# Patient Record
Sex: FEMALE | Race: WHITE | NOT HISPANIC OR LATINO | Employment: OTHER | URBAN - METROPOLITAN AREA
[De-identification: names, ages, dates, MRNs, and addresses within clinical notes are randomized per-mention and may not be internally consistent; named-entity substitution may affect disease eponyms.]

---

## 2017-01-05 RX ORDER — OMEGA-3-ACID ETHYL ESTERS 1 G/1
CAPSULE, LIQUID FILLED ORAL
Qty: 180 CAPSULE | Refills: 0
Start: 2017-01-05

## 2017-01-11 RX ORDER — OMEGA-3-ACID ETHYL ESTERS 1 G/1
CAPSULE, LIQUID FILLED ORAL
Qty: 180 CAPSULE | Refills: 0 | OUTPATIENT
Start: 2017-01-11

## 2017-01-16 RX ORDER — OMEGA-3-ACID ETHYL ESTERS 1 G/1
CAPSULE, LIQUID FILLED ORAL
Qty: 180 CAPSULE | Refills: 0 | Status: SHIPPED | OUTPATIENT
Start: 2017-01-16 | End: 2017-04-17 | Stop reason: SDUPTHER

## 2017-01-16 NOTE — TELEPHONE ENCOUNTER
----- Message from Landy Jordan sent at 2017  1:19 PM CST -----  Contact: SELF  Tonya Bucio  MRN: [id number]  : 1935  PCP: Tasha Atkins  Home Phone      [phone]  Work Phone      Not on file.  Mobile          [phone]      MESSAGE:   PT NEEDS A REFILL ON HER OMEGA 3 ACID    [facility]Y: [facility]    PHONE: [phone]

## 2017-01-18 ENCOUNTER — LAB VISIT (OUTPATIENT)
Dept: LAB | Facility: HOSPITAL | Age: 82
End: 2017-01-18
Attending: INTERNAL MEDICINE
Payer: MEDICARE

## 2017-01-18 DIAGNOSIS — I12.9 HYPERTENSIVE CHRONIC KIDNEY DISEASE: ICD-10-CM

## 2017-01-18 DIAGNOSIS — N18.31 CHRONIC KIDNEY DISEASE (CKD) STAGE G3A/A1, MODERATELY DECREASED GLOMERULAR FILTRATION RATE (GFR) BETWEEN 45-59 ML/MIN/1.73 SQUARE METER AND ALBUMINURIA CREATININE RATIO LESS THAN 30 MG/G: Primary | ICD-10-CM

## 2017-01-18 DIAGNOSIS — E55.9 UNSPECIFIED VITAMIN D DEFICIENCY: ICD-10-CM

## 2017-01-18 LAB
25(OH)D3+25(OH)D2 SERPL-MCNC: 15 NG/ML
ALBUMIN SERPL BCP-MCNC: 3.4 G/DL
ALP SERPL-CCNC: 108 U/L
ALT SERPL W/O P-5'-P-CCNC: 21 U/L
ANION GAP SERPL CALC-SCNC: 10 MMOL/L
AST SERPL-CCNC: 15 U/L
BASOPHILS # BLD AUTO: 0.02 K/UL
BASOPHILS NFR BLD: 0.3 %
BILIRUB SERPL-MCNC: 1.1 MG/DL
BUN SERPL-MCNC: 21 MG/DL
CALCIUM SERPL-MCNC: 10.4 MG/DL
CHLORIDE SERPL-SCNC: 106 MMOL/L
CO2 SERPL-SCNC: 26 MMOL/L
CREAT SERPL-MCNC: 1.3 MG/DL
DIFFERENTIAL METHOD: ABNORMAL
EOSINOPHIL # BLD AUTO: 0.5 K/UL
EOSINOPHIL NFR BLD: 6.8 %
ERYTHROCYTE [DISTWIDTH] IN BLOOD BY AUTOMATED COUNT: 15.2 %
EST. GFR  (AFRICAN AMERICAN): 45 ML/MIN/1.73 M^2
EST. GFR  (NON AFRICAN AMERICAN): 39 ML/MIN/1.73 M^2
GLUCOSE SERPL-MCNC: 115 MG/DL
HCT VFR BLD AUTO: 40.1 %
HGB BLD-MCNC: 12.8 G/DL
LYMPHOCYTES # BLD AUTO: 1.8 K/UL
LYMPHOCYTES NFR BLD: 26 %
MCH RBC QN AUTO: 29.4 PG
MCHC RBC AUTO-ENTMCNC: 31.9 %
MCV RBC AUTO: 92 FL
MONOCYTES # BLD AUTO: 0.6 K/UL
MONOCYTES NFR BLD: 9 %
NEUTROPHILS # BLD AUTO: 4 K/UL
NEUTROPHILS NFR BLD: 57.9 %
PHOSPHATE SERPL-MCNC: 2.3 MG/DL
PLATELET # BLD AUTO: 276 K/UL
PMV BLD AUTO: 10.6 FL
POTASSIUM SERPL-SCNC: 4.1 MMOL/L
PROT SERPL-MCNC: 7.6 G/DL
PTH-INTACT SERPL-MCNC: 401 PG/ML
RBC # BLD AUTO: 4.36 M/UL
SODIUM SERPL-SCNC: 142 MMOL/L
WBC # BLD AUTO: 6.92 K/UL

## 2017-01-18 PROCEDURE — 80053 COMPREHEN METABOLIC PANEL: CPT

## 2017-01-18 PROCEDURE — 82306 VITAMIN D 25 HYDROXY: CPT

## 2017-01-18 PROCEDURE — 36415 COLL VENOUS BLD VENIPUNCTURE: CPT

## 2017-01-18 PROCEDURE — 83970 ASSAY OF PARATHORMONE: CPT

## 2017-01-18 PROCEDURE — 85025 COMPLETE CBC W/AUTO DIFF WBC: CPT

## 2017-01-18 PROCEDURE — 84100 ASSAY OF PHOSPHORUS: CPT

## 2017-01-25 RX ORDER — ROSUVASTATIN CALCIUM 20 MG/1
TABLET, FILM COATED ORAL
Qty: 90 TABLET | Refills: 0 | Status: SHIPPED | OUTPATIENT
Start: 2017-01-25 | End: 2017-02-02 | Stop reason: CLARIF

## 2017-01-25 RX ORDER — ALLOPURINOL 300 MG/1
TABLET ORAL
Qty: 30 TABLET | Refills: 0 | Status: SHIPPED | OUTPATIENT
Start: 2017-01-25 | End: 2017-03-01 | Stop reason: SDUPTHER

## 2017-01-30 DIAGNOSIS — M17.0 PRIMARY OSTEOARTHRITIS OF BOTH KNEES: ICD-10-CM

## 2017-01-30 RX ORDER — HYDROCODONE BITARTRATE AND ACETAMINOPHEN 7.5; 325 MG/1; MG/1
1 TABLET ORAL
Qty: 30 TABLET | Refills: 0 | Status: SHIPPED | OUTPATIENT
Start: 2017-01-30 | End: 2017-02-22 | Stop reason: SDUPTHER

## 2017-01-30 NOTE — TELEPHONE ENCOUNTER
----- Message from Landy Jordan sent at 2017 10:22 AM CST -----  Contact: self  Tonya Bucio  MRN: [id number]  : 1935  PCP: Tasha Atkins  Home Phone      [phone]  Work Phone      Not on file.  Mobile          [phone]      MESSAGE:   Pt needs a refill on her pain meds.    Phone: [phone]

## 2017-01-31 NOTE — TELEPHONE ENCOUNTER
Fax received from [facility] pharmacy indicating that patient's insurance will not cover brand name Crestor. Pharmacy would like to substitute generic so insurance will cover. Please advise. Thanks.

## 2017-02-02 DIAGNOSIS — I10 ESSENTIAL HYPERTENSION: ICD-10-CM

## 2017-02-02 RX ORDER — METOPROLOL TARTRATE 50 MG/1
TABLET ORAL
Qty: 60 TABLET | Refills: 0 | Status: SHIPPED | OUTPATIENT
Start: 2017-02-02 | End: 2017-03-08 | Stop reason: SDUPTHER

## 2017-02-02 RX ORDER — ROSUVASTATIN CALCIUM 20 MG/1
20 TABLET, COATED ORAL DAILY
Qty: 90 TABLET | Refills: 1 | Status: SHIPPED | OUTPATIENT
Start: 2017-02-02 | End: 2017-10-05

## 2017-02-02 NOTE — TELEPHONE ENCOUNTER
Requested Prescriptions     Pending Prescriptions Disp Refills    rosuvastatin (CRESTOR) 20 MG tablet 90 tablet 1     Sig: Take 1 tablet (20 mg total) by mouth once daily.   Script pended.

## 2017-02-22 ENCOUNTER — HOSPITAL ENCOUNTER (OUTPATIENT)
Dept: RADIOLOGY | Facility: HOSPITAL | Age: 82
Discharge: HOME | End: 2017-02-22
Attending: INTERNAL MEDICINE
Payer: MEDICARE

## 2017-02-22 ENCOUNTER — OFFICE VISIT (OUTPATIENT)
Dept: INTERNAL MEDICINE | Facility: CLINIC | Age: 82
End: 2017-02-22
Payer: MEDICARE

## 2017-02-22 VITALS
WEIGHT: 277.56 LBS | HEIGHT: 68 IN | DIASTOLIC BLOOD PRESSURE: 64 MMHG | HEART RATE: 61 BPM | BODY MASS INDEX: 42.07 KG/M2 | SYSTOLIC BLOOD PRESSURE: 112 MMHG | OXYGEN SATURATION: 94 % | RESPIRATION RATE: 16 BRPM

## 2017-02-22 DIAGNOSIS — M17.0 PRIMARY OSTEOARTHRITIS OF BOTH KNEES: ICD-10-CM

## 2017-02-22 PROCEDURE — 99999 PR PBB SHADOW E&M-EST. PATIENT-LVL III: CPT | Mod: PBBFAC,,, | Performed by: INTERNAL MEDICINE

## 2017-02-22 PROCEDURE — 1157F ADVNC CARE PLAN IN RCRD: CPT | Mod: S$GLB,,, | Performed by: INTERNAL MEDICINE

## 2017-02-22 PROCEDURE — 3078F DIAST BP <80 MM HG: CPT | Mod: S$GLB,,, | Performed by: INTERNAL MEDICINE

## 2017-02-22 PROCEDURE — 99499 UNLISTED E&M SERVICE: CPT | Mod: S$GLB,,, | Performed by: INTERNAL MEDICINE

## 2017-02-22 PROCEDURE — 99213 OFFICE O/P EST LOW 20 MIN: CPT | Mod: S$GLB,,, | Performed by: INTERNAL MEDICINE

## 2017-02-22 PROCEDURE — 73560 X-RAY EXAM OF KNEE 1 OR 2: CPT | Mod: 26,50,, | Performed by: RADIOLOGY

## 2017-02-22 PROCEDURE — 1159F MED LIST DOCD IN RCRD: CPT | Mod: S$GLB,,, | Performed by: INTERNAL MEDICINE

## 2017-02-22 PROCEDURE — 1160F RVW MEDS BY RX/DR IN RCRD: CPT | Mod: S$GLB,,, | Performed by: INTERNAL MEDICINE

## 2017-02-22 PROCEDURE — 3074F SYST BP LT 130 MM HG: CPT | Mod: S$GLB,,, | Performed by: INTERNAL MEDICINE

## 2017-02-22 PROCEDURE — 73560 X-RAY EXAM OF KNEE 1 OR 2: CPT | Mod: 50,TC

## 2017-02-22 RX ORDER — HYDROCODONE BITARTRATE AND ACETAMINOPHEN 7.5; 325 MG/1; MG/1
1 TABLET ORAL
Qty: 30 TABLET | Refills: 0 | Status: SHIPPED | OUTPATIENT
Start: 2017-02-22 | End: 2017-03-29 | Stop reason: SDUPTHER

## 2017-02-22 RX ORDER — ERGOCALCIFEROL 1.25 MG/1
50000 CAPSULE ORAL
COMMUNITY
End: 2018-05-24

## 2017-02-22 RX ORDER — CINACALCET HYDROCHLORIDE 60 MG/1
TABLET, COATED ORAL
COMMUNITY
Start: 2017-01-27 | End: 2020-11-25 | Stop reason: SDUPTHER

## 2017-02-22 NOTE — PROGRESS NOTES
Subjective:       Patient ID: Tonya Bucio is a 81 y.o. female.    Chief Complaint: Osteoarthritis of both knees (pain med refill)    Chronic Pain  Past Medical History includes: chronic pain syndrome and degenerative joint disease. Patient states that the pain is chronic. Tonya describes pain involving the knee and lumbar spine. The onset of her pain began more than 1 year ago. Progression of pain since onset is waxing and waning. Patient describes pain as a 9/10. Tonya is currently treating her symptoms with hydrocodone/acetaminophen (Hycet, LorcetLortab, Norco, Vicodin). Patient has shown moderate improvement with current treatment.  Goals of therapy include: Improve ADL's and Improve Sleep. Tonya has previously tried hydrocodone/acetaminophen (Hycet, Lorcet, Lortab, Norco,Vicodin) to treat her pain. Associated symptoms include: leg pain. Previous Imaging studies include: X-Ray.  Patient has not had a drug screen. Patient does have a pain contract. Patient's history of substance abuse includes: none. Patient's psychiatric disorders include: none.    Review of Systems   Constitutional: Positive for fatigue. Negative for chills, fever and weight loss.   HENT: Negative for congestion, hearing loss, sinus pressure and sore throat.    Eyes: Negative for photophobia.   Respiratory: Negative for cough, choking, chest tightness, shortness of breath and wheezing.    Cardiovascular: Negative for chest pain and palpitations.   Gastrointestinal: Negative for blood in stool, nausea and vomiting.   Endocrine: Negative for polydipsia and polyphagia.   Genitourinary: Negative for dysuria and hematuria.   Musculoskeletal: Positive for arthralgias, back pain, gait problem and myalgias. Negative for neck pain.   Skin: Negative for pallor.   Neurological: Negative for dizziness, tingling, weakness and numbness.   Hematological: Does not bruise/bleed easily.   Psychiatric/Behavioral: Negative for confusion and suicidal ideas. The  patient is not nervous/anxious.        Objective:      Physical Exam   Constitutional: She is oriented to person, place, and time. She appears well-developed and well-nourished.   HENT:   Head: Normocephalic and atraumatic.   Right Ear: External ear normal.   Left Ear: External ear normal.   Mouth/Throat: Oropharynx is clear and moist.   Eyes: Conjunctivae and EOM are normal. Pupils are equal, round, and reactive to light.   Neck: Normal range of motion. Neck supple. No JVD present. No tracheal deviation present. No thyromegaly present.   Cardiovascular: Normal rate, regular rhythm, normal heart sounds and intact distal pulses.    Pulmonary/Chest: Effort normal and breath sounds normal. No respiratory distress. She has no wheezes. She has no rales. She exhibits no tenderness.   Abdominal: Soft. Bowel sounds are normal. She exhibits no distension and no mass. There is no tenderness. There is no rebound and no guarding.   Musculoskeletal: Normal range of motion. She exhibits no edema.   Bilateral knee oa changes.     Lymphadenopathy:     She has no cervical adenopathy.   Neurological: She is alert and oriented to person, place, and time. She has normal reflexes. No cranial nerve deficit. She exhibits normal muscle tone. Coordination normal.   Skin: Skin is warm and dry.   Psychiatric: She has a normal mood and affect.   Nursing note and vitals reviewed.      Assessment:       1. Primary osteoarthritis of both knees        Plan:   Tonya was seen today for osteoarthritis of both knees.    Diagnoses and all orders for this visit:    Primary osteoarthritis of both knees  -     hydrocodone-acetaminophen 7.5-325mg (NORCO) 7.5-325 mg per tablet; Take 1 tablet by mouth every 24 hours as needed for Pain.    we discussed narcotics for pain management :    Managing chronic pain with opioids is complicated and challenging. I explained to patient that Doctors need to know if patients can follow the treatment plan, if they get  desired responses from the meds, and if there are signs of developing addiction. Physicians use medication contracts to monitor patients adherence, or to help check that patients are compliant with the medications ordered. Such agreements are most commonly used when narcotic pain relievers are prescribed. Narcotics can sometimes become addictive if not taken as prescribed by a doctor.    The use of a pain management agreement allows for the documentation of understanding between a doctor and patient. Such documentation, when used as a means of facilitating care, can improve communication between doctors and patients.

## 2017-02-22 NOTE — MR AVS SNAPSHOT
[facility]  [address] [address]  Phone: [phone]  Fax: [phone]                  Tonya Bucio   2017 3:45 PM   Office Visit    Description:  Female : 1935   Provider:  Tasha Atkins MD   Department:  [facility]           Reason for Visit     Osteoarthritis of both knees           Diagnoses this Visit        Comments    Primary osteoarthritis of both knees                To Do List           Future Appointments        Provider Department Dept Phone    2017 8:00 AM NURSE, [facility] [phone]    2017 10:15 AM Tasha Atkins MD [facility] [phone]      Goals (5 Years of Data)     None      Follow-Up and Disposition     Return in about 3 months (around 2017).       These Medications        Disp Refills Start End    hydrocodone-acetaminophen 7.5-325mg (NORCO) 7.5-325 mg per tablet 30 tablet 0 2017     Take 1 tablet by mouth every 24 hours as needed for Pain. - Oral    Pharmacy: [facility] [address] [place] [address] #: [phone]         [facility]     [facility] Nurse Care Line -  Assistance  Registered nurses in the [facility] On Call Center provide clinical advisement, health education, appointment booking, and other advisory services.  Call for this free service at [phone].             Medications           Message regarding Medications     Verify the changes and/or additions to your medication regime listed below are the same as discussed with your clinician today.  If any of these changes or additions are incorrect, please notify your healthcare provider.             Verify that the below list of medications is an accurate representation of the medications you are currently taking.  If none reported, the list may be blank. If incorrect, please contact your healthcare provider. Carry this list with you in case of  "emergency.           Current Medications     allopurinol (ZYLOPRIM) 300 MG tablet TAKE ONE TABLET BY MOUTH ONCE DAILY    amlodipine (NORVASC) 10 MG tablet Take 10 mg by mouth once daily.     aspirin (ECOTRIN) 81 MG EC tablet Take 162 mg by mouth every other day.     back brace Misc one    blood sugar diagnostic (CONTOUR TEST STRIPS) Strp 1 strips strip bid    ergocalciferol (VITAMIN D2) 50,000 unit Cap Take 50,000 Units by mouth every 7 days.    hydrochlorothiazide (HYDRODIURIL) 25 MG tablet Take 1 tablet (25 mg total) by mouth every other day.    hydrocodone-acetaminophen 7.5-325mg (NORCO) 7.5-325 mg per tablet Take 1 tablet by mouth every 24 hours as needed for Pain.    levothyroxine (SYNTHROID) 75 MCG tablet TAKE ONE TABLET BY MOUTH ONCE DAILY    metoprolol tartrate (LOPRESSOR) 50 MG tablet TAKE ONE TABLET BY MOUTH TWICE DAILY    omega-3 acid ethyl esters (LOVAZA) 1 gram capsule TAKE ONE CAPSULE BY MOUTH TWICE DAILY    rivaroxaban (XARELTO) 20 mg Tab Take 1 tablet (20 mg total) by mouth once daily. To start after 15 mg BID for 21 days completed    rosuvastatin (CRESTOR) 20 MG tablet Take 1 tablet (20 mg total) by mouth once daily.    SENSIPAR 60 mg Tab     triamcinolone acetonide 0.1% (KENALOG) 0.1 % cream APPLY TOPICALLY TWICE DAILY    nitroGLYCERIN (NITROSTAT) 0.4 MG SL tablet Place 0.4 mg under the tongue every 5 (five) minutes as needed for Chest pain.           Clinical Reference Information           Your Vitals Were     BP Pulse Resp Height Weight SpO2    112/64 61 16 5' 8" (1.727 m) 125.9 kg (277 lb 9 oz) 94%    BMI                42.2 kg/m2          Blood Pressure          Most Recent Value    BP  112/64      Allergies as of 2/22/2017     No Known Allergies      Immunizations Administered on Date of Encounter - 2/22/2017     None      Orders Placed During Today's Visit      Normal Orders This Visit    Ambulatory referral to Orthopedics     Future Labs/Procedures Expected by Expires    X-Ray Knee 3 View " Bilateral  2/22/2017 2/22/2018      Language Assistance Services     ATTENTION: Language assistance services are available, free of charge. Please call [phone].      ATENCIÓN: Si habla hzao, tiene a white disposición servicios gratuitos de asistencia lingüística. Llame al [phone].     CHÚ Ý: N?u b?n nói Ti?ng Vi?t, có các d?ch v? h? tr? ngôn ng? mi?n phí dành cho b?n. G?i s? [phone].         [facility] complies with applicable Federal civil rights laws and does not discriminate on the basis of race, color, national origin, age, disability, or sex.

## 2017-02-22 NOTE — PROGRESS NOTES
Patient, Tonya Bucio (MRN #[id number]), presented with a recorded BMI of 42.2 kg/m^2 consistent with the definition of morbid obesity (ICD-10 E66.01). The patient's morbid obesity was monitored, evaluated, addressed and/or treated. This addendum to the medical record is made on 02/22/2017.

## 2017-02-24 ENCOUNTER — TELEPHONE (OUTPATIENT)
Dept: INTERNAL MEDICINE | Facility: CLINIC | Age: 82
End: 2017-02-24

## 2017-03-01 RX ORDER — ALLOPURINOL 300 MG/1
TABLET ORAL
Qty: 30 TABLET | Refills: 0 | Status: SHIPPED | OUTPATIENT
Start: 2017-03-01 | End: 2017-03-29 | Stop reason: SDUPTHER

## 2017-03-08 DIAGNOSIS — I10 ESSENTIAL HYPERTENSION: ICD-10-CM

## 2017-03-09 RX ORDER — METOPROLOL TARTRATE 50 MG/1
TABLET ORAL
Qty: 60 TABLET | Refills: 11 | Status: SHIPPED | OUTPATIENT
Start: 2017-03-09 | End: 2018-03-06 | Stop reason: SDUPTHER

## 2017-03-29 DIAGNOSIS — M17.0 PRIMARY OSTEOARTHRITIS OF BOTH KNEES: ICD-10-CM

## 2017-03-29 RX ORDER — HYDROCODONE BITARTRATE AND ACETAMINOPHEN 7.5; 325 MG/1; MG/1
1 TABLET ORAL
Qty: 30 TABLET | Refills: 0 | Status: SHIPPED | OUTPATIENT
Start: 2017-03-29 | End: 2017-04-26 | Stop reason: SDUPTHER

## 2017-03-29 RX ORDER — ALLOPURINOL 300 MG/1
TABLET ORAL
Qty: 30 TABLET | Refills: 0 | Status: SHIPPED | OUTPATIENT
Start: 2017-03-29 | End: 2017-04-26 | Stop reason: SDUPTHER

## 2017-03-29 NOTE — TELEPHONE ENCOUNTER
----- Message from Shania Casillas sent at 3/29/2017 11:01 AM CDT -----  Contact: self  Tonya Bucio  MRN: [id number]  : 1935  PCP: Tasha Atkins  Home Phone      [phone]  Work Phone      Not on file.  [employer]          [phone]      MESSAGE: katelin-------[phone]

## 2017-04-04 DIAGNOSIS — E11.9 TYPE 2 DIABETES MELLITUS WITHOUT COMPLICATION, UNSPECIFIED LONG TERM INSULIN USE STATUS: Primary | ICD-10-CM

## 2017-04-04 NOTE — TELEPHONE ENCOUNTER
----- Message from Landy Jordan sent at 2017  4:28 PM CDT -----  Contact: self  Tonya Bucio  MRN: [id number]  : 1935  PCP: Tasha Atkins  Home Phone      [phone]  Work Phone      Not on file.  Mobile          [phone]      MESSAGE:   Pt needs to get her diabetic supplies (meter, lancets, strips) sent in to [facility].     Phone: [phone]    Pharmacy: [facility] [place]

## 2017-04-17 RX ORDER — OMEGA-3-ACID ETHYL ESTERS 1 G/1
CAPSULE, LIQUID FILLED ORAL
Qty: 180 CAPSULE | Refills: 0 | Status: SHIPPED | OUTPATIENT
Start: 2017-04-17 | End: 2017-07-12 | Stop reason: SDUPTHER

## 2017-04-26 DIAGNOSIS — M17.0 PRIMARY OSTEOARTHRITIS OF BOTH KNEES: ICD-10-CM

## 2017-04-26 RX ORDER — HYDROCODONE BITARTRATE AND ACETAMINOPHEN 7.5; 325 MG/1; MG/1
1 TABLET ORAL
Qty: 30 TABLET | Refills: 0 | Status: SHIPPED | OUTPATIENT
Start: 2017-04-26 | End: 2017-05-30 | Stop reason: SDUPTHER

## 2017-04-26 RX ORDER — ALLOPURINOL 300 MG/1
TABLET ORAL
Qty: 30 TABLET | Refills: 11 | Status: SHIPPED | OUTPATIENT
Start: 2017-04-26 | End: 2018-04-30 | Stop reason: SDUPTHER

## 2017-04-26 NOTE — TELEPHONE ENCOUNTER
----- Message from Landy Jordan sent at 2017 10:52 AM CDT -----  Contact: self  Tonya Bucio  MRN: [id number]  : 1935  PCP: Tasha Atkins  Home Phone      [phone]  Work Phone      Not on file.  Mobile          [phone]      MESSAGE:   Pt needs to get a refill on her pain meds.    Phone: [phone]

## 2017-05-11 ENCOUNTER — CLINICAL SUPPORT (OUTPATIENT)
Dept: INTERNAL MEDICINE | Facility: CLINIC | Age: 82
End: 2017-05-11
Payer: MEDICARE

## 2017-05-11 DIAGNOSIS — E11.69 CONTROLLED TYPE 2 DIABETES MELLITUS WITH OTHER SPECIFIED COMPLICATION, WITHOUT LONG-TERM CURRENT USE OF INSULIN: ICD-10-CM

## 2017-05-11 DIAGNOSIS — I10 ESSENTIAL HYPERTENSION: ICD-10-CM

## 2017-05-11 DIAGNOSIS — E78.5 HYPERLIPIDEMIA, UNSPECIFIED HYPERLIPIDEMIA TYPE: ICD-10-CM

## 2017-05-11 LAB
ALBUMIN SERPL BCP-MCNC: 3.3 G/DL
ALP SERPL-CCNC: 95 U/L
ALT SERPL W/O P-5'-P-CCNC: 24 U/L
ANION GAP SERPL CALC-SCNC: 13 MMOL/L
AST SERPL-CCNC: 18 U/L
BASOPHILS # BLD AUTO: 0.03 K/UL
BASOPHILS NFR BLD: 0.4 %
BILIRUB SERPL-MCNC: 1 MG/DL
BUN SERPL-MCNC: 23 MG/DL
CALCIUM SERPL-MCNC: 10.5 MG/DL
CHLORIDE SERPL-SCNC: 106 MMOL/L
CHOLEST/HDLC SERPL: 3.8 {RATIO}
CO2 SERPL-SCNC: 25 MMOL/L
CREAT SERPL-MCNC: 1.1 MG/DL
CREAT UR-MCNC: 55.4 MG/DL
DIFFERENTIAL METHOD: ABNORMAL
EOSINOPHIL # BLD AUTO: 0.4 K/UL
EOSINOPHIL NFR BLD: 5.4 %
ERYTHROCYTE [DISTWIDTH] IN BLOOD BY AUTOMATED COUNT: 15.1 %
EST. GFR  (AFRICAN AMERICAN): 55 ML/MIN/1.73 M^2
EST. GFR  (NON AFRICAN AMERICAN): 48 ML/MIN/1.73 M^2
GLUCOSE SERPL-MCNC: 112 MG/DL
HCT VFR BLD AUTO: 38.8 %
HDL/CHOLESTEROL RATIO: 26.4 %
HDLC SERPL-MCNC: 140 MG/DL
HDLC SERPL-MCNC: 37 MG/DL
HGB BLD-MCNC: 12.5 G/DL
LDLC SERPL CALC-MCNC: 53.4 MG/DL
LYMPHOCYTES # BLD AUTO: 2.1 K/UL
LYMPHOCYTES NFR BLD: 27.7 %
MCH RBC QN AUTO: 30.1 PG
MCHC RBC AUTO-ENTMCNC: 32.2 %
MCV RBC AUTO: 94 FL
MICROALBUMIN UR DL<=1MG/L-MCNC: 19 UG/ML
MICROALBUMIN/CREATININE RATIO: 34.3 UG/MG
MONOCYTES # BLD AUTO: 0.6 K/UL
MONOCYTES NFR BLD: 8.2 %
NEUTROPHILS # BLD AUTO: 4.4 K/UL
NEUTROPHILS NFR BLD: 58.3 %
NONHDLC SERPL-MCNC: 103 MG/DL
PLATELET # BLD AUTO: 275 K/UL
PMV BLD AUTO: 11.3 FL
POTASSIUM SERPL-SCNC: 4 MMOL/L
PROT SERPL-MCNC: 7.6 G/DL
RBC # BLD AUTO: 4.15 M/UL
SODIUM SERPL-SCNC: 144 MMOL/L
TRIGL SERPL-MCNC: 248 MG/DL
TSH SERPL DL<=0.005 MIU/L-ACNC: 2.67 UIU/ML
WBC # BLD AUTO: 7.45 K/UL

## 2017-05-11 PROCEDURE — 80053 COMPREHEN METABOLIC PANEL: CPT

## 2017-05-11 PROCEDURE — 83036 HEMOGLOBIN GLYCOSYLATED A1C: CPT

## 2017-05-11 PROCEDURE — 85025 COMPLETE CBC W/AUTO DIFF WBC: CPT

## 2017-05-11 PROCEDURE — 36415 COLL VENOUS BLD VENIPUNCTURE: CPT | Mod: S$GLB,,, | Performed by: INTERNAL MEDICINE

## 2017-05-11 PROCEDURE — 84443 ASSAY THYROID STIM HORMONE: CPT

## 2017-05-11 PROCEDURE — 80061 LIPID PANEL: CPT

## 2017-05-11 PROCEDURE — 82570 ASSAY OF URINE CREATININE: CPT

## 2017-05-12 LAB
ESTIMATED AVG GLUCOSE: 131 MG/DL
HBA1C MFR BLD HPLC: 6.2 %

## 2017-05-18 ENCOUNTER — LAB VISIT (OUTPATIENT)
Dept: LAB | Facility: HOSPITAL | Age: 82
End: 2017-05-18
Attending: INTERNAL MEDICINE
Payer: MEDICARE

## 2017-05-18 ENCOUNTER — OFFICE VISIT (OUTPATIENT)
Dept: INTERNAL MEDICINE | Facility: CLINIC | Age: 82
End: 2017-05-18
Payer: MEDICARE

## 2017-05-18 VITALS
HEART RATE: 70 BPM | RESPIRATION RATE: 16 BRPM | OXYGEN SATURATION: 96 % | WEIGHT: 283.06 LBS | HEIGHT: 68 IN | SYSTOLIC BLOOD PRESSURE: 136 MMHG | DIASTOLIC BLOOD PRESSURE: 62 MMHG | BODY MASS INDEX: 42.9 KG/M2

## 2017-05-18 DIAGNOSIS — E78.5 HYPERLIPIDEMIA, UNSPECIFIED HYPERLIPIDEMIA TYPE: ICD-10-CM

## 2017-05-18 DIAGNOSIS — N18.30 TYPE 2 DIABETES MELLITUS WITH STAGE 3 CHRONIC KIDNEY DISEASE, WITHOUT LONG-TERM CURRENT USE OF INSULIN: ICD-10-CM

## 2017-05-18 DIAGNOSIS — E11.22 TYPE 2 DIABETES MELLITUS WITH STAGE 3 CHRONIC KIDNEY DISEASE, WITHOUT LONG-TERM CURRENT USE OF INSULIN: ICD-10-CM

## 2017-05-18 DIAGNOSIS — E66.01 MORBID OBESITY WITH BMI OF 40.0-44.9, ADULT: ICD-10-CM

## 2017-05-18 DIAGNOSIS — I12.9 UNSPECIFIED HYPERTENSIVE KIDNEY DISEASE WITH CHRONIC KIDNEY DISEASE STAGE I THROUGH STAGE IV, OR UNSPECIFIED(403.90): ICD-10-CM

## 2017-05-18 DIAGNOSIS — I10 ESSENTIAL HYPERTENSION: Primary | ICD-10-CM

## 2017-05-18 DIAGNOSIS — N18.30 CHRONIC KIDNEY DISEASE, STAGE III (MODERATE): Primary | ICD-10-CM

## 2017-05-18 DIAGNOSIS — E03.9 ACQUIRED HYPOTHYROIDISM: ICD-10-CM

## 2017-05-18 LAB
ALBUMIN SERPL BCP-MCNC: 3.4 G/DL
ALP SERPL-CCNC: 94 U/L
ALT SERPL W/O P-5'-P-CCNC: 26 U/L
ANION GAP SERPL CALC-SCNC: 14 MMOL/L
AST SERPL-CCNC: 16 U/L
BASOPHILS # BLD AUTO: 0.03 K/UL
BASOPHILS NFR BLD: 0.3 %
BILIRUB SERPL-MCNC: 1.1 MG/DL
BUN SERPL-MCNC: 27 MG/DL
CALCIUM SERPL-MCNC: 10.7 MG/DL
CHLORIDE SERPL-SCNC: 105 MMOL/L
CO2 SERPL-SCNC: 24 MMOL/L
CREAT SERPL-MCNC: 1.3 MG/DL
DIFFERENTIAL METHOD: ABNORMAL
EOSINOPHIL # BLD AUTO: 0.4 K/UL
EOSINOPHIL NFR BLD: 4.2 %
ERYTHROCYTE [DISTWIDTH] IN BLOOD BY AUTOMATED COUNT: 14.9 %
EST. GFR  (AFRICAN AMERICAN): 45 ML/MIN/1.73 M^2
EST. GFR  (NON AFRICAN AMERICAN): 39 ML/MIN/1.73 M^2
GLUCOSE SERPL-MCNC: 115 MG/DL
HCT VFR BLD AUTO: 39.9 %
HGB BLD-MCNC: 13 G/DL
LYMPHOCYTES # BLD AUTO: 2.2 K/UL
LYMPHOCYTES NFR BLD: 24 %
MCH RBC QN AUTO: 30.4 PG
MCHC RBC AUTO-ENTMCNC: 32.6 %
MCV RBC AUTO: 93 FL
MONOCYTES # BLD AUTO: 0.7 K/UL
MONOCYTES NFR BLD: 7.4 %
NEUTROPHILS # BLD AUTO: 5.8 K/UL
NEUTROPHILS NFR BLD: 64.1 %
PHOSPHATE SERPL-MCNC: 2.8 MG/DL
PLATELET # BLD AUTO: 299 K/UL
PMV BLD AUTO: 10.3 FL
POTASSIUM SERPL-SCNC: 4.1 MMOL/L
PROT SERPL-MCNC: 7.6 G/DL
PTH-INTACT SERPL-MCNC: 321 PG/ML
RBC # BLD AUTO: 4.27 M/UL
SODIUM SERPL-SCNC: 143 MMOL/L
WBC # BLD AUTO: 9.04 K/UL

## 2017-05-18 PROCEDURE — 3075F SYST BP GE 130 - 139MM HG: CPT | Mod: S$GLB,,, | Performed by: INTERNAL MEDICINE

## 2017-05-18 PROCEDURE — 36415 COLL VENOUS BLD VENIPUNCTURE: CPT

## 2017-05-18 PROCEDURE — 1159F MED LIST DOCD IN RCRD: CPT | Mod: S$GLB,,, | Performed by: INTERNAL MEDICINE

## 2017-05-18 PROCEDURE — 80053 COMPREHEN METABOLIC PANEL: CPT

## 2017-05-18 PROCEDURE — 1160F RVW MEDS BY RX/DR IN RCRD: CPT | Mod: S$GLB,,, | Performed by: INTERNAL MEDICINE

## 2017-05-18 PROCEDURE — 83970 ASSAY OF PARATHORMONE: CPT

## 2017-05-18 PROCEDURE — 99214 OFFICE O/P EST MOD 30 MIN: CPT | Mod: S$GLB,,, | Performed by: INTERNAL MEDICINE

## 2017-05-18 PROCEDURE — 84100 ASSAY OF PHOSPHORUS: CPT

## 2017-05-18 PROCEDURE — 85025 COMPLETE CBC W/AUTO DIFF WBC: CPT

## 2017-05-18 PROCEDURE — 99499 UNLISTED E&M SERVICE: CPT | Mod: S$GLB,,, | Performed by: INTERNAL MEDICINE

## 2017-05-18 PROCEDURE — 99999 PR PBB SHADOW E&M-EST. PATIENT-LVL III: CPT | Mod: PBBFAC,,, | Performed by: INTERNAL MEDICINE

## 2017-05-18 PROCEDURE — 3078F DIAST BP <80 MM HG: CPT | Mod: S$GLB,,, | Performed by: INTERNAL MEDICINE

## 2017-05-18 RX ORDER — HYDROCHLOROTHIAZIDE 12.5 MG/1
TABLET ORAL
COMMUNITY
Start: 2017-04-19 | End: 2017-10-05

## 2017-05-18 NOTE — MR AVS SNAPSHOT
[place] [facility]  [address] [address]  Phone: [phone]  Fax: [phone]                  Tonya Bucio   2017 10:15 AM   Office Visit    Description:  Female : 1935   Provider:  Tasha Atkins MD   Department:  [place] [facility]           Reason for Visit     Hypertension     Hyperlipidemia     Arthritis     Thyroid Problem     Diabetes           Diagnoses this Visit        Comments    Essential hypertension    -  Primary     Type 2 diabetes mellitus with stage 3 chronic kidney disease, without long-term current use of insulin         Acquired hypothyroidism         Hyperlipidemia, unspecified hyperlipidemia type         Morbid obesity with BMI of 40.0-44.9, adult                To Do List           Goals (5 Years of Data)     None      Follow-Up and Disposition     Return in about 6 months (around 2017).      [facility]     [facility] On [facility] Nurse Care Line -  Assistance  Unless otherwise directed by your provider, please contact [facility], our nurse care line that is available for  assistance.     Registered nurses in the [facility] On Call Center provide: appointment scheduling, clinical advisement, health education, and other advisory services.  Call: [phone] (toll free)               Medications           Message regarding Medications     Verify the changes and/or additions to your medication regime listed below are the same as discussed with your clinician today.  If any of these changes or additions are incorrect, please notify your healthcare provider.             Verify that the below list of medications is an accurate representation of the medications you are currently taking.  If none reported, the list may be blank. If incorrect, please contact your healthcare provider. Carry this list with you in case of emergency.           Current Medications     allopurinol (ZYLOPRIM) 300 MG tablet TAKE ONE TABLET BY  "MOUTH ONCE DAILY    amlodipine (NORVASC) 10 MG tablet Take 10 mg by mouth once daily.     aspirin (ECOTRIN) 81 MG EC tablet Take 162 mg by mouth every other day.     back brace [facility] one    diabetic supplies, [employer]. [facility] TRUE METRIX GLUCOMETER. USE AS DIRECTED TO TEST BLOOD SUGAR ONCE DAILY    diabetic supplies, [employer]. Misc TRUE METRIX TEST STRIPS. USE AS DIRECTED TO TEST BLOOD SUGAR DAILY.    diabetic supplies, [facility] LANCETS. USE AS DIRECTED TO TEST BLOOD SUGAR DAILY.    ergocalciferol (VITAMIN D2) 50,000 unit Cap Take 50,000 Units by mouth every 7 days.    hydrochlorothiazide (HYDRODIURIL) 12.5 MG Tab     hydrocodone-acetaminophen 7.5-325mg (NORCO) 7.5-325 mg per tablet Take 1 tablet by mouth every 24 hours as needed for Pain.    levothyroxine (SYNTHROID) 75 MCG tablet TAKE ONE TABLET BY MOUTH ONCE DAILY    metoprolol tartrate (LOPRESSOR) 50 MG tablet TAKE ONE TABLET BY MOUTH TWICE DAILY    omega-3 acid ethyl esters (LOVAZA) 1 gram capsule TAKE ONE CAPSULE BY MOUTH TWICE DAILY    rivaroxaban (XARELTO) 20 mg Tab Take 1 tablet (20 mg total) by mouth once daily. To start after 15 mg BID for 21 days completed    rosuvastatin (CRESTOR) 20 MG tablet Take 1 tablet (20 mg total) by mouth once daily.    SENSIPAR 60 mg Tab     triamcinolone acetonide 0.1% (KENALOG) 0.1 % cream APPLY TOPICALLY TWICE DAILY    nitroGLYCERIN (NITROSTAT) 0.4 MG SL tablet Place 0.4 mg under the tongue every 5 (five) minutes as needed for Chest pain.           Clinical Reference Information           Your Vitals Were     BP Pulse Resp Height Weight SpO2    136/62 70 16 5' 8" (1.727 m) 128.4 kg (283 lb 1.1 oz) 96%    BMI                43.04 kg/m2          Blood Pressure          Most Recent Value    BP  136/62      Allergies as of 5/18/2017     No Known Allergies      Immunizations Administered on Date of Encounter - 5/18/2017     None      Orders Placed During Today's Visit     Future Labs/Procedures Expected by Expires    CBC " auto differential  11/14/2017 (Approximate) 5/18/2018    Comprehensive metabolic panel  11/14/2017 (Approximate) 5/18/2018    Hemoglobin A1c  11/14/2017 (Approximate) 5/18/2018    Lipid panel  11/14/2017 (Approximate) 5/18/2018    Microalbumin/creatinine urine ratio  11/14/2017 (Approximate) 5/18/2018    TSH  11/14/2017 (Approximate) 5/18/2018      Language Assistance Services     ATTENTION: Language assistance services are available, free of charge. Please call [phone].      ATENCIÓN: Si habla español, tiene a white disposición servicios gratuitos de asistencia lingüística. Llame al [phone].     CHÚ Ý: N?u b?n nói Ti?ng Vi?t, có các d?ch v? h? tr? ngôn ng? mi?n phí dành cho b?n. G?i s? [phone].         [facility] complies with applicable Federal civil rights laws and does not discriminate on the basis of race, color, national origin, age, disability, or sex.

## 2017-05-18 NOTE — PROGRESS NOTES
"Subjective:       Patient ID: Tonya Bucio is a 81 y.o. female.    Chief Complaint: Hypertension (follow up with lab review); Hyperlipidemia; Arthritis; Thyroid Problem; and Diabetes    HPI Comments: Tonya Bucio is a 81 y.o. female who presents for Type II DM, Hypertension, and Hyperlipidemia follow up. Labs were reviewed with patient today.      Hypertension   This is a chronic problem. The current episode started more than 1 year ago. The problem has been gradually worsening since onset. The problem is controlled. Pertinent negatives include no chest pain, neck pain, palpitations or shortness of breath. Past treatments include calcium channel blockers and beta blockers. Hypertensive end-organ damage includes a thyroid problem.   Hyperlipidemia   This is a chronic problem. The current episode started more than 1 year ago. The problem is controlled. Recent lipid tests were reviewed and are low. Exacerbating diseases include obesity. Associated symptoms include myalgias. Pertinent negatives include no chest pain, leg pain or shortness of breath.   Arthritis   Presents for follow-up (R hand swollen for 2 -3 weeks . " my gout is acting Up ") visit. Associated symptoms include fatigue and rash. Pertinent negatives include no dysuria or fever. Compliance with total regimen is 51-75%.   Thyroid Problem   Presents for follow-up visit. Symptoms include fatigue. Patient reports no anxiety or palpitations. Her past medical history is significant for hyperlipidemia.   Diabetes   She presents for her follow-up diabetic visit. She has type 2 diabetes mellitus. There are no hypoglycemic associated symptoms. Pertinent negatives for hypoglycemia include no confusion, dizziness, nervousness/anxiousness or pallor. Associated symptoms include fatigue. Pertinent negatives for diabetes include no chest pain, no polydipsia, no polyphagia and no weakness. There are no hypoglycemic complications. Symptoms are stable. There are no " diabetic complications. Current diabetic treatment includes diet. She is following a diabetic diet. Her breakfast blood glucose range is generally 110-130 mg/dl. Her dinner blood glucose range is generally  mg/dl. An ACE inhibitor/angiotensin II receptor blocker is being taken.   Head and Neck Mass:   Chronicity:  New (R clavicuomandibular area swelling : for 2 weeks . non tneder , no heat ; no pain ; no injury ; walks with walker )   Associated symptoms: myalgias.  No sore throat, no chills, no fever, no shortness of breath and no sore throat.Aggravated by:  Nothing  Treatments tried:  Nothing  Medication Refill   Associated symptoms include arthralgias, fatigue, myalgias and a rash. Pertinent negatives include no chest pain, chills, congestion, coughing, fever, nausea, neck pain, numbness, sore throat, vomiting or weakness.     Review of Systems   Constitutional: Positive for fatigue. Negative for chills and fever.   HENT: Negative for congestion and sore throat.    Respiratory: Negative for cough and shortness of breath.    Cardiovascular: Negative for chest pain and palpitations.   Gastrointestinal: Negative for nausea and vomiting.   Endocrine: Negative for polydipsia and polyphagia.   Genitourinary: Negative for dysuria.   Musculoskeletal: Positive for arthralgias, arthritis and myalgias. Negative for neck pain.   Skin: Positive for rash. Negative for pallor.   Neurological: Negative for dizziness, weakness and numbness.   Psychiatric/Behavioral: Negative for confusion. The patient is not nervous/anxious.        Objective:      Physical Exam   Constitutional: She is oriented to person, place, and time. She appears well-developed and well-nourished.   HENT:   Head: Normocephalic and atraumatic.   Right Ear: External ear normal.   Left Ear: External ear normal.   Mouth/Throat: Oropharynx is clear and moist.   Eyes: Conjunctivae and EOM are normal. Pupils are equal, round, and reactive to light.   Neck:  Normal range of motion. Neck supple. No JVD present. No tracheal deviation present. No thyromegaly present.   Cardiovascular: Normal rate, regular rhythm, normal heart sounds and intact distal pulses.        Pulmonary/Chest: Effort normal and breath sounds normal. No respiratory distress. She has no wheezes. She has no rales. She exhibits no tenderness.   Abdominal: Soft. Bowel sounds are normal. She exhibits no distension and no mass. There is no tenderness. There is no rebound and no guarding.   Musculoskeletal: Normal range of motion. She exhibits no edema.   Bilateral knee oa changes.     Feet:   Left Foot:   Protective Sensation: 6 sites sensed.   Lymphadenopathy:     She has no cervical adenopathy.   Neurological: She is alert and oriented to person, place, and time. She has normal reflexes. No cranial nerve deficit. She exhibits normal muscle tone. Coordination normal.   Skin: Skin is warm and dry.        R clavicular head where it meets manubrium is slightly swollen; no hyperemia, no tenderness   Psychiatric: She has a normal mood and affect.   Nursing note and vitals reviewed.      Assessment:       1. Essential hypertension    2. Type 2 diabetes mellitus with stage 3 chronic kidney disease, without long-term current use of insulin    3. Acquired hypothyroidism    4. Hyperlipidemia, unspecified hyperlipidemia type    5. Morbid obesity with BMI of 40.0-44.9, adult        Plan:   Tonya was seen today for hypertension, hyperlipidemia, arthritis, thyroid problem and diabetes.    Diagnoses and all orders for this visit:    Essential hypertension  -     CBC auto differential; Future  -     Comprehensive metabolic panel; Future    Well controlled.  Continue same medication and dose.  1. Keep weight close to ideal body weight.   2.   Avoid high salt foods (olives, pickles, smoked meats, salted potato chips, etc.).   Do not add salt to your food at the table.   Use only small amounts of salt when cooking.   3. Begin  an exercise program. Discuss with your doctor what type of exercise program would be best for you. It doesn't have to be difficult. Even brisk walking for 20 minutes three times a week is a good form of exercise.   4. Avoid medicines which contain heart stimulants. This includes many cold and sinus decongestant pills and sprays as well as diet pills. Check the warnings about hypertension on the label. Stimulants such as amphetamine or cocaine could be lethal for someone with hypertension. Never take these.    Type 2 diabetes mellitus with stage 3 chronic kidney disease, without long-term current use of insulin  -     Hemoglobin A1c; Future  -     Microalbumin/creatinine urine ratio; Future  Patient has uncontrolled Diabetes .  We discussed about diet ;low in calories. Avoid sweats, sodas.  Also increasing activity;walking 2-3 miles a day.  I also adjusted medications and gave patient  instructions about adherence to plan.  Goal of  A1c  less than 7 % stressed.  Also goal of LDL less than 70 highlighted to patient.    Acquired hypothyroidism  -     TSH; Future  Well controlled.  Continue same medication and dose.  Hyperlipidemia, unspecified hyperlipidemia type  -     Lipid panel; Future  Limit the cholesterol in your diet to less than 300 mg per day.   Fats should contribute no more than 20 to 35% of your daily calories.   Less than 7 to 10% of your calories should come from saturated fat.   Avoid saturated fat products e.g., Butter, some oils, meat, and poultry fat contain a lot of saturated fat.   Check food labels for fat and cholesterol content. Choose the foods with less fat per serving.   Limit the amount of butter and margarine you eat.   Use salad dressings and margarine made with polyunsaturated and monounsaturated fats.   Use egg whites or egg substitutes rather than whole eggs.   Replace whole-milk dairy products with nonfat or low-fat milk, cheese, spreads, and yogurt.   Eat skinless chicken, turkey,  fish, and meatless entrees more often than red meat.   Choose lean cuts of meat and trim off all visible fat. Keep portion sizes moderate.   Avoid fatty desserts such as ice cream, cream-filled cakes, and cheesecakes. Choose fresh fruits, nonfat frozen yogurt, Popsicles, etc.   Reduce the amount of fried foods, vending machine food, and fast food you eat.   Eat fruits and vegetables (especially fresh fruits and leafy vegetables), beans, and whole grains daily. The fiber in these foods helps lower cholesterol.   Look for low-fat or nonfat varieties of the foods you like to eat, or look for substitutes.   You may need to exercise 60 minutes a day to prevent weight gain and 90 minutes a day to lose weight.  Morbid obesity with BMI of 40.0-44.9, adult    # The patient is asked to make an attempt to improve diet and exercise patterns to aid in medical management of this problem.     # Eat  5 small meals a day.     # Cut out high carbohydrate  foods : bread, rice, pasta, potatoes.     # Exercise/walk 5x/week for at least 30-45  minutes.

## 2017-05-30 DIAGNOSIS — M17.0 PRIMARY OSTEOARTHRITIS OF BOTH KNEES: ICD-10-CM

## 2017-05-30 RX ORDER — HYDROCODONE BITARTRATE AND ACETAMINOPHEN 7.5; 325 MG/1; MG/1
1 TABLET ORAL
Qty: 30 TABLET | Refills: 0 | Status: SHIPPED | OUTPATIENT
Start: 2017-05-30 | End: 2017-06-28 | Stop reason: SDUPTHER

## 2017-06-28 DIAGNOSIS — M17.0 PRIMARY OSTEOARTHRITIS OF BOTH KNEES: ICD-10-CM

## 2017-06-28 RX ORDER — HYDROCODONE BITARTRATE AND ACETAMINOPHEN 7.5; 325 MG/1; MG/1
1 TABLET ORAL
Qty: 30 TABLET | Refills: 0 | Status: SHIPPED | OUTPATIENT
Start: 2017-06-28 | End: 2017-07-26 | Stop reason: SDUPTHER

## 2017-06-28 NOTE — TELEPHONE ENCOUNTER
----- Message from Shania Casillas sent at 2017 10:52 AM CDT -----  Contact: self  Tonya Bucio  MRN: [id number]  : 1935  PCP: Tasha Atkins  Home Phone      [phone]  Work Phone      Not on file.  [employer]          [phone]      MESSAGE: katelin-------[phone]

## 2017-07-12 ENCOUNTER — OFFICE VISIT (OUTPATIENT)
Dept: INTERNAL MEDICINE | Facility: CLINIC | Age: 82
End: 2017-07-12
Payer: MEDICARE

## 2017-07-12 VITALS
OXYGEN SATURATION: 96 % | HEART RATE: 65 BPM | RESPIRATION RATE: 16 BRPM | WEIGHT: 285.25 LBS | DIASTOLIC BLOOD PRESSURE: 68 MMHG | HEIGHT: 68 IN | BODY MASS INDEX: 43.23 KG/M2 | SYSTOLIC BLOOD PRESSURE: 122 MMHG

## 2017-07-12 DIAGNOSIS — Z02.4 DRIVER'S PERMIT PE (PHYSICAL EXAMINATION): Primary | ICD-10-CM

## 2017-07-12 PROCEDURE — 99213 OFFICE O/P EST LOW 20 MIN: CPT | Mod: S$GLB,,, | Performed by: INTERNAL MEDICINE

## 2017-07-12 PROCEDURE — 1159F MED LIST DOCD IN RCRD: CPT | Mod: S$GLB,,, | Performed by: INTERNAL MEDICINE

## 2017-07-12 PROCEDURE — 99999 PR PBB SHADOW E&M-EST. PATIENT-LVL III: CPT | Mod: PBBFAC,,, | Performed by: INTERNAL MEDICINE

## 2017-07-12 RX ORDER — LANCETS 33 GAUGE
EACH MISCELLANEOUS
COMMUNITY
Start: 2017-04-04 | End: 2018-03-07 | Stop reason: SDUPTHER

## 2017-07-12 RX ORDER — CALCIUM CITRATE/VITAMIN D3 200MG-6.25
TABLET ORAL
COMMUNITY
Start: 2017-07-05 | End: 2018-03-07 | Stop reason: SDUPTHER

## 2017-07-12 RX ORDER — TRIAMCINOLONE ACETONIDE 1 MG/G
CREAM TOPICAL
Qty: 80 G | Refills: 1 | Status: SHIPPED | OUTPATIENT
Start: 2017-07-12 | End: 2019-08-14

## 2017-07-12 RX ORDER — OMEGA-3-ACID ETHYL ESTERS 1 G/1
1 CAPSULE, LIQUID FILLED ORAL 2 TIMES DAILY
Qty: 180 CAPSULE | Refills: 0 | Status: SHIPPED | OUTPATIENT
Start: 2017-07-12 | End: 2017-10-17 | Stop reason: SDUPTHER

## 2017-07-12 RX ORDER — BLOOD-GLUCOSE METER
EACH MISCELLANEOUS
COMMUNITY
Start: 2017-04-04 | End: 2018-03-07 | Stop reason: SDUPTHER

## 2017-07-12 NOTE — PROGRESS NOTES
Subjective:       Patient ID: Tonya Bucio is a 82 y.o. female.    Chief Complaint: Medical Examination Form (dmv licence )    Tonya Bucio is a 80 y.o. female  Here for  physical.    She reports walks with cane ;went for [employer] and was sent home to have a physical      Review of Systems   Constitutional: Positive for fatigue. Negative for chills and fever.   HENT: Negative for congestion, hearing loss, sinus pressure and sore throat.    Eyes: Negative for photophobia.   Respiratory: Negative for cough, choking, chest tightness, shortness of breath and wheezing.    Cardiovascular: Negative for chest pain and palpitations.   Gastrointestinal: Negative for blood in stool, nausea and vomiting.   Endocrine: Negative for polydipsia and polyphagia.   Genitourinary: Negative for dysuria and hematuria.   Musculoskeletal: Positive for arthralgias, back pain, gait problem and myalgias. Negative for neck pain.   Skin: Positive for rash. Negative for pallor.        R leg chronic hyperemia ; ch venous congestion with possible cellulitis   Neurological: Negative for dizziness, weakness and numbness.   Hematological: Does not bruise/bleed easily.   Psychiatric/Behavioral: Negative for confusion and suicidal ideas. The patient is not nervous/anxious.        Objective:      Physical Exam   Constitutional: She is oriented to person, place, and time. She appears well-developed and well-nourished.   HENT:   Head: Normocephalic and atraumatic.   Right Ear: External ear normal.   Left Ear: External ear normal.   Mouth/Throat: Oropharynx is clear and moist.   Eyes: Conjunctivae and EOM are normal. Pupils are equal, round, and reactive to light.   Neck: Normal range of motion. Neck supple. No JVD present. No tracheal deviation present. No thyromegaly present.   Cardiovascular: Normal rate, regular rhythm, normal heart sounds and intact distal pulses.    Pulmonary/Chest: Effort normal and breath sounds normal. No respiratory  distress. She has no wheezes. She has no rales. She exhibits no tenderness.   Abdominal: Soft. Bowel sounds are normal. She exhibits no distension and no mass. There is no tenderness. There is no rebound and no guarding.   Musculoskeletal: Normal range of motion. She exhibits no edema.   Bilateral knee oa changes.     Lymphadenopathy:     She has no cervical adenopathy.   Neurological: She is alert and oriented to person, place, and time. She has normal reflexes. No cranial nerve deficit. She exhibits normal muscle tone. Coordination normal.   Skin: Skin is warm and dry.   R leg chronic hyperemia ; ch venous congestion    Psychiatric: She has a normal mood and affect.   Nursing note and vitals reviewed.      Assessment:       1. 's permit PE (physical examination)        Plan:   Tonya was seen today for medical examination form.    Diagnoses and all orders for this visit:    's permit PE (physical examination)    paperwork completed.      's permit PE (physical examination)    Other orders  -     omega-3 acid ethyl esters (LOVAZA) 1 gram capsule; Take 1 capsule (1 g total) by mouth 2 (two) times daily.  Dispense: 180 capsule; Refill: 0  -     triamcinolone acetonide 0.1% (KENALOG) 0.1 % cream; APPLY TOPICALLY TWICE DAILY  Dispense: 80 g; Refill: 1

## 2017-07-13 DIAGNOSIS — R07.9 CHEST PAIN, UNSPECIFIED: ICD-10-CM

## 2017-07-17 RX ORDER — NITROGLYCERIN 0.4 MG/1
TABLET SUBLINGUAL
Qty: 25 TABLET | Refills: 0 | Status: SHIPPED | OUTPATIENT
Start: 2017-07-17 | End: 2019-08-14

## 2017-07-26 DIAGNOSIS — M17.0 PRIMARY OSTEOARTHRITIS OF BOTH KNEES: ICD-10-CM

## 2017-07-26 RX ORDER — HYDROCODONE BITARTRATE AND ACETAMINOPHEN 7.5; 325 MG/1; MG/1
1 TABLET ORAL
Qty: 30 TABLET | Refills: 0 | Status: SHIPPED | OUTPATIENT
Start: 2017-07-26 | End: 2017-08-30 | Stop reason: SDUPTHER

## 2017-07-26 NOTE — TELEPHONE ENCOUNTER
----- Message from Shania Casillas sent at 2017 11:50 AM CDT -----  Contact: self  Tonya Bucio  MRN: [id number]  : 1935  PCP: Tasha Atkins  Home Phone      [phone]  Work Phone      Not on file.  [employer]          [phone]      MESSAGE: refill---norco-----[phone]

## 2017-08-23 ENCOUNTER — LAB VISIT (OUTPATIENT)
Dept: LAB | Facility: HOSPITAL | Age: 82
End: 2017-08-23
Attending: INTERNAL MEDICINE
Payer: MEDICARE

## 2017-08-23 ENCOUNTER — OFFICE VISIT (OUTPATIENT)
Dept: INTERNAL MEDICINE | Facility: CLINIC | Age: 82
End: 2017-08-23
Payer: MEDICARE

## 2017-08-23 VITALS
DIASTOLIC BLOOD PRESSURE: 56 MMHG | WEIGHT: 285.06 LBS | RESPIRATION RATE: 16 BRPM | SYSTOLIC BLOOD PRESSURE: 130 MMHG | OXYGEN SATURATION: 93 % | HEART RATE: 66 BPM | BODY MASS INDEX: 43.2 KG/M2 | HEIGHT: 68 IN

## 2017-08-23 DIAGNOSIS — K62.5 RECTAL BLEEDING: ICD-10-CM

## 2017-08-23 DIAGNOSIS — K62.5 RECTAL BLEEDING: Primary | ICD-10-CM

## 2017-08-23 LAB
BASOPHILS # BLD AUTO: 0.02 K/UL
BASOPHILS NFR BLD: 0.2 %
DIFFERENTIAL METHOD: NORMAL
EOSINOPHIL # BLD AUTO: 0.2 K/UL
EOSINOPHIL NFR BLD: 1.9 %
ERYTHROCYTE [DISTWIDTH] IN BLOOD BY AUTOMATED COUNT: 14.5 %
HCT VFR BLD AUTO: 41.1 %
HGB BLD-MCNC: 13.2 G/DL
LYMPHOCYTES # BLD AUTO: 3.4 K/UL
LYMPHOCYTES NFR BLD: 28.4 %
MCH RBC QN AUTO: 30 PG
MCHC RBC AUTO-ENTMCNC: 32.1 G/DL
MCV RBC AUTO: 93 FL
MONOCYTES # BLD AUTO: 0.7 K/UL
MONOCYTES NFR BLD: 6.2 %
NEUTROPHILS # BLD AUTO: 7.5 K/UL
NEUTROPHILS NFR BLD: 63.3 %
PLATELET # BLD AUTO: 302 K/UL
PMV BLD AUTO: 10.5 FL
RBC # BLD AUTO: 4.4 M/UL
WBC # BLD AUTO: 11.81 K/UL

## 2017-08-23 PROCEDURE — 1159F MED LIST DOCD IN RCRD: CPT | Mod: S$GLB,,, | Performed by: INTERNAL MEDICINE

## 2017-08-23 PROCEDURE — 36415 COLL VENOUS BLD VENIPUNCTURE: CPT

## 2017-08-23 PROCEDURE — 3008F BODY MASS INDEX DOCD: CPT | Mod: S$GLB,,, | Performed by: INTERNAL MEDICINE

## 2017-08-23 PROCEDURE — 3078F DIAST BP <80 MM HG: CPT | Mod: S$GLB,,, | Performed by: INTERNAL MEDICINE

## 2017-08-23 PROCEDURE — 99999 PR PBB SHADOW E&M-EST. PATIENT-LVL III: CPT | Mod: PBBFAC,,, | Performed by: INTERNAL MEDICINE

## 2017-08-23 PROCEDURE — 85025 COMPLETE CBC W/AUTO DIFF WBC: CPT

## 2017-08-23 PROCEDURE — 3075F SYST BP GE 130 - 139MM HG: CPT | Mod: S$GLB,,, | Performed by: INTERNAL MEDICINE

## 2017-08-23 PROCEDURE — 99213 OFFICE O/P EST LOW 20 MIN: CPT | Mod: S$GLB,,, | Performed by: INTERNAL MEDICINE

## 2017-08-23 PROCEDURE — 99499 UNLISTED E&M SERVICE: CPT | Mod: S$GLB,,, | Performed by: INTERNAL MEDICINE

## 2017-08-23 NOTE — PROGRESS NOTES
"Subjective:       Patient ID: Tonya Bucio is a 82 y.o. female.    Chief Complaint: Rectal Bleeding    Tonya Bucio is a 81 y.o. female who presents for rectal bleeding   Last colonoscopy at [facility] more than 10 yrs ago.  She is on ASA and xarelto.        Rectal Bleeding   This is a new problem. The current episode started in the past 7 days. Episode frequency: once on sunday ; about a tablespoon  Associated symptoms include arthralgias, myalgias and a rash. Pertinent negatives include no abdominal pain, chest pain, chills, congestion, coughing, fatigue, fever, nausea, neck pain, numbness, sore throat, vomiting or weakness.   Hypertension   This is a chronic problem. The current episode started more than 1 year ago. The problem has been gradually worsening since onset. The problem is controlled. Pertinent negatives include no chest pain, neck pain, palpitations or shortness of breath. Past treatments include calcium channel blockers and beta blockers. Hypertensive end-organ damage includes a thyroid problem.   Hyperlipidemia   This is a chronic problem. The current episode started more than 1 year ago. The problem is controlled. Recent lipid tests were reviewed and are low. Exacerbating diseases include obesity. Associated symptoms include myalgias. Pertinent negatives include no chest pain, leg pain or shortness of breath.   Arthritis   Presents for follow-up (R hand swollen for 2 -3 weeks . " my gout is acting Up ") visit. Associated symptoms include rash. Pertinent negatives include no dysuria, fatigue or fever. Compliance with total regimen is 51-75%.   Thyroid Problem   Presents for follow-up visit. Patient reports no anxiety, fatigue or palpitations. Her past medical history is significant for hyperlipidemia.   Diabetes   She presents for her follow-up diabetic visit. She has type 2 diabetes mellitus. There are no hypoglycemic associated symptoms. Pertinent negatives for hypoglycemia include no " confusion, dizziness, nervousness/anxiousness or pallor. Pertinent negatives for diabetes include no chest pain, no fatigue, no polydipsia, no polyphagia and no weakness. There are no hypoglycemic complications. Symptoms are stable. There are no diabetic complications. Current diabetic treatment includes diet. She is following a diabetic diet. Her breakfast blood glucose range is generally 110-130 mg/dl. Her dinner blood glucose range is generally  mg/dl. An ACE inhibitor/angiotensin II receptor blocker is being taken.   Head and Neck Mass:   Chronicity:  New (R clavicuomandibular area swelling : for 2 weeks . non tneder , no heat ; no pain ; no injury ; walks with walker )   Associated symptoms: myalgias.  No sore throat, no chills, no fever, no shortness of breath and no sore throat.Aggravated by:  Nothing  Treatments tried:  Nothing  Medication Refill   Associated symptoms include arthralgias, myalgias and a rash. Pertinent negatives include no abdominal pain, chest pain, chills, congestion, coughing, fatigue, fever, nausea, neck pain, numbness, sore throat, vomiting or weakness.     Review of Systems   Constitutional: Negative for chills, fatigue and fever.   HENT: Negative for congestion and sore throat.    Respiratory: Negative for cough and shortness of breath.    Cardiovascular: Negative for chest pain and palpitations.   Gastrointestinal: Positive for blood in stool and hematochezia. Negative for abdominal pain, nausea and vomiting.        Fresh bright red blood X1 ; 1 tablespoon    Endocrine: Negative for polydipsia and polyphagia.   Genitourinary: Negative for dysuria.   Musculoskeletal: Positive for arthralgias, arthritis and myalgias. Negative for neck pain.   Skin: Positive for rash. Negative for pallor.   Neurological: Negative for dizziness, weakness and numbness.   Psychiatric/Behavioral: Negative for confusion. The patient is not nervous/anxious.        Objective:      Physical Exam    Constitutional: She is oriented to person, place, and time. She appears well-developed and well-nourished.   HENT:   Head: Normocephalic and atraumatic.   Right Ear: External ear normal.   Left Ear: External ear normal.   Mouth/Throat: Oropharynx is clear and moist.   Eyes: Conjunctivae and EOM are normal. Pupils are equal, round, and reactive to light.   Neck: Normal range of motion. Neck supple. No JVD present. No tracheal deviation present. No thyromegaly present.   Cardiovascular: Normal rate, regular rhythm, normal heart sounds and intact distal pulses.        Pulmonary/Chest: Effort normal and breath sounds normal. No respiratory distress. She has no wheezes. She has no rales. She exhibits no tenderness.   Abdominal: Soft. Bowel sounds are normal. She exhibits no distension and no mass. There is no tenderness. There is no rebound and no guarding.   Musculoskeletal: Normal range of motion. She exhibits no edema.   Bilateral knee oa changes.     Feet:   Left Foot:   Protective Sensation: 6 sites sensed.   Lymphadenopathy:     She has no cervical adenopathy.   Neurological: She is alert and oriented to person, place, and time. She has normal reflexes. No cranial nerve deficit. She exhibits normal muscle tone. Coordination normal.   Skin: Skin is warm and dry.        R clavicular head where it meets manubrium is slightly swollen; no hyperemia, no tenderness   Psychiatric: She has a normal mood and affect.   Nursing note and vitals reviewed.      Assessment:       1. Rectal bleeding        Plan:   Tonya was seen today for rectal bleeding.    Diagnoses and all orders for this visit:    Rectal bleeding  -     Ambulatory Referral to Colorectal Surgery  -     CBC auto differential; Future    her last coloscopy was more than 10 yrs ago.  Continue ASA and xarelto.  She is on xarelto for PE.

## 2017-08-24 ENCOUNTER — OFFICE VISIT (OUTPATIENT)
Dept: SURGERY | Facility: CLINIC | Age: 82
End: 2017-08-24
Payer: MEDICARE

## 2017-08-24 VITALS
DIASTOLIC BLOOD PRESSURE: 78 MMHG | BODY MASS INDEX: 43.3 KG/M2 | RESPIRATION RATE: 17 BRPM | WEIGHT: 285.69 LBS | SYSTOLIC BLOOD PRESSURE: 120 MMHG | HEIGHT: 68 IN | HEART RATE: 74 BPM

## 2017-08-24 DIAGNOSIS — K62.5 RECTAL BLEEDING: Primary | ICD-10-CM

## 2017-08-24 DIAGNOSIS — Z86.010 HISTORY OF COLON POLYPS: ICD-10-CM

## 2017-08-24 DIAGNOSIS — Z79.01 ANTICOAGULANT LONG-TERM USE: ICD-10-CM

## 2017-08-24 DIAGNOSIS — K64.8 INTERNAL HEMORRHOIDS: ICD-10-CM

## 2017-08-24 PROCEDURE — 3074F SYST BP LT 130 MM HG: CPT | Mod: S$GLB,,, | Performed by: COLON & RECTAL SURGERY

## 2017-08-24 PROCEDURE — 3008F BODY MASS INDEX DOCD: CPT | Mod: S$GLB,,, | Performed by: COLON & RECTAL SURGERY

## 2017-08-24 PROCEDURE — 46600 DIAGNOSTIC ANOSCOPY SPX: CPT | Mod: S$GLB,,, | Performed by: COLON & RECTAL SURGERY

## 2017-08-24 PROCEDURE — 99203 OFFICE O/P NEW LOW 30 MIN: CPT | Mod: 25,S$GLB,, | Performed by: COLON & RECTAL SURGERY

## 2017-08-24 PROCEDURE — 99999 PR PBB SHADOW E&M-EST. PATIENT-LVL II: CPT | Mod: PBBFAC,,, | Performed by: COLON & RECTAL SURGERY

## 2017-08-24 PROCEDURE — 1159F MED LIST DOCD IN RCRD: CPT | Mod: S$GLB,,, | Performed by: COLON & RECTAL SURGERY

## 2017-08-24 PROCEDURE — 1126F AMNT PAIN NOTED NONE PRSNT: CPT | Mod: S$GLB,,, | Performed by: COLON & RECTAL SURGERY

## 2017-08-24 PROCEDURE — 3078F DIAST BP <80 MM HG: CPT | Mod: S$GLB,,, | Performed by: COLON & RECTAL SURGERY

## 2017-08-24 RX ORDER — HYDROCORTISONE 25 MG/G
CREAM TOPICAL 2 TIMES DAILY
Qty: 1 TUBE | Refills: 5 | Status: SHIPPED | OUTPATIENT
Start: 2017-08-24 | End: 2023-10-04

## 2017-08-24 NOTE — LETTER
August 24, 2017      Tasha Atkins MD  [address] [address]           [facility]  [address] [facility] [address]  Phone: [phone]  Fax: [phone]          Patient: Tonya Bucio   MR Number: [id number]   YOB: 1935   Date of Visit: 8/24/2017       Dear Dr. Tasha Atkins:    Thank you for referring Tonya Bucio to me for evaluation. Attached you will find relevant portions of my assessment and plan of care.    If you have questions, please do not hesitate to call me. I look forward to following Tonya Bucio along with you.    Sincerely,    Wyatt Quintreos MD    Enclosure  CC:  No Recipients    If you would like to receive this communication electronically, please contact [email or web address] or [phone] to request more information on [employer] access.    For providers and/or their staff who would like to refer a patient to [facility], please contact us through our one-stop-shop provider referral line, [facility], at [phone].    If you feel you have received this communication in error or would no longer like to receive these types of communications, please e-mail [email or web address]

## 2017-08-24 NOTE — PROGRESS NOTES
Subjective:       Patient ID: Tonya Bucio is a 82 y.o. female.    Chief Complaint: Rectal Bleeding (x 1 day )    HPI  81 yo F with c/o rectal bleeding - had episode of hematochezia 4 days ago - felt urge to have BM & large volume of bright red blood passed (no stool).  She's had no significant bleeding since then and has been having normal BM's.   No anal pain with bleeding, no dizziness/lightheadedness.  She's has had similar episodes in the past.   No significant constipation.  Earlier this year she had diarrhea for 3-4 months - this has resolved.     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    Last colonoscopy - >10 yrs ago @ [facility] - she says polyps were removed - she was not told when she needed another.  No family hx of CRC or IBD.      Review of patient's allergies indicates:  No Known Allergies    Past Medical History:   Diagnosis Date    Acute bronchitis with asthma     Angina pectoris     Anticoagulant long-term use     Asthma     Back pain     Cancer     Chest pain, musculoskeletal 7/16/2013    Colon polyps     Gout, unspecified     History of cervical cancer     Hypothyroidism     Obesity     Osteoarthritis     Other and unspecified hyperlipidemia     PE (pulmonary embolism)     Renal manifestation of secondary diabetes mellitus     Thyroid disease     Type II or unspecified type diabetes mellitus without mention of complication, uncontrolled     States everything okay-previous dx    Unspecified essential hypertension     Urinary incontinence     Uterine cancer        Past Surgical History:   Procedure Laterality Date    ADENOIDECTOMY      EYE SURGERY Right     right eye cataract    HYSTERECTOMY  2008    LIZ-BSO    tonsilectomy      TONSILLECTOMY  1945    TOTAL ABDOMINAL HYSTERECTOMY  2008    TOTAL ABDOMINAL HYSTERECTOMY W/ BILATERAL SALPINGOOPHORECTOMY  2008       Current Outpatient Prescriptions   Medication Sig Dispense Refill    allopurinol  (ZYLOPRIM) 300 MG tablet TAKE ONE TABLET BY MOUTH ONCE DAILY 30 tablet 11    amlodipine (NORVASC) 10 MG tablet Take 10 mg by mouth once daily.       aspirin (ECOTRIN) 81 MG EC tablet Take 162 mg by mouth every other day.       back brace Misc one 1 each 0    diabetic supplies, [facility]. [facility] TRUE METRIX GLUCOMETER. USE AS DIRECTED TO TEST BLOOD SUGAR ONCE DAILY 1 each 0    diabetic supplies, miscellan. Misc TRUE METRIX TEST STRIPS. USE AS DIRECTED TO TEST BLOOD SUGAR DAILY. 100 each 4    diabetic supplies, [employer]. [facility] LANCETS. USE AS DIRECTED TO TEST BLOOD SUGAR DAILY. 100 each 4    ergocalciferol (VITAMIN D2) 50,000 unit Cap Take 50,000 Units by mouth every 7 days.      hydrochlorothiazide (HYDRODIURIL) 12.5 MG Tab       hydrocodone-acetaminophen 7.5-325mg (NORCO) 7.5-325 mg per tablet Take 1 tablet by mouth every 24 hours as needed for Pain. 30 tablet 0    levothyroxine (SYNTHROID) 75 MCG tablet TAKE ONE TABLET BY MOUTH ONCE DAILY 30 tablet 5    metoprolol tartrate (LOPRESSOR) 50 MG tablet TAKE ONE TABLET BY MOUTH TWICE DAILY 60 tablet 11    omega-3 acid ethyl esters (LOVAZA) 1 gram capsule Take 1 capsule (1 g total) by mouth 2 (two) times daily. 180 capsule 0    rivaroxaban (XARELTO) 20 mg Tab Take 1 tablet (20 mg total) by mouth once daily. To start after 15 mg BID for 21 days completed (Patient taking differently: Take 15 mg by mouth every evening. To start after 15 mg BID for 21 days completed) 30 tablet 5    rosuvastatin (CRESTOR) 20 MG tablet Take 1 tablet (20 mg total) by mouth once daily. 90 tablet 1    SENSIPAR 60 mg Tab       TRUE METRIX GLUCOSE METER Misc       TRUE METRIX GLUCOSE TEST STRIP Strp       TRUEPLUS LANCETS 33 gauge Misc       hydrocortisone 2.5 % cream Apply topically 2 (two) times daily. 1 Tube 5    NITROSTAT 0.4 mg SL tablet PLACE 1 TABLET UNDER THE TONGUE EVERY 5 MINUTES AS NEEDED FOR CHEST PAIN. 25 tablet 0    triamcinolone acetonide 0.1% (KENALOG) 0.1 % cream  APPLY TOPICALLY TWICE DAILY 80 g 1     No current facility-administered medications for this visit.        Family History   Problem Relation Age of Onset    Heart disease Mother     Arthritis Father     Cancer Sister      breast    Cancer Brother      Throat cancer    Arthritis Daughter      back    No Known Problems Son     Heart disease Brother     Stroke Brother     Heart disease Brother      stents heart and legs     Diabetes Brother     Hyperlipidemia Brother     Anemia Daughter     Arthritis Daughter      RA    Diabetes Daughter     Arthritis Daughter      RA    Glaucoma Daughter     Diabetes Daughter     Liver disease Daughter     Arthritis Daughter      back     Stroke Son     No Known Problems Son        Social History     Social History    Marital status:      Spouse name: N/A    Number of children: N/A    Years of education: N/A     Social History Main Topics    Smoking status: Former Smoker     Packs/day: 0.33     Years: 13.00     Types: Cigarettes     Start date: 8/11/1951     Quit date: 8/11/1964    Smokeless tobacco: Never Used    Alcohol use No    Drug use: No    Sexual activity: No      Comment:      Other Topics Concern    None     Social History Narrative    None       Review of Systems   Constitutional: Negative for chills and fever.   HENT: Negative for congestion and sore throat.    Eyes: Negative for visual disturbance.   Respiratory: Negative for cough and shortness of breath.    Cardiovascular: Negative for chest pain and palpitations.   Gastrointestinal: Positive for anal bleeding. Negative for abdominal distention, abdominal pain, blood in stool, constipation, diarrhea, nausea, rectal pain and vomiting.   Endocrine: Negative for cold intolerance and heat intolerance.   Genitourinary: Negative for dysuria and frequency.   Musculoskeletal: Positive for arthralgias, gait problem and myalgias. Negative for back pain and neck pain.   Skin: Negative  for rash.   Allergic/Immunologic: Negative for immunocompromised state.   Neurological: Negative for dizziness, light-headedness and headaches.   Hematological: Does not bruise/bleed easily.   Psychiatric/Behavioral: Negative for confusion. The patient is not nervous/anxious.        Objective:      Physical Exam   Constitutional: She is oriented to person, place, and time. She appears well-developed and well-nourished.   HENT:   Head: Normocephalic.   Pulmonary/Chest: Effort normal. No respiratory distress.   Abdominal: Soft. Bowel sounds are normal. She exhibits no distension and no mass. There is no tenderness. There is no rebound and no guarding.   Genitourinary:   Genitourinary Comments: Perineum - excoriated/macerated perianal skin, no mass, no fissure, no external hemorrhoids  BARBARA - good tone, no mass  Anoscopy - Grade 1 internal hemorrhoids with mild inflammation     Musculoskeletal: Normal range of motion.   Neurological: She is alert and oriented to person, place, and time.   Skin: Skin is warm and dry.   Psychiatric: She has a normal mood and affect.         Lab Results   Component Value Date    WBC 11.81 08/23/2017    HGB 13.2 08/23/2017    HCT 41.1 08/23/2017    MCV 93 08/23/2017     08/23/2017     BMP  Lab Results   Component Value Date     05/18/2017    K 4.1 05/18/2017     05/18/2017    CO2 24 05/18/2017    BUN 27 (H) 05/18/2017    CREATININE 1.3 05/18/2017    CALCIUM 10.7 (H) 05/18/2017    ANIONGAP 14 05/18/2017    ESTGFRAFRICA 45 (A) 05/18/2017    EGFRNONAA 39 (A) 05/18/2017     CMP  Sodium   Date Value Ref Range Status   05/18/2017 143 136 - 145 mmol/L Final     Potassium   Date Value Ref Range Status   05/18/2017 4.1 3.5 - 5.1 mmol/L Final     Chloride   Date Value Ref Range Status   05/18/2017 105 95 - 110 mmol/L Final     CO2   Date Value Ref Range Status   05/18/2017 24 23 - 29 mmol/L Final     Glucose   Date Value Ref Range Status   05/18/2017 115 (H) 70 - 110 mg/dL Final      BUN, Bld   Date Value Ref Range Status   05/18/2017 27 (H) 8 - 23 mg/dL Final     Creatinine   Date Value Ref Range Status   05/18/2017 1.3 0.5 - 1.4 mg/dL Final     Calcium   Date Value Ref Range Status   05/18/2017 10.7 (H) 8.7 - 10.5 mg/dL Final     Total Protein   Date Value Ref Range Status   05/18/2017 7.6 6.0 - 8.4 g/dL Final     Albumin   Date Value Ref Range Status   05/18/2017 3.4 (L) 3.5 - 5.2 g/dL Final     Total Bilirubin   Date Value Ref Range Status   05/18/2017 1.1 (H) 0.1 - 1.0 mg/dL Final     Comment:     For infants and newborns, interpretation of results should be based  on gestational age, weight and in agreement with clinical  observations.  Premature Infant recommended reference ranges:  Up to 24 hours.............<8.0 mg/dL  Up to 48 hours............<12.0 mg/dL  3-5 days..................<15.0 mg/dL  6-29 days.................<15.0 mg/dL       Alkaline Phosphatase   Date Value Ref Range Status   05/18/2017 94 55 - 135 U/L Final     AST   Date Value Ref Range Status   05/18/2017 16 10 - 40 U/L Final     ALT   Date Value Ref Range Status   05/18/2017 26 10 - 44 U/L Final     Anion Gap   Date Value Ref Range Status   05/18/2017 14 8 - 16 mmol/L Final     eGFR if    Date Value Ref Range Status   05/18/2017 45 (A) >60 mL/min/1.73 m^2 Final     eGFR if non    Date Value Ref Range Status   05/18/2017 39 (A) >60 mL/min/1.73 m^2 Final     Comment:     Calculation used to obtain the estimated glomerular filtration  rate (eGFR) is the CKD-EPI equation. Since race is unknown   in our information system, the eGFR values for   -American and Non--American patients are given   for each creatinine result.         Assessment:       1. Rectal bleeding    2. Internal hemorrhoids    3. History of colon polyps    4. Anticoagulant long-term use        Plan:   Etiology of bleeding may have been hemorrhoidal.  Another possible etiology, epsecially given her  description of the bleeding, is diverticular disease.  She also has hx of colon polyps and last colonoscopy was >10 yrs ago - polyp/mass may be possible etiology as well.  Bleeding exacerbated by anticoagulant use  Will start with conservative measures for hemorrhoids:   -Increased fiber intake (20-25 grams/day) and fluid intake (8-10 glasses water/day)   -Daily fiber supplement   -Avoid excessive trauma/straining if possible   -Avoid sitting on toilet for long periods   -Hydrocortisone 2.5% bid.   Discussed colonoscopy - she wants to wait and see how she does with treating her hemorrhoids first.  Urged her to call if she has additional episodes of bleeding.  RTO 6 weeks.    Wyatt Quinteros MD, FACS, FASCRS

## 2017-08-29 ENCOUNTER — TELEPHONE (OUTPATIENT)
Dept: INTERNAL MEDICINE | Facility: CLINIC | Age: 82
End: 2017-08-29

## 2017-08-29 NOTE — TELEPHONE ENCOUNTER
----- Message from Carolyn Negrete sent at 2017 10:46 AM CDT -----  Contact: Pt  Tonya Bucio  MRN: [id number]  : 1935  PCP: Tasha Atkins  Home Phone      [phone]  Work Phone      Not on file.  Mobile          [phone]      MESSAGE:  Called for a refill on Norco. Please advise.    PHONE: [phone]

## 2017-08-30 ENCOUNTER — OFFICE VISIT (OUTPATIENT)
Dept: INTERNAL MEDICINE | Facility: CLINIC | Age: 82
End: 2017-08-30
Payer: MEDICARE

## 2017-08-30 VITALS
DIASTOLIC BLOOD PRESSURE: 68 MMHG | RESPIRATION RATE: 16 BRPM | WEIGHT: 288.56 LBS | BODY MASS INDEX: 43.73 KG/M2 | SYSTOLIC BLOOD PRESSURE: 136 MMHG | HEART RATE: 75 BPM | HEIGHT: 68 IN | OXYGEN SATURATION: 94 %

## 2017-08-30 DIAGNOSIS — M17.0 PRIMARY OSTEOARTHRITIS OF BOTH KNEES: ICD-10-CM

## 2017-08-30 PROCEDURE — 99999 PR PBB SHADOW E&M-EST. PATIENT-LVL III: CPT | Mod: PBBFAC,,, | Performed by: INTERNAL MEDICINE

## 2017-08-30 PROCEDURE — 1159F MED LIST DOCD IN RCRD: CPT | Mod: S$GLB,,, | Performed by: INTERNAL MEDICINE

## 2017-08-30 PROCEDURE — 3078F DIAST BP <80 MM HG: CPT | Mod: S$GLB,,, | Performed by: INTERNAL MEDICINE

## 2017-08-30 PROCEDURE — 99213 OFFICE O/P EST LOW 20 MIN: CPT | Mod: S$GLB,,, | Performed by: INTERNAL MEDICINE

## 2017-08-30 PROCEDURE — 3008F BODY MASS INDEX DOCD: CPT | Mod: S$GLB,,, | Performed by: INTERNAL MEDICINE

## 2017-08-30 PROCEDURE — 3075F SYST BP GE 130 - 139MM HG: CPT | Mod: S$GLB,,, | Performed by: INTERNAL MEDICINE

## 2017-08-30 RX ORDER — HYDROCODONE BITARTRATE AND ACETAMINOPHEN 7.5; 325 MG/1; MG/1
1 TABLET ORAL
Qty: 30 TABLET | Refills: 0 | Status: SHIPPED | OUTPATIENT
Start: 2017-08-30 | End: 2017-09-27 | Stop reason: SDUPTHER

## 2017-08-30 NOTE — PROGRESS NOTES
Subjective:       Patient ID: Tonya Bucio is a 82 y.o. female.    Chief Complaint: Medication Refill (pain)    Tonya Bucio is a 82 y.o. female here for her pain meds refills.      Medication Refill   Associated symptoms include arthralgias, fatigue and myalgias. Pertinent negatives include no chest pain, chills, congestion, coughing, fever, nausea, neck pain, numbness, sore throat, vomiting or weakness.   Chronic Pain  Past Medical History includes: chronic pain syndrome and degenerative joint disease. Patient states that the pain is chronic. Tonya describes pain involving the knee and lumbar spine. The onset of her pain began more than 1 year ago. Progression of pain since onset is waxing and waning. Patient describes pain as a 9/10. Tonya is currently treating her symptoms with hydrocodone/acetaminophen (Hycet, LorcetLortab, Norco, Vicodin). Patient has shown moderate improvement with current treatment.  Goals of therapy include: Improve ADL's and Improve Sleep. Tonya has previously tried hydrocodone/acetaminophen (Hycet, Lorcet, Lortab, Norco,Vicodin) to treat her pain. Associated symptoms include: leg pain. Previous Imaging studies include: X-Ray.  Patient has not had a drug screen. Patient does have a pain contract. Patient's history of substance abuse includes: none. Patient's psychiatric disorders include: none.    Review of Systems   Constitutional: Positive for fatigue. Negative for chills, fever and weight loss.   HENT: Negative for congestion, hearing loss, sinus pressure and sore throat.    Eyes: Negative for photophobia.   Respiratory: Negative for cough, choking, chest tightness, shortness of breath and wheezing.    Cardiovascular: Negative for chest pain and palpitations.   Gastrointestinal: Negative for blood in stool, nausea and vomiting.   Endocrine: Negative for polydipsia and polyphagia.   Genitourinary: Negative for dysuria and hematuria.   Musculoskeletal: Positive for arthralgias, back  pain, gait problem and myalgias. Negative for neck pain.   Skin: Negative for pallor.   Neurological: Negative for dizziness, tingling, weakness and numbness.   Hematological: Does not bruise/bleed easily.   Psychiatric/Behavioral: Negative for confusion and suicidal ideas. The patient is not nervous/anxious.        Objective:      Physical Exam   Constitutional: She is oriented to person, place, and time. She appears well-developed and well-nourished.   HENT:   Head: Normocephalic and atraumatic.   Right Ear: External ear normal.   Left Ear: External ear normal.   Mouth/Throat: Oropharynx is clear and moist.   Eyes: Conjunctivae and EOM are normal. Pupils are equal, round, and reactive to light.   Neck: Normal range of motion. Neck supple. No JVD present. No tracheal deviation present. No thyromegaly present.   Cardiovascular: Normal rate, regular rhythm, normal heart sounds and intact distal pulses.    Pulmonary/Chest: Effort normal and breath sounds normal. No respiratory distress. She has no wheezes. She has no rales. She exhibits no tenderness.   Abdominal: Soft. Bowel sounds are normal. She exhibits no distension and no mass. There is no tenderness. There is no rebound and no guarding.   Musculoskeletal: Normal range of motion. She exhibits no edema.   Bilateral knee oa changes.     Lymphadenopathy:     She has no cervical adenopathy.   Neurological: She is alert and oriented to person, place, and time. She has normal reflexes. No cranial nerve deficit. She exhibits normal muscle tone. Coordination normal.   Skin: Skin is warm and dry.   Psychiatric: She has a normal mood and affect.   Nursing note and vitals reviewed.      Assessment:       1. Primary osteoarthritis of both knees        Plan:   Tonya was seen today for medication refill.    Diagnoses and all orders for this visit:    Primary osteoarthritis of both knees  -     hydrocodone-acetaminophen 7.5-325mg (NORCO) 7.5-325 mg per tablet; Take 1 tablet by  mouth every 24 hours as needed for Pain.    we discussed narcotics for pain management :    Managing chronic pain with opioids is complicated and challenging. I explained to patient that Doctors need to know if patients can follow the treatment plan, if they get desired responses from the meds, and if there are signs of developing addiction. Physicians use medication contracts to monitor patients adherence, or to help check that patients are compliant with the medications ordered. Such agreements are most commonly used when narcotic pain relievers are prescribed. Narcotics can sometimes become addictive if not taken as prescribed by a doctor.    The use of a pain management agreement allows for the documentation of understanding between a doctor and patient. Such documentation, when used as a means of facilitating care, can improve communication between doctors and patients.

## 2017-09-07 ENCOUNTER — TELEPHONE (OUTPATIENT)
Dept: INTERNAL MEDICINE | Facility: CLINIC | Age: 82
End: 2017-09-07

## 2017-09-07 NOTE — TELEPHONE ENCOUNTER
Fax received from [employer] requesting the you select a [employer] preferred lower cost alternative as an approved change from Omega 3-Acid Ethyl Esters as this could potentially have a cost savings for the patient.   Alternatives include: Fenofibrate tablet, Gemfibrozil, Niacor.   Please advise. Thanks.

## 2017-09-07 NOTE — TELEPHONE ENCOUNTER
Unfortunately All these are not good choices for her . Risks of her statin combing with these meds are very high   She should stay on lovaza

## 2017-09-21 ENCOUNTER — LAB VISIT (OUTPATIENT)
Dept: LAB | Facility: HOSPITAL | Age: 82
End: 2017-09-21
Attending: INTERNAL MEDICINE
Payer: MEDICARE

## 2017-09-21 DIAGNOSIS — I10 ESSENTIAL (PRIMARY) HYPERTENSION: Primary | ICD-10-CM

## 2017-09-21 LAB
ALBUMIN SERPL BCP-MCNC: 3.4 G/DL
ALP SERPL-CCNC: 105 U/L
ALT SERPL W/O P-5'-P-CCNC: 25 U/L
ANION GAP SERPL CALC-SCNC: 12 MMOL/L
AST SERPL-CCNC: 16 U/L
BASOPHILS # BLD AUTO: 0.02 K/UL
BASOPHILS NFR BLD: 0.2 %
BILIRUB SERPL-MCNC: 0.9 MG/DL
BUN SERPL-MCNC: 19 MG/DL
CALCIUM SERPL-MCNC: 10.9 MG/DL
CHLORIDE SERPL-SCNC: 103 MMOL/L
CO2 SERPL-SCNC: 26 MMOL/L
CREAT SERPL-MCNC: 1.3 MG/DL
DIFFERENTIAL METHOD: ABNORMAL
EOSINOPHIL # BLD AUTO: 0.3 K/UL
EOSINOPHIL NFR BLD: 3.5 %
ERYTHROCYTE [DISTWIDTH] IN BLOOD BY AUTOMATED COUNT: 14.7 %
EST. GFR  (AFRICAN AMERICAN): 44 ML/MIN/1.73 M^2
EST. GFR  (NON AFRICAN AMERICAN): 39 ML/MIN/1.73 M^2
GLUCOSE SERPL-MCNC: 130 MG/DL
HCT VFR BLD AUTO: 40.8 %
HGB BLD-MCNC: 13.1 G/DL
LYMPHOCYTES # BLD AUTO: 2.3 K/UL
LYMPHOCYTES NFR BLD: 24.1 %
MCH RBC QN AUTO: 30.1 PG
MCHC RBC AUTO-ENTMCNC: 32.1 G/DL
MCV RBC AUTO: 94 FL
MONOCYTES # BLD AUTO: 0.7 K/UL
MONOCYTES NFR BLD: 7.2 %
NEUTROPHILS # BLD AUTO: 6.2 K/UL
NEUTROPHILS NFR BLD: 65 %
PHOSPHATE SERPL-MCNC: 2.2 MG/DL
PLATELET # BLD AUTO: 327 K/UL
PMV BLD AUTO: 10.3 FL
POTASSIUM SERPL-SCNC: 4.6 MMOL/L
PROT SERPL-MCNC: 7.7 G/DL
PTH-INTACT SERPL-MCNC: 326 PG/ML
RBC # BLD AUTO: 4.35 M/UL
SODIUM SERPL-SCNC: 141 MMOL/L
WBC # BLD AUTO: 9.5 K/UL

## 2017-09-21 PROCEDURE — 80053 COMPREHEN METABOLIC PANEL: CPT

## 2017-09-21 PROCEDURE — 85025 COMPLETE CBC W/AUTO DIFF WBC: CPT

## 2017-09-21 PROCEDURE — 36415 COLL VENOUS BLD VENIPUNCTURE: CPT

## 2017-09-21 PROCEDURE — 83970 ASSAY OF PARATHORMONE: CPT

## 2017-09-21 PROCEDURE — 84100 ASSAY OF PHOSPHORUS: CPT

## 2017-09-27 DIAGNOSIS — M17.0 PRIMARY OSTEOARTHRITIS OF BOTH KNEES: ICD-10-CM

## 2017-09-27 RX ORDER — HYDROCODONE BITARTRATE AND ACETAMINOPHEN 7.5; 325 MG/1; MG/1
1 TABLET ORAL
Qty: 30 TABLET | Refills: 0 | Status: SHIPPED | OUTPATIENT
Start: 2017-09-27 | End: 2017-10-31 | Stop reason: SDUPTHER

## 2017-09-27 NOTE — TELEPHONE ENCOUNTER
Requested Prescriptions     Pending Prescriptions Disp Refills    hydrocodone-acetaminophen 7.5-325mg (NORCO) 7.5-325 mg per tablet 30 tablet 0     Sig: Take 1 tablet by mouth every 24 hours as needed for Pain.   LOV: 8/30/17. Last refill: 8/24/17

## 2017-10-05 ENCOUNTER — OFFICE VISIT (OUTPATIENT)
Dept: SURGERY | Facility: CLINIC | Age: 82
End: 2017-10-05
Payer: MEDICARE

## 2017-10-05 VITALS
WEIGHT: 285.94 LBS | HEIGHT: 68 IN | SYSTOLIC BLOOD PRESSURE: 102 MMHG | RESPIRATION RATE: 19 BRPM | HEART RATE: 68 BPM | BODY MASS INDEX: 43.34 KG/M2 | DIASTOLIC BLOOD PRESSURE: 60 MMHG

## 2017-10-05 DIAGNOSIS — Z79.01 ANTICOAGULANT LONG-TERM USE: ICD-10-CM

## 2017-10-05 DIAGNOSIS — Z86.010 HISTORY OF COLON POLYPS: ICD-10-CM

## 2017-10-05 DIAGNOSIS — K62.5 RECTAL BLEEDING: Primary | ICD-10-CM

## 2017-10-05 PROCEDURE — 99999 PR PBB SHADOW E&M-EST. PATIENT-LVL IV: CPT | Mod: PBBFAC,,, | Performed by: COLON & RECTAL SURGERY

## 2017-10-05 PROCEDURE — 99213 OFFICE O/P EST LOW 20 MIN: CPT | Mod: S$GLB,,, | Performed by: COLON & RECTAL SURGERY

## 2017-10-05 RX ORDER — LOSARTAN POTASSIUM AND HYDROCHLOROTHIAZIDE 12.5; 5 MG/1; MG/1
TABLET ORAL
COMMUNITY
Start: 2017-09-21 | End: 2019-11-11 | Stop reason: SDUPTHER

## 2017-10-05 RX ORDER — RIVAROXABAN 15 MG/1
TABLET, FILM COATED ORAL NIGHTLY
COMMUNITY
Start: 2017-09-21 | End: 2023-06-01 | Stop reason: SDUPTHER

## 2017-10-05 NOTE — LETTER
October 5, 2017        Tasha Atkins MD  [address]  [facility] [address]             [facility]  [address] [facility] [address]  Phone: [phone]  Fax: [phone]   Patient: Tonya Bucio   MR Number: [id number]   YOB: 1935   Date of Visit: 10/5/2017       Dear Dr. Atkins:    Thank you for referring Tonya Bucio to me for evaluation. Attached you will find relevant portions of my assessment and plan of care.    If you have questions, please do not hesitate to call me. I look forward to following Tonya Bucio along with you.    Sincerely,      Wyatt Quinteros MD            CC  No Recipients    Enclosure

## 2017-10-05 NOTE — PROGRESS NOTES
Subjective:       Patient ID: Tonya Bucio is a 82 y.o. female.    Chief Complaint: Hemorrhoids (doinmg better)    HPI  81 yo F seen 8/24/17 c/o rectal bleeding - had episode of hematochezia 4 days prior - felt urge to have BM & large volume of bright red blood passed (no stool).  She'd had no significant bleeding between then & office visit on 8/24/17.  No anal pain with bleeding, no dizziness/lightheadedness.  She's has had similar episodes in the past.   No significant constipation.  Earlier this year she had diarrhea for 3-4 months - this has resolved.     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    Last colonoscopy - >10 yrs ago @ [facility] - she says polyps were removed - she was not told when she needed another.  No family hx of CRC or IBD.    On exam she had grade 1 internal hemorrhoids.  I told her most likely source of bleeding was either hemorrhoidal or diverticular.  In either instance, initial treatment is to increase fiber/fluid intake, which I recommended.    She returns today for follow-up.  No further bleeding.  BM's are soft/regular.  Her Xarelto dose has been reduced in half by her cardiologist.    Review of patient's allergies indicates:  No Known Allergies    Past Medical History:   Diagnosis Date    Acute bronchitis with asthma     Angina pectoris     Anticoagulant long-term use     Asthma     Back pain     Cancer     Chest pain, musculoskeletal 7/16/2013    Colon polyps     Gout, unspecified     History of cervical cancer     Hypothyroidism     Obesity     Osteoarthritis     Other and unspecified hyperlipidemia     PE (pulmonary embolism)     Renal manifestation of secondary diabetes mellitus     Thyroid disease     Type II or unspecified type diabetes mellitus without mention of complication, uncontrolled     States everything okay-previous dx    Unspecified essential hypertension     Urinary incontinence     Uterine cancer        Past  Surgical History:   Procedure Laterality Date    ADENOIDECTOMY      EYE SURGERY Right     right eye cataract    HYSTERECTOMY  2008    LIZ-BSO    tonsilectomy      TONSILLECTOMY  1945    TOTAL ABDOMINAL HYSTERECTOMY  2008    TOTAL ABDOMINAL HYSTERECTOMY W/ BILATERAL SALPINGOOPHORECTOMY  2008       Current Outpatient Prescriptions   Medication Sig Dispense Refill    allopurinol (ZYLOPRIM) 300 MG tablet TAKE ONE TABLET BY MOUTH ONCE DAILY 30 tablet 11    amlodipine (NORVASC) 10 MG tablet Take 10 mg by mouth once daily.       aspirin (ECOTRIN) 81 MG EC tablet Take 162 mg by mouth every other day.       diabetic [facility], [facility]. [facility] TRUE [facility] GLUCOMETER. USE AS DIRECTED TO TEST BLOOD SUGAR ONCE DAILY 1 each 0    diabetic supplies, miscellan. Misc TRUE METRIX TEST STRIPS. USE AS DIRECTED TO TEST BLOOD SUGAR DAILY. 100 each 4    diabetic supplies, [facility]ellan. [facility] LANCETS. USE AS DIRECTED TO TEST BLOOD SUGAR DAILY. 100 each 4    ergocalciferol (VITAMIN D2) 50,000 unit Cap Take 50,000 Units by mouth every 7 days.      hydrocodone-acetaminophen 7.5-325mg (NORCO) 7.5-325 mg per tablet Take 1 tablet by mouth every 24 hours as needed for Pain. 30 tablet 0    hydrocortisone 2.5 % cream Apply topically 2 (two) times daily. 1 Tube 5    levothyroxine (SYNTHROID) 75 MCG tablet TAKE ONE TABLET BY MOUTH ONCE DAILY 30 tablet 5    losartan-hydrochlorothiazide 50-12.5 mg (HYZAAR) 50-12.5 mg per tablet       metoprolol tartrate (LOPRESSOR) 50 MG tablet TAKE ONE TABLET BY MOUTH TWICE DAILY 60 tablet 11    NITROSTAT 0.4 mg SL tablet PLACE 1 TABLET UNDER THE TONGUE EVERY 5 MINUTES AS NEEDED FOR CHEST PAIN. 25 tablet 0    omega-3 acid ethyl esters (LOVAZA) 1 gram capsule Take 1 capsule (1 g total) by mouth 2 (two) times daily. 180 capsule 0    rivaroxaban (XARELTO) 20 mg Tab Take 1 tablet (20 mg total) by mouth once daily. To start after 15 mg BID for 21 days completed (Patient taking differently: Take 15 mg by  mouth every evening. To start after 15 mg BID for 21 days completed) 30 tablet 5    SENSIPAR 60 mg Tab       triamcinolone acetonide 0.1% (KENALOG) 0.1 % cream APPLY TOPICALLY TWICE DAILY 80 g 1    TRUE METRIX GLUCOSE METER Misc       TRUE METRIX GLUCOSE TEST STRIP Strp       TRUEPLUS LANCETS 33 gauge Misc       XARELTO 15 mg Tab every evening.        No current facility-administered medications for this visit.        Family History   Problem Relation Age of Onset    Heart disease Mother     Arthritis Father     Cancer Sister      breast    Cancer Brother      Throat cancer    Arthritis Daughter      back    No Known Problems Son     Heart disease Brother     Stroke Brother     Heart disease Brother      stents heart and legs     Diabetes Brother     Hyperlipidemia Brother     Anemia Daughter     Arthritis Daughter      RA    Diabetes Daughter     Arthritis Daughter      RA    Glaucoma Daughter     Diabetes Daughter     Liver disease Daughter     Arthritis Daughter      back     Stroke Son     No Known Problems Son        Social History     Social History    Marital status:      Spouse name: N/A    Number of children: N/A    Years of education: N/A     Social History Main Topics    Smoking status: Former Smoker     Packs/day: 0.33     Years: 13.00     Types: Cigarettes     Start date: 8/11/1951     Quit date: 8/11/1964    Smokeless tobacco: Never Used    Alcohol use No    Drug use: No    Sexual activity: No      Comment:      Other Topics Concern    None     Social History Narrative    None       Review of Systems   Constitutional: Negative for chills and fever.   HENT: Negative for congestion and sore throat.    Eyes: Negative for visual disturbance.   Respiratory: Negative for cough and shortness of breath.    Cardiovascular: Negative for chest pain and palpitations.   Gastrointestinal: Negative for abdominal distention, abdominal pain, anal bleeding, blood in  stool, constipation, diarrhea, nausea, rectal pain and vomiting.   Endocrine: Negative for cold intolerance and heat intolerance.   Genitourinary: Negative for dysuria and frequency.   Musculoskeletal: Positive for arthralgias, gait problem and myalgias. Negative for back pain and neck pain.   Skin: Negative for rash.   Allergic/Immunologic: Negative for immunocompromised state.   Neurological: Negative for dizziness, light-headedness and headaches.   Hematological: Does not bruise/bleed easily.   Psychiatric/Behavioral: Negative for confusion. The patient is not nervous/anxious.        Objective:      Physical Exam   Constitutional: She is oriented to person, place, and time. She appears well-developed and well-nourished.   HENT:   Head: Normocephalic.   Pulmonary/Chest: Effort normal. No respiratory distress.   Abdominal: Soft. Bowel sounds are normal. She exhibits no distension and no mass. There is no tenderness. There is no rebound and no guarding.   Musculoskeletal: Normal range of motion.   Neurological: She is alert and oriented to person, place, and time.   Skin: Skin is warm and dry.   Psychiatric: She has a normal mood and affect.         Lab Results   Component Value Date    WBC 9.50 09/21/2017    HGB 13.1 09/21/2017    HCT 40.8 09/21/2017    MCV 94 09/21/2017     09/21/2017     BMP  Lab Results   Component Value Date     09/21/2017    K 4.6 09/21/2017     09/21/2017    CO2 26 09/21/2017    BUN 19 09/21/2017    CREATININE 1.3 09/21/2017    CALCIUM 10.9 (H) 09/21/2017    ANIONGAP 12 09/21/2017    ESTGFRAFRICA 44 (A) 09/21/2017    EGFRNONAA 39 (A) 09/21/2017     CMP  Sodium   Date Value Ref Range Status   09/21/2017 141 136 - 145 mmol/L Final     Potassium   Date Value Ref Range Status   09/21/2017 4.6 3.5 - 5.1 mmol/L Final     Chloride   Date Value Ref Range Status   09/21/2017 103 95 - 110 mmol/L Final     CO2   Date Value Ref Range Status   09/21/2017 26 23 - 29 mmol/L Final      Glucose   Date Value Ref Range Status   09/21/2017 130 (H) 70 - 110 mg/dL Final     BUN, Bld   Date Value Ref Range Status   09/21/2017 19 8 - 23 mg/dL Final     Creatinine   Date Value Ref Range Status   09/21/2017 1.3 0.5 - 1.4 mg/dL Final     Calcium   Date Value Ref Range Status   09/21/2017 10.9 (H) 8.7 - 10.5 mg/dL Final     Total Protein   Date Value Ref Range Status   09/21/2017 7.7 6.0 - 8.4 g/dL Final     Albumin   Date Value Ref Range Status   09/21/2017 3.4 (L) 3.5 - 5.2 g/dL Final     Total Bilirubin   Date Value Ref Range Status   09/21/2017 0.9 0.1 - 1.0 mg/dL Final     Comment:     For infants and newborns, interpretation of results should be based  on gestational age, weight and in agreement with clinical  observations.  Premature Infant recommended reference ranges:  Up to 24 hours.............<8.0 mg/dL  Up to 48 hours............<12.0 mg/dL  3-5 days..................<15.0 mg/dL  6-29 days.................<15.0 mg/dL       Alkaline Phosphatase   Date Value Ref Range Status   09/21/2017 105 55 - 135 U/L Final     AST   Date Value Ref Range Status   09/21/2017 16 10 - 40 U/L Final     ALT   Date Value Ref Range Status   09/21/2017 25 10 - 44 U/L Final     Anion Gap   Date Value Ref Range Status   09/21/2017 12 8 - 16 mmol/L Final     eGFR if    Date Value Ref Range Status   09/21/2017 44 (A) >60 mL/min/1.73 m^2 Final     eGFR if non    Date Value Ref Range Status   09/21/2017 39 (A) >60 mL/min/1.73 m^2 Final     Comment:     Calculation used to obtain the estimated glomerular filtration  rate (eGFR) is the CKD-EPI equation. Since race is unknown   in our information system, the eGFR values for   -American and Non--American patients are given   for each creatinine result.         Assessment:       1. Rectal bleeding    2. History of colon polyps    3. Anticoagulant long-term use        Plan:   Suspect etiology of bleeding was diverticular  disease.  Bleeding exacerbated by anticoagulant use  She also has hx of colon polyps and last colonoscopy was >10 yrs ago - given that bleeding has stopped, would not recommend colonoscopy given age/co-morbidities/need for anti-coagulation.  Continue high fiber diet  RTO if bleeding recurs.    Wyatt Quinteros MD, FACS, FASCRS

## 2017-10-17 RX ORDER — OMEGA-3-ACID ETHYL ESTERS 1 G/1
CAPSULE, LIQUID FILLED ORAL
Qty: 180 CAPSULE | Refills: 0 | Status: SHIPPED | OUTPATIENT
Start: 2017-10-17 | End: 2018-01-16 | Stop reason: SDUPTHER

## 2017-10-30 ENCOUNTER — LAB VISIT (OUTPATIENT)
Dept: LAB | Facility: HOSPITAL | Age: 82
End: 2017-10-30
Attending: INTERNAL MEDICINE
Payer: MEDICARE

## 2017-10-30 DIAGNOSIS — I12.9: ICD-10-CM

## 2017-10-30 DIAGNOSIS — N18.30 CHRONIC KIDNEY DISEASE, STAGE III (MODERATE): Primary | ICD-10-CM

## 2017-10-30 LAB
ALBUMIN SERPL BCP-MCNC: 3.4 G/DL
ALP SERPL-CCNC: 93 U/L
ALT SERPL W/O P-5'-P-CCNC: 25 U/L
ANION GAP SERPL CALC-SCNC: 11 MMOL/L
AST SERPL-CCNC: 15 U/L
BILIRUB SERPL-MCNC: 0.8 MG/DL
BUN SERPL-MCNC: 34 MG/DL
CALCIUM SERPL-MCNC: 10.4 MG/DL
CHLORIDE SERPL-SCNC: 103 MMOL/L
CO2 SERPL-SCNC: 28 MMOL/L
CREAT SERPL-MCNC: 1.6 MG/DL
EST. GFR  (AFRICAN AMERICAN): 35 ML/MIN/1.73 M^2
EST. GFR  (NON AFRICAN AMERICAN): 30 ML/MIN/1.73 M^2
GLUCOSE SERPL-MCNC: 118 MG/DL
POTASSIUM SERPL-SCNC: 4.4 MMOL/L
PROT SERPL-MCNC: 7.6 G/DL
PTH-INTACT SERPL-MCNC: 356 PG/ML
SODIUM SERPL-SCNC: 142 MMOL/L

## 2017-10-30 PROCEDURE — 80053 COMPREHEN METABOLIC PANEL: CPT

## 2017-10-30 PROCEDURE — 36415 COLL VENOUS BLD VENIPUNCTURE: CPT

## 2017-10-30 PROCEDURE — 83970 ASSAY OF PARATHORMONE: CPT

## 2017-10-31 DIAGNOSIS — M17.0 PRIMARY OSTEOARTHRITIS OF BOTH KNEES: ICD-10-CM

## 2017-10-31 RX ORDER — HYDROCODONE BITARTRATE AND ACETAMINOPHEN 7.5; 325 MG/1; MG/1
1 TABLET ORAL
Qty: 30 TABLET | Refills: 0 | Status: SHIPPED | OUTPATIENT
Start: 2017-10-31 | End: 2017-11-28 | Stop reason: SDUPTHER

## 2017-10-31 RX ORDER — ROSUVASTATIN CALCIUM 20 MG/1
TABLET, COATED ORAL
Qty: 90 TABLET | Refills: 0 | Status: SHIPPED | OUTPATIENT
Start: 2017-10-31 | End: 2018-01-29 | Stop reason: SDUPTHER

## 2017-10-31 NOTE — TELEPHONE ENCOUNTER
----- Message from Shania Casillas sent at 10/31/2017  9:47 AM CDT -----  Contact: self  Tonya Bucio  MRN: [id number]  : 1935  PCP: Tasha Atkins  Home Phone      [phone]  Work Phone      Not on file.  [employer]          [phone]      MESSAGE: refill norco-----[phone]

## 2017-11-28 ENCOUNTER — OFFICE VISIT (OUTPATIENT)
Dept: INTERNAL MEDICINE | Facility: CLINIC | Age: 82
End: 2017-11-28
Payer: MEDICARE

## 2017-11-28 VITALS
DIASTOLIC BLOOD PRESSURE: 62 MMHG | BODY MASS INDEX: 43.23 KG/M2 | OXYGEN SATURATION: 95 % | HEIGHT: 68 IN | SYSTOLIC BLOOD PRESSURE: 128 MMHG | HEART RATE: 76 BPM | RESPIRATION RATE: 16 BRPM | WEIGHT: 285.25 LBS

## 2017-11-28 DIAGNOSIS — I27.82 OTHER CHRONIC PULMONARY EMBOLISM WITHOUT ACUTE COR PULMONALE: ICD-10-CM

## 2017-11-28 DIAGNOSIS — E11.21 CONTROLLED TYPE 2 DIABETES MELLITUS WITH DIABETIC NEPHROPATHY, WITHOUT LONG-TERM CURRENT USE OF INSULIN: Primary | ICD-10-CM

## 2017-11-28 DIAGNOSIS — E11.22 TYPE 2 DIABETES MELLITUS WITH STAGE 3 CHRONIC KIDNEY DISEASE, WITHOUT LONG-TERM CURRENT USE OF INSULIN: ICD-10-CM

## 2017-11-28 DIAGNOSIS — M17.0 PRIMARY OSTEOARTHRITIS OF BOTH KNEES: ICD-10-CM

## 2017-11-28 DIAGNOSIS — E03.9 ACQUIRED HYPOTHYROIDISM: ICD-10-CM

## 2017-11-28 DIAGNOSIS — E03.4 HYPOTHYROIDISM DUE TO ACQUIRED ATROPHY OF THYROID: ICD-10-CM

## 2017-11-28 DIAGNOSIS — I77.1 TORTUOUS AORTA: ICD-10-CM

## 2017-11-28 DIAGNOSIS — N18.30 TYPE 2 DIABETES MELLITUS WITH STAGE 3 CHRONIC KIDNEY DISEASE, WITHOUT LONG-TERM CURRENT USE OF INSULIN: ICD-10-CM

## 2017-11-28 DIAGNOSIS — I10 ESSENTIAL HYPERTENSION: ICD-10-CM

## 2017-11-28 DIAGNOSIS — E78.5 HYPERLIPIDEMIA, UNSPECIFIED HYPERLIPIDEMIA TYPE: ICD-10-CM

## 2017-11-28 PROCEDURE — 99999 PR PBB SHADOW E&M-EST. PATIENT-LVL III: CPT | Mod: PBBFAC,,, | Performed by: INTERNAL MEDICINE

## 2017-11-28 PROCEDURE — 99499 UNLISTED E&M SERVICE: CPT | Mod: S$GLB,,, | Performed by: INTERNAL MEDICINE

## 2017-11-28 PROCEDURE — 99214 OFFICE O/P EST MOD 30 MIN: CPT | Mod: S$GLB,,, | Performed by: INTERNAL MEDICINE

## 2017-11-28 RX ORDER — LEVOTHYROXINE SODIUM 75 UG/1
TABLET ORAL
Qty: 30 TABLET | Refills: 5 | Status: SHIPPED | OUTPATIENT
Start: 2017-11-28 | End: 2018-06-06 | Stop reason: SDUPTHER

## 2017-11-28 RX ORDER — HYDROCODONE BITARTRATE AND ACETAMINOPHEN 7.5; 325 MG/1; MG/1
1 TABLET ORAL
Qty: 30 TABLET | Refills: 0 | Status: SHIPPED | OUTPATIENT
Start: 2017-11-28 | End: 2017-12-26 | Stop reason: SDUPTHER

## 2017-11-28 NOTE — PROGRESS NOTES
Subjective:       Patient ID: Tonya Bucio is a 82 y.o. female.    Chief Complaint: Hyperlipidemia; Hypertension; Thyroid Problem; Diabetes; and Arthritis    Tonya Bucio is a 82 y.o. female who presents for Type II DM, Hypertension, and Hyperlipidemia follow up. Labs were reviewed with patient today.        Hyperlipidemia   This is a chronic problem. The current episode started more than 1 year ago. The problem is controlled. Recent lipid tests were reviewed and are low. Exacerbating diseases include obesity. Associated symptoms include myalgias. Pertinent negatives include no chest pain, leg pain or shortness of breath.   Hypertension   This is a chronic problem. The current episode started more than 1 year ago. The problem has been gradually worsening since onset. The problem is controlled. Pertinent negatives include no chest pain, neck pain, palpitations or shortness of breath. Past treatments include calcium channel blockers and beta blockers. Hypertensive end-organ damage includes a thyroid problem.   Thyroid Problem   Presents for follow-up visit. Symptoms include fatigue. Patient reports no anxiety or palpitations. Her past medical history is significant for hyperlipidemia.   Diabetes   She presents for her follow-up diabetic visit. She has type 2 diabetes mellitus. There are no hypoglycemic associated symptoms. Pertinent negatives for hypoglycemia include no confusion, dizziness, nervousness/anxiousness or pallor. Associated symptoms include fatigue. Pertinent negatives for diabetes include no chest pain, no polydipsia, no polyphagia and no weakness. There are no hypoglycemic complications. Symptoms are stable. There are no diabetic complications. Current diabetic treatment includes diet. She is following a diabetic diet. Her breakfast blood glucose range is generally 110-130 mg/dl. Her dinner blood glucose range is generally  mg/dl. An ACE inhibitor/angiotensin II receptor blocker is being  "taken.   Arthritis   Presents for follow-up (R hand swollen for 2 -3 weeks . " my gout is acting Up ") visit. Associated symptoms include fatigue. Pertinent negatives include no dysuria or fever. Compliance with total regimen is 51-75%.   Medication Refill   Associated symptoms include arthralgias, fatigue and myalgias. Pertinent negatives include no chest pain, chills, congestion, coughing, fever, nausea, neck pain, numbness, sore throat, vomiting or weakness.     Review of Systems   Constitutional: Positive for fatigue. Negative for chills and fever.   HENT: Negative for congestion, hearing loss, sinus pressure and sore throat.    Eyes: Negative for photophobia.   Respiratory: Negative for cough, choking, chest tightness, shortness of breath and wheezing.    Cardiovascular: Negative for chest pain and palpitations.   Gastrointestinal: Negative for blood in stool, nausea and vomiting.   Endocrine: Negative for polydipsia and polyphagia.   Genitourinary: Negative for dysuria and hematuria.   Musculoskeletal: Positive for arthralgias, arthritis, back pain, gait problem and myalgias. Negative for neck pain.   Skin: Negative for pallor.   Neurological: Negative for dizziness, weakness and numbness.   Hematological: Does not bruise/bleed easily.   Psychiatric/Behavioral: Negative for confusion and suicidal ideas. The patient is not nervous/anxious.        Objective:      Physical Exam   Constitutional: She is oriented to person, place, and time. She appears well-developed and well-nourished.   HENT:   Head: Normocephalic and atraumatic.   Right Ear: External ear normal.   Left Ear: External ear normal.   Mouth/Throat: Oropharynx is clear and moist.   Eyes: Conjunctivae and EOM are normal. Pupils are equal, round, and reactive to light.   Neck: Normal range of motion. Neck supple. No JVD present. No tracheal deviation present. No thyromegaly present.   Cardiovascular: Normal rate, regular rhythm, normal heart sounds and " intact distal pulses.    Pulses:       Dorsalis pedis pulses are 2+ on the right side, and 2+ on the left side.        Posterior tibial pulses are 2+ on the right side, and 2+ on the left side.       Pulmonary/Chest: Effort normal and breath sounds normal. No respiratory distress. She has no wheezes. She has no rales. She exhibits no tenderness.   Abdominal: Soft. Bowel sounds are normal. She exhibits no distension and no mass. There is no tenderness. There is no rebound and no guarding.   Musculoskeletal: Normal range of motion. She exhibits no edema.   Bilateral knee oa changes.     Feet:   Right Foot:   Protective Sensation: 7 sites tested. 7 sites sensed.   Skin Integrity: Positive for dry skin. Negative for ulcer or erythema.   Left Foot:   Protective Sensation: 7 sites tested. 6 sites sensed.   Skin Integrity: Positive for dry skin. Negative for ulcer or erythema.   Lymphadenopathy:     She has no cervical adenopathy.   Neurological: She is alert and oriented to person, place, and time. She has normal reflexes. No cranial nerve deficit. She exhibits normal muscle tone. Coordination normal.   Skin: Skin is warm and dry.        R clavicular head where it meets manubrium is slightly swollen; no hyperemia, no tenderness   Psychiatric: She has a normal mood and affect.   Nursing note and vitals reviewed.      Assessment:       1. Controlled type 2 diabetes mellitus with diabetic nephropathy, without long-term current use of insulin    2. Other chronic pulmonary embolism without acute cor pulmonale    3. Acquired hypothyroidism    4. Essential hypertension    5. Tortuous aorta    6. Type 2 diabetes mellitus with stage 3 chronic kidney disease, without long-term current use of insulin    7. Hyperlipidemia, unspecified hyperlipidemia type    8. Primary osteoarthritis of both knees        Plan:   Tonya was seen today for hyperlipidemia, hypertension, thyroid problem, diabetes and arthritis.    Diagnoses and all orders  for this visit:    Controlled type 2 diabetes mellitus with diabetic nephropathy, without long-term current use of insulin  Diet controllwd.    -     Hemoglobin A1c; Future  -     Microalbumin/creatinine urine ratio; Future  Patient has controlled Diabetes .  We discussed about diet ;low in calories. Avoid sweats, sodas.  Also increasing activity;walking 2-3 miles a day.  I also adjusted medications and gave patient  instructions about adherence to plan.  Goal of  A1c  less than 7 % stressed.  Also goal of LDL less than 70 highlighted to patient.  Other chronic pulmonary embolism without acute cor pulmonale  Continue xarelto     Acquired hypothyroidism  -     TSH; Future  The current medical regimen is effective;  continue present plan and medications.  Essential hypertension  -     CBC auto differential; Future  -     Comprehensive metabolic panel; Future    Tortuous aorta  -     Lipid panel; Future  Continue levothyroxine 75 daily       Hyperlipidemia, unspecified hyperlipidemia type  -     Lipid panel; Future  Continue lovaaza    Primary osteoarthritis of both knees  -     hydrocodone-acetaminophen 7.5-325mg (NORCO) 7.5-325 mg per tablet; Take 1 tablet by mouth every 24 hours as needed for Pain.

## 2017-12-13 ENCOUNTER — OFFICE VISIT (OUTPATIENT)
Dept: INTERNAL MEDICINE | Facility: CLINIC | Age: 82
End: 2017-12-13
Payer: MEDICARE

## 2017-12-13 ENCOUNTER — TELEPHONE (OUTPATIENT)
Dept: INTERNAL MEDICINE | Facility: CLINIC | Age: 82
End: 2017-12-13

## 2017-12-13 VITALS
BODY MASS INDEX: 43.1 KG/M2 | DIASTOLIC BLOOD PRESSURE: 48 MMHG | RESPIRATION RATE: 32 BRPM | TEMPERATURE: 98 F | HEIGHT: 68 IN | SYSTOLIC BLOOD PRESSURE: 100 MMHG | OXYGEN SATURATION: 95 % | WEIGHT: 284.38 LBS | HEART RATE: 75 BPM

## 2017-12-13 DIAGNOSIS — J44.1 COPD EXACERBATION: Primary | ICD-10-CM

## 2017-12-13 DIAGNOSIS — J20.9 ACUTE BRONCHITIS, UNSPECIFIED ORGANISM: Primary | ICD-10-CM

## 2017-12-13 PROCEDURE — 96372 THER/PROPH/DIAG INJ SC/IM: CPT | Mod: S$GLB,,, | Performed by: INTERNAL MEDICINE

## 2017-12-13 PROCEDURE — 99213 OFFICE O/P EST LOW 20 MIN: CPT | Mod: 25,S$GLB,, | Performed by: NURSE PRACTITIONER

## 2017-12-13 PROCEDURE — 99999 PR PBB SHADOW E&M-EST. PATIENT-LVL V: CPT | Mod: PBBFAC,,, | Performed by: NURSE PRACTITIONER

## 2017-12-13 PROCEDURE — 99499 UNLISTED E&M SERVICE: CPT | Mod: S$GLB,,, | Performed by: NURSE PRACTITIONER

## 2017-12-13 RX ORDER — DOXYCYCLINE 100 MG/1
100 CAPSULE ORAL EVERY 12 HOURS
Qty: 20 CAPSULE | Refills: 0 | Status: SHIPPED | OUTPATIENT
Start: 2017-12-13 | End: 2018-02-21

## 2017-12-13 RX ORDER — LEVALBUTEROL 1.25 MG/.5ML
1 SOLUTION, CONCENTRATE RESPIRATORY (INHALATION) EVERY 4 HOURS PRN
Qty: 50 EACH | Refills: 0 | Status: SHIPPED | OUTPATIENT
Start: 2017-12-13 | End: 2017-12-14 | Stop reason: SDUPTHER

## 2017-12-13 RX ORDER — METHYLPREDNISOLONE ACETATE 40 MG/ML
40 INJECTION, SUSPENSION INTRA-ARTICULAR; INTRALESIONAL; INTRAMUSCULAR; SOFT TISSUE
Status: COMPLETED | OUTPATIENT
Start: 2017-12-13 | End: 2017-12-13

## 2017-12-13 RX ADMIN — METHYLPREDNISOLONE ACETATE 40 MG: 40 INJECTION, SUSPENSION INTRA-ARTICULAR; INTRALESIONAL; INTRAMUSCULAR; SOFT TISSUE at 02:12

## 2017-12-13 NOTE — PROGRESS NOTES
Subjective:       Patient ID: Tonya Bucio is a 82 y.o. female.    Chief Complaint: Chest Congestion; Wheezing; and Cough    HPI: Pt presents to clinic today known to me with c/o not feeling well since ;last Thursday. She started with cough and congestion. Now wheezing. No fever. Using cough syrup OTC with some relief. No fever. Feels like sinus congestion and sore throat getting worse.   Review of Systems   Constitutional: Positive for fatigue. Negative for appetite change, chills and fever.   HENT: Negative for congestion, ear discharge, ear pain, rhinorrhea, sinus pressure and sore throat.    Respiratory: Positive for cough, shortness of breath and wheezing. Negative for choking and chest tightness.         With coughing and increased activity   Cardiovascular: Negative for chest pain and palpitations.   Gastrointestinal: Negative for constipation, diarrhea, nausea and vomiting.   Musculoskeletal: Negative for joint swelling and myalgias.   Skin: Negative for rash and wound.   Neurological: Negative for dizziness, syncope, weakness, light-headedness and numbness.       Objective:      Physical Exam   Constitutional: She is oriented to person, place, and time. She appears well-developed and well-nourished.   HENT:   Head: Normocephalic and atraumatic.   Right Ear: External ear normal.   Left Ear: External ear normal.   Nose: Nose normal.   Mouth/Throat: Oropharynx is clear and moist. No oropharyngeal exudate.   Neck: No thyromegaly present.   Cardiovascular: Normal rate, regular rhythm and normal heart sounds.    No murmur heard.  Pulmonary/Chest: Effort normal. No respiratory distress. She has wheezes. She has rales.   RR 24   Abdominal: Soft. Bowel sounds are normal. She exhibits no distension and no mass. There is no tenderness. There is no rebound and no guarding.   Lymphadenopathy:     She has no cervical adenopathy.   Neurological: She is alert and oriented to person, place, and time.   Skin: Skin is warm  and dry. Capillary refill takes less than 2 seconds.   Psychiatric: She has a normal mood and affect.   Nursing note and vitals reviewed.      Assessment:       1. Acute bronchitis, unspecified organism        Plan:   Tonya was seen today for chest congestion, wheezing and cough.    Diagnoses and all orders for this visit:    Acute bronchitis, unspecified organism  -     doxycycline (VIBRAMYCIN) 100 MG Cap; Take 1 capsule (100 mg total) by mouth every 12 (twelve) hours.  -     methylPREDNISolone acetate injection 40 mg; Inject 1 mL (40 mg total) into the muscle one time.  -     levalbuterol (XOPENEX) 1.25 mg/0.5 mL nebulizer solution; Take 0.5 mLs (1.25 mg total) by nebulization every 4 (four) hours as needed for Wheezing. Rescue    Reports that bs has been well controlled without meds, diet controlled. last bs on labs 116. Also CKD 3 stable

## 2017-12-13 NOTE — TELEPHONE ENCOUNTER
[facility] needs a new Rx sent in for neb with Dx code, Will not take a verbal. Thank you!!          [facility]

## 2017-12-14 RX ORDER — LEVALBUTEROL 1.25 MG/.5ML
1 SOLUTION, CONCENTRATE RESPIRATORY (INHALATION) EVERY 4 HOURS PRN
Qty: 50 EACH | Refills: 0 | Status: SHIPPED | OUTPATIENT
Start: 2017-12-14 | End: 2017-12-15 | Stop reason: DRUGHIGH

## 2017-12-15 ENCOUNTER — TELEPHONE (OUTPATIENT)
Dept: INTERNAL MEDICINE | Facility: CLINIC | Age: 82
End: 2017-12-15

## 2017-12-15 RX ORDER — LEVALBUTEROL INHALATION SOLUTION 1.25 MG/3ML
1 SOLUTION RESPIRATORY (INHALATION) EVERY 4 HOURS PRN
Qty: 1 BOX | Refills: 1 | Status: SHIPPED | OUTPATIENT
Start: 2017-12-15 | End: 2018-03-07 | Stop reason: SDUPTHER

## 2017-12-15 NOTE — TELEPHONE ENCOUNTER
----- Message from Hollie Hartman sent at 12/15/2017  8:14 AM CST -----  Contact: [facility]  Tonya Bucio  MRN: [id number]  : 1935  PCP: Tasha Atkins  Home Phone      [phone]  Work Phone      Not on file.  Mobile          [phone]      MESSAGE: WANTS TO SEE IF MEDICATION CHANGE CAN BE ADDRESSED BY SOMEONE TODAY SINCE PT HAS BEEN OUT FOR TWO DAYS. NEEDS CHANGE ON XOPENEX. SENT FAX LAST NIGHT. PLEASE CALL     PHONE: [phone]

## 2017-12-26 DIAGNOSIS — M17.0 PRIMARY OSTEOARTHRITIS OF BOTH KNEES: ICD-10-CM

## 2017-12-26 RX ORDER — HYDROCODONE BITARTRATE AND ACETAMINOPHEN 7.5; 325 MG/1; MG/1
1 TABLET ORAL
Qty: 30 TABLET | Refills: 0 | Status: SHIPPED | OUTPATIENT
Start: 2017-12-26 | End: 2018-01-29 | Stop reason: SDUPTHER

## 2017-12-26 NOTE — TELEPHONE ENCOUNTER
----- Message from Carolyn Negrete sent at 2017 10:14 AM CST -----  Contact: Pt  Tonya Bucio  MRN: [id number]  : 1935  PCP: Tasha Atkins  Home Phone      [phone]  Work Phone      Not on file.  Mobile          [phone]      MESSAGE:  Pt needs refill on [employer]. Please advise.  PHONE:  [phone]

## 2018-01-17 RX ORDER — OMEGA-3-ACID ETHYL ESTERS 1 G/1
CAPSULE, LIQUID FILLED ORAL
Qty: 180 CAPSULE | Refills: 0 | Status: SHIPPED | OUTPATIENT
Start: 2018-01-17 | End: 2018-04-26 | Stop reason: SDUPTHER

## 2018-01-29 DIAGNOSIS — M17.0 PRIMARY OSTEOARTHRITIS OF BOTH KNEES: ICD-10-CM

## 2018-01-29 NOTE — TELEPHONE ENCOUNTER
----- Message from Carolyn Negrete sent at 2018  2:17 PM CST -----  Contact: pt  Tonya Bucio  MRN: [id number]  : 1935  PCP: Tasha Atkins  Home Phone      [phone]  Work Phone      Not on file.  Mobile          [phone]      MESSAGE:  Requesting refill on Norco. Please advise.  PHONE:  [phone]

## 2018-01-29 NOTE — TELEPHONE ENCOUNTER
Requested Prescriptions     Pending Prescriptions Disp Refills    hydrocodone-acetaminophen 7.5-325mg (NORCO) 7.5-325 mg per tablet 30 tablet 0     Sig: Take 1 tablet by mouth every 24 hours as needed for Pain.   LOV: 11/28/17. Rx last fill: 12/26/17

## 2018-01-30 RX ORDER — HYDROCODONE BITARTRATE AND ACETAMINOPHEN 7.5; 325 MG/1; MG/1
1 TABLET ORAL
Qty: 30 TABLET | Refills: 0 | Status: SHIPPED | OUTPATIENT
Start: 2018-01-30 | End: 2018-02-26 | Stop reason: SDUPTHER

## 2018-01-30 RX ORDER — ROSUVASTATIN CALCIUM 20 MG/1
TABLET, COATED ORAL
Qty: 90 TABLET | Refills: 0 | Status: SHIPPED | OUTPATIENT
Start: 2018-01-30 | End: 2018-04-30 | Stop reason: SDUPTHER

## 2018-02-16 ENCOUNTER — PES CALL (OUTPATIENT)
Dept: ADMINISTRATIVE | Facility: CLINIC | Age: 83
End: 2018-02-16

## 2018-02-21 ENCOUNTER — OFFICE VISIT (OUTPATIENT)
Dept: INTERNAL MEDICINE | Facility: CLINIC | Age: 83
End: 2018-02-21
Payer: MEDICARE

## 2018-02-21 VITALS
WEIGHT: 280.19 LBS | HEART RATE: 68 BPM | BODY MASS INDEX: 46.68 KG/M2 | DIASTOLIC BLOOD PRESSURE: 52 MMHG | HEIGHT: 65 IN | OXYGEN SATURATION: 95 % | TEMPERATURE: 98 F | RESPIRATION RATE: 20 BRPM | SYSTOLIC BLOOD PRESSURE: 112 MMHG

## 2018-02-21 DIAGNOSIS — G89.29 CHRONIC BILATERAL LOW BACK PAIN WITHOUT SCIATICA: ICD-10-CM

## 2018-02-21 DIAGNOSIS — Z85.41 HISTORY OF CERVICAL CANCER: ICD-10-CM

## 2018-02-21 DIAGNOSIS — M1A.39X0 CHRONIC GOUT DUE TO RENAL IMPAIRMENT OF MULTIPLE SITES WITHOUT TOPHUS: ICD-10-CM

## 2018-02-21 DIAGNOSIS — H91.91 HEARING LOSS OF RIGHT EAR, UNSPECIFIED HEARING LOSS TYPE: ICD-10-CM

## 2018-02-21 DIAGNOSIS — J44.9 PULMONARY HYPERTENSION DUE TO COPD: ICD-10-CM

## 2018-02-21 DIAGNOSIS — I26.92 CHRONIC SADDLE PULMONARY EMBOLISM WITHOUT ACUTE COR PULMONALE: ICD-10-CM

## 2018-02-21 DIAGNOSIS — N18.4 HYPERTENSION ASSOCIATED WITH STAGE 4 CHRONIC KIDNEY DISEASE DUE TO TYPE 2 DIABETES MELLITUS: ICD-10-CM

## 2018-02-21 DIAGNOSIS — K80.20 ASYMPTOMATIC CHOLELITHIASIS: ICD-10-CM

## 2018-02-21 DIAGNOSIS — R32 INCONTINENCE OF URINE IN FEMALE: ICD-10-CM

## 2018-02-21 DIAGNOSIS — E11.36 CONTROLLED TYPE 2 DIABETES MELLITUS WITH DIABETIC CATARACT, WITHOUT LONG-TERM CURRENT USE OF INSULIN: ICD-10-CM

## 2018-02-21 DIAGNOSIS — M54.50 CHRONIC BILATERAL LOW BACK PAIN WITHOUT SCIATICA: ICD-10-CM

## 2018-02-21 DIAGNOSIS — E78.2 MIXED HYPERLIPIDEMIA: ICD-10-CM

## 2018-02-21 DIAGNOSIS — I27.23 PULMONARY HYPERTENSION DUE TO COPD: ICD-10-CM

## 2018-02-21 DIAGNOSIS — Z79.02 LONG TERM CURRENT USE OF ANTITHROMBOTICS/ANTIPLATELETS: ICD-10-CM

## 2018-02-21 DIAGNOSIS — E03.9 ACQUIRED HYPOTHYROIDISM: ICD-10-CM

## 2018-02-21 DIAGNOSIS — I27.82 CHRONIC SADDLE PULMONARY EMBOLISM WITHOUT ACUTE COR PULMONALE: ICD-10-CM

## 2018-02-21 DIAGNOSIS — Z86.010 HISTORY OF ADENOMATOUS POLYP OF COLON: ICD-10-CM

## 2018-02-21 DIAGNOSIS — M17.0 PRIMARY OSTEOARTHRITIS OF BOTH KNEES: ICD-10-CM

## 2018-02-21 DIAGNOSIS — I12.9 HYPERTENSION ASSOCIATED WITH STAGE 4 CHRONIC KIDNEY DISEASE DUE TO TYPE 2 DIABETES MELLITUS: ICD-10-CM

## 2018-02-21 DIAGNOSIS — E11.22 TYPE 2 DIABETES MELLITUS WITH STAGE 4 CHRONIC KIDNEY DISEASE, WITHOUT LONG-TERM CURRENT USE OF INSULIN: ICD-10-CM

## 2018-02-21 DIAGNOSIS — Z86.711 HISTORY OF PULMONARY EMBOLUS (PE): ICD-10-CM

## 2018-02-21 DIAGNOSIS — I77.1 TORTUOUS AORTA: ICD-10-CM

## 2018-02-21 DIAGNOSIS — N18.4 TYPE 2 DIABETES MELLITUS WITH STAGE 4 CHRONIC KIDNEY DISEASE, WITHOUT LONG-TERM CURRENT USE OF INSULIN: ICD-10-CM

## 2018-02-21 DIAGNOSIS — Z00.00 ENCOUNTER FOR PREVENTIVE HEALTH EXAMINATION: Primary | ICD-10-CM

## 2018-02-21 DIAGNOSIS — E11.22 HYPERTENSION ASSOCIATED WITH STAGE 4 CHRONIC KIDNEY DISEASE DUE TO TYPE 2 DIABETES MELLITUS: ICD-10-CM

## 2018-02-21 DIAGNOSIS — M15.9 PRIMARY OSTEOARTHRITIS INVOLVING MULTIPLE JOINTS: ICD-10-CM

## 2018-02-21 PROBLEM — Z79.01 BLOOD THINNED DUE TO LONG-TERM ANTICOAGULANT USE: Status: ACTIVE | Noted: 2018-02-21

## 2018-02-21 PROCEDURE — G0439 PPPS, SUBSEQ VISIT: HCPCS | Mod: S$GLB,,, | Performed by: NURSE PRACTITIONER

## 2018-02-21 PROCEDURE — 3048F LDL-C <100 MG/DL: CPT | Mod: S$GLB,,, | Performed by: NURSE PRACTITIONER

## 2018-02-21 PROCEDURE — 3074F SYST BP LT 130 MM HG: CPT | Mod: S$GLB,,, | Performed by: NURSE PRACTITIONER

## 2018-02-21 PROCEDURE — 1158F ADVNC CARE PLAN TLK DOCD: CPT | Mod: S$GLB,,, | Performed by: NURSE PRACTITIONER

## 2018-02-21 PROCEDURE — 3044F HG A1C LEVEL LT 7.0%: CPT | Mod: S$GLB,,, | Performed by: NURSE PRACTITIONER

## 2018-02-21 PROCEDURE — 1125F AMNT PAIN NOTED PAIN PRSNT: CPT | Mod: S$GLB,,, | Performed by: NURSE PRACTITIONER

## 2018-02-21 PROCEDURE — 1159F MED LIST DOCD IN RCRD: CPT | Mod: S$GLB,,, | Performed by: NURSE PRACTITIONER

## 2018-02-21 PROCEDURE — 1170F FXNL STATUS ASSESSED: CPT | Mod: S$GLB,,, | Performed by: NURSE PRACTITIONER

## 2018-02-21 PROCEDURE — 3078F DIAST BP <80 MM HG: CPT | Mod: S$GLB,,, | Performed by: NURSE PRACTITIONER

## 2018-02-21 PROCEDURE — 99499 UNLISTED E&M SERVICE: CPT | Mod: S$GLB,,, | Performed by: NURSE PRACTITIONER

## 2018-02-21 PROCEDURE — 1160F RVW MEDS BY RX/DR IN RCRD: CPT | Mod: S$GLB,,, | Performed by: NURSE PRACTITIONER

## 2018-02-21 PROCEDURE — 99999 PR PBB SHADOW E&M-EST. PATIENT-LVL V: CPT | Mod: PBBFAC,,, | Performed by: NURSE PRACTITIONER

## 2018-02-21 PROCEDURE — 3060F POS MICROALBUMINURIA REV: CPT | Mod: S$GLB,,, | Performed by: NURSE PRACTITIONER

## 2018-02-21 NOTE — PATIENT INSTRUCTIONS
Counseling and Referral of Other Preventative  (Italic type indicates deductible and co-insurance are waived)    Patient Name: Tonya Bucio  Today's Date: 2/21/2018    Health Maintenance       Date Due Completion Date    Urine Drug Screen 06/18/1953 ---    Hemoglobin A1c 03/27/2018 9/27/2017 (Done)    Override on 9/27/2017: Done (a1c 6.6 %)    DEXA SCAN 08/03/2018 8/3/2015 (Declined)    Override on 8/3/2015: Declined    Lipid Panel 09/27/2018 9/27/2017    Eye Exam 11/01/2018 11/1/2017 (Done)    Override on 11/1/2017: Done    Override on 10/28/2016: Not Clinically Appropriate    Override on 8/3/2015: Not Clinically Appropriate    Foot Exam 11/28/2018 11/28/2017    Override on 11/28/2017: Done    Override on 10/12/2016: Done    Override on 8/3/2015: Declined    TETANUS VACCINE 02/21/2028 2/21/2018 (Not Clinical)    Override on 2/21/2018: Not Clinically Appropriate        No orders of the defined types were placed in this encounter.    The following information is provided to all patients.  This information is to help you find resources for any of the problems found today that may be affecting your health:                Living healthy guide: www.[facility].[place].gov      Understanding Diabetes: www.diabetes.org      Eating healthy: www.cdc.gov/healthyweight      CDC home safety checklist: www.cdc.gov/steadi/patient.html      Agency on Aging: www.goea.[place].[facility]      Alcoholics anonymous (AA): www.aa.org      Physical Activity: www.kristyn.nih.gov/xt0mpxb      Tobacco use: www.quitwithusla.org

## 2018-02-21 NOTE — PROGRESS NOTES
"Tonya Bucio presented for a  Medicare AWV and comprehensive Health Risk Assessment today. The following components were reviewed and updated:    · Medical history  · Family History  · Social history  · Allergies and Current Medications  · Health Risk Assessment  · Health Maintenance  · Care Team     ** See Completed Assessments for Annual Wellness Visit within the encounter summary.**       The following assessments were completed:  · Living Situation  · CAGE  · Depression Screening  · Timed Get Up and Go  · Whisper Test  · Cognitive Function Screening  · Nutrition Screening  · ADL Screening  · PAQ Screening    Vitals:    02/21/18 1108   BP: (!) 112/52   Pulse: 68   Resp: 20   Temp: 98.2 °F (36.8 °C)   TempSrc: Tympanic   SpO2: 95%   Weight: 127.1 kg (280 lb 3.3 oz)   Height: 5' 5" (1.651 m)     Body mass index is 46.63 kg/m².  Physical Exam   Constitutional: She is oriented to person, place, and time. She appears well-developed and well-nourished.   HENT:   Head: Normocephalic and atraumatic.   Right Ear: External ear normal.   Left Ear: External ear normal.   Mouth/Throat: Oropharynx is clear and moist.   Eyes: Conjunctivae and EOM are normal. Pupils are equal, round, and reactive to light.   Neck: Normal range of motion. Neck supple. No JVD present. No tracheal deviation present. No thyromegaly present.   Cardiovascular: Normal rate, regular rhythm, normal heart sounds and intact distal pulses.    Pulmonary/Chest: Effort normal and breath sounds normal. No respiratory distress. She has no wheezes. She has no rales. She exhibits no tenderness.   Abdominal: Soft. Bowel sounds are normal. She exhibits no distension and no mass. There is no tenderness. There is no rebound and no guarding.   Musculoskeletal: Normal range of motion. She exhibits no edema.   Bilateral knee oa changes.     Lymphadenopathy:     She has no cervical adenopathy.   Neurological: She is alert and oriented to person, place, and time. She has " normal reflexes. No cranial nerve deficit. She exhibits normal muscle tone. Coordination normal.   Skin: Skin is warm and dry.   Psychiatric: She has a normal mood and affect.   Nursing note and vitals reviewed.        Diagnoses and health risks identified today and associated recommendations/orders:    1. Encounter for preventive health examination    2. History of pulmonary embolus (PE)    3. Long term current use of antithrombotics/antiplatelets    4. Hypertension associated with stage 4 chronic kidney disease due to type 2 diabetes mellitus    5. Mixed hyperlipidemia    6. Tortuous aorta    7. Type 2 diabetes mellitus with stage 4 chronic kidney disease, without long-term current use of insulin    8. Acquired hypothyroidism    9. Class 3 obesity with serious comorbidity and body mass index (BMI) of 45.0 to 49.9 in adult, unspecified obesity type    10. Pulmonary hypertension due to COPD    11. History of cervical cancer    12. Hearing loss of right ear, unspecified hearing loss type    13. Controlled type 2 diabetes mellitus with diabetic cataract, without long-term current use of insulin    14. Primary osteoarthritis of both knees    15. Primary osteoarthritis involving multiple joints    16. Chronic gout due to renal impairment of multiple sites without tophus    17. Chronic bilateral low back pain without sciatica    18. History of adenomatous polyp of colon    19. Asymptomatic cholelithiasis    20. Chronic saddle pulmonary embolism without acute cor pulmonale    21. Incontinence of urine in female      Provided Tonya with a 5-10 year written screening schedule and personal prevention plan. Recommendations were developed using the USPSTF age appropriate recommendations. Education, counseling, and referrals were provided as needed. After Visit Summary printed and given to patient which includes a list of additional screenings\tests needed.    No Follow-up on file.    Carolyn Sarah NP    I offered to  discuss end of life issues, including information on how to make advance directives that the patient could use to name someone who would make medical decisions on their behalf if they became too ill to make themselves.    ___Patient declined  _X_Patient is interested, I provided paper work and offered to discuss.

## 2018-02-26 ENCOUNTER — OFFICE VISIT (OUTPATIENT)
Dept: INTERNAL MEDICINE | Facility: CLINIC | Age: 83
End: 2018-02-26
Payer: MEDICARE

## 2018-02-26 VITALS
HEIGHT: 65 IN | DIASTOLIC BLOOD PRESSURE: 82 MMHG | OXYGEN SATURATION: 95 % | WEIGHT: 285.5 LBS | RESPIRATION RATE: 16 BRPM | BODY MASS INDEX: 47.57 KG/M2 | SYSTOLIC BLOOD PRESSURE: 126 MMHG | HEART RATE: 64 BPM

## 2018-02-26 DIAGNOSIS — Z91.81 AT RISK FOR FALLING: ICD-10-CM

## 2018-02-26 DIAGNOSIS — M17.0 PRIMARY OSTEOARTHRITIS OF BOTH KNEES: Primary | ICD-10-CM

## 2018-02-26 PROCEDURE — 99213 OFFICE O/P EST LOW 20 MIN: CPT | Mod: S$GLB,,, | Performed by: INTERNAL MEDICINE

## 2018-02-26 PROCEDURE — 3008F BODY MASS INDEX DOCD: CPT | Mod: S$GLB,,, | Performed by: INTERNAL MEDICINE

## 2018-02-26 PROCEDURE — 99999 PR PBB SHADOW E&M-EST. PATIENT-LVL III: CPT | Mod: PBBFAC,,, | Performed by: INTERNAL MEDICINE

## 2018-02-26 PROCEDURE — 1125F AMNT PAIN NOTED PAIN PRSNT: CPT | Mod: S$GLB,,, | Performed by: INTERNAL MEDICINE

## 2018-02-26 PROCEDURE — 1159F MED LIST DOCD IN RCRD: CPT | Mod: S$GLB,,, | Performed by: INTERNAL MEDICINE

## 2018-02-26 RX ORDER — HYDROCODONE BITARTRATE AND ACETAMINOPHEN 7.5; 325 MG/1; MG/1
1 TABLET ORAL
Qty: 30 TABLET | Refills: 0 | Status: SHIPPED | OUTPATIENT
Start: 2018-02-26 | End: 2018-03-26 | Stop reason: SDUPTHER

## 2018-02-26 NOTE — PROGRESS NOTES
Subjective:       Patient ID: Tonya Bucio is a 82 y.o. female.    Chief Complaint: Primary Osteoarthritis of both knees (needs a script for wheel chair )    Tonya Bucio is a 82 y.o. female here for her pain meds refills.      Medication Refill   Associated symptoms include arthralgias, fatigue and myalgias. Pertinent negatives include no chest pain, chills, congestion, coughing, fever, nausea, neck pain, numbness, sore throat, vomiting or weakness.   Chronic Pain  Past Medical History includes: chronic pain syndrome and degenerative joint disease. Patient states that the pain is chronic. Tonya describes pain involving the knee and lumbar spine. The onset of her pain began more than 1 year ago. Progression of pain since onset is waxing and waning. Patient describes pain as a 9/10. Tonya is currently treating her symptoms with hydrocodone/acetaminophen (Hycet, LorcetLortab, Norco, Vicodin). Patient has shown moderate improvement with current treatment.  Goals of therapy include: Improve ADL's and Improve Sleep. Tonya has previously tried hydrocodone/acetaminophen (Hycet, Lorcet, Lortab, Norco,Vicodin) to treat her pain. Associated symptoms include: leg pain. Previous Imaging studies include: X-Ray.  Patient has not had a drug screen. Patient does have a pain contract. Patient's history of substance abuse includes: none. Patient's psychiatric disorders include: none.    Review of Systems   Constitutional: Positive for fatigue. Negative for chills, fever and weight loss.   HENT: Negative for congestion, hearing loss, sinus pressure and sore throat.    Eyes: Negative for photophobia.   Respiratory: Negative for cough, choking, chest tightness, shortness of breath and wheezing.    Cardiovascular: Negative for chest pain and palpitations.   Gastrointestinal: Negative for blood in stool, nausea and vomiting.   Endocrine: Negative for polydipsia and polyphagia.   Genitourinary: Negative for dysuria and hematuria.    Musculoskeletal: Positive for arthralgias, back pain, gait problem and myalgias. Negative for neck pain.        Fall risk ; needs wheel chair  For  Moving from her room to kitchen ;she is a fall risk    Skin: Negative for pallor.   Neurological: Negative for dizziness, tingling, weakness and numbness.   Hematological: Does not bruise/bleed easily.   Psychiatric/Behavioral: Negative for confusion and suicidal ideas. The patient is not nervous/anxious.        Objective:      Physical Exam   Constitutional: She is oriented to person, place, and time. She appears well-developed and well-nourished.   HENT:   Head: Normocephalic and atraumatic.   Right Ear: External ear normal.   Left Ear: External ear normal.   Mouth/Throat: Oropharynx is clear and moist.   Eyes: Conjunctivae and EOM are normal. Pupils are equal, round, and reactive to light.   Neck: Normal range of motion. Neck supple. No JVD present. No tracheal deviation present. No thyromegaly present.   Cardiovascular: Normal rate, regular rhythm, normal heart sounds and intact distal pulses.    Pulmonary/Chest: Effort normal and breath sounds normal. No respiratory distress. She has no wheezes. She has no rales. She exhibits no tenderness.   Abdominal: Soft. Bowel sounds are normal. She exhibits no distension and no mass. There is no tenderness. There is no rebound and no guarding.   Musculoskeletal: Normal range of motion. She exhibits no edema.   Bilateral knee oa changes.     Lymphadenopathy:     She has no cervical adenopathy.   Neurological: She is alert and oriented to person, place, and time. She has normal reflexes. No cranial nerve deficit. She exhibits normal muscle tone. Coordination normal.   Skin: Skin is warm and dry.   Psychiatric: She has a normal mood and affect.   Nursing note and vitals reviewed.      Assessment:       1. Primary osteoarthritis of both knees    2. At risk for falling        Plan:   Tonya was seen today for primary  osteoarthritis of both knees.    Diagnoses and all orders for this visit:    Primary osteoarthritis of both knees  -     wheelchair Melly; Use as directed  -     hydrocodone-acetaminophen 7.5-325mg (NORCO) 7.5-325 mg per tablet; Take 1 tablet by mouth every 24 hours as needed for Pain.    At risk for falling  -     wheelchair Melly; Use as directed    we discussed narcotics for pain management :    Managing chronic pain with opioids is complicated and challenging. I explained to patient that Doctors need to know if patients can follow the treatment plan, if they get desired responses from the meds, and if there are signs of developing addiction. Physicians use medication contracts to monitor patients adherence, or to help check that patients are compliant with the medications ordered. Such agreements are most commonly used when narcotic pain relievers are prescribed. Narcotics can sometimes become addictive if not taken as prescribed by a doctor.    The use of a pain management agreement allows for the documentation of understanding between a doctor and patient. Such documentation, when used as a means of facilitating care, can improve communication between doctors and patients.

## 2018-03-06 DIAGNOSIS — I10 ESSENTIAL HYPERTENSION: ICD-10-CM

## 2018-03-06 RX ORDER — METOPROLOL TARTRATE 50 MG/1
TABLET ORAL
Qty: 60 TABLET | Refills: 11 | Status: SHIPPED | OUTPATIENT
Start: 2018-03-06 | End: 2019-03-06 | Stop reason: SDUPTHER

## 2018-03-07 ENCOUNTER — OFFICE VISIT (OUTPATIENT)
Dept: INTERNAL MEDICINE | Facility: CLINIC | Age: 83
End: 2018-03-07
Payer: MEDICARE

## 2018-03-07 VITALS
DIASTOLIC BLOOD PRESSURE: 74 MMHG | TEMPERATURE: 98 F | HEART RATE: 64 BPM | BODY MASS INDEX: 46.68 KG/M2 | OXYGEN SATURATION: 91 % | SYSTOLIC BLOOD PRESSURE: 128 MMHG | WEIGHT: 280.19 LBS | HEIGHT: 65 IN | RESPIRATION RATE: 18 BRPM

## 2018-03-07 DIAGNOSIS — J20.9 ACUTE BRONCHITIS, UNSPECIFIED ORGANISM: Primary | ICD-10-CM

## 2018-03-07 PROCEDURE — 3074F SYST BP LT 130 MM HG: CPT | Mod: S$GLB,,, | Performed by: INTERNAL MEDICINE

## 2018-03-07 PROCEDURE — 96372 THER/PROPH/DIAG INJ SC/IM: CPT | Mod: S$GLB,,, | Performed by: INTERNAL MEDICINE

## 2018-03-07 PROCEDURE — 99213 OFFICE O/P EST LOW 20 MIN: CPT | Mod: 25,S$GLB,, | Performed by: INTERNAL MEDICINE

## 2018-03-07 PROCEDURE — 3078F DIAST BP <80 MM HG: CPT | Mod: S$GLB,,, | Performed by: INTERNAL MEDICINE

## 2018-03-07 PROCEDURE — 99999 PR PBB SHADOW E&M-EST. PATIENT-LVL III: CPT | Mod: PBBFAC,,, | Performed by: INTERNAL MEDICINE

## 2018-03-07 RX ORDER — LEVALBUTEROL INHALATION SOLUTION 1.25 MG/3ML
1 SOLUTION RESPIRATORY (INHALATION) EVERY 4 HOURS PRN
Qty: 1 BOX | Refills: 1 | Status: SHIPPED | OUTPATIENT
Start: 2018-03-07 | End: 2019-08-14

## 2018-03-07 RX ORDER — METHYLPREDNISOLONE ACETATE 40 MG/ML
40 INJECTION, SUSPENSION INTRA-ARTICULAR; INTRALESIONAL; INTRAMUSCULAR; SOFT TISSUE
Status: COMPLETED | OUTPATIENT
Start: 2018-03-07 | End: 2018-03-07

## 2018-03-07 RX ORDER — AZITHROMYCIN 250 MG/1
TABLET, FILM COATED ORAL
Qty: 6 TABLET | Refills: 0 | Status: SHIPPED | OUTPATIENT
Start: 2018-03-07 | End: 2018-05-24

## 2018-03-07 RX ADMIN — METHYLPREDNISOLONE ACETATE 40 MG: 40 INJECTION, SUSPENSION INTRA-ARTICULAR; INTRALESIONAL; INTRAMUSCULAR; SOFT TISSUE at 04:03

## 2018-03-07 NOTE — PROGRESS NOTES
Subjective:       Patient ID: Tonya Bucio is a 82 y.o. female.    Chief Complaint: Cough and Shortness of Breath    Cough   This is a new problem. The current episode started in the past 7 days. The problem has been waxing and waning. The problem occurs hourly. The cough is non-productive. Associated symptoms include nasal congestion, postnasal drip, shortness of breath and wheezing. Pertinent negatives include no chest pain, chills, ear pain, fever, myalgias, rash, rhinorrhea or sore throat. The symptoms are aggravated by lying down. Treatments tried: robitussin; nebs  The treatment provided mild relief.   Shortness of Breath   Associated symptoms include wheezing. Pertinent negatives include no chest pain, ear pain, fever, rash, rhinorrhea, sore throat or vomiting.     Review of Systems   Constitutional: Positive for fatigue. Negative for appetite change, chills and fever.   HENT: Positive for postnasal drip. Negative for congestion, ear discharge, ear pain, rhinorrhea, sinus pressure and sore throat.    Respiratory: Positive for cough, shortness of breath and wheezing. Negative for choking and chest tightness.         With coughing and increased activity   Cardiovascular: Negative for chest pain and palpitations.   Gastrointestinal: Negative for constipation, diarrhea, nausea and vomiting.   Musculoskeletal: Negative for joint swelling and myalgias.   Skin: Negative for rash and wound.   Neurological: Negative for dizziness, syncope, weakness, light-headedness and numbness.       Objective:      Physical Exam   Constitutional: She is oriented to person, place, and time. She appears well-developed and well-nourished.   HENT:   Head: Normocephalic and atraumatic.   Right Ear: External ear normal.   Left Ear: External ear normal.   Nose: Nose normal.   Mouth/Throat: Oropharynx is clear and moist. No oropharyngeal exudate.   Neck: No thyromegaly present.   Cardiovascular: Normal rate, regular rhythm and normal  heart sounds.    No murmur heard.  Pulmonary/Chest: Effort normal. No respiratory distress. She has wheezes. She has rales.   Abdominal: Soft. Bowel sounds are normal. She exhibits no distension and no mass. There is no tenderness. There is no rebound and no guarding.   Lymphadenopathy:     She has no cervical adenopathy.   Neurological: She is alert and oriented to person, place, and time.   Skin: Skin is warm and dry. Capillary refill takes less than 2 seconds.   Psychiatric: She has a normal mood and affect.   Nursing note and vitals reviewed.      Assessment:       1. Acute bronchitis, unspecified organism        Plan:   Tonya was seen today for cough and shortness of breath.    Diagnoses and all orders for this visit:    Acute bronchitis, unspecified organism  -     methylPREDNISolone acetate injection 40 mg; Inject 1 mL (40 mg total) into the muscle one time.  -     azithromycin (ZITHROMAX Z-JEIMY) 250 MG tablet; 2 today , then 1 daily  -     levalbuterol (XOPENEX) 1.25 mg/3 mL nebulizer solution; Take 3 mLs (1.25 mg total) by nebulization every 4 (four) hours as needed for Wheezing or Shortness of Breath. Rescue    continue robitussin  If not better call me or see us.

## 2018-03-26 DIAGNOSIS — M17.0 PRIMARY OSTEOARTHRITIS OF BOTH KNEES: ICD-10-CM

## 2018-03-26 RX ORDER — HYDROCODONE BITARTRATE AND ACETAMINOPHEN 7.5; 325 MG/1; MG/1
1 TABLET ORAL
Qty: 30 TABLET | Refills: 0 | Status: SHIPPED | OUTPATIENT
Start: 2018-03-26 | End: 2018-04-30 | Stop reason: SDUPTHER

## 2018-03-26 NOTE — TELEPHONE ENCOUNTER
Medication refill on hydrocodone-acetaminophen 7.5-325mg (NORCO) 7.5-325 mg per tablet sent to [facility] [address]. Last office visit on 3/7/2018. Please advise.

## 2018-04-26 RX ORDER — OMEGA-3-ACID ETHYL ESTERS 1 G/1
CAPSULE, LIQUID FILLED ORAL
Qty: 180 CAPSULE | Refills: 0 | Status: SHIPPED | OUTPATIENT
Start: 2018-04-26 | End: 2018-07-25 | Stop reason: SDUPTHER

## 2018-04-30 DIAGNOSIS — M17.0 PRIMARY OSTEOARTHRITIS OF BOTH KNEES: ICD-10-CM

## 2018-04-30 RX ORDER — ALLOPURINOL 300 MG/1
TABLET ORAL
Qty: 30 TABLET | Refills: 11 | Status: SHIPPED | OUTPATIENT
Start: 2018-04-30 | End: 2019-05-08 | Stop reason: SDUPTHER

## 2018-04-30 RX ORDER — HYDROCODONE BITARTRATE AND ACETAMINOPHEN 7.5; 325 MG/1; MG/1
1 TABLET ORAL
Qty: 30 TABLET | Refills: 0 | Status: SHIPPED | OUTPATIENT
Start: 2018-04-30 | End: 2018-05-24 | Stop reason: SDUPTHER

## 2018-04-30 RX ORDER — ROSUVASTATIN CALCIUM 20 MG/1
TABLET, COATED ORAL
Qty: 90 TABLET | Refills: 0 | Status: SHIPPED | OUTPATIENT
Start: 2018-04-30 | End: 2018-08-06 | Stop reason: SDUPTHER

## 2018-05-01 ENCOUNTER — TELEPHONE (OUTPATIENT)
Dept: INTERNAL MEDICINE | Facility: CLINIC | Age: 83
End: 2018-05-01

## 2018-05-01 DIAGNOSIS — M17.0 PRIMARY OSTEOARTHRITIS OF BOTH KNEES: ICD-10-CM

## 2018-05-01 DIAGNOSIS — Z91.81 AT RISK FOR FALLING: ICD-10-CM

## 2018-05-01 NOTE — TELEPHONE ENCOUNTER
----- Message from Carolyn Negrete sent at 2018  3:57 PM CDT -----  Contact: pt  Tonya Bucio  MRN: [id number]  : 1935  PCP: Tasha Atkins  Home Phone      [phone]  Work Phone      Not on file.  Mobile          [phone]      MESSAGE:  Pt accidentally misplaced the order for a wheelchair and wants to know if she can get a copy of it. Please advise.  PHONE:  [phone]

## 2018-05-01 NOTE — TELEPHONE ENCOUNTER
Could you place another order for a wheel chair? Patient called stating she misplaced the previous order.

## 2018-05-17 ENCOUNTER — LAB VISIT (OUTPATIENT)
Dept: LAB | Facility: HOSPITAL | Age: 83
End: 2018-05-17
Attending: INTERNAL MEDICINE
Payer: MEDICARE

## 2018-05-17 DIAGNOSIS — I12.9 HYPERTENSIVE KIDNEY DISEASE: ICD-10-CM

## 2018-05-17 DIAGNOSIS — N18.30 CHRONIC KIDNEY DISEASE, STAGE III (MODERATE): ICD-10-CM

## 2018-05-17 DIAGNOSIS — E03.9 ACQUIRED HYPOTHYROIDISM: ICD-10-CM

## 2018-05-17 DIAGNOSIS — I10 ESSENTIAL HYPERTENSION: ICD-10-CM

## 2018-05-17 DIAGNOSIS — N18.30 TYPE 2 DIABETES MELLITUS WITH STAGE 3 CHRONIC KIDNEY DISEASE, WITHOUT LONG-TERM CURRENT USE OF INSULIN: ICD-10-CM

## 2018-05-17 DIAGNOSIS — E11.22 TYPE 2 DIABETES MELLITUS WITH STAGE 3 CHRONIC KIDNEY DISEASE, WITHOUT LONG-TERM CURRENT USE OF INSULIN: ICD-10-CM

## 2018-05-17 DIAGNOSIS — E78.5 HYPERLIPIDEMIA, UNSPECIFIED HYPERLIPIDEMIA TYPE: ICD-10-CM

## 2018-05-17 DIAGNOSIS — E83.52 HYPERCALCEMIA: Primary | ICD-10-CM

## 2018-05-17 LAB
ALBUMIN SERPL BCP-MCNC: 3.3 G/DL
ALBUMIN SERPL BCP-MCNC: 3.3 G/DL
ALP SERPL-CCNC: 88 U/L
ALP SERPL-CCNC: 88 U/L
ALT SERPL W/O P-5'-P-CCNC: 20 U/L
ALT SERPL W/O P-5'-P-CCNC: 20 U/L
ANION GAP SERPL CALC-SCNC: 11 MMOL/L
ANION GAP SERPL CALC-SCNC: 11 MMOL/L
AST SERPL-CCNC: 14 U/L
AST SERPL-CCNC: 14 U/L
BASOPHILS # BLD AUTO: 0.02 K/UL
BASOPHILS # BLD AUTO: 0.02 K/UL
BASOPHILS NFR BLD: 0.2 %
BASOPHILS NFR BLD: 0.2 %
BILIRUB SERPL-MCNC: 1 MG/DL
BILIRUB SERPL-MCNC: 1 MG/DL
BUN SERPL-MCNC: 25 MG/DL
BUN SERPL-MCNC: 25 MG/DL
CALCIUM SERPL-MCNC: 10.7 MG/DL
CALCIUM SERPL-MCNC: 10.7 MG/DL
CHLORIDE SERPL-SCNC: 103 MMOL/L
CHLORIDE SERPL-SCNC: 103 MMOL/L
CHOLEST SERPL-MCNC: 153 MG/DL
CHOLEST/HDLC SERPL: 3.3 {RATIO}
CO2 SERPL-SCNC: 28 MMOL/L
CO2 SERPL-SCNC: 28 MMOL/L
CREAT SERPL-MCNC: 1.3 MG/DL
CREAT SERPL-MCNC: 1.3 MG/DL
CREAT UR-MCNC: 99.7 MG/DL
DIFFERENTIAL METHOD: ABNORMAL
DIFFERENTIAL METHOD: ABNORMAL
EOSINOPHIL # BLD AUTO: 0.5 K/UL
EOSINOPHIL # BLD AUTO: 0.5 K/UL
EOSINOPHIL NFR BLD: 5.9 %
EOSINOPHIL NFR BLD: 5.9 %
ERYTHROCYTE [DISTWIDTH] IN BLOOD BY AUTOMATED COUNT: 14.8 %
ERYTHROCYTE [DISTWIDTH] IN BLOOD BY AUTOMATED COUNT: 14.8 %
EST. GFR  (AFRICAN AMERICAN): 44 ML/MIN/1.73 M^2
EST. GFR  (AFRICAN AMERICAN): 44 ML/MIN/1.73 M^2
EST. GFR  (NON AFRICAN AMERICAN): 39 ML/MIN/1.73 M^2
EST. GFR  (NON AFRICAN AMERICAN): 39 ML/MIN/1.73 M^2
ESTIMATED AVG GLUCOSE: 131 MG/DL
GLUCOSE SERPL-MCNC: 119 MG/DL
GLUCOSE SERPL-MCNC: 119 MG/DL
HBA1C MFR BLD HPLC: 6.2 %
HCT VFR BLD AUTO: 36.8 %
HCT VFR BLD AUTO: 36.8 %
HDLC SERPL-MCNC: 46 MG/DL
HDLC SERPL: 30.1 %
HGB BLD-MCNC: 11.8 G/DL
HGB BLD-MCNC: 11.8 G/DL
LDLC SERPL CALC-MCNC: 58 MG/DL
LYMPHOCYTES # BLD AUTO: 2.2 K/UL
LYMPHOCYTES # BLD AUTO: 2.2 K/UL
LYMPHOCYTES NFR BLD: 23.7 %
LYMPHOCYTES NFR BLD: 23.7 %
MCH RBC QN AUTO: 30.6 PG
MCH RBC QN AUTO: 30.6 PG
MCHC RBC AUTO-ENTMCNC: 32.1 G/DL
MCHC RBC AUTO-ENTMCNC: 32.1 G/DL
MCV RBC AUTO: 96 FL
MCV RBC AUTO: 96 FL
MICROALBUMIN UR DL<=1MG/L-MCNC: 68 UG/ML
MICROALBUMIN/CREATININE RATIO: 68.2 UG/MG
MONOCYTES # BLD AUTO: 0.5 K/UL
MONOCYTES # BLD AUTO: 0.5 K/UL
MONOCYTES NFR BLD: 5.8 %
MONOCYTES NFR BLD: 5.8 %
NEUTROPHILS # BLD AUTO: 5.9 K/UL
NEUTROPHILS # BLD AUTO: 5.9 K/UL
NEUTROPHILS NFR BLD: 64.4 %
NEUTROPHILS NFR BLD: 64.4 %
NONHDLC SERPL-MCNC: 107 MG/DL
PLATELET # BLD AUTO: 292 K/UL
PLATELET # BLD AUTO: 292 K/UL
PMV BLD AUTO: 10.9 FL
PMV BLD AUTO: 10.9 FL
POTASSIUM SERPL-SCNC: 4.3 MMOL/L
POTASSIUM SERPL-SCNC: 4.3 MMOL/L
PROT SERPL-MCNC: 7.5 G/DL
PROT SERPL-MCNC: 7.5 G/DL
PTH-INTACT SERPL-MCNC: 342 PG/ML
RBC # BLD AUTO: 3.85 M/UL
RBC # BLD AUTO: 3.85 M/UL
SODIUM SERPL-SCNC: 142 MMOL/L
SODIUM SERPL-SCNC: 142 MMOL/L
TRIGL SERPL-MCNC: 245 MG/DL
TSH SERPL DL<=0.005 MIU/L-ACNC: 2.22 UIU/ML
WBC # BLD AUTO: 9.14 K/UL
WBC # BLD AUTO: 9.14 K/UL

## 2018-05-17 PROCEDURE — 80053 COMPREHEN METABOLIC PANEL: CPT

## 2018-05-17 PROCEDURE — 83036 HEMOGLOBIN GLYCOSYLATED A1C: CPT

## 2018-05-17 PROCEDURE — 84443 ASSAY THYROID STIM HORMONE: CPT

## 2018-05-17 PROCEDURE — 36415 COLL VENOUS BLD VENIPUNCTURE: CPT

## 2018-05-17 PROCEDURE — 85025 COMPLETE CBC W/AUTO DIFF WBC: CPT

## 2018-05-17 PROCEDURE — 80061 LIPID PANEL: CPT

## 2018-05-17 PROCEDURE — 82043 UR ALBUMIN QUANTITATIVE: CPT

## 2018-05-17 PROCEDURE — 83970 ASSAY OF PARATHORMONE: CPT

## 2018-05-24 ENCOUNTER — OFFICE VISIT (OUTPATIENT)
Dept: INTERNAL MEDICINE | Facility: CLINIC | Age: 83
End: 2018-05-24
Payer: MEDICARE

## 2018-05-24 VITALS
HEART RATE: 69 BPM | SYSTOLIC BLOOD PRESSURE: 110 MMHG | DIASTOLIC BLOOD PRESSURE: 68 MMHG | RESPIRATION RATE: 17 BRPM | WEIGHT: 289.69 LBS | HEIGHT: 65 IN | BODY MASS INDEX: 48.26 KG/M2

## 2018-05-24 DIAGNOSIS — M1A.39X0 CHRONIC GOUT DUE TO RENAL IMPAIRMENT OF MULTIPLE SITES WITHOUT TOPHUS: ICD-10-CM

## 2018-05-24 DIAGNOSIS — E78.2 MIXED HYPERLIPIDEMIA: Primary | ICD-10-CM

## 2018-05-24 DIAGNOSIS — M17.0 PRIMARY OSTEOARTHRITIS OF BOTH KNEES: ICD-10-CM

## 2018-05-24 DIAGNOSIS — N18.4 TYPE 2 DIABETES MELLITUS WITH STAGE 4 CHRONIC KIDNEY DISEASE, WITHOUT LONG-TERM CURRENT USE OF INSULIN: ICD-10-CM

## 2018-05-24 DIAGNOSIS — E03.9 ACQUIRED HYPOTHYROIDISM: ICD-10-CM

## 2018-05-24 DIAGNOSIS — E11.22 TYPE 2 DIABETES MELLITUS WITH STAGE 4 CHRONIC KIDNEY DISEASE, WITHOUT LONG-TERM CURRENT USE OF INSULIN: ICD-10-CM

## 2018-05-24 PROCEDURE — 99499 UNLISTED E&M SERVICE: CPT | Mod: S$PBB,,, | Performed by: INTERNAL MEDICINE

## 2018-05-24 PROCEDURE — 99214 OFFICE O/P EST MOD 30 MIN: CPT | Mod: S$GLB,,, | Performed by: INTERNAL MEDICINE

## 2018-05-24 PROCEDURE — 99999 PR PBB SHADOW E&M-EST. PATIENT-LVL III: CPT | Mod: PBBFAC,,, | Performed by: INTERNAL MEDICINE

## 2018-05-24 PROCEDURE — 3078F DIAST BP <80 MM HG: CPT | Mod: CPTII,S$GLB,, | Performed by: INTERNAL MEDICINE

## 2018-05-24 PROCEDURE — 3074F SYST BP LT 130 MM HG: CPT | Mod: CPTII,S$GLB,, | Performed by: INTERNAL MEDICINE

## 2018-05-24 RX ORDER — HYDROCODONE BITARTRATE AND ACETAMINOPHEN 7.5; 325 MG/1; MG/1
1 TABLET ORAL
Qty: 30 TABLET | Refills: 0 | Status: SHIPPED | OUTPATIENT
Start: 2018-05-24 | End: 2018-06-28 | Stop reason: SDUPTHER

## 2018-05-24 RX ORDER — LANOLIN ALCOHOL/MO/W.PET/CERES
400 CREAM (GRAM) TOPICAL 2 TIMES DAILY
COMMUNITY
End: 2018-08-22 | Stop reason: SDUPTHER

## 2018-05-24 NOTE — PROGRESS NOTES
Subjective:       Patient ID: Tonya Bucio is a 82 y.o. female.    Chief Complaint: Follow-up (6 month); Hyperlipidemia; Hypertension; and Diabetes    Tonya Bucio is a 82 y.o. female who presents for Type II DM, Hypertension, and Hyperlipidemia follow up. Labs were reviewed with patient today.        Hyperlipidemia   This is a chronic problem. The current episode started more than 1 year ago. The problem is controlled. Recent lipid tests were reviewed and are low. Exacerbating diseases include obesity. Associated symptoms include myalgias. Pertinent negatives include no chest pain, leg pain or shortness of breath.   Hypertension   This is a chronic problem. The current episode started more than 1 year ago. The problem has been gradually worsening since onset. The problem is controlled. Pertinent negatives include no chest pain, neck pain, palpitations or shortness of breath. Past treatments include calcium channel blockers and beta blockers. Identifiable causes of hypertension include a thyroid problem.   Diabetes   She presents for her follow-up diabetic visit. She has type 2 diabetes mellitus. There are no hypoglycemic associated symptoms. Pertinent negatives for hypoglycemia include no confusion, dizziness, nervousness/anxiousness or pallor. Associated symptoms include fatigue. Pertinent negatives for diabetes include no chest pain, no polydipsia, no polyphagia and no weakness. There are no hypoglycemic complications. Symptoms are stable. There are no diabetic complications. Current diabetic treatment includes diet. She is following a diabetic diet. Her breakfast blood glucose range is generally 110-130 mg/dl. Her dinner blood glucose range is generally  mg/dl. An ACE inhibitor/angiotensin II receptor blocker is being taken.   Thyroid Problem   Presents for follow-up visit. Symptoms include fatigue. Patient reports no anxiety or palpitations. Her past medical history is significant for hyperlipidemia.  "  Arthritis   Presents for follow-up (R hand swollen for 2 -3 weeks . " my gout is acting Up ") visit. Associated symptoms include fatigue. Pertinent negatives include no dysuria or fever. Compliance with total regimen is 51-75%.   Medication Refill   Associated symptoms include arthralgias, fatigue and myalgias. Pertinent negatives include no chest pain, chills, congestion, coughing, fever, nausea, neck pain, numbness, sore throat, vomiting or weakness.     Review of Systems   Constitutional: Positive for fatigue. Negative for chills and fever.   HENT: Negative for congestion, hearing loss, sinus pressure and sore throat.    Eyes: Negative for photophobia.   Respiratory: Negative for cough, choking, chest tightness, shortness of breath and wheezing.    Cardiovascular: Negative for chest pain and palpitations.   Gastrointestinal: Negative for blood in stool, nausea and vomiting.   Endocrine: Negative for polydipsia and polyphagia.   Genitourinary: Negative for dysuria and hematuria.   Musculoskeletal: Positive for arthralgias, arthritis, back pain, gait problem and myalgias. Negative for neck pain.   Skin: Negative for pallor.   Neurological: Negative for dizziness, weakness and numbness.   Hematological: Does not bruise/bleed easily.   Psychiatric/Behavioral: Negative for confusion and suicidal ideas. The patient is not nervous/anxious.        Objective:      Physical Exam   Constitutional: She is oriented to person, place, and time. She appears well-developed and well-nourished.   HENT:   Head: Normocephalic and atraumatic.   Right Ear: External ear normal.   Left Ear: External ear normal.   Mouth/Throat: Oropharynx is clear and moist.   Eyes: Conjunctivae and EOM are normal. Pupils are equal, round, and reactive to light.   Neck: Normal range of motion. Neck supple. No JVD present. No tracheal deviation present. No thyromegaly present.   Cardiovascular: Normal rate, regular rhythm, normal heart sounds and intact " distal pulses.    Pulses:       Dorsalis pedis pulses are 2+ on the right side, and 2+ on the left side.        Posterior tibial pulses are 2+ on the right side, and 2+ on the left side.       Pulmonary/Chest: Effort normal and breath sounds normal. No respiratory distress. She has no wheezes. She has no rales. She exhibits no tenderness.   Abdominal: Soft. Bowel sounds are normal. She exhibits no distension and no mass. There is no tenderness. There is no rebound and no guarding.   Musculoskeletal: Normal range of motion. She exhibits no edema.   Bilateral knee oa changes.     Feet:   Right Foot:   Protective Sensation: 7 sites tested. 7 sites sensed.   Skin Integrity: Positive for dry skin. Negative for ulcer or erythema.   Left Foot:   Protective Sensation: 7 sites tested. 6 sites sensed.   Skin Integrity: Positive for dry skin. Negative for ulcer or erythema.   Lymphadenopathy:     She has no cervical adenopathy.   Neurological: She is alert and oriented to person, place, and time. She has normal reflexes. No cranial nerve deficit. She exhibits normal muscle tone. Coordination normal.   Skin: Skin is warm and dry.        R clavicular head where it meets manubrium is slightly swollen; no hyperemia, no tenderness   Psychiatric: She has a normal mood and affect.   Nursing note and vitals reviewed.      Assessment:       1. Mixed hyperlipidemia    2. Acquired hypothyroidism    3. Type 2 diabetes mellitus with stage 4 chronic kidney disease, without long-term current use of insulin    4. Primary osteoarthritis of both knees        Plan:   Tonya was seen today for follow-up, hyperlipidemia, hypertension and diabetes.    Diagnoses and all orders for this visit:    Mixed hyperlipidemia  -     CBC auto differential; Future  -     Comprehensive metabolic panel; Future  Limit the cholesterol in your diet to less than 300 mg per day.   Fats should contribute no more than 20 to 35% of your daily calories.   Less than 7 to  10% of your calories should come from saturated fat.   Avoid saturated fat products e.g., Butter, some oils, meat, and poultry fat contain a lot of saturated fat.   Check food labels for fat and cholesterol content. Choose the foods with less fat per serving.   Limit the amount of butter and margarine you eat.   Use salad dressings and margarine made with polyunsaturated and monounsaturated fats.   Use egg whites or egg substitutes rather than whole eggs.   Replace whole-milk dairy products with nonfat or low-fat milk, cheese, spreads, and yogurt.   Eat skinless chicken, turkey, fish, and meatless entrees more often than red meat.   Choose lean cuts of meat and trim off all visible fat. Keep portion sizes moderate.   Avoid fatty desserts such as ice cream, cream-filled cakes, and cheesecakes. Choose fresh fruits, nonfat frozen yogurt, Popsicles, etc.   Reduce the amount of fried foods, vending machine food, and fast food you eat.   Eat fruits and vegetables (especially fresh fruits and leafy vegetables), beans, and whole grains daily. The fiber in these foods helps lower cholesterol.   Look for low-fat or nonfat varieties of the foods you like to eat, or look for substitutes.   You may need to exercise 60 minutes a day to prevent weight gain and 90 minutes a day to lose weight.  Acquired hypothyroidism  -     TSH; Future  The current medical regimen is effective;  continue present plan and medications.  Type 2 diabetes mellitus with stage 4 chronic kidney disease, without long-term current use of insulin  -     Lipid panel; Future  -     Hemoglobin A1c; Future  Patient has uncontrolled Diabetes .  We discussed about diet ;low in calories. Avoid sweats, sodas.  Also increasing activity;walking 2-3 miles a day.  I also adjusted medications and gave patient  instructions about adherence to plan.  Goal of  A1c  less than 7 % stressed.  Also goal of LDL less than 70 highlighted to patient.  Primary osteoarthritis of both  knees  -     HYDROcodone-acetaminophen (NORCO) 7.5-325 mg per tablet; Take 1 tablet by mouth every 24 hours as needed for Pain.  we discussed narcotics for pain management :    Managing chronic pain with opioids is complicated and challenging. I explained to patient that Doctors need to know if patients can follow the treatment plan, if they get desired responses from the meds, and if there are signs of developing addiction. Physicians use medication contracts to monitor patients adherence, or to help check that patients are compliant with the medications ordered. Such agreements are most commonly used when narcotic pain relievers are prescribed. Narcotics can sometimes become addictive if not taken as prescribed by a doctor.    The use of a pain management agreement allows for the documentation of understanding between a doctor and patient. Such documentation, when used as a means of facilitating care, can improve communication between doctors and patients.    Chronic gout due to renal impairment of multiple sites without tophus  -     Uric acid; Future

## 2018-05-28 ENCOUNTER — HOSPITAL ENCOUNTER (EMERGENCY)
Facility: HOSPITAL | Age: 83
Discharge: HOME | End: 2018-05-29
Attending: EMERGENCY MEDICINE
Payer: MEDICARE

## 2018-05-28 DIAGNOSIS — N39.0 URINARY TRACT INFECTION WITHOUT HEMATURIA, SITE UNSPECIFIED: Primary | ICD-10-CM

## 2018-05-28 DIAGNOSIS — R53.1 WEAKNESS: ICD-10-CM

## 2018-05-28 LAB
ALBUMIN SERPL BCP-MCNC: 3.1 G/DL
ALP SERPL-CCNC: 73 U/L
ALT SERPL W/O P-5'-P-CCNC: 18 U/L
ANION GAP SERPL CALC-SCNC: 11 MMOL/L
AST SERPL-CCNC: 14 U/L
BASOPHILS # BLD AUTO: 0.02 K/UL
BASOPHILS NFR BLD: 0.2 %
BILIRUB SERPL-MCNC: 1 MG/DL
BNP SERPL-MCNC: 138 PG/ML
BUN SERPL-MCNC: 28 MG/DL
CALCIUM SERPL-MCNC: 9.9 MG/DL
CHLORIDE SERPL-SCNC: 104 MMOL/L
CK MB SERPL-MCNC: 0.3 NG/ML
CK MB SERPL-RTO: 1.1 %
CK SERPL-CCNC: 28 U/L
CK SERPL-CCNC: 28 U/L
CO2 SERPL-SCNC: 25 MMOL/L
CREAT SERPL-MCNC: 1.4 MG/DL
DIFFERENTIAL METHOD: ABNORMAL
EOSINOPHIL # BLD AUTO: 0 K/UL
EOSINOPHIL NFR BLD: 0.1 %
ERYTHROCYTE [DISTWIDTH] IN BLOOD BY AUTOMATED COUNT: 14.9 %
EST. GFR  (AFRICAN AMERICAN): 41 ML/MIN/1.73 M^2
EST. GFR  (NON AFRICAN AMERICAN): 35 ML/MIN/1.73 M^2
GLUCOSE SERPL-MCNC: 121 MG/DL
HCT VFR BLD AUTO: 34.7 %
HGB BLD-MCNC: 11.1 G/DL
LYMPHOCYTES # BLD AUTO: 0.7 K/UL
LYMPHOCYTES NFR BLD: 8.5 %
MCH RBC QN AUTO: 30.2 PG
MCHC RBC AUTO-ENTMCNC: 32 G/DL
MCV RBC AUTO: 94 FL
MONOCYTES # BLD AUTO: 0.4 K/UL
MONOCYTES NFR BLD: 4.6 %
NEUTROPHILS # BLD AUTO: 7.5 K/UL
NEUTROPHILS NFR BLD: 86.6 %
PLATELET # BLD AUTO: 248 K/UL
PMV BLD AUTO: 10.5 FL
POTASSIUM SERPL-SCNC: 4 MMOL/L
PROT SERPL-MCNC: 6.9 G/DL
RBC # BLD AUTO: 3.68 M/UL
SODIUM SERPL-SCNC: 140 MMOL/L
TROPONIN I SERPL DL<=0.01 NG/ML-MCNC: 0.03 NG/ML
WBC # BLD AUTO: 8.69 K/UL

## 2018-05-28 PROCEDURE — 81000 URINALYSIS NONAUTO W/SCOPE: CPT

## 2018-05-28 PROCEDURE — 80053 COMPREHEN METABOLIC PANEL: CPT

## 2018-05-28 PROCEDURE — 82550 ASSAY OF CK (CPK): CPT

## 2018-05-28 PROCEDURE — 83880 ASSAY OF NATRIURETIC PEPTIDE: CPT

## 2018-05-28 PROCEDURE — 99285 EMERGENCY DEPT VISIT HI MDM: CPT | Mod: 25

## 2018-05-28 PROCEDURE — 36415 COLL VENOUS BLD VENIPUNCTURE: CPT

## 2018-05-28 PROCEDURE — 93010 ELECTROCARDIOGRAM REPORT: CPT | Mod: ,,, | Performed by: INTERNAL MEDICINE

## 2018-05-28 PROCEDURE — 87088 URINE BACTERIA CULTURE: CPT

## 2018-05-28 PROCEDURE — 93005 ELECTROCARDIOGRAM TRACING: CPT

## 2018-05-28 PROCEDURE — 82553 CREATINE MB FRACTION: CPT

## 2018-05-28 PROCEDURE — 87186 SC STD MICRODIL/AGAR DIL: CPT

## 2018-05-28 PROCEDURE — 96361 HYDRATE IV INFUSION ADD-ON: CPT

## 2018-05-28 PROCEDURE — 85025 COMPLETE CBC W/AUTO DIFF WBC: CPT

## 2018-05-28 PROCEDURE — 87086 URINE CULTURE/COLONY COUNT: CPT

## 2018-05-28 PROCEDURE — 87077 CULTURE AEROBIC IDENTIFY: CPT

## 2018-05-28 PROCEDURE — 96374 THER/PROPH/DIAG INJ IV PUSH: CPT

## 2018-05-28 PROCEDURE — 84484 ASSAY OF TROPONIN QUANT: CPT

## 2018-05-28 RX ORDER — ACETAMINOPHEN 500 MG
1000 TABLET ORAL
Status: COMPLETED | OUTPATIENT
Start: 2018-05-28 | End: 2018-05-29

## 2018-05-29 VITALS
HEART RATE: 82 BPM | BODY MASS INDEX: 44.6 KG/M2 | WEIGHT: 268 LBS | RESPIRATION RATE: 22 BRPM | TEMPERATURE: 100 F | OXYGEN SATURATION: 95 % | DIASTOLIC BLOOD PRESSURE: 61 MMHG | SYSTOLIC BLOOD PRESSURE: 140 MMHG

## 2018-05-29 LAB
BACTERIA #/AREA URNS HPF: ABNORMAL /HPF
BILIRUB UR QL STRIP: NEGATIVE
CLARITY UR: CLEAR
COLOR UR: YELLOW
GLUCOSE UR QL STRIP: NEGATIVE
HGB UR QL STRIP: ABNORMAL
KETONES UR QL STRIP: NEGATIVE
LEUKOCYTE ESTERASE UR QL STRIP: ABNORMAL
MICROSCOPIC COMMENT: ABNORMAL
NITRITE UR QL STRIP: NEGATIVE
PH UR STRIP: 6 [PH] (ref 5–8)
PROT UR QL STRIP: NEGATIVE
RBC #/AREA URNS HPF: 50 /HPF (ref 0–4)
SP GR UR STRIP: 1.02 (ref 1–1.03)
TROPONIN I SERPL DL<=0.01 NG/ML-MCNC: 0.03 NG/ML
URN SPEC COLLECT METH UR: ABNORMAL
UROBILINOGEN UR STRIP-ACNC: NEGATIVE EU/DL
WBC #/AREA URNS HPF: 30 /HPF (ref 0–5)

## 2018-05-29 PROCEDURE — 84484 ASSAY OF TROPONIN QUANT: CPT

## 2018-05-29 PROCEDURE — 93010 ELECTROCARDIOGRAM REPORT: CPT | Mod: ,,, | Performed by: INTERNAL MEDICINE

## 2018-05-29 PROCEDURE — 36415 COLL VENOUS BLD VENIPUNCTURE: CPT

## 2018-05-29 PROCEDURE — 63600175 PHARM REV CODE 636 W HCPCS: Performed by: EMERGENCY MEDICINE

## 2018-05-29 PROCEDURE — 25000003 PHARM REV CODE 250: Performed by: EMERGENCY MEDICINE

## 2018-05-29 PROCEDURE — 93005 ELECTROCARDIOGRAM TRACING: CPT

## 2018-05-29 RX ORDER — CEFTRIAXONE 1 G/1
1 INJECTION, POWDER, FOR SOLUTION INTRAMUSCULAR; INTRAVENOUS
Status: COMPLETED | OUTPATIENT
Start: 2018-05-29 | End: 2018-05-29

## 2018-05-29 RX ADMIN — CEFTRIAXONE SODIUM 1 G: 1 INJECTION, POWDER, FOR SOLUTION INTRAMUSCULAR; INTRAVENOUS at 01:05

## 2018-05-29 RX ADMIN — SODIUM CHLORIDE 1000 ML: 0.9 INJECTION, SOLUTION INTRAVENOUS at 01:05

## 2018-05-29 RX ADMIN — SODIUM CHLORIDE 500 ML: 0.9 INJECTION, SOLUTION INTRAVENOUS at 03:05

## 2018-05-29 RX ADMIN — ACETAMINOPHEN 1000 MG: 500 TABLET ORAL at 12:05

## 2018-05-29 NOTE — ED PROVIDER NOTES
"[facility]                                                  Chief Complaint  82 y.o. female with Fatigue    History of Present Illness  Tonya Bucio presents to the emergency room with complaints of fatigue and feeling run down.  Patient denies specific pain.  She just reports that she doesn't feel well.  She has some left shoulder pain that she believes she "slept on her shoulder wrong".  Her family is worried because they believe she is slurring her words and seems tired.  They report the symptoms have been going on since yesterday.  The patient denies any muscle weakness or neurologic symptoms.      Past Medical History:   Diagnosis Date    Acute bronchitis with asthma     Angina pectoris     Anticoagulant long-term use     Asthma     Back pain     Cancer     Chest pain, musculoskeletal 7/16/2013    Chronic bronchitis     Colon polyps     Gout, unspecified     History of cervical cancer     Hypothyroidism     Obesity     Osteoarthritis     Other and unspecified hyperlipidemia     PE (pulmonary embolism)     Renal manifestation of secondary diabetes mellitus     Thyroid disease     Trouble in sleeping     Type 2 diabetes mellitus with ophthalmic manifestations     Type 2 diabetes with peripheral circulatory disorder, controlled     Type II or unspecified type diabetes mellitus without mention of complication, uncontrolled     States everything okay-previous dx    Unspecified essential hypertension     Urinary incontinence     Uterine cancer      Past Surgical History:   Procedure Laterality Date    ADENOIDECTOMY      EYE SURGERY Right     right eye cataract    HYSTERECTOMY  2008    LIZ-BSO    tonsilectomy      TONSILLECTOMY  1945    TOTAL ABDOMINAL HYSTERECTOMY  2008    TOTAL ABDOMINAL HYSTERECTOMY W/ BILATERAL SALPINGOOPHORECTOMY  2008      Review of patient's allergies indicates:  No Known Allergies     Review of Systems and Physical Exam     Review of " Systems  -- Constitution - no fever, reports fatigue and generalized weakness  -- Eyes - no tearing or redness, no visual disturbance  -- Ear, Nose - no tinnitus or earache, no nasal congestion or discharge  -- Mouth,Throat - no sore throat, no toothache, normal voice, normal swallowing  -- Respiratory - denies cough and congestion, no shortness of breath, no wheezing  -- Cardiovascular - denies chest pain, no palpitations, denies claudication  -- Gastrointestinal - denies abdominal pain, nausea, vomiting, or diarrhea  -- Genitourinary - no dysuria, no denies flank pain, no hematuria or frequency   -- Musculoskeletal - denies back pain, negative for myalgias and arthralgias  reports left shoulder pain  -- Neurological - no headache, denies weakness or seizure; no LOC  -- Skin - denies pallor, rash, or changes in skin. no hives or welts noted    Vital Signs   weight is 121.6 kg (268 lb). Her tympanic temperature is 100.1 °F (37.8 °C). Her blood pressure is 128/54 (abnormal) and her pulse is 82. Her respiration is 27 (abnormal) and oxygen saturation is 95%.      Physical Exam  -- Nursing note and vitals reviewed  -- Constitutional: Appears well-developed and well-nourished, awake and alert, oriented ×4, patient appears well  -- Head: Atraumatic. Normocephalic. No obvious abnormality  -- Eyes: Pupils are equal and reactive to light. Normal conjunctiva and lids  -- Nose: Nose normal in appearance, nares grossly normal. No discharge  -- Throat: Mucous membranes moist, pharynx normal, normal tonsils. No lesions   -- Ears: External ears and TM normal bilaterally. Normal hearing and no drainage  -- Neck: Normal range of motion. Neck supple. No masses, trachea midline  -- Cardiac: Normal rate, regular rhythm and normal heart sounds, no JVD  -- Pulmonary: Normal respiratory effort, breath sounds clear to auscultation  -- Abdominal: Soft, no tenderness, no rebound or guarding. Normal bowel sounds. Normal liver edge  --  Musculoskeletal: Normal range of motion, no effusions. Joints stable left arm and shoulder full range of motion   -- Neurological: Cranial nerves II through XII grossly intact No focal deficits, I do not appreciate any slurring.  Patient has adequate articulation and is having no word finding difficulty.  She expresses herself clearly.  Strength is 5 out of 5 in all 4 extremities.  Showed good interaction with staff  -- Vascular: Posterior tibial, dorsalis pedis and radial pulses 2+ bilaterally  no lower extremity edema  -- Lymphatics: No cervical or peripheral lymphadenopathy. No edema noted  -- Skin: Warm and dry. No evidence of rash or cellulitis  -- Psychiatric: Normal mood and affect. Bedside behavior is appropriate    Emergency Room Course     Treatment and Evaluation  1.  Physical exam unremarkable, neuro exam normal  2.  Vitals within normal limits  3.  CT head negative for acute process  4.  EKG shows A. fib versus junctional rhythm without tachycardia, no evidence of ST changes  5.  CBC/CMP within normal limits, elevated BUN, creatinine at baseline  6.  Troponin mildly elevated at 0.031, will repeat  7.  Urinalysis positive for white blood cells and erythrocytes, culture sent  8.  Ceftriaxone 1 g IV  9.  Normal saline 1 L  10.  acetaminophen 1 g by mouth  11.  Asked x-ray negative for acute process  12.  Repeat EKG after hydration normal sinus rhythm with first-degree AV block  13.  Repeat troponin unchanged  14.  NV home follow-up Dr Atkins this week for repeat EKG and reevaluation    Abnormal lab values  Labs Reviewed   CBC W/ AUTO DIFFERENTIAL - Abnormal; Notable for the following:        Result Value    RBC 3.68 (*)     Hemoglobin 11.1 (*)     Hematocrit 34.7 (*)     RDW 14.9 (*)     Lymph # 0.7 (*)     Gran% 86.6 (*)     Lymph% 8.5 (*)     All other components within normal limits   COMPREHENSIVE METABOLIC PANEL - Abnormal; Notable for the following:     Glucose 121 (*)     BUN, Bld 28 (*)     Albumin  3.1 (*)     eGFR if  41 (*)     eGFR if non  35 (*)     All other components within normal limits   TROPONIN I - Abnormal; Notable for the following:     Troponin I 0.031 (*)     All other components within normal limits   B-TYPE NATRIURETIC PEPTIDE - Abnormal; Notable for the following:      (*)     All other components within normal limits   URINALYSIS - Abnormal; Notable for the following:     Occult Blood UA 3+ (*)     Leukocytes, UA 2+ (*)     All other components within normal limits   URINALYSIS MICROSCOPIC - Abnormal; Notable for the following:     RBC, UA 50 (*)     WBC, UA 30 (*)     All other components within normal limits   CULTURE, URINE   CK-MB   CK       Medications Given  Medications   acetaminophen tablet 1,000 mg (1,000 mg Oral Given 5/29/18 0029)   cefTRIAXone injection 1 g (1 g Intravenous Given 5/29/18 0131)   sodium chloride 0.9% bolus 1,000 mL (1,000 mLs Intravenous New Bag 5/29/18 0131)           Diagnosis  -- Urinary tract infection    Disposition and Plan  -- Disposition: home  -- Condition: stable  -- Follow-up: Patient to follow up with Tasha Atikns MD in 1-2 days.  -- I advised the patient that we have found no life threatening condition today  -- At this time, I believe the patient is clinically stable for discharge.   -- The patient acknowledges that close follow up with a MD is required   -- Patient agrees to comply with all instruction and direction given in the ER  -- Patient counseled on strict return precautions as discussed       Bailey Eden MD  05/29/18 6207

## 2018-05-30 ENCOUNTER — OFFICE VISIT (OUTPATIENT)
Dept: INTERNAL MEDICINE | Facility: CLINIC | Age: 83
End: 2018-05-30
Payer: MEDICARE

## 2018-05-30 VITALS
HEART RATE: 60 BPM | HEIGHT: 65 IN | RESPIRATION RATE: 16 BRPM | BODY MASS INDEX: 48.12 KG/M2 | WEIGHT: 288.81 LBS | DIASTOLIC BLOOD PRESSURE: 66 MMHG | SYSTOLIC BLOOD PRESSURE: 110 MMHG | OXYGEN SATURATION: 92 %

## 2018-05-30 DIAGNOSIS — N18.30 CHRONIC KIDNEY DISEASE, STAGE III (MODERATE): ICD-10-CM

## 2018-05-30 DIAGNOSIS — R79.89 ELEVATED TROPONIN: ICD-10-CM

## 2018-05-30 DIAGNOSIS — N30.01 ACUTE CYSTITIS WITH HEMATURIA: Primary | ICD-10-CM

## 2018-05-30 DIAGNOSIS — R94.31 EKG ABNORMALITIES: ICD-10-CM

## 2018-05-30 DIAGNOSIS — R07.89 ATYPICAL CHEST PAIN: ICD-10-CM

## 2018-05-30 LAB — TROPONIN I SERPL DL<=0.01 NG/ML-MCNC: 0.03 NG/ML

## 2018-05-30 PROCEDURE — 99999 PR PBB SHADOW E&M-EST. PATIENT-LVL V: CPT | Mod: PBBFAC,,, | Performed by: NURSE PRACTITIONER

## 2018-05-30 PROCEDURE — 3074F SYST BP LT 130 MM HG: CPT | Mod: CPTII,S$GLB,, | Performed by: NURSE PRACTITIONER

## 2018-05-30 PROCEDURE — 3078F DIAST BP <80 MM HG: CPT | Mod: CPTII,S$GLB,, | Performed by: NURSE PRACTITIONER

## 2018-05-30 PROCEDURE — 84484 ASSAY OF TROPONIN QUANT: CPT

## 2018-05-30 PROCEDURE — 99214 OFFICE O/P EST MOD 30 MIN: CPT | Mod: S$GLB,,, | Performed by: NURSE PRACTITIONER

## 2018-05-30 PROCEDURE — 99499 UNLISTED E&M SERVICE: CPT | Mod: S$GLB,,, | Performed by: NURSE PRACTITIONER

## 2018-05-30 RX ORDER — CIPROFLOXACIN 250 MG/1
250 TABLET, FILM COATED ORAL 2 TIMES DAILY
Qty: 14 TABLET | Refills: 0 | Status: SHIPPED | OUTPATIENT
Start: 2018-05-30 | End: 2018-08-22 | Stop reason: ALTCHOICE

## 2018-05-30 NOTE — PROGRESS NOTES
Subjective:           Patient ID: Tonya Bucio is a 82 y.o. female.    Chief Complaint: Urinary Tract Infection (ER FOLLOW UP)    81 yO WF brought to ER by 5/28 with c/o fatigue and noting she had slurred speech. CT of head negative. She was found to have + UTI and tx with IV rocephin 1Gm IVFluids. She also reported some atypical chest pain, left shoulder pain. This symptom was also recently reported at visit with Dr. Atkins. Daughter states they recommended repeat EKG at follow up today bc she was noted to have mild elevated TNI and  abn EKG per ER report; Afib vs junctional rhythm. Pt denies hx of afib. Heart tones are regular and repeat EKG in office today better quality and reveals SR with first degree AVB> Will repeat Troponin level to make sure not rising.   She has follow up tomorrow with Nephrology- Dr. Ritter for CKD.   She also has f/u with Dr. Ibarra June 11th.   Urine Micro pending; will start ABx - cipro 25omg bid and f/u culture  She is feeling much better today. No fever. NO slurred speech. LOC nml      Review of Systems   Constitutional: Positive for fatigue. Negative for chills and fever.   HENT: Negative for congestion, hearing loss, sinus pressure and sore throat.    Eyes: Negative for photophobia.   Respiratory: Negative for cough, choking, chest tightness, shortness of breath and wheezing.    Cardiovascular: Negative for chest pain and palpitations.   Gastrointestinal: Negative for blood in stool, nausea and vomiting.   Endocrine: Negative for polydipsia and polyphagia.   Genitourinary: Negative for dysuria and hematuria.   Musculoskeletal: Positive for arthralgias, back pain, gait problem and myalgias. Negative for neck pain.        Left upper arm pain with lifting but not tender to palpation   Skin: Negative for pallor.   Neurological: Negative for dizziness, weakness and numbness.   Hematological: Does not bruise/bleed easily.   Psychiatric/Behavioral: Negative for confusion and suicidal  ideas. The patient is not nervous/anxious.        Objective:      Physical Exam   Constitutional: She is oriented to person, place, and time. Vital signs are normal. She appears well-developed and well-nourished. She does not appear ill. No distress.   Obese in WC   HENT:   Head: Normocephalic and atraumatic.   Right Ear: External ear normal.   Left Ear: External ear normal.   Mouth/Throat: Oropharynx is clear and moist.   Eyes: Conjunctivae and EOM are normal. Pupils are equal, round, and reactive to light.   Neck: Normal range of motion. Neck supple. No JVD present. No tracheal deviation present. No thyromegaly present.   Cardiovascular: Normal rate, regular rhythm, normal heart sounds and intact distal pulses.    Pulmonary/Chest: Effort normal and breath sounds normal. No respiratory distress. She has no wheezes. She has no rales. She exhibits no tenderness.   Abdominal: Soft. Bowel sounds are normal. She exhibits no distension and no mass. There is no tenderness. There is no rebound and no guarding.   Musculoskeletal: Normal range of motion. She exhibits no edema.   Bilateral knee oa changes.  +Wheelchair     Lymphadenopathy:     She has no cervical adenopathy.   Neurological: She is alert and oriented to person, place, and time. She has normal reflexes. No cranial nerve deficit. She exhibits normal muscle tone. Coordination normal.   Skin: Skin is warm and dry.   Psychiatric: She has a normal mood and affect.   Nursing note and vitals reviewed.      Assessment:       1. Acute cystitis with hematuria    2. EKG abnormalities    3. Elevated troponin    4. Chronic kidney disease, stage III (moderate)    5. Atypical chest pain        Plan:   Tonya was seen today for urinary tract infection.    Diagnoses and all orders for this visit:    Acute cystitis with hematuria  -     ciprofloxacin HCl (CIPRO) 250 MG tablet; Take 1 tablet (250 mg total) by mouth 2 (two) times daily.    EKG abnormalities  -     IN OFFICE EKG  12-LEAD (to Muse)    Elevated troponin  -     IN OFFICE EKG 12-LEAD (to Muse)  -     Troponin I; Future  -     Troponin I    Chronic kidney disease, stage III (moderate)    Atypical chest pain  Comments:  left upper arm pain      Follow up next week

## 2018-05-31 ENCOUNTER — TELEPHONE (OUTPATIENT)
Dept: INTERNAL MEDICINE | Facility: CLINIC | Age: 83
End: 2018-05-31

## 2018-05-31 LAB — BACTERIA UR CULT: NORMAL

## 2018-05-31 NOTE — TELEPHONE ENCOUNTER
Looked up diclofenac/volataren topical and capsicum creams but none of this is covered by her insurance.   Can try salonpas patches or other OTC patches

## 2018-06-04 ENCOUNTER — TELEPHONE (OUTPATIENT)
Dept: INTERNAL MEDICINE | Facility: CLINIC | Age: 83
End: 2018-06-04

## 2018-06-06 DIAGNOSIS — E03.4 HYPOTHYROIDISM DUE TO ACQUIRED ATROPHY OF THYROID: ICD-10-CM

## 2018-06-06 RX ORDER — LEVOTHYROXINE SODIUM 75 UG/1
TABLET ORAL
Qty: 30 TABLET | Refills: 5 | Status: SHIPPED | OUTPATIENT
Start: 2018-06-06 | End: 2018-12-06 | Stop reason: SDUPTHER

## 2018-06-28 DIAGNOSIS — M17.0 PRIMARY OSTEOARTHRITIS OF BOTH KNEES: ICD-10-CM

## 2018-06-28 RX ORDER — HYDROCODONE BITARTRATE AND ACETAMINOPHEN 7.5; 325 MG/1; MG/1
1 TABLET ORAL
Qty: 30 TABLET | Refills: 0 | Status: SHIPPED | OUTPATIENT
Start: 2018-06-28 | End: 2018-07-31 | Stop reason: SDUPTHER

## 2018-06-28 NOTE — TELEPHONE ENCOUNTER
----- Message from Hollie Hartman sent at 2018 12:29 PM CDT -----  Contact: SELF   Tonya Bucio  MRN: [id number]  : 1935  PCP: Tasha Atkins  Home Phone      [phone]  Work Phone      Not on file.  [employer]          [phone]      MESSAGE: NEEDS REFILL ON [employer]    PHONE: [phone]

## 2018-07-25 RX ORDER — OMEGA-3-ACID ETHYL ESTERS 1 G/1
CAPSULE, LIQUID FILLED ORAL
Qty: 180 CAPSULE | Refills: 0 | Status: SHIPPED | OUTPATIENT
Start: 2018-07-25 | End: 2018-09-18 | Stop reason: SDUPTHER

## 2018-07-31 DIAGNOSIS — M17.0 PRIMARY OSTEOARTHRITIS OF BOTH KNEES: ICD-10-CM

## 2018-07-31 RX ORDER — HYDROCODONE BITARTRATE AND ACETAMINOPHEN 7.5; 325 MG/1; MG/1
1 TABLET ORAL
Qty: 30 TABLET | Refills: 0 | Status: SHIPPED | OUTPATIENT
Start: 2018-07-31 | End: 2018-08-22 | Stop reason: SDUPTHER

## 2018-07-31 NOTE — TELEPHONE ENCOUNTER
----- Message from Hollie Hartman sent at 2018 10:30 AM CDT -----  Contact: SELF  Tonya Bucio  MRN: [id number]  : 1935  PCP: Tasha Atkins  Home Phone      [phone]  Work Phone      Not on file.  [employer]          [phone]      MESSAGE: NEEDS REFILL ON PAIN MED.     PHONE: [phone]

## 2018-08-06 RX ORDER — ROSUVASTATIN CALCIUM 20 MG/1
TABLET, COATED ORAL
Qty: 90 TABLET | Refills: 0 | Status: SHIPPED | OUTPATIENT
Start: 2018-08-06 | End: 2018-11-08 | Stop reason: SDUPTHER

## 2018-08-22 ENCOUNTER — OFFICE VISIT (OUTPATIENT)
Dept: INTERNAL MEDICINE | Facility: CLINIC | Age: 83
End: 2018-08-22
Payer: MEDICARE

## 2018-08-22 VITALS
DIASTOLIC BLOOD PRESSURE: 76 MMHG | RESPIRATION RATE: 16 BRPM | BODY MASS INDEX: 48.6 KG/M2 | OXYGEN SATURATION: 95 % | HEART RATE: 69 BPM | WEIGHT: 291.69 LBS | HEIGHT: 65 IN | SYSTOLIC BLOOD PRESSURE: 116 MMHG

## 2018-08-22 DIAGNOSIS — M17.0 PRIMARY OSTEOARTHRITIS OF BOTH KNEES: ICD-10-CM

## 2018-08-22 PROCEDURE — 3074F SYST BP LT 130 MM HG: CPT | Mod: CPTII,S$GLB,, | Performed by: INTERNAL MEDICINE

## 2018-08-22 PROCEDURE — 99213 OFFICE O/P EST LOW 20 MIN: CPT | Mod: S$GLB,,, | Performed by: INTERNAL MEDICINE

## 2018-08-22 PROCEDURE — 99999 PR PBB SHADOW E&M-EST. PATIENT-LVL III: CPT | Mod: PBBFAC,,, | Performed by: INTERNAL MEDICINE

## 2018-08-22 PROCEDURE — 3078F DIAST BP <80 MM HG: CPT | Mod: CPTII,S$GLB,, | Performed by: INTERNAL MEDICINE

## 2018-08-22 RX ORDER — LANOLIN ALCOHOL/MO/W.PET/CERES
400 CREAM (GRAM) TOPICAL 2 TIMES DAILY
Qty: 180 TABLET | Refills: 1 | COMMUNITY
Start: 2018-08-22 | End: 2020-04-29 | Stop reason: SDUPTHER

## 2018-08-22 RX ORDER — HYDROCODONE BITARTRATE AND ACETAMINOPHEN 7.5; 325 MG/1; MG/1
1 TABLET ORAL
Qty: 30 TABLET | Refills: 0 | Status: SHIPPED | OUTPATIENT
Start: 2018-08-22 | End: 2018-09-28 | Stop reason: SDUPTHER

## 2018-08-22 NOTE — PROGRESS NOTES
Subjective:       Patient ID: Tonya Bucio is a 83 y.o. female.    Chief Complaint: OSTEOARTHRITIS OF BOTH KNEES (3 MONTH FOLLOW UP)    Tonya Bucio is a 83  y.o. female here for her pain meds refills.      Medication Refill   Associated symptoms include arthralgias, fatigue and myalgias. Pertinent negatives include no chest pain, chills, congestion, coughing, fever, nausea, neck pain, numbness, sore throat, vomiting or weakness.   Chronic Pain  Past Medical History includes: chronic pain syndrome and degenerative joint disease. Patient states that the pain is chronic. Tonya describes pain involving the knee and lumbar spine. The onset of her pain began more than 1 year ago. Progression of pain since onset is waxing and waning. Patient describes pain as a 9/10. Tonya is currently treating her symptoms with hydrocodone/acetaminophen (Hycet, LorcetLortab, Norco, Vicodin). Patient has shown moderate improvement with current treatment.  Goals of therapy include: Improve ADL's and Improve Sleep. Tonya has previously tried hydrocodone/acetaminophen (Hycet, Lorcet, Lortab, Norco,Vicodin) to treat her pain. Associated symptoms include: leg pain. Previous Imaging studies include: X-Ray.  Patient has not had a drug screen. Patient does have a pain contract. Patient's history of substance abuse includes: none. Patient's psychiatric disorders include: none.    Review of Systems   Constitutional: Positive for fatigue. Negative for chills, fever and weight loss.   HENT: Negative for congestion, hearing loss, sinus pressure and sore throat.    Eyes: Negative for photophobia.   Respiratory: Negative for cough, choking, chest tightness, shortness of breath and wheezing.    Cardiovascular: Negative for chest pain and palpitations.   Gastrointestinal: Negative for blood in stool, nausea and vomiting.   Endocrine: Negative for polydipsia and polyphagia.   Genitourinary: Negative for dysuria and hematuria.   Musculoskeletal:  Positive for arthralgias, back pain, gait problem and myalgias. Negative for neck pain.        Fall risk ; needs wheel chair  For  Moving from her room to kitchen ;she is a fall risk    Skin: Negative for pallor.   Neurological: Negative for dizziness, tingling, weakness and numbness.   Hematological: Does not bruise/bleed easily.   Psychiatric/Behavioral: Negative for confusion and suicidal ideas. The patient is not nervous/anxious.        Objective:      Physical Exam   Constitutional: She is oriented to person, place, and time. She appears well-developed and well-nourished.   HENT:   Head: Normocephalic and atraumatic.   Right Ear: External ear normal.   Left Ear: External ear normal.   Mouth/Throat: Oropharynx is clear and moist.   Eyes: Conjunctivae and EOM are normal. Pupils are equal, round, and reactive to light.   Neck: Normal range of motion. Neck supple. No JVD present. No tracheal deviation present. No thyromegaly present.   Cardiovascular: Normal rate, regular rhythm, normal heart sounds and intact distal pulses.   Pulmonary/Chest: Effort normal and breath sounds normal. No respiratory distress. She has no wheezes. She has no rales. She exhibits no tenderness.   Abdominal: Soft. Bowel sounds are normal. She exhibits no distension and no mass. There is no tenderness. There is no rebound and no guarding.   Musculoskeletal: Normal range of motion. She exhibits no edema.   Bilateral knee oa changes.     Lymphadenopathy:     She has no cervical adenopathy.   Neurological: She is alert and oriented to person, place, and time. She has normal reflexes. No cranial nerve deficit. She exhibits normal muscle tone. Coordination normal.   Skin: Skin is warm and dry.   Psychiatric: She has a normal mood and affect.   Nursing note and vitals reviewed.      Assessment:       1. Primary osteoarthritis of both knees        Plan:   Tonya was seen today for osteoarthritis of both knees.    Diagnoses and all orders for this  visit:    Primary osteoarthritis of both knees  -     HYDROcodone-acetaminophen (NORCO) 7.5-325 mg per tablet; Take 1 tablet by mouth every 24 hours as needed for Pain.    we discussed narcotics for pain management :    Managing chronic pain with opioids is complicated and challenging. I explained to patient that Doctors need to know if patients can follow the treatment plan, if they get desired responses from the meds, and if there are signs of developing addiction. Physicians use medication contracts to monitor patients adherence, or to help check that patients are compliant with the medications ordered. Such agreements are most commonly used when narcotic pain relievers are prescribed. Narcotics can sometimes become addictive if not taken as prescribed by a doctor.    The use of a pain management agreement allows for the documentation of understanding between a doctor and patient. Such documentation, when used as a means of facilitating care, can improve communication between doctors and patients.      Chronic, pain controlled on current regimen  Cont for now  No escalation of narcotics  Agrees to random UDS   reviewed today

## 2018-09-10 ENCOUNTER — OFFICE VISIT (OUTPATIENT)
Dept: INTERNAL MEDICINE | Facility: CLINIC | Age: 83
End: 2018-09-10
Payer: MEDICARE

## 2018-09-10 VITALS
HEIGHT: 65 IN | DIASTOLIC BLOOD PRESSURE: 64 MMHG | RESPIRATION RATE: 16 BRPM | OXYGEN SATURATION: 95 % | SYSTOLIC BLOOD PRESSURE: 110 MMHG | WEIGHT: 285.94 LBS | BODY MASS INDEX: 47.64 KG/M2 | HEART RATE: 61 BPM

## 2018-09-10 DIAGNOSIS — R42 DIZZINESS: Primary | ICD-10-CM

## 2018-09-10 PROCEDURE — 99213 OFFICE O/P EST LOW 20 MIN: CPT | Mod: S$PBB,,, | Performed by: INTERNAL MEDICINE

## 2018-09-10 PROCEDURE — 3074F SYST BP LT 130 MM HG: CPT | Mod: CPTII,,, | Performed by: INTERNAL MEDICINE

## 2018-09-10 PROCEDURE — 3078F DIAST BP <80 MM HG: CPT | Mod: CPTII,,, | Performed by: INTERNAL MEDICINE

## 2018-09-10 PROCEDURE — 99999 PR PBB SHADOW E&M-EST. PATIENT-LVL III: CPT | Mod: PBBFAC,,, | Performed by: INTERNAL MEDICINE

## 2018-09-10 PROCEDURE — 99213 OFFICE O/P EST LOW 20 MIN: CPT | Mod: PBBFAC | Performed by: INTERNAL MEDICINE

## 2018-09-10 PROCEDURE — 1101F PT FALLS ASSESS-DOCD LE1/YR: CPT | Mod: CPTII,,, | Performed by: INTERNAL MEDICINE

## 2018-09-10 RX ORDER — MECLIZINE HCL 12.5 MG 12.5 MG/1
12.5 TABLET ORAL 3 TIMES DAILY PRN
Qty: 30 TABLET | Refills: 0 | Status: SHIPPED | OUTPATIENT
Start: 2018-09-10 | End: 2019-02-28

## 2018-09-10 NOTE — PROGRESS NOTES
Subjective:       Patient ID: Tonya Bucio is a 83 y.o. female.    Chief Complaint: Dizziness    Dizziness:   Chronicity:  New  Onset:  In the past 7 days  Progression since onset:  Waxing and waning  Dizziness characteristics:  Lightheaded/impending faintno ear congestion, no ear pain, no fever, no tinnitus, no panic and no numbness in extremities.no strokes, no ear trauma and no ear tubes.    Review of Systems   Constitutional: Negative for fever.   HENT: Negative for ear pain and tinnitus.    Neurological: Positive for dizziness.       Objective:      Physical Exam   Constitutional: She is oriented to person, place, and time. She appears well-developed and well-nourished.   HENT:   Head: Normocephalic and atraumatic.   Right Ear: External ear normal.   Left Ear: External ear normal.   Mouth/Throat: Oropharynx is clear and moist.   Eyes: Conjunctivae and EOM are normal. Pupils are equal, round, and reactive to light.   Neck: Normal range of motion. Neck supple. No JVD present. No tracheal deviation present. No thyromegaly present.   Cardiovascular: Normal rate, regular rhythm, normal heart sounds and intact distal pulses.   Pulmonary/Chest: Effort normal and breath sounds normal. No respiratory distress. She has no wheezes. She has no rales. She exhibits no tenderness.   Abdominal: Soft. Bowel sounds are normal. She exhibits no distension and no mass. There is no tenderness. There is no rebound and no guarding.   Musculoskeletal: Normal range of motion. She exhibits no edema.   Bilateral knee oa changes.     Lymphadenopathy:     She has no cervical adenopathy.   Neurological: She is alert and oriented to person, place, and time. She has normal reflexes. No cranial nerve deficit. She exhibits normal muscle tone. Coordination normal.   Skin: Skin is warm and dry.   Psychiatric: She has a normal mood and affect.   Nursing note and vitals reviewed.      Assessment:       1. Dizziness        Plan:   Tonya was seen  today for dizziness.    Diagnoses and all orders for this visit:    Dizziness  Etiology is between vertigo from inner ear issues or orthostic hypotension  Add meclizine   Decrease amlodipine 5 mg from 10 mg   Check bp daily .

## 2018-09-17 ENCOUNTER — TELEPHONE (OUTPATIENT)
Dept: INTERNAL MEDICINE | Facility: CLINIC | Age: 83
End: 2018-09-17

## 2018-09-17 NOTE — TELEPHONE ENCOUNTER
Instructed patient to stop amlodipine and continue to monitor B/P daily. Call with report in 1 week.

## 2018-09-17 NOTE — TELEPHONE ENCOUNTER
----- Message from Rosaura Reeves sent at 2018 11:36 AM CDT -----  Contact: Self  Tonya Bucio  MRN: [id number]  : 1935  PCP: Tasha Atkins  Home Phone      [phone]  Work Phone      Not on file.  [employer]          [phone]    MESSAGE:   Dr. Atkins changed the dosage of her blood pressure meds, and told her to call in with a report of her blood pressure readings. Please call.    Phone: [phone]

## 2018-09-17 NOTE — TELEPHONE ENCOUNTER
Patient calling with B/P readings since you decreased her amlodipine from 10 mg to 5 mg daily:  116/47, 109/49, 114/61, 119/52, 116/43, 108/45.   Readings taken in am before meds. Please advise. Thanks.

## 2018-09-18 RX ORDER — OMEGA-3-ACID ETHYL ESTERS 1 G/1
1 CAPSULE, LIQUID FILLED ORAL 2 TIMES DAILY
Qty: 180 CAPSULE | Refills: 1 | Status: SHIPPED | OUTPATIENT
Start: 2018-09-18 | End: 2019-02-18 | Stop reason: SDUPTHER

## 2018-09-24 ENCOUNTER — TELEPHONE (OUTPATIENT)
Dept: INTERNAL MEDICINE | Facility: CLINIC | Age: 83
End: 2018-09-24

## 2018-09-24 NOTE — TELEPHONE ENCOUNTER
----- Message from Stefani Cotter MA sent at 9/24/2018  1:20 PM CDT -----  Contact: Patient  Patient called to report her BP readings.     On Tuesday  105/45  Wednesday 101/41  Thursday   105/54   And this morning it was 123/69

## 2018-09-28 DIAGNOSIS — M17.0 PRIMARY OSTEOARTHRITIS OF BOTH KNEES: ICD-10-CM

## 2018-09-28 RX ORDER — HYDROCODONE BITARTRATE AND ACETAMINOPHEN 7.5; 325 MG/1; MG/1
1 TABLET ORAL
Qty: 30 TABLET | Refills: 0 | Status: SHIPPED | OUTPATIENT
Start: 2018-09-28 | End: 2018-11-01 | Stop reason: SDUPTHER

## 2018-09-28 NOTE — TELEPHONE ENCOUNTER
----- Message from Rosaura Reeves sent at 2018 10:32 AM CDT -----  Contact: Self  Tonya Bucio  MRN: [id number]  : 1935  PCP: Tasha Atkins  Home Phone      [phone]  Work Phone      Not on file.  [employer]          [phone]    MESSAGE:   Needs RX  HYDROcodone-acetaminophen (NORCO) 7.5-325 mg per tablet    Phone: [phone]

## 2018-10-22 LAB
LEFT EYE DM RETINOPATHY: NEGATIVE
RIGHT EYE DM RETINOPATHY: NEGATIVE

## 2018-10-26 ENCOUNTER — TELEPHONE (OUTPATIENT)
Dept: INTERNAL MEDICINE | Facility: CLINIC | Age: 83
End: 2018-10-26

## 2018-10-26 NOTE — TELEPHONE ENCOUNTER
----- Message from Rosaura Reeves sent at 10/26/2018  2:35 PM CDT -----  Contact: Self  Tonya Bucio  MRN: [id number]  : 1935  PCP: Tasha Atkins  Home Phone      [phone]  Work Phone      Not on file.  [employer]          [phone]    MESSAGE:   Calling to see if she can get documentation to give [employer] in [place] to get her wheelchair.  She already has prescription, but they are telling her that they need documentation also.  Please call.    Phone: [phone]

## 2018-11-01 DIAGNOSIS — M17.0 PRIMARY OSTEOARTHRITIS OF BOTH KNEES: ICD-10-CM

## 2018-11-01 NOTE — TELEPHONE ENCOUNTER
----- Message from Rosaura Reeves sent at 2018 10:24 AM CDT -----  Contact: Self  Tonya Bucio  MRN: [id number]  : 1935  PCP: Tasha Atkins  Home Phone      [phone]  Work Phone      Not on file.  [employer]          [phone]      MESSAGE:   Needs RX  HYDROcodone-acetaminophen (NORCO) 7.5-325 mg per tablet    Phone: [phone]

## 2018-11-02 RX ORDER — HYDROCODONE BITARTRATE AND ACETAMINOPHEN 7.5; 325 MG/1; MG/1
1 TABLET ORAL
Qty: 30 TABLET | Refills: 0 | Status: SHIPPED | OUTPATIENT
Start: 2018-11-02 | End: 2018-11-15 | Stop reason: SDUPTHER

## 2018-11-08 ENCOUNTER — LAB VISIT (OUTPATIENT)
Dept: LAB | Facility: HOSPITAL | Age: 83
End: 2018-11-08
Attending: INTERNAL MEDICINE
Payer: MEDICARE

## 2018-11-08 DIAGNOSIS — M1A.39X0 CHRONIC GOUT DUE TO RENAL IMPAIRMENT OF MULTIPLE SITES WITHOUT TOPHUS: ICD-10-CM

## 2018-11-08 DIAGNOSIS — E78.2 MIXED HYPERLIPIDEMIA: ICD-10-CM

## 2018-11-08 DIAGNOSIS — E83.52 HYPERCALCEMIA: Primary | ICD-10-CM

## 2018-11-08 DIAGNOSIS — E11.22 TYPE 2 DIABETES MELLITUS WITH STAGE 4 CHRONIC KIDNEY DISEASE, WITHOUT LONG-TERM CURRENT USE OF INSULIN: ICD-10-CM

## 2018-11-08 DIAGNOSIS — N18.4 TYPE 2 DIABETES MELLITUS WITH STAGE 4 CHRONIC KIDNEY DISEASE, WITHOUT LONG-TERM CURRENT USE OF INSULIN: ICD-10-CM

## 2018-11-08 DIAGNOSIS — E03.9 ACQUIRED HYPOTHYROIDISM: ICD-10-CM

## 2018-11-08 LAB
ALBUMIN SERPL BCP-MCNC: 3.2 G/DL
ALBUMIN SERPL BCP-MCNC: 3.2 G/DL
ALP SERPL-CCNC: 84 U/L
ALT SERPL W/O P-5'-P-CCNC: 21 U/L
ANION GAP SERPL CALC-SCNC: 11 MMOL/L
ANION GAP SERPL CALC-SCNC: 11 MMOL/L
AST SERPL-CCNC: 19 U/L
BASOPHILS # BLD AUTO: 0.02 K/UL
BASOPHILS NFR BLD: 0.3 %
BILIRUB SERPL-MCNC: 0.9 MG/DL
BUN SERPL-MCNC: 23 MG/DL
BUN SERPL-MCNC: 23 MG/DL
CALCIUM SERPL-MCNC: 10.6 MG/DL
CALCIUM SERPL-MCNC: 10.6 MG/DL
CHLORIDE SERPL-SCNC: 102 MMOL/L
CHLORIDE SERPL-SCNC: 102 MMOL/L
CHOLEST SERPL-MCNC: 146 MG/DL
CHOLEST/HDLC SERPL: 4.2 {RATIO}
CO2 SERPL-SCNC: 28 MMOL/L
CO2 SERPL-SCNC: 28 MMOL/L
CREAT SERPL-MCNC: 1.4 MG/DL
CREAT SERPL-MCNC: 1.4 MG/DL
DIFFERENTIAL METHOD: ABNORMAL
EOSINOPHIL # BLD AUTO: 0.4 K/UL
EOSINOPHIL NFR BLD: 5.6 %
ERYTHROCYTE [DISTWIDTH] IN BLOOD BY AUTOMATED COUNT: 15 %
EST. GFR  (AFRICAN AMERICAN): 40 ML/MIN/1.73 M^2
EST. GFR  (AFRICAN AMERICAN): 40 ML/MIN/1.73 M^2
EST. GFR  (NON AFRICAN AMERICAN): 35 ML/MIN/1.73 M^2
EST. GFR  (NON AFRICAN AMERICAN): 35 ML/MIN/1.73 M^2
ESTIMATED AVG GLUCOSE: 143 MG/DL
GLUCOSE SERPL-MCNC: 121 MG/DL
GLUCOSE SERPL-MCNC: 121 MG/DL
HBA1C MFR BLD HPLC: 6.6 %
HCT VFR BLD AUTO: 37 %
HDLC SERPL-MCNC: 35 MG/DL
HDLC SERPL: 24 %
HGB BLD-MCNC: 11.7 G/DL
LDLC SERPL CALC-MCNC: 49.6 MG/DL
LYMPHOCYTES # BLD AUTO: 2.1 K/UL
LYMPHOCYTES NFR BLD: 27.2 %
MCH RBC QN AUTO: 30.5 PG
MCHC RBC AUTO-ENTMCNC: 31.6 G/DL
MCV RBC AUTO: 96 FL
MONOCYTES # BLD AUTO: 0.6 K/UL
MONOCYTES NFR BLD: 7.4 %
NEUTROPHILS # BLD AUTO: 4.7 K/UL
NEUTROPHILS NFR BLD: 59.5 %
NONHDLC SERPL-MCNC: 111 MG/DL
PHOSPHATE SERPL-MCNC: 2.4 MG/DL
PLATELET # BLD AUTO: 294 K/UL
PMV BLD AUTO: 10.4 FL
POTASSIUM SERPL-SCNC: 4.4 MMOL/L
POTASSIUM SERPL-SCNC: 4.4 MMOL/L
PROT SERPL-MCNC: 7.4 G/DL
PTH-INTACT SERPL-MCNC: 497 PG/ML
RBC # BLD AUTO: 3.84 M/UL
SODIUM SERPL-SCNC: 141 MMOL/L
SODIUM SERPL-SCNC: 141 MMOL/L
TRIGL SERPL-MCNC: 307 MG/DL
TSH SERPL DL<=0.005 MIU/L-ACNC: 2.15 UIU/ML
URATE SERPL-MCNC: 6.3 MG/DL
WBC # BLD AUTO: 7.88 K/UL

## 2018-11-08 PROCEDURE — 83036 HEMOGLOBIN GLYCOSYLATED A1C: CPT | Mod: HCWC

## 2018-11-08 PROCEDURE — 84443 ASSAY THYROID STIM HORMONE: CPT | Mod: HCWC

## 2018-11-08 PROCEDURE — 84550 ASSAY OF BLOOD/URIC ACID: CPT | Mod: HCWC

## 2018-11-08 PROCEDURE — 85025 COMPLETE CBC W/AUTO DIFF WBC: CPT | Mod: HCWC

## 2018-11-08 PROCEDURE — 36415 COLL VENOUS BLD VENIPUNCTURE: CPT | Mod: HCWC

## 2018-11-08 PROCEDURE — 83970 ASSAY OF PARATHORMONE: CPT | Mod: HCWC

## 2018-11-08 PROCEDURE — 80069 RENAL FUNCTION PANEL: CPT | Mod: HCWC

## 2018-11-08 PROCEDURE — 80053 COMPREHEN METABOLIC PANEL: CPT | Mod: HCWC

## 2018-11-08 PROCEDURE — 80061 LIPID PANEL: CPT | Mod: HCWC

## 2018-11-08 RX ORDER — ROSUVASTATIN CALCIUM 20 MG/1
TABLET, COATED ORAL
Qty: 90 TABLET | Refills: 0 | Status: SHIPPED | OUTPATIENT
Start: 2018-11-08 | End: 2019-02-07 | Stop reason: SDUPTHER

## 2018-11-15 ENCOUNTER — OFFICE VISIT (OUTPATIENT)
Dept: INTERNAL MEDICINE | Facility: CLINIC | Age: 83
End: 2018-11-15
Payer: MEDICARE

## 2018-11-15 VITALS
HEART RATE: 70 BPM | HEIGHT: 65 IN | DIASTOLIC BLOOD PRESSURE: 72 MMHG | SYSTOLIC BLOOD PRESSURE: 122 MMHG | OXYGEN SATURATION: 93 % | BODY MASS INDEX: 48.3 KG/M2 | RESPIRATION RATE: 14 BRPM | WEIGHT: 289.88 LBS

## 2018-11-15 DIAGNOSIS — M17.0 PRIMARY OSTEOARTHRITIS OF BOTH KNEES: ICD-10-CM

## 2018-11-15 DIAGNOSIS — E11.22 TYPE 2 DIABETES MELLITUS WITH STAGE 4 CHRONIC KIDNEY DISEASE, WITHOUT LONG-TERM CURRENT USE OF INSULIN: ICD-10-CM

## 2018-11-15 DIAGNOSIS — E03.9 ACQUIRED HYPOTHYROIDISM: ICD-10-CM

## 2018-11-15 DIAGNOSIS — E11.22 HYPERTENSION ASSOCIATED WITH STAGE 4 CHRONIC KIDNEY DISEASE DUE TO TYPE 2 DIABETES MELLITUS: ICD-10-CM

## 2018-11-15 DIAGNOSIS — I12.9 HYPERTENSION ASSOCIATED WITH STAGE 4 CHRONIC KIDNEY DISEASE DUE TO TYPE 2 DIABETES MELLITUS: ICD-10-CM

## 2018-11-15 DIAGNOSIS — N18.4 HYPERTENSION ASSOCIATED WITH STAGE 4 CHRONIC KIDNEY DISEASE DUE TO TYPE 2 DIABETES MELLITUS: ICD-10-CM

## 2018-11-15 DIAGNOSIS — E78.2 MIXED HYPERLIPIDEMIA: Primary | ICD-10-CM

## 2018-11-15 DIAGNOSIS — N18.4 TYPE 2 DIABETES MELLITUS WITH STAGE 4 CHRONIC KIDNEY DISEASE, WITHOUT LONG-TERM CURRENT USE OF INSULIN: ICD-10-CM

## 2018-11-15 PROCEDURE — 1101F PT FALLS ASSESS-DOCD LE1/YR: CPT | Mod: CPTII,HCWC,S$GLB, | Performed by: INTERNAL MEDICINE

## 2018-11-15 PROCEDURE — 90662 IIV NO PRSV INCREASED AG IM: CPT | Mod: HCWC,S$GLB,, | Performed by: INTERNAL MEDICINE

## 2018-11-15 PROCEDURE — G0008 ADMIN INFLUENZA VIRUS VAC: HCPCS | Mod: HCWC,S$GLB,, | Performed by: INTERNAL MEDICINE

## 2018-11-15 PROCEDURE — 99999 PR PBB SHADOW E&M-EST. PATIENT-LVL III: CPT | Mod: PBBFAC,HCWC,, | Performed by: INTERNAL MEDICINE

## 2018-11-15 PROCEDURE — 99214 OFFICE O/P EST MOD 30 MIN: CPT | Mod: 25,HCWC,S$GLB, | Performed by: INTERNAL MEDICINE

## 2018-11-15 PROCEDURE — 3078F DIAST BP <80 MM HG: CPT | Mod: CPTII,HCWC,S$GLB, | Performed by: INTERNAL MEDICINE

## 2018-11-15 PROCEDURE — 3074F SYST BP LT 130 MM HG: CPT | Mod: CPTII,HCWC,S$GLB, | Performed by: INTERNAL MEDICINE

## 2018-11-15 RX ORDER — HYDROCODONE BITARTRATE AND ACETAMINOPHEN 7.5; 325 MG/1; MG/1
1 TABLET ORAL
Qty: 30 TABLET | Refills: 0 | Status: SHIPPED | OUTPATIENT
Start: 2018-11-15 | End: 2019-01-02 | Stop reason: SDUPTHER

## 2018-11-15 NOTE — PROGRESS NOTES
Subjective:       Patient ID: Tonya Bucio is a 83 y.o. female.    Chief Complaint: Hyperlipidemia (FOLLOW UP WITH LAB REVIEW); Hypertension; Arthritis; Diabetes; and Thyroid Problem    Tonya Bucio is a 83 y.o. female who presents for Type II DM, Hypertension, and Hyperlipidemia follow up. Labs were reviewed with patient today.        Hyperlipidemia   This is a chronic problem. The current episode started more than 1 year ago. The problem is controlled. Recent lipid tests were reviewed and are low. Exacerbating diseases include obesity. Associated symptoms include myalgias. Pertinent negatives include no chest pain, leg pain or shortness of breath.   Hypertension   This is a chronic problem. The current episode started more than 1 year ago. The problem has been gradually worsening since onset. The problem is controlled. Pertinent negatives include no chest pain, neck pain, palpitations or shortness of breath. Past treatments include calcium channel blockers and beta blockers. Identifiable causes of hypertension include a thyroid problem.   Diabetes   She presents for her follow-up diabetic visit. She has type 2 diabetes mellitus. There are no hypoglycemic associated symptoms. Pertinent negatives for hypoglycemia include no confusion, dizziness, nervousness/anxiousness or pallor. Associated symptoms include fatigue. Pertinent negatives for diabetes include no chest pain, no polydipsia, no polyphagia and no weakness. There are no hypoglycemic complications. Symptoms are stable. There are no diabetic complications. Current diabetic treatment includes diet. She is following a diabetic diet. Her breakfast blood glucose range is generally 110-130 mg/dl. Her dinner blood glucose range is generally  mg/dl. An ACE inhibitor/angiotensin II receptor blocker is being taken.   Thyroid Problem   Presents for follow-up visit. Symptoms include fatigue. Patient reports no anxiety or palpitations. Her past medical history  "is significant for hyperlipidemia.   Arthritis   Presents for follow-up (R hand swollen for 2 -3 weeks . " my gout is acting Up ") visit. Associated symptoms include fatigue. Pertinent negatives include no dysuria or fever. Compliance with total regimen is 51-75%.   Medication Refill   Associated symptoms include arthralgias, fatigue and myalgias. Pertinent negatives include no chest pain, chills, congestion, coughing, fever, nausea, neck pain, numbness, sore throat, vomiting or weakness.     Review of Systems   Constitutional: Positive for fatigue. Negative for chills and fever.   HENT: Negative for congestion, hearing loss, sinus pressure and sore throat.    Eyes: Negative for photophobia.   Respiratory: Negative for cough, choking, chest tightness, shortness of breath and wheezing.    Cardiovascular: Negative for chest pain and palpitations.   Gastrointestinal: Negative for blood in stool, nausea and vomiting.   Endocrine: Negative for polydipsia and polyphagia.   Genitourinary: Negative for dysuria and hematuria.   Musculoskeletal: Positive for arthralgias, arthritis, back pain, gait problem and myalgias. Negative for neck pain.   Skin: Negative for pallor.   Neurological: Negative for dizziness, weakness and numbness.   Hematological: Does not bruise/bleed easily.   Psychiatric/Behavioral: Negative for confusion and suicidal ideas. The patient is not nervous/anxious.        Objective:      Physical Exam   Constitutional: She is oriented to person, place, and time. She appears well-developed and well-nourished.   HENT:   Head: Normocephalic and atraumatic.   Right Ear: External ear normal.   Left Ear: External ear normal.   Mouth/Throat: Oropharynx is clear and moist.   Eyes: Conjunctivae and EOM are normal. Pupils are equal, round, and reactive to light.   Neck: Normal range of motion. Neck supple. No JVD present. No tracheal deviation present. No thyromegaly present.   Cardiovascular: Normal rate, regular " rhythm, normal heart sounds and intact distal pulses.   Pulmonary/Chest: Effort normal and breath sounds normal. No respiratory distress. She has no wheezes. She has no rales. She exhibits no tenderness.   Abdominal: Soft. Bowel sounds are normal. She exhibits no distension and no mass. There is no tenderness. There is no rebound and no guarding.   Musculoskeletal: Normal range of motion. She exhibits no edema.   Bilateral knee oa changes.     Lymphadenopathy:     She has no cervical adenopathy.   Neurological: She is alert and oriented to person, place, and time. She has normal reflexes. No cranial nerve deficit. She exhibits normal muscle tone. Coordination normal.   Skin: Skin is warm and dry.   Psychiatric: She has a normal mood and affect.   Nursing note and vitals reviewed.      Assessment:       1. Mixed hyperlipidemia    2. Primary osteoarthritis of both knees    3. Acquired hypothyroidism    4. Type 2 diabetes mellitus with stage 4 chronic kidney disease, without long-term current use of insulin    5. Hypertension associated with stage 4 chronic kidney disease due to type 2 diabetes mellitus        Plan:   Tonya was seen today for hyperlipidemia, hypertension, arthritis, diabetes and thyroid problem.    Diagnoses and all orders for this visit:    Mixed hyperlipidemia  -     CBC auto differential; Future  -     Comprehensive metabolic panel; Future  -     Lipid panel; Future  -     TSH; Future  The current medical regimen is effective;  continue present plan and medications.  Primary osteoarthritis of both knees  -     HYDROcodone-acetaminophen (NORCO) 7.5-325 mg per tablet; Take 1 tablet by mouth every 24 hours as needed for Pain.    Acquired hypothyroidism  -     TSH; Future  Well controlled.  Continue same medication and dose.  Type 2 diabetes mellitus with stage 4 chronic kidney disease, without long-term current use of insulin  -     Hemoglobin A1c; Future  -     Microalbumin/creatinine urine ratio;  Future  Patient has controlled Diabetes .  We discussed about diet ;low in calories. Avoid sweats, sodas.  Also increasing activity;walking 2-3 miles a day.  Goal of  A1c  less than 7 % stressed.  Also goal of LDL less than 70 highlighted to patient.  Hypertension associated with stage 4 chronic kidney disease due to type 2 diabetes mellitus  -     CBC auto differential; Future  -     Comprehensive metabolic panel; Future    Well controlled.  Continue same medication and dose.  1. Keep weight close to ideal body weight.   2.   Avoid high salt foods (olives, pickles, smoked meats, salted potato chips, etc.).   Do not add salt to your food at the table.   Use only small amounts of salt when cooking.   3. Begin an exercise program. Discuss with your doctor what type of exercise program would be best for you. It doesn't have to be difficult. Even brisk walking for 20 minutes three times a week is a good form of exercise.   4. Avoid medicines which contain heart stimulants. This includes many cold and sinus decongestant pills and sprays as well as diet pills. Check the warnings about hypertension on the label. Stimulants such as amphetamine or cocaine could be lethal for someone with hypertension. Never take these.    Other orders  -     Diabetes Digital Medicine (DDMP) Enrollment Order  -     Diabetes Digital Medicine (DDMP): Assign Onboarding Questionnaires  -     NURSING COMMUNICATION: Create [facility] Account

## 2018-12-06 DIAGNOSIS — E03.4 HYPOTHYROIDISM DUE TO ACQUIRED ATROPHY OF THYROID: ICD-10-CM

## 2018-12-06 RX ORDER — LEVOTHYROXINE SODIUM 75 UG/1
TABLET ORAL
Qty: 30 TABLET | Refills: 5 | Status: SHIPPED | OUTPATIENT
Start: 2018-12-06 | End: 2019-06-06 | Stop reason: SDUPTHER

## 2018-12-12 ENCOUNTER — TELEPHONE (OUTPATIENT)
Dept: INTERNAL MEDICINE | Facility: CLINIC | Age: 83
End: 2018-12-12

## 2018-12-12 NOTE — TELEPHONE ENCOUNTER
Patient called requesting that notes be sent to [facility] stating she needs a wheelchair. Notes faxed over.

## 2018-12-12 NOTE — TELEPHONE ENCOUNTER
----- Message from Rosaura Reeves sent at 2018  2:57 PM CST -----  Contact: Self  Tonya Bucio  MRN: [id number]  : 1935  PCP: Tasha Atkins  Home Phone      [phone]  Work Phone      Not on file.  Mobile          [phone]    MESSAGE:   Stating that [facility] in [place] is requesting chart notes in order to complete her order for wheelchair.  Please call.    Phone: [phone]

## 2019-01-02 DIAGNOSIS — M17.0 PRIMARY OSTEOARTHRITIS OF BOTH KNEES: ICD-10-CM

## 2019-01-02 RX ORDER — HYDROCODONE BITARTRATE AND ACETAMINOPHEN 7.5; 325 MG/1; MG/1
1 TABLET ORAL
Qty: 30 TABLET | Refills: 0 | Status: SHIPPED | OUTPATIENT
Start: 2019-01-02 | End: 2019-01-31 | Stop reason: SDUPTHER

## 2019-01-02 NOTE — TELEPHONE ENCOUNTER
----- Message from Rosaura Reeves sent at 2019  8:29 AM CST -----  Contact: Self  Tonya Bucio  MRN: [id number]  : 1935  PCP: Tasha Atkins  Home Phone      [phone]  Work Phone      Not on file.  [employer]          [phone]    MESSAGE:   Needs RX   HYDROcodone-acetaminophen (NORCO) 7.5-325 mg per tablet    Phone: [phone]

## 2019-01-02 NOTE — TELEPHONE ENCOUNTER
Requested Prescriptions     Pending Prescriptions Disp Refills    HYDROcodone-acetaminophen (NORCO) 7.5-325 mg per tablet 30 tablet 0     Sig: Take 1 tablet by mouth every 24 hours as needed for Pain.   LOV: 11/15/18. Last fill: 11/15/18

## 2019-01-31 DIAGNOSIS — M17.0 PRIMARY OSTEOARTHRITIS OF BOTH KNEES: ICD-10-CM

## 2019-01-31 NOTE — TELEPHONE ENCOUNTER
----- Message from Rosanne Dawn sent at 2019  8:48 AM CST -----  Contact: paul Bucio  MRN: [id number]  : 1935  PCP: Tasha Atkins  Home Phone      [phone]  Work Phone      Not on file.  [employer]          [phone]    MESSAGE:    Rx written - HYDROcodone-acetaminophen (NORCO) 7.5-325 mg per tablet    Phone # [phone]    [place] [address]

## 2019-02-01 RX ORDER — HYDROCODONE BITARTRATE AND ACETAMINOPHEN 7.5; 325 MG/1; MG/1
1 TABLET ORAL
Qty: 30 TABLET | Refills: 0 | Status: SHIPPED | OUTPATIENT
Start: 2019-02-01 | End: 2019-02-14 | Stop reason: SDUPTHER

## 2019-02-07 RX ORDER — ROSUVASTATIN CALCIUM 20 MG/1
TABLET, COATED ORAL
Qty: 90 TABLET | Refills: 0 | Status: SHIPPED | OUTPATIENT
Start: 2019-02-07 | End: 2019-05-01 | Stop reason: SDUPTHER

## 2019-02-14 ENCOUNTER — TELEPHONE (OUTPATIENT)
Dept: INTERNAL MEDICINE | Facility: CLINIC | Age: 84
End: 2019-02-14

## 2019-02-14 ENCOUNTER — OFFICE VISIT (OUTPATIENT)
Dept: INTERNAL MEDICINE | Facility: CLINIC | Age: 84
End: 2019-02-14
Payer: MEDICARE

## 2019-02-14 VITALS
HEART RATE: 72 BPM | WEIGHT: 283.94 LBS | HEIGHT: 65 IN | BODY MASS INDEX: 47.31 KG/M2 | SYSTOLIC BLOOD PRESSURE: 126 MMHG | OXYGEN SATURATION: 94 % | RESPIRATION RATE: 16 BRPM | DIASTOLIC BLOOD PRESSURE: 74 MMHG

## 2019-02-14 DIAGNOSIS — G89.29 CHRONIC BILATERAL LOW BACK PAIN WITHOUT SCIATICA: ICD-10-CM

## 2019-02-14 DIAGNOSIS — Z91.81 RISK FOR FALLS: ICD-10-CM

## 2019-02-14 DIAGNOSIS — M54.50 CHRONIC BILATERAL LOW BACK PAIN WITHOUT SCIATICA: ICD-10-CM

## 2019-02-14 DIAGNOSIS — M15.9 PRIMARY OSTEOARTHRITIS INVOLVING MULTIPLE JOINTS: Primary | ICD-10-CM

## 2019-02-14 DIAGNOSIS — M17.0 PRIMARY OSTEOARTHRITIS OF BOTH KNEES: Primary | ICD-10-CM

## 2019-02-14 PROCEDURE — 3074F PR MOST RECENT SYSTOLIC BLOOD PRESSURE < 130 MM HG: ICD-10-PCS | Mod: HCNC,CPTII,S$GLB, | Performed by: INTERNAL MEDICINE

## 2019-02-14 PROCEDURE — 3078F PR MOST RECENT DIASTOLIC BLOOD PRESSURE < 80 MM HG: ICD-10-PCS | Mod: HCNC,CPTII,S$GLB, | Performed by: INTERNAL MEDICINE

## 2019-02-14 PROCEDURE — 3074F SYST BP LT 130 MM HG: CPT | Mod: HCNC,CPTII,S$GLB, | Performed by: INTERNAL MEDICINE

## 2019-02-14 PROCEDURE — 3078F DIAST BP <80 MM HG: CPT | Mod: HCNC,CPTII,S$GLB, | Performed by: INTERNAL MEDICINE

## 2019-02-14 PROCEDURE — 99213 OFFICE O/P EST LOW 20 MIN: CPT | Mod: HCNC,S$GLB,, | Performed by: INTERNAL MEDICINE

## 2019-02-14 PROCEDURE — 99499 UNLISTED E&M SERVICE: CPT | Mod: HCNC,S$GLB,, | Performed by: INTERNAL MEDICINE

## 2019-02-14 PROCEDURE — 99999 PR PBB SHADOW E&M-EST. PATIENT-LVL III: ICD-10-PCS | Mod: PBBFAC,HCNC,, | Performed by: INTERNAL MEDICINE

## 2019-02-14 PROCEDURE — 1101F PT FALLS ASSESS-DOCD LE1/YR: CPT | Mod: HCNC,CPTII,S$GLB, | Performed by: INTERNAL MEDICINE

## 2019-02-14 PROCEDURE — 99213 PR OFFICE/OUTPT VISIT, EST, LEVL III, 20-29 MIN: ICD-10-PCS | Mod: HCNC,S$GLB,, | Performed by: INTERNAL MEDICINE

## 2019-02-14 PROCEDURE — 99499 RISK ADDL DX/OHS AUDIT: ICD-10-PCS | Mod: HCNC,S$GLB,, | Performed by: INTERNAL MEDICINE

## 2019-02-14 PROCEDURE — 99999 PR PBB SHADOW E&M-EST. PATIENT-LVL III: CPT | Mod: PBBFAC,HCNC,, | Performed by: INTERNAL MEDICINE

## 2019-02-14 PROCEDURE — 1101F PR PT FALLS ASSESS DOC 0-1 FALLS W/OUT INJ PAST YR: ICD-10-PCS | Mod: HCNC,CPTII,S$GLB, | Performed by: INTERNAL MEDICINE

## 2019-02-14 RX ORDER — HYDROCODONE BITARTRATE AND ACETAMINOPHEN 7.5; 325 MG/1; MG/1
1 TABLET ORAL
Qty: 30 TABLET | Refills: 0 | Status: SHIPPED | OUTPATIENT
Start: 2019-02-14 | End: 2019-04-03 | Stop reason: SDUPTHER

## 2019-02-14 NOTE — PROGRESS NOTES
Patient, Tonya Bucio (MRN #[id number]), presented with a recent Estimated Glumerular Filtration Rate (EGFR) between 30 and 45 consistent with the definition of chronic kidney disease stage 3 - moderate (ICD10 - N18.3).    eGFR if non    Date Value Ref Range Status   11/08/2018 35 (A) >60 mL/min/1.73 m^2 Final     Comment:     Calculation used to obtain the estimated glomerular filtration  rate (eGFR) is the CKD-EPI equation.      11/08/2018 35 (A) >60 mL/min/1.73 m^2 Final     Comment:     Calculation used to obtain the estimated glomerular filtration  rate (eGFR) is the CKD-EPI equation.          The patient's chronic kidney disease stage 3 was monitored, evaluated, addressed and/or treated. This addendum to the medical record is made on 02/14/2019.

## 2019-02-14 NOTE — TELEPHONE ENCOUNTER
----- Message from Rosaura Reeves sent at 2019  2:24 PM CST -----  [facility]  Tonya Bucio  MRN: [id number]  : 1935  PCP: Tasha Atkins  Home Phone      [phone]  Work Phone      Not on file.  Mobile          [phone]      MESSAGE:     Received order for wheelchair, but they are in need of patient demographics and weight and height of patient. Please call.    Phone: [phone]

## 2019-02-14 NOTE — PROGRESS NOTES
Patient, Tonya Bucio (MRN #[id number]), presented with a recorded BMI of 47.25 kg/m^2 consistent with the definition of morbid obesity (ICD-10 E66.01). The patient's morbid obesity was monitored, evaluated, addressed and/or treated. This addendum to the medical record is made on 02/14/2019.

## 2019-02-14 NOTE — PROGRESS NOTES
"Subjective:       Patient ID: Tonya Bucio is a 83 y.o. female.    Chief Complaint: Osteoarthritis of both knees (3 month follow up)    Tonya Bucio is a 83  y.o. female here for her pain meds refills.  Needs visit note faxed to [facility] to receive wheelchair.      "Patient is unable to safely ambulate with the use of a cane or walker. The patient requires the use of a wheelchair to perform ADL's within her home."       Medication Refill   Associated symptoms include arthralgias, fatigue and myalgias. Pertinent negatives include no chest pain, chills, congestion, coughing, fever, nausea, neck pain, numbness, sore throat, vomiting or weakness. The symptoms are aggravated by walking. She has tried oral narcotics for the symptoms.   Chronic Pain  Past Medical History includes: chronic pain syndrome and degenerative joint disease. Patient states that the pain is chronic. Tonya describes pain involving the knee and lumbar spine. The onset of her pain began more than 1 year ago. Progression of pain since onset is waxing and waning. Patient describes pain as a 9/10. Tonya is currently treating her symptoms with hydrocodone/acetaminophen (Hycet, LorcetLortab, Norco, Vicodin). Patient has shown moderate improvement with current treatment.  Goals of therapy include: Improve ADL's and Improve Sleep. Tonya has previously tried hydrocodone/acetaminophen (Hycet, Lorcet, Lortab, Norco,Vicodin) to treat her pain. Associated symptoms include: leg pain. Previous Imaging studies include: X-Ray.  Patient has not had a drug screen. Patient does have a pain contract. Patient's history of substance abuse includes: none. Patient's psychiatric disorders include: none.    Review of Systems   Constitutional: Positive for fatigue. Negative for chills, fever and weight loss.   HENT: Negative for congestion, hearing loss, sinus pressure and sore throat.    Eyes: Negative for photophobia.   Respiratory: Negative for cough, choking, chest " tightness, shortness of breath and wheezing.    Cardiovascular: Negative for chest pain and palpitations.   Gastrointestinal: Negative for blood in stool, nausea and vomiting.   Endocrine: Negative for polydipsia and polyphagia.   Genitourinary: Negative for dysuria and hematuria.   Musculoskeletal: Positive for arthralgias, back pain, gait problem and myalgias. Negative for neck pain.        Fall risk ; needs wheel chair  For  Moving from her room to kitchen ;she is a fall risk    Skin: Negative for pallor.   Neurological: Negative for dizziness, tingling, weakness and numbness.   Hematological: Does not bruise/bleed easily.   Psychiatric/Behavioral: Negative for confusion and suicidal ideas. The patient is not nervous/anxious.        Objective:      Physical Exam   Constitutional: She is oriented to person, place, and time. She appears well-developed and well-nourished.   HENT:   Head: Normocephalic and atraumatic.   Right Ear: External ear normal.   Left Ear: External ear normal.   Mouth/Throat: Oropharynx is clear and moist.   Eyes: Conjunctivae and EOM are normal. Pupils are equal, round, and reactive to light.   Neck: Normal range of motion. Neck supple. No JVD present. No tracheal deviation present. No thyromegaly present.   Cardiovascular: Normal rate, regular rhythm, normal heart sounds and intact distal pulses.   Pulmonary/Chest: Effort normal and breath sounds normal. No respiratory distress. She has no wheezes. She has no rales. She exhibits no tenderness.   Abdominal: Soft. Bowel sounds are normal. She exhibits no distension and no mass. There is no tenderness. There is no rebound and no guarding.   Musculoskeletal: Normal range of motion. She exhibits no edema.   Bilateral knee oa changes.     Lymphadenopathy:     She has no cervical adenopathy.   Neurological: She is alert and oriented to person, place, and time. She has normal reflexes. No cranial nerve deficit. She exhibits normal muscle tone.  Coordination normal.   Skin: Skin is warm and dry.   Psychiatric: She has a normal mood and affect.   Nursing note and vitals reviewed.      Assessment:       1. Primary osteoarthritis of both knees    2. Risk for falls        Plan:   Tonya was seen today for osteoarthritis of both knees.    Diagnoses and all orders for this visit:    Primary osteoarthritis of both knees  -     HYDROcodone-acetaminophen (NORCO) 7.5-325 mg per tablet; Take 1 tablet by mouth every 24 hours as needed for Pain.    Risk for falls  - Use wheelchair to decrease risk of fall.    Problem List Items Addressed This Visit     Primary osteoarthritis of both knees - Primary

## 2019-02-14 NOTE — TELEPHONE ENCOUNTER
"Please create an addendum to today's note stating that "Patient is unable to safely ambulate with the use of a cane or walker. The patient requires the use of a wheelchair to perform ADL's within her home."   A new order for w/c is pended because the old order is out-dated. Thanks.   "

## 2019-02-19 RX ORDER — OMEGA-3-ACID ETHYL ESTERS 1 G/1
CAPSULE, LIQUID FILLED ORAL
Qty: 180 CAPSULE | Refills: 1 | Status: SHIPPED | OUTPATIENT
Start: 2019-02-19 | End: 2019-07-08 | Stop reason: SDUPTHER

## 2019-02-28 ENCOUNTER — OFFICE VISIT (OUTPATIENT)
Dept: INTERNAL MEDICINE | Facility: CLINIC | Age: 84
End: 2019-02-28
Payer: MEDICARE

## 2019-02-28 VITALS
OXYGEN SATURATION: 94 % | WEIGHT: 283 LBS | SYSTOLIC BLOOD PRESSURE: 106 MMHG | HEIGHT: 68 IN | BODY MASS INDEX: 42.89 KG/M2 | RESPIRATION RATE: 16 BRPM | DIASTOLIC BLOOD PRESSURE: 66 MMHG | HEART RATE: 63 BPM

## 2019-02-28 DIAGNOSIS — E21.0 PRIMARY HYPERPARATHYROIDISM: ICD-10-CM

## 2019-02-28 DIAGNOSIS — E11.36 CONTROLLED TYPE 2 DIABETES MELLITUS WITH DIABETIC CATARACT, WITHOUT LONG-TERM CURRENT USE OF INSULIN: ICD-10-CM

## 2019-02-28 DIAGNOSIS — J44.9 PULMONARY HYPERTENSION DUE TO COPD: ICD-10-CM

## 2019-02-28 DIAGNOSIS — E21.5 PARATHYROID RELATED HYPERCALCEMIA: ICD-10-CM

## 2019-02-28 DIAGNOSIS — E11.69 HYPERLIPIDEMIA ASSOCIATED WITH TYPE 2 DIABETES MELLITUS: ICD-10-CM

## 2019-02-28 DIAGNOSIS — E78.5 HYPERLIPIDEMIA ASSOCIATED WITH TYPE 2 DIABETES MELLITUS: ICD-10-CM

## 2019-02-28 DIAGNOSIS — Z74.09 IMPAIRED PHYSICAL MOBILITY: ICD-10-CM

## 2019-02-28 DIAGNOSIS — E11.22 HYPERTENSION ASSOCIATED WITH STAGE 3 CHRONIC KIDNEY DISEASE DUE TO TYPE 2 DIABETES MELLITUS: ICD-10-CM

## 2019-02-28 DIAGNOSIS — D63.1 ANEMIA DUE TO STAGE 3 CHRONIC KIDNEY DISEASE: ICD-10-CM

## 2019-02-28 DIAGNOSIS — I27.82 CHRONIC SADDLE PULMONARY EMBOLISM WITHOUT ACUTE COR PULMONALE: ICD-10-CM

## 2019-02-28 DIAGNOSIS — N18.30 HYPERTENSION ASSOCIATED WITH STAGE 3 CHRONIC KIDNEY DISEASE DUE TO TYPE 2 DIABETES MELLITUS: ICD-10-CM

## 2019-02-28 DIAGNOSIS — E03.9 ACQUIRED HYPOTHYROIDISM: ICD-10-CM

## 2019-02-28 DIAGNOSIS — I77.1 TORTUOUS AORTA: ICD-10-CM

## 2019-02-28 DIAGNOSIS — I12.9 HYPERTENSION ASSOCIATED WITH STAGE 3 CHRONIC KIDNEY DISEASE DUE TO TYPE 2 DIABETES MELLITUS: ICD-10-CM

## 2019-02-28 DIAGNOSIS — E83.52 PARATHYROID RELATED HYPERCALCEMIA: ICD-10-CM

## 2019-02-28 DIAGNOSIS — E66.01 MORBID OBESITY WITH BODY MASS INDEX (BMI) OF 40.0 TO 44.9 IN ADULT: ICD-10-CM

## 2019-02-28 DIAGNOSIS — I27.23 PULMONARY HYPERTENSION DUE TO COPD: ICD-10-CM

## 2019-02-28 DIAGNOSIS — I26.92 CHRONIC SADDLE PULMONARY EMBOLISM WITHOUT ACUTE COR PULMONALE: ICD-10-CM

## 2019-02-28 DIAGNOSIS — N18.30 ANEMIA DUE TO STAGE 3 CHRONIC KIDNEY DISEASE: ICD-10-CM

## 2019-02-28 DIAGNOSIS — Z00.00 ENCOUNTER FOR PREVENTIVE HEALTH EXAMINATION: Primary | ICD-10-CM

## 2019-02-28 PROBLEM — E79.0 HYPERURICEMIA: Status: ACTIVE | Noted: 2019-02-28

## 2019-02-28 PROCEDURE — 99499 UNLISTED E&M SERVICE: CPT | Mod: HCNC,S$GLB,, | Performed by: NURSE PRACTITIONER

## 2019-02-28 PROCEDURE — G0439 PR MEDICARE ANNUAL WELLNESS SUBSEQUENT VISIT: ICD-10-PCS | Mod: HCNC,S$GLB,, | Performed by: NURSE PRACTITIONER

## 2019-02-28 PROCEDURE — 3074F PR MOST RECENT SYSTOLIC BLOOD PRESSURE < 130 MM HG: ICD-10-PCS | Mod: HCNC,CPTII,S$GLB, | Performed by: NURSE PRACTITIONER

## 2019-02-28 PROCEDURE — 3078F PR MOST RECENT DIASTOLIC BLOOD PRESSURE < 80 MM HG: ICD-10-PCS | Mod: HCNC,CPTII,S$GLB, | Performed by: NURSE PRACTITIONER

## 2019-02-28 PROCEDURE — 99499 RISK ADDL DX/OHS AUDIT: ICD-10-PCS | Mod: HCNC,S$GLB,, | Performed by: NURSE PRACTITIONER

## 2019-02-28 PROCEDURE — 3074F SYST BP LT 130 MM HG: CPT | Mod: HCNC,CPTII,S$GLB, | Performed by: NURSE PRACTITIONER

## 2019-02-28 PROCEDURE — 99999 PR PBB SHADOW E&M-EST. PATIENT-LVL IV: ICD-10-PCS | Mod: PBBFAC,HCNC,, | Performed by: NURSE PRACTITIONER

## 2019-02-28 PROCEDURE — 99999 PR PBB SHADOW E&M-EST. PATIENT-LVL IV: CPT | Mod: PBBFAC,HCNC,, | Performed by: NURSE PRACTITIONER

## 2019-02-28 PROCEDURE — 3078F DIAST BP <80 MM HG: CPT | Mod: HCNC,CPTII,S$GLB, | Performed by: NURSE PRACTITIONER

## 2019-02-28 PROCEDURE — G0439 PPPS, SUBSEQ VISIT: HCPCS | Mod: HCNC,S$GLB,, | Performed by: NURSE PRACTITIONER

## 2019-02-28 RX ORDER — OMEPRAZOLE 40 MG/1
40 CAPSULE, DELAYED RELEASE ORAL DAILY
COMMUNITY
End: 2019-08-14

## 2019-02-28 NOTE — PATIENT INSTRUCTIONS
Counseling and Referral of Other Preventative  (Italic type indicates deductible and co-insurance are waived)    Patient Name: Tonya Bucio  Today's Date: 2/28/2019    Health Maintenance       Date Due Completion Date    Urine Drug Screen 06/18/1953 ---    Pneumococcal Vaccine (65+ Low/Medium Risk) (1 of 2 - PCV13) 06/18/2001 ---    DEXA SCAN 08/03/2018 8/3/2015 (Declined)    Override on 8/3/2015: Declined    Eye Exam 04/04/2019 4/4/2018 (Done)    Override on 4/4/2018: Done (Dr. Martins)    Override on 11/1/2017: Done    Override on 10/28/2016: Not Clinically Appropriate    Override on 8/3/2015: Not Clinically Appropriate    Hemoglobin A1c 05/08/2019 11/8/2018    Override on 9/27/2017: Done (a1c 6.6 %)    Foot Exam 05/24/2019 5/24/2018    Override on 11/28/2017: Done    Override on 10/12/2016: Done    Override on 8/3/2015: Declined    Lipid Panel 11/08/2019 11/8/2018    TETANUS VACCINE 02/21/2028 2/21/2018 (ClinicallyNA)    Override on 2/21/2018: Not Clinically Appropriate        No orders of the defined types were placed in this encounter.    The following information is provided to all patients.  This information is to help you find resources for any of the problems found today that may be affecting your health:                Living healthy guide: www.[facility].[place].gov      Understanding Diabetes: www.diabetes.org      Eating healthy: www.cdc.gov/healthyweight      CDC home safety checklist: www.cdc.gov/steadi/patient.html      Agency on Aging: www.goea.[place].[facility]      Alcoholics anonymous (AA): www.aa.org      Physical Activity: www.kristyn.nih.gov/pb6ascm      Tobacco use: www.quitwithusla.org

## 2019-02-28 NOTE — PROGRESS NOTES
"Tonya Bucio presented for a  Medicare AWV and comprehensive Health Risk Assessment today. The following components were reviewed and updated:    · Medical history  · Family History  · Social history  · Allergies and Current Medications  · Health Risk Assessment  · Health Maintenance  · Care Team     ** See Completed Assessments for Annual Wellness Visit within the encounter summary.**       The following assessments were completed:  · Living Situation  · CAGE  · Depression Screening  · Timed Get Up and Go  · Whisper Test  · Cognitive Function Screening  · Nutrition Screening  · ADL Screening  · PAQ Screening    Vitals:    02/28/19 1315   BP: 106/66   Pulse: 63   Resp: 16   SpO2: (!) 94%   Weight: 128.4 kg (283 lb)   Height: 5' 8" (1.727 m)     Body mass index is 43.03 kg/m².  Physical Exam   Constitutional: She is oriented to person, place, and time. Vital signs are normal. She appears well-developed and well-nourished. No distress.   morbidly obese, in wheelchair   HENT:   Head: Normocephalic and atraumatic.   Right Ear: External ear normal.   Left Ear: External ear normal.   Nose: Nose normal.   Eyes: Conjunctivae and lids are normal. Right eye exhibits no discharge. Left eye exhibits no discharge. No scleral icterus.   Neck: Phonation normal. Neck supple.   Cardiovascular: Normal rate, regular rhythm, normal heart sounds and intact distal pulses.   Pulmonary/Chest: Effort normal and breath sounds normal. No accessory muscle usage. No respiratory distress.   Abdominal: Normal appearance.   Neurological: She is alert and oriented to person, place, and time. She has normal strength. She exhibits normal muscle tone. Gait normal.   Skin: Skin is warm, dry and intact. No rash noted.   Psychiatric: She has a normal mood and affect. Her speech is normal and behavior is normal. Judgment and thought content normal. Cognition and memory are normal.   Nursing note and vitals reviewed.        Diagnoses and health risks " identified today and associated recommendations/orders:    1. Encounter for preventive health examination  Health maintenance reviewed and up to date    2. Hypertension associated with stage 3 chronic kidney disease due to type 2 diabetes mellitus  Stable on current regimen, followed by nephrology and cards    3. Hyperlipidemia associated with type 2 diabetes mellitus  On statin, followed by PCP    4. Tortuous aorta  On statin and xarelto, followed by cards    5. Pulmonary hypertension due to COPD  Stable, followed by pulmonology    6. Chronic saddle pulmonary embolism without acute cor pulmonale  On xarelto, asymptomatic    7. Primary hyperparathyroidism  Stable, monitored by nephrology    8. Acquired hypothyroidism  Stable on current regimen, followed by PCP    9. Parathyroid related hypercalcemia  Managed by nephrology    10. Controlled type 2 diabetes mellitus with diabetic cataract, without long-term current use of insulin  Last HgA1c 6.6, managed by PCP    11. Anemia due to stage 3 chronic kidney disease  Stable, followed by PCP    12. Morbid obesity with body mass index (BMI) of 40.0 to 44.9 in adult  Non ambulatory due to chronic pain, The patient is asked to make an attempt to improve diet and exercise patterns to aid in medical management of this problem.    13. Impaired physical mobility  Mostly wheelchair bound, does use walker assistance for transfers at home, at risk for fall, home prevention discussed      Provided Tonya with a 5-10 year written screening schedule and personal prevention plan. Recommendations were developed using the USPSTF age appropriate recommendations. Education, counseling, and referrals were provided as needed. After Visit Summary printed and given to patient which includes a list of additional screenings\tests needed.    No Follow-up on file.    Carolyn Sarah NP

## 2019-03-06 DIAGNOSIS — I10 ESSENTIAL HYPERTENSION: ICD-10-CM

## 2019-03-07 ENCOUNTER — LAB VISIT (OUTPATIENT)
Dept: LAB | Facility: HOSPITAL | Age: 84
End: 2019-03-07
Attending: INTERNAL MEDICINE
Payer: MEDICARE

## 2019-03-07 DIAGNOSIS — I10 HTN (HYPERTENSION): Primary | ICD-10-CM

## 2019-03-07 DIAGNOSIS — Z79.899 HIGH RISK MEDICATION USE: ICD-10-CM

## 2019-03-07 DIAGNOSIS — I12.9 HYPERTENSIVE KIDNEY DISEASE WITH CKD (CHRONIC KIDNEY DISEASE), STAGE 1-4 OR UNSPECIFIED CHRONIC KIDNEY DISEASE: ICD-10-CM

## 2019-03-07 DIAGNOSIS — E78.5 DYSLIPIDEMIA: ICD-10-CM

## 2019-03-07 LAB
ALBUMIN SERPL BCP-MCNC: 3.4 G/DL
ALBUMIN SERPL BCP-MCNC: 3.4 G/DL
ALP SERPL-CCNC: 83 U/L
ALT SERPL W/O P-5'-P-CCNC: 18 U/L
ANION GAP SERPL CALC-SCNC: 10 MMOL/L
ANION GAP SERPL CALC-SCNC: 11 MMOL/L
AST SERPL-CCNC: 13 U/L
BILIRUB DIRECT SERPL-MCNC: 0.3 MG/DL
BILIRUB SERPL-MCNC: 0.8 MG/DL
BUN SERPL-MCNC: 23 MG/DL
BUN SERPL-MCNC: 23 MG/DL
CALCIUM SERPL-MCNC: 10.1 MG/DL
CALCIUM SERPL-MCNC: 10.2 MG/DL
CHLORIDE SERPL-SCNC: 103 MMOL/L
CHLORIDE SERPL-SCNC: 103 MMOL/L
CHOLEST SERPL-MCNC: 149 MG/DL
CHOLEST/HDLC SERPL: 3.9 {RATIO}
CO2 SERPL-SCNC: 27 MMOL/L
CO2 SERPL-SCNC: 27 MMOL/L
CREAT SERPL-MCNC: 1.4 MG/DL
CREAT SERPL-MCNC: 1.4 MG/DL
ERYTHROCYTE [DISTWIDTH] IN BLOOD BY AUTOMATED COUNT: 14.9 %
EST. GFR  (AFRICAN AMERICAN): 40 ML/MIN/1.73 M^2
EST. GFR  (AFRICAN AMERICAN): 40 ML/MIN/1.73 M^2
EST. GFR  (NON AFRICAN AMERICAN): 35 ML/MIN/1.73 M^2
EST. GFR  (NON AFRICAN AMERICAN): 35 ML/MIN/1.73 M^2
GLUCOSE SERPL-MCNC: 151 MG/DL
GLUCOSE SERPL-MCNC: 152 MG/DL
HCT VFR BLD AUTO: 38.2 %
HDLC SERPL-MCNC: 38 MG/DL
HDLC SERPL: 25.5 %
HGB BLD-MCNC: 11.8 G/DL
LDLC SERPL CALC-MCNC: 47.2 MG/DL
MCH RBC QN AUTO: 29.8 PG
MCHC RBC AUTO-ENTMCNC: 30.9 G/DL
MCV RBC AUTO: 97 FL
NONHDLC SERPL-MCNC: 111 MG/DL
PHOSPHATE SERPL-MCNC: 2.7 MG/DL
PLATELET # BLD AUTO: 302 K/UL
PMV BLD AUTO: 10.8 FL
POTASSIUM SERPL-SCNC: 4.5 MMOL/L
POTASSIUM SERPL-SCNC: 4.7 MMOL/L
PROT SERPL-MCNC: 7.4 G/DL
PTH-INTACT SERPL-MCNC: 463 PG/ML
RBC # BLD AUTO: 3.96 M/UL
SODIUM SERPL-SCNC: 140 MMOL/L
SODIUM SERPL-SCNC: 141 MMOL/L
T3FREE SERPL-MCNC: 2.3 PG/ML
T4 FREE SERPL-MCNC: 1.07 NG/DL
TRIGL SERPL-MCNC: 319 MG/DL
TSH SERPL DL<=0.005 MIU/L-ACNC: 2.64 UIU/ML
WBC # BLD AUTO: 9.06 K/UL

## 2019-03-07 PROCEDURE — 84439 ASSAY OF FREE THYROXINE: CPT | Mod: HCNC

## 2019-03-07 PROCEDURE — 36415 COLL VENOUS BLD VENIPUNCTURE: CPT | Mod: HCNC

## 2019-03-07 PROCEDURE — 83970 ASSAY OF PARATHORMONE: CPT | Mod: HCNC

## 2019-03-07 PROCEDURE — 84443 ASSAY THYROID STIM HORMONE: CPT | Mod: HCNC

## 2019-03-07 PROCEDURE — 80061 LIPID PANEL: CPT | Mod: HCNC

## 2019-03-07 PROCEDURE — 80076 HEPATIC FUNCTION PANEL: CPT | Mod: HCNC

## 2019-03-07 PROCEDURE — 85027 COMPLETE CBC AUTOMATED: CPT | Mod: HCNC

## 2019-03-07 PROCEDURE — 80069 RENAL FUNCTION PANEL: CPT | Mod: HCNC

## 2019-03-07 PROCEDURE — 84481 FREE ASSAY (FT-3): CPT | Mod: HCNC

## 2019-03-07 PROCEDURE — 80048 BASIC METABOLIC PNL TOTAL CA: CPT | Mod: HCNC

## 2019-03-08 RX ORDER — METOPROLOL TARTRATE 50 MG/1
TABLET ORAL
Qty: 60 TABLET | Refills: 11 | Status: SHIPPED | OUTPATIENT
Start: 2019-03-08 | End: 2020-02-26

## 2019-04-03 DIAGNOSIS — M17.0 PRIMARY OSTEOARTHRITIS OF BOTH KNEES: ICD-10-CM

## 2019-04-03 RX ORDER — HYDROCODONE BITARTRATE AND ACETAMINOPHEN 7.5; 325 MG/1; MG/1
1 TABLET ORAL
Qty: 30 TABLET | Refills: 0 | Status: SHIPPED | OUTPATIENT
Start: 2019-04-03 | End: 2019-05-01 | Stop reason: SDUPTHER

## 2019-04-03 NOTE — TELEPHONE ENCOUNTER
----- Message from Adriana Russ MA sent at 4/3/2019 11:33 AM CDT -----  Contact: self  Tonya Bucio  MRN: [id number]  : 1935  PCP: Tasha Atkins  Home Phone      [phone]  Work Phone      Not on file.  [employer]          [phone]      MESSAGE:  Needs refill on [place]   Phone:  [phone]

## 2019-05-01 DIAGNOSIS — M17.0 PRIMARY OSTEOARTHRITIS OF BOTH KNEES: ICD-10-CM

## 2019-05-01 RX ORDER — ROSUVASTATIN CALCIUM 20 MG/1
TABLET, COATED ORAL
Qty: 90 TABLET | Refills: 0 | Status: SHIPPED | OUTPATIENT
Start: 2019-05-01 | End: 2019-08-07 | Stop reason: SDUPTHER

## 2019-05-01 RX ORDER — HYDROCODONE BITARTRATE AND ACETAMINOPHEN 7.5; 325 MG/1; MG/1
1 TABLET ORAL
Qty: 30 TABLET | Refills: 0 | Status: SHIPPED | OUTPATIENT
Start: 2019-05-01 | End: 2019-05-16 | Stop reason: SDUPTHER

## 2019-05-01 NOTE — TELEPHONE ENCOUNTER
----- Message from Rosanne Dawn sent at 2019  2:19 PM CDT -----  Contact: self   Tonya Bucio  MRN: [id number]  : 1935  PCP: Tasha Atkins  Home Phone      [phone]  Work Phone      Not on file.  [employer]          [phone]    MESSAGE:   The pt requested a written Rx for medication - HYDROcodone-acetaminophen (NORCO) 7.5-325 mg per tablet    Phone # [phone]    [facility] [address] [place] [address]

## 2019-05-08 RX ORDER — ALLOPURINOL 300 MG/1
TABLET ORAL
Qty: 30 TABLET | Refills: 11 | Status: SHIPPED | OUTPATIENT
Start: 2019-05-08 | End: 2020-05-06 | Stop reason: SDUPTHER

## 2019-05-16 ENCOUNTER — OFFICE VISIT (OUTPATIENT)
Dept: INTERNAL MEDICINE | Facility: CLINIC | Age: 84
End: 2019-05-16
Payer: MEDICARE

## 2019-05-16 VITALS
RESPIRATION RATE: 14 BRPM | OXYGEN SATURATION: 92 % | SYSTOLIC BLOOD PRESSURE: 124 MMHG | DIASTOLIC BLOOD PRESSURE: 62 MMHG | BODY MASS INDEX: 42.53 KG/M2 | WEIGHT: 280.63 LBS | HEIGHT: 68 IN | HEART RATE: 62 BPM

## 2019-05-16 DIAGNOSIS — M17.0 PRIMARY OSTEOARTHRITIS OF BOTH KNEES: Primary | ICD-10-CM

## 2019-05-16 DIAGNOSIS — N18.30 HYPERTENSION ASSOCIATED WITH STAGE 3 CHRONIC KIDNEY DISEASE DUE TO TYPE 2 DIABETES MELLITUS: ICD-10-CM

## 2019-05-16 DIAGNOSIS — E11.69 HYPERLIPIDEMIA ASSOCIATED WITH TYPE 2 DIABETES MELLITUS: ICD-10-CM

## 2019-05-16 DIAGNOSIS — E03.9 ACQUIRED HYPOTHYROIDISM: ICD-10-CM

## 2019-05-16 DIAGNOSIS — I12.9 HYPERTENSION ASSOCIATED WITH STAGE 3 CHRONIC KIDNEY DISEASE DUE TO TYPE 2 DIABETES MELLITUS: ICD-10-CM

## 2019-05-16 DIAGNOSIS — E11.22 HYPERTENSION ASSOCIATED WITH STAGE 3 CHRONIC KIDNEY DISEASE DUE TO TYPE 2 DIABETES MELLITUS: ICD-10-CM

## 2019-05-16 DIAGNOSIS — E78.5 HYPERLIPIDEMIA ASSOCIATED WITH TYPE 2 DIABETES MELLITUS: ICD-10-CM

## 2019-05-16 DIAGNOSIS — E11.36 CONTROLLED TYPE 2 DIABETES MELLITUS WITH DIABETIC CATARACT, WITHOUT LONG-TERM CURRENT USE OF INSULIN: ICD-10-CM

## 2019-05-16 PROCEDURE — 3074F SYST BP LT 130 MM HG: CPT | Mod: HCNC,CPTII,S$GLB, | Performed by: INTERNAL MEDICINE

## 2019-05-16 PROCEDURE — 99213 PR OFFICE/OUTPT VISIT, EST, LEVL III, 20-29 MIN: ICD-10-PCS | Mod: HCNC,S$GLB,, | Performed by: INTERNAL MEDICINE

## 2019-05-16 PROCEDURE — 3078F PR MOST RECENT DIASTOLIC BLOOD PRESSURE < 80 MM HG: ICD-10-PCS | Mod: HCNC,CPTII,S$GLB, | Performed by: INTERNAL MEDICINE

## 2019-05-16 PROCEDURE — 3078F DIAST BP <80 MM HG: CPT | Mod: HCNC,CPTII,S$GLB, | Performed by: INTERNAL MEDICINE

## 2019-05-16 PROCEDURE — 1101F PR PT FALLS ASSESS DOC 0-1 FALLS W/OUT INJ PAST YR: ICD-10-PCS | Mod: HCNC,CPTII,S$GLB, | Performed by: INTERNAL MEDICINE

## 2019-05-16 PROCEDURE — 99213 OFFICE O/P EST LOW 20 MIN: CPT | Mod: HCNC,S$GLB,, | Performed by: INTERNAL MEDICINE

## 2019-05-16 PROCEDURE — 3074F PR MOST RECENT SYSTOLIC BLOOD PRESSURE < 130 MM HG: ICD-10-PCS | Mod: HCNC,CPTII,S$GLB, | Performed by: INTERNAL MEDICINE

## 2019-05-16 PROCEDURE — 99999 PR PBB SHADOW E&M-EST. PATIENT-LVL III: ICD-10-PCS | Mod: PBBFAC,HCNC,, | Performed by: INTERNAL MEDICINE

## 2019-05-16 PROCEDURE — 99999 PR PBB SHADOW E&M-EST. PATIENT-LVL III: CPT | Mod: PBBFAC,HCNC,, | Performed by: INTERNAL MEDICINE

## 2019-05-16 PROCEDURE — 1101F PT FALLS ASSESS-DOCD LE1/YR: CPT | Mod: HCNC,CPTII,S$GLB, | Performed by: INTERNAL MEDICINE

## 2019-05-16 RX ORDER — HYDROCODONE BITARTRATE AND ACETAMINOPHEN 7.5; 325 MG/1; MG/1
1 TABLET ORAL
Qty: 30 TABLET | Refills: 0 | Status: SHIPPED | OUTPATIENT
Start: 2019-05-16 | End: 2019-07-03 | Stop reason: SDUPTHER

## 2019-05-16 NOTE — PROGRESS NOTES
"Subjective:       Patient ID: Tonya Bucio is a 83 y.o. female.    Chief Complaint: Chronic Pain    Tonya Bucio is a 83  y.o. female here for her pain meds refills.  Needs visit note faxed to [employer] to receive wheelchair.      "Patient is unable to safely ambulate with the use of a cane or walker. The patient requires the use of a wheelchair to perform ADL's within her home."     Chronic Pain  Past Medical History includes: chronic pain syndrome and degenerative joint disease. Patient states that the pain is chronic. Tonya describes pain involving the knee and lumbar spine. The onset of her pain began more than 1 year ago. Progression of pain since onset is waxing and waning. Patient describes pain as a 9/10. Tonya is currently treating her symptoms with hydrocodone/acetaminophen (Hycet, LorcetLortab, Norco, Vicodin). Patient has shown moderate improvement with current treatment.  Goals of therapy include: Improve ADL's and Improve Sleep. Tonya has previously tried hydrocodone/acetaminophen (Hycet, Lorcet, Lortab, Norco,Vicodin) to treat her pain. Associated symptoms include: leg pain. Previous Imaging studies include: X-Ray.  Patient has not had a drug screen. Patient does have a pain contract. Patient's history of substance abuse includes: none. Patient's psychiatric disorders include: none.  Medication Refill   Associated symptoms include arthralgias, fatigue and myalgias. Pertinent negatives include no chest pain, chills, congestion, coughing, fever, nausea, neck pain, numbness, sore throat, vomiting or weakness. The symptoms are aggravated by walking. She has tried oral narcotics for the symptoms.     Review of Systems   Constitutional: Positive for fatigue. Negative for chills, fever and weight loss.   HENT: Negative for congestion and sore throat.    Respiratory: Negative for cough and shortness of breath.    Cardiovascular: Negative for chest pain.   Gastrointestinal: Negative for nausea and vomiting. "   Musculoskeletal: Positive for arthralgias and myalgias. Negative for neck pain.   Neurological: Negative for dizziness, tingling, weakness and numbness.       Objective:      Physical Exam   Constitutional: She is oriented to person, place, and time. She appears well-developed and well-nourished.   HENT:   Head: Normocephalic and atraumatic.   Right Ear: External ear normal.   Left Ear: External ear normal.   Mouth/Throat: Oropharynx is clear and moist.   Eyes: Pupils are equal, round, and reactive to light. Conjunctivae and EOM are normal.   Neck: Normal range of motion. Neck supple. No JVD present. No tracheal deviation present. No thyromegaly present.   Cardiovascular: Normal rate, regular rhythm, normal heart sounds and intact distal pulses.   Pulmonary/Chest: Effort normal and breath sounds normal. No respiratory distress. She has no wheezes. She has no rales. She exhibits no tenderness.   Abdominal: Soft. Bowel sounds are normal. She exhibits no distension and no mass. There is no tenderness. There is no rebound and no guarding.   Musculoskeletal: Normal range of motion. She exhibits no edema.   Bilateral knee oa changes.     Lymphadenopathy:     She has no cervical adenopathy.   Neurological: She is alert and oriented to person, place, and time. She has normal reflexes. No cranial nerve deficit. She exhibits normal muscle tone. Coordination normal.   Skin: Skin is warm and dry.   Psychiatric: She has a normal mood and affect.   Nursing note and vitals reviewed.      Assessment:       1. Primary osteoarthritis of both knees    2. Controlled type 2 diabetes mellitus with diabetic cataract, without long-term current use of insulin    3. Acquired hypothyroidism    4. Hypertension associated with stage 3 chronic kidney disease due to type 2 diabetes mellitus    5. Hyperlipidemia associated with type 2 diabetes mellitus        Plan:   Tonya was seen today for chronic pain.    Diagnoses and all orders for this  visit:    Primary osteoarthritis of both knees  -     HYDROcodone-acetaminophen (NORCO) 7.5-325 mg per tablet; Take 1 tablet by mouth every 24 hours as needed for Pain.  we discussed narcotics for pain management :    Managing chronic pain with opioids is complicated and challenging. I explained to patient that Doctors need to know if patients can follow the treatment plan, if they get desired responses from the meds, and if there are signs of developing addiction. Physicians use medication contracts to monitor patients adherence, or to help check that patients are compliant with the medications ordered. Such agreements are most commonly used when narcotic pain relievers are prescribed. Narcotics can sometimes become addictive if not taken as prescribed by a doctor.    The use of a pain management agreement allows for the documentation of understanding between a doctor and patient. Such documentation, when used as a means of facilitating care, can improve communication between doctors and patients.      Chronic, pain controlled on current regimen  Cont for now  No escalation of narcotics  Agrees to random UDS   reviewed today    Controlled type 2 diabetes mellitus with diabetic cataract, without long-term current use of insulin  -     Hemoglobin A1c; Future  -     Microalbumin/creatinine urine ratio; Future    Acquired hypothyroidism  -     TSH; Future    Hypertension associated with stage 3 chronic kidney disease due to type 2 diabetes mellitus  -     CBC auto differential; Future  -     Comprehensive metabolic panel; Future    Hyperlipidemia associated with type 2 diabetes mellitus  -     Lipid panel; Future      Problem List Items Addressed This Visit     Osteoarthritis    Relevant Medications    HYDROcodone-acetaminophen (NORCO) 7.5-325 mg per tablet    Hypothyroidism    Relevant Orders    TSH    Hyperlipidemia associated with type 2 diabetes mellitus    Relevant Orders    Lipid panel    Hypertension  associated with stage 3 chronic kidney disease due to type 2 diabetes mellitus    Relevant Orders    CBC auto differential    Comprehensive metabolic panel    Controlled type 2 diabetes mellitus with diabetic cataract, without long-term current use of insulin - Primary    Relevant Orders    Hemoglobin A1c    Microalbumin/creatinine urine ratio

## 2019-06-06 ENCOUNTER — TELEPHONE (OUTPATIENT)
Dept: INTERNAL MEDICINE | Facility: CLINIC | Age: 84
End: 2019-06-06

## 2019-06-06 DIAGNOSIS — E03.4 HYPOTHYROIDISM DUE TO ACQUIRED ATROPHY OF THYROID: ICD-10-CM

## 2019-06-06 RX ORDER — LEVOTHYROXINE SODIUM 75 UG/1
TABLET ORAL
Qty: 30 TABLET | Refills: 5 | Status: SHIPPED | OUTPATIENT
Start: 2019-06-06 | End: 2019-12-30

## 2019-06-06 NOTE — TELEPHONE ENCOUNTER
----- Message from Luisa Russ sent at 2019 11:17 AM CDT -----  Contact: self  Tonya Bucio  MRN: [id number]  : 1935  PCP: Tasha Atkins  Home Phone      [phone]  Work Phone      Not on file.  Mobile          [phone]      MESSAGE:  Pt states went to get her medication yesterday for her NORCO, but pharmacy stated they would have to talk to the Dr first. Pt would like to know if it can be filled and if not why. Please advise, thanks.  Pharmacy: [facility] in [place]  Phone: [phone]

## 2019-07-03 DIAGNOSIS — M17.0 PRIMARY OSTEOARTHRITIS OF BOTH KNEES: ICD-10-CM

## 2019-07-03 RX ORDER — HYDROCODONE BITARTRATE AND ACETAMINOPHEN 7.5; 325 MG/1; MG/1
1 TABLET ORAL
Qty: 7 TABLET | Refills: 0 | Status: SHIPPED | OUTPATIENT
Start: 2019-07-03 | End: 2019-07-09 | Stop reason: SDUPTHER

## 2019-07-03 NOTE — TELEPHONE ENCOUNTER
----- Message from Rosaura Reeves sent at 7/3/2019  8:23 AM CDT -----  Contact: Self  Tonya Bucio  MRN: [id number]  : 1935  PCP: Tasha Atkins  Home Phone      [phone]  Work Phone      Not on file.  [place]          [phone]      MESSAGE:   Needs RX  HYDROcodone-acetaminophen (NORCO) 7.5-325 mg per tablet    Phone: [phone]

## 2019-07-08 ENCOUNTER — TELEPHONE (OUTPATIENT)
Dept: INTERNAL MEDICINE | Facility: CLINIC | Age: 84
End: 2019-07-08

## 2019-07-08 RX ORDER — OMEGA-3-ACID ETHYL ESTERS 1 G/1
CAPSULE, LIQUID FILLED ORAL
Qty: 180 CAPSULE | Refills: 1 | Status: SHIPPED | OUTPATIENT
Start: 2019-07-08 | End: 2019-11-27 | Stop reason: SDUPTHER

## 2019-07-08 RX ORDER — MECLIZINE HCL 12.5 MG 12.5 MG/1
12.5 TABLET ORAL 3 TIMES DAILY PRN
Qty: 30 TABLET | Refills: 0 | Status: ON HOLD | OUTPATIENT
Start: 2019-07-08 | End: 2020-03-15 | Stop reason: HOSPADM

## 2019-07-08 NOTE — TELEPHONE ENCOUNTER
----- Message from Rosaura Reeves sent at 2019 10:24 AM CDT -----  Contact: Self  Tonya Bucio  MRN: [id number]  : 1935  PCP: Tasha Atkins  Home Phone      [phone]  Work Phone      Not on file.  Mobile          [phone]    MESSAGE:   Needs RX  Meclizine 12.5 mg    Pharmacy: [place] [id number]    Phone: [phone]

## 2019-07-09 DIAGNOSIS — M17.0 PRIMARY OSTEOARTHRITIS OF BOTH KNEES: ICD-10-CM

## 2019-07-09 NOTE — TELEPHONE ENCOUNTER
----- Message from Rosanne Dawn sent at 2019  4:40 PM CDT -----  Contact: self   Tonya Bucio  MRN: [id number]  : 1935  PCP: Tasha Atkins  Home Phone      [phone]  Work Phone      Not on file.  Mobile          [phone]    MESSAGE:   Pt requests written Rx for medication - HYDROcodone-acetaminophen (NORCO) 7.5-325 mg per tablet.    Phone # [phone]    [facility] [address] [place] [address]

## 2019-07-09 NOTE — TELEPHONE ENCOUNTER
Requested Prescriptions     Pending Prescriptions Disp Refills    HYDROcodone-acetaminophen (NORCO) 7.5-325 mg per tablet 30 tablet 0     Sig: Take 1 tablet by mouth every 24 hours as needed for Pain.   LOV: 5/16/19

## 2019-07-10 RX ORDER — HYDROCODONE BITARTRATE AND ACETAMINOPHEN 7.5; 325 MG/1; MG/1
1 TABLET ORAL
Qty: 30 TABLET | Refills: 0 | Status: SHIPPED | OUTPATIENT
Start: 2019-07-10 | End: 2019-08-05 | Stop reason: SDUPTHER

## 2019-07-31 ENCOUNTER — PATIENT OUTREACH (OUTPATIENT)
Dept: ADMINISTRATIVE | Facility: HOSPITAL | Age: 84
End: 2019-07-31

## 2019-07-31 DIAGNOSIS — M89.9 DISORDER OF BONE AND ARTICULAR CARTILAGE: Primary | ICD-10-CM

## 2019-07-31 DIAGNOSIS — M94.9 DISORDER OF BONE AND ARTICULAR CARTILAGE: Primary | ICD-10-CM

## 2019-08-01 ENCOUNTER — PATIENT OUTREACH (OUTPATIENT)
Dept: ADMINISTRATIVE | Facility: HOSPITAL | Age: 84
End: 2019-08-01

## 2019-08-05 DIAGNOSIS — M17.0 PRIMARY OSTEOARTHRITIS OF BOTH KNEES: ICD-10-CM

## 2019-08-05 NOTE — TELEPHONE ENCOUNTER
----- Message from Anjali Osman sent at 2019  2:56 PM CDT -----  Contact: pt  Tonya Bucio  MRN: [id number]  : 1935  PCP: Tasha Atkins  Home Phone      [phone]  Work Phone      Not on file.  Mobile          [phone]      MESSAGE:   Pt requesting refill or new Rx.   Is this a refill or new RX:  refill  RX name and strength: HYDROcodone-acetaminophen (NORCO) 7.5-325 mg per tablet  Last office visit: 19  Is this a 30-day or 90-day RX:  30  Ph[facility] [place]  Comments:      Phone:  [phone]

## 2019-08-06 ENCOUNTER — TELEPHONE (OUTPATIENT)
Dept: INTERNAL MEDICINE | Facility: CLINIC | Age: 84
End: 2019-08-06

## 2019-08-06 RX ORDER — HYDROCODONE BITARTRATE AND ACETAMINOPHEN 7.5; 325 MG/1; MG/1
1 TABLET ORAL
Qty: 30 TABLET | Refills: 0 | Status: SHIPPED | OUTPATIENT
Start: 2019-08-06 | End: 2019-09-09 | Stop reason: SDUPTHER

## 2019-08-06 NOTE — TELEPHONE ENCOUNTER
----- Message from Rosanne Dawn sent at 2019  1:00 PM CDT -----  Contact: paul Bucio  MRN: [id number]  : 1935  PCP: Tasha Atkins  Home Phone      [phone]  Work Phone      Not on file.  Mobile          [phone]    MESSAGE:   Pt requests written Rx of mediation - HYDROcodone-acetaminophen (NORCO) 7.5-325 mg per tablet 30 tablet. She has two days of medication left.     Phone # [phone]    Pharmacy - [facility] [address] [place] [address]

## 2019-08-07 RX ORDER — ROSUVASTATIN CALCIUM 20 MG/1
TABLET, COATED ORAL
Qty: 90 TABLET | Refills: 0 | Status: SHIPPED | OUTPATIENT
Start: 2019-08-07 | End: 2019-11-11 | Stop reason: SDUPTHER

## 2019-08-09 ENCOUNTER — LAB VISIT (OUTPATIENT)
Dept: LAB | Facility: HOSPITAL | Age: 84
End: 2019-08-09
Attending: INTERNAL MEDICINE
Payer: MEDICARE

## 2019-08-09 DIAGNOSIS — I10 ESSENTIAL HYPERTENSION: ICD-10-CM

## 2019-08-09 DIAGNOSIS — E78.5 HYPERLIPIDEMIA ASSOCIATED WITH TYPE 2 DIABETES MELLITUS: ICD-10-CM

## 2019-08-09 DIAGNOSIS — E03.9 ACQUIRED HYPOTHYROIDISM: ICD-10-CM

## 2019-08-09 DIAGNOSIS — E11.22 HYPERTENSION ASSOCIATED WITH STAGE 3 CHRONIC KIDNEY DISEASE DUE TO TYPE 2 DIABETES MELLITUS: ICD-10-CM

## 2019-08-09 DIAGNOSIS — N18.30 HYPERTENSION ASSOCIATED WITH STAGE 3 CHRONIC KIDNEY DISEASE DUE TO TYPE 2 DIABETES MELLITUS: ICD-10-CM

## 2019-08-09 DIAGNOSIS — E11.69 HYPERLIPIDEMIA ASSOCIATED WITH TYPE 2 DIABETES MELLITUS: ICD-10-CM

## 2019-08-09 DIAGNOSIS — E11.36 CONTROLLED TYPE 2 DIABETES MELLITUS WITH DIABETIC CATARACT, WITHOUT LONG-TERM CURRENT USE OF INSULIN: ICD-10-CM

## 2019-08-09 DIAGNOSIS — I12.9 HYPERTENSION ASSOCIATED WITH STAGE 3 CHRONIC KIDNEY DISEASE DUE TO TYPE 2 DIABETES MELLITUS: ICD-10-CM

## 2019-08-09 DIAGNOSIS — E21.0 PRIMARY HYPERPARATHYROIDISM: ICD-10-CM

## 2019-08-09 DIAGNOSIS — I12.9 HYPERTENSIVE CHRONIC KIDNEY DISEASE WITH STAGE 1 THROUGH STAGE 4 CHRONIC KIDNEY DISEASE, OR UNSPECIFIED CHRONIC KIDNEY DISEASE: Primary | ICD-10-CM

## 2019-08-09 LAB
ALBUMIN SERPL BCP-MCNC: 3.4 G/DL (ref 3.5–5.2)
ALBUMIN SERPL BCP-MCNC: 3.4 G/DL (ref 3.5–5.2)
ALBUMIN/CREAT UR: 29.8 UG/MG (ref 0–30)
ALP SERPL-CCNC: 86 U/L (ref 55–135)
ALT SERPL W/O P-5'-P-CCNC: 25 U/L (ref 10–44)
ANION GAP SERPL CALC-SCNC: 12 MMOL/L (ref 8–16)
ANION GAP SERPL CALC-SCNC: 12 MMOL/L (ref 8–16)
AST SERPL-CCNC: 17 U/L (ref 10–40)
BACTERIA #/AREA URNS HPF: ABNORMAL /HPF
BASOPHILS # BLD AUTO: 0.03 K/UL (ref 0–0.2)
BASOPHILS # BLD AUTO: 0.03 K/UL (ref 0–0.2)
BASOPHILS NFR BLD: 0.4 % (ref 0–1.9)
BASOPHILS NFR BLD: 0.4 % (ref 0–1.9)
BILIRUB SERPL-MCNC: 0.8 MG/DL (ref 0.1–1)
BILIRUB UR QL STRIP: NEGATIVE
BUN SERPL-MCNC: 24 MG/DL (ref 8–23)
BUN SERPL-MCNC: 24 MG/DL (ref 8–23)
CALCIUM SERPL-MCNC: 10.7 MG/DL (ref 8.7–10.5)
CALCIUM SERPL-MCNC: 10.7 MG/DL (ref 8.7–10.5)
CHLORIDE SERPL-SCNC: 103 MMOL/L (ref 95–110)
CHLORIDE SERPL-SCNC: 103 MMOL/L (ref 95–110)
CHOLEST SERPL-MCNC: 146 MG/DL (ref 120–199)
CHOLEST/HDLC SERPL: 3.9 {RATIO} (ref 2–5)
CLARITY UR: CLEAR
CO2 SERPL-SCNC: 27 MMOL/L (ref 23–29)
CO2 SERPL-SCNC: 27 MMOL/L (ref 23–29)
COLOR UR: YELLOW
CREAT SERPL-MCNC: 1.5 MG/DL (ref 0.5–1.4)
CREAT SERPL-MCNC: 1.5 MG/DL (ref 0.5–1.4)
CREAT UR-MCNC: 110.9 MG/DL (ref 15–325)
DIFFERENTIAL METHOD: ABNORMAL
DIFFERENTIAL METHOD: ABNORMAL
EOSINOPHIL # BLD AUTO: 0.4 K/UL (ref 0–0.5)
EOSINOPHIL # BLD AUTO: 0.4 K/UL (ref 0–0.5)
EOSINOPHIL NFR BLD: 4.8 % (ref 0–8)
EOSINOPHIL NFR BLD: 4.8 % (ref 0–8)
ERYTHROCYTE [DISTWIDTH] IN BLOOD BY AUTOMATED COUNT: 14.8 % (ref 11.5–14.5)
ERYTHROCYTE [DISTWIDTH] IN BLOOD BY AUTOMATED COUNT: 14.8 % (ref 11.5–14.5)
EST. GFR  (AFRICAN AMERICAN): 37 ML/MIN/1.73 M^2
EST. GFR  (AFRICAN AMERICAN): 37 ML/MIN/1.73 M^2
EST. GFR  (NON AFRICAN AMERICAN): 32 ML/MIN/1.73 M^2
EST. GFR  (NON AFRICAN AMERICAN): 32 ML/MIN/1.73 M^2
ESTIMATED AVG GLUCOSE: 154 MG/DL (ref 68–131)
GLUCOSE SERPL-MCNC: 138 MG/DL (ref 70–110)
GLUCOSE SERPL-MCNC: 138 MG/DL (ref 70–110)
GLUCOSE UR QL STRIP: NEGATIVE
HBA1C MFR BLD HPLC: 7 % (ref 4–5.6)
HCT VFR BLD AUTO: 36.5 % (ref 37–48.5)
HCT VFR BLD AUTO: 36.5 % (ref 37–48.5)
HDLC SERPL-MCNC: 37 MG/DL (ref 40–75)
HDLC SERPL: 25.3 % (ref 20–50)
HGB BLD-MCNC: 11.6 G/DL (ref 12–16)
HGB BLD-MCNC: 11.6 G/DL (ref 12–16)
HGB UR QL STRIP: ABNORMAL
IMM GRANULOCYTES # BLD AUTO: 0.06 K/UL (ref 0–0.04)
IMM GRANULOCYTES # BLD AUTO: 0.06 K/UL (ref 0–0.04)
IMM GRANULOCYTES NFR BLD AUTO: 0.7 % (ref 0–0.5)
IMM GRANULOCYTES NFR BLD AUTO: 0.7 % (ref 0–0.5)
KETONES UR QL STRIP: NEGATIVE
LDLC SERPL CALC-MCNC: 48.8 MG/DL (ref 63–159)
LEUKOCYTE ESTERASE UR QL STRIP: ABNORMAL
LYMPHOCYTES # BLD AUTO: 2.2 K/UL (ref 1–4.8)
LYMPHOCYTES # BLD AUTO: 2.2 K/UL (ref 1–4.8)
LYMPHOCYTES NFR BLD: 26.7 % (ref 18–48)
LYMPHOCYTES NFR BLD: 26.7 % (ref 18–48)
MCH RBC QN AUTO: 29.8 PG (ref 27–31)
MCH RBC QN AUTO: 29.8 PG (ref 27–31)
MCHC RBC AUTO-ENTMCNC: 31.8 G/DL (ref 32–36)
MCHC RBC AUTO-ENTMCNC: 31.8 G/DL (ref 32–36)
MCV RBC AUTO: 94 FL (ref 82–98)
MCV RBC AUTO: 94 FL (ref 82–98)
MICROALBUMIN UR DL<=1MG/L-MCNC: 33 UG/ML
MICROSCOPIC COMMENT: ABNORMAL
MONOCYTES # BLD AUTO: 0.5 K/UL (ref 0.3–1)
MONOCYTES # BLD AUTO: 0.5 K/UL (ref 0.3–1)
MONOCYTES NFR BLD: 6.5 % (ref 4–15)
MONOCYTES NFR BLD: 6.5 % (ref 4–15)
NEUTROPHILS # BLD AUTO: 5 K/UL (ref 1.8–7.7)
NEUTROPHILS # BLD AUTO: 5 K/UL (ref 1.8–7.7)
NEUTROPHILS NFR BLD: 60.9 % (ref 38–73)
NEUTROPHILS NFR BLD: 60.9 % (ref 38–73)
NITRITE UR QL STRIP: NEGATIVE
NONHDLC SERPL-MCNC: 109 MG/DL
NRBC BLD-RTO: 0 /100 WBC
NRBC BLD-RTO: 0 /100 WBC
PH UR STRIP: 6 [PH] (ref 5–8)
PHOSPHATE SERPL-MCNC: 2.4 MG/DL (ref 2.7–4.5)
PLATELET # BLD AUTO: 282 K/UL (ref 150–350)
PLATELET # BLD AUTO: 282 K/UL (ref 150–350)
PMV BLD AUTO: 10.7 FL (ref 9.2–12.9)
PMV BLD AUTO: 10.7 FL (ref 9.2–12.9)
POTASSIUM SERPL-SCNC: 4.5 MMOL/L (ref 3.5–5.1)
POTASSIUM SERPL-SCNC: 4.5 MMOL/L (ref 3.5–5.1)
PROT SERPL-MCNC: 7.4 G/DL (ref 6–8.4)
PROT UR QL STRIP: NEGATIVE
PTH-INTACT SERPL-MCNC: 485 PG/ML (ref 9–77)
RBC # BLD AUTO: 3.89 M/UL (ref 4–5.4)
RBC # BLD AUTO: 3.89 M/UL (ref 4–5.4)
RBC #/AREA URNS HPF: >100 /HPF (ref 0–4)
SODIUM SERPL-SCNC: 142 MMOL/L (ref 136–145)
SODIUM SERPL-SCNC: 142 MMOL/L (ref 136–145)
SP GR UR STRIP: 1.02 (ref 1–1.03)
TRIGL SERPL-MCNC: 301 MG/DL (ref 30–150)
TSH SERPL DL<=0.005 MIU/L-ACNC: 2.16 UIU/ML (ref 0.4–4)
URN SPEC COLLECT METH UR: ABNORMAL
UROBILINOGEN UR STRIP-ACNC: NEGATIVE EU/DL
WBC # BLD AUTO: 8.15 K/UL (ref 3.9–12.7)
WBC # BLD AUTO: 8.15 K/UL (ref 3.9–12.7)
WBC #/AREA URNS HPF: 5 /HPF (ref 0–5)

## 2019-08-09 PROCEDURE — 84443 ASSAY THYROID STIM HORMONE: CPT | Mod: HCNC

## 2019-08-09 PROCEDURE — 82043 UR ALBUMIN QUANTITATIVE: CPT | Mod: HCNC

## 2019-08-09 PROCEDURE — 83970 ASSAY OF PARATHORMONE: CPT | Mod: HCNC

## 2019-08-09 PROCEDURE — 85025 COMPLETE CBC W/AUTO DIFF WBC: CPT | Mod: HCNC

## 2019-08-09 PROCEDURE — 80061 LIPID PANEL: CPT | Mod: HCNC

## 2019-08-09 PROCEDURE — 83036 HEMOGLOBIN GLYCOSYLATED A1C: CPT | Mod: HCNC

## 2019-08-09 PROCEDURE — 36415 COLL VENOUS BLD VENIPUNCTURE: CPT | Mod: HCNC

## 2019-08-09 PROCEDURE — 84100 ASSAY OF PHOSPHORUS: CPT | Mod: HCNC

## 2019-08-09 PROCEDURE — 80053 COMPREHEN METABOLIC PANEL: CPT | Mod: HCNC

## 2019-08-09 PROCEDURE — 81000 URINALYSIS NONAUTO W/SCOPE: CPT | Mod: HCNC

## 2019-08-12 ENCOUNTER — TELEPHONE (OUTPATIENT)
Dept: INTERNAL MEDICINE | Facility: CLINIC | Age: 84
End: 2019-08-12

## 2019-08-12 RX ORDER — CIPROFLOXACIN 250 MG/1
250 TABLET, FILM COATED ORAL EVERY 12 HOURS
Qty: 14 TABLET | Refills: 0 | Status: SHIPPED | OUTPATIENT
Start: 2019-08-12 | End: 2019-08-19

## 2019-08-12 NOTE — TELEPHONE ENCOUNTER
Patient has UTI  Started on antibiotics.  Drink plenty of water.  If high fevers and chills call.

## 2019-08-14 ENCOUNTER — OFFICE VISIT (OUTPATIENT)
Dept: INTERNAL MEDICINE | Facility: CLINIC | Age: 84
End: 2019-08-14
Payer: MEDICARE

## 2019-08-14 VITALS
RESPIRATION RATE: 16 BRPM | SYSTOLIC BLOOD PRESSURE: 106 MMHG | BODY MASS INDEX: 43.23 KG/M2 | HEIGHT: 68 IN | WEIGHT: 285.25 LBS | HEART RATE: 77 BPM | DIASTOLIC BLOOD PRESSURE: 68 MMHG

## 2019-08-14 DIAGNOSIS — I12.9 HYPERTENSION ASSOCIATED WITH STAGE 4 CHRONIC KIDNEY DISEASE DUE TO TYPE 2 DIABETES MELLITUS: ICD-10-CM

## 2019-08-14 DIAGNOSIS — E03.9 ACQUIRED HYPOTHYROIDISM: ICD-10-CM

## 2019-08-14 DIAGNOSIS — N18.4 HYPERTENSION ASSOCIATED WITH STAGE 4 CHRONIC KIDNEY DISEASE DUE TO TYPE 2 DIABETES MELLITUS: ICD-10-CM

## 2019-08-14 DIAGNOSIS — E11.65 UNCONTROLLED TYPE 2 DIABETES MELLITUS WITH HYPERGLYCEMIA: ICD-10-CM

## 2019-08-14 DIAGNOSIS — Z02.4 DRIVER'S PERMIT PHYSICAL EXAMINATION: ICD-10-CM

## 2019-08-14 DIAGNOSIS — N18.30 ANEMIA DUE TO STAGE 3 CHRONIC KIDNEY DISEASE: Primary | ICD-10-CM

## 2019-08-14 DIAGNOSIS — E11.22 HYPERTENSION ASSOCIATED WITH STAGE 4 CHRONIC KIDNEY DISEASE DUE TO TYPE 2 DIABETES MELLITUS: ICD-10-CM

## 2019-08-14 DIAGNOSIS — D63.1 ANEMIA DUE TO STAGE 3 CHRONIC KIDNEY DISEASE: Primary | ICD-10-CM

## 2019-08-14 PROCEDURE — 1101F PT FALLS ASSESS-DOCD LE1/YR: CPT | Mod: HCNC,CPTII,S$GLB, | Performed by: INTERNAL MEDICINE

## 2019-08-14 PROCEDURE — 3074F PR MOST RECENT SYSTOLIC BLOOD PRESSURE < 130 MM HG: ICD-10-PCS | Mod: HCNC,CPTII,S$GLB, | Performed by: INTERNAL MEDICINE

## 2019-08-14 PROCEDURE — 99999 PR PBB SHADOW E&M-EST. PATIENT-LVL III: CPT | Mod: PBBFAC,HCNC,, | Performed by: INTERNAL MEDICINE

## 2019-08-14 PROCEDURE — 99999 PR PBB SHADOW E&M-EST. PATIENT-LVL III: ICD-10-PCS | Mod: PBBFAC,HCNC,, | Performed by: INTERNAL MEDICINE

## 2019-08-14 PROCEDURE — 99214 OFFICE O/P EST MOD 30 MIN: CPT | Mod: HCNC,S$GLB,, | Performed by: INTERNAL MEDICINE

## 2019-08-14 PROCEDURE — 3078F PR MOST RECENT DIASTOLIC BLOOD PRESSURE < 80 MM HG: ICD-10-PCS | Mod: HCNC,CPTII,S$GLB, | Performed by: INTERNAL MEDICINE

## 2019-08-14 PROCEDURE — 1101F PR PT FALLS ASSESS DOC 0-1 FALLS W/OUT INJ PAST YR: ICD-10-PCS | Mod: HCNC,CPTII,S$GLB, | Performed by: INTERNAL MEDICINE

## 2019-08-14 PROCEDURE — 3074F SYST BP LT 130 MM HG: CPT | Mod: HCNC,CPTII,S$GLB, | Performed by: INTERNAL MEDICINE

## 2019-08-14 PROCEDURE — 99214 PR OFFICE/OUTPT VISIT, EST, LEVL IV, 30-39 MIN: ICD-10-PCS | Mod: HCNC,S$GLB,, | Performed by: INTERNAL MEDICINE

## 2019-08-14 PROCEDURE — 3078F DIAST BP <80 MM HG: CPT | Mod: HCNC,CPTII,S$GLB, | Performed by: INTERNAL MEDICINE

## 2019-08-14 NOTE — PROGRESS NOTES
Subjective:       Patient ID: Tonya Bucio is a 84 y.o. female.    Chief Complaint: Follow-up ( physical); Hypertension; Hyperlipidemia; and Diabetes    Tonya Bucio is a 84 y.o. female who presents for Type II DM, Hypertension, and Hyperlipidemia follow up. Labs were reviewed with patient today.      Hyperlipidemia   This is a chronic problem. The current episode started more than 1 year ago. The problem is controlled. Recent lipid tests were reviewed and are low. Exacerbating diseases include obesity. Associated symptoms include myalgias. Pertinent negatives include no chest pain, leg pain or shortness of breath.   Hypertension   This is a chronic problem. The current episode started more than 1 year ago. The problem has been gradually worsening since onset. The problem is controlled. Pertinent negatives include no chest pain, neck pain, palpitations or shortness of breath. Past treatments include calcium channel blockers and beta blockers. Identifiable causes of hypertension include a thyroid problem.   Diabetes   She presents for her follow-up diabetic visit. She has type 2 diabetes mellitus. There are no hypoglycemic associated symptoms. Pertinent negatives for hypoglycemia include no confusion, dizziness, nervousness/anxiousness or pallor. Associated symptoms include fatigue. Pertinent negatives for diabetes include no chest pain, no polydipsia, no polyphagia and no weakness. There are no hypoglycemic complications. Symptoms are stable. There are no diabetic complications. Current diabetic treatment includes diet. She is following a diabetic diet. Her breakfast blood glucose range is generally 110-130 mg/dl. Her dinner blood glucose range is generally  mg/dl. An ACE inhibitor/angiotensin II receptor blocker is being taken.   Thyroid Problem   Presents for follow-up visit. Symptoms include fatigue. Patient reports no anxiety or palpitations. Her past medical history is significant for  "hyperlipidemia.   Arthritis   Presents for follow-up (R hand swollen for 2 -3 weeks . " my gout is acting Up ") visit. Associated symptoms include fatigue. Pertinent negatives include no dysuria or fever. Compliance with total regimen is 51-75%.   Medication Refill   Associated symptoms include arthralgias, fatigue and myalgias. Pertinent negatives include no chest pain, chills, congestion, coughing, fever, nausea, neck pain, numbness, sore throat, vomiting or weakness.     Review of Systems   Constitutional: Positive for fatigue. Negative for chills and fever.   HENT: Negative for congestion and sore throat.    Respiratory: Negative for cough and shortness of breath.    Cardiovascular: Negative for chest pain and palpitations.   Gastrointestinal: Negative for nausea and vomiting.   Endocrine: Negative for polydipsia and polyphagia.   Genitourinary: Negative for dysuria.   Musculoskeletal: Positive for arthralgias, arthritis and myalgias. Negative for neck pain.   Skin: Negative for pallor.   Neurological: Negative for dizziness, weakness and numbness.   Psychiatric/Behavioral: Negative for confusion. The patient is not nervous/anxious.        Objective:      Physical Exam   Constitutional: She is oriented to person, place, and time. She appears well-developed and well-nourished.   HENT:   Head: Normocephalic and atraumatic.   Right Ear: External ear normal.   Left Ear: External ear normal.   Mouth/Throat: Oropharynx is clear and moist.   Eyes: Pupils are equal, round, and reactive to light. Conjunctivae and EOM are normal.   Neck: Normal range of motion. Neck supple. No JVD present. No tracheal deviation present. No thyromegaly present.   Cardiovascular: Normal rate, regular rhythm, normal heart sounds and intact distal pulses.   Pulses:       Dorsalis pedis pulses are 1+ on the right side, and 1+ on the left side.        Posterior tibial pulses are 1+ on the right side, and 1+ on the left side.   Pulmonary/Chest: " Effort normal and breath sounds normal. No respiratory distress. She has no wheezes. She has no rales. She exhibits no tenderness.   Abdominal: Soft. Bowel sounds are normal. She exhibits no distension and no mass. There is no tenderness. There is no rebound and no guarding.   Musculoskeletal: Normal range of motion. She exhibits no edema.   Bilateral knee oa changes.     Feet:   Right Foot:   Protective Sensation: 7 sites tested.   Skin Integrity: Positive for dry skin. Negative for ulcer or erythema.   Left Foot:   Protective Sensation: 7 sites tested. 4 sites sensed.   Skin Integrity: Positive for dry skin. Negative for ulcer or erythema.   Lymphadenopathy:     She has no cervical adenopathy.   Neurological: She is alert and oriented to person, place, and time. She has normal reflexes. No cranial nerve deficit. She exhibits normal muscle tone. Coordination normal.   Skin: Skin is warm and dry.   Psychiatric: She has a normal mood and affect.   Nursing note and vitals reviewed.      Assessment:       1. Anemia due to stage 3 chronic kidney disease    2. Hypertension associated with stage 4 chronic kidney disease due to type 2 diabetes mellitus    3. Uncontrolled type 2 diabetes mellitus with hyperglycemia    4. Acquired hypothyroidism    5. 's permit physical examination        Plan:   Tonya was seen today for follow-up, hypertension, hyperlipidemia and diabetes.    Diagnoses and all orders for this visit:    Anemia due to stage 3 chronic kidney disease  -     CBC auto differential; Future  Labs stable  She is eeing nephrology     Hypertension associated with stage 4 chronic kidney disease due to type 2 diabetes mellitus  -     CBC auto differential; Future  -     Comprehensive metabolic panel; Future    Well controlled.  Continue same medication and dose.  1. Keep weight close to ideal body weight.   2.   Avoid high salt foods (olives, pickles, smoked meats, salted potato chips, etc.).   Do not add salt to  your food at the table.   Use only small amounts of salt when cooking.   3. Begin an exercise program. Discuss with your doctor what type of exercise program would be best for you. It doesn't have to be difficult. Even brisk walking for 20 minutes three times a week is a good form of exercise.   4. Avoid medicines which contain heart stimulants. This includes many cold and sinus decongestant pills and sprays as well as diet pills. Check the warnings about hypertension on the label. Stimulants such as amphetamine or cocaine could be lethal for someone with hypertension. Never take these.    Uncontrolled type 2 diabetes mellitus with hyperglycemia  -     Lipid panel; Future  -     Hemoglobin A1c; Future  -     Microalbumin/creatinine urine ratio; Future  Patient has uncontrolled Diabetes .  We discussed about diet ;low in calories. Avoid sweats, sodas.  Also increasing activity;walking 2-3 miles a day.  I also adjusted medications and gave patient  instructions about adherence to plan.  Goal of  A1c  less than 7 % stressed.  Also goal of LDL less than 70 highlighted to patient.  Acquired hypothyroidism  -     TSH; Future  The current medical regimen is effective;  continue present plan and medications.    's permit physical examination  Paperwork completed       Problem List Items Addressed This Visit     Hypothyroidism    Anemia due to stage 3 chronic kidney disease - Primary    Hypertension associated with stage 4 chronic kidney disease due to type 2 diabetes mellitus    Uncontrolled type 2 diabetes mellitus with hyperglycemia      Other Visit Diagnoses     's permit physical examination

## 2019-09-09 DIAGNOSIS — M17.0 PRIMARY OSTEOARTHRITIS OF BOTH KNEES: ICD-10-CM

## 2019-09-09 RX ORDER — HYDROCODONE BITARTRATE AND ACETAMINOPHEN 7.5; 325 MG/1; MG/1
1 TABLET ORAL
Qty: 30 TABLET | Refills: 0 | Status: SHIPPED | OUTPATIENT
Start: 2019-09-09 | End: 2019-10-08 | Stop reason: SDUPTHER

## 2019-09-09 NOTE — TELEPHONE ENCOUNTER
Requested Prescriptions     Pending Prescriptions Disp Refills    HYDROcodone-acetaminophen (NORCO) 7.5-325 mg per tablet 30 tablet 0     Sig: Take 1 tablet by mouth every 24 hours as needed for Pain.   LOV: 8/14/19. Last fill date: 8/6/19

## 2019-09-09 NOTE — TELEPHONE ENCOUNTER
----- Message from Rosaura Reeves sent at 2019  1:37 PM CDT -----  Contact: Self  Tonya Bucio  MRN: [id number]  : 1935  PCP: Tasha Atkins  Home Phone      [phone]  Work Phone      Not on file.  [employer]          [phone]    MESSAGE:   Needs RX  HYDROcodone-acetaminophen (NORCO) 7.5-325 mg per tablet    Phone: [phone]

## 2019-10-08 DIAGNOSIS — M17.0 PRIMARY OSTEOARTHRITIS OF BOTH KNEES: ICD-10-CM

## 2019-10-08 NOTE — TELEPHONE ENCOUNTER
----- Message from Vilma Marcial sent at 10/8/2019  4:38 PM CDT -----  Contact: Self  Tonya Bucio  MRN: [id number]  : 1935  PCP: Tasha Atkins  Home Phone      [phone]  Work Phone      Not on file.  [employer]          [phone]      MESSAGE:   Pt requesting refill or new Rx.   Is this a refill or new RX:  refill  RX name and strength: HYDROcodone-acetaminophen (NORCO) 7.5-325 mg per tablet  Last office visit: 2019  Is this a 30-day or 90-day RX:  30-Day  Pharmacy name and location:  [facility] [place]  Comments:      Phone:

## 2019-10-09 RX ORDER — HYDROCODONE BITARTRATE AND ACETAMINOPHEN 7.5; 325 MG/1; MG/1
1 TABLET ORAL
Qty: 30 TABLET | Refills: 0 | Status: SHIPPED | OUTPATIENT
Start: 2019-10-09 | End: 2019-11-07 | Stop reason: SDUPTHER

## 2019-10-21 ENCOUNTER — LAB VISIT (OUTPATIENT)
Dept: LAB | Facility: HOSPITAL | Age: 84
End: 2019-10-21
Attending: INTERNAL MEDICINE
Payer: MEDICARE

## 2019-10-21 DIAGNOSIS — I12.9 HYPERTENSION ASSOCIATED WITH STAGE 4 CHRONIC KIDNEY DISEASE DUE TO TYPE 2 DIABETES MELLITUS: ICD-10-CM

## 2019-10-21 DIAGNOSIS — N18.4 TYPE 2 DIABETES MELLITUS WITH STAGE 4 CHRONIC KIDNEY DISEASE, WITHOUT LONG-TERM CURRENT USE OF INSULIN: ICD-10-CM

## 2019-10-21 DIAGNOSIS — E11.22 TYPE 2 DIABETES MELLITUS WITH STAGE 4 CHRONIC KIDNEY DISEASE, WITHOUT LONG-TERM CURRENT USE OF INSULIN: ICD-10-CM

## 2019-10-21 DIAGNOSIS — I10 HTN (HYPERTENSION): Primary | ICD-10-CM

## 2019-10-21 DIAGNOSIS — E11.22 HYPERTENSION ASSOCIATED WITH STAGE 4 CHRONIC KIDNEY DISEASE DUE TO TYPE 2 DIABETES MELLITUS: ICD-10-CM

## 2019-10-21 DIAGNOSIS — E78.2 MIXED HYPERLIPIDEMIA: ICD-10-CM

## 2019-10-21 DIAGNOSIS — E21.3 HYPERPARATHYROIDISM: ICD-10-CM

## 2019-10-21 DIAGNOSIS — E03.9 ACQUIRED HYPOTHYROIDISM: ICD-10-CM

## 2019-10-21 DIAGNOSIS — N18.4 HYPERTENSION ASSOCIATED WITH STAGE 4 CHRONIC KIDNEY DISEASE DUE TO TYPE 2 DIABETES MELLITUS: ICD-10-CM

## 2019-10-21 LAB
ALBUMIN SERPL BCP-MCNC: 3.5 G/DL (ref 3.5–5.2)
ALBUMIN SERPL BCP-MCNC: 3.5 G/DL (ref 3.5–5.2)
ALBUMIN/CREAT UR: 14.8 UG/MG (ref 0–30)
ALP SERPL-CCNC: 90 U/L (ref 55–135)
ALT SERPL W/O P-5'-P-CCNC: 23 U/L (ref 10–44)
ANION GAP SERPL CALC-SCNC: 11 MMOL/L (ref 8–16)
ANION GAP SERPL CALC-SCNC: 12 MMOL/L (ref 8–16)
AST SERPL-CCNC: 15 U/L (ref 10–40)
BACTERIA #/AREA URNS HPF: ABNORMAL /HPF
BASOPHILS # BLD AUTO: 0.03 K/UL (ref 0–0.2)
BASOPHILS # BLD AUTO: 0.03 K/UL (ref 0–0.2)
BASOPHILS NFR BLD: 0.3 % (ref 0–1.9)
BASOPHILS NFR BLD: 0.3 % (ref 0–1.9)
BILIRUB SERPL-MCNC: 0.8 MG/DL (ref 0.1–1)
BILIRUB UR QL STRIP: NEGATIVE
BUN SERPL-MCNC: 27 MG/DL (ref 8–23)
BUN SERPL-MCNC: 27 MG/DL (ref 8–23)
CALCIUM SERPL-MCNC: 11.1 MG/DL (ref 8.7–10.5)
CALCIUM SERPL-MCNC: 11.2 MG/DL (ref 8.7–10.5)
CHLORIDE SERPL-SCNC: 102 MMOL/L (ref 95–110)
CHLORIDE SERPL-SCNC: 103 MMOL/L (ref 95–110)
CHOLEST SERPL-MCNC: 163 MG/DL (ref 120–199)
CHOLEST/HDLC SERPL: 3.7 {RATIO} (ref 2–5)
CLARITY UR: ABNORMAL
CO2 SERPL-SCNC: 28 MMOL/L (ref 23–29)
CO2 SERPL-SCNC: 29 MMOL/L (ref 23–29)
COLOR UR: YELLOW
CREAT SERPL-MCNC: 1.4 MG/DL (ref 0.5–1.4)
CREAT SERPL-MCNC: 1.4 MG/DL (ref 0.5–1.4)
CREAT UR-MCNC: 54 MG/DL (ref 15–325)
DIFFERENTIAL METHOD: ABNORMAL
DIFFERENTIAL METHOD: ABNORMAL
EOSINOPHIL # BLD AUTO: 0.4 K/UL (ref 0–0.5)
EOSINOPHIL # BLD AUTO: 0.4 K/UL (ref 0–0.5)
EOSINOPHIL NFR BLD: 4 % (ref 0–8)
EOSINOPHIL NFR BLD: 4.4 % (ref 0–8)
ERYTHROCYTE [DISTWIDTH] IN BLOOD BY AUTOMATED COUNT: 14.9 % (ref 11.5–14.5)
ERYTHROCYTE [DISTWIDTH] IN BLOOD BY AUTOMATED COUNT: 15 % (ref 11.5–14.5)
EST. GFR  (AFRICAN AMERICAN): 40 ML/MIN/1.73 M^2
EST. GFR  (AFRICAN AMERICAN): 40 ML/MIN/1.73 M^2
EST. GFR  (NON AFRICAN AMERICAN): 35 ML/MIN/1.73 M^2
EST. GFR  (NON AFRICAN AMERICAN): 35 ML/MIN/1.73 M^2
ESTIMATED AVG GLUCOSE: 146 MG/DL (ref 68–131)
GLUCOSE SERPL-MCNC: 133 MG/DL (ref 70–110)
GLUCOSE SERPL-MCNC: 134 MG/DL (ref 70–110)
GLUCOSE UR QL STRIP: NEGATIVE
HBA1C MFR BLD HPLC: 6.7 % (ref 4–5.6)
HCT VFR BLD AUTO: 39 % (ref 37–48.5)
HCT VFR BLD AUTO: 39.2 % (ref 37–48.5)
HDLC SERPL-MCNC: 44 MG/DL (ref 40–75)
HDLC SERPL: 27 % (ref 20–50)
HGB BLD-MCNC: 12.2 G/DL (ref 12–16)
HGB BLD-MCNC: 12.4 G/DL (ref 12–16)
HGB UR QL STRIP: ABNORMAL
IMM GRANULOCYTES # BLD AUTO: 0.05 K/UL (ref 0–0.04)
IMM GRANULOCYTES # BLD AUTO: 0.05 K/UL (ref 0–0.04)
IMM GRANULOCYTES NFR BLD AUTO: 0.6 % (ref 0–0.5)
IMM GRANULOCYTES NFR BLD AUTO: 0.6 % (ref 0–0.5)
KETONES UR QL STRIP: NEGATIVE
LDLC SERPL CALC-MCNC: 50.4 MG/DL (ref 63–159)
LEUKOCYTE ESTERASE UR QL STRIP: ABNORMAL
LYMPHOCYTES # BLD AUTO: 1.9 K/UL (ref 1–4.8)
LYMPHOCYTES # BLD AUTO: 1.9 K/UL (ref 1–4.8)
LYMPHOCYTES NFR BLD: 20.9 % (ref 18–48)
LYMPHOCYTES NFR BLD: 21.5 % (ref 18–48)
MCH RBC QN AUTO: 30.1 PG (ref 27–31)
MCH RBC QN AUTO: 30.6 PG (ref 27–31)
MCHC RBC AUTO-ENTMCNC: 31.1 G/DL (ref 32–36)
MCHC RBC AUTO-ENTMCNC: 31.8 G/DL (ref 32–36)
MCV RBC AUTO: 96 FL (ref 82–98)
MCV RBC AUTO: 97 FL (ref 82–98)
MICROALBUMIN UR DL<=1MG/L-MCNC: 8 UG/ML
MICROSCOPIC COMMENT: ABNORMAL
MONOCYTES # BLD AUTO: 0.6 K/UL (ref 0.3–1)
MONOCYTES # BLD AUTO: 0.6 K/UL (ref 0.3–1)
MONOCYTES NFR BLD: 6.3 % (ref 4–15)
MONOCYTES NFR BLD: 6.8 % (ref 4–15)
NEUTROPHILS # BLD AUTO: 5.9 K/UL (ref 1.8–7.7)
NEUTROPHILS # BLD AUTO: 6 K/UL (ref 1.8–7.7)
NEUTROPHILS NFR BLD: 67 % (ref 38–73)
NEUTROPHILS NFR BLD: 67.3 % (ref 38–73)
NITRITE UR QL STRIP: NEGATIVE
NONHDLC SERPL-MCNC: 119 MG/DL
NRBC BLD-RTO: 0 /100 WBC
NRBC BLD-RTO: 0 /100 WBC
PH UR STRIP: 7 [PH] (ref 5–8)
PHOSPHATE SERPL-MCNC: 2.3 MG/DL (ref 2.7–4.5)
PLATELET # BLD AUTO: 264 K/UL (ref 150–350)
PLATELET # BLD AUTO: 274 K/UL (ref 150–350)
PMV BLD AUTO: 11.1 FL (ref 9.2–12.9)
PMV BLD AUTO: 11.4 FL (ref 9.2–12.9)
POTASSIUM SERPL-SCNC: 4.5 MMOL/L (ref 3.5–5.1)
POTASSIUM SERPL-SCNC: 4.6 MMOL/L (ref 3.5–5.1)
PROT SERPL-MCNC: 7.5 G/DL (ref 6–8.4)
PROT UR QL STRIP: NEGATIVE
PTH-INTACT SERPL-MCNC: 441 PG/ML (ref 9–77)
RBC # BLD AUTO: 4.05 M/UL (ref 4–5.4)
RBC # BLD AUTO: 4.05 M/UL (ref 4–5.4)
RBC #/AREA URNS HPF: 10 /HPF (ref 0–4)
SODIUM SERPL-SCNC: 142 MMOL/L (ref 136–145)
SODIUM SERPL-SCNC: 143 MMOL/L (ref 136–145)
SP GR UR STRIP: 1.01 (ref 1–1.03)
TRIGL SERPL-MCNC: 343 MG/DL (ref 30–150)
TSH SERPL DL<=0.005 MIU/L-ACNC: 2 UIU/ML (ref 0.4–4)
URN SPEC COLLECT METH UR: ABNORMAL
UROBILINOGEN UR STRIP-ACNC: NEGATIVE EU/DL
WBC # BLD AUTO: 8.76 K/UL (ref 3.9–12.7)
WBC # BLD AUTO: 8.95 K/UL (ref 3.9–12.7)
WBC #/AREA URNS HPF: 20 /HPF (ref 0–5)

## 2019-10-21 PROCEDURE — 80061 LIPID PANEL: CPT | Mod: HCNC

## 2019-10-21 PROCEDURE — 83036 HEMOGLOBIN GLYCOSYLATED A1C: CPT | Mod: HCNC

## 2019-10-21 PROCEDURE — 82043 UR ALBUMIN QUANTITATIVE: CPT | Mod: HCNC

## 2019-10-21 PROCEDURE — 83970 ASSAY OF PARATHORMONE: CPT | Mod: HCNC

## 2019-10-21 PROCEDURE — 36415 COLL VENOUS BLD VENIPUNCTURE: CPT | Mod: HCNC

## 2019-10-21 PROCEDURE — 80053 COMPREHEN METABOLIC PANEL: CPT | Mod: HCNC

## 2019-10-21 PROCEDURE — 80069 RENAL FUNCTION PANEL: CPT | Mod: HCNC

## 2019-10-21 PROCEDURE — 84443 ASSAY THYROID STIM HORMONE: CPT | Mod: HCNC

## 2019-10-21 PROCEDURE — 85025 COMPLETE CBC W/AUTO DIFF WBC: CPT | Mod: HCNC

## 2019-10-21 PROCEDURE — 81000 URINALYSIS NONAUTO W/SCOPE: CPT | Mod: HCNC

## 2019-11-04 NOTE — TELEPHONE ENCOUNTER
Patient due for appt to refill pain meds. Appt scheduled for tomorrow. Patient notified   Consent: Verbal consent obtained. Risks include but not limited to lid/brow ptosis, bruising, swelling, diplopia, temporary effect, incomplete chemical denervation.

## 2019-11-07 DIAGNOSIS — M17.0 PRIMARY OSTEOARTHRITIS OF BOTH KNEES: ICD-10-CM

## 2019-11-07 NOTE — TELEPHONE ENCOUNTER
----- Message from Rosanne Dawn sent at 2019  1:02 PM CST -----  Contact: Self   Tonya Bucio  MRN: [id number]  : 1935  PCP: Tasha Atkins  Home Phone      [phone]  Work Phone      Not on file.  Mobile          [phone]    MESSAGE:   Rx / written Refill -- HYDROcodone-acetaminophen (NORCO) 7.5-325 mg per tablet.    Phone # [phone]    Pharmacy - [place] [address]

## 2019-11-08 RX ORDER — HYDROCODONE BITARTRATE AND ACETAMINOPHEN 7.5; 325 MG/1; MG/1
1 TABLET ORAL
Qty: 30 TABLET | Refills: 0 | Status: SHIPPED | OUTPATIENT
Start: 2019-11-08 | End: 2019-12-09 | Stop reason: SDUPTHER

## 2019-11-11 ENCOUNTER — OFFICE VISIT (OUTPATIENT)
Dept: INTERNAL MEDICINE | Facility: CLINIC | Age: 84
End: 2019-11-11
Payer: MEDICARE

## 2019-11-11 VITALS
RESPIRATION RATE: 16 BRPM | WEIGHT: 274.25 LBS | OXYGEN SATURATION: 94 % | HEART RATE: 64 BPM | DIASTOLIC BLOOD PRESSURE: 70 MMHG | SYSTOLIC BLOOD PRESSURE: 110 MMHG | BODY MASS INDEX: 41.57 KG/M2 | HEIGHT: 68 IN

## 2019-11-11 DIAGNOSIS — E11.69 HYPERLIPIDEMIA ASSOCIATED WITH TYPE 2 DIABETES MELLITUS: ICD-10-CM

## 2019-11-11 DIAGNOSIS — E78.5 HYPERLIPIDEMIA ASSOCIATED WITH TYPE 2 DIABETES MELLITUS: ICD-10-CM

## 2019-11-11 DIAGNOSIS — M17.0 PRIMARY OSTEOARTHRITIS OF BOTH KNEES: Primary | ICD-10-CM

## 2019-11-11 PROCEDURE — 1101F PR PT FALLS ASSESS DOC 0-1 FALLS W/OUT INJ PAST YR: ICD-10-PCS | Mod: HCNC,CPTII,S$GLB, | Performed by: INTERNAL MEDICINE

## 2019-11-11 PROCEDURE — 90662 IIV NO PRSV INCREASED AG IM: CPT | Mod: HCNC,S$GLB,, | Performed by: INTERNAL MEDICINE

## 2019-11-11 PROCEDURE — G0008 ADMIN INFLUENZA VIRUS VAC: HCPCS | Mod: HCNC,S$GLB,, | Performed by: INTERNAL MEDICINE

## 2019-11-11 PROCEDURE — 3074F SYST BP LT 130 MM HG: CPT | Mod: HCNC,CPTII,S$GLB, | Performed by: INTERNAL MEDICINE

## 2019-11-11 PROCEDURE — G0008 FLU VACCINE - HIGH DOSE (65+) PRESERVATIVE FREE IM: ICD-10-PCS | Mod: HCNC,S$GLB,, | Performed by: INTERNAL MEDICINE

## 2019-11-11 PROCEDURE — 99213 OFFICE O/P EST LOW 20 MIN: CPT | Mod: 25,HCNC,S$GLB, | Performed by: INTERNAL MEDICINE

## 2019-11-11 PROCEDURE — 1101F PT FALLS ASSESS-DOCD LE1/YR: CPT | Mod: HCNC,CPTII,S$GLB, | Performed by: INTERNAL MEDICINE

## 2019-11-11 PROCEDURE — 3074F PR MOST RECENT SYSTOLIC BLOOD PRESSURE < 130 MM HG: ICD-10-PCS | Mod: HCNC,CPTII,S$GLB, | Performed by: INTERNAL MEDICINE

## 2019-11-11 PROCEDURE — 99999 PR PBB SHADOW E&M-EST. PATIENT-LVL III: CPT | Mod: PBBFAC,HCNC,, | Performed by: INTERNAL MEDICINE

## 2019-11-11 PROCEDURE — 90662 FLU VACCINE - HIGH DOSE (65+) PRESERVATIVE FREE IM: ICD-10-PCS | Mod: HCNC,S$GLB,, | Performed by: INTERNAL MEDICINE

## 2019-11-11 PROCEDURE — 99213 PR OFFICE/OUTPT VISIT, EST, LEVL III, 20-29 MIN: ICD-10-PCS | Mod: 25,HCNC,S$GLB, | Performed by: INTERNAL MEDICINE

## 2019-11-11 PROCEDURE — 99999 PR PBB SHADOW E&M-EST. PATIENT-LVL III: ICD-10-PCS | Mod: PBBFAC,HCNC,, | Performed by: INTERNAL MEDICINE

## 2019-11-11 PROCEDURE — 3078F PR MOST RECENT DIASTOLIC BLOOD PRESSURE < 80 MM HG: ICD-10-PCS | Mod: HCNC,CPTII,S$GLB, | Performed by: INTERNAL MEDICINE

## 2019-11-11 PROCEDURE — 3078F DIAST BP <80 MM HG: CPT | Mod: HCNC,CPTII,S$GLB, | Performed by: INTERNAL MEDICINE

## 2019-11-11 RX ORDER — HYDROCHLOROTHIAZIDE 12.5 MG/1
12.5 TABLET ORAL DAILY
Refills: 2 | COMMUNITY
Start: 2019-09-24 | End: 2022-01-19

## 2019-11-11 RX ORDER — LOSARTAN POTASSIUM 50 MG/1
50 TABLET ORAL DAILY
Refills: 2 | COMMUNITY
Start: 2019-09-24 | End: 2022-01-19

## 2019-11-11 RX ORDER — ROSUVASTATIN CALCIUM 20 MG/1
20 TABLET, COATED ORAL DAILY
Qty: 90 TABLET | Refills: 0 | Status: SHIPPED | OUTPATIENT
Start: 2019-11-11 | End: 2020-02-12

## 2019-11-11 NOTE — PROGRESS NOTES
"Subjective:       Patient ID: Tonya Bucio is a 84 y.o. female.    Chief Complaint: Medication Refill and Chronic Pain    Tonya Bucio is a 84  y.o. female here for her pain meds refills.      "Patient is unable to safely ambulate with the use of a cane or walker. The patient requires the use of a wheelchair to perform ADL's within her home."     Chronic Pain  Past Medical History includes: chronic pain syndrome and degenerative joint disease. Patient states that the pain is chronic. Tonya describes pain involving the knee and lumbar spine. The onset of her pain began more than 1 year ago. Progression of pain since onset is waxing and waning. Patient describes pain as a 9/10. Tonya is currently treating her symptoms with hydrocodone/acetaminophen (Hycet, LorcetLortab, Norco, Vicodin). Patient has shown moderate improvement with current treatment.  Goals of therapy include: Improve ADL's and Improve Sleep. Tonya has previously tried hydrocodone/acetaminophen (Hycet, Lorcet, Lortab, Norco,Vicodin) to treat her pain. Associated symptoms include: leg pain. Previous Imaging studies include: X-Ray.  Patient has not had a drug screen. Patient does have a pain contract. Patient's history of substance abuse includes: none. Patient's psychiatric disorders include: none.  Medication Refill   Associated symptoms include arthralgias, fatigue and myalgias. Pertinent negatives include no chest pain, chills, congestion, coughing, fever, nausea, neck pain, numbness, sore throat, vomiting or weakness. The symptoms are aggravated by walking. She has tried oral narcotics for the symptoms.     Review of Systems   Constitutional: Positive for fatigue. Negative for chills, fever and weight loss.   HENT: Negative for congestion and sore throat.    Respiratory: Negative for cough and shortness of breath.    Cardiovascular: Negative for chest pain.   Gastrointestinal: Negative for nausea and vomiting.   Musculoskeletal: Positive for " arthralgias and myalgias. Negative for neck pain.   Neurological: Negative for dizziness, tingling, weakness and numbness.       Objective:      Physical Exam   Constitutional: She is oriented to person, place, and time. She appears well-developed and well-nourished.   HENT:   Head: Normocephalic and atraumatic.   Right Ear: External ear normal.   Left Ear: External ear normal.   Mouth/Throat: Oropharynx is clear and moist.   Eyes: Pupils are equal, round, and reactive to light. Conjunctivae and EOM are normal.   Neck: Normal range of motion. Neck supple. No JVD present. No tracheal deviation present. No thyromegaly present.   Cardiovascular: Normal rate, regular rhythm, normal heart sounds and intact distal pulses.   Pulses:       Dorsalis pedis pulses are 1+ on the right side, and 1+ on the left side.        Posterior tibial pulses are 1+ on the right side, and 1+ on the left side.   Pulmonary/Chest: Effort normal and breath sounds normal. No respiratory distress. She has no wheezes. She has no rales. She exhibits no tenderness.   Abdominal: Soft. Bowel sounds are normal. She exhibits no distension and no mass. There is no tenderness. There is no rebound and no guarding.   Musculoskeletal: Normal range of motion. She exhibits no edema.   Bilateral knee oa changes.     Feet:   Right Foot:   Protective Sensation: 7 sites tested.   Skin Integrity: Positive for dry skin. Negative for ulcer or erythema.   Left Foot:   Protective Sensation: 7 sites tested. 4 sites sensed.   Skin Integrity: Positive for dry skin. Negative for ulcer or erythema.   Lymphadenopathy:     She has no cervical adenopathy.   Neurological: She is alert and oriented to person, place, and time. She has normal reflexes. No cranial nerve deficit. She exhibits normal muscle tone. Coordination normal.   Skin: Skin is warm and dry.   Psychiatric: She has a normal mood and affect.   Nursing note and vitals reviewed.      Assessment:       1. Primary  osteoarthritis of both knees    2. Hyperlipidemia associated with type 2 diabetes mellitus        Plan:   Tonya was seen today for medication refill and chronic pain.    Diagnoses and all orders for this visit:    Primary osteoarthritis of both knees  we discussed narcotics for pain management :    Managing chronic pain with opioids is complicated and challenging. I explained to patient that Doctors need to know if patients can follow the treatment plan, if they get desired responses from the meds, and if there are signs of developing addiction. Physicians use medication contracts to monitor patients adherence, or to help check that patients are compliant with the medications ordered. Such agreements are most commonly used when narcotic pain relievers are prescribed. Narcotics can sometimes become addictive if not taken as prescribed by a doctor.    The use of a pain management agreement allows for the documentation of understanding between a doctor and patient. Such documentation, when used as a means of facilitating care, can improve communication between doctors and patients.      Chronic, pain controlled on current regimen  Cont for now  No escalation of narcotics  Agrees to random UDS   reviewed today    Hyperlipidemia associated with type 2 diabetes mellitus  -     rosuvastatin (CRESTOR) 20 MG tablet; Take 1 tablet (20 mg total) by mouth once daily.      Problem List Items Addressed This Visit     None

## 2019-12-02 RX ORDER — OMEGA-3-ACID ETHYL ESTERS 1 G/1
CAPSULE, LIQUID FILLED ORAL
Qty: 180 CAPSULE | Refills: 0 | Status: SHIPPED | OUTPATIENT
Start: 2019-12-02 | End: 2020-05-06 | Stop reason: SDUPTHER

## 2019-12-09 DIAGNOSIS — M17.0 PRIMARY OSTEOARTHRITIS OF BOTH KNEES: ICD-10-CM

## 2019-12-09 RX ORDER — HYDROCODONE BITARTRATE AND ACETAMINOPHEN 7.5; 325 MG/1; MG/1
1 TABLET ORAL
Qty: 30 TABLET | Refills: 0 | Status: SHIPPED | OUTPATIENT
Start: 2019-12-09 | End: 2020-01-06 | Stop reason: SDUPTHER

## 2019-12-09 NOTE — TELEPHONE ENCOUNTER
----- Message from Luisa Russ sent at 2019  4:42 PM CST -----  Contact: self  Tonya Bucio  MRN: [id number]  : 1935  PCP: Tasha Atkins  Home Phone      [phone]  Work Phone      Not on file.  [employer]          [phone]      MESSAGE:  Needing refill on HYDROcodone-acetaminophen (NORCO) 7.5-325 mg per tablet.  Pharmacy: [facility]   Phone: [phone]

## 2019-12-10 ENCOUNTER — TELEPHONE (OUTPATIENT)
Dept: INTERNAL MEDICINE | Facility: CLINIC | Age: 84
End: 2019-12-10

## 2019-12-10 NOTE — TELEPHONE ENCOUNTER
----- Message from Emerald Riddle sent at 12/9/2019  5:28 PM CST -----  Contact: pharmacy  Pharmacy called stating prescription HYDROcodone-acetaminophen (NORCO) 7.5-325 mg per tablet can not be fax over have to be hard copy or escribe.

## 2019-12-26 DIAGNOSIS — E03.4 HYPOTHYROIDISM DUE TO ACQUIRED ATROPHY OF THYROID: ICD-10-CM

## 2019-12-30 RX ORDER — LEVOTHYROXINE SODIUM 75 UG/1
TABLET ORAL
Qty: 30 TABLET | Refills: 5 | Status: SHIPPED | OUTPATIENT
Start: 2019-12-30 | End: 2020-07-06

## 2020-01-06 DIAGNOSIS — M17.0 PRIMARY OSTEOARTHRITIS OF BOTH KNEES: ICD-10-CM

## 2020-01-06 RX ORDER — HYDROCODONE BITARTRATE AND ACETAMINOPHEN 7.5; 325 MG/1; MG/1
1 TABLET ORAL
Qty: 30 TABLET | Refills: 0 | Status: SHIPPED | OUTPATIENT
Start: 2020-01-06 | End: 2020-02-04 | Stop reason: SDUPTHER

## 2020-01-06 NOTE — TELEPHONE ENCOUNTER
Requested Prescriptions     Pending Prescriptions Disp Refills    HYDROcodone-acetaminophen (NORCO) 7.5-325 mg per tablet 30 tablet 0     Sig: Take 1 tablet by mouth every 24 hours as needed for Pain.   LOV: 11/19/19. Last fill date: 12/9/19

## 2020-01-06 NOTE — TELEPHONE ENCOUNTER
----- Message from Fany Leal MA sent at 1/6/2020 10:55 AM CST -----  Contact: self   Pt request refill on [place].      [phone]

## 2020-01-17 ENCOUNTER — PES CALL (OUTPATIENT)
Dept: ADMINISTRATIVE | Facility: CLINIC | Age: 85
End: 2020-01-17

## 2020-02-04 DIAGNOSIS — M17.0 PRIMARY OSTEOARTHRITIS OF BOTH KNEES: ICD-10-CM

## 2020-02-04 RX ORDER — HYDROCODONE BITARTRATE AND ACETAMINOPHEN 7.5; 325 MG/1; MG/1
1 TABLET ORAL
Qty: 30 TABLET | Refills: 0 | Status: SHIPPED | OUTPATIENT
Start: 2020-02-04 | End: 2020-03-09 | Stop reason: SDUPTHER

## 2020-02-04 NOTE — TELEPHONE ENCOUNTER
----- Message from Rosaura Reeves sent at 2020 10:38 AM CST -----  Contact: Self  Tonya Bucio  MRN: [id number]  : 1935  PCP: Tasha Atkins  Home Phone      [phone]  Work Phone      Not on file.  [employer]          [phone]      MESSAGE:   Requesting refill of RX  HYDROcodone-acetaminophen (NORCO) 7.5-325 mg per tablet    Phone: [phone]

## 2020-02-06 ENCOUNTER — LAB VISIT (OUTPATIENT)
Dept: LAB | Facility: HOSPITAL | Age: 85
End: 2020-02-06
Attending: INTERNAL MEDICINE
Payer: MEDICARE

## 2020-02-06 DIAGNOSIS — N18.4 HYPERTENSION ASSOCIATED WITH STAGE 4 CHRONIC KIDNEY DISEASE DUE TO TYPE 2 DIABETES MELLITUS: ICD-10-CM

## 2020-02-06 DIAGNOSIS — E11.22 HYPERTENSION ASSOCIATED WITH STAGE 4 CHRONIC KIDNEY DISEASE DUE TO TYPE 2 DIABETES MELLITUS: ICD-10-CM

## 2020-02-06 DIAGNOSIS — N18.30 ANEMIA DUE TO STAGE 3 CHRONIC KIDNEY DISEASE: ICD-10-CM

## 2020-02-06 DIAGNOSIS — I12.9 HYPERTENSION ASSOCIATED WITH STAGE 4 CHRONIC KIDNEY DISEASE DUE TO TYPE 2 DIABETES MELLITUS: ICD-10-CM

## 2020-02-06 DIAGNOSIS — E11.65 UNCONTROLLED TYPE 2 DIABETES MELLITUS WITH HYPERGLYCEMIA: ICD-10-CM

## 2020-02-06 DIAGNOSIS — I12.9 HYPERTENSIVE CHRONIC KIDNEY DISEASE WITH STAGE 1 THROUGH STAGE 4 CHRONIC KIDNEY DISEASE, OR UNSPECIFIED CHRONIC KIDNEY DISEASE: Primary | ICD-10-CM

## 2020-02-06 DIAGNOSIS — D63.1 ANEMIA DUE TO STAGE 3 CHRONIC KIDNEY DISEASE: ICD-10-CM

## 2020-02-06 DIAGNOSIS — E03.9 ACQUIRED HYPOTHYROIDISM: ICD-10-CM

## 2020-02-06 LAB
ALBUMIN SERPL BCP-MCNC: 3.5 G/DL (ref 3.5–5.2)
ALBUMIN SERPL BCP-MCNC: 3.5 G/DL (ref 3.5–5.2)
ALBUMIN/CREAT UR: 142.6 UG/MG (ref 0–30)
ALP SERPL-CCNC: 100 U/L (ref 55–135)
ALT SERPL W/O P-5'-P-CCNC: 21 U/L (ref 10–44)
ANION GAP SERPL CALC-SCNC: 11 MMOL/L (ref 8–16)
ANION GAP SERPL CALC-SCNC: 11 MMOL/L (ref 8–16)
AST SERPL-CCNC: 13 U/L (ref 10–40)
BASOPHILS # BLD AUTO: 0.05 K/UL (ref 0–0.2)
BASOPHILS # BLD AUTO: 0.05 K/UL (ref 0–0.2)
BASOPHILS NFR BLD: 0.5 % (ref 0–1.9)
BASOPHILS NFR BLD: 0.5 % (ref 0–1.9)
BILIRUB SERPL-MCNC: 0.8 MG/DL (ref 0.1–1)
BUN SERPL-MCNC: 31 MG/DL (ref 8–23)
BUN SERPL-MCNC: 31 MG/DL (ref 8–23)
CALCIUM SERPL-MCNC: 10.4 MG/DL (ref 8.7–10.5)
CALCIUM SERPL-MCNC: 10.4 MG/DL (ref 8.7–10.5)
CHLORIDE SERPL-SCNC: 102 MMOL/L (ref 95–110)
CHLORIDE SERPL-SCNC: 102 MMOL/L (ref 95–110)
CHOLEST SERPL-MCNC: 151 MG/DL (ref 120–199)
CHOLEST/HDLC SERPL: 4 {RATIO} (ref 2–5)
CO2 SERPL-SCNC: 29 MMOL/L (ref 23–29)
CO2 SERPL-SCNC: 29 MMOL/L (ref 23–29)
CREAT SERPL-MCNC: 1.6 MG/DL (ref 0.5–1.4)
CREAT SERPL-MCNC: 1.6 MG/DL (ref 0.5–1.4)
CREAT UR-MCNC: 56.6 MG/DL (ref 15–325)
CREAT UR-MCNC: 56.8 MG/DL (ref 15–325)
DIFFERENTIAL METHOD: ABNORMAL
DIFFERENTIAL METHOD: ABNORMAL
EOSINOPHIL # BLD AUTO: 0.4 K/UL (ref 0–0.5)
EOSINOPHIL # BLD AUTO: 0.4 K/UL (ref 0–0.5)
EOSINOPHIL NFR BLD: 3.8 % (ref 0–8)
EOSINOPHIL NFR BLD: 3.8 % (ref 0–8)
ERYTHROCYTE [DISTWIDTH] IN BLOOD BY AUTOMATED COUNT: 14.2 % (ref 11.5–14.5)
ERYTHROCYTE [DISTWIDTH] IN BLOOD BY AUTOMATED COUNT: 14.2 % (ref 11.5–14.5)
EST. GFR  (AFRICAN AMERICAN): 34 ML/MIN/1.73 M^2
EST. GFR  (AFRICAN AMERICAN): 34 ML/MIN/1.73 M^2
EST. GFR  (NON AFRICAN AMERICAN): 30 ML/MIN/1.73 M^2
EST. GFR  (NON AFRICAN AMERICAN): 30 ML/MIN/1.73 M^2
ESTIMATED AVG GLUCOSE: 146 MG/DL (ref 68–131)
GLUCOSE SERPL-MCNC: 121 MG/DL (ref 70–110)
GLUCOSE SERPL-MCNC: 121 MG/DL (ref 70–110)
HBA1C MFR BLD HPLC: 6.7 % (ref 4–5.6)
HCT VFR BLD AUTO: 40 % (ref 37–48.5)
HCT VFR BLD AUTO: 40 % (ref 37–48.5)
HDLC SERPL-MCNC: 38 MG/DL (ref 40–75)
HDLC SERPL: 25.2 % (ref 20–50)
HGB BLD-MCNC: 12.4 G/DL (ref 12–16)
HGB BLD-MCNC: 12.4 G/DL (ref 12–16)
IMM GRANULOCYTES # BLD AUTO: 0.06 K/UL (ref 0–0.04)
IMM GRANULOCYTES # BLD AUTO: 0.06 K/UL (ref 0–0.04)
IMM GRANULOCYTES NFR BLD AUTO: 0.6 % (ref 0–0.5)
IMM GRANULOCYTES NFR BLD AUTO: 0.6 % (ref 0–0.5)
LDLC SERPL CALC-MCNC: 51.4 MG/DL (ref 63–159)
LYMPHOCYTES # BLD AUTO: 2.6 K/UL (ref 1–4.8)
LYMPHOCYTES # BLD AUTO: 2.6 K/UL (ref 1–4.8)
LYMPHOCYTES NFR BLD: 24.8 % (ref 18–48)
LYMPHOCYTES NFR BLD: 24.8 % (ref 18–48)
MCH RBC QN AUTO: 30.4 PG (ref 27–31)
MCH RBC QN AUTO: 30.4 PG (ref 27–31)
MCHC RBC AUTO-ENTMCNC: 31 G/DL (ref 32–36)
MCHC RBC AUTO-ENTMCNC: 31 G/DL (ref 32–36)
MCV RBC AUTO: 98 FL (ref 82–98)
MCV RBC AUTO: 98 FL (ref 82–98)
MICROALBUMIN UR DL<=1MG/L-MCNC: 81 UG/ML
MONOCYTES # BLD AUTO: 0.7 K/UL (ref 0.3–1)
MONOCYTES # BLD AUTO: 0.7 K/UL (ref 0.3–1)
MONOCYTES NFR BLD: 7.2 % (ref 4–15)
MONOCYTES NFR BLD: 7.2 % (ref 4–15)
NEUTROPHILS # BLD AUTO: 6.5 K/UL (ref 1.8–7.7)
NEUTROPHILS # BLD AUTO: 6.5 K/UL (ref 1.8–7.7)
NEUTROPHILS NFR BLD: 63.1 % (ref 38–73)
NEUTROPHILS NFR BLD: 63.1 % (ref 38–73)
NONHDLC SERPL-MCNC: 113 MG/DL
NRBC BLD-RTO: 0 /100 WBC
NRBC BLD-RTO: 0 /100 WBC
PHOSPHATE SERPL-MCNC: 2.4 MG/DL (ref 2.7–4.5)
PLATELET # BLD AUTO: 301 K/UL (ref 150–350)
PLATELET # BLD AUTO: 301 K/UL (ref 150–350)
PMV BLD AUTO: 10.8 FL (ref 9.2–12.9)
PMV BLD AUTO: 10.8 FL (ref 9.2–12.9)
POTASSIUM SERPL-SCNC: 4.6 MMOL/L (ref 3.5–5.1)
POTASSIUM SERPL-SCNC: 4.6 MMOL/L (ref 3.5–5.1)
PROT SERPL-MCNC: 7.8 G/DL (ref 6–8.4)
PROT UR-MCNC: 27 MG/DL (ref 0–15)
PROT/CREAT UR: 0.48 MG/G{CREAT} (ref 0–0.2)
PTH-INTACT SERPL-MCNC: 459 PG/ML (ref 9–77)
RBC # BLD AUTO: 4.08 M/UL (ref 4–5.4)
RBC # BLD AUTO: 4.08 M/UL (ref 4–5.4)
SODIUM SERPL-SCNC: 142 MMOL/L (ref 136–145)
SODIUM SERPL-SCNC: 142 MMOL/L (ref 136–145)
TRIGL SERPL-MCNC: 308 MG/DL (ref 30–150)
TSH SERPL DL<=0.005 MIU/L-ACNC: 2.2 UIU/ML (ref 0.4–4)
WBC # BLD AUTO: 10.33 K/UL (ref 3.9–12.7)
WBC # BLD AUTO: 10.33 K/UL (ref 3.9–12.7)

## 2020-02-06 PROCEDURE — 80069 RENAL FUNCTION PANEL: CPT | Mod: HCNC

## 2020-02-06 PROCEDURE — 80053 COMPREHEN METABOLIC PANEL: CPT | Mod: HCNC

## 2020-02-06 PROCEDURE — 85025 COMPLETE CBC W/AUTO DIFF WBC: CPT | Mod: HCNC

## 2020-02-06 PROCEDURE — 82043 UR ALBUMIN QUANTITATIVE: CPT | Mod: HCNC

## 2020-02-06 PROCEDURE — 84156 ASSAY OF PROTEIN URINE: CPT | Mod: HCNC

## 2020-02-06 PROCEDURE — 83970 ASSAY OF PARATHORMONE: CPT | Mod: HCNC

## 2020-02-06 PROCEDURE — 83036 HEMOGLOBIN GLYCOSYLATED A1C: CPT | Mod: HCNC

## 2020-02-06 PROCEDURE — 36415 COLL VENOUS BLD VENIPUNCTURE: CPT | Mod: HCNC

## 2020-02-06 PROCEDURE — 84443 ASSAY THYROID STIM HORMONE: CPT | Mod: HCNC

## 2020-02-06 PROCEDURE — 80061 LIPID PANEL: CPT | Mod: HCNC

## 2020-02-12 DIAGNOSIS — E11.69 HYPERLIPIDEMIA ASSOCIATED WITH TYPE 2 DIABETES MELLITUS: ICD-10-CM

## 2020-02-12 DIAGNOSIS — E78.5 HYPERLIPIDEMIA ASSOCIATED WITH TYPE 2 DIABETES MELLITUS: ICD-10-CM

## 2020-02-12 RX ORDER — ROSUVASTATIN CALCIUM 20 MG/1
TABLET, COATED ORAL
Qty: 90 TABLET | Refills: 0 | Status: SHIPPED | OUTPATIENT
Start: 2020-02-12 | End: 2020-05-13

## 2020-02-13 ENCOUNTER — OFFICE VISIT (OUTPATIENT)
Dept: INTERNAL MEDICINE | Facility: CLINIC | Age: 85
End: 2020-02-13
Payer: MEDICARE

## 2020-02-13 VITALS
BODY MASS INDEX: 41.9 KG/M2 | SYSTOLIC BLOOD PRESSURE: 138 MMHG | WEIGHT: 276.44 LBS | HEART RATE: 74 BPM | DIASTOLIC BLOOD PRESSURE: 82 MMHG | RESPIRATION RATE: 18 BRPM | HEIGHT: 68 IN

## 2020-02-13 DIAGNOSIS — I12.9 HYPERTENSION ASSOCIATED WITH STAGE 4 CHRONIC KIDNEY DISEASE DUE TO TYPE 2 DIABETES MELLITUS: ICD-10-CM

## 2020-02-13 DIAGNOSIS — I26.92 CHRONIC SADDLE PULMONARY EMBOLISM WITHOUT ACUTE COR PULMONALE: ICD-10-CM

## 2020-02-13 DIAGNOSIS — E11.22 HYPERTENSION ASSOCIATED WITH STAGE 4 CHRONIC KIDNEY DISEASE DUE TO TYPE 2 DIABETES MELLITUS: ICD-10-CM

## 2020-02-13 DIAGNOSIS — J44.9 PULMONARY HYPERTENSION DUE TO COPD: ICD-10-CM

## 2020-02-13 DIAGNOSIS — E21.0 PRIMARY HYPERPARATHYROIDISM: ICD-10-CM

## 2020-02-13 DIAGNOSIS — N18.4 HYPERTENSION ASSOCIATED WITH STAGE 4 CHRONIC KIDNEY DISEASE DUE TO TYPE 2 DIABETES MELLITUS: ICD-10-CM

## 2020-02-13 DIAGNOSIS — E78.5 HYPERLIPIDEMIA ASSOCIATED WITH TYPE 2 DIABETES MELLITUS: ICD-10-CM

## 2020-02-13 DIAGNOSIS — N30.01 ACUTE CYSTITIS WITH HEMATURIA: Primary | ICD-10-CM

## 2020-02-13 DIAGNOSIS — I27.82 CHRONIC SADDLE PULMONARY EMBOLISM WITHOUT ACUTE COR PULMONALE: ICD-10-CM

## 2020-02-13 DIAGNOSIS — I27.23 PULMONARY HYPERTENSION DUE TO COPD: ICD-10-CM

## 2020-02-13 DIAGNOSIS — E11.69 HYPERLIPIDEMIA ASSOCIATED WITH TYPE 2 DIABETES MELLITUS: ICD-10-CM

## 2020-02-13 DIAGNOSIS — E66.01 MORBID OBESITY WITH BODY MASS INDEX (BMI) OF 40.0 TO 44.9 IN ADULT: ICD-10-CM

## 2020-02-13 PROCEDURE — 1159F PR MEDICATION LIST DOCUMENTED IN MEDICAL RECORD: ICD-10-PCS | Mod: HCNC,S$GLB,, | Performed by: INTERNAL MEDICINE

## 2020-02-13 PROCEDURE — 1126F AMNT PAIN NOTED NONE PRSNT: CPT | Mod: HCNC,S$GLB,, | Performed by: INTERNAL MEDICINE

## 2020-02-13 PROCEDURE — 99499 RISK ADDL DX/OHS AUDIT: ICD-10-PCS | Mod: HCNC,S$GLB,, | Performed by: INTERNAL MEDICINE

## 2020-02-13 PROCEDURE — 99499 UNLISTED E&M SERVICE: CPT | Mod: HCNC,S$GLB,, | Performed by: INTERNAL MEDICINE

## 2020-02-13 PROCEDURE — 99999 PR PBB SHADOW E&M-EST. PATIENT-LVL III: CPT | Mod: PBBFAC,HCNC,, | Performed by: INTERNAL MEDICINE

## 2020-02-13 PROCEDURE — 3075F SYST BP GE 130 - 139MM HG: CPT | Mod: HCNC,CPTII,S$GLB, | Performed by: INTERNAL MEDICINE

## 2020-02-13 PROCEDURE — 1101F PT FALLS ASSESS-DOCD LE1/YR: CPT | Mod: HCNC,CPTII,S$GLB, | Performed by: INTERNAL MEDICINE

## 2020-02-13 PROCEDURE — 1126F PR PAIN SEVERITY QUANTIFIED, NO PAIN PRESENT: ICD-10-PCS | Mod: HCNC,S$GLB,, | Performed by: INTERNAL MEDICINE

## 2020-02-13 PROCEDURE — 99999 PR PBB SHADOW E&M-EST. PATIENT-LVL III: ICD-10-PCS | Mod: PBBFAC,HCNC,, | Performed by: INTERNAL MEDICINE

## 2020-02-13 PROCEDURE — 3079F PR MOST RECENT DIASTOLIC BLOOD PRESSURE 80-89 MM HG: ICD-10-PCS | Mod: HCNC,CPTII,S$GLB, | Performed by: INTERNAL MEDICINE

## 2020-02-13 PROCEDURE — 3075F PR MOST RECENT SYSTOLIC BLOOD PRESS GE 130-139MM HG: ICD-10-PCS | Mod: HCNC,CPTII,S$GLB, | Performed by: INTERNAL MEDICINE

## 2020-02-13 PROCEDURE — 3079F DIAST BP 80-89 MM HG: CPT | Mod: HCNC,CPTII,S$GLB, | Performed by: INTERNAL MEDICINE

## 2020-02-13 PROCEDURE — 99214 PR OFFICE/OUTPT VISIT, EST, LEVL IV, 30-39 MIN: ICD-10-PCS | Mod: HCNC,S$GLB,, | Performed by: INTERNAL MEDICINE

## 2020-02-13 PROCEDURE — 1159F MED LIST DOCD IN RCRD: CPT | Mod: HCNC,S$GLB,, | Performed by: INTERNAL MEDICINE

## 2020-02-13 PROCEDURE — 1101F PR PT FALLS ASSESS DOC 0-1 FALLS W/OUT INJ PAST YR: ICD-10-PCS | Mod: HCNC,CPTII,S$GLB, | Performed by: INTERNAL MEDICINE

## 2020-02-13 PROCEDURE — 99214 OFFICE O/P EST MOD 30 MIN: CPT | Mod: HCNC,S$GLB,, | Performed by: INTERNAL MEDICINE

## 2020-02-13 RX ORDER — NITROFURANTOIN MACROCRYSTALS 50 MG/1
50 CAPSULE ORAL DAILY
Qty: 10 CAPSULE | Refills: 0 | Status: SHIPPED | OUTPATIENT
Start: 2020-02-13 | End: 2020-02-23

## 2020-02-13 NOTE — PROGRESS NOTES
Subjective:       Patient ID: Tonya Bucio is a 84 y.o. female.    Chief Complaint: Follow-up (uti); Urinary Frequency; Hyperlipidemia; Hypertension; and Diabetes    Tonya Bucio is a 84 y.o. female who presents for Type II DM, Hypertension, and Hyperlipidemia follow up. Labs were reviewed with patient today.  Seeing Dr austin for  CKD and hyperparathyroidism      Hyperlipidemia   This is a chronic problem. The current episode started more than 1 year ago. The problem is controlled. Recent lipid tests were reviewed and are low. Exacerbating diseases include obesity. Associated symptoms include myalgias. Pertinent negatives include no chest pain, leg pain or shortness of breath.   Hypertension   This is a chronic problem. The current episode started more than 1 year ago. The problem has been gradually worsening since onset. The problem is controlled. Pertinent negatives include no chest pain, neck pain, palpitations or shortness of breath. Past treatments include calcium channel blockers and beta blockers. Identifiable causes of hypertension include a thyroid problem.   Diabetes   She presents for her follow-up diabetic visit. She has type 2 diabetes mellitus. There are no hypoglycemic associated symptoms. Pertinent negatives for hypoglycemia include no confusion, dizziness, nervousness/anxiousness or pallor. Associated symptoms include fatigue. Pertinent negatives for diabetes include no chest pain, no polydipsia, no polyphagia and no weakness. There are no hypoglycemic complications. Symptoms are stable. There are no diabetic complications. Current diabetic treatment includes diet. She is following a diabetic diet. Her breakfast blood glucose range is generally 110-130 mg/dl. Her dinner blood glucose range is generally  mg/dl. An ACE inhibitor/angiotensin II receptor blocker is being taken.   Thyroid Problem   Presents for follow-up visit. Symptoms include fatigue. Patient reports no anxiety or  "palpitations. Her past medical history is significant for hyperlipidemia.   Arthritis   Presents for follow-up (R hand swollen for 2 -3 weeks . " my gout is acting Up ") visit. Associated symptoms include fatigue. Pertinent negatives include no dysuria or fever. Compliance with total regimen is 51-75%.   Medication Refill   Associated symptoms include arthralgias, fatigue and myalgias. Pertinent negatives include no chest pain, chills, congestion, coughing, fever, nausea, neck pain, numbness, sore throat, vomiting or weakness.     Review of Systems   Constitutional: Positive for fatigue. Negative for chills and fever.   HENT: Negative for congestion and sore throat.    Respiratory: Negative for cough and shortness of breath.    Cardiovascular: Negative for chest pain and palpitations.   Gastrointestinal: Negative for nausea and vomiting.   Endocrine: Negative for polydipsia and polyphagia.   Genitourinary: Negative for dysuria.   Musculoskeletal: Positive for arthralgias, arthritis and myalgias. Negative for neck pain.   Skin: Negative for pallor.   Neurological: Negative for dizziness, weakness and numbness.   Psychiatric/Behavioral: Negative for confusion. The patient is not nervous/anxious.        Objective:      Physical Exam   Constitutional: She is oriented to person, place, and time. She appears well-developed and well-nourished.   HENT:   Head: Normocephalic and atraumatic.   Right Ear: External ear normal.   Left Ear: External ear normal.   Mouth/Throat: Oropharynx is clear and moist.   Eyes: Pupils are equal, round, and reactive to light. Conjunctivae and EOM are normal.   Neck: Normal range of motion. Neck supple. No JVD present. No tracheal deviation present. No thyromegaly present.   Cardiovascular: Normal rate, regular rhythm, normal heart sounds and intact distal pulses.   Pulses:       Dorsalis pedis pulses are 1+ on the right side, and 1+ on the left side.        Posterior tibial pulses are 1+ on " the right side, and 1+ on the left side.   Pulmonary/Chest: Effort normal and breath sounds normal. No respiratory distress. She has no wheezes. She has no rales. She exhibits no tenderness.   Abdominal: Soft. Bowel sounds are normal. She exhibits no distension and no mass. There is no tenderness. There is no rebound and no guarding.   Musculoskeletal: Normal range of motion. She exhibits no edema.   Bilateral knee oa changes.     Feet:   Right Foot:   Protective Sensation: 7 sites tested.   Skin Integrity: Positive for dry skin. Negative for ulcer or erythema.   Left Foot:   Protective Sensation: 7 sites tested. 4 sites sensed.   Skin Integrity: Positive for dry skin. Negative for ulcer or erythema.   Lymphadenopathy:     She has no cervical adenopathy.   Neurological: She is alert and oriented to person, place, and time. She has normal reflexes. No cranial nerve deficit. She exhibits normal muscle tone. Coordination normal.   Skin: Skin is warm and dry.   Psychiatric: She has a normal mood and affect.   Nursing note and vitals reviewed.      Assessment:       1. Acute cystitis with hematuria    2. Hyperlipidemia associated with type 2 diabetes mellitus    3. Morbid obesity with body mass index (BMI) of 40.0 to 44.9 in adult    4. Primary hyperparathyroidism    5. Pulmonary hypertension due to COPD    6. Chronic saddle pulmonary embolism without acute cor pulmonale    7. Hypertension associated with stage 4 chronic kidney disease due to type 2 diabetes mellitus        Plan:   Tonya was seen today for follow-up, urinary frequency, hyperlipidemia, hypertension and diabetes.    Diagnoses and all orders for this visit:    Acute cystitis with hematuria  -     nitrofurantoin (MACRODANTIN) 50 MG capsule; Take 1 capsule (50 mg total) by mouth once daily. for 10 days    Hyperlipidemia associated with type 2 diabetes mellitus  -     CBC auto differential; Future  -     Comprehensive metabolic panel; Future  -     Hemoglobin  A1c; Future  -     Microalbumin/creatinine urine ratio; Future  -     TSH; Future  Well controlled.  Continue same medication and dose.  Morbid obesity with body mass index (BMI) of 40.0 to 44.9 in adult    # The patient is asked to make an attempt to improve diet and exercise patterns to aid in medical management of this problem.     # Eat  5 small meals a day.     # Cut out high carbohydrate  foods : bread, rice, pasta, potatoes.     # Exercise/walk 5x/week for at least 30-45  minutes.        Primary hyperparathyroidism  Seeing nephrology   She is not ready for parathyroid surgery     Pulmonary hypertension due to COPD  Stable.    Chronic saddle pulmonary embolism without acute cor pulmonale  Continue same meds.    Hypertension associated with stage 4 chronic kidney disease due to type 2 diabetes mellitus  -     Lipid panel; Future  -     Hemoglobin A1c; Future  -     TSH; Future    Well controlled.  Continue same medication and dose.  1. Keep weight close to ideal body weight.   2.   Avoid high salt foods (olives, pickles, smoked meats, salted potato chips, etc.).   Do not add salt to your food at the table.   Use only small amounts of salt when cooking.   3. Begin an exercise program. Discuss with your doctor what type of exercise program would be best for you. It doesn't have to be difficult. Even brisk walking for 20 minutes three times a week is a good form of exercise.   4. Avoid medicines which contain heart stimulants. This includes many cold and sinus decongestant pills and sprays as well as diet pills. Check the warnings about hypertension on the label. Stimulants such as amphetamine or cocaine could be lethal for someone with hypertension. Never take these.      Problem List Items Addressed This Visit     Hyperlipidemia associated with type 2 diabetes mellitus    Relevant Orders    CBC auto differential    Comprehensive metabolic panel    Hemoglobin A1c    Microalbumin/creatinine urine ratio    TSH     Chronic saddle pulmonary embolism without acute cor pulmonale    Primary hyperparathyroidism    Pulmonary hypertension due to COPD    Overview     On CT and bronch 2014         Morbid obesity with body mass index (BMI) of 40.0 to 44.9 in adult    Hypertension associated with stage 4 chronic kidney disease due to type 2 diabetes mellitus    Relevant Orders    Lipid panel    Hemoglobin A1c    TSH      Other Visit Diagnoses     Acute cystitis with hematuria    -  Primary    Relevant Medications    nitrofurantoin (MACRODANTIN) 50 MG capsule

## 2020-02-26 DIAGNOSIS — I10 ESSENTIAL HYPERTENSION: ICD-10-CM

## 2020-02-26 RX ORDER — METOPROLOL TARTRATE 50 MG/1
TABLET ORAL
Qty: 60 TABLET | Refills: 5 | Status: ON HOLD | OUTPATIENT
Start: 2020-02-26 | End: 2020-03-15 | Stop reason: HOSPADM

## 2020-03-09 DIAGNOSIS — M17.0 PRIMARY OSTEOARTHRITIS OF BOTH KNEES: ICD-10-CM

## 2020-03-09 RX ORDER — HYDROCODONE BITARTRATE AND ACETAMINOPHEN 7.5; 325 MG/1; MG/1
1 TABLET ORAL
Qty: 30 TABLET | Refills: 0 | Status: SHIPPED | OUTPATIENT
Start: 2020-03-09 | End: 2020-04-08 | Stop reason: SDUPTHER

## 2020-03-09 NOTE — TELEPHONE ENCOUNTER
----- Message from Rosanne Dawn sent at 3/9/2020  8:35 AM CDT -----  Contact: self   Tonya Bucio  MRN: [id number]  : 1935  PCP: Tasha Atkins  Home Phone      [phone]  Work Phone      Not on file.  [employer]          [phone]    MESSAGE:   Rx refill - HYDROcodone-acetaminophen (NORCO) 7.5-325 mg per tablet.    Phone # [phone]    Pharmacy - [facility] [address] [place] [address]

## 2020-03-09 NOTE — TELEPHONE ENCOUNTER
Requested Prescriptions     Pending Prescriptions Disp Refills    HYDROcodone-acetaminophen (NORCO) 7.5-325 mg per tablet 30 tablet 0     Sig: Take 1 tablet by mouth every 24 hours as needed for Pain.   LOV: 2/13/20. Last fill: 2/4/20

## 2020-03-13 ENCOUNTER — OFFICE VISIT (OUTPATIENT)
Dept: INTERNAL MEDICINE | Facility: CLINIC | Age: 85
End: 2020-03-13
Payer: MEDICARE

## 2020-03-13 ENCOUNTER — HOSPITAL ENCOUNTER (INPATIENT)
Facility: HOSPITAL | Age: 85
LOS: 2 days | Discharge: HOME | DRG: 191 | End: 2020-03-15
Attending: SURGERY | Admitting: INTERNAL MEDICINE
Payer: MEDICARE

## 2020-03-13 VITALS
RESPIRATION RATE: 20 BRPM | BODY MASS INDEX: 42.04 KG/M2 | DIASTOLIC BLOOD PRESSURE: 52 MMHG | SYSTOLIC BLOOD PRESSURE: 90 MMHG | HEART RATE: 94 BPM | TEMPERATURE: 100 F | OXYGEN SATURATION: 93 % | HEIGHT: 68 IN

## 2020-03-13 DIAGNOSIS — J44.0 COPD WITH ACUTE LOWER RESPIRATORY INFECTION: Primary | ICD-10-CM

## 2020-03-13 DIAGNOSIS — J40 BRONCHITIS: Primary | ICD-10-CM

## 2020-03-13 DIAGNOSIS — R05.9 COUGH: ICD-10-CM

## 2020-03-13 DIAGNOSIS — I48.3 TYPICAL ATRIAL FLUTTER: ICD-10-CM

## 2020-03-13 DIAGNOSIS — I48.91 NEW ONSET A-FIB: ICD-10-CM

## 2020-03-13 PROBLEM — J44.1 COPD EXACERBATION: Status: ACTIVE | Noted: 2020-03-13

## 2020-03-13 LAB
ALBUMIN SERPL BCP-MCNC: 2.7 G/DL (ref 3.5–5.2)
ALP SERPL-CCNC: 131 U/L (ref 55–135)
ALT SERPL W/O P-5'-P-CCNC: 14 U/L (ref 10–44)
AMORPH CRY URNS QL MICRO: ABNORMAL
ANION GAP SERPL CALC-SCNC: 16 MMOL/L (ref 8–16)
APTT BLDCRRT: 33.1 SEC (ref 21–32)
AST SERPL-CCNC: 13 U/L (ref 10–40)
BACTERIA #/AREA URNS HPF: ABNORMAL /HPF
BASOPHILS # BLD AUTO: 0.06 K/UL (ref 0–0.2)
BASOPHILS NFR BLD: 0.3 % (ref 0–1.9)
BILIRUB SERPL-MCNC: 0.7 MG/DL (ref 0.1–1)
BILIRUB UR QL STRIP: NEGATIVE
BNP SERPL-MCNC: 115 PG/ML (ref 0–99)
BUN SERPL-MCNC: 30 MG/DL (ref 8–23)
CALCIUM SERPL-MCNC: 10.9 MG/DL (ref 8.7–10.5)
CHLORIDE SERPL-SCNC: 100 MMOL/L (ref 95–110)
CK MB SERPL-MCNC: 0.6 NG/ML (ref 0.1–6.5)
CK MB SERPL-RTO: 2.6 % (ref 0–5)
CK SERPL-CCNC: 23 U/L (ref 20–180)
CK SERPL-CCNC: 23 U/L (ref 20–180)
CLARITY UR: ABNORMAL
CO2 SERPL-SCNC: 23 MMOL/L (ref 23–29)
COLOR UR: YELLOW
CREAT SERPL-MCNC: 1.7 MG/DL (ref 0.5–1.4)
DIFFERENTIAL METHOD: ABNORMAL
EOSINOPHIL # BLD AUTO: 0 K/UL (ref 0–0.5)
EOSINOPHIL NFR BLD: 0.2 % (ref 0–8)
ERYTHROCYTE [DISTWIDTH] IN BLOOD BY AUTOMATED COUNT: 14.2 % (ref 11.5–14.5)
EST. GFR  (AFRICAN AMERICAN): 32 ML/MIN/1.73 M^2
EST. GFR  (NON AFRICAN AMERICAN): 27 ML/MIN/1.73 M^2
GLUCOSE SERPL-MCNC: 178 MG/DL (ref 70–110)
GLUCOSE UR QL STRIP: NEGATIVE
HCT VFR BLD AUTO: 36 % (ref 37–48.5)
HGB BLD-MCNC: 11.6 G/DL (ref 12–16)
HGB UR QL STRIP: ABNORMAL
HYALINE CASTS #/AREA URNS LPF: 6 /LPF
IMM GRANULOCYTES # BLD AUTO: 0.19 K/UL (ref 0–0.04)
IMM GRANULOCYTES NFR BLD AUTO: 1 % (ref 0–0.5)
INFLUENZA A, MOLECULAR: NEGATIVE
INFLUENZA B, MOLECULAR: NEGATIVE
INR PPP: 1.1 (ref 0.8–1.2)
KETONES UR QL STRIP: NEGATIVE
LACTATE SERPL-SCNC: 1.7 MMOL/L (ref 0.5–2.2)
LACTATE SERPL-SCNC: 2.3 MMOL/L (ref 0.5–2.2)
LACTATE SERPL-SCNC: 2.5 MMOL/L (ref 0.5–2.2)
LEUKOCYTE ESTERASE UR QL STRIP: ABNORMAL
LYMPHOCYTES # BLD AUTO: 2.1 K/UL (ref 1–4.8)
LYMPHOCYTES NFR BLD: 10.7 % (ref 18–48)
MCH RBC QN AUTO: 30.6 PG (ref 27–31)
MCHC RBC AUTO-ENTMCNC: 32.2 G/DL (ref 32–36)
MCV RBC AUTO: 95 FL (ref 82–98)
MICROSCOPIC COMMENT: ABNORMAL
MONOCYTES # BLD AUTO: 1.1 K/UL (ref 0.3–1)
MONOCYTES NFR BLD: 5.7 % (ref 4–15)
NEUTROPHILS # BLD AUTO: 16.3 K/UL (ref 1.8–7.7)
NEUTROPHILS NFR BLD: 82.1 % (ref 38–73)
NITRITE UR QL STRIP: NEGATIVE
NRBC BLD-RTO: 0 /100 WBC
PH UR STRIP: 6 [PH] (ref 5–8)
PLATELET # BLD AUTO: 349 K/UL (ref 150–350)
PMV BLD AUTO: 10.6 FL (ref 9.2–12.9)
POCT GLUCOSE: 277 MG/DL (ref 70–110)
POTASSIUM SERPL-SCNC: 4 MMOL/L (ref 3.5–5.1)
PROCALCITONIN SERPL IA-MCNC: 0.35 NG/ML
PROT SERPL-MCNC: 8.1 G/DL (ref 6–8.4)
PROT UR QL STRIP: ABNORMAL
PROTHROMBIN TIME: 11.9 SEC (ref 9–12.5)
RBC # BLD AUTO: 3.79 M/UL (ref 4–5.4)
RBC #/AREA URNS HPF: 25 /HPF (ref 0–4)
SODIUM SERPL-SCNC: 139 MMOL/L (ref 136–145)
SP GR UR STRIP: 1.02 (ref 1–1.03)
SPECIMEN SOURCE: NORMAL
TROPONIN I SERPL DL<=0.01 NG/ML-MCNC: 0.03 NG/ML (ref 0–0.03)
TROPONIN I SERPL DL<=0.01 NG/ML-MCNC: 0.04 NG/ML (ref 0–0.03)
URN SPEC COLLECT METH UR: ABNORMAL
UROBILINOGEN UR STRIP-ACNC: NEGATIVE EU/DL
WBC # BLD AUTO: 19.85 K/UL (ref 3.9–12.7)
WBC #/AREA URNS HPF: 11 /HPF (ref 0–5)

## 2020-03-13 PROCEDURE — 99999 PR PBB SHADOW E&M-EST. PATIENT-LVL IV: ICD-10-PCS | Mod: PBBFAC,HCNC,, | Performed by: NURSE PRACTITIONER

## 2020-03-13 PROCEDURE — 1101F PT FALLS ASSESS-DOCD LE1/YR: CPT | Mod: HCNC,CPTII,S$GLB, | Performed by: NURSE PRACTITIONER

## 2020-03-13 PROCEDURE — 99223 PR INITIAL HOSPITAL CARE,LEVL III: ICD-10-PCS | Mod: AI,HCNC,, | Performed by: INTERNAL MEDICINE

## 2020-03-13 PROCEDURE — 99214 OFFICE O/P EST MOD 30 MIN: CPT | Mod: HCNC,S$GLB,, | Performed by: NURSE PRACTITIONER

## 2020-03-13 PROCEDURE — 87040 BLOOD CULTURE FOR BACTERIA: CPT | Mod: 59,HCNC

## 2020-03-13 PROCEDURE — 25000003 PHARM REV CODE 250: Mod: HCNC | Performed by: NURSE PRACTITIONER

## 2020-03-13 PROCEDURE — 99285 EMERGENCY DEPT VISIT HI MDM: CPT | Mod: 25,HCNC

## 2020-03-13 PROCEDURE — 85730 THROMBOPLASTIN TIME PARTIAL: CPT | Mod: HCNC

## 2020-03-13 PROCEDURE — 87502 INFLUENZA DNA AMP PROBE: CPT | Mod: HCNC

## 2020-03-13 PROCEDURE — 11000001 HC ACUTE MED/SURG PRIVATE ROOM: Mod: HCNC

## 2020-03-13 PROCEDURE — 3074F PR MOST RECENT SYSTOLIC BLOOD PRESSURE < 130 MM HG: ICD-10-PCS | Mod: HCNC,CPTII,S$GLB, | Performed by: NURSE PRACTITIONER

## 2020-03-13 PROCEDURE — 63600175 PHARM REV CODE 636 W HCPCS: Mod: HCNC | Performed by: SURGERY

## 2020-03-13 PROCEDURE — 81000 URINALYSIS NONAUTO W/SCOPE: CPT | Mod: HCNC

## 2020-03-13 PROCEDURE — 83880 ASSAY OF NATRIURETIC PEPTIDE: CPT | Mod: HCNC

## 2020-03-13 PROCEDURE — 84484 ASSAY OF TROPONIN QUANT: CPT | Mod: HCNC

## 2020-03-13 PROCEDURE — 3078F PR MOST RECENT DIASTOLIC BLOOD PRESSURE < 80 MM HG: ICD-10-PCS | Mod: HCNC,CPTII,S$GLB, | Performed by: NURSE PRACTITIONER

## 2020-03-13 PROCEDURE — 3074F SYST BP LT 130 MM HG: CPT | Mod: HCNC,CPTII,S$GLB, | Performed by: NURSE PRACTITIONER

## 2020-03-13 PROCEDURE — 82550 ASSAY OF CK (CPK): CPT | Mod: HCNC

## 2020-03-13 PROCEDURE — 99999 PR PBB SHADOW E&M-EST. PATIENT-LVL IV: CPT | Mod: PBBFAC,HCNC,, | Performed by: NURSE PRACTITIONER

## 2020-03-13 PROCEDURE — 94640 AIRWAY INHALATION TREATMENT: CPT | Mod: HCNC

## 2020-03-13 PROCEDURE — 80053 COMPREHEN METABOLIC PANEL: CPT | Mod: HCNC

## 2020-03-13 PROCEDURE — 25000242 PHARM REV CODE 250 ALT 637 W/ HCPCS: Mod: HCNC | Performed by: SURGERY

## 2020-03-13 PROCEDURE — 99223 1ST HOSP IP/OBS HIGH 75: CPT | Mod: AI,HCNC,, | Performed by: INTERNAL MEDICINE

## 2020-03-13 PROCEDURE — 84145 PROCALCITONIN (PCT): CPT | Mod: HCNC

## 2020-03-13 PROCEDURE — 85025 COMPLETE CBC W/AUTO DIFF WBC: CPT | Mod: HCNC

## 2020-03-13 PROCEDURE — 85610 PROTHROMBIN TIME: CPT | Mod: HCNC

## 2020-03-13 PROCEDURE — 99214 PR OFFICE/OUTPT VISIT, EST, LEVL IV, 30-39 MIN: ICD-10-PCS | Mod: HCNC,S$GLB,, | Performed by: NURSE PRACTITIONER

## 2020-03-13 PROCEDURE — 25000003 PHARM REV CODE 250: Mod: HCNC | Performed by: INTERNAL MEDICINE

## 2020-03-13 PROCEDURE — 1126F PR PAIN SEVERITY QUANTIFIED, NO PAIN PRESENT: ICD-10-PCS | Mod: HCNC,S$GLB,, | Performed by: NURSE PRACTITIONER

## 2020-03-13 PROCEDURE — 84484 ASSAY OF TROPONIN QUANT: CPT | Mod: 91,HCNC

## 2020-03-13 PROCEDURE — 93010 EKG 12-LEAD: ICD-10-PCS | Mod: HCNC,,, | Performed by: INTERNAL MEDICINE

## 2020-03-13 PROCEDURE — 3078F DIAST BP <80 MM HG: CPT | Mod: HCNC,CPTII,S$GLB, | Performed by: NURSE PRACTITIONER

## 2020-03-13 PROCEDURE — 1101F PR PT FALLS ASSESS DOC 0-1 FALLS W/OUT INJ PAST YR: ICD-10-PCS | Mod: HCNC,CPTII,S$GLB, | Performed by: NURSE PRACTITIONER

## 2020-03-13 PROCEDURE — 27000492 HC SLEEVE, SCD T/L: Mod: HCNC

## 2020-03-13 PROCEDURE — 63600175 PHARM REV CODE 636 W HCPCS: Mod: HCNC | Performed by: INTERNAL MEDICINE

## 2020-03-13 PROCEDURE — 82553 CREATINE MB FRACTION: CPT | Mod: HCNC

## 2020-03-13 PROCEDURE — 93005 ELECTROCARDIOGRAM TRACING: CPT | Mod: HCNC

## 2020-03-13 PROCEDURE — 94761 N-INVAS EAR/PLS OXIMETRY MLT: CPT | Mod: HCNC

## 2020-03-13 PROCEDURE — 36415 COLL VENOUS BLD VENIPUNCTURE: CPT | Mod: HCNC

## 2020-03-13 PROCEDURE — 1159F PR MEDICATION LIST DOCUMENTED IN MEDICAL RECORD: ICD-10-PCS | Mod: HCNC,S$GLB,, | Performed by: NURSE PRACTITIONER

## 2020-03-13 PROCEDURE — 87086 URINE CULTURE/COLONY COUNT: CPT | Mod: HCNC

## 2020-03-13 PROCEDURE — 1126F AMNT PAIN NOTED NONE PRSNT: CPT | Mod: HCNC,S$GLB,, | Performed by: NURSE PRACTITIONER

## 2020-03-13 PROCEDURE — 1159F MED LIST DOCD IN RCRD: CPT | Mod: HCNC,S$GLB,, | Performed by: NURSE PRACTITIONER

## 2020-03-13 PROCEDURE — 93010 ELECTROCARDIOGRAM REPORT: CPT | Mod: HCNC,,, | Performed by: INTERNAL MEDICINE

## 2020-03-13 PROCEDURE — 83605 ASSAY OF LACTIC ACID: CPT | Mod: 91,HCNC

## 2020-03-13 RX ORDER — METOPROLOL TARTRATE 50 MG/1
50 TABLET ORAL 2 TIMES DAILY
Status: DISCONTINUED | OUTPATIENT
Start: 2020-03-13 | End: 2020-03-13

## 2020-03-13 RX ORDER — PANTOPRAZOLE SODIUM 40 MG/1
40 TABLET, DELAYED RELEASE ORAL DAILY
Status: DISCONTINUED | OUTPATIENT
Start: 2020-03-14 | End: 2020-03-15 | Stop reason: HOSPADM

## 2020-03-13 RX ORDER — SODIUM CHLORIDE 0.9 % (FLUSH) 0.9 %
10 SYRINGE (ML) INJECTION
Status: DISCONTINUED | OUTPATIENT
Start: 2020-03-13 | End: 2020-03-15 | Stop reason: HOSPADM

## 2020-03-13 RX ORDER — METHYLPREDNISOLONE SOD SUCC 125 MG
125 VIAL (EA) INJECTION EVERY 8 HOURS
Status: DISCONTINUED | OUTPATIENT
Start: 2020-03-13 | End: 2020-03-13

## 2020-03-13 RX ORDER — LEVOTHYROXINE SODIUM 75 UG/1
75 TABLET ORAL
Status: DISCONTINUED | OUTPATIENT
Start: 2020-03-14 | End: 2020-03-15 | Stop reason: HOSPADM

## 2020-03-13 RX ORDER — ALLOPURINOL 100 MG/1
300 TABLET ORAL DAILY
Status: DISCONTINUED | OUTPATIENT
Start: 2020-03-14 | End: 2020-03-15 | Stop reason: HOSPADM

## 2020-03-13 RX ORDER — INSULIN ASPART 100 [IU]/ML
0-5 INJECTION, SOLUTION INTRAVENOUS; SUBCUTANEOUS
Status: DISCONTINUED | OUTPATIENT
Start: 2020-03-13 | End: 2020-03-13

## 2020-03-13 RX ORDER — METHYLPREDNISOLONE SOD SUCC 125 MG
125 VIAL (EA) INJECTION
Status: COMPLETED | OUTPATIENT
Start: 2020-03-13 | End: 2020-03-13

## 2020-03-13 RX ORDER — IBUPROFEN 200 MG
16 TABLET ORAL
Status: DISCONTINUED | OUTPATIENT
Start: 2020-03-13 | End: 2020-03-13

## 2020-03-13 RX ORDER — ROSUVASTATIN CALCIUM 10 MG/1
20 TABLET, COATED ORAL NIGHTLY
Status: DISCONTINUED | OUTPATIENT
Start: 2020-03-13 | End: 2020-03-15 | Stop reason: HOSPADM

## 2020-03-13 RX ORDER — LOSARTAN POTASSIUM 50 MG/1
50 TABLET ORAL DAILY
Status: DISCONTINUED | OUTPATIENT
Start: 2020-03-14 | End: 2020-03-15 | Stop reason: HOSPADM

## 2020-03-13 RX ORDER — LEVALBUTEROL 1.25 MG/.5ML
1.25 SOLUTION, CONCENTRATE RESPIRATORY (INHALATION) EVERY 6 HOURS PRN
Status: DISCONTINUED | OUTPATIENT
Start: 2020-03-13 | End: 2020-03-14

## 2020-03-13 RX ORDER — GLUCAGON 1 MG
1 KIT INJECTION
Status: DISCONTINUED | OUTPATIENT
Start: 2020-03-13 | End: 2020-03-15 | Stop reason: HOSPADM

## 2020-03-13 RX ORDER — INSULIN ASPART 100 [IU]/ML
8 INJECTION, SOLUTION INTRAVENOUS; SUBCUTANEOUS
Status: DISCONTINUED | OUTPATIENT
Start: 2020-03-13 | End: 2020-03-13

## 2020-03-13 RX ORDER — ACETAMINOPHEN 325 MG/1
650 TABLET ORAL EVERY 8 HOURS PRN
Status: DISCONTINUED | OUTPATIENT
Start: 2020-03-13 | End: 2020-03-15 | Stop reason: HOSPADM

## 2020-03-13 RX ORDER — IBUPROFEN 200 MG
16 TABLET ORAL
Status: DISCONTINUED | OUTPATIENT
Start: 2020-03-13 | End: 2020-03-15 | Stop reason: HOSPADM

## 2020-03-13 RX ORDER — LANOLIN ALCOHOL/MO/W.PET/CERES
400 CREAM (GRAM) TOPICAL 2 TIMES DAILY
Status: DISCONTINUED | OUTPATIENT
Start: 2020-03-13 | End: 2020-03-15 | Stop reason: HOSPADM

## 2020-03-13 RX ORDER — IBUPROFEN 200 MG
24 TABLET ORAL
Status: DISCONTINUED | OUTPATIENT
Start: 2020-03-13 | End: 2020-03-15 | Stop reason: HOSPADM

## 2020-03-13 RX ORDER — HYDROCODONE BITARTRATE AND ACETAMINOPHEN 7.5; 325 MG/1; MG/1
1 TABLET ORAL EVERY 8 HOURS PRN
Status: DISCONTINUED | OUTPATIENT
Start: 2020-03-13 | End: 2020-03-15 | Stop reason: HOSPADM

## 2020-03-13 RX ORDER — SOTALOL HYDROCHLORIDE 80 MG/1
80 TABLET ORAL 2 TIMES DAILY
Status: DISCONTINUED | OUTPATIENT
Start: 2020-03-13 | End: 2020-03-15 | Stop reason: HOSPADM

## 2020-03-13 RX ORDER — SODIUM CHLORIDE 9 MG/ML
INJECTION, SOLUTION INTRAVENOUS CONTINUOUS
Status: DISCONTINUED | OUTPATIENT
Start: 2020-03-13 | End: 2020-03-15 | Stop reason: HOSPADM

## 2020-03-13 RX ORDER — IBUPROFEN 200 MG
24 TABLET ORAL
Status: DISCONTINUED | OUTPATIENT
Start: 2020-03-13 | End: 2020-03-13

## 2020-03-13 RX ORDER — INSULIN ASPART 100 [IU]/ML
1-10 INJECTION, SOLUTION INTRAVENOUS; SUBCUTANEOUS
Status: DISCONTINUED | OUTPATIENT
Start: 2020-03-13 | End: 2020-03-15 | Stop reason: HOSPADM

## 2020-03-13 RX ORDER — ONDANSETRON 2 MG/ML
4 INJECTION INTRAMUSCULAR; INTRAVENOUS EVERY 8 HOURS PRN
Status: DISCONTINUED | OUTPATIENT
Start: 2020-03-13 | End: 2020-03-15 | Stop reason: HOSPADM

## 2020-03-13 RX ORDER — GLUCAGON 1 MG
1 KIT INJECTION
Status: DISCONTINUED | OUTPATIENT
Start: 2020-03-13 | End: 2020-03-13

## 2020-03-13 RX ORDER — IPRATROPIUM BROMIDE AND ALBUTEROL SULFATE 2.5; .5 MG/3ML; MG/3ML
3 SOLUTION RESPIRATORY (INHALATION)
Status: COMPLETED | OUTPATIENT
Start: 2020-03-13 | End: 2020-03-13

## 2020-03-13 RX ADMIN — CEFTRIAXONE 1 G: 1 INJECTION, SOLUTION INTRAVENOUS at 03:03

## 2020-03-13 RX ADMIN — ROSUVASTATIN CALCIUM 20 MG: 10 TABLET, FILM COATED ORAL at 08:03

## 2020-03-13 RX ADMIN — INSULIN ASPART 3 UNITS: 100 INJECTION, SOLUTION INTRAVENOUS; SUBCUTANEOUS at 08:03

## 2020-03-13 RX ADMIN — METHYLPREDNISOLONE SODIUM SUCCINATE 125 MG: 125 INJECTION, POWDER, FOR SOLUTION INTRAMUSCULAR; INTRAVENOUS at 03:03

## 2020-03-13 RX ADMIN — AZITHROMYCIN MONOHYDRATE 500 MG: 500 INJECTION, POWDER, LYOPHILIZED, FOR SOLUTION INTRAVENOUS at 04:03

## 2020-03-13 RX ADMIN — IPRATROPIUM BROMIDE AND ALBUTEROL SULFATE 3 ML: .5; 3 SOLUTION RESPIRATORY (INHALATION) at 02:03

## 2020-03-13 RX ADMIN — SODIUM CHLORIDE: 0.9 INJECTION, SOLUTION INTRAVENOUS at 04:03

## 2020-03-13 RX ADMIN — LEVALBUTEROL 1.25 MG: 1.25 SOLUTION, CONCENTRATE RESPIRATORY (INHALATION) at 08:03

## 2020-03-13 RX ADMIN — RIVAROXABAN 15 MG: 15 TABLET, FILM COATED ORAL at 06:03

## 2020-03-13 RX ADMIN — SOTALOL HYDROCHLORIDE 80 MG: 80 TABLET ORAL at 08:03

## 2020-03-13 RX ADMIN — Medication 400 MG: at 08:03

## 2020-03-13 NOTE — PROGRESS NOTES
Subjective:       Patient ID: Tonya Bucio is a 84 y.o. female.    Chief Complaint: Cough (started Monday, light green mucus) and Shortness of Breath    Pt remotely known to me. Present with SOB and cough for a few days. Pt do have hx of PHTN with COPD without complication.     Cough   This is a new problem. The current episode started in the past 7 days. The problem has been gradually worsening. The problem occurs every few minutes. The cough is productive of sputum. Associated symptoms include shortness of breath and wheezing. Pertinent negatives include no chest pain, chills, ear congestion, ear pain, fever, headaches, heartburn, hemoptysis, myalgias, nasal congestion, postnasal drip, rash, rhinorrhea, sore throat, sweats or weight loss. The symptoms are aggravated by lying down. She has tried OTC cough suppressant for the symptoms. The treatment provided no relief. Her past medical history is significant for COPD.     Review of Systems   Constitutional: Negative for activity change, appetite change, chills, fever, unexpected weight change and weight loss.   HENT: Negative for ear pain, postnasal drip, rhinorrhea and sore throat.    Respiratory: Positive for cough, shortness of breath and wheezing. Negative for hemoptysis and chest tightness.    Cardiovascular: Negative for chest pain and palpitations.   Gastrointestinal: Negative for abdominal pain, constipation, diarrhea, heartburn, nausea and vomiting.   Genitourinary: Negative for difficulty urinating and dysuria.   Musculoskeletal: Negative for myalgias.   Skin: Negative for rash.   Neurological: Negative for headaches.   All other systems reviewed and are negative.      Objective:      Physical Exam   Constitutional: She is oriented to person, place, and time. Vital signs are normal. She appears well-developed and well-nourished. She is active and cooperative.   HENT:   Head: Normocephalic and atraumatic.   Right Ear: External ear normal.   Left Ear:  External ear normal.   Nose: Nose normal.   Mouth/Throat: Oropharynx is clear and moist and mucous membranes are normal. Normal dentition.   Eyes: Pupils are equal, round, and reactive to light. Conjunctivae and lids are normal.   Neck: Trachea normal, normal range of motion and phonation normal. Neck supple.   Cardiovascular: Normal heart sounds. An irregular rhythm present. Tachycardia present.   Pulmonary/Chest: No accessory muscle usage. Tachypnea (shallow breaths, RR 22-24 ) noted. No respiratory distress. She has no decreased breath sounds. She has no wheezes. She has rhonchi. She has rales.   Sats 90-92% during visit   Abdominal: Soft. Normal appearance and bowel sounds are normal. There is no tenderness.   Neurological: She is alert and oriented to person, place, and time. No cranial nerve deficit.   Skin: Skin is warm, dry and intact. No rash noted.   Psychiatric: She has a normal mood and affect. Her speech is normal and behavior is normal. Judgment and thought content normal. Cognition and memory are normal.   Nursing note and vitals reviewed.      Assessment:       1. COPD with acute lower respiratory infection        Plan:       1. COPD with acute lower respiratory infection  Pt presents to clinic with worsening SOB, fatigue and cough. Found to by tachypic without distress. Rales noted bilaterally. Sats remained 90-92% during visit. Discussed with patient and family. She will go immediately to ED for further evaluation.     KAITY Clancy, FNP-C  Family/Internal Medicine  [facility]

## 2020-03-13 NOTE — ED TRIAGE NOTES
PT presents with C/O of cough and chest congestion. PT states she was sent to ED from clinic for diagnosis of Pneumonia

## 2020-03-13 NOTE — PLAN OF CARE
03/13/20 1741   Advance Directives (For Healthcare)   Advance Directive  (If Adv Dir status is received, view document under Adv Dir in header or Chart Review Media tab) Patient does not have Advance Directive, declines information.       Patient wishes to remain FULL CODE.

## 2020-03-13 NOTE — CONSULTS
[facility]  Cardiology  Consult Note    Patient Name: Tonya Bucio  MRN: [id number]  Admission Date: 3/13/2020  Hospital Length of Stay: 0 days  Code Status: Full Code   Consulting Provider: Willian Lara NP  Primary Care Physician: Tasha Atkins MD  Principal Problem:<principal problem not specified>      Inpatient consult to Cardiology-[facility]  Consult performed by: Elijah Ibarra MD  Consult ordered by: Tasha Atkins MD        Subjective:     Chief Complaint:  SOB    HPI: 85 y/o female with PMHx of HTN, dyslipidemia, DVT/PE presents with c/o SOB. Pt diagnosed and treated for bronchitis. Subsequent EKG reveals new onset Aflutter. [facility] asked to evaluate.    Past Medical History:   Diagnosis Date    Acute bronchitis with asthma     Anemia due to stage 3 chronic kidney disease     Angina pectoris     Anticoagulant long-term use     Asthma     Back pain     Cancer     Chest pain, musculoskeletal 7/16/2013    Chronic bronchitis     Class 3 obesity with serious comorbidity and body mass index (BMI) of 45.0 to 49.9 in adult 11/17/2016    Colon polyps     Gout, unspecified     History of cervical cancer     Hypothyroidism     Obesity     Osteoarthritis     Other and unspecified hyperlipidemia     PE (pulmonary embolism)     Renal manifestation of secondary diabetes mellitus     Thyroid disease     Trouble in sleeping     Type 2 diabetes mellitus with ophthalmic manifestations     Type 2 diabetes with peripheral circulatory disorder, controlled     Type II or unspecified type diabetes mellitus without mention of complication, uncontrolled     States everything okay-previous dx    Unspecified essential hypertension     Urinary incontinence     Uterine cancer        Past Surgical History:   Procedure Laterality Date    ADENOIDECTOMY      EYE SURGERY Right     right eye cataract    HYSTERECTOMY  2008    LIZ-BSO    tonsilectomy      TONSILLECTOMY  1945    TOTAL  ABDOMINAL HYSTERECTOMY  2008    TOTAL ABDOMINAL HYSTERECTOMY W/ BILATERAL SALPINGOOPHORECTOMY  2008       Review of patient's allergies indicates:  No Known Allergies    No current facility-administered medications on file prior to encounter.      Current Outpatient Medications on File Prior to Encounter   Medication Sig    allopurinol (ZYLOPRIM) 300 MG tablet TAKE 1 TABLET BY MOUTH ONCE DAILY    hydroCHLOROthiazide (HYDRODIURIL) 12.5 MG Tab Take 12.5 mg by mouth once daily.    HYDROcodone-acetaminophen (NORCO) 7.5-325 mg per tablet Take 1 tablet by mouth every 24 hours as needed for Pain.    levothyroxine (SYNTHROID) 75 MCG tablet TAKE 1 TABLET BY MOUTH ONCE DAILY    losartan (COZAAR) 50 MG tablet Take 50 mg by mouth once daily.    magnesium oxide (MAG-OX) 400 mg tablet Take 1 tablet (400 mg total) by mouth 2 (two) times daily.    metoprolol tartrate (LOPRESSOR) 50 MG tablet TAKE 1 TABLET BY MOUTH TWICE DAILY    omega-3 acid ethyl esters (LOVAZA) 1 gram capsule TAKE 1 CAPSULE TWICE DAILY    rosuvastatin (CRESTOR) 20 MG tablet TAKE 1 TABLET BY MOUTH ONCE DAILY    SENSIPAR 60 mg Tab     XARELTO 15 mg Tab every evening.     diabetic supplies, miscellan. [facility] TRUE METRIX GLUCOMETER. USE AS DIRECTED TO TEST BLOOD SUGAR ONCE DAILY    diabetic supplies, miscellan. Misc TRUE METRIX TEST STRIPS. USE AS DIRECTED TO TEST BLOOD SUGAR DAILY.    diabetic supplies, miscellan. [facility] LANCETS. USE AS DIRECTED TO TEST BLOOD SUGAR DAILY.    hydrocortisone 2.5 % cream Apply topically 2 (two) times daily.    meclizine (ANTIVERT) 12.5 mg tablet Take 1 tablet (12.5 mg total) by mouth 3 (three) times daily as needed for Dizziness.    wheelchair Melly Use as directed     Family History     Problem Relation (Age of Onset)    Anemia Daughter    Arthritis Father, Daughter, Daughter, Daughter, Daughter    Cancer Sister, Brother    Diabetes Brother, Daughter, Daughter    Glaucoma Daughter    Heart disease Mother, Brother, Brother     Hyperlipidemia Brother    Liver disease Daughter    No Known Problems Son, Son    Stroke Brother, Son        Tobacco Use    Smoking status: Former Smoker     Packs/day: 0.33     Years: 13.00     Pack years: 4.29     Types: Cigarettes     Start date: 1950     Last attempt to quit: 1964     Years since quittin.6    Smokeless tobacco: Never Used   Substance and Sexual Activity    Alcohol use: No    Drug use: No    Sexual activity: Never     Birth control/protection: Surgical     Comment:      ROS   Constitutional : Negative  EENT : Negative  CV : Negative  Respiratory : SOB  Gastrointestinal: Negative   Genitourinary: Negative  Musculoskeletal: Negative  Skin : Negative  Neurological : AAO x 3   Objective:     Vital Signs (Most Recent):  Temp: 97.4 °F (36.3 °C) (20)  Pulse: 85 (20)  Resp: 20 (20)  BP: 135/71 (20)  SpO2: 97 % (20) Vital Signs (24h Range):  Temp:  [97.4 °F (36.3 °C)-99.7 °F (37.6 °C)] 97.4 °F (36.3 °C)  Pulse:  [74-94] 85  Resp:  [20-22] 20  SpO2:  [93 %-97 %] 97 %  BP: ()/(52-76) 135/71     Weight: 123.9 kg (273 lb 2.4 oz)  Body mass index is 41.53 kg/m².    SpO2: 97 %  O2 Device (Oxygen Therapy): room air    No intake or output data in the 24 hours ending 20 1642    Lines/Drains/Airways     Peripheral Intravenous Line                 Peripheral IV - Single Lumen 20 1521 20 G;1 in Right Antecubital less than 1 day                Physical Exam  General appearance: alert, appears stated age and cooperative  Head: Normocephalic, without obvious abnormality, atraumatic  Eyes: conjunctivae/corneas clear. PERRL  Neck: no carotid bruit, no JVD and supple, symmetrical, trachea midline  Lungs: clear to auscultation bilaterally, normal respiratory effort  Chest Wall: no tenderness  Heart: regular rate and rhythm, S1, S2 normal, 2/6 SE murmur, click, rub or gallop  Abdomen: soft, non-tender; bowel sounds normal;  no masses,  no organomegaly  Extremities: Extremities normal, atraumatic, no cyanosis, clubbing, or edema  Pulses: Dorsalis Pedis R: 2+ (normal)/L: 2+ (normal)  Skin: Skin color, texture, turgor normal. No rashes or lesions  Neurologic: Normal mood and affect  Alert and oriented X 3    Significant Labs:   BMP:   Recent Labs   Lab 03/13/20  1420   *      K 4.0      CO2 23   BUN 30*   CREATININE 1.7*   CALCIUM 10.9*   , CMP   Recent Labs   Lab 03/13/20  1420      K 4.0      CO2 23   *   BUN 30*   CREATININE 1.7*   CALCIUM 10.9*   PROT 8.1   ALBUMIN 2.7*   BILITOT 0.7   ALKPHOS 131   AST 13   ALT 14   ANIONGAP 16   ESTGFRAFRICA 32*   EGFRNONAA 27*   , CBC   Recent Labs   Lab 03/13/20  1420   WBC 19.85*   HGB 11.6*   HCT 36.0*      , Troponin   Recent Labs   Lab 03/13/20  1420   TROPONINI 0.036*    and All pertinent lab results from the last 24 hours have been reviewed.    Assessment and Plan:     Active Diagnoses:    Diagnosis Date Noted POA    Bronchitis [J40] 03/13/2020 Yes      Problems Resolved During this Admission:       VTE Risk Mitigation (From admission, onward)         Ordered     IP VTE HIGH RISK PATIENT  Once      03/13/20 1601     Place sequential compression device  Until discontinued      03/13/20 1601              DX:  NEw onset Aflutter  COPD  HTN  HX DVT/PE  CKD    ECHO 2016:  EF 50-55%  1+TR  PAP 35mmHg    Willian Lara, NP for Dr Ibarra  Cardiology   [facility]    PLAN:try switch metoprolol to sotalol 80 mg bid, ekg qd  Continue same otherwise, including xarelto    I attest that I have personally seen and examined this patient masked and gowned. I have reviewed and discussed the management in detail as outlined above.

## 2020-03-13 NOTE — ED PROVIDER NOTES
[facility]                                                 Chief Complaint  84 y.o. female with Cough    History of Present Illness  Tonya Bucio presents to the emergency room with cough and congestion  Patient was sent from the local internal medicine clinic with hypoxia in clinic  Patient is not hypoxic care but has a bad cough with active wheezing noted   Long history of COPD the patient quit smoking in 1964 per ER interview now  Patient denies any sputum production, patient feels short of breath on history  ROS in the ER today shows no signs or symptoms suspicious for coronavirus     The history is provided by the patient   device was not used during this ER visit    Past Medical History   -- Acute bronchitis with asthma    -- Anemia due to stage 3 chronic kidney disease    -- Angina pectoris    -- Anticoagulant long-term use    -- Asthma    -- Back pain    -- Cancer    -- Chest pain, musculoskeletal    -- Chronic bronchitis    -- Class 3 obesity with serious comorbidity    -- Colon polyps    -- Gout, unspecified    -- History of cervical cancer    -- Hypothyroidism    -- Obesity    -- Osteoarthritis    -- Other and unspecified hyperlipidemia    -- PE (pulmonary embolism)    -- Renal manifestation of secondary diabetes mellitus    -- Thyroid disease    -- Trouble in sleeping    -- Type 2 diabetes mellitus with ophthalmic manifestations    -- Type 2 diabetes with peripheral circulatory disorder, controlled    -- Type II or unspecified type diabetes mellitus    -- Unspecified essential hypertension    -- Urinary incontinence    -- Uterine cancer       Surgeries:  Adenoids, cataract, hysterectomy, tonsils, salpingo oophorectomy  No Known Allergies     I have reviewed all of this patient's past medical, surgical, family, and social   histories as well as active allergies and medications documented in the  electronic medical record    Review of Systems and Physical Exam       Review of Systems  -- Constitution - no fever, denies fatigue, no weakness, no chills  -- Eyes - no tearing or redness, no visual disturbance  -- Ear, Nose - sneezing, nasal congestion and clear discharge   -- Mouth,Throat - sore throat, no toothache, normal voice, normal swallowing  -- Respiratory - cough and congestion, shortness of breath, no RICARDO  -- Cardiovascular - denies chest pain, no palpitations, denies claudication  -- Gastrointestinal - denies abdominal pain, nausea, vomiting, or diarrhea  -- Genitourinary - no dysuria, no hematuria, no flank pain, no bladder pain  -- Musculoskeletal - denies back pain, negative for trauma or injury  -- Neurological - no headache, denies weakness or seizure; no LOC  -- Skin - denies pallor, rash, or changes in skin. no hives or welts noted     BP (!) 156/76   Pulse 74   Temp 98.1 °F  Resp 20   SpO2 97%    Physical Exam  -- Nursing note and vitals reviewed  -- Constitutional: Appears well-developed and well-nourished  -- Head: Atraumatic. Normocephalic. No obvious abnormality  -- Eyes: Pupils are equal and reactive to light. Normal conjunctiva and lids  -- Nose: Nose normal in appearance, nares grossly normal. No discharge  -- Throat: Mucous membranes moist, pharynx normal, normal tonsils. No lesions   -- Ears: External ears and TM normal bilaterally. Normal hearing and no drainage  -- Neck: Normal range of motion. Neck supple. No masses, trachea midline  -- Cardiac: Normal rate, regular rhythm and normal heart sounds  -- Pulmonary: faint rhonchi at the bilateral bases with active wheezing   -- Abdominal: Soft, no tenderness. Normal bowel sounds. Normal liver edge  -- Musculoskeletal: Normal range of motion, no effusions. Joints stable   -- Neurological: No focal deficits. Showed good interaction with staff    Emergency Room Course      Lab Results     K 4.0      CO2 23   BUN 30 (H)   CREATININE 1.7 (H)    (H)   ALKPHOS 131   AST 13   ALT 14    BILITOT 0.7   ALBUMIN 2.7 (L)   PROT 8.1   WBC 19.85 (H)   HGB 11.6 (L)   HCT 36.0 (L)      CPK 23   CPK 23   CPKMB 0.6   TROPONINI 0.036 (H)   INR 1.1    (H)   LACTATE 2.5 (H)   TSH 2.197     EKG   -- The EKG findings showed chronic atrial flutter versus atrial fibrillation    Chest x-ray  -- peribronchial cuffing on chest x-ray consistent with bronchitis    Additional Work up  -- Blood cultures have also been drawn, results are pending  -- the patient tested negative for influenza   -- procalcitonin is currently pending    Medications Given  cefTRIAXone (ROCEPHIN) 1 g in dextrose 5 % 50 mL IVPB (has no administration in time range)   azithromycin 500 mg in dextrose 5 % 250 mL IVPB (ready to mix system) (has no administration in time range)   methylPREDNISolone sodium succinate injection 125 mg (has no administration in time range)   albuterol-ipratropium 2.5 mg-0.5 mg/3 mL nebulizer solution 3 mL (3 mLs Nebulization Given 3/13/20 1426)     ED Physician Management  -- Diagnosis management comments: 84 y.o. female with cough and congestion  -- patient was hypoxic and clinic and hypotensive with a low-grade temperature  -- sent immediately to the emergency room, active wheezing on ER presentation  -- patient quit smoking in 1964 with long history of COPD reported in the interview  -- patient has a chest x-ray shows bronchitis, no infiltrate appreciated on evaluation  -- patient has a [place] [id number] and elevated lactic acid on ER assessment  -- her clinical presentation is not consistent with sepsis but rather COPD bronchitis  -- patient be admitted for steroids antibiotics breathing treatment and minimal fluids    Diagnosis  -- The primary encounter diagnosis was Bronchitis.   -- A diagnosis of Cough was also pertinent to this visit.    Disposition and Plan  -- Disposition: admit  -- Condition: stable    This note is dictated on M*Modal word recognition program.  There are word recognition  mistakes that are occasionally missed on review.             David Guzmán MD  03/13/20 7505

## 2020-03-13 NOTE — CONSULTS
Discharge assessment completed via phone. I spoke with Ms. Castaneda. She states she lives at home with daughter, who helps her when needed. Patient uses walker and wheelchair at baseline. Patient admitted with Dx of bronchitis and questionable pneumonia. Patient educated on diagnosis for this admission, and patient verbalizes understanding. Discharge planning brochure and educational handout of what signs and symptoms to look for after discharge, and how to manage health at home, given to patient and family. Patient and family are encouraged to call with any questions or help the would need. Contact information given. CM will continue to follow patient throughout the transitions of care, and assist with any discharge needs.    Discharge plan:   Home with family.    [facility]       03/13/20 0943   Discharge Assessment   Assessment Type Discharge Planning Assessment   Confirmed/corrected address and phone number on facesheet? Yes   Assessment information obtained from? Patient;Medical Record   Expected Length of Stay (days) 3   Communicated expected length of stay with patient/caregiver yes   Prior to hospitilization cognitive status: Alert/Oriented   Prior to hospitalization functional status: Assistive Equipment   Current cognitive status: Alert/Oriented   Current Functional Status: Assistive Equipment;Needs Assistance  (Fall risk)   Facility Arrived From: Emergency room   Lives With child(yesenia), adult   Able to Return to Prior Arrangements yes   Is patient able to care for self after discharge? Yes   Who are your caregiver(s) and their phone number(s)? Mita Bucio (daughter)   [phone]   Patient's perception of discharge disposition home or selfcare   Readmission Within the Last 30 Days no previous admission in last 30 days   Patient currently being followed by outpatient case management? No   Patient currently receives any other outside agency services? No   Equipment Currently Used  at Home wheelchair;walker, rolling   Part D Coverage [employer]   Do you have any problems affording any of your prescribed medications? No   Is the patient taking medications as prescribed? yes   Does the patient have transportation home? Yes   Transportation Anticipated family or friend will provide   Dialysis Name and Scheduled days N/A   Does the patient receive services at the Coumadin Clinic? No   Discharge Plan A Home with family   Discharge Plan B Home Health   DME Needed Upon Discharge  none   Patient/Family in Agreement with Plan yes

## 2020-03-14 PROBLEM — I48.3 TYPICAL ATRIAL FLUTTER: Status: ACTIVE | Noted: 2020-03-14

## 2020-03-14 LAB
ALBUMIN SERPL BCP-MCNC: 2.4 G/DL (ref 3.5–5.2)
ALP SERPL-CCNC: 129 U/L (ref 55–135)
ALT SERPL W/O P-5'-P-CCNC: 17 U/L (ref 10–44)
ANION GAP SERPL CALC-SCNC: 12 MMOL/L (ref 8–16)
AST SERPL-CCNC: 14 U/L (ref 10–40)
BASOPHILS # BLD AUTO: 0.03 K/UL (ref 0–0.2)
BASOPHILS NFR BLD: 0.2 % (ref 0–1.9)
BILIRUB SERPL-MCNC: 0.4 MG/DL (ref 0.1–1)
BUN SERPL-MCNC: 31 MG/DL (ref 8–23)
CALCIUM SERPL-MCNC: 10.5 MG/DL (ref 8.7–10.5)
CHLORIDE SERPL-SCNC: 98 MMOL/L (ref 95–110)
CO2 SERPL-SCNC: 26 MMOL/L (ref 23–29)
CREAT SERPL-MCNC: 1.7 MG/DL (ref 0.5–1.4)
DIFFERENTIAL METHOD: ABNORMAL
EOSINOPHIL # BLD AUTO: 0 K/UL (ref 0–0.5)
EOSINOPHIL NFR BLD: 0 % (ref 0–8)
ERYTHROCYTE [DISTWIDTH] IN BLOOD BY AUTOMATED COUNT: 13.9 % (ref 11.5–14.5)
EST. GFR  (AFRICAN AMERICAN): 32 ML/MIN/1.73 M^2
EST. GFR  (NON AFRICAN AMERICAN): 27 ML/MIN/1.73 M^2
GLUCOSE SERPL-MCNC: 222 MG/DL (ref 70–110)
HCT VFR BLD AUTO: 33.7 % (ref 37–48.5)
HGB BLD-MCNC: 11 G/DL (ref 12–16)
IMM GRANULOCYTES # BLD AUTO: 0.19 K/UL (ref 0–0.04)
IMM GRANULOCYTES NFR BLD AUTO: 1.3 % (ref 0–0.5)
LYMPHOCYTES # BLD AUTO: 1.4 K/UL (ref 1–4.8)
LYMPHOCYTES NFR BLD: 9.3 % (ref 18–48)
MCH RBC QN AUTO: 30.6 PG (ref 27–31)
MCHC RBC AUTO-ENTMCNC: 32.6 G/DL (ref 32–36)
MCV RBC AUTO: 94 FL (ref 82–98)
MONOCYTES # BLD AUTO: 0.2 K/UL (ref 0.3–1)
MONOCYTES NFR BLD: 1.3 % (ref 4–15)
NEUTROPHILS # BLD AUTO: 13 K/UL (ref 1.8–7.7)
NEUTROPHILS NFR BLD: 87.9 % (ref 38–73)
NRBC BLD-RTO: 0 /100 WBC
PLATELET # BLD AUTO: 334 K/UL (ref 150–350)
PMV BLD AUTO: 10.7 FL (ref 9.2–12.9)
POCT GLUCOSE: 194 MG/DL (ref 70–110)
POCT GLUCOSE: 203 MG/DL (ref 70–110)
POCT GLUCOSE: 211 MG/DL (ref 70–110)
POCT GLUCOSE: 226 MG/DL (ref 70–110)
POTASSIUM SERPL-SCNC: 4.3 MMOL/L (ref 3.5–5.1)
PROT SERPL-MCNC: 7.6 G/DL (ref 6–8.4)
RBC # BLD AUTO: 3.59 M/UL (ref 4–5.4)
SODIUM SERPL-SCNC: 136 MMOL/L (ref 136–145)
TROPONIN I SERPL DL<=0.01 NG/ML-MCNC: 0.02 NG/ML (ref 0–0.03)
TROPONIN I SERPL DL<=0.01 NG/ML-MCNC: 0.03 NG/ML (ref 0–0.03)
WBC # BLD AUTO: 14.77 K/UL (ref 3.9–12.7)

## 2020-03-14 PROCEDURE — 63600175 PHARM REV CODE 636 W HCPCS: Mod: HCNC | Performed by: SURGERY

## 2020-03-14 PROCEDURE — 11000001 HC ACUTE MED/SURG PRIVATE ROOM: Mod: HCNC

## 2020-03-14 PROCEDURE — 25000003 PHARM REV CODE 250: Mod: HCNC | Performed by: NURSE PRACTITIONER

## 2020-03-14 PROCEDURE — 94640 AIRWAY INHALATION TREATMENT: CPT | Mod: HCNC

## 2020-03-14 PROCEDURE — 27000221 HC OXYGEN, UP TO 24 HOURS: Mod: HCNC

## 2020-03-14 PROCEDURE — 80053 COMPREHEN METABOLIC PANEL: CPT | Mod: HCNC

## 2020-03-14 PROCEDURE — 99233 SBSQ HOSP IP/OBS HIGH 50: CPT | Mod: HCNC,,, | Performed by: FAMILY MEDICINE

## 2020-03-14 PROCEDURE — 85025 COMPLETE CBC W/AUTO DIFF WBC: CPT | Mod: HCNC

## 2020-03-14 PROCEDURE — 25000242 PHARM REV CODE 250 ALT 637 W/ HCPCS: Mod: HCNC | Performed by: SURGERY

## 2020-03-14 PROCEDURE — 93005 ELECTROCARDIOGRAM TRACING: CPT | Mod: HCNC

## 2020-03-14 PROCEDURE — 36415 COLL VENOUS BLD VENIPUNCTURE: CPT | Mod: HCNC

## 2020-03-14 PROCEDURE — 25000003 PHARM REV CODE 250: Mod: HCNC | Performed by: INTERNAL MEDICINE

## 2020-03-14 PROCEDURE — 94761 N-INVAS EAR/PLS OXIMETRY MLT: CPT | Mod: HCNC

## 2020-03-14 PROCEDURE — 99233 PR SUBSEQUENT HOSPITAL CARE,LEVL III: ICD-10-PCS | Mod: HCNC,,, | Performed by: FAMILY MEDICINE

## 2020-03-14 PROCEDURE — 25000003 PHARM REV CODE 250: Mod: HCNC | Performed by: SURGERY

## 2020-03-14 PROCEDURE — 84484 ASSAY OF TROPONIN QUANT: CPT | Mod: HCNC

## 2020-03-14 RX ORDER — LEVALBUTEROL 1.25 MG/.5ML
1.25 SOLUTION, CONCENTRATE RESPIRATORY (INHALATION) EVERY 6 HOURS
Status: DISCONTINUED | OUTPATIENT
Start: 2020-03-15 | End: 2020-03-15 | Stop reason: HOSPADM

## 2020-03-14 RX ADMIN — INSULIN ASPART 2 UNITS: 100 INJECTION, SOLUTION INTRAVENOUS; SUBCUTANEOUS at 06:03

## 2020-03-14 RX ADMIN — INSULIN ASPART 4 UNITS: 100 INJECTION, SOLUTION INTRAVENOUS; SUBCUTANEOUS at 05:03

## 2020-03-14 RX ADMIN — ALLOPURINOL 300 MG: 100 TABLET ORAL at 09:03

## 2020-03-14 RX ADMIN — LEVALBUTEROL 1.25 MG: 1.25 SOLUTION, CONCENTRATE RESPIRATORY (INHALATION) at 08:03

## 2020-03-14 RX ADMIN — INSULIN ASPART 1 UNITS: 100 INJECTION, SOLUTION INTRAVENOUS; SUBCUTANEOUS at 08:03

## 2020-03-14 RX ADMIN — ROSUVASTATIN CALCIUM 20 MG: 10 TABLET, FILM COATED ORAL at 08:03

## 2020-03-14 RX ADMIN — Medication 400 MG: at 08:03

## 2020-03-14 RX ADMIN — AZITHROMYCIN MONOHYDRATE 500 MG: 500 INJECTION, POWDER, LYOPHILIZED, FOR SOLUTION INTRAVENOUS at 04:03

## 2020-03-14 RX ADMIN — RIVAROXABAN 15 MG: 15 TABLET, FILM COATED ORAL at 04:03

## 2020-03-14 RX ADMIN — LEVALBUTEROL 1.25 MG: 1.25 SOLUTION, CONCENTRATE RESPIRATORY (INHALATION) at 06:03

## 2020-03-14 RX ADMIN — PANTOPRAZOLE SODIUM 40 MG: 40 TABLET, DELAYED RELEASE ORAL at 09:03

## 2020-03-14 RX ADMIN — Medication 400 MG: at 09:03

## 2020-03-14 RX ADMIN — SOTALOL HYDROCHLORIDE 80 MG: 80 TABLET ORAL at 09:03

## 2020-03-14 RX ADMIN — LOSARTAN POTASSIUM 50 MG: 50 TABLET, FILM COATED ORAL at 09:03

## 2020-03-14 RX ADMIN — SOTALOL HYDROCHLORIDE 80 MG: 80 TABLET ORAL at 08:03

## 2020-03-14 RX ADMIN — INSULIN ASPART 4 UNITS: 100 INJECTION, SOLUTION INTRAVENOUS; SUBCUTANEOUS at 11:03

## 2020-03-14 RX ADMIN — LEVOTHYROXINE SODIUM 75 MCG: 75 TABLET ORAL at 06:03

## 2020-03-14 RX ADMIN — CEFTRIAXONE 1 G: 1 INJECTION, SOLUTION INTRAVENOUS at 03:03

## 2020-03-14 NOTE — PLAN OF CARE
Patient compliant with Plan of Care:  Maintain Pain Regimen; No c/o Pain noted this shift.  Monitor Breathing Status; Oxygen saturation 94-97% on room air; Bilateral Breath sounds auscultated and diminished RLL.  shortness of breath noted upon exertion.  Monitor Vital Signs;  Vital signs stable this shift.  Monitor Labs; Lactate tonight 2.3; Troponin level 0.028.  Administer Sotalol as ordered; Sotalol 80mg given tonight.  Administer IV fluids as ordered;.0.9% Normal Saline at 50cc/hr maintained.  Maintain I/O's; Maintained as ordered and documented.  Administer IV antibiotics as ordered;  Rocephin and Azithromycin given on Day shift.  Monitor Cardiac Rhythm; Patient on Tele in A-Flutter with Ventricular rate of 72-95/min.  Fall Precautions; Maintain Patient Safety; No falls or injury noted this shift; Bed Alarm in use; Family at bedside.

## 2020-03-14 NOTE — ASSESSMENT & PLAN NOTE
Supplemental O2 via nasal canula; titrate O2 saturation to >92%.   Stop  Solumedrol because of hyperglycemia.    Obtain Pulmonary consultation.   Continue beta 2 agonist bronchodilator treatments.   Continue IV antibiotics azithromycin and Rocephin.  Continue routine medications as before.

## 2020-03-14 NOTE — ASSESSMENT & PLAN NOTE
Cardiology consulted.  Heart rate under good control  Metoprolol discontinued  Sotalol 80 mg p.o. b.i.d.  Magnesium 40 mg p.o. b.i.d.  Losartan 50 mg p.o. daily  Xarelto 15 mg p.o. daily

## 2020-03-14 NOTE — PROGRESS NOTES
[facility] Medicine  Progress Note    Patient Name: Tonya Bucio  MRN: [id number]  Patient Class: IP- Inpatient   Admission Date: 3/13/2020  Length of Stay: 1 days  Attending Physician: Ata Negrete MD  Primary Care Provider: Tasha Atkins MD        Subjective:     Principal Problem:COPD exacerbation        HPI:  Tonya Bucio is a 84 y.o. female  Well known to me from clinic with PMH of COPD, pulmonary embolisim, DM, CAD, hypothyroidism presented with   Cough /wheezing for 5 days .  Cough: Patient complains of dyspnea, productive cough and wheezing.  Symptoms began 5 days ago.  The cough is non-productive, without wheezing, dyspnea or hemoptysis, productive of clear sputum and is aggravated by cold air and reclining position Associated symptoms include:change in voice and shortness of breath. Patient does not have new pets. Patient does have a history of asthma. Patient does have a history of environmental allergens. Patient does not have recent travel.         Overview/Hospital Course:  Patient is still coughing and wheezing.  She is feeling better.  She is receiving breathing treatments 4 times daily.  She is tolerating her is a through my sin and Rocephin.  Cardiology evaluated patient for atrial flutter.  They changed her metoprolol to sotalol 80 mg twice daily.  They added magnesium 400 mg twice daily as well.  She is currently taking Xarelto 15 mg daily for anticoagulation.  Cardiology continued losartan 50 mg daily    Past Medical History:   Diagnosis Date    Acute bronchitis with asthma     Anemia due to stage 3 chronic kidney disease     Angina pectoris     Anticoagulant long-term use     Asthma     Back pain     Cancer     Chest pain, musculoskeletal 7/16/2013    Chronic bronchitis     Class 3 obesity with serious comorbidity and body mass index (BMI) of 45.0 to 49.9 in adult 11/17/2016    Colon polyps     COPD exacerbation 3/13/2020    Gout,  unspecified     History of cervical cancer     Hypothyroidism     Obesity     Osteoarthritis     Other and unspecified hyperlipidemia     PE (pulmonary embolism)     Renal manifestation of secondary diabetes mellitus     Thyroid disease     Trouble in sleeping     Type 2 diabetes mellitus with ophthalmic manifestations     Type 2 diabetes with peripheral circulatory disorder, controlled     Type II or unspecified type diabetes mellitus without mention of complication, uncontrolled     States everything okay-previous dx    Unspecified essential hypertension     Urinary incontinence     Uterine cancer        Past Surgical History:   Procedure Laterality Date    ADENOIDECTOMY      EYE SURGERY Right     right eye cataract    HYSTERECTOMY  2008    LIZ-BSO    tonsilectomy      TONSILLECTOMY  1945    TOTAL ABDOMINAL HYSTERECTOMY  2008    TOTAL ABDOMINAL HYSTERECTOMY W/ BILATERAL SALPINGOOPHORECTOMY  2008       Review of patient's allergies indicates:  No Known Allergies    No current facility-administered medications on file prior to encounter.      Current Outpatient Medications on File Prior to Encounter   Medication Sig    allopurinol (ZYLOPRIM) 300 MG tablet TAKE 1 TABLET BY MOUTH ONCE DAILY    hydroCHLOROthiazide (HYDRODIURIL) 12.5 MG Tab Take 12.5 mg by mouth once daily.    HYDROcodone-acetaminophen (NORCO) 7.5-325 mg per tablet Take 1 tablet by mouth every 24 hours as needed for Pain.    levothyroxine (SYNTHROID) 75 MCG tablet TAKE 1 TABLET BY MOUTH ONCE DAILY    losartan (COZAAR) 50 MG tablet Take 50 mg by mouth once daily.    magnesium oxide (MAG-OX) 400 mg tablet Take 1 tablet (400 mg total) by mouth 2 (two) times daily.    metoprolol tartrate (LOPRESSOR) 50 MG tablet TAKE 1 TABLET BY MOUTH TWICE DAILY    omega-3 acid ethyl esters (LOVAZA) 1 gram capsule TAKE 1 CAPSULE TWICE DAILY    rosuvastatin (CRESTOR) 20 MG tablet TAKE 1 TABLET BY MOUTH ONCE DAILY    SENSIPAR 60 mg Tab      XARELTO 15 mg Tab every evening.     diabetic supplies, miscellan. [facility] TRUE METRIX GLUCOMETER. USE AS DIRECTED TO TEST BLOOD SUGAR ONCE DAILY    diabetic supplies, miscellan. Misc TRUE METRIX TEST STRIPS. USE AS DIRECTED TO TEST BLOOD SUGAR DAILY.    diabetic supplies, miscellan. [facility] LANCETS. USE AS DIRECTED TO TEST BLOOD SUGAR DAILY.    hydrocortisone 2.5 % cream Apply topically 2 (two) times daily.    meclizine (ANTIVERT) 12.5 mg tablet Take 1 tablet (12.5 mg total) by mouth 3 (three) times daily as needed for Dizziness.    wheelchair Melly Use as directed     Family History     Problem Relation (Age of Onset)    Anemia Daughter    Arthritis Father, Daughter, Daughter, Daughter, Daughter    Cancer Sister, Brother    Diabetes Brother, Daughter, Daughter    Glaucoma Daughter    Heart disease Mother, Brother, Brother    Hyperlipidemia Brother    Liver disease Daughter    No Known Problems Son, Son    Stroke Brother, Son        Tobacco Use    Smoking status: Former Smoker     Packs/day: 0.33     Years: 13.00     Pack years: 4.29     Types: Cigarettes     Start date: 1951     Last attempt to quit: 1964     Years since quittin.6    Smokeless tobacco: Never Used   Substance and Sexual Activity    Alcohol use: No    Drug use: No    Sexual activity: Never     Birth control/protection: Surgical     Comment:      Review of Systems   Constitutional: Positive for fatigue. Negative for appetite change, chills and fever.   HENT: Negative for congestion, ear discharge, ear pain, rhinorrhea, sinus pressure and sore throat.    Respiratory: Positive for cough, shortness of breath and wheezing. Negative for choking and chest tightness.         With coughing and increased activity   Cardiovascular: Negative for chest pain and palpitations.   Gastrointestinal: Negative for constipation, diarrhea, nausea and vomiting.   Musculoskeletal: Positive for arthralgias and gait problem. Negative for joint  swelling and myalgias.   Skin: Negative for rash and wound.   Neurological: Negative for dizziness, syncope, weakness, light-headedness and numbness.     Objective:     Vital Signs (Most Recent):  Temp: 97.1 °F (36.2 °C) (03/14/20 1118)  Pulse: 82 (03/14/20 1120)  Resp: 20 (03/14/20 1120)  BP: 119/64 (03/14/20 1120)  SpO2: (!) 94 % (03/14/20 1120) Vital Signs (24h Range):  Temp:  [97 °F (36.1 °C)-99.7 °F (37.6 °C)] 97.1 °F (36.2 °C)  Pulse:  [] 82  Resp:  [14-22] 20  SpO2:  [90 %-97 %] 94 %  BP: ()/(52-76) 119/64     Weight: 123.9 kg (273 lb 2.4 oz)  Body mass index is 41.53 kg/m².    Physical Exam   Constitutional: She is oriented to person, place, and time. She appears well-developed and well-nourished.   HENT:   Head: Normocephalic and atraumatic.   Right Ear: External ear normal.   Left Ear: External ear normal.   Nose: Nose normal.   Mouth/Throat: Oropharynx is clear and moist.   Cardiovascular: Normal rate, regular rhythm and normal heart sounds.   Pulmonary/Chest: Effort normal. She has wheezes. She has rales.   Abdominal: Soft. Bowel sounds are normal.   Musculoskeletal: She exhibits edema (1+ edema).   Neurological: She is alert and oriented to person, place, and time.   Skin: Skin is warm and dry.   Psychiatric: She has a normal mood and affect.   Nursing note and vitals reviewed.          Significant Labs:   CBC:   Recent Labs   Lab 03/13/20  1420 03/14/20  0156   WBC 19.85* 14.77*   HGB 11.6* 11.0*   HCT 36.0* 33.7*    334     CMP:   Recent Labs   Lab 03/13/20  1420 03/14/20  0156    136   K 4.0 4.3    98   CO2 23 26   * 222*   BUN 30* 31*   CREATININE 1.7* 1.7*   CALCIUM 10.9* 10.5   PROT 8.1 7.6   ALBUMIN 2.7* 2.4*   BILITOT 0.7 0.4   ALKPHOS 131 129   AST 13 14   ALT 14 17   ANIONGAP 16 12   EGFRNONAA 27* 27*     Lactic Acid:   Recent Labs   Lab 03/13/20  1420 03/13/20  1802 03/13/20 2055   LACTATE 2.5* 1.7 2.3*     Troponin:   Recent Labs   Lab 03/13/20 2055  03/14/20  0156 03/14/20  0849   TROPONINI 0.028* 0.022 0.021     TSH:   Recent Labs   Lab 02/06/20  1024   TSH 2.197     Urine Studies:   Recent Labs   Lab 03/13/20  1825   COLORU Yellow   APPEARANCEUA Hazy*   PHUR 6.0   SPECGRAV 1.025   PROTEINUA 2+*   GLUCUA Negative   KETONESU Negative   BILIRUBINUA Negative   OCCULTUA 3+*   NITRITE Negative   UROBILINOGEN Negative   LEUKOCYTESUR Trace*   RBCUA 25*   WBCUA 11*   BACTERIA Few*   HYALINECASTS 6*     BNP  Recent Labs   Lab 03/13/20  1420   *         Significant Imaging: CXR: I have reviewed all pertinent results/findings within the past 24 hours and my personal findings are:  see below     The heart is mildly enlarged.  There is central pulmonary vascular congestion.  There is peribronchial cuffing suggesting a bronchitis.  No evidence for consolidation to suggest a pneumonia.  Skeletal structures are intact.              Assessment/Plan:      * COPD exacerbation  Supplemental O2 via nasal canula; titrate O2 saturation to >92%.   Stop  Solumedrol because of hyperglycemia.    Obtain Pulmonary consultation.   Continue beta 2 agonist bronchodilator treatments.   Continue IV antibiotics azithromycin and Rocephin.  Continue routine medications as before.           Typical atrial flutter  Cardiology consulted.  Heart rate under good control  Metoprolol discontinued  Sotalol 80 mg p.o. b.i.d.  Magnesium 40 mg p.o. b.i.d.  Losartan 50 mg p.o. daily  Xarelto 15 mg p.o. daily      Bronchitis  Continue zithromax and Rocephin       Morbid obesity with body mass index (BMI) of 40.0 to 44.9 in adult    # The patient is asked to make an attempt to improve diet and exercise patterns to aid in medical management of this problem.     # Eat  5 small meals a day.     # Cut out high carbohydrate  foods : bread, rice, pasta, potatoes.     # Exercise/walk 5x/week for at least 30-45  minutes.            History of pulmonary embolus (PE)  Resume xarelto       Controlled type 2  diabetes mellitus with diabetic cataract, without long-term current use of insulin  Lab Results   Component Value Date    HGBA1C 6.7 (H) 02/06/2020       Correction scale     Hyperlipidemia associated with type 2 diabetes mellitus  Lab Results   Component Value Date    LDLCALC 51.4 (L) 02/06/2020     Resume statin     Hypothyroidism  Lab Results   Component Value Date    TSH 2.197 02/06/2020   Resume levothyroxine      Chronic gout of multiple sites  Resume allopurinol          VTE Risk Mitigation (From admission, onward)         Ordered     rivaroxaban tablet 15 mg  With dinner      03/13/20 1650     IP VTE HIGH RISK PATIENT  Once      03/13/20 1601     Place sequential compression device  Until discontinued      03/13/20 1601                      Ata Negrete MD  Department of Hospital Medicine   [facility]

## 2020-03-14 NOTE — HPI
Tonya Bucio is a 84 y.o. female  Well known to me from clinic with PMH of COPD, pulmonary embolisim, DM, CAD, hypothyroidism presented with   Cough /wheezing for 5 days .  Cough: Patient complains of dyspnea, productive cough and wheezing.  Symptoms began 5 days ago.  The cough is non-productive, without wheezing, dyspnea or hemoptysis, productive of clear sputum and is aggravated by cold air and reclining position Associated symptoms include:change in voice and shortness of breath. Patient does not have new pets. Patient does have a history of asthma. Patient does have a history of environmental allergens. Patient does not have recent travel.

## 2020-03-14 NOTE — ASSESSMENT & PLAN NOTE
Supplemental O2 via nasal canula; titrate O2 saturation to >92%.   Start Solumedrol Pulmonary consultation.   Continue beta 2 agonist bronchodilator treatments.   Continue IV antibiotics.  Continue routine medications as before.

## 2020-03-14 NOTE — SUBJECTIVE & OBJECTIVE
Past Medical History:   Diagnosis Date    Acute bronchitis with asthma     Anemia due to stage 3 chronic kidney disease     Angina pectoris     Anticoagulant long-term use     Asthma     Back pain     Cancer     Chest pain, musculoskeletal 7/16/2013    Chronic bronchitis     Class 3 obesity with serious comorbidity and body mass index (BMI) of 45.0 to 49.9 in adult 11/17/2016    Colon polyps     Gout, unspecified     History of cervical cancer     Hypothyroidism     Obesity     Osteoarthritis     Other and unspecified hyperlipidemia     PE (pulmonary embolism)     Renal manifestation of secondary diabetes mellitus     Thyroid disease     Trouble in sleeping     Type 2 diabetes mellitus with ophthalmic manifestations     Type 2 diabetes with peripheral circulatory disorder, controlled     Type II or unspecified type diabetes mellitus without mention of complication, uncontrolled     States everything okay-previous dx    Unspecified essential hypertension     Urinary incontinence     Uterine cancer        Past Surgical History:   Procedure Laterality Date    ADENOIDECTOMY      EYE SURGERY Right     right eye cataract    HYSTERECTOMY  2008    LIZ-BSO    tonsilectomy      TONSILLECTOMY  1945    TOTAL ABDOMINAL HYSTERECTOMY  2008    TOTAL ABDOMINAL HYSTERECTOMY W/ BILATERAL SALPINGOOPHORECTOMY  2008       Review of patient's allergies indicates:  No Known Allergies    No current facility-administered medications on file prior to encounter.      Current Outpatient Medications on File Prior to Encounter   Medication Sig    allopurinol (ZYLOPRIM) 300 MG tablet TAKE 1 TABLET BY MOUTH ONCE DAILY    hydroCHLOROthiazide (HYDRODIURIL) 12.5 MG Tab Take 12.5 mg by mouth once daily.    HYDROcodone-acetaminophen (NORCO) 7.5-325 mg per tablet Take 1 tablet by mouth every 24 hours as needed for Pain.    levothyroxine (SYNTHROID) 75 MCG tablet TAKE 1 TABLET BY MOUTH ONCE DAILY    losartan (COZAAR)  50 MG tablet Take 50 mg by mouth once daily.    magnesium oxide (MAG-OX) 400 mg tablet Take 1 tablet (400 mg total) by mouth 2 (two) times daily.    metoprolol tartrate (LOPRESSOR) 50 MG tablet TAKE 1 TABLET BY MOUTH TWICE DAILY    omega-3 acid ethyl esters (LOVAZA) 1 gram capsule TAKE 1 CAPSULE TWICE DAILY    rosuvastatin (CRESTOR) 20 MG tablet TAKE 1 TABLET BY MOUTH ONCE DAILY    SENSIPAR 60 mg Tab     XARELTO 15 mg Tab every evening.     diabetic supplies, miscellan. [facility] TRUE METRIX GLUCOMETER. USE AS DIRECTED TO TEST BLOOD SUGAR ONCE DAILY    diabetic supplies, miscellan. Misc TRUE METRIX TEST STRIPS. USE AS DIRECTED TO TEST BLOOD SUGAR DAILY.    diabetic supplies, miscellan. [facility] [place] LANCETS. USE AS DIRECTED TO TEST BLOOD SUGAR DAILY.    hydrocortisone 2.5 % cream Apply topically 2 (two) times daily.    meclizine (ANTIVERT) 12.5 mg tablet Take 1 tablet (12.5 mg total) by mouth 3 (three) times daily as needed for Dizziness.    wheelchair Melly Use as directed     Family History     Problem Relation (Age of Onset)    Anemia Daughter    Arthritis Father, Daughter, Daughter, Daughter, Daughter    Cancer Sister, Brother    Diabetes Brother, Daughter, Daughter    Glaucoma Daughter    Heart disease Mother, Brother, Brother    Hyperlipidemia Brother    Liver disease Daughter    No Known Problems Son, Son    Stroke Brother, Son        Tobacco Use    Smoking status: Former Smoker     Packs/day: 0.33     Years: 13.00     Pack years: 4.29     Types: Cigarettes     Start date: 1951     Last attempt to quit: 1964     Years since quittin.6    Smokeless tobacco: Never Used   Substance and Sexual Activity    Alcohol use: No    Drug use: No    Sexual activity: Never     Birth control/protection: Surgical     Comment:      Review of Systems   Constitutional: Positive for fatigue. Negative for appetite change, chills and fever.   HENT: Negative for congestion, ear discharge, ear pain, rhinorrhea,  sinus pressure and sore throat.    Respiratory: Positive for cough, shortness of breath and wheezing. Negative for choking and chest tightness.         With coughing and increased activity   Cardiovascular: Negative for chest pain and palpitations.   Gastrointestinal: Negative for constipation, diarrhea, nausea and vomiting.   Musculoskeletal: Positive for arthralgias and gait problem. Negative for joint swelling and myalgias.   Skin: Negative for rash and wound.   Neurological: Negative for dizziness, syncope, weakness, light-headedness and numbness.     Objective:     Vital Signs (Most Recent):  Temp: 98.9 °F (37.2 °C) (03/13/20 1910)  Pulse: 85 (03/13/20 2007)  Resp: 16 (03/13/20 2007)  BP: (!) 143/63 (03/13/20 1910)  SpO2: 95 % (03/13/20 2007) Vital Signs (24h Range):  Temp:  [97.4 °F (36.3 °C)-99.7 °F (37.6 °C)] 98.9 °F (37.2 °C)  Pulse:  [74-94] 85  Resp:  [16-22] 16  SpO2:  [93 %-97 %] 95 %  BP: ()/(52-76) 143/63     Weight: 123.9 kg (273 lb 2.4 oz)  Body mass index is 41.53 kg/m².    Physical Exam   Constitutional: She is oriented to person, place, and time. She appears well-developed and well-nourished.   HENT:   Head: Normocephalic and atraumatic.   Right Ear: External ear normal.   Left Ear: External ear normal.   Nose: Nose normal.   Mouth/Throat: Oropharynx is clear and moist.   Cardiovascular: Normal rate, regular rhythm and normal heart sounds.   Pulmonary/Chest: Effort normal. She has wheezes. She has rales.   Abdominal: Soft. Bowel sounds are normal.   Neurological: She is alert and oriented to person, place, and time.   Skin: Skin is warm and dry.   Psychiatric: She has a normal mood and affect.   Nursing note and vitals reviewed.          Significant Labs:   CBC:   Recent Labs   Lab 03/13/20  1420   WBC 19.85*   HGB 11.6*   HCT 36.0*        CMP:   Recent Labs   Lab 03/13/20  1420      K 4.0      CO2 23   *   BUN 30*   CREATININE 1.7*   CALCIUM 10.9*   PROT 8.1    ALBUMIN 2.7*   BILITOT 0.7   ALKPHOS 131   AST 13   ALT 14   ANIONGAP 16   EGFRNONAA 27*     Lactic Acid:   Recent Labs   Lab 03/13/20  1420 03/13/20  1802 03/13/20 2055   LACTATE 2.5* 1.7 2.3*     Troponin:   Recent Labs   Lab 03/13/20  1420 03/13/20  2055   TROPONINI 0.036* 0.028*     TSH:   Recent Labs   Lab 02/06/20  1024   TSH 2.197     Urine Studies:   Recent Labs   Lab 03/13/20  1825   COLORU Yellow   APPEARANCEUA Hazy*   PHUR 6.0   SPECGRAV 1.025   PROTEINUA 2+*   GLUCUA Negative   KETONESU Negative   BILIRUBINUA Negative   OCCULTUA 3+*   NITRITE Negative   UROBILINOGEN Negative   LEUKOCYTESUR Trace*   RBCUA 25*   WBCUA 11*   BACTERIA Few*   HYALINECASTS 6*     BNP  Recent Labs   Lab 03/13/20  1420   *         Significant Imaging: CXR: I have reviewed all pertinent results/findings within the past 24 hours and my personal findings are:  see below     The heart is mildly enlarged.  There is central pulmonary vascular congestion.  There is peribronchial cuffing suggesting a bronchitis.  No evidence for consolidation to suggest a pneumonia.  Skeletal structures are intact.

## 2020-03-14 NOTE — SUBJECTIVE & OBJECTIVE
Past Medical History:   Diagnosis Date    Acute bronchitis with asthma     Anemia due to stage 3 chronic kidney disease     Angina pectoris     Anticoagulant long-term use     Asthma     Back pain     Cancer     Chest pain, musculoskeletal 7/16/2013    Chronic bronchitis     Class 3 obesity with serious comorbidity and body mass index (BMI) of 45.0 to 49.9 in adult 11/17/2016    Colon polyps     COPD exacerbation 3/13/2020    Gout, unspecified     History of cervical cancer     Hypothyroidism     Obesity     Osteoarthritis     Other and unspecified hyperlipidemia     PE (pulmonary embolism)     Renal manifestation of secondary diabetes mellitus     Thyroid disease     Trouble in sleeping     Type 2 diabetes mellitus with ophthalmic manifestations     Type 2 diabetes with peripheral circulatory disorder, controlled     Type II or unspecified type diabetes mellitus without mention of complication, uncontrolled     States everything okay-previous dx    Unspecified essential hypertension     Urinary incontinence     Uterine cancer        Past Surgical History:   Procedure Laterality Date    ADENOIDECTOMY      EYE SURGERY Right     right eye cataract    HYSTERECTOMY  2008    LIZ-BSO    tonsilectomy      TONSILLECTOMY  1945    TOTAL ABDOMINAL HYSTERECTOMY  2008    TOTAL ABDOMINAL HYSTERECTOMY W/ BILATERAL SALPINGOOPHORECTOMY  2008       Review of patient's allergies indicates:  No Known Allergies    No current facility-administered medications on file prior to encounter.      Current Outpatient Medications on File Prior to Encounter   Medication Sig    allopurinol (ZYLOPRIM) 300 MG tablet TAKE 1 TABLET BY MOUTH ONCE DAILY    hydroCHLOROthiazide (HYDRODIURIL) 12.5 MG Tab Take 12.5 mg by mouth once daily.    HYDROcodone-acetaminophen (NORCO) 7.5-325 mg per tablet Take 1 tablet by mouth every 24 hours as needed for Pain.    levothyroxine (SYNTHROID) 75 MCG tablet TAKE 1 TABLET BY MOUTH  ONCE DAILY    losartan (COZAAR) 50 MG tablet Take 50 mg by mouth once daily.    magnesium oxide (MAG-OX) 400 mg tablet Take 1 tablet (400 mg total) by mouth 2 (two) times daily.    metoprolol tartrate (LOPRESSOR) 50 MG tablet TAKE 1 TABLET BY MOUTH TWICE DAILY    omega-3 acid ethyl esters (LOVAZA) 1 gram capsule TAKE 1 CAPSULE TWICE DAILY    rosuvastatin (CRESTOR) 20 MG tablet TAKE 1 TABLET BY MOUTH ONCE DAILY    SENSIPAR 60 mg Tab     XARELTO 15 mg Tab every evening.     diabetic supplies, miscellan. [facility] TRUE METRIX GLUCOMETER. USE AS DIRECTED TO TEST BLOOD SUGAR ONCE DAILY    diabetic supplies, miscellan. Misc TRUE METRIX TEST STRIPS. USE AS DIRECTED TO TEST BLOOD SUGAR DAILY.    diabetic supplies, miscellan. [facility] [place] LANCETS. USE AS DIRECTED TO TEST BLOOD SUGAR DAILY.    hydrocortisone 2.5 % cream Apply topically 2 (two) times daily.    meclizine (ANTIVERT) 12.5 mg tablet Take 1 tablet (12.5 mg total) by mouth 3 (three) times daily as needed for Dizziness.    wheelchair Melly Use as directed     Family History     Problem Relation (Age of Onset)    Anemia Daughter    Arthritis Father, Daughter, Daughter, Daughter, Daughter    Cancer Sister, Brother    Diabetes Brother, Daughter, Daughter    Glaucoma Daughter    Heart disease Mother, Brother, Brother    Hyperlipidemia Brother    Liver disease Daughter    No Known Problems Son, Son    Stroke Brother, Son        Tobacco Use    Smoking status: Former Smoker     Packs/day: 0.33     Years: 13.00     Pack years: 4.29     Types: Cigarettes     Start date: 1951     Last attempt to quit: 1964     Years since quittin.6    Smokeless tobacco: Never Used   Substance and Sexual Activity    Alcohol use: No    Drug use: No    Sexual activity: Never     Birth control/protection: Surgical     Comment:      Review of Systems   Constitutional: Positive for fatigue. Negative for appetite change, chills and fever.   HENT: Negative for congestion, ear  discharge, ear pain, rhinorrhea, sinus pressure and sore throat.    Respiratory: Positive for cough, shortness of breath and wheezing. Negative for choking and chest tightness.         With coughing and increased activity   Cardiovascular: Negative for chest pain and palpitations.   Gastrointestinal: Negative for constipation, diarrhea, nausea and vomiting.   Musculoskeletal: Positive for arthralgias and gait problem. Negative for joint swelling and myalgias.   Skin: Negative for rash and wound.   Neurological: Negative for dizziness, syncope, weakness, light-headedness and numbness.     Objective:     Vital Signs (Most Recent):  Temp: 97.1 °F (36.2 °C) (03/14/20 1118)  Pulse: 82 (03/14/20 1120)  Resp: 20 (03/14/20 1120)  BP: 119/64 (03/14/20 1120)  SpO2: (!) 94 % (03/14/20 1120) Vital Signs (24h Range):  Temp:  [97 °F (36.1 °C)-99.7 °F (37.6 °C)] 97.1 °F (36.2 °C)  Pulse:  [] 82  Resp:  [14-22] 20  SpO2:  [90 %-97 %] 94 %  BP: ()/(52-76) 119/64     Weight: 123.9 kg (273 lb 2.4 oz)  Body mass index is 41.53 kg/m².    Physical Exam   Constitutional: She is oriented to person, place, and time. She appears well-developed and well-nourished.   HENT:   Head: Normocephalic and atraumatic.   Right Ear: External ear normal.   Left Ear: External ear normal.   Nose: Nose normal.   Mouth/Throat: Oropharynx is clear and moist.   Cardiovascular: Normal rate, regular rhythm and normal heart sounds.   Pulmonary/Chest: Effort normal. She has wheezes. She has rales.   Abdominal: Soft. Bowel sounds are normal.   Musculoskeletal: She exhibits edema (1+ edema).   Neurological: She is alert and oriented to person, place, and time.   Skin: Skin is warm and dry.   Psychiatric: She has a normal mood and affect.   Nursing note and vitals reviewed.          Significant Labs:   CBC:   Recent Labs   Lab 03/13/20  1420 03/14/20  0156   WBC 19.85* 14.77*   HGB 11.6* 11.0*   HCT 36.0* 33.7*    334     CMP:   Recent Labs   Lab  03/13/20  1420 03/14/20  0156    136   K 4.0 4.3    98   CO2 23 26   * 222*   BUN 30* 31*   CREATININE 1.7* 1.7*   CALCIUM 10.9* 10.5   PROT 8.1 7.6   ALBUMIN 2.7* 2.4*   BILITOT 0.7 0.4   ALKPHOS 131 129   AST 13 14   ALT 14 17   ANIONGAP 16 12   EGFRNONAA 27* 27*     Lactic Acid:   Recent Labs   Lab 03/13/20  1420 03/13/20  1802 03/13/20  2055   LACTATE 2.5* 1.7 2.3*     Troponin:   Recent Labs   Lab 03/13/20  2055 03/14/20  0156 03/14/20  0849   TROPONINI 0.028* 0.022 0.021     TSH:   Recent Labs   Lab 02/06/20  1024   TSH 2.197     Urine Studies:   Recent Labs   Lab 03/13/20  1825   COLORU Yellow   APPEARANCEUA Hazy*   PHUR 6.0   SPECGRAV 1.025   PROTEINUA 2+*   GLUCUA Negative   KETONESU Negative   BILIRUBINUA Negative   OCCULTUA 3+*   NITRITE Negative   UROBILINOGEN Negative   LEUKOCYTESUR Trace*   RBCUA 25*   WBCUA 11*   BACTERIA Few*   HYALINECASTS 6*     BNP  Recent Labs   Lab 03/13/20  1420   *         Significant Imaging: CXR: I have reviewed all pertinent results/findings within the past 24 hours and my personal findings are:  see below     The heart is mildly enlarged.  There is central pulmonary vascular congestion.  There is peribronchial cuffing suggesting a bronchitis.  No evidence for consolidation to suggest a pneumonia.  Skeletal structures are intact.

## 2020-03-14 NOTE — H&P
[facility] Medicine  History & Physical    Patient Name: Tonya Bucio  MRN: [id number]  Admission Date: 3/13/2020  Attending Physician: Tasha Atkins MD   Primary Care Provider: Tasha Atkins MD         Patient information was obtained from patient, past medical records and ER records.     Subjective:     Principal Problem:COPD exacerbation    Chief Complaint:   Chief Complaint   Patient presents with    Cough     Chest Congestion,  PT was sent from Clinic for Diagnosis of Pneumonia        HPI: Tonya Bucio is a 84 y.o. female  Well known to me from clinic with PMH of COPD, pulmonary embolisim, DM, CAD, hypothyroidism presented with   Cough /wheezing for 5 days .  Cough: Patient complains of dyspnea, productive cough and wheezing.  Symptoms began 5 days ago.  The cough is non-productive, without wheezing, dyspnea or hemoptysis, productive of clear sputum and is aggravated by cold air and reclining position Associated symptoms include:change in voice and shortness of breath. Patient does not have new pets. Patient does have a history of asthma. Patient does have a history of environmental allergens. Patient does not have recent travel.         Past Medical History:   Diagnosis Date    Acute bronchitis with asthma     Anemia due to stage 3 chronic kidney disease     Angina pectoris     Anticoagulant long-term use     Asthma     Back pain     Cancer     Chest pain, musculoskeletal 7/16/2013    Chronic bronchitis     Class 3 obesity with serious comorbidity and body mass index (BMI) of 45.0 to 49.9 in adult 11/17/2016    Colon polyps     Gout, unspecified     History of cervical cancer     Hypothyroidism     Obesity     Osteoarthritis     Other and unspecified hyperlipidemia     PE (pulmonary embolism)     Renal manifestation of secondary diabetes mellitus     Thyroid disease     Trouble in sleeping     Type 2 diabetes mellitus with ophthalmic manifestations      Type 2 diabetes with peripheral circulatory disorder, controlled     Type II or unspecified type diabetes mellitus without mention of complication, uncontrolled     States everything okay-previous dx    Unspecified essential hypertension     Urinary incontinence     Uterine cancer        Past Surgical History:   Procedure Laterality Date    ADENOIDECTOMY      EYE SURGERY Right     right eye cataract    HYSTERECTOMY  2008    LIZ-BSO    tonsilectomy      TONSILLECTOMY  1945    TOTAL ABDOMINAL HYSTERECTOMY  2008    TOTAL ABDOMINAL HYSTERECTOMY W/ BILATERAL SALPINGOOPHORECTOMY  2008       Review of patient's allergies indicates:  No Known Allergies    No current facility-administered medications on file prior to encounter.      Current Outpatient Medications on File Prior to Encounter   Medication Sig    allopurinol (ZYLOPRIM) 300 MG tablet TAKE 1 TABLET BY MOUTH ONCE DAILY    hydroCHLOROthiazide (HYDRODIURIL) 12.5 MG Tab Take 12.5 mg by mouth once daily.    HYDROcodone-acetaminophen (NORCO) 7.5-325 mg per tablet Take 1 tablet by mouth every 24 hours as needed for Pain.    levothyroxine (SYNTHROID) 75 MCG tablet TAKE 1 TABLET BY MOUTH ONCE DAILY    losartan (COZAAR) 50 MG tablet Take 50 mg by mouth once daily.    magnesium oxide (MAG-OX) 400 mg tablet Take 1 tablet (400 mg total) by mouth 2 (two) times daily.    metoprolol tartrate (LOPRESSOR) 50 MG tablet TAKE 1 TABLET BY MOUTH TWICE DAILY    omega-3 acid ethyl esters (LOVAZA) 1 gram capsule TAKE 1 CAPSULE TWICE DAILY    rosuvastatin (CRESTOR) 20 MG tablet TAKE 1 TABLET BY MOUTH ONCE DAILY    SENSIPAR 60 mg Tab     XARELTO 15 mg Tab every evening.     diabetic [place]. [facility] [place]RIX GLUCOMETER. USE AS DIRECTED TO TEST BLOOD SUGAR ONCE DAILY    diabetic supplies, miscellan. Misc TRUE METRIX TEST STRIPS. USE AS DIRECTED TO TEST BLOOD SUGAR DAILY.    diabetic supplies, [place]an. [facility] [place]. USE AS DIRECTED TO TEST BLOOD  SUGAR DAILY.    hydrocortisone 2.5 % cream Apply topically 2 (two) times daily.    meclizine (ANTIVERT) 12.5 mg tablet Take 1 tablet (12.5 mg total) by mouth 3 (three) times daily as needed for Dizziness.    wheelchair Melly Use as directed     Family History     Problem Relation (Age of Onset)    Anemia Daughter    Arthritis Father, Daughter, Daughter, Daughter, Daughter    Cancer Sister, Brother    Diabetes Brother, Daughter, Daughter    Glaucoma Daughter    Heart disease Mother, Brother, Brother    Hyperlipidemia Brother    Liver disease Daughter    No Known Problems Son, Son    Stroke Brother, Son        Tobacco Use    Smoking status: Former Smoker     Packs/day: 0.33     Years: 13.00     Pack years: 4.29     Types: Cigarettes     Start date: 1951     Last attempt to quit: 1964     Years since quittin.6    Smokeless tobacco: Never Used   Substance and Sexual Activity    Alcohol use: No    Drug use: No    Sexual activity: Never     Birth control/protection: Surgical     Comment:      Review of Systems   Constitutional: Positive for fatigue. Negative for appetite change, chills and fever.   HENT: Negative for congestion, ear discharge, ear pain, rhinorrhea, sinus pressure and sore throat.    Respiratory: Positive for cough, shortness of breath and wheezing. Negative for choking and chest tightness.         With coughing and increased activity   Cardiovascular: Negative for chest pain and palpitations.   Gastrointestinal: Negative for constipation, diarrhea, nausea and vomiting.   Musculoskeletal: Positive for arthralgias and gait problem. Negative for joint swelling and myalgias.   Skin: Negative for rash and wound.   Neurological: Negative for dizziness, syncope, weakness, light-headedness and numbness.     Objective:     Vital Signs (Most Recent):  Temp: 98.9 °F (37.2 °C) (20)  Pulse: 85 (20)  Resp: 16 (20)  BP: (!) 143/63 (20)  SpO2: 95 %  (03/13/20 2007) Vital Signs (24h Range):  Temp:  [97.4 °F (36.3 °C)-99.7 °F (37.6 °C)] 98.9 °F (37.2 °C)  Pulse:  [74-94] 85  Resp:  [16-22] 16  SpO2:  [93 %-97 %] 95 %  BP: ()/(52-76) 143/63     Weight: 123.9 kg (273 lb 2.4 oz)  Body mass index is 41.53 kg/m².    Physical Exam   Constitutional: She is oriented to person, place, and time. She appears well-developed and well-nourished.   HENT:   Head: Normocephalic and atraumatic.   Right Ear: External ear normal.   Left Ear: External ear normal.   Nose: Nose normal.   Mouth/Throat: Oropharynx is clear and moist.   Cardiovascular: Normal rate, regular rhythm and normal heart sounds.   Pulmonary/Chest: Effort normal. She has wheezes. She has rales.   Abdominal: Soft. Bowel sounds are normal.   Neurological: She is alert and oriented to person, place, and time.   Skin: Skin is warm and dry.   Psychiatric: She has a normal mood and affect.   Nursing note and vitals reviewed.          Significant Labs:   CBC:   Recent Labs   Lab 03/13/20  1420   WBC 19.85*   HGB 11.6*   HCT 36.0*        CMP:   Recent Labs   Lab 03/13/20  1420      K 4.0      CO2 23   *   BUN 30*   CREATININE 1.7*   CALCIUM 10.9*   PROT 8.1   ALBUMIN 2.7*   BILITOT 0.7   ALKPHOS 131   AST 13   ALT 14   ANIONGAP 16   EGFRNONAA 27*     Lactic Acid:   Recent Labs   Lab 03/13/20  1420 03/13/20  1802 03/13/20 2055   LACTATE 2.5* 1.7 2.3*     Troponin:   Recent Labs   Lab 03/13/20  1420 03/13/20 2055   TROPONINI 0.036* 0.028*     TSH:   Recent Labs   Lab 02/06/20  1024   TSH 2.197     Urine Studies:   Recent Labs   Lab 03/13/20  1825   COLORU Yellow   APPEARANCEUA Hazy*   PHUR 6.0   SPECGRAV 1.025   PROTEINUA 2+*   GLUCUA Negative   KETONESU Negative   BILIRUBINUA Negative   OCCULTUA 3+*   NITRITE Negative   UROBILINOGEN Negative   LEUKOCYTESUR Trace*   RBCUA 25*   WBCUA 11*   BACTERIA Few*   HYALINECASTS 6*     BNP  Recent Labs   Lab 03/13/20  1420   *          Significant Imaging: CXR: I have reviewed all pertinent results/findings within the past 24 hours and my personal findings are:  see below     The heart is mildly enlarged.  There is central pulmonary vascular congestion.  There is peribronchial cuffing suggesting a bronchitis.  No evidence for consolidation to suggest a pneumonia.  Skeletal structures are intact.            Assessment/Plan:     * COPD exacerbation  Supplemental O2 via nasal canula; titrate O2 saturation to >92%.   Start Solumedrol Pulmonary consultation.   Continue beta 2 agonist bronchodilator treatments.   Continue IV antibiotics.  Continue routine medications as before.           Bronchitis  Continue zithromax and Rocephin       Morbid obesity with body mass index (BMI) of 40.0 to 44.9 in adult    # The patient is asked to make an attempt to improve diet and exercise patterns to aid in medical management of this problem.     # Eat  5 small meals a day.     # Cut out high carbohydrate  foods : bread, rice, pasta, potatoes.     # Exercise/walk 5x/week for at least 30-45  minutes.            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, without long-term current use of insulin  Lab Results   Component Value Date    HGBA1C 6.7 (H) 02/06/2020       Correction scale     Hyperlipidemia associated with type 2 diabetes mellitus  Lab Results   Component Value Date    LDLCALC 51.4 (L) 02/06/2020       Resume statin     Hypothyroidism  Lab Results   Component Value Date    TSH 2.197 02/06/2020       Resume levothyroxine      Chronic gout of multiple sites  Resume allopurinol         A flutter   Continue sotalol   Continue xarelto   CIS consult     VTE Risk Mitigation (From admission, onward)         Ordered     rivaroxaban tablet 15 mg  With dinner      03/13/20 1650     IP VTE HIGH RISK PATIENT  Once      03/13/20 1601     Place sequential compression device  Until discontinued       03/13/20 1601                   Tasha Atkins MD  Department of Hospital Medicine   [facility]

## 2020-03-14 NOTE — PLAN OF CARE
Assessment complete per flow sheet, AAOX4, RR even unlabored BBS clear, diminished on RA. O2 ordered PRN. Neb treatments Q 6 hours, PRN  A-Flutter noted on Cardiac monitor. Anticoagulant use noted. Abdomen soft, non distended, with active bowel sounds x 4 quads. Tolerating diet well. 20 G PIV infusing with out difficulty, Dsg C/D/I. Medications administered per MAR, tolerated well. Standby assist when ambulating, Safety precautions maintained, bed alarm on, free from falls/ injury, call bell in reach, instructed to call for needs, voiced understanding, will monitor.        MD rounding, new orders noted.  Daughter at bedside, NAD noted.

## 2020-03-15 VITALS
BODY MASS INDEX: 41.39 KG/M2 | RESPIRATION RATE: 16 BRPM | DIASTOLIC BLOOD PRESSURE: 76 MMHG | OXYGEN SATURATION: 95 % | WEIGHT: 273.13 LBS | HEART RATE: 71 BPM | HEIGHT: 68 IN | SYSTOLIC BLOOD PRESSURE: 135 MMHG | TEMPERATURE: 97 F

## 2020-03-15 LAB
ANION GAP SERPL CALC-SCNC: 11 MMOL/L (ref 8–16)
BASOPHILS # BLD AUTO: 0.04 K/UL (ref 0–0.2)
BASOPHILS NFR BLD: 0.2 % (ref 0–1.9)
BUN SERPL-MCNC: 36 MG/DL (ref 8–23)
CALCIUM SERPL-MCNC: 11.6 MG/DL (ref 8.7–10.5)
CHLORIDE SERPL-SCNC: 102 MMOL/L (ref 95–110)
CO2 SERPL-SCNC: 26 MMOL/L (ref 23–29)
CREAT SERPL-MCNC: 1.4 MG/DL (ref 0.5–1.4)
DIFFERENTIAL METHOD: ABNORMAL
EOSINOPHIL # BLD AUTO: 0 K/UL (ref 0–0.5)
EOSINOPHIL NFR BLD: 0.1 % (ref 0–8)
ERYTHROCYTE [DISTWIDTH] IN BLOOD BY AUTOMATED COUNT: 14.1 % (ref 11.5–14.5)
EST. GFR  (AFRICAN AMERICAN): 40 ML/MIN/1.73 M^2
EST. GFR  (NON AFRICAN AMERICAN): 35 ML/MIN/1.73 M^2
GLUCOSE SERPL-MCNC: 158 MG/DL (ref 70–110)
HCT VFR BLD AUTO: 37 % (ref 37–48.5)
HGB BLD-MCNC: 12 G/DL (ref 12–16)
IMM GRANULOCYTES # BLD AUTO: 0.41 K/UL (ref 0–0.04)
IMM GRANULOCYTES NFR BLD AUTO: 2.4 % (ref 0–0.5)
LYMPHOCYTES # BLD AUTO: 1.7 K/UL (ref 1–4.8)
LYMPHOCYTES NFR BLD: 9.8 % (ref 18–48)
MCH RBC QN AUTO: 30.5 PG (ref 27–31)
MCHC RBC AUTO-ENTMCNC: 32.4 G/DL (ref 32–36)
MCV RBC AUTO: 94 FL (ref 82–98)
MONOCYTES # BLD AUTO: 1.1 K/UL (ref 0.3–1)
MONOCYTES NFR BLD: 6.2 % (ref 4–15)
NEUTROPHILS # BLD AUTO: 13.8 K/UL (ref 1.8–7.7)
NEUTROPHILS NFR BLD: 81.3 % (ref 38–73)
NRBC BLD-RTO: 0 /100 WBC
PLATELET # BLD AUTO: 422 K/UL (ref 150–350)
PMV BLD AUTO: 10.9 FL (ref 9.2–12.9)
POCT GLUCOSE: 145 MG/DL (ref 70–110)
POCT GLUCOSE: 148 MG/DL (ref 70–110)
POTASSIUM SERPL-SCNC: 3.7 MMOL/L (ref 3.5–5.1)
RBC # BLD AUTO: 3.93 M/UL (ref 4–5.4)
SODIUM SERPL-SCNC: 139 MMOL/L (ref 136–145)
TROPONIN I SERPL DL<=0.01 NG/ML-MCNC: 0.02 NG/ML (ref 0–0.03)
TROPONIN I SERPL DL<=0.01 NG/ML-MCNC: 0.02 NG/ML (ref 0–0.03)
WBC # BLD AUTO: 16.97 K/UL (ref 3.9–12.7)

## 2020-03-15 PROCEDURE — 93010 ELECTROCARDIOGRAM REPORT: CPT | Mod: HCNC,,, | Performed by: INTERNAL MEDICINE

## 2020-03-15 PROCEDURE — 85025 COMPLETE CBC W/AUTO DIFF WBC: CPT | Mod: HCNC

## 2020-03-15 PROCEDURE — 99239 HOSP IP/OBS DSCHRG MGMT >30: CPT | Mod: HCNC,,, | Performed by: FAMILY MEDICINE

## 2020-03-15 PROCEDURE — 63600175 PHARM REV CODE 636 W HCPCS: Mod: HCNC | Performed by: SURGERY

## 2020-03-15 PROCEDURE — 80048 BASIC METABOLIC PNL TOTAL CA: CPT | Mod: HCNC

## 2020-03-15 PROCEDURE — 99239 PR HOSPITAL DISCHARGE DAY,>30 MIN: ICD-10-PCS | Mod: HCNC,,, | Performed by: FAMILY MEDICINE

## 2020-03-15 PROCEDURE — 93010 EKG 12-LEAD: ICD-10-PCS | Mod: HCNC,,, | Performed by: INTERNAL MEDICINE

## 2020-03-15 PROCEDURE — 97162 PT EVAL MOD COMPLEX 30 MIN: CPT | Mod: HCNC

## 2020-03-15 PROCEDURE — 93005 ELECTROCARDIOGRAM TRACING: CPT | Mod: HCNC

## 2020-03-15 PROCEDURE — 25000003 PHARM REV CODE 250: Mod: HCNC | Performed by: INTERNAL MEDICINE

## 2020-03-15 PROCEDURE — 84484 ASSAY OF TROPONIN QUANT: CPT | Mod: HCNC

## 2020-03-15 PROCEDURE — 36415 COLL VENOUS BLD VENIPUNCTURE: CPT | Mod: HCNC

## 2020-03-15 PROCEDURE — 25000003 PHARM REV CODE 250: Mod: HCNC | Performed by: SURGERY

## 2020-03-15 PROCEDURE — 94640 AIRWAY INHALATION TREATMENT: CPT | Mod: HCNC

## 2020-03-15 PROCEDURE — 25000003 PHARM REV CODE 250: Mod: HCNC | Performed by: NURSE PRACTITIONER

## 2020-03-15 PROCEDURE — 94761 N-INVAS EAR/PLS OXIMETRY MLT: CPT | Mod: HCNC

## 2020-03-15 PROCEDURE — 87086 URINE CULTURE/COLONY COUNT: CPT | Mod: HCNC

## 2020-03-15 PROCEDURE — 25000242 PHARM REV CODE 250 ALT 637 W/ HCPCS: Mod: HCNC | Performed by: FAMILY MEDICINE

## 2020-03-15 RX ORDER — AZITHROMYCIN 250 MG/1
TABLET, FILM COATED ORAL
Qty: 2 TABLET | Refills: 0 | Status: SHIPPED | OUTPATIENT
Start: 2020-03-15 | End: 2020-03-19

## 2020-03-15 RX ORDER — SOTALOL HYDROCHLORIDE 80 MG/1
80 TABLET ORAL 2 TIMES DAILY
Qty: 60 TABLET | Refills: 0 | Status: SHIPPED | OUTPATIENT
Start: 2020-03-15 | End: 2022-01-19 | Stop reason: SDUPTHER

## 2020-03-15 RX ORDER — LEVALBUTEROL 1.25 MG/.5ML
1.25 SOLUTION, CONCENTRATE RESPIRATORY (INHALATION) EVERY 6 HOURS PRN
Qty: 100 EACH | Refills: 0 | Status: SHIPPED | OUTPATIENT
Start: 2020-03-15 | End: 2020-03-19 | Stop reason: SDUPTHER

## 2020-03-15 RX ADMIN — CEFTRIAXONE 1 G: 1 INJECTION, SOLUTION INTRAVENOUS at 01:03

## 2020-03-15 RX ADMIN — LOSARTAN POTASSIUM 50 MG: 50 TABLET, FILM COATED ORAL at 08:03

## 2020-03-15 RX ADMIN — SOTALOL HYDROCHLORIDE 80 MG: 80 TABLET ORAL at 08:03

## 2020-03-15 RX ADMIN — ALLOPURINOL 300 MG: 100 TABLET ORAL at 08:03

## 2020-03-15 RX ADMIN — LEVALBUTEROL 1.25 MG: 1.25 SOLUTION, CONCENTRATE RESPIRATORY (INHALATION) at 07:03

## 2020-03-15 RX ADMIN — LEVOTHYROXINE SODIUM 75 MCG: 75 TABLET ORAL at 06:03

## 2020-03-15 RX ADMIN — LEVALBUTEROL 1.25 MG: 1.25 SOLUTION, CONCENTRATE RESPIRATORY (INHALATION) at 12:03

## 2020-03-15 RX ADMIN — LEVALBUTEROL 1.25 MG: 1.25 SOLUTION, CONCENTRATE RESPIRATORY (INHALATION) at 01:03

## 2020-03-15 RX ADMIN — PANTOPRAZOLE SODIUM 40 MG: 40 TABLET, DELAYED RELEASE ORAL at 08:03

## 2020-03-15 RX ADMIN — Medication 400 MG: at 08:03

## 2020-03-15 NOTE — ASSESSMENT & PLAN NOTE
Continue zithromax and Rocephin (3 doses).  Will send with 2 more days of azithromycin and mucinex. Also duonebs on d/c.

## 2020-03-15 NOTE — PT/OT/SLP EVAL
"Physical Therapy Evaluation    Patient Name:  Tonya Bucio   MRN:  [id number]    Recommendations:     Discharge Recommendations:  home with home health, home health PT   Discharge Equipment Recommendations: none   Barriers to discharge: None    Assessment:     Tonya Bucio is a 84 y.o. female admitted with a medical diagnosis of COPD exacerbation.  She presents with the following impairments/functional limitations:  weakness, impaired endurance, impaired functional mobilty, gait instability, impaired balance, decreased lower extremity function. Patient will benefit from PT to address these limitations and improve functional mobility/independence.    Rehab Prognosis: Good; patient would benefit from acute skilled PT services to address these deficits and reach maximum level of function.    Recent Surgery: * No surgery found *      Plan:     During this hospitalization, patient to be seen 5 x/week to address the identified rehab impairments via gait training, therapeutic activities, therapeutic exercises and progress toward the following goals:    · Plan of Care Expires:  03/27/20    Subjective     Chief Complaint: "I get SOB when I walk to the bathroom."  Patient/Family Comments/goals: "To get better and go home."  Pain/Comfort:  · Pain Rating 1: 2/10  · Location - Side 1: Bilateral  · Location - Orientation 1: generalized  · Location 1: knee  · Pain Addressed 1: Reposition  · Pain Rating Post-Intervention 1: 3/10    Patients cultural, spiritual, Anabaptism conflicts given the current situation: no    Living Environment:  Patient lives with family including son and Dtr in law who assist her. There are 4 short steps to enter home with one hand rail. She is able to climb these steps with some difficulty.   Prior to admission, patients level of function was Modified independent with RW for ambulation at a household level. Uses wheelchair for all outside mobility.  Equipment used at home: wheelchair, walker, rolling, " shower chair.  DME owned (not currently used): none.  Upon discharge, patient will have assistance from Family.    Objective:     Communicated with Nurse prior to session.  Patient found HOB elevated with peripheral IV, SCD, telemetry  upon PT entry to room.    General Precautions: Standard, fall   Orthopedic Precautions:N/A   Braces: N/A     Exams:  · Cognitive Exam:  Patient is oriented to Person, Place, Time and Situation  · Gross Motor Coordination:  WFL  · Sensation:    · -       Intact  · RLE ROM: WFL  · RLE Strength: Deficits: 4-/5 Right knee flexion and extension, Hip Flexion 4-/5  · LLE ROM: WFL  · LLE Strength: Deficits: 4-/5 Left knee Flexion and Extension, Hip Flexion 4-/5    Functional Mobility:  · Bed Mobility:     · Rolling Left:  contact guard assistance  · Scooting: minimum assistance  · Bridging: minimum assistance  · Supine to Sit: minimum assistance  · Sit to Supine: minimum assistance  · Transfers:     · Sit to Stand:  minimum assistance with rolling walker  · Gait: Patient ambulated 20 feet with RW and CGAx1. Shuffling gait pattern with moderate head down posture. At 20 feet, SOB severity at 6/10.  · Balance: Seated good, Standing Fair        AM-PAC 6 CLICK MOBILITY  Total Score:17     Patient left HOB elevated with all lines intact, call button in reach and family present.    GOALS:   Multidisciplinary Problems     Physical Therapy Goals        Problem: Physical Therapy Goal    Goal Priority Disciplines Outcome Goal Variances Interventions   Physical Therapy Goal     PT, PT/OT      Description:  Goals to be met by: 3/27/20     Patient will increase functional independence with mobility by performin. Supine to sit with Modified [place]  2. Sit to supine with Modified [place]  3. Rolling to Left and Right with [place].  4. Sit to stand transfer with Contact Guard Assistance  5. Bed to chair transfer with Contact Guard Assistance using Rolling Walker  6. Gait  x 50 feet with  Contact Guard Assistance using Rolling Walker.                       History:     Past Medical History:   Diagnosis Date    Acute bronchitis with asthma     Anemia due to stage 3 chronic kidney disease     Angina pectoris     Anticoagulant long-term use     Asthma     Back pain     Cancer     Chest pain, musculoskeletal 7/16/2013    Chronic bronchitis     Class 3 obesity with serious comorbidity and body mass index (BMI) of 45.0 to 49.9 in adult 11/17/2016    Colon polyps     COPD exacerbation 3/13/2020    Gout, unspecified     History of cervical cancer     Hypothyroidism     Obesity     Osteoarthritis     Other and unspecified hyperlipidemia     PE (pulmonary embolism)     Renal manifestation of secondary diabetes mellitus     Thyroid disease     Trouble in sleeping     Type 2 diabetes mellitus with ophthalmic manifestations     Type 2 diabetes with peripheral circulatory disorder, controlled     Type II or unspecified type diabetes mellitus without mention of complication, uncontrolled     States everything okay-previous dx    Unspecified essential hypertension     Urinary incontinence     Uterine cancer        Past Surgical History:   Procedure Laterality Date    ADENOIDECTOMY      EYE SURGERY Right     right eye cataract    HYSTERECTOMY  2008    LIZ-BSO    tonsilectomy      TONSILLECTOMY  1945    TOTAL ABDOMINAL HYSTERECTOMY  2008    TOTAL ABDOMINAL HYSTERECTOMY W/ BILATERAL SALPINGOOPHORECTOMY  2008       History  Co-morbidities and personal factors that may impact the plan of care Examination  Body Structures and Functions, activity limitations and participation restrictions that may impact the plan of care Clinical Presentation   Decision Making/ Complexity Score   Co-morbidities:   [] Time since onset of injury / illness / exacerbation  [x] Status of current condition  []Patient's cognitive status and safety concerns    [x] Multiple Medical Problems (see med hx)  Personal  Factors:   [x] Patient's age  [x] Prior Level of function   [] Patient's home situation (environment and family support)  [] Patient's level of motivation  [x] Expected progression of patient      HISTORY:(criteria)    [] 58237 - no personal factors/history    [] 99067 - has 1-2 personal factor/comorbidity     [x] 51544 - has >3 personal factor/comorbidity     Body Regions:  [x] Objective examination findings  [] Head     []  Neck  [x] Trunk   [] Upper Extremity  [x] Lower Extremity    Body Systems:  [] For communication ability, affect, cognition, language, and learning style: the assessment of the ability to make needs known, consciousness, orientation (person, place, and time), expected emotional /behavioral responses, and learning preferences (eg, learning barriers, education  needs)  [x] For the neuromuscular system: a general assessment of gross coordinated movement (eg, balance, gait, locomotion, transfers, and transitions) and motor function  (motor control and motor learning)  [x] For the musculoskeletal system: the assessment of gross symmetry, gross range of motion, gross strength, height, and weight  [] For the integumentary system: the assessment of pliability(texture), presence of scar formation, skin color, and skin integrity  [x] For cardiovascular/pulmonary system: the assessment of heart rate, respiratory rate, blood pressure, and edema     Activity limitations:    [] Patient's cognitive status and saf ety concerns          [x] Status of current condition      [] Weight bearing restriction  [] Cardiopulmunary Restriction    Participation Restrictions:   [] Goals and goal agreement with the patient     [x] Rehab potential (prognosis) and probable outcome      Examination of Body System: (criteria)    [] 26500 - addressing 1-2 elements    [] 39372 - addressing a total of 3 or more elements     [x] 82467 -  Addressing a total of 4 or more elements         Clinical Presentation: (criteria)  Evolving -  03381     On examination of body system using standardized tests and measures patient presents with 4 or more elements from any of the following: body structures and functions, activity limitations, and/or participation restrictions.  Leading to a clinical presentation that is considered evolving with changing characteristics                              Clinical Decision Making  (Eval Complexity):  Moderate - 78663     \    Time Tracking:     PT Received On: 03/15/20  PT Start Time: 0935     PT Stop Time: 0950  PT Total Time (min): 15 min     Billable Minutes: Evaluation 15 min      Jayden Rudd, PT  03/15/2020

## 2020-03-15 NOTE — SUBJECTIVE & OBJECTIVE
Review of Systems   Constitutional: Positive for fatigue. Negative for appetite change, chills and fever.   HENT: Negative for congestion, ear discharge, ear pain, rhinorrhea, sinus pressure and sore throat.    Respiratory: Positive for cough. Negative for choking, chest tightness, shortness of breath and wheezing.         With coughing and increased activity   Cardiovascular: Negative for chest pain and palpitations.   Gastrointestinal: Negative for constipation, diarrhea, nausea and vomiting.   Musculoskeletal: Positive for arthralgias and gait problem. Negative for joint swelling and myalgias.   Skin: Negative for rash and wound.   Neurological: Negative for dizziness, syncope, weakness, light-headedness and numbness.     Objective:     Vital Signs (Most Recent):  Temp: 96.5 °F (35.8 °C) (03/15/20 0702)  Pulse: 71 (03/15/20 1000)  Resp: 16 (03/15/20 0722)  BP: (!) 145/82 (03/15/20 0702)  SpO2: 96 % (03/15/20 0722) Vital Signs (24h Range):  Temp:  [96.2 °F (35.7 °C)-97.4 °F (36.3 °C)] 96.5 °F (35.8 °C)  Pulse:  [53-89] 71  Resp:  [16-20] 16  SpO2:  [92 %-96 %] 96 %  BP: (119-145)/(64-82) 145/82     Weight: 123.9 kg (273 lb 2.4 oz)  Body mass index is 41.53 kg/m².    Physical Exam   Constitutional: She is oriented to person, place, and time. She appears well-developed and well-nourished.   HENT:   Head: Normocephalic and atraumatic.   Right Ear: External ear normal.   Left Ear: External ear normal.   Nose: Nose normal.   Mouth/Throat: Oropharynx is clear and moist.   Eyes: Pupils are equal, round, and reactive to light. EOM are normal.   Neck: Normal range of motion. No JVD present.   Cardiovascular: Normal rate and normal heart sounds.   irreg rate   Pulmonary/Chest: Effort normal. She has no wheezes. She has rales.   Upper airway noise   Abdominal: Soft. Bowel sounds are normal.   Musculoskeletal: She exhibits edema (1+ edema).   Neurological: She is alert and oriented to person, place, and time.   Skin: Skin is  warm and dry.   Psychiatric: She has a normal mood and affect.   Nursing note and vitals reviewed.        CRANIAL NERVES     CN III, IV, VI   Pupils are equal, round, and reactive to light.  Extraocular motions are normal.        Significant Labs:   CBC:   Recent Labs   Lab 03/13/20 1420 03/14/20  0156   WBC 19.85* 14.77*   HGB 11.6* 11.0*   HCT 36.0* 33.7*    334     CMP:   Recent Labs   Lab 03/13/20  1420 03/14/20  0156    136   K 4.0 4.3    98   CO2 23 26   * 222*   BUN 30* 31*   CREATININE 1.7* 1.7*   CALCIUM 10.9* 10.5   PROT 8.1 7.6   ALBUMIN 2.7* 2.4*   BILITOT 0.7 0.4   ALKPHOS 131 129   AST 13 14   ALT 14 17   ANIONGAP 16 12   EGFRNONAA 27* 27*     Lactic Acid:   Recent Labs   Lab 03/13/20  1420 03/13/20  1802 03/13/20  2055   LACTATE 2.5* 1.7 2.3*     Troponin:   Recent Labs   Lab 03/14/20  2005 03/15/20  0158 03/15/20  0929   TROPONINI 0.021 0.023 0.020     TSH:   Recent Labs   Lab 02/06/20  1024   TSH 2.197     Urine Studies:   Recent Labs   Lab 03/13/20  1825   COLORU Yellow   APPEARANCEUA Hazy*   PHUR 6.0   SPECGRAV 1.025   PROTEINUA 2+*   GLUCUA Negative   KETONESU Negative   BILIRUBINUA Negative   OCCULTUA 3+*   NITRITE Negative   UROBILINOGEN Negative   LEUKOCYTESUR Trace*   RBCUA 25*   WBCUA 11*   BACTERIA Few*   HYALINECASTS 6*     BNP  Recent Labs   Lab 03/13/20  1420   *         Significant Imaging: CXR: I have reviewed all pertinent results/findings within the past 24 hours and my personal findings are:  see below     The heart is mildly enlarged.  There is central pulmonary vascular congestion.  There is peribronchial cuffing suggesting a bronchitis.  No evidence for consolidation to suggest a pneumonia.  Skeletal structures are intact.

## 2020-03-15 NOTE — ASSESSMENT & PLAN NOTE
Supplemental O2 via nasal canula; titrate O2 saturation to >92%.   Stop  Solumedrol because of hyperglycemia.    Obtain Pulmonary consultation.   Continue beta 2 agonist bronchodilator treatments.   Continue IV antibiotics azithromycin and Rocephin.  Continue routine medications as before.     3/15  Not wheezing today. No more steroids.

## 2020-03-15 NOTE — DISCHARGE INSTRUCTIONS
Discharge Instructions for Chronic Bronchitis  You have been diagnosed with chronic bronchitis. With this condition, you cough up mucus and feel short of breath for 3 months or more each year for at least 2 years in a row.  Home care  Here is how you can take care of yourself at home:   · If you smoke, quit. This is the best thing you can do for your bronchitis and overall health.  ¨ Try a stop-smoking program. There are even telephone and internet programs.  ¨ Ask your healthcare provider about medicines or other methods to help you quit.  ¨ Ask family members to quit smoking as well.  ¨ Dont allow smoking in your home, in your car, or around you.  · Protect yourself from infection.  ¨ Wash your hands often. Do your best to keep your hands away from your face. Most germs are spread from your hands to your mouth or nose.  ¨ Ask your healthcare provider about a yearly flu vaccine and pneumonia vaccines.  ¨ Avoid crowds. It's especially important to do this in the winter when more people have colds and flu.  ¨ Take care of your overall health. That means:  § Getting about 8 hours of sleep every night  § Exercising for at least 30 minutes on most days  § Eating lots of fresh fruits and vegetables, as well as whole grains, lean meats and fish, and low-fat dairy products. Avoiding fat- and sugar-filled foods is also important.  § Limiting the amount of alcoholic beverages you drink.  · Take your medicines exactly as directed. Dont skip doses.  · Talk to your healthcare provider about ways to keep your mucus thin. Drinking a lot of water helps.  · Ask your healthcare provider to show you pursed-lip breathing to help decrease shortness of breath.  · Find out about pulmonary rehabilitation programs in your area. Ask your provider or local hospital.  Follow-up care  Make all of your follow-up appointments as directed by our staff.  When to call your healthcare provider  Call your provider immediately if you have any of  the following:  · Shortness of breath, wheezing, or coughing  · Increased mucus  · Yellow, green, bloody, or smelly mucus  · Fever or chills  · Tightness in your chest that does not go away with your normal medicines  · An irregular heartbeat or feeling that your heart is racing  · Swollen ankles   Date Last Reviewed: 5/1/2016 © 2000-2017 [employer]. [address] [place] [address], [place] [address]. All rights reserved. This information is not intended as a substitute for professional medical care. Always follow your healthcare professional's instructions.        Urinary Tract Infections in Women    Urinary tract infections (UTIs) are most often caused by bacteria (germs). These bacteria enter the urinary tract. The bacteria may come from outside the body. Or they may travel from the skin outside the rectum or vagina into the urethra. Female anatomy makes it easy for bacteria from the bowel to enter a womans urinary tract, which is the most common source of UTI. This means women develop UTIs more often than men. Pain in or around the urinary tract is a common UTI symptom. But the only way to know for sure if you have a UTI for the healthcare provider to test your urine. The two tests that may be done are the urinalysis and urine culture.  Types of UTIs  · Cystitis: A bladder infection (cystitis) is the most common UTI in women. You may have urgent or frequent urination. You may also have pain, burning when you urinate, and bloody urine.  · Urethritis: This is an inflamed urethra, which is the tube that carries urine from the bladder to outside the body. You may have lower stomach or back pain. You may also have urgent or frequent urination.  · Pyelonephritis: This is a kidney infection. If not treated, it can be serious and damage your kidneys. In severe cases, you may be hospitalized. You may have a fever and lower back pain.  Medicines to treat a UTI  Most UTIs are treated with antibiotics. These kill  the bacteria. The length of time you need to take them depends on the type of infection. It may be as short as 3 days. If you have repeated UTIs, a low-dose antibiotic may be needed for several months. Take antibiotics exactly as directed. Dont stop taking them until all of the medicine is gone. If you stop taking the antibiotic too soon, the infection may not go away, and you may develop a resistance to the antibiotic. This can make it much harder to treat.  Lifestyle changes to treat and prevent UTIs  The lifestyle changes below will help get rid of your UTI. They may also help prevent future UTIs.  · Drink plenty of fluids. This includes water, juice, or other caffeine-free drinks. Fluids help flush bacteria out of your body.  · Empty your bladder. Always empty your bladder when you feel the urge to urinate. And always urinate before going to sleep. Urine that stays in your bladder can lead to infection. Try to urinate before and after sex as well.  · Practice good personal hygiene. Wipe yourself from front to back after using the toilet. This helps keep bacteria from getting into the urethra.  · Use condoms during sex. These help prevent UTIs caused by sexually transmitted bacteria. Also, avoid using spermicides during sex. These can increase the risk of UTIs. Choose other forms of birth control instead. For women who tend to get UTIs after sex, a low-dose of a preventive antibiotic may be used. Be sure to discuss this option with your healthcare provider.  · Follow up with your healthcare provider as directed. He or she may test to make sure the infection has cleared. If needed, more treatment may be started.  Date Last Reviewed: 1/1/2017  © 8945-3711 [employer]. [address] [place] [address], [place] [address]. All rights reserved. This information is not intended as a substitute for professional medical care. Always follow your healthcare professional's instructions.

## 2020-03-15 NOTE — NURSING
Pt in stable condition. Discharged home. Reviewed discharge instructions, follow up appts, medications and reportable signs and symptoms with pt and daughter; state understanding.

## 2020-03-15 NOTE — ASSESSMENT & PLAN NOTE
Cardiology consulted.  Heart rate under good control  Metoprolol discontinued  Sotalol 80 mg p.o. b.i.d.  Magnesium 40 mg p.o. b.i.d.  Losartan 50 mg p.o. daily  Xarelto 15 mg p.o. daily    3/15  Rate under control. Will f/u with cards this week.

## 2020-03-15 NOTE — PROGRESS NOTES
[facility] Medicine  Progress Note    Patient Name: Tonya Bucoi  MRN: [id number]  Patient Class: IP- Inpatient   Admission Date: 3/13/2020  Length of Stay: 2 days  Attending Physician: Ata Negrete MD  Primary Care Provider: Tasha Atkins MD        Subjective:     Principal Problem:COPD exacerbation        HPI:  Tonya Bucio is a 84 y.o. female  Well known to me from clinic with PMH of COPD, pulmonary embolisim, DM, CAD, hypothyroidism presented with   Cough /wheezing for 5 days .  Cough: Patient complains of dyspnea, productive cough and wheezing.  Symptoms began 5 days ago.  The cough is non-productive, without wheezing, dyspnea or hemoptysis, productive of clear sputum and is aggravated by cold air and reclining position Associated symptoms include:change in voice and shortness of breath. Patient does not have new pets. Patient does have a history of asthma. Patient does have a history of environmental allergens. Patient does not have recent travel.         Overview/Hospital Course:  Patient is still coughing and wheezing.  She is feeling better.  She is receiving breathing treatments 4 times daily.  She is tolerating her azithromycin and Rocephin.  Cardiology evaluated patient for atrial flutter.  They changed her metoprolol to sotalol 80 mg twice daily.  They added magnesium 400 mg twice daily as well.  She is currently taking Xarelto 15 mg daily for anticoagulation.  Cardiology continued losartan 50 mg daily    3/15  Sats 96% on RA. Improved coughing and breathing. No fever. Tolerating sotalol. 70s flutter.    Review of Systems   Constitutional: Positive for fatigue. Negative for appetite change, chills and fever.   HENT: Negative for congestion, ear discharge, ear pain, rhinorrhea, sinus pressure and sore throat.    Respiratory: Positive for cough. Negative for choking, chest tightness, shortness of breath and wheezing.         With coughing and increased activity    Cardiovascular: Negative for chest pain and palpitations.   Gastrointestinal: Negative for constipation, diarrhea, nausea and vomiting.   Musculoskeletal: Positive for arthralgias and gait problem. Negative for joint swelling and myalgias.   Skin: Negative for rash and wound.   Neurological: Negative for dizziness, syncope, weakness, light-headedness and numbness.     Objective:     Vital Signs (Most Recent):  Temp: 96.5 °F (35.8 °C) (03/15/20 0702)  Pulse: 71 (03/15/20 1000)  Resp: 16 (03/15/20 0722)  BP: (!) 145/82 (03/15/20 0702)  SpO2: 96 % (03/15/20 0722) Vital Signs (24h Range):  Temp:  [96.2 °F (35.7 °C)-97.4 °F (36.3 °C)] 96.5 °F (35.8 °C)  Pulse:  [53-89] 71  Resp:  [16-20] 16  SpO2:  [92 %-96 %] 96 %  BP: (119-145)/(64-82) 145/82     Weight: 123.9 kg (273 lb 2.4 oz)  Body mass index is 41.53 kg/m².    Physical Exam   Constitutional: She is oriented to person, place, and time. She appears well-developed and well-nourished.   HENT:   Head: Normocephalic and atraumatic.   Right Ear: External ear normal.   Left Ear: External ear normal.   Nose: Nose normal.   Mouth/Throat: Oropharynx is clear and moist.   Eyes: Pupils are equal, round, and reactive to light. EOM are normal.   Neck: Normal range of motion. No JVD present.   Cardiovascular: Normal rate and normal heart sounds.   irreg rate   Pulmonary/Chest: Effort normal. She has no wheezes. She has rales.   Upper airway noise   Abdominal: Soft. Bowel sounds are normal.   Musculoskeletal: She exhibits edema (1+ edema).   Neurological: She is alert and oriented to person, place, and time.   Skin: Skin is warm and dry.   Psychiatric: She has a normal mood and affect.   Nursing note and vitals reviewed.        CRANIAL NERVES     CN III, IV, VI   Pupils are equal, round, and reactive to light.  Extraocular motions are normal.        Significant Labs:   CBC:   Recent Labs   Lab 03/13/20  1420 03/14/20  0156   WBC 19.85* 14.77*   HGB 11.6* 11.0*   HCT 36.0* 33.7*     334     CMP:   Recent Labs   Lab 03/13/20  1420 03/14/20  0156    136   K 4.0 4.3    98   CO2 23 26   * 222*   BUN 30* 31*   CREATININE 1.7* 1.7*   CALCIUM 10.9* 10.5   PROT 8.1 7.6   ALBUMIN 2.7* 2.4*   BILITOT 0.7 0.4   ALKPHOS 131 129   AST 13 14   ALT 14 17   ANIONGAP 16 12   EGFRNONAA 27* 27*     Lactic Acid:   Recent Labs   Lab 03/13/20  1420 03/13/20  1802 03/13/20  2055   LACTATE 2.5* 1.7 2.3*     Troponin:   Recent Labs   Lab 03/14/20  2005 03/15/20  0158 03/15/20  0929   TROPONINI 0.021 0.023 0.020     TSH:   Recent Labs   Lab 02/06/20  1024   TSH 2.197     Urine Studies:   Recent Labs   Lab 03/13/20  1825   COLORU Yellow   APPEARANCEUA Hazy*   PHUR 6.0   SPECGRAV 1.025   PROTEINUA 2+*   GLUCUA Negative   KETONESU Negative   BILIRUBINUA Negative   OCCULTUA 3+*   NITRITE Negative   UROBILINOGEN Negative   LEUKOCYTESUR Trace*   RBCUA 25*   WBCUA 11*   BACTERIA Few*   HYALINECASTS 6*     BNP  Recent Labs   Lab 03/13/20  1420   *         Significant Imaging: CXR: I have reviewed all pertinent results/findings within the past 24 hours and my personal findings are:  see below     The heart is mildly enlarged.  There is central pulmonary vascular congestion.  There is peribronchial cuffing suggesting a bronchitis.  No evidence for consolidation to suggest a pneumonia.  Skeletal structures are intact.              Assessment/Plan:      * COPD exacerbation  Supplemental O2 via nasal canula; titrate O2 saturation to >92%.   Stop  Solumedrol because of hyperglycemia.    Obtain Pulmonary consultation.   Continue beta 2 agonist bronchodilator treatments.   Continue IV antibiotics azithromycin and Rocephin.  Continue routine medications as before.     3/15  Not wheezing today. No more steroids.          Typical atrial flutter  Cardiology consulted.  Heart rate under good control  Metoprolol discontinued  Sotalol 80 mg p.o. b.i.d.  Magnesium 40 mg p.o. b.i.d.  Losartan 50 mg p.o.  daily  Xarelto 15 mg p.o. daily    3/15  Rate under control. Will f/u with cards this week.    Bronchitis  Continue zithromax and Rocephin (3 doses).  Will send with 2 more days of azithromycin and mucinex. Also duonebs on d/c.      Morbid obesity with body mass index (BMI) of 40.0 to 44.9 in adult    # The patient is asked to make an attempt to improve diet and exercise patterns to aid in medical management of this problem.     # Eat  5 small meals a day.     # Cut out high carbohydrate  foods : bread, rice, pasta, potatoes.     # Exercise/walk 5x/week for at least 30-45  minutes.            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, without long-term current use of insulin  Lab Results   Component Value Date    HGBA1C 6.7 (H) 02/06/2020       Correction scale     3/15      Hyperlipidemia associated with type 2 diabetes mellitus  Lab Results   Component Value Date    LDLCALC 51.4 (L) 02/06/2020     Resume statin     Hypothyroidism  Lab Results   Component Value Date    TSH 2.197 02/06/2020   Resume levothyroxine      Chronic gout of multiple sites  Resume allopurinol          VTE Risk Mitigation (From admission, onward)         Ordered     rivaroxaban tablet 15 mg  With dinner      03/13/20 1650     IP VTE HIGH RISK PATIENT  Once      03/13/20 1601     Place sequential compression device  Until discontinued      03/13/20 1601                      Anjali Porter MD  Department of Hospital Medicine   [facility]

## 2020-03-15 NOTE — PLAN OF CARE
POC reviewed ::::   Ivf's   Iv abx's   Oxygen   Breathing tx's   Monitor labs / vitals   Cardiac monitoring  Side rails up x2   Pt to call RN for any needs

## 2020-03-16 NOTE — PLAN OF CARE
03/16/20 0804   Final Note   Assessment Type Final Discharge Note   Anticipated Discharge Disposition Home   What phone number can be called within the next 1-3 days to see how you are doing after discharge? [phone]   Discharge plans and expectations educations in teach back method with documentation complete? Yes   Right Care Referral Info   Post Acute Recommendation SNF / Sub-Acute Rehab   Referral Type No care  (Patient at baseline)   Post-Acute Status   Post-Acute Authorization Other   Part D Coverage [employer]   Other Status No Post-Acute Service Needs   Discharge Delays None known at this time

## 2020-03-17 LAB — BACTERIA UR CULT: NORMAL

## 2020-03-18 NOTE — DISCHARGE SUMMARY
[facility] Medicine  Discharge Summary      Patient Name: Tonya Bucio  MRN: [id number]  Admission Date: 3/13/2020  Hospital Length of Stay: 2 days  Discharge Date and Time:  03/18/2020 5:27 PM  Attending Physician: No att. providers found   Discharging Provider: Anjali Porter MD  Primary Care Provider: Tasha Leiva MD      HPI:   Tonya Bucio is a 84 y.o. female  Well known to me from clinic with PMH of COPD, pulmonary embolisim, DM, CAD, hypothyroidism presented with   Cough /wheezing for 5 days .  Cough: Patient complains of dyspnea, productive cough and wheezing.  Symptoms began 5 days ago.  The cough is non-productive, without wheezing, dyspnea or hemoptysis, productive of clear sputum and is aggravated by cold air and reclining position Associated symptoms include:change in voice and shortness of breath. Patient does not have new pets. Patient does have a history of asthma. Patient does have a history of environmental allergens. Patient does not have recent travel.         * No surgery found *      Hospital Course:   Patient is still coughing and wheezing.  She is feeling better.  She is receiving breathing treatments 4 times daily.  She is tolerating her azithromycin and Rocephin.  Cardiology evaluated patient for atrial flutter.  They changed her metoprolol to sotalol 80 mg twice daily.  They added magnesium 400 mg twice daily as well.  She is currently taking Xarelto 15 mg daily for anticoagulation.  Cardiology continued losartan 50 mg daily    3/15  Sats 96% on RA. Improved coughing and breathing. No fever. Tolerating sotalol. 70s flutter.     Consults:   Consults (From admission, onward)        Status Ordering Provider     Inpatient consult to Cardiology-[facility]  Once     Provider:  Elijah Ibarra MD    Completed TASHA LEIVA     IP consult to case management  Once     Provider:  (Not yet assigned)    Completed TASHA LEIVA.          * Typical atrial  flutter  Cardiology consulted.  Heart rate under good control  Metoprolol discontinued  Sotalol 80 mg p.o. b.i.d.  Magnesium 40 mg p.o. b.i.d.  Losartan 50 mg p.o. daily  Xarelto 15 mg p.o. daily    3/15  Rate under control. Will f/u with cards this week.    COPD exacerbation  Supplemental O2 via nasal canula; titrate O2 saturation to >92%.   Stop  Solumedrol because of hyperglycemia.    Obtain Pulmonary consultation.   Continue beta 2 agonist bronchodilator treatments.   Continue IV antibiotics azithromycin and Rocephin.  Continue routine medications as before.     3/15  Not wheezing today. No more steroids.          Bronchitis  Continue zithromax and Rocephin (3 doses).  Will send with 2 more days of azithromycin and mucinex. Also duonebs on d/c.      Morbid obesity with body mass index (BMI) of 40.0 to 44.9 in adult    # The patient is asked to make an attempt to improve diet and exercise patterns to aid in medical management of this problem.     # Eat  5 small meals a day.     # Cut out high carbohydrate  foods : bread, rice, pasta, potatoes.     # Exercise/walk 5x/week for at least 30-45  minutes.            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, without long-term current use of insulin  Lab Results   Component Value Date    HGBA1C 6.7 (H) 02/06/2020       Correction scale     3/15      Hyperlipidemia associated with type 2 diabetes mellitus  Lab Results   Component Value Date    LDLCALC 51.4 (L) 02/06/2020     Resume statin     Hypothyroidism  Lab Results   Component Value Date    TSH 2.197 02/06/2020   Resume levothyroxine      Chronic gout of multiple sites  Resume allopurinol          Final Active Diagnoses:    Diagnosis Date Noted POA    PRINCIPAL PROBLEM:  Typical atrial flutter [I48.3] 03/14/2020 Yes    Bronchitis [J40] 03/13/2020 Yes    COPD exacerbation [J44.1] 03/13/2020 Yes    Morbid obesity with body mass index  (BMI) of 40.0 to 44.9 in adult [E66.01, Z68.41] 02/28/2019 Not Applicable    History of pulmonary embolus (PE) [Z86.711] 02/21/2018 Yes    Controlled type 2 diabetes mellitus with diabetic cataract, without long-term current use of insulin [E11.36] 11/12/2015 Yes    Hyperlipidemia associated with type 2 diabetes mellitus [E11.69, E78.5] 01/21/2014 Yes    Hypothyroidism [E03.9] 07/16/2013 Yes    Chronic gout of multiple sites [M1A.09X0]  Yes      Problems Resolved During this Admission:       Discharged Condition: good    Disposition: Home or Self Care    Follow Up:  Follow-up Information     Elijah Ibarra MD In 3 days.    Specialty:  Cardiology  Contact information:  [address] [place] [id number] [phone]             Tasha Atkins MD In 5 days.    Specialty:  Internal Medicine  Contact information:  [address]  [place] [address]  [phone]                 Patient Instructions:   No discharge procedures on file.    Significant Diagnostic Studies: Labs: CMP No results for input(s): NA, K, CL, CO2, GLU, BUN, CREATININE, CALCIUM, PROT, ALBUMIN, BILITOT, ALKPHOS, AST, ALT, ANIONGAP, ESTGFRAFRICA, EGFRNONAA in the last 48 hours. and CBC No results for input(s): WBC, HGB, HCT, PLT in the last 48 hours.    Pending Diagnostic Studies:     Procedure Component Value Units Date/Time    EKG 12-lead [[id number]] Collected:  03/14/20 0857    Order Status:  Sent Lab Status:  In process Updated:  03/14/20 0933    Narrative:       Test Reason : I48.91,    Vent. Rate : 074 BPM     Atrial Rate : 277 BPM     P-R Int : 000 ms          QRS Dur : 090 ms      QT Int : 430 ms       P-R-T Axes : 000 021 090 degrees     QTc Int : 477 ms    Atrial flutter with variable A-V block  Nonspecific T wave abnormality  Prolonged QT  Abnormal ECG  When compared with ECG of 13-MAR-2020 14:09,  Criteria for Septal infarct are no longer Present  QT has lengthened    Referred By: [email or web address]   SELF           Confirmed By:           Medications:  Reconciled Home Medications:      Medication List      START taking these medications    azithromycin 250 MG tablet  Commonly known as:  Z-JEIMY  Take 1 tab daily x 2 days     levalbuterol 1.25 mg/0.5 mL nebulizer solution  Commonly known as:  XOPENEX  Take 0.5 mLs (1.25 mg total) by nebulization every 6 (six) hours as needed for Wheezing. Rescue     sotaloL 80 MG tablet  Commonly known as:  BETAPACE  Take 1 tablet (80 mg total) by mouth 2 (two) times daily.        CONTINUE taking these medications    allopurinoL 300 MG tablet  Commonly known as:  ZYLOPRIM  TAKE 1 TABLET BY MOUTH ONCE DAILY     * diabetic supplies, [employer]. [facility]  TRUE METRIX GLUCOMETER. USE AS DIRECTED TO TEST BLOOD SUGAR ONCE DAILY     * diabetic supplies, [employer]. Misc  TRUE METRIX TEST STRIPS. USE AS DIRECTED TO TEST BLOOD SUGAR DAILY.     * diabetic supplies, [employer]. [facility]  LANCETS. USE AS DIRECTED TO TEST BLOOD SUGAR DAILY.     hydroCHLOROthiazide 12.5 MG Tab  Commonly known as:  HYDRODIURIL  Take 12.5 mg by mouth once daily.     HYDROcodone-acetaminophen 7.5-325 mg per tablet  Commonly known as:  NORCO  Take 1 tablet by mouth every 24 hours as needed for Pain.     hydrocortisone 2.5 % cream  Apply topically 2 (two) times daily.     levothyroxine 75 MCG tablet  Commonly known as:  SYNTHROID  TAKE 1 TABLET BY MOUTH ONCE DAILY     losartan 50 MG tablet  Commonly known as:  COZAAR  Take 50 mg by mouth once daily.     magnesium oxide 400 mg (241.3 mg magnesium) tablet  Commonly known as:  MAG-OX  Take 1 tablet (400 mg total) by mouth 2 (two) times daily.     omega-3 acid ethyl esters 1 gram capsule  Commonly known as:  LOVAZA  TAKE 1 CAPSULE TWICE DAILY     rosuvastatin 20 MG tablet  Commonly known as:  CRESTOR  TAKE 1 TABLET BY MOUTH ONCE DAILY     SENSIPAR 60 MG Tab  Generic drug:  cinacalcet     wheelchair Melly  Use as directed     XARELTO 15 mg Tab  Generic drug:  rivaroxaban  every evening.         * This list has 3  medication(s) that are the same as other medications prescribed for you. Read the directions carefully, and ask your doctor or other care provider to review them with you.            STOP taking these medications    meclizine 12.5 mg tablet  Commonly known as:  ANTIVERT     metoprolol tartrate 50 MG tablet  Commonly known as:  LOPRESSOR            Indwelling Lines/Drains at time of discharge:   Lines/Drains/Airways     None                 Time spent on the discharge of patient: 35 minutes  Patient was seen and examined on the date of discharge and determined to be suitable for discharge.         Anjali Porter MD  Department of Hospital Medicine  [facility]

## 2020-03-19 ENCOUNTER — OFFICE VISIT (OUTPATIENT)
Dept: INTERNAL MEDICINE | Facility: CLINIC | Age: 85
End: 2020-03-19
Payer: MEDICARE

## 2020-03-19 VITALS
HEART RATE: 86 BPM | OXYGEN SATURATION: 96 % | HEIGHT: 68 IN | SYSTOLIC BLOOD PRESSURE: 90 MMHG | BODY MASS INDEX: 40.92 KG/M2 | WEIGHT: 270 LBS | DIASTOLIC BLOOD PRESSURE: 58 MMHG | RESPIRATION RATE: 18 BRPM | TEMPERATURE: 98 F

## 2020-03-19 DIAGNOSIS — Z09 HOSPITAL DISCHARGE FOLLOW-UP: Primary | ICD-10-CM

## 2020-03-19 DIAGNOSIS — J44.1 COPD EXACERBATION: ICD-10-CM

## 2020-03-19 DIAGNOSIS — I48.3 TYPICAL ATRIAL FLUTTER: ICD-10-CM

## 2020-03-19 DIAGNOSIS — J20.9 ACUTE BRONCHITIS, UNSPECIFIED ORGANISM: ICD-10-CM

## 2020-03-19 LAB
BACTERIA BLD CULT: NORMAL
BACTERIA BLD CULT: NORMAL

## 2020-03-19 PROCEDURE — 1126F AMNT PAIN NOTED NONE PRSNT: CPT | Mod: HCNC,S$GLB,, | Performed by: INTERNAL MEDICINE

## 2020-03-19 PROCEDURE — 1159F MED LIST DOCD IN RCRD: CPT | Mod: HCNC,S$GLB,, | Performed by: INTERNAL MEDICINE

## 2020-03-19 PROCEDURE — 99214 PR OFFICE/OUTPT VISIT, EST, LEVL IV, 30-39 MIN: ICD-10-PCS | Mod: HCNC,S$GLB,, | Performed by: INTERNAL MEDICINE

## 2020-03-19 PROCEDURE — 1101F PR PT FALLS ASSESS DOC 0-1 FALLS W/OUT INJ PAST YR: ICD-10-PCS | Mod: HCNC,CPTII,S$GLB, | Performed by: INTERNAL MEDICINE

## 2020-03-19 PROCEDURE — 3078F PR MOST RECENT DIASTOLIC BLOOD PRESSURE < 80 MM HG: ICD-10-PCS | Mod: HCNC,CPTII,S$GLB, | Performed by: INTERNAL MEDICINE

## 2020-03-19 PROCEDURE — 99499 RISK ADDL DX/OHS AUDIT: ICD-10-PCS | Mod: HCNC,S$GLB,, | Performed by: INTERNAL MEDICINE

## 2020-03-19 PROCEDURE — 3074F PR MOST RECENT SYSTOLIC BLOOD PRESSURE < 130 MM HG: ICD-10-PCS | Mod: HCNC,CPTII,S$GLB, | Performed by: INTERNAL MEDICINE

## 2020-03-19 PROCEDURE — 1126F PR PAIN SEVERITY QUANTIFIED, NO PAIN PRESENT: ICD-10-PCS | Mod: HCNC,S$GLB,, | Performed by: INTERNAL MEDICINE

## 2020-03-19 PROCEDURE — 1159F PR MEDICATION LIST DOCUMENTED IN MEDICAL RECORD: ICD-10-PCS | Mod: HCNC,S$GLB,, | Performed by: INTERNAL MEDICINE

## 2020-03-19 PROCEDURE — 99499 UNLISTED E&M SERVICE: CPT | Mod: HCNC,S$GLB,, | Performed by: INTERNAL MEDICINE

## 2020-03-19 PROCEDURE — 3078F DIAST BP <80 MM HG: CPT | Mod: HCNC,CPTII,S$GLB, | Performed by: INTERNAL MEDICINE

## 2020-03-19 PROCEDURE — 99999 PR PBB SHADOW E&M-EST. PATIENT-LVL III: CPT | Mod: PBBFAC,HCNC,, | Performed by: INTERNAL MEDICINE

## 2020-03-19 PROCEDURE — 99999 PR PBB SHADOW E&M-EST. PATIENT-LVL III: ICD-10-PCS | Mod: PBBFAC,HCNC,, | Performed by: INTERNAL MEDICINE

## 2020-03-19 PROCEDURE — 99214 OFFICE O/P EST MOD 30 MIN: CPT | Mod: HCNC,S$GLB,, | Performed by: INTERNAL MEDICINE

## 2020-03-19 PROCEDURE — 3074F SYST BP LT 130 MM HG: CPT | Mod: HCNC,CPTII,S$GLB, | Performed by: INTERNAL MEDICINE

## 2020-03-19 PROCEDURE — 1101F PT FALLS ASSESS-DOCD LE1/YR: CPT | Mod: HCNC,CPTII,S$GLB, | Performed by: INTERNAL MEDICINE

## 2020-03-19 RX ORDER — LEVALBUTEROL 1.25 MG/.5ML
1.25 SOLUTION, CONCENTRATE RESPIRATORY (INHALATION) EVERY 8 HOURS PRN
Qty: 100 EACH | Refills: 0 | Status: SHIPPED | OUTPATIENT
Start: 2020-03-19 | End: 2022-02-03 | Stop reason: SDUPTHER

## 2020-03-19 NOTE — PROGRESS NOTES
Patient, Tonya Bucio (MRN #[id number]), presented with a recent Estimated Glumerular Filtration Rate (EGFR) between 30 and 45 consistent with the definition of chronic kidney disease stage 3 - moderate (ICD10 - N18.3).    eGFR if non    Date Value Ref Range Status   03/15/2020 35 (A) >60 mL/min/1.73 m^2 Final     Comment:     Calculation used to obtain the estimated glomerular filtration  rate (eGFR) is the CKD-EPI equation.          The patient's chronic kidney disease stage 3 was monitored, evaluated, addressed and/or treated. This addendum to the medical record is made on 03/19/2020.

## 2020-04-08 DIAGNOSIS — M17.0 PRIMARY OSTEOARTHRITIS OF BOTH KNEES: ICD-10-CM

## 2020-04-08 RX ORDER — HYDROCODONE BITARTRATE AND ACETAMINOPHEN 7.5; 325 MG/1; MG/1
1 TABLET ORAL
Qty: 30 TABLET | Refills: 0 | Status: SHIPPED | OUTPATIENT
Start: 2020-04-08 | End: 2020-05-06 | Stop reason: SDUPTHER

## 2020-04-08 NOTE — TELEPHONE ENCOUNTER
----- Message from Noy Morse sent at 2020  1:46 PM CDT -----  Contact: self  Tonya Bucio  MRN: [id number]  : 1935  PCP: Tasha Atkins  Home Phone      [phone]  Work Phone      Not on file.  Mobile          [phone]      MESSAGE:   Pt needs a refill on [place] called in to [facility] in [place], please return call @  [phone]. thanks

## 2020-04-13 ENCOUNTER — TELEPHONE (OUTPATIENT)
Dept: INTERNAL MEDICINE | Facility: CLINIC | Age: 85
End: 2020-04-13

## 2020-04-13 NOTE — TELEPHONE ENCOUNTER
----- Message from Rosanne Dawn sent at 2020  1:11 PM CDT -----  Contact: self   Tonya Bucio  MRN: [id number]  : 1935  PCP: Tasha Atkins  Home Phone      [phone]  Work Phone      Not on file.  Mobile          [phone]    MESSAGE:   Patient request to speak to a nurse regarding appointment with Dr. Ibarra / mammogram suggested.     Phone # [phone]     Pharmacy - [facility] [address] [place] [address]

## 2020-04-15 ENCOUNTER — TELEPHONE (OUTPATIENT)
Dept: INTERNAL MEDICINE | Facility: CLINIC | Age: 85
End: 2020-04-15

## 2020-04-15 NOTE — TELEPHONE ENCOUNTER
Dr. Porter put this patient on Sotalol when she was discharged from the hospital. She would like to know if she is to continue on this medication. If so, please refill. She also states that Dr. Ibarra had an incidental finding in her breast while doing a cardiac work up. She does not know what he found or which breast he found it in. I will call his office and request medical records.

## 2020-04-15 NOTE — TELEPHONE ENCOUNTER
----- Message from Rosanne Dawn sent at 4/15/2020 12:38 PM CDT -----  Contact: self   Tonya Bucio  MRN: [id number]  : 1935  PCP: Tasha Atkins  Home Phone      [phone]  Work Phone      Not on file.  Mobile          [phone]    MESSAGE:   Rx refill - sotaloL (BETAPACE) 80 MG tablet.  Patient request to speak to a nurse regarding  medication questions  Rx per Anjali Porter MD upon hospital discharge.     Phone # [phone]    Pharmacy - [facility] [address] [place] [address]

## 2020-04-16 NOTE — TELEPHONE ENCOUNTER
Sotalol is a cardiac medication ;so best to call DR cox   My guess is that she should continue with it ;but confirm it with dr cox

## 2020-04-24 ENCOUNTER — TELEPHONE (OUTPATIENT)
Dept: INTERNAL MEDICINE | Facility: CLINIC | Age: 85
End: 2020-04-24

## 2020-04-24 DIAGNOSIS — R92.8 OTHER ABNORMAL AND INCONCLUSIVE FINDINGS ON DIAGNOSTIC IMAGING OF BREAST: ICD-10-CM

## 2020-04-24 DIAGNOSIS — R93.89 ABNORMAL RADIOLOGIC DENSITY: Primary | ICD-10-CM

## 2020-04-24 NOTE — TELEPHONE ENCOUNTER
Pt called to question if we received records from Dr Ibarra. SPECT scan shows some increased uptake in the right breast area. Dr Ibarra instructed pt to follow up with you for mmg. Pt wants to know what you think she should do. Records on your desk for review.

## 2020-04-27 NOTE — TELEPHONE ENCOUNTER
Spoke to patient. Diagnostic MMG right breast scheduled for 5/7/20. No previous mammograms seen in Epic. Patient states that previous MMG was done @ [facility].

## 2020-04-29 NOTE — TELEPHONE ENCOUNTER
----- Message from Rosanne Dawn sent at 2020 12:14 PM CDT -----  Contact: self   Tonya Bucio  MRN: [id number]  : 1935  PCP: Tasha Atkins  Home Phone      [phone]  Work Phone      Not on file.  Mobile          [phone]    MESSAGE:   Rx refill - magnesium oxide (MAG-OX) 400 mg tablet.    Phone # [phone]    Pharmacy - [place] [address]

## 2020-05-04 RX ORDER — LANOLIN ALCOHOL/MO/W.PET/CERES
400 CREAM (GRAM) TOPICAL 2 TIMES DAILY
Qty: 180 TABLET | Refills: 1 | COMMUNITY
Start: 2020-05-04 | End: 2023-08-30

## 2020-05-06 DIAGNOSIS — M17.0 PRIMARY OSTEOARTHRITIS OF BOTH KNEES: ICD-10-CM

## 2020-05-06 RX ORDER — HYDROCODONE BITARTRATE AND ACETAMINOPHEN 7.5; 325 MG/1; MG/1
1 TABLET ORAL
Qty: 30 TABLET | Refills: 0 | Status: SHIPPED | OUTPATIENT
Start: 2020-05-06 | End: 2020-06-04 | Stop reason: SDUPTHER

## 2020-05-06 RX ORDER — ALLOPURINOL 300 MG/1
300 TABLET ORAL DAILY
Qty: 30 TABLET | Refills: 11 | Status: SHIPPED | OUTPATIENT
Start: 2020-05-06 | End: 2021-05-06 | Stop reason: SDUPTHER

## 2020-05-06 RX ORDER — OMEGA-3-ACID ETHYL ESTERS 1 G/1
1 CAPSULE, LIQUID FILLED ORAL 2 TIMES DAILY
Qty: 180 CAPSULE | Refills: 0 | Status: SHIPPED | OUTPATIENT
Start: 2020-05-06 | End: 2020-08-06

## 2020-05-06 NOTE — TELEPHONE ENCOUNTER
----- Message from Rosaura Reeves sent at 2020  2:19 PM CDT -----  Contact: Self  Tonya Bucio  MRN: [id number]  : 1935  PCP: Tasha Atkins  Home Phone      [phone]  Work Phone      Not on file.  [employer]          [phone]      MESSAGE:   Refill  HYDROcodone-acetaminophen (NORCO) 7.5-325 mg per tablet  allopurinol (ZYLOPRIM) 300 MG tablet  omega-3 acid ethyl esters (LOVAZA) 1 gram capsule    Pharmacy: [facility] [address] [place] [address]    Phone: [phone]

## 2020-05-07 ENCOUNTER — HOSPITAL ENCOUNTER (OUTPATIENT)
Dept: RADIOLOGY | Facility: HOSPITAL | Age: 85
Discharge: HOME | End: 2020-05-07
Attending: INTERNAL MEDICINE
Payer: MEDICARE

## 2020-05-07 VITALS — HEIGHT: 68 IN | WEIGHT: 270 LBS | BODY MASS INDEX: 40.92 KG/M2

## 2020-05-07 DIAGNOSIS — R92.8 OTHER ABNORMAL AND INCONCLUSIVE FINDINGS ON DIAGNOSTIC IMAGING OF BREAST: ICD-10-CM

## 2020-05-07 DIAGNOSIS — R93.89 ABNORMAL RADIOLOGIC DENSITY: ICD-10-CM

## 2020-05-07 PROCEDURE — 77062 MAMMO DIGITAL DIAGNOSTIC BILAT WITH TOMOSYNTHESIS_CAD: ICD-10-PCS | Mod: 26,HCNC,, | Performed by: RADIOLOGY

## 2020-05-07 PROCEDURE — 77066 MAMMO DIGITAL DIAGNOSTIC BILAT WITH TOMOSYNTHESIS_CAD: ICD-10-PCS | Mod: 26,HCNC,, | Performed by: RADIOLOGY

## 2020-05-07 PROCEDURE — 77066 DX MAMMO INCL CAD BI: CPT | Mod: 26,HCNC,, | Performed by: RADIOLOGY

## 2020-05-07 PROCEDURE — 77066 DX MAMMO INCL CAD BI: CPT | Mod: TC,HCNC

## 2020-05-07 PROCEDURE — 77062 BREAST TOMOSYNTHESIS BI: CPT | Mod: 26,HCNC,, | Performed by: RADIOLOGY

## 2020-05-13 DIAGNOSIS — E78.5 HYPERLIPIDEMIA ASSOCIATED WITH TYPE 2 DIABETES MELLITUS: ICD-10-CM

## 2020-05-13 DIAGNOSIS — E11.69 HYPERLIPIDEMIA ASSOCIATED WITH TYPE 2 DIABETES MELLITUS: ICD-10-CM

## 2020-05-13 RX ORDER — ROSUVASTATIN CALCIUM 20 MG/1
TABLET, COATED ORAL
Qty: 90 TABLET | Refills: 0 | Status: SHIPPED | OUTPATIENT
Start: 2020-05-13 | End: 2020-08-12

## 2020-05-14 ENCOUNTER — OFFICE VISIT (OUTPATIENT)
Dept: INTERNAL MEDICINE | Facility: CLINIC | Age: 85
End: 2020-05-14
Payer: MEDICARE

## 2020-05-14 VITALS
RESPIRATION RATE: 20 BRPM | HEART RATE: 86 BPM | OXYGEN SATURATION: 97 % | HEIGHT: 68 IN | DIASTOLIC BLOOD PRESSURE: 78 MMHG | TEMPERATURE: 99 F | WEIGHT: 268.5 LBS | SYSTOLIC BLOOD PRESSURE: 134 MMHG | BODY MASS INDEX: 40.69 KG/M2

## 2020-05-14 DIAGNOSIS — Z23 IMMUNIZATION DUE: Primary | ICD-10-CM

## 2020-05-14 DIAGNOSIS — M17.0 PRIMARY OSTEOARTHRITIS OF BOTH KNEES: ICD-10-CM

## 2020-05-14 PROCEDURE — 1101F PR PT FALLS ASSESS DOC 0-1 FALLS W/OUT INJ PAST YR: ICD-10-PCS | Mod: HCNC,CPTII,S$GLB, | Performed by: INTERNAL MEDICINE

## 2020-05-14 PROCEDURE — 99213 PR OFFICE/OUTPT VISIT, EST, LEVL III, 20-29 MIN: ICD-10-PCS | Mod: HCNC,25,S$GLB, | Performed by: INTERNAL MEDICINE

## 2020-05-14 PROCEDURE — 3078F PR MOST RECENT DIASTOLIC BLOOD PRESSURE < 80 MM HG: ICD-10-PCS | Mod: HCNC,CPTII,S$GLB, | Performed by: INTERNAL MEDICINE

## 2020-05-14 PROCEDURE — 90714 TD VACCINE GREATER THAN OR EQUAL TO 7YO PRESERVATIVE FREE IM: ICD-10-PCS | Mod: HCNC,S$GLB,, | Performed by: INTERNAL MEDICINE

## 2020-05-14 PROCEDURE — 1159F MED LIST DOCD IN RCRD: CPT | Mod: HCNC,S$GLB,, | Performed by: INTERNAL MEDICINE

## 2020-05-14 PROCEDURE — 90471 IMMUNIZATION ADMIN: CPT | Mod: HCNC,S$GLB,, | Performed by: INTERNAL MEDICINE

## 2020-05-14 PROCEDURE — 90471 TD VACCINE GREATER THAN OR EQUAL TO 7YO PRESERVATIVE FREE IM: ICD-10-PCS | Mod: HCNC,S$GLB,, | Performed by: INTERNAL MEDICINE

## 2020-05-14 PROCEDURE — 90714 TD VACC NO PRESV 7 YRS+ IM: CPT | Mod: HCNC,S$GLB,, | Performed by: INTERNAL MEDICINE

## 2020-05-14 PROCEDURE — 3075F PR MOST RECENT SYSTOLIC BLOOD PRESS GE 130-139MM HG: ICD-10-PCS | Mod: HCNC,CPTII,S$GLB, | Performed by: INTERNAL MEDICINE

## 2020-05-14 PROCEDURE — 1101F PT FALLS ASSESS-DOCD LE1/YR: CPT | Mod: HCNC,CPTII,S$GLB, | Performed by: INTERNAL MEDICINE

## 2020-05-14 PROCEDURE — 1126F AMNT PAIN NOTED NONE PRSNT: CPT | Mod: HCNC,S$GLB,, | Performed by: INTERNAL MEDICINE

## 2020-05-14 PROCEDURE — 3075F SYST BP GE 130 - 139MM HG: CPT | Mod: HCNC,CPTII,S$GLB, | Performed by: INTERNAL MEDICINE

## 2020-05-14 PROCEDURE — 99999 PR PBB SHADOW E&M-EST. PATIENT-LVL V: CPT | Mod: PBBFAC,HCNC,, | Performed by: INTERNAL MEDICINE

## 2020-05-14 PROCEDURE — 3078F DIAST BP <80 MM HG: CPT | Mod: HCNC,CPTII,S$GLB, | Performed by: INTERNAL MEDICINE

## 2020-05-14 PROCEDURE — 99999 PR PBB SHADOW E&M-EST. PATIENT-LVL V: ICD-10-PCS | Mod: PBBFAC,HCNC,, | Performed by: INTERNAL MEDICINE

## 2020-05-14 PROCEDURE — 1159F PR MEDICATION LIST DOCUMENTED IN MEDICAL RECORD: ICD-10-PCS | Mod: HCNC,S$GLB,, | Performed by: INTERNAL MEDICINE

## 2020-05-14 PROCEDURE — 1126F PR PAIN SEVERITY QUANTIFIED, NO PAIN PRESENT: ICD-10-PCS | Mod: HCNC,S$GLB,, | Performed by: INTERNAL MEDICINE

## 2020-05-14 PROCEDURE — 99213 OFFICE O/P EST LOW 20 MIN: CPT | Mod: HCNC,25,S$GLB, | Performed by: INTERNAL MEDICINE

## 2020-05-14 NOTE — PROGRESS NOTES
"Subjective:       Patient ID: Tonya Bucio is a 84 y.o. female.    Chief Complaint: Follow-up (pain visit and mammogram results)    Tonya Bucio is a 84  y.o. female here for her pain meds refills.      "Patient is unable to safely ambulate with the use of a cane or walker. The patient requires the use of a wheelchair to perform ADL's within her home."       Her mammogram is normal     Chronic Pain  Past Medical History includes: chronic pain syndrome and degenerative joint disease. Patient states that the pain is chronic. Tonya describes pain involving the knee and lumbar spine. The onset of her pain began more than 1 year ago. Progression of pain since onset is waxing and waning. Patient describes pain as a 9/10. Tonya is currently treating her symptoms with hydrocodone/acetaminophen (Hycet, LorcetLortab, Norco, Vicodin). Patient has shown moderate improvement with current treatment.  Goals of therapy include: Improve ADL's and Improve Sleep. Tonya has previously tried hydrocodone/acetaminophen (Hycet, Lorcet, Lortab, Norco,Vicodin) to treat her pain. Associated symptoms include: leg pain. Previous Imaging studies include: X-Ray.  Patient has not had a drug screen. Patient does have a pain contract. Patient's history of substance abuse includes: none. Patient's psychiatric disorders include: none.  Medication Refill   Associated symptoms include arthralgias, fatigue and myalgias. Pertinent negatives include no chest pain, chills, congestion, coughing, fever, nausea, neck pain, numbness, sore throat, vomiting or weakness. The symptoms are aggravated by walking. She has tried oral narcotics for the symptoms.     Review of Systems   Constitutional: Positive for fatigue. Negative for chills, fever and weight loss.   HENT: Negative for congestion and sore throat.    Respiratory: Negative for cough and shortness of breath.    Cardiovascular: Negative for chest pain.   Gastrointestinal: Negative for nausea and " vomiting.   Musculoskeletal: Positive for arthralgias and myalgias. Negative for neck pain.   Neurological: Negative for dizziness, tingling, weakness and numbness.       Objective:      Physical Exam   Constitutional: She is oriented to person, place, and time. She appears well-developed and well-nourished.   HENT:   Head: Normocephalic and atraumatic.   Right Ear: External ear normal.   Left Ear: External ear normal.   Mouth/Throat: Oropharynx is clear and moist.   Eyes: Pupils are equal, round, and reactive to light. Conjunctivae and EOM are normal.   Neck: Normal range of motion. Neck supple. No JVD present. No tracheal deviation present. No thyromegaly present.   Cardiovascular: Normal rate, regular rhythm, normal heart sounds and intact distal pulses.   Pulses:       Dorsalis pedis pulses are 1+ on the right side, and 1+ on the left side.        Posterior tibial pulses are 1+ on the right side, and 1+ on the left side.   Pulmonary/Chest: Effort normal and breath sounds normal. No respiratory distress. She has no wheezes. She has no rales. She exhibits no tenderness.   Abdominal: Soft. Bowel sounds are normal. She exhibits no distension and no mass. There is no tenderness. There is no rebound and no guarding.   Musculoskeletal: Normal range of motion. She exhibits no edema.   Bilateral knee oa changes.     Feet:   Right Foot:   Protective Sensation: 7 sites tested.   Skin Integrity: Positive for dry skin. Negative for ulcer or erythema.   Left Foot:   Protective Sensation: 7 sites tested. 4 sites sensed.   Skin Integrity: Positive for dry skin. Negative for ulcer or erythema.   Lymphadenopathy:     She has no cervical adenopathy.   Neurological: She is alert and oriented to person, place, and time. She has normal reflexes. No cranial nerve deficit. She exhibits normal muscle tone. Coordination normal.   Skin: Skin is warm and dry.   Psychiatric: She has a normal mood and affect.   Nursing note and vitals  reviewed.      Assessment:       1. Primary osteoarthritis of both knees        Plan:   Tonya was seen today for follow-up.    Diagnoses and all orders for this visit:    Primary osteoarthritis of both knees    we discussed narcotics for pain management :    Managing chronic pain with opioids is complicated and challenging. I explained to patient that Doctors need to know if patients can follow the treatment plan, if they get desired responses from the meds, and if there are signs of developing addiction. Physicians use medication contracts to monitor patients adherence, or to help check that patients are compliant with the medications ordered. Such agreements are most commonly used when narcotic pain relievers are prescribed. Narcotics can sometimes become addictive if not taken as prescribed by a doctor.    The use of a pain management agreement allows for the documentation of understanding between a doctor and patient. Such documentation, when used as a means of facilitating care, can improve communication between doctors and patients.      Chronic, pain controlled on current regimen  Cont for now  No escalation of narcotics  Agrees to random UDS   reviewed today        Problem List Items Addressed This Visit     Primary osteoarthritis of both knees

## 2020-06-04 DIAGNOSIS — M17.0 PRIMARY OSTEOARTHRITIS OF BOTH KNEES: ICD-10-CM

## 2020-06-04 RX ORDER — HYDROCODONE BITARTRATE AND ACETAMINOPHEN 7.5; 325 MG/1; MG/1
1 TABLET ORAL
Qty: 30 TABLET | Refills: 0 | Status: SHIPPED | OUTPATIENT
Start: 2020-06-04 | End: 2020-07-09 | Stop reason: SDUPTHER

## 2020-07-09 DIAGNOSIS — M17.0 PRIMARY OSTEOARTHRITIS OF BOTH KNEES: ICD-10-CM

## 2020-07-09 RX ORDER — HYDROCODONE BITARTRATE AND ACETAMINOPHEN 7.5; 325 MG/1; MG/1
1 TABLET ORAL
Qty: 30 TABLET | Refills: 0 | Status: SHIPPED | OUTPATIENT
Start: 2020-07-09 | End: 2020-08-06 | Stop reason: SDUPTHER

## 2020-07-09 NOTE — TELEPHONE ENCOUNTER
----- Message from Rosanne Dawn sent at 2020  8:08 AM CDT -----  Regarding: Rx refills  Contact: naomi Bucio  MRN: [id number]  : 1935  PCP: Tasha Atkins  Home Phone      [phone]  Work Phone      Not on file.  Mobile          [phone]      MESSAGE:    Rx refills:  Levothyroxine 75 mcg - 1 daily  HYDROcodone-acetaminophen (NORCO) 7.5-325 mg per tablet  Patient is out of medication.     Phone # [phone]    Pharmacy - [facility] [address] [place] [address]

## 2020-07-24 ENCOUNTER — PES CALL (OUTPATIENT)
Dept: ADMINISTRATIVE | Facility: CLINIC | Age: 85
End: 2020-07-24

## 2020-08-04 ENCOUNTER — PES CALL (OUTPATIENT)
Dept: ADMINISTRATIVE | Facility: CLINIC | Age: 85
End: 2020-08-04

## 2020-08-05 ENCOUNTER — LAB VISIT (OUTPATIENT)
Dept: LAB | Facility: HOSPITAL | Age: 85
End: 2020-08-05
Attending: INTERNAL MEDICINE
Payer: MEDICARE

## 2020-08-05 DIAGNOSIS — E11.69 HYPERLIPIDEMIA ASSOCIATED WITH TYPE 2 DIABETES MELLITUS: ICD-10-CM

## 2020-08-05 DIAGNOSIS — I12.9 HYPERTENSION ASSOCIATED WITH STAGE 4 CHRONIC KIDNEY DISEASE DUE TO TYPE 2 DIABETES MELLITUS: ICD-10-CM

## 2020-08-05 DIAGNOSIS — N18.4 HYPERTENSION ASSOCIATED WITH STAGE 4 CHRONIC KIDNEY DISEASE DUE TO TYPE 2 DIABETES MELLITUS: ICD-10-CM

## 2020-08-05 DIAGNOSIS — E78.5 HYPERLIPIDEMIA ASSOCIATED WITH TYPE 2 DIABETES MELLITUS: ICD-10-CM

## 2020-08-05 DIAGNOSIS — I12.9 HYPERTENSIVE CKD (CHRONIC KIDNEY DISEASE): Primary | ICD-10-CM

## 2020-08-05 DIAGNOSIS — E11.22 HYPERTENSION ASSOCIATED WITH STAGE 4 CHRONIC KIDNEY DISEASE DUE TO TYPE 2 DIABETES MELLITUS: ICD-10-CM

## 2020-08-05 LAB
ALBUMIN SERPL BCP-MCNC: 3.4 G/DL (ref 3.5–5.2)
ALBUMIN SERPL BCP-MCNC: 3.4 G/DL (ref 3.5–5.2)
ALBUMIN/CREAT UR: 23 UG/MG (ref 0–30)
ALP SERPL-CCNC: 99 U/L (ref 55–135)
ALT SERPL W/O P-5'-P-CCNC: 19 U/L (ref 10–44)
ANION GAP SERPL CALC-SCNC: 11 MMOL/L (ref 8–16)
ANION GAP SERPL CALC-SCNC: 11 MMOL/L (ref 8–16)
AST SERPL-CCNC: 13 U/L (ref 10–40)
BASOPHILS # BLD AUTO: 0.06 K/UL (ref 0–0.2)
BASOPHILS # BLD AUTO: 0.06 K/UL (ref 0–0.2)
BASOPHILS NFR BLD: 0.4 % (ref 0–1.9)
BASOPHILS NFR BLD: 0.4 % (ref 0–1.9)
BILIRUB SERPL-MCNC: 0.8 MG/DL (ref 0.1–1)
BUN SERPL-MCNC: 31 MG/DL (ref 8–23)
BUN SERPL-MCNC: 31 MG/DL (ref 8–23)
CALCIUM SERPL-MCNC: 11 MG/DL (ref 8.7–10.5)
CALCIUM SERPL-MCNC: 11 MG/DL (ref 8.7–10.5)
CHLORIDE SERPL-SCNC: 100 MMOL/L (ref 95–110)
CHLORIDE SERPL-SCNC: 100 MMOL/L (ref 95–110)
CHOLEST SERPL-MCNC: 173 MG/DL (ref 120–199)
CHOLEST/HDLC SERPL: 4.7 {RATIO} (ref 2–5)
CO2 SERPL-SCNC: 30 MMOL/L (ref 23–29)
CO2 SERPL-SCNC: 30 MMOL/L (ref 23–29)
CREAT SERPL-MCNC: 1.5 MG/DL (ref 0.5–1.4)
CREAT SERPL-MCNC: 1.5 MG/DL (ref 0.5–1.4)
CREAT UR-MCNC: 39.1 MG/DL (ref 15–325)
CREAT UR-MCNC: 39.1 MG/DL (ref 15–325)
DIFFERENTIAL METHOD: ABNORMAL
DIFFERENTIAL METHOD: ABNORMAL
EOSINOPHIL # BLD AUTO: 0.4 K/UL (ref 0–0.5)
EOSINOPHIL # BLD AUTO: 0.4 K/UL (ref 0–0.5)
EOSINOPHIL NFR BLD: 2.7 % (ref 0–8)
EOSINOPHIL NFR BLD: 2.7 % (ref 0–8)
ERYTHROCYTE [DISTWIDTH] IN BLOOD BY AUTOMATED COUNT: 14.6 % (ref 11.5–14.5)
ERYTHROCYTE [DISTWIDTH] IN BLOOD BY AUTOMATED COUNT: 14.6 % (ref 11.5–14.5)
EST. GFR  (AFRICAN AMERICAN): 37 ML/MIN/1.73 M^2
EST. GFR  (AFRICAN AMERICAN): 37 ML/MIN/1.73 M^2
EST. GFR  (NON AFRICAN AMERICAN): 32 ML/MIN/1.73 M^2
EST. GFR  (NON AFRICAN AMERICAN): 32 ML/MIN/1.73 M^2
ESTIMATED AVG GLUCOSE: 137 MG/DL (ref 68–131)
GLUCOSE SERPL-MCNC: 131 MG/DL (ref 70–110)
GLUCOSE SERPL-MCNC: 131 MG/DL (ref 70–110)
HBA1C MFR BLD HPLC: 6.4 % (ref 4–5.6)
HCT VFR BLD AUTO: 39.3 % (ref 37–48.5)
HCT VFR BLD AUTO: 39.3 % (ref 37–48.5)
HDLC SERPL-MCNC: 37 MG/DL (ref 40–75)
HDLC SERPL: 21.4 % (ref 20–50)
HGB BLD-MCNC: 12.4 G/DL (ref 12–16)
HGB BLD-MCNC: 12.4 G/DL (ref 12–16)
IMM GRANULOCYTES # BLD AUTO: 0.08 K/UL (ref 0–0.04)
IMM GRANULOCYTES # BLD AUTO: 0.08 K/UL (ref 0–0.04)
IMM GRANULOCYTES NFR BLD AUTO: 0.6 % (ref 0–0.5)
IMM GRANULOCYTES NFR BLD AUTO: 0.6 % (ref 0–0.5)
LDLC SERPL CALC-MCNC: 66.6 MG/DL (ref 63–159)
LYMPHOCYTES # BLD AUTO: 2.4 K/UL (ref 1–4.8)
LYMPHOCYTES # BLD AUTO: 2.4 K/UL (ref 1–4.8)
LYMPHOCYTES NFR BLD: 18.1 % (ref 18–48)
LYMPHOCYTES NFR BLD: 18.1 % (ref 18–48)
MCH RBC QN AUTO: 30.1 PG (ref 27–31)
MCH RBC QN AUTO: 30.1 PG (ref 27–31)
MCHC RBC AUTO-ENTMCNC: 31.6 G/DL (ref 32–36)
MCHC RBC AUTO-ENTMCNC: 31.6 G/DL (ref 32–36)
MCV RBC AUTO: 95 FL (ref 82–98)
MCV RBC AUTO: 95 FL (ref 82–98)
MICROALBUMIN UR DL<=1MG/L-MCNC: 9 UG/ML
MONOCYTES # BLD AUTO: 0.8 K/UL (ref 0.3–1)
MONOCYTES # BLD AUTO: 0.8 K/UL (ref 0.3–1)
MONOCYTES NFR BLD: 6.2 % (ref 4–15)
MONOCYTES NFR BLD: 6.2 % (ref 4–15)
NEUTROPHILS # BLD AUTO: 9.7 K/UL (ref 1.8–7.7)
NEUTROPHILS # BLD AUTO: 9.7 K/UL (ref 1.8–7.7)
NEUTROPHILS NFR BLD: 72 % (ref 38–73)
NEUTROPHILS NFR BLD: 72 % (ref 38–73)
NONHDLC SERPL-MCNC: 136 MG/DL
NRBC BLD-RTO: 0 /100 WBC
NRBC BLD-RTO: 0 /100 WBC
PHOSPHATE SERPL-MCNC: 2.6 MG/DL (ref 2.7–4.5)
PLATELET # BLD AUTO: 322 K/UL (ref 150–350)
PLATELET # BLD AUTO: 322 K/UL (ref 150–350)
PMV BLD AUTO: 11 FL (ref 9.2–12.9)
PMV BLD AUTO: 11 FL (ref 9.2–12.9)
POTASSIUM SERPL-SCNC: 4.9 MMOL/L (ref 3.5–5.1)
POTASSIUM SERPL-SCNC: 4.9 MMOL/L (ref 3.5–5.1)
PROT SERPL-MCNC: 7.8 G/DL (ref 6–8.4)
PROT UR-MCNC: <7 MG/DL (ref 0–15)
PROT/CREAT UR: NORMAL MG/G{CREAT} (ref 0–0.2)
RBC # BLD AUTO: 4.12 M/UL (ref 4–5.4)
RBC # BLD AUTO: 4.12 M/UL (ref 4–5.4)
SODIUM SERPL-SCNC: 141 MMOL/L (ref 136–145)
SODIUM SERPL-SCNC: 141 MMOL/L (ref 136–145)
TRIGL SERPL-MCNC: 347 MG/DL (ref 30–150)
TSH SERPL DL<=0.005 MIU/L-ACNC: 2.21 UIU/ML (ref 0.4–4)
WBC # BLD AUTO: 13.51 K/UL (ref 3.9–12.7)
WBC # BLD AUTO: 13.51 K/UL (ref 3.9–12.7)

## 2020-08-05 PROCEDURE — 80061 LIPID PANEL: CPT | Mod: HCNC

## 2020-08-05 PROCEDURE — 83036 HEMOGLOBIN GLYCOSYLATED A1C: CPT | Mod: HCNC

## 2020-08-05 PROCEDURE — 84443 ASSAY THYROID STIM HORMONE: CPT | Mod: HCNC

## 2020-08-05 PROCEDURE — 84156 ASSAY OF PROTEIN URINE: CPT | Mod: HCNC

## 2020-08-05 PROCEDURE — 85025 COMPLETE CBC W/AUTO DIFF WBC: CPT | Mod: HCNC

## 2020-08-05 PROCEDURE — 84100 ASSAY OF PHOSPHORUS: CPT | Mod: HCNC

## 2020-08-05 PROCEDURE — 80053 COMPREHEN METABOLIC PANEL: CPT | Mod: HCNC

## 2020-08-05 PROCEDURE — 83970 ASSAY OF PARATHORMONE: CPT | Mod: HCNC

## 2020-08-05 PROCEDURE — 36415 COLL VENOUS BLD VENIPUNCTURE: CPT | Mod: HCNC

## 2020-08-05 PROCEDURE — 82043 UR ALBUMIN QUANTITATIVE: CPT | Mod: HCNC

## 2020-08-06 DIAGNOSIS — M17.0 PRIMARY OSTEOARTHRITIS OF BOTH KNEES: ICD-10-CM

## 2020-08-06 LAB — PTH-INTACT SERPL-MCNC: 453 PG/ML (ref 9–77)

## 2020-08-06 RX ORDER — HYDROCODONE BITARTRATE AND ACETAMINOPHEN 7.5; 325 MG/1; MG/1
1 TABLET ORAL
Qty: 30 TABLET | Refills: 0 | Status: SHIPPED | OUTPATIENT
Start: 2020-08-06 | End: 2020-09-08 | Stop reason: SDUPTHER

## 2020-08-06 NOTE — TELEPHONE ENCOUNTER
----- Message from Rosaura Reeves sent at 2020  8:47 AM CDT -----  Contact: Self  Tonya Bucio  MRN: [id number]  : 1935  PCP: Tasha Atkins  Home Phone      [phone]  Work Phone      Not on file.  [place]          [phone]      MESSAGE:   Refill  HYDROcodone-acetaminophen (NORCO) 7.5-325 mg per tablet    [place] [address]    [phone]

## 2020-08-06 NOTE — TELEPHONE ENCOUNTER
Requested Prescriptions     Pending Prescriptions Disp Refills    HYDROcodone-acetaminophen (NORCO) 7.5-325 mg per tablet 30 tablet 0     Sig: Take 1 tablet by mouth every 24 hours as needed for Pain.   LOV: 5/14/20. RTC: 8/12/20. Last fill date: 7/9/20

## 2020-08-12 ENCOUNTER — OFFICE VISIT (OUTPATIENT)
Dept: INTERNAL MEDICINE | Facility: CLINIC | Age: 85
End: 2020-08-12
Payer: MEDICARE

## 2020-08-12 VITALS
BODY MASS INDEX: 40.03 KG/M2 | RESPIRATION RATE: 16 BRPM | HEIGHT: 68 IN | DIASTOLIC BLOOD PRESSURE: 72 MMHG | OXYGEN SATURATION: 95 % | HEART RATE: 71 BPM | SYSTOLIC BLOOD PRESSURE: 126 MMHG | TEMPERATURE: 98 F | WEIGHT: 264.13 LBS

## 2020-08-12 DIAGNOSIS — G89.29 CHRONIC BILATERAL LOW BACK PAIN WITHOUT SCIATICA: ICD-10-CM

## 2020-08-12 DIAGNOSIS — E03.9 ACQUIRED HYPOTHYROIDISM: Primary | ICD-10-CM

## 2020-08-12 DIAGNOSIS — E11.9 CONTROLLED TYPE 2 DIABETES MELLITUS WITHOUT COMPLICATION, WITHOUT LONG-TERM CURRENT USE OF INSULIN: ICD-10-CM

## 2020-08-12 DIAGNOSIS — M54.50 CHRONIC BILATERAL LOW BACK PAIN WITHOUT SCIATICA: ICD-10-CM

## 2020-08-12 DIAGNOSIS — E11.69 HYPERLIPIDEMIA ASSOCIATED WITH TYPE 2 DIABETES MELLITUS: ICD-10-CM

## 2020-08-12 DIAGNOSIS — E78.5 HYPERLIPIDEMIA ASSOCIATED WITH TYPE 2 DIABETES MELLITUS: ICD-10-CM

## 2020-08-12 PROCEDURE — 99499 RISK ADDL DX/OHS AUDIT: ICD-10-PCS | Mod: HCNC,S$GLB,, | Performed by: INTERNAL MEDICINE

## 2020-08-12 PROCEDURE — 3074F SYST BP LT 130 MM HG: CPT | Mod: HCNC,CPTII,S$GLB, | Performed by: INTERNAL MEDICINE

## 2020-08-12 PROCEDURE — 3078F PR MOST RECENT DIASTOLIC BLOOD PRESSURE < 80 MM HG: ICD-10-PCS | Mod: HCNC,CPTII,S$GLB, | Performed by: INTERNAL MEDICINE

## 2020-08-12 PROCEDURE — 99499 UNLISTED E&M SERVICE: CPT | Mod: HCNC,S$GLB,, | Performed by: INTERNAL MEDICINE

## 2020-08-12 PROCEDURE — 99999 PR PBB SHADOW E&M-EST. PATIENT-LVL III: ICD-10-PCS | Mod: PBBFAC,HCNC,, | Performed by: INTERNAL MEDICINE

## 2020-08-12 PROCEDURE — 1126F AMNT PAIN NOTED NONE PRSNT: CPT | Mod: HCNC,S$GLB,, | Performed by: INTERNAL MEDICINE

## 2020-08-12 PROCEDURE — 1101F PR PT FALLS ASSESS DOC 0-1 FALLS W/OUT INJ PAST YR: ICD-10-PCS | Mod: HCNC,CPTII,S$GLB, | Performed by: INTERNAL MEDICINE

## 2020-08-12 PROCEDURE — 1101F PT FALLS ASSESS-DOCD LE1/YR: CPT | Mod: HCNC,CPTII,S$GLB, | Performed by: INTERNAL MEDICINE

## 2020-08-12 PROCEDURE — 1126F PR PAIN SEVERITY QUANTIFIED, NO PAIN PRESENT: ICD-10-PCS | Mod: HCNC,S$GLB,, | Performed by: INTERNAL MEDICINE

## 2020-08-12 PROCEDURE — 3074F PR MOST RECENT SYSTOLIC BLOOD PRESSURE < 130 MM HG: ICD-10-PCS | Mod: HCNC,CPTII,S$GLB, | Performed by: INTERNAL MEDICINE

## 2020-08-12 PROCEDURE — 99214 OFFICE O/P EST MOD 30 MIN: CPT | Mod: HCNC,S$GLB,, | Performed by: INTERNAL MEDICINE

## 2020-08-12 PROCEDURE — 99999 PR PBB SHADOW E&M-EST. PATIENT-LVL III: CPT | Mod: PBBFAC,HCNC,, | Performed by: INTERNAL MEDICINE

## 2020-08-12 PROCEDURE — 1159F MED LIST DOCD IN RCRD: CPT | Mod: HCNC,S$GLB,, | Performed by: INTERNAL MEDICINE

## 2020-08-12 PROCEDURE — 3078F DIAST BP <80 MM HG: CPT | Mod: HCNC,CPTII,S$GLB, | Performed by: INTERNAL MEDICINE

## 2020-08-12 PROCEDURE — 99214 PR OFFICE/OUTPT VISIT, EST, LEVL IV, 30-39 MIN: ICD-10-PCS | Mod: HCNC,S$GLB,, | Performed by: INTERNAL MEDICINE

## 2020-08-12 PROCEDURE — 1159F PR MEDICATION LIST DOCUMENTED IN MEDICAL RECORD: ICD-10-PCS | Mod: HCNC,S$GLB,, | Performed by: INTERNAL MEDICINE

## 2020-08-12 NOTE — PROGRESS NOTES
Subjective:       Patient ID: Tonya Bucio is a 85 y.o. female.    Chief Complaint: Follow-up, Hyperlipidemia, Hypothyroidism, and Diabetes    Tonya Bucio is a 85  y.o. female here for routine check up.    Patient ambulates with walker around home, wheelchair for outside home. No medication refills needed at this time.    Follow-up  Associated symptoms include arthralgias, fatigue, myalgias and neck pain. Pertinent negatives include no chest pain, chills, congestion, coughing, fever, nausea, numbness, sore throat, vomiting or weakness.   Hyperlipidemia  This is a chronic problem. The current episode started more than 1 year ago. The problem is uncontrolled. Associated symptoms include leg pain and myalgias. Pertinent negatives include no chest pain or shortness of breath. There are no compliance problems.    Diabetes  She presents for her follow-up diabetic visit. She has type 2 diabetes mellitus. Her disease course has been stable. Pertinent negatives for hypoglycemia include no confusion, dizziness, nervousness/anxiousness or pallor. Associated symptoms include fatigue. Pertinent negatives for diabetes include no chest pain, no polydipsia, no polyphagia, no weakness and no weight loss.   Chronic Pain  Past Medical History Includes::  Chronic pain syndrome and degenerative joint disease  Chronicity:  Chronic  Onset:  More than 1 year ago  Progression since onset:  Waxing and waning  Pain location:  Knee, lumbar spine and cervical spine  Medications Tried:  Hydrocodone/acetaminophen (Hycet, Lorcet, Lortab, Norco,Vicodin)  Previous Imaging:  X-Ray  Current treatment:  Hydrocodone/acetaminophen (Hycet, LorcetLortab, Norco, Vicodin)  Improvement on Current Treatment:  Moderate  Goals of therapy:  Improve ADL's and Improve Sleep  Associated symptoms: leg pain    Associated symptoms: no chest pain, no dizziness, no shortness of breath, no numbness, no tingling, no weakness, no weight loss and no nausea    Drug  Screen: No    Pain Contract: Yes    Psychiatric Disorders:  None  History of Substance Abuse:  None    Review of Systems   Constitutional: Positive for fatigue. Negative for chills, fever and weight loss.   HENT: Negative for congestion and sore throat.    Respiratory: Negative for cough and shortness of breath.    Cardiovascular: Negative for chest pain and palpitations.   Gastrointestinal: Negative for nausea and vomiting.   Endocrine: Negative for polydipsia and polyphagia.   Genitourinary: Negative for dysuria.   Musculoskeletal: Positive for arthralgias, myalgias and neck pain.   Skin: Negative for pallor.   Neurological: Negative for dizziness, tingling, weakness and numbness.   Psychiatric/Behavioral: Negative for confusion. The patient is not nervous/anxious.        Objective:      Physical Exam  Vitals signs and nursing note reviewed.   Constitutional:       Appearance: She is well-developed.   HENT:      Head: Normocephalic and atraumatic.      Right Ear: External ear normal.      Left Ear: External ear normal.   Eyes:      Conjunctiva/sclera: Conjunctivae normal.      Pupils: Pupils are equal, round, and reactive to light.   Neck:      Musculoskeletal: Normal range of motion and neck supple.      Thyroid: No thyromegaly.      Vascular: No JVD.      Trachea: No tracheal deviation.   Cardiovascular:      Rate and Rhythm: Normal rate and regular rhythm.      Pulses:           Dorsalis pedis pulses are 1+ on the right side and 1+ on the left side.        Posterior tibial pulses are 1+ on the right side and 1+ on the left side.      Heart sounds: Normal heart sounds.   Pulmonary:      Effort: Pulmonary effort is normal. No respiratory distress.      Breath sounds: Normal breath sounds. No wheezing or rales.   Chest:      Chest wall: No tenderness.   Abdominal:      General: Bowel sounds are normal. There is no distension.      Palpations: Abdomen is soft. There is no mass.      Tenderness: There is no  abdominal tenderness. There is no guarding or rebound.   Musculoskeletal: Normal range of motion.      Comments: Bilateral knee oa changes.     Feet:      Right foot:      Protective Sensation: 7 sites tested.      Skin integrity: Dry skin present. No ulcer or erythema.      Left foot:      Protective Sensation: 7 sites tested. 4 sites sensed.      Skin integrity: Dry skin present. No ulcer or erythema.   Lymphadenopathy:      Cervical: No cervical adenopathy.   Skin:     General: Skin is warm and dry.   Neurological:      Mental Status: She is alert and oriented to person, place, and time.      Cranial Nerves: No cranial nerve deficit.      Motor: No abnormal muscle tone.      Coordination: Coordination normal.      Deep Tendon Reflexes: Reflexes are normal and symmetric.         Assessment:       1. Acquired hypothyroidism    2. Hyperlipidemia associated with type 2 diabetes mellitus    3. Controlled type 2 diabetes mellitus without complication, without long-term current use of insulin    4. Chronic bilateral low back pain without sciatica        Plan:   Tonya was seen today for follow-up, hyperlipidemia, hypothyroidism and diabetes.    Diagnoses and all orders for this visit:    Acquired hypothyroidism  Lab Results   Component Value Date    TSH 2.208 08/05/2020   The current medical regimen is effective;  continue present plan and medications.  Hyperlipidemia associated with type 2 diabetes mellitus  Lab Results   Component Value Date    LDLCALC 66.6 08/05/2020     Controlled type 2 diabetes mellitus without complication, without long-term current use of insulin  The current medical regimen is effective;  continue present plan and medications.   Lab Results   Component Value Date    HGBA1C 6.4 (H) 08/05/2020     Chronic bilateral low back pain without sciatica    Currently taking narcotics for pain, will continue. See patient every 3 months.     Problem List Items Addressed This Visit     Hypothyroidism -  Primary    Hyperlipidemia associated with type 2 diabetes mellitus    Controlled type 2 diabetes mellitus without complication, without long-term current use of insulin

## 2020-09-08 DIAGNOSIS — M17.0 PRIMARY OSTEOARTHRITIS OF BOTH KNEES: ICD-10-CM

## 2020-09-08 RX ORDER — HYDROCODONE BITARTRATE AND ACETAMINOPHEN 7.5; 325 MG/1; MG/1
1 TABLET ORAL
Qty: 30 TABLET | Refills: 0 | Status: SHIPPED | OUTPATIENT
Start: 2020-09-08 | End: 2020-10-07 | Stop reason: SDUPTHER

## 2020-09-08 NOTE — TELEPHONE ENCOUNTER
----- Message from Rosanne Dawn sent at 2020  8:42 AM CDT -----  Regarding: Rx Refill  Contact: naomi Bucio  MRN: [id number]  : 1935  PCP: Tasha Atkins  Home Phone      [phone]  Work Phone      Not on file.  Mobile          [phone]      MESSAGE:    Rx Refill - HYDROcodone-acetaminophen (NORCO) 7.5-325 mg per tablet. Patient is out of medication.     Phone # [phone]    Pharmacy - [facility] [address] [place] [address]

## 2020-09-08 NOTE — TELEPHONE ENCOUNTER
Requested Prescriptions     Pending Prescriptions Disp Refills    HYDROcodone-acetaminophen (NORCO) 7.5-325 mg per tablet 30 tablet 0     Sig: Take 1 tablet by mouth every 24 hours as needed for Pain.   LOV: 8/12/20. Last fill date: 8/6/12. [facility]

## 2020-09-14 NOTE — TELEPHONE ENCOUNTER
Notified patient that BLADIMIR Rao attempted to send script for Lidocaine cream but it wasn't covered under patient's formulary. Advised patient to try OTC pain patch. Patient verbally stated understanding.   Universal Screening Protocol for COVID-19    Writer called Mother for Universal Screening Protocol that is in place for COVID-19.     Per patient/patient's Mother, does have negative screen. Please see below.     Mother advised triage nurse, or provider will review and contact Mother for further instructions if needed.     Mother also advised if patient is seen in office, only 1 person is to come with patient to reduce any potential exposures etc. (If under 1 month, up to 2 persons). Advised if they had their own face mask we encourage they wear them, if they don't, we can provide one if needed. Advised to arrive early to have a temperature screening before entering the clinic. Advised if any questions, or if symptoms develop throughout the remainder of the day/night/weekend, to call the office to inform us prior to appointment/arrival to clinic.     Mother verbalized understanding and is agreement with this plan.     1.  Has the patient, or anyone in the household, been in close contact with a person known to have a positive test for COVID-19 in the past 14 days?    No    2. Has the patient or anyone in the household, been tested for COVID-19 in the last 14 days? Is anyone awaiting test results?     No    3.  Has the patient, or anyone in the household, had any travel outside of the state of [place] or [place] in the last 14 days?   No       If YES to travel, how did the patient travel? Where did the patient travel?            4.  Does patient OR patient's household members have any of the following for the last 14 days?:   Temperature/fever (100 or/>), cough, shortness of breath, sore throat, difficulty breathing, chills, repeated shaking with chills, muscle pain, headache, any new loss of smell or taste, nausea, vomiting or diarrhea?   If so, when was the onset of the patient's symptoms?  (If Yes See Below for Symptom List)   No  Who in household has symptom?           Current Patient Symptoms:  Symptom Response   Fever >  100.F? /Chills/Shaking with Chills?  No   Headache?  No   Runny Nose?   No   Sneezing?   No   Sore Throat?  No   Myalgias? (Achy, Irritable) No   Fatigue? (Sleeping More?) No   Cough?  No   If cough is present, is it dry?  No   Dyspnea? No   Evidence of Respiratory Distress?  No   Nausea, vomiting or diarrhea?  No   Loss of taste or smell?  No

## 2020-10-07 DIAGNOSIS — M17.0 PRIMARY OSTEOARTHRITIS OF BOTH KNEES: ICD-10-CM

## 2020-10-07 RX ORDER — HYDROCODONE BITARTRATE AND ACETAMINOPHEN 7.5; 325 MG/1; MG/1
1 TABLET ORAL
Qty: 30 TABLET | Refills: 0 | Status: SHIPPED | OUTPATIENT
Start: 2020-10-07 | End: 2020-11-05 | Stop reason: SDUPTHER

## 2020-10-07 NOTE — TELEPHONE ENCOUNTER
----- Message from Rosanne Dawn sent at 10/7/2020 10:38 AM CDT -----  Regarding: Rx Refill  Contact: naomi Bucio  MRN: [id number]  : 1935  PCP: Tasha Atkins  Home Phone      [phone]  Work Phone      Not on file.  Mobile          [phone]      MESSAGE:    Rx Refill - HYDROcodone-acetaminophen (NORCO) 7.5-325 mg per tablet. Patient has one pill left.     Phone # [phone]    Pharmacy - [facility] [address] [place] [address]

## 2020-11-05 DIAGNOSIS — M17.0 PRIMARY OSTEOARTHRITIS OF BOTH KNEES: ICD-10-CM

## 2020-11-05 NOTE — TELEPHONE ENCOUNTER
----- Message from Rosaura Reeves sent at 2020 10:19 AM CST -----  Contact: Self  Tonya Bucio  MRN: [id number]  : 1935  PCP: Tasha Atkins  Home Phone      [phone]  Work Phone      Not on file.  [place]          [phone]      MESSAGE:     Refill  HYDROcodone-acetaminophen (NORCO) 7.5-325 mg per tablet    [place] [address]    [phone]

## 2020-11-06 RX ORDER — HYDROCODONE BITARTRATE AND ACETAMINOPHEN 7.5; 325 MG/1; MG/1
1 TABLET ORAL
Qty: 30 TABLET | Refills: 0 | Status: SHIPPED | OUTPATIENT
Start: 2020-11-06 | End: 2020-11-25 | Stop reason: SDUPTHER

## 2020-11-10 ENCOUNTER — OFFICE VISIT (OUTPATIENT)
Dept: INTERNAL MEDICINE | Facility: CLINIC | Age: 85
End: 2020-11-10
Payer: MEDICARE

## 2020-11-10 VITALS
DIASTOLIC BLOOD PRESSURE: 64 MMHG | BODY MASS INDEX: 39.83 KG/M2 | SYSTOLIC BLOOD PRESSURE: 126 MMHG | HEIGHT: 68 IN | WEIGHT: 262.81 LBS | TEMPERATURE: 99 F | OXYGEN SATURATION: 97 % | HEART RATE: 63 BPM | RESPIRATION RATE: 16 BRPM

## 2020-11-10 DIAGNOSIS — Z79.01 LONG TERM (CURRENT) USE OF ANTICOAGULANTS: ICD-10-CM

## 2020-11-10 DIAGNOSIS — I26.92 CHRONIC SADDLE PULMONARY EMBOLISM WITHOUT ACUTE COR PULMONALE: ICD-10-CM

## 2020-11-10 DIAGNOSIS — I27.23 PULMONARY HYPERTENSION DUE TO COPD: ICD-10-CM

## 2020-11-10 DIAGNOSIS — E78.5 HYPERLIPIDEMIA ASSOCIATED WITH TYPE 2 DIABETES MELLITUS: ICD-10-CM

## 2020-11-10 DIAGNOSIS — J44.9 PULMONARY HYPERTENSION DUE TO COPD: ICD-10-CM

## 2020-11-10 DIAGNOSIS — R26.9 ABNORMALITY OF GAIT AND MOBILITY: ICD-10-CM

## 2020-11-10 DIAGNOSIS — M15.9 PRIMARY OSTEOARTHRITIS INVOLVING MULTIPLE JOINTS: ICD-10-CM

## 2020-11-10 DIAGNOSIS — E83.52 PARATHYROID RELATED HYPERCALCEMIA: ICD-10-CM

## 2020-11-10 DIAGNOSIS — E11.9 CONTROLLED TYPE 2 DIABETES MELLITUS WITHOUT COMPLICATION, WITHOUT LONG-TERM CURRENT USE OF INSULIN: ICD-10-CM

## 2020-11-10 DIAGNOSIS — E11.69 HYPERLIPIDEMIA ASSOCIATED WITH TYPE 2 DIABETES MELLITUS: ICD-10-CM

## 2020-11-10 DIAGNOSIS — N18.30 HYPERTENSION ASSOCIATED WITH STAGE 3 CHRONIC KIDNEY DISEASE DUE TO TYPE 2 DIABETES MELLITUS: ICD-10-CM

## 2020-11-10 DIAGNOSIS — E66.01 MORBID OBESITY WITH BODY MASS INDEX (BMI) OF 40.0 TO 44.9 IN ADULT: ICD-10-CM

## 2020-11-10 DIAGNOSIS — Z00.00 ENCOUNTER FOR PREVENTIVE HEALTH EXAMINATION: Primary | ICD-10-CM

## 2020-11-10 DIAGNOSIS — E11.22 HYPERTENSION ASSOCIATED WITH STAGE 3 CHRONIC KIDNEY DISEASE DUE TO TYPE 2 DIABETES MELLITUS: ICD-10-CM

## 2020-11-10 DIAGNOSIS — H91.91 HEARING LOSS OF RIGHT EAR, UNSPECIFIED HEARING LOSS TYPE: ICD-10-CM

## 2020-11-10 DIAGNOSIS — Z99.3 DEPENDENCE ON WHEELCHAIR: ICD-10-CM

## 2020-11-10 DIAGNOSIS — E21.5 PARATHYROID RELATED HYPERCALCEMIA: ICD-10-CM

## 2020-11-10 DIAGNOSIS — H61.22 EXCESSIVE CERUMEN IN LEFT EAR CANAL: ICD-10-CM

## 2020-11-10 DIAGNOSIS — J40 BRONCHITIS: ICD-10-CM

## 2020-11-10 DIAGNOSIS — I48.3 TYPICAL ATRIAL FLUTTER: ICD-10-CM

## 2020-11-10 DIAGNOSIS — M17.0 PRIMARY OSTEOARTHRITIS OF BOTH KNEES: ICD-10-CM

## 2020-11-10 DIAGNOSIS — I27.82 CHRONIC SADDLE PULMONARY EMBOLISM WITHOUT ACUTE COR PULMONALE: ICD-10-CM

## 2020-11-10 DIAGNOSIS — I12.9 HYPERTENSION ASSOCIATED WITH STAGE 3 CHRONIC KIDNEY DISEASE DUE TO TYPE 2 DIABETES MELLITUS: ICD-10-CM

## 2020-11-10 DIAGNOSIS — N18.32 STAGE 3B CHRONIC KIDNEY DISEASE: ICD-10-CM

## 2020-11-10 DIAGNOSIS — I77.1 TORTUOUS AORTA: ICD-10-CM

## 2020-11-10 PROBLEM — N18.4 HYPERTENSION ASSOCIATED WITH STAGE 4 CHRONIC KIDNEY DISEASE DUE TO TYPE 2 DIABETES MELLITUS: Status: RESOLVED | Noted: 2019-08-14 | Resolved: 2020-11-10

## 2020-11-10 PROCEDURE — G0439 PR MEDICARE ANNUAL WELLNESS SUBSEQUENT VISIT: ICD-10-PCS | Mod: HCNC,S$GLB,, | Performed by: NURSE PRACTITIONER

## 2020-11-10 PROCEDURE — 99999 PR PBB SHADOW E&M-EST. PATIENT-LVL V: ICD-10-PCS | Mod: PBBFAC,HCNC,, | Performed by: NURSE PRACTITIONER

## 2020-11-10 PROCEDURE — 3078F PR MOST RECENT DIASTOLIC BLOOD PRESSURE < 80 MM HG: ICD-10-PCS | Mod: HCNC,CPTII,S$GLB, | Performed by: NURSE PRACTITIONER

## 2020-11-10 PROCEDURE — 99499 UNLISTED E&M SERVICE: CPT | Mod: S$GLB,,, | Performed by: NURSE PRACTITIONER

## 2020-11-10 PROCEDURE — 90694 VACC AIIV4 NO PRSRV 0.5ML IM: CPT | Mod: HCNC,S$GLB,, | Performed by: NURSE PRACTITIONER

## 2020-11-10 PROCEDURE — 3078F DIAST BP <80 MM HG: CPT | Mod: HCNC,CPTII,S$GLB, | Performed by: NURSE PRACTITIONER

## 2020-11-10 PROCEDURE — 3074F SYST BP LT 130 MM HG: CPT | Mod: HCNC,CPTII,S$GLB, | Performed by: NURSE PRACTITIONER

## 2020-11-10 PROCEDURE — 90694 FLU VACCINE - QUADRIVALENT - ADJUVANTED: ICD-10-PCS | Mod: HCNC,S$GLB,, | Performed by: NURSE PRACTITIONER

## 2020-11-10 PROCEDURE — G0008 FLU VACCINE - QUADRIVALENT - ADJUVANTED: ICD-10-PCS | Mod: HCNC,S$GLB,, | Performed by: NURSE PRACTITIONER

## 2020-11-10 PROCEDURE — 3074F PR MOST RECENT SYSTOLIC BLOOD PRESSURE < 130 MM HG: ICD-10-PCS | Mod: HCNC,CPTII,S$GLB, | Performed by: NURSE PRACTITIONER

## 2020-11-10 PROCEDURE — 99499 RISK ADDL DX/OHS AUDIT: ICD-10-PCS | Mod: S$GLB,,, | Performed by: NURSE PRACTITIONER

## 2020-11-10 PROCEDURE — G0008 ADMIN INFLUENZA VIRUS VAC: HCPCS | Mod: HCNC,S$GLB,, | Performed by: NURSE PRACTITIONER

## 2020-11-10 PROCEDURE — G0439 PPPS, SUBSEQ VISIT: HCPCS | Mod: HCNC,S$GLB,, | Performed by: NURSE PRACTITIONER

## 2020-11-10 PROCEDURE — 99999 PR PBB SHADOW E&M-EST. PATIENT-LVL V: CPT | Mod: PBBFAC,HCNC,, | Performed by: NURSE PRACTITIONER

## 2020-11-10 NOTE — PROGRESS NOTES
"  Tonya Bucio presented for a  Medicare AWV and comprehensive Health Risk Assessment today. The following components were reviewed and updated:    · Medical history  · Family History  · Social history  · Allergies and Current Medications  · Health Risk Assessment  · Health Maintenance  · Care Team     ** See Completed Assessments for Annual Wellness Visit within the encounter summary.**         The following assessments were completed:  · Living Situation  · CAGE  · Depression Screening  · Timed Get Up and Go  · Whisper Test  · Cognitive Function Screening  · Nutrition Screening  · ADL Screening  · PAQ Screening        Vitals:    11/10/20 1106   BP: 126/64   Pulse: 63   Resp: 16   Temp: 98.6 °F (37 °C)   SpO2: 97%   Weight: 119.2 kg (262 lb 12.6 oz)   Height: 5' 8" (1.727 m)     Body mass index is 39.96 kg/m².  Physical Exam  Vitals signs and nursing note reviewed.   Constitutional:       Appearance: She is well-developed. She is obese.   HENT:      Head: Normocephalic and atraumatic.      Right Ear: Decreased hearing noted.      Left Ear: There is impacted cerumen.      Ears:      Comments: Left ear canal irrigated and cerumen removed manually with device, TM normal - very small amount of cerumen remaining in canal  3:00 position     Nose: Nose normal.   Eyes:      Pupils: Pupils are equal, round, and reactive to light.   Neck:      Musculoskeletal: Normal range of motion and neck supple.   Cardiovascular:      Rate and Rhythm: Normal rate and regular rhythm.      Heart sounds: No murmur.   Pulmonary:      Effort: Pulmonary effort is normal.      Breath sounds: Normal breath sounds.   Abdominal:      General: Bowel sounds are normal.      Palpations: Abdomen is soft.   Musculoskeletal: Normal range of motion.      Comments: Bilateral knees OA changes     Skin:     General: Skin is warm and dry.      Capillary Refill: Capillary refill takes less than 2 seconds.   Neurological:      Mental Status: She is alert and " oriented to person, place, and time.   Psychiatric:         Behavior: Behavior normal.         Thought Content: Thought content normal.         Judgment: Judgment normal.               Diagnoses and health risks identified today and associated recommendations/orders:    1. Encounter for preventive health examination  Health maintenance reviewed   - Flu Vaccine - Quadrivalent - High Dose (65+) (PF) (IM)  Defers bone density ; would like to discuss with PCP further but denies hx of fracture and W/C bound with transfers      2. Dependence on wheelchair  Pt uses W/C due to severe DJD of knees; ambulates short distances with walker and is independent with transfers and bathing/hygeine     3. Abnormality of gait and mobility    Pt uses W/C due to severe DJD of knees; ambulates short distances with walker and is independent with transfers and bathing/hygeine       4. Stage 3b chronic kidney disease  Follows with Dr. Ritter- has been stable, labs reviewed     5. Primary osteoarthritis of both knees  Pt uses W/C due to severe DJD of knees; ambulates short distances with walker and is independent with transfers and bathing/hygeine   Hydrocodone daily prn pain, has been effective , not NSAID candidate due to CKD,         6. Hypertension associated with stage 3 chronic kidney disease due to type 2 diabetes mellitus  BP well controlled with losartan and HCTZ; follows with nephrology  Diabetes well controlled on    7. Parathyroid related hypercalcemia  PCP and nephrology monitoring calcium; pt aware of elevated calcium and they have discussed with care team possible future surgery; she is fearful of surgery due to age. Plan for now is to continue to monitor unless calcium trending up with associated symptoms    8. Chronic saddle pulmonary embolism without acute cor pulmonale  Stable on xarelto     9. Controlled type 2 diabetes mellitus without complication, without long-term current use of insulin  Diet controlled  Lab Results    Component Value Date    HGBA1C 6.4 (H) 08/05/2020         10. Morbid obesity with body mass index (BMI) of 40.0 to 44.9 in adult  Non ambulatory due to chronic pain, The patient is asked to make an attempt to improve diet and exercise patterns to aid in medical management of this problem.    11. Hyperlipidemia associated with type 2 diabetes mellitus  Stable with statin     12. Tortuous aorta  Follows with cardiology  Stable with statin    13. Pulmonary hypertension due to COPD  Follows with cards; denies SOB - stable       14. Bronchitis  Chronic bronchitis/ COPD no recent exacerbations or acute complaints  Has nebulizer as needed - no recent need     15. Long term (current) use of anticoagulants  Stable on xarelto for hx of PE and Aflutter, managed per cardilogy    16. Typical atrial flutter  Managed per cards , betapace and xarelto- stable     17. Primary osteoarthritis involving multiple joints  Hydrocodone daily prn pain, has been effective , not NSAID candidate due to CKD,     18. Hearing loss of right ear, unspecified hearing loss type  Stable with hearing aid to right - TM nML today     19. Excessive cerumen in left ear canal  Left ear - does not use hearing aid in this ear- hard cerumen noted - irrigated successfully       Provided Tonya with a 5-10 year written screening schedule and personal prevention plan. Recommendations were developed using the USPSTF age appropriate recommendations. Education, counseling, and referrals were provided as needed. After Visit Summary printed and given to patient which includes a list of additional screenings\tests needed.    No follow-ups on file.    Staci Parmar NP

## 2020-11-10 NOTE — PATIENT INSTRUCTIONS
Counseling and Referral of Other Preventative  (Italic type indicates deductible and co-insurance are waived)    Patient Name: Tonya Bucio  Today's Date: 11/10/2020    Health Maintenance       Date Due Completion Date    DEXA SCAN 06/18/1976 ---    Shingles Vaccine (1 of 2) 06/18/1986 ---    Eye Exam 10/22/2019 10/22/2018    Influenza Vaccine (1) 08/01/2020 11/11/2019    Hemoglobin A1c 02/05/2021 8/5/2020    Lipid Panel 08/05/2021 8/5/2020    Foot Exam 08/12/2021 8/12/2020    Override on 8/14/2019: Done    TETANUS VACCINE 05/14/2030 5/14/2020        Orders Placed This Encounter   Procedures    Flu Vaccine - Quadrivalent - High Dose (65+) (PF) (IM)     The following information is provided to all patients.  This information is to help you find resources for any of the problems found today that may be affecting your health:                Living healthy guide: www.[facility].[place].[facility]      Understanding Diabetes: www.diabetes.org      Eating healthy: www.cdc.gov/healthyweight      [facility] home safety checklist: www.cdc.gov/steadi/patient.html      Agency on Aging: www.goea.[place].[facility]      Alcoholics anonymous (AA): www.aa.org      Physical Activity: www.kristyn.nih.gov/ck9qesa      Tobacco use: www.quitwithusla.org     Prevention Guidelines, Women Ages 65 and Older  Screening tests and vaccines are an important part of managing your health. Health counseling is essential, too. Below are guidelines for these, for women ages 65 and older. Talk with your healthcare provider to make sure youre up to date on what you need.  Screening Who needs it How often   Type 2 diabetes or prediabetes All adults beginning at age 45 and adults without symptoms at any age who are overweight or obese and have 1 or more additional risk factors for diabetes At least every 3 years   Alcohol misuse All women in this age group At routine exams   Blood pressure All women in this age group Every 2 years if your blood pressure is less than 120/80  mm Hg; yearly if your systolic blood pressure is 120 to 139 mm Hg, or your diastolic blood pressure reading is 80 to 89 mm Hg   Breast cancer All women in this age group Yearly mammogram and clinical breast exam1   Cervical cancer Only women who had abnormal screening results before age 65 Talk with your healthcare provider   Chlamydia Women at increased risk for infection At routine exams   Colorectal cancer All women in this age group1 Flexible sigmoidoscopy every 5 years, or colonoscopy every 10 years, or double-contrast barium enema every 5 years; yearly fecal occult blood test or fecal immunochemical test; or a stool DNA test as often as your healthcare provider advises; talk with your healthcare provider about which tests are best for you   Depression All women in this age group At routine exams   Gonorrhea Sexually active women at increased risk for infection At routine exams   Hepatitis C Anyone at increased risk; 1 time for those born between 1945 and 1965 At routine exams   High cholesterol or triglycerides All women in this age group who are at risk for coronary artery disease At least every 5 years   HIV Women at increased risk for infection - talk with your healthcare provider At routine exams   Lung cancer Adults age 55 to 80 who have smoked Yearly screening in smokers with 30 pack-year history of smoking or who quit within 15 years   Obesity All women in this age group At routine exams   Osteoporosis All women in this age group Bone density test at age 65, then follow-up as advised by your healthcare provider   Syphilis Women at increased risk for infection - talk with your healthcare provider At routine exams   Thyroid-Stimulating Hormone (TSH) All women in this age group Every 5 years   Tuberculosis Women at increased risk for infection - talk with your healthcare provider Ask your healthcare provider   Vision All women in this age group Every 1 to 2 years; if you have a chronic health condition,  ask your healthcare provider if you need exams more often   Vaccine Who needs it How often   Chickenpox (varicella) All women in this age group who have no record of this infection or vaccine 2 doses; second dose should be given at least 4 weeks after the first dose   Hepatitis A Women at increased risk for infection - talk with your healthcare provider 2 doses given 6 months apart   Hepatitis B Women at increased risk for infection - talk with your healthcare provider 3 doses over 6 months; second dose should be given 1 month after the first dose; the third dose should be given at least 2 months after the second dose and at least 4 months after the first dose   Haemophilus influenza Type B (HIB) Women at increased risk for infection - talk with your healthcare provider 1 to 3 doses   Influenza (flu) All women in this age group Once a year   Pneumococcal conjugate vaccine (PCV13) and pneumococcal polysaccharide vaccine (PPSV23) All women in this age group 1 dose of each vaccine   Tetanus/diphtheria/pertussis (Td/Tdap) booster All women in this age group Td every 10 years, or a one-time dose of Tdap instead of a Td booster after age 18, then Td every 10 years   Zoster All women in this age group 1 dose   Counseling Who needs it How often   Diet and exercise Women who are overweight or obese When diagnosed, and then at routine exams   Fall prevention (exercise and vitamin D supplements) All women in this age group At routine exams   Sexually transmitted infection prevention Women at increased risk for infection - talk with your healthcare provider At routine exams   Use of daily aspirin Women ages 55 and up in this age group who are at risk for cardiovascular health problems such as stroke When your risk is known   Use of tobacco and the health effects it can cause All women in this age group Every exam   1American Cancer Society  Date Last Reviewed: 8/9/2015  © 0127-1196 [employer]. [address] [place] [address]  Road, [place] [address]. All rights reserved. This information is not intended as a substitute for professional medical care. Always follow your healthcare professional's instructions.

## 2020-11-10 NOTE — Clinical Note
DR. Atkins,   Mrs. Castaneda was seen today for AMW;   Health maintenance reviewed ; she was given influenza vaccine today.   Defers bone density ; would like to discuss with PCP further but denies hx of fracture and W/C bound with transfers .   Left ear was irrigated for cermuen impaction with good results.  Has F/U with you next week .   Thank you   Staci RILEY NP

## 2020-11-10 NOTE — Clinical Note
Dr. Atkins,    Tonya Fortunato was seen for AMW;   Health maintenance reviewed   - Flu Vaccine - Quadrivalent - High Dose (65+) (PF) (IM)  Defers bone density ; would like to discuss with PCP further but denies hx of fracture and W/C bound with transfers    Thanks  Staci RILEY NP

## 2020-11-25 ENCOUNTER — OFFICE VISIT (OUTPATIENT)
Dept: INTERNAL MEDICINE | Facility: CLINIC | Age: 85
End: 2020-11-25
Payer: MEDICARE

## 2020-11-25 VITALS
HEIGHT: 68 IN | WEIGHT: 262 LBS | SYSTOLIC BLOOD PRESSURE: 124 MMHG | RESPIRATION RATE: 16 BRPM | DIASTOLIC BLOOD PRESSURE: 68 MMHG | HEART RATE: 64 BPM | OXYGEN SATURATION: 98 % | BODY MASS INDEX: 39.71 KG/M2

## 2020-11-25 DIAGNOSIS — M17.0 PRIMARY OSTEOARTHRITIS OF BOTH KNEES: ICD-10-CM

## 2020-11-25 PROCEDURE — 1101F PT FALLS ASSESS-DOCD LE1/YR: CPT | Mod: HCNC,CPTII,S$GLB, | Performed by: INTERNAL MEDICINE

## 2020-11-25 PROCEDURE — 99213 OFFICE O/P EST LOW 20 MIN: CPT | Mod: HCNC,S$GLB,, | Performed by: INTERNAL MEDICINE

## 2020-11-25 PROCEDURE — 99999 PR PBB SHADOW E&M-EST. PATIENT-LVL IV: CPT | Mod: PBBFAC,HCNC,, | Performed by: INTERNAL MEDICINE

## 2020-11-25 PROCEDURE — 99213 PR OFFICE/OUTPT VISIT, EST, LEVL III, 20-29 MIN: ICD-10-PCS | Mod: HCNC,S$GLB,, | Performed by: INTERNAL MEDICINE

## 2020-11-25 PROCEDURE — 3288F PR FALLS RISK ASSESSMENT DOCUMENTED: ICD-10-PCS | Mod: HCNC,CPTII,S$GLB, | Performed by: INTERNAL MEDICINE

## 2020-11-25 PROCEDURE — 1126F AMNT PAIN NOTED NONE PRSNT: CPT | Mod: HCNC,S$GLB,, | Performed by: INTERNAL MEDICINE

## 2020-11-25 PROCEDURE — 1101F PR PT FALLS ASSESS DOC 0-1 FALLS W/OUT INJ PAST YR: ICD-10-PCS | Mod: HCNC,CPTII,S$GLB, | Performed by: INTERNAL MEDICINE

## 2020-11-25 PROCEDURE — 3078F DIAST BP <80 MM HG: CPT | Mod: HCNC,CPTII,S$GLB, | Performed by: INTERNAL MEDICINE

## 2020-11-25 PROCEDURE — 99999 PR PBB SHADOW E&M-EST. PATIENT-LVL IV: ICD-10-PCS | Mod: PBBFAC,HCNC,, | Performed by: INTERNAL MEDICINE

## 2020-11-25 PROCEDURE — 3074F SYST BP LT 130 MM HG: CPT | Mod: HCNC,CPTII,S$GLB, | Performed by: INTERNAL MEDICINE

## 2020-11-25 PROCEDURE — 1159F PR MEDICATION LIST DOCUMENTED IN MEDICAL RECORD: ICD-10-PCS | Mod: HCNC,S$GLB,, | Performed by: INTERNAL MEDICINE

## 2020-11-25 PROCEDURE — 1126F PR PAIN SEVERITY QUANTIFIED, NO PAIN PRESENT: ICD-10-PCS | Mod: HCNC,S$GLB,, | Performed by: INTERNAL MEDICINE

## 2020-11-25 PROCEDURE — 1159F MED LIST DOCD IN RCRD: CPT | Mod: HCNC,S$GLB,, | Performed by: INTERNAL MEDICINE

## 2020-11-25 PROCEDURE — 3078F PR MOST RECENT DIASTOLIC BLOOD PRESSURE < 80 MM HG: ICD-10-PCS | Mod: HCNC,CPTII,S$GLB, | Performed by: INTERNAL MEDICINE

## 2020-11-25 PROCEDURE — 3288F FALL RISK ASSESSMENT DOCD: CPT | Mod: HCNC,CPTII,S$GLB, | Performed by: INTERNAL MEDICINE

## 2020-11-25 PROCEDURE — 3074F PR MOST RECENT SYSTOLIC BLOOD PRESSURE < 130 MM HG: ICD-10-PCS | Mod: HCNC,CPTII,S$GLB, | Performed by: INTERNAL MEDICINE

## 2020-11-25 RX ORDER — HYDROCODONE BITARTRATE AND ACETAMINOPHEN 7.5; 325 MG/1; MG/1
1 TABLET ORAL
Qty: 30 TABLET | Refills: 0 | Status: SHIPPED | OUTPATIENT
Start: 2020-11-25 | End: 2021-01-06 | Stop reason: SDUPTHER

## 2020-11-25 RX ORDER — CINACALCET 60 MG/1
60 TABLET, FILM COATED ORAL
COMMUNITY
End: 2022-04-06

## 2020-11-25 NOTE — PROGRESS NOTES
Subjective:       Patient ID: Tonya Bucio is a 85 y.o. female.    Chief Complaint: Chronic Pain Syndrome    Tonya Bucio is a 85  y.o. female here for her pain meds refills.        Chronic Pain  Past Medical History Includes::  Chronic pain syndrome and degenerative joint disease  Chronicity:  Chronic  Onset:  More than 1 year ago  Progression since onset:  Waxing and waning  Pain location:  Knee and lumbar spine  Medications Tried:  Hydrocodone/acetaminophen (Hycet, Lorcet, Lortab, Norco,Vicodin)  Previous Imaging:  X-Ray  Current treatment:  Hydrocodone/acetaminophen (Hycet, LorcetLortab, Norco, Vicodin)  Improvement on Current Treatment:  Moderate  Goals of therapy:  Improve ADL's and Improve Sleep  Associated symptoms: leg pain    Associated symptoms: no chest pain, no dizziness, no shortness of breath, no numbness, no tingling, no weakness, no weight loss and no nausea    Drug Screen: No    Pain Contract: Yes    Psychiatric Disorders:  None  History of Substance Abuse:  None  Medication Refill  Associated symptoms include arthralgias, fatigue and myalgias. Pertinent negatives include no chest pain, chills, congestion, coughing, fever, nausea, neck pain, numbness, sore throat, vomiting or weakness. The symptoms are aggravated by walking. She has tried oral narcotics for the symptoms.     Review of Systems   Constitutional: Positive for fatigue. Negative for chills, fever and weight loss.   HENT: Negative for congestion and sore throat.    Respiratory: Negative for cough and shortness of breath.    Cardiovascular: Negative for chest pain.   Gastrointestinal: Negative for nausea and vomiting.   Musculoskeletal: Positive for arthralgias and myalgias. Negative for neck pain.   Neurological: Negative for dizziness, tingling, weakness and numbness.       Objective:      Physical Exam  Vitals signs and nursing note reviewed.   Constitutional:       Appearance: She is well-developed.   HENT:      Head:  Normocephalic and atraumatic.      Right Ear: External ear normal.      Left Ear: External ear normal.   Eyes:      Conjunctiva/sclera: Conjunctivae normal.      Pupils: Pupils are equal, round, and reactive to light.   Neck:      Musculoskeletal: Normal range of motion and neck supple.      Thyroid: No thyromegaly.      Vascular: No JVD.      Trachea: No tracheal deviation.   Cardiovascular:      Rate and Rhythm: Normal rate and regular rhythm.      Pulses:           Dorsalis pedis pulses are 1+ on the right side and 1+ on the left side.        Posterior tibial pulses are 1+ on the right side and 1+ on the left side.      Heart sounds: Normal heart sounds.   Pulmonary:      Effort: Pulmonary effort is normal. No respiratory distress.      Breath sounds: Normal breath sounds. No wheezing or rales.   Chest:      Chest wall: No tenderness.   Abdominal:      General: Bowel sounds are normal. There is no distension.      Palpations: Abdomen is soft. There is no mass.      Tenderness: There is no abdominal tenderness. There is no guarding or rebound.   Musculoskeletal: Normal range of motion.      Comments: Bilateral knee oa changes.     Feet:      Right foot:      Protective Sensation: 7 sites tested.      Skin integrity: Dry skin present. No ulcer or erythema.      Left foot:      Protective Sensation: 7 sites tested. 4 sites sensed.      Skin integrity: Dry skin present. No ulcer or erythema.   Lymphadenopathy:      Cervical: No cervical adenopathy.   Skin:     General: Skin is warm and dry.   Neurological:      Mental Status: She is alert and oriented to person, place, and time.      Cranial Nerves: No cranial nerve deficit.      Motor: No abnormal muscle tone.      Coordination: Coordination normal.      Deep Tendon Reflexes: Reflexes are normal and symmetric.         Assessment:       1. Primary osteoarthritis of both knees        Plan:   Tonya was seen today for chronic pain syndrome.    Diagnoses and all orders  for this visit:    Primary osteoarthritis of both knees  -     HYDROcodone-acetaminophen (NORCO) 7.5-325 mg per tablet; Take 1 tablet by mouth every 24 hours as needed for Pain.    The patient presents for medical management of chronic pain. He has chronic pain in his joints. sHe has been treated with opioid pain medications for years because he failed to improve with more conservative treatments such as OTC medications, Non-opioid pain medications, therapy and other modalities. sHe is compliant with treatment. I reviewed his profile on the [facility] Prescription Monitoring Program Website and he appears to be compliant.  sHe has severe daily pain. Pain medications allow him to perform activities of daily living that he would otherwise not be able to perform. He denies any ongoing substance abuse. sHe has signed a pain management agreement and understands the risk, benefits and alternatives of treatment with chronic opioid pain medications including the risk of tolerance and dependency.       Problem List Items Addressed This Visit     Osteoarthritis    Relevant Medications    HYDROcodone-acetaminophen (NORCO) 7.5-325 mg per tablet

## 2021-01-06 DIAGNOSIS — M17.0 PRIMARY OSTEOARTHRITIS OF BOTH KNEES: ICD-10-CM

## 2021-01-06 RX ORDER — HYDROCODONE BITARTRATE AND ACETAMINOPHEN 7.5; 325 MG/1; MG/1
1 TABLET ORAL
Qty: 30 TABLET | Refills: 0 | Status: SHIPPED | OUTPATIENT
Start: 2021-01-06 | End: 2021-02-10 | Stop reason: SDUPTHER

## 2021-01-27 ENCOUNTER — IMMUNIZATION (OUTPATIENT)
Dept: PHARMACY | Facility: CLINIC | Age: 86
End: 2021-01-27
Payer: MEDICARE

## 2021-01-27 DIAGNOSIS — Z23 NEED FOR VACCINATION: Primary | ICD-10-CM

## 2021-02-03 ENCOUNTER — LAB VISIT (OUTPATIENT)
Dept: LAB | Facility: HOSPITAL | Age: 86
End: 2021-02-03
Attending: INTERNAL MEDICINE
Payer: MEDICARE

## 2021-02-03 DIAGNOSIS — E03.9 ACQUIRED HYPOTHYROIDISM: ICD-10-CM

## 2021-02-03 DIAGNOSIS — I11.9 HHD (HYPERTENSIVE HEART DISEASE): ICD-10-CM

## 2021-02-03 DIAGNOSIS — R06.09 DOE (DYSPNEA ON EXERTION): ICD-10-CM

## 2021-02-03 DIAGNOSIS — E11.9 CONTROLLED TYPE 2 DIABETES MELLITUS WITHOUT COMPLICATION, WITHOUT LONG-TERM CURRENT USE OF INSULIN: ICD-10-CM

## 2021-02-03 DIAGNOSIS — E78.5 HYPERLIPIDEMIA ASSOCIATED WITH TYPE 2 DIABETES MELLITUS: ICD-10-CM

## 2021-02-03 DIAGNOSIS — E11.69 HYPERLIPIDEMIA ASSOCIATED WITH TYPE 2 DIABETES MELLITUS: ICD-10-CM

## 2021-02-03 DIAGNOSIS — E78.5 DYSLIPIDEMIA: ICD-10-CM

## 2021-02-03 DIAGNOSIS — I12.9 HYPERTENSIVE RENAL DISEASE: Primary | ICD-10-CM

## 2021-02-03 LAB
ALBUMIN SERPL BCP-MCNC: 3.6 G/DL (ref 3.5–5.2)
ALBUMIN/CREAT UR: 19.1 UG/MG (ref 0–30)
ALP SERPL-CCNC: 107 U/L (ref 55–135)
ALP SERPL-CCNC: 107 U/L (ref 55–135)
ALT SERPL W/O P-5'-P-CCNC: 13 U/L (ref 10–44)
ALT SERPL W/O P-5'-P-CCNC: 13 U/L (ref 10–44)
ANION GAP SERPL CALC-SCNC: 12 MMOL/L (ref 8–16)
AST SERPL-CCNC: 10 U/L (ref 10–40)
AST SERPL-CCNC: 10 U/L (ref 10–40)
BASOPHILS # BLD AUTO: 0.05 K/UL (ref 0–0.2)
BASOPHILS NFR BLD: 0.4 % (ref 0–1.9)
BILIRUB DIRECT SERPL-MCNC: 0.2 MG/DL (ref 0.1–0.3)
BILIRUB SERPL-MCNC: 0.6 MG/DL (ref 0.1–1)
BILIRUB SERPL-MCNC: 0.6 MG/DL (ref 0.1–1)
BUN SERPL-MCNC: 28 MG/DL (ref 8–23)
CALCIUM SERPL-MCNC: 9.8 MG/DL (ref 8.7–10.5)
CHLORIDE SERPL-SCNC: 101 MMOL/L (ref 95–110)
CHOLEST SERPL-MCNC: 173 MG/DL (ref 120–199)
CHOLEST SERPL-MCNC: 173 MG/DL (ref 120–199)
CHOLEST/HDLC SERPL: 4.8 {RATIO} (ref 2–5)
CHOLEST/HDLC SERPL: 4.8 {RATIO} (ref 2–5)
CO2 SERPL-SCNC: 29 MMOL/L (ref 23–29)
CREAT SERPL-MCNC: 1.6 MG/DL (ref 0.5–1.4)
CREAT UR-MCNC: 89 MG/DL (ref 15–325)
DIFFERENTIAL METHOD: ABNORMAL
EOSINOPHIL # BLD AUTO: 0.3 K/UL (ref 0–0.5)
EOSINOPHIL NFR BLD: 2.3 % (ref 0–8)
ERYTHROCYTE [DISTWIDTH] IN BLOOD BY AUTOMATED COUNT: 14.2 % (ref 11.5–14.5)
ERYTHROCYTE [DISTWIDTH] IN BLOOD BY AUTOMATED COUNT: 14.2 % (ref 11.5–14.5)
EST. GFR  (AFRICAN AMERICAN): 34 ML/MIN/1.73 M^2
EST. GFR  (NON AFRICAN AMERICAN): 29 ML/MIN/1.73 M^2
GLUCOSE SERPL-MCNC: 111 MG/DL (ref 70–110)
HCT VFR BLD AUTO: 38.2 % (ref 37–48.5)
HCT VFR BLD AUTO: 38.2 % (ref 37–48.5)
HDLC SERPL-MCNC: 36 MG/DL (ref 40–75)
HDLC SERPL-MCNC: 36 MG/DL (ref 40–75)
HDLC SERPL: 20.8 % (ref 20–50)
HDLC SERPL: 20.8 % (ref 20–50)
HGB BLD-MCNC: 12 G/DL (ref 12–16)
HGB BLD-MCNC: 12 G/DL (ref 12–16)
IMM GRANULOCYTES # BLD AUTO: 0.07 K/UL (ref 0–0.04)
IMM GRANULOCYTES NFR BLD AUTO: 0.6 % (ref 0–0.5)
LDLC SERPL CALC-MCNC: 73 MG/DL (ref 63–159)
LDLC SERPL CALC-MCNC: 73 MG/DL (ref 63–159)
LYMPHOCYTES # BLD AUTO: 3.1 K/UL (ref 1–4.8)
LYMPHOCYTES NFR BLD: 26.3 % (ref 18–48)
MAGNESIUM SERPL-MCNC: 2.8 MG/DL (ref 1.6–2.6)
MCH RBC QN AUTO: 30.5 PG (ref 27–31)
MCH RBC QN AUTO: 30.5 PG (ref 27–31)
MCHC RBC AUTO-ENTMCNC: 31.4 G/DL (ref 32–36)
MCHC RBC AUTO-ENTMCNC: 31.4 G/DL (ref 32–36)
MCV RBC AUTO: 97 FL (ref 82–98)
MCV RBC AUTO: 97 FL (ref 82–98)
MICROALBUMIN UR DL<=1MG/L-MCNC: 17 UG/ML
MONOCYTES # BLD AUTO: 0.7 K/UL (ref 0.3–1)
MONOCYTES NFR BLD: 6.2 % (ref 4–15)
NEUTROPHILS # BLD AUTO: 7.5 K/UL (ref 1.8–7.7)
NEUTROPHILS NFR BLD: 64.2 % (ref 38–73)
NONHDLC SERPL-MCNC: 137 MG/DL
NONHDLC SERPL-MCNC: 137 MG/DL
NRBC BLD-RTO: 0 /100 WBC
PHOSPHATE SERPL-MCNC: 3 MG/DL (ref 2.7–4.5)
PLATELET # BLD AUTO: 315 K/UL (ref 150–350)
PLATELET # BLD AUTO: 315 K/UL (ref 150–350)
PMV BLD AUTO: 10.5 FL (ref 9.2–12.9)
PMV BLD AUTO: 10.5 FL (ref 9.2–12.9)
POTASSIUM SERPL-SCNC: 4.5 MMOL/L (ref 3.5–5.1)
PROT SERPL-MCNC: 7.8 G/DL (ref 6–8.4)
PROT SERPL-MCNC: 7.8 G/DL (ref 6–8.4)
RBC # BLD AUTO: 3.93 M/UL (ref 4–5.4)
RBC # BLD AUTO: 3.93 M/UL (ref 4–5.4)
SODIUM SERPL-SCNC: 142 MMOL/L (ref 136–145)
T4 FREE SERPL-MCNC: 0.99 NG/DL (ref 0.71–1.51)
TRIGL SERPL-MCNC: 320 MG/DL (ref 30–150)
TRIGL SERPL-MCNC: 320 MG/DL (ref 30–150)
TSH SERPL DL<=0.005 MIU/L-ACNC: 2.18 UIU/ML (ref 0.4–4)
TSH SERPL DL<=0.005 MIU/L-ACNC: 2.18 UIU/ML (ref 0.4–4)
WBC # BLD AUTO: 11.67 K/UL (ref 3.9–12.7)
WBC # BLD AUTO: 11.67 K/UL (ref 3.9–12.7)

## 2021-02-03 PROCEDURE — 80053 COMPREHEN METABOLIC PANEL: CPT

## 2021-02-03 PROCEDURE — 82043 UR ALBUMIN QUANTITATIVE: CPT

## 2021-02-03 PROCEDURE — 85025 COMPLETE CBC W/AUTO DIFF WBC: CPT

## 2021-02-03 PROCEDURE — 36415 COLL VENOUS BLD VENIPUNCTURE: CPT

## 2021-02-03 PROCEDURE — 83036 HEMOGLOBIN GLYCOSYLATED A1C: CPT

## 2021-02-03 PROCEDURE — 80076 HEPATIC FUNCTION PANEL: CPT

## 2021-02-03 PROCEDURE — 82570 ASSAY OF URINE CREATININE: CPT

## 2021-02-03 PROCEDURE — 84443 ASSAY THYROID STIM HORMONE: CPT

## 2021-02-03 PROCEDURE — 83735 ASSAY OF MAGNESIUM: CPT

## 2021-02-03 PROCEDURE — 84481 FREE ASSAY (FT-3): CPT

## 2021-02-03 PROCEDURE — 80061 LIPID PANEL: CPT

## 2021-02-03 PROCEDURE — 84439 ASSAY OF FREE THYROXINE: CPT

## 2021-02-03 PROCEDURE — 80069 RENAL FUNCTION PANEL: CPT

## 2021-02-04 LAB
ESTIMATED AVG GLUCOSE: 131 MG/DL (ref 68–131)
HBA1C MFR BLD: 6.2 % (ref 4–5.6)
T3FREE SERPL-MCNC: 1.8 PG/ML (ref 2.3–4.2)

## 2021-02-10 ENCOUNTER — OFFICE VISIT (OUTPATIENT)
Dept: INTERNAL MEDICINE | Facility: CLINIC | Age: 86
End: 2021-02-10
Payer: MEDICARE

## 2021-02-10 VITALS
HEIGHT: 68 IN | OXYGEN SATURATION: 96 % | BODY MASS INDEX: 39.2 KG/M2 | HEART RATE: 70 BPM | SYSTOLIC BLOOD PRESSURE: 134 MMHG | WEIGHT: 258.63 LBS | RESPIRATION RATE: 16 BRPM | DIASTOLIC BLOOD PRESSURE: 66 MMHG | TEMPERATURE: 97 F

## 2021-02-10 DIAGNOSIS — M1A.39X0 CHRONIC GOUT DUE TO RENAL IMPAIRMENT OF MULTIPLE SITES WITHOUT TOPHUS: ICD-10-CM

## 2021-02-10 DIAGNOSIS — E66.01 MORBID OBESITY WITH BODY MASS INDEX (BMI) OF 40.0 TO 44.9 IN ADULT: ICD-10-CM

## 2021-02-10 DIAGNOSIS — E21.0 PRIMARY HYPERPARATHYROIDISM: ICD-10-CM

## 2021-02-10 DIAGNOSIS — E78.5 HYPERLIPIDEMIA ASSOCIATED WITH TYPE 2 DIABETES MELLITUS: ICD-10-CM

## 2021-02-10 DIAGNOSIS — E11.69 HYPERLIPIDEMIA ASSOCIATED WITH TYPE 2 DIABETES MELLITUS: ICD-10-CM

## 2021-02-10 DIAGNOSIS — N18.32 STAGE 3B CHRONIC KIDNEY DISEASE: ICD-10-CM

## 2021-02-10 DIAGNOSIS — I27.82 CHRONIC SADDLE PULMONARY EMBOLISM WITHOUT ACUTE COR PULMONALE: ICD-10-CM

## 2021-02-10 DIAGNOSIS — E11.9 CONTROLLED TYPE 2 DIABETES MELLITUS WITHOUT COMPLICATION, WITHOUT LONG-TERM CURRENT USE OF INSULIN: Primary | ICD-10-CM

## 2021-02-10 DIAGNOSIS — M17.0 PRIMARY OSTEOARTHRITIS OF BOTH KNEES: ICD-10-CM

## 2021-02-10 DIAGNOSIS — E03.9 ACQUIRED HYPOTHYROIDISM: ICD-10-CM

## 2021-02-10 DIAGNOSIS — I26.92 CHRONIC SADDLE PULMONARY EMBOLISM WITHOUT ACUTE COR PULMONALE: ICD-10-CM

## 2021-02-10 PROCEDURE — 3078F PR MOST RECENT DIASTOLIC BLOOD PRESSURE < 80 MM HG: ICD-10-PCS | Mod: CPTII,S$GLB,, | Performed by: INTERNAL MEDICINE

## 2021-02-10 PROCEDURE — 99499 RISK ADDL DX/OHS AUDIT: ICD-10-PCS | Mod: S$GLB,,, | Performed by: INTERNAL MEDICINE

## 2021-02-10 PROCEDURE — 1159F MED LIST DOCD IN RCRD: CPT | Mod: S$GLB,,, | Performed by: INTERNAL MEDICINE

## 2021-02-10 PROCEDURE — 3288F FALL RISK ASSESSMENT DOCD: CPT | Mod: CPTII,S$GLB,, | Performed by: INTERNAL MEDICINE

## 2021-02-10 PROCEDURE — 99999 PR PBB SHADOW E&M-EST. PATIENT-LVL IV: ICD-10-PCS | Mod: PBBFAC,,, | Performed by: INTERNAL MEDICINE

## 2021-02-10 PROCEDURE — 1101F PT FALLS ASSESS-DOCD LE1/YR: CPT | Mod: CPTII,S$GLB,, | Performed by: INTERNAL MEDICINE

## 2021-02-10 PROCEDURE — 99999 PR PBB SHADOW E&M-EST. PATIENT-LVL IV: CPT | Mod: PBBFAC,,, | Performed by: INTERNAL MEDICINE

## 2021-02-10 PROCEDURE — 1126F PR PAIN SEVERITY QUANTIFIED, NO PAIN PRESENT: ICD-10-PCS | Mod: S$GLB,,, | Performed by: INTERNAL MEDICINE

## 2021-02-10 PROCEDURE — 99214 OFFICE O/P EST MOD 30 MIN: CPT | Mod: S$GLB,,, | Performed by: INTERNAL MEDICINE

## 2021-02-10 PROCEDURE — 1159F PR MEDICATION LIST DOCUMENTED IN MEDICAL RECORD: ICD-10-PCS | Mod: S$GLB,,, | Performed by: INTERNAL MEDICINE

## 2021-02-10 PROCEDURE — 1126F AMNT PAIN NOTED NONE PRSNT: CPT | Mod: S$GLB,,, | Performed by: INTERNAL MEDICINE

## 2021-02-10 PROCEDURE — 3288F PR FALLS RISK ASSESSMENT DOCUMENTED: ICD-10-PCS | Mod: CPTII,S$GLB,, | Performed by: INTERNAL MEDICINE

## 2021-02-10 PROCEDURE — 3075F PR MOST RECENT SYSTOLIC BLOOD PRESS GE 130-139MM HG: ICD-10-PCS | Mod: CPTII,S$GLB,, | Performed by: INTERNAL MEDICINE

## 2021-02-10 PROCEDURE — 99499 UNLISTED E&M SERVICE: CPT | Mod: S$GLB,,, | Performed by: INTERNAL MEDICINE

## 2021-02-10 PROCEDURE — 3075F SYST BP GE 130 - 139MM HG: CPT | Mod: CPTII,S$GLB,, | Performed by: INTERNAL MEDICINE

## 2021-02-10 PROCEDURE — 3078F DIAST BP <80 MM HG: CPT | Mod: CPTII,S$GLB,, | Performed by: INTERNAL MEDICINE

## 2021-02-10 PROCEDURE — 99214 PR OFFICE/OUTPT VISIT, EST, LEVL IV, 30-39 MIN: ICD-10-PCS | Mod: S$GLB,,, | Performed by: INTERNAL MEDICINE

## 2021-02-10 PROCEDURE — 1101F PR PT FALLS ASSESS DOC 0-1 FALLS W/OUT INJ PAST YR: ICD-10-PCS | Mod: CPTII,S$GLB,, | Performed by: INTERNAL MEDICINE

## 2021-02-10 RX ORDER — HYDROCODONE BITARTRATE AND ACETAMINOPHEN 7.5; 325 MG/1; MG/1
1 TABLET ORAL
Qty: 30 TABLET | Refills: 0 | Status: SHIPPED | OUTPATIENT
Start: 2021-02-10 | End: 2021-03-10 | Stop reason: SDUPTHER

## 2021-02-10 RX ORDER — OMEGA-3-ACID ETHYL ESTERS 1 G/1
1 CAPSULE, LIQUID FILLED ORAL 2 TIMES DAILY
Qty: 180 CAPSULE | Refills: 0 | Status: SHIPPED | OUTPATIENT
Start: 2021-02-10 | End: 2021-05-06 | Stop reason: SDUPTHER

## 2021-02-24 ENCOUNTER — IMMUNIZATION (OUTPATIENT)
Dept: PHARMACY | Facility: CLINIC | Age: 86
End: 2021-02-24
Payer: MEDICARE

## 2021-02-24 DIAGNOSIS — Z23 NEED FOR VACCINATION: Primary | ICD-10-CM

## 2021-03-10 DIAGNOSIS — M17.0 PRIMARY OSTEOARTHRITIS OF BOTH KNEES: ICD-10-CM

## 2021-03-10 RX ORDER — HYDROCODONE BITARTRATE AND ACETAMINOPHEN 7.5; 325 MG/1; MG/1
1 TABLET ORAL
Qty: 30 TABLET | Refills: 0 | Status: SHIPPED | OUTPATIENT
Start: 2021-03-10 | End: 2021-04-06 | Stop reason: SDUPTHER

## 2021-04-06 DIAGNOSIS — M17.0 PRIMARY OSTEOARTHRITIS OF BOTH KNEES: ICD-10-CM

## 2021-04-06 RX ORDER — HYDROCODONE BITARTRATE AND ACETAMINOPHEN 7.5; 325 MG/1; MG/1
1 TABLET ORAL
Qty: 30 TABLET | Refills: 0 | Status: SHIPPED | OUTPATIENT
Start: 2021-04-06 | End: 2021-05-06 | Stop reason: SDUPTHER

## 2021-04-30 ENCOUNTER — PES CALL (OUTPATIENT)
Dept: ADMINISTRATIVE | Facility: CLINIC | Age: 86
End: 2021-04-30

## 2021-05-06 ENCOUNTER — OFFICE VISIT (OUTPATIENT)
Dept: INTERNAL MEDICINE | Facility: CLINIC | Age: 86
End: 2021-05-06
Payer: MEDICARE

## 2021-05-06 VITALS
DIASTOLIC BLOOD PRESSURE: 63 MMHG | BODY MASS INDEX: 38.79 KG/M2 | HEART RATE: 89 BPM | HEIGHT: 68 IN | OXYGEN SATURATION: 97 % | SYSTOLIC BLOOD PRESSURE: 122 MMHG | RESPIRATION RATE: 17 BRPM | WEIGHT: 255.94 LBS

## 2021-05-06 DIAGNOSIS — E11.69 HYPERLIPIDEMIA ASSOCIATED WITH TYPE 2 DIABETES MELLITUS: ICD-10-CM

## 2021-05-06 DIAGNOSIS — M1A.39X0 CHRONIC GOUT DUE TO RENAL IMPAIRMENT OF MULTIPLE SITES WITHOUT TOPHUS: ICD-10-CM

## 2021-05-06 DIAGNOSIS — R21 SCALY PATCH RASH: ICD-10-CM

## 2021-05-06 DIAGNOSIS — I27.23 PULMONARY HYPERTENSION DUE TO COPD: ICD-10-CM

## 2021-05-06 DIAGNOSIS — J44.9 PULMONARY HYPERTENSION DUE TO COPD: ICD-10-CM

## 2021-05-06 DIAGNOSIS — I48.3 TYPICAL ATRIAL FLUTTER: ICD-10-CM

## 2021-05-06 DIAGNOSIS — E78.5 HYPERLIPIDEMIA ASSOCIATED WITH TYPE 2 DIABETES MELLITUS: ICD-10-CM

## 2021-05-06 DIAGNOSIS — M17.0 PRIMARY OSTEOARTHRITIS OF BOTH KNEES: Primary | ICD-10-CM

## 2021-05-06 PROCEDURE — 99214 PR OFFICE/OUTPT VISIT, EST, LEVL IV, 30-39 MIN: ICD-10-PCS | Mod: S$GLB,,, | Performed by: INTERNAL MEDICINE

## 2021-05-06 PROCEDURE — 99999 PR PBB SHADOW E&M-EST. PATIENT-LVL III: ICD-10-PCS | Mod: PBBFAC,,, | Performed by: INTERNAL MEDICINE

## 2021-05-06 PROCEDURE — 1159F MED LIST DOCD IN RCRD: CPT | Mod: S$GLB,,, | Performed by: INTERNAL MEDICINE

## 2021-05-06 PROCEDURE — 1125F PR PAIN SEVERITY QUANTIFIED, PAIN PRESENT: ICD-10-PCS | Mod: S$GLB,,, | Performed by: INTERNAL MEDICINE

## 2021-05-06 PROCEDURE — 1125F AMNT PAIN NOTED PAIN PRSNT: CPT | Mod: S$GLB,,, | Performed by: INTERNAL MEDICINE

## 2021-05-06 PROCEDURE — 99499 RISK ADDL DX/OHS AUDIT: ICD-10-PCS | Mod: S$GLB,,, | Performed by: INTERNAL MEDICINE

## 2021-05-06 PROCEDURE — 3288F FALL RISK ASSESSMENT DOCD: CPT | Mod: CPTII,S$GLB,, | Performed by: INTERNAL MEDICINE

## 2021-05-06 PROCEDURE — 99999 PR PBB SHADOW E&M-EST. PATIENT-LVL III: CPT | Mod: PBBFAC,,, | Performed by: INTERNAL MEDICINE

## 2021-05-06 PROCEDURE — 99499 UNLISTED E&M SERVICE: CPT | Mod: S$GLB,,, | Performed by: INTERNAL MEDICINE

## 2021-05-06 PROCEDURE — 3288F PR FALLS RISK ASSESSMENT DOCUMENTED: ICD-10-PCS | Mod: CPTII,S$GLB,, | Performed by: INTERNAL MEDICINE

## 2021-05-06 PROCEDURE — 99214 OFFICE O/P EST MOD 30 MIN: CPT | Mod: S$GLB,,, | Performed by: INTERNAL MEDICINE

## 2021-05-06 PROCEDURE — 1101F PR PT FALLS ASSESS DOC 0-1 FALLS W/OUT INJ PAST YR: ICD-10-PCS | Mod: CPTII,S$GLB,, | Performed by: INTERNAL MEDICINE

## 2021-05-06 PROCEDURE — 1101F PT FALLS ASSESS-DOCD LE1/YR: CPT | Mod: CPTII,S$GLB,, | Performed by: INTERNAL MEDICINE

## 2021-05-06 PROCEDURE — 1159F PR MEDICATION LIST DOCUMENTED IN MEDICAL RECORD: ICD-10-PCS | Mod: S$GLB,,, | Performed by: INTERNAL MEDICINE

## 2021-05-06 RX ORDER — TRIAMCINOLONE ACETONIDE 1 MG/G
CREAM TOPICAL 2 TIMES DAILY
Qty: 45 G | Refills: 0 | Status: SHIPPED | OUTPATIENT
Start: 2021-05-06 | End: 2024-01-04

## 2021-05-06 RX ORDER — HYDROCODONE BITARTRATE AND ACETAMINOPHEN 7.5; 325 MG/1; MG/1
1 TABLET ORAL
Qty: 30 TABLET | Refills: 0 | Status: SHIPPED | OUTPATIENT
Start: 2021-05-06 | End: 2021-06-09 | Stop reason: SDUPTHER

## 2021-05-06 RX ORDER — ALLOPURINOL 300 MG/1
300 TABLET ORAL DAILY
Qty: 30 TABLET | Refills: 11 | Status: SHIPPED | OUTPATIENT
Start: 2021-05-06 | End: 2022-05-04

## 2021-05-06 RX ORDER — OMEGA-3-ACID ETHYL ESTERS 1 G/1
1 CAPSULE, LIQUID FILLED ORAL 2 TIMES DAILY
Qty: 180 CAPSULE | Refills: 0 | Status: SHIPPED | OUTPATIENT
Start: 2021-05-06 | End: 2021-11-01

## 2021-06-09 DIAGNOSIS — M17.0 PRIMARY OSTEOARTHRITIS OF BOTH KNEES: ICD-10-CM

## 2021-06-09 RX ORDER — HYDROCODONE BITARTRATE AND ACETAMINOPHEN 7.5; 325 MG/1; MG/1
1 TABLET ORAL
Qty: 30 TABLET | Refills: 0 | Status: SHIPPED | OUTPATIENT
Start: 2021-06-09 | End: 2021-07-07 | Stop reason: SDUPTHER

## 2021-07-07 DIAGNOSIS — M17.0 PRIMARY OSTEOARTHRITIS OF BOTH KNEES: ICD-10-CM

## 2021-07-08 RX ORDER — HYDROCODONE BITARTRATE AND ACETAMINOPHEN 7.5; 325 MG/1; MG/1
1 TABLET ORAL
Qty: 30 TABLET | Refills: 0 | Status: SHIPPED | OUTPATIENT
Start: 2021-07-08 | End: 2021-08-06 | Stop reason: SDUPTHER

## 2021-07-26 ENCOUNTER — PATIENT OUTREACH (OUTPATIENT)
Dept: ADMINISTRATIVE | Facility: HOSPITAL | Age: 86
End: 2021-07-26

## 2021-07-29 ENCOUNTER — LAB VISIT (OUTPATIENT)
Dept: LAB | Facility: HOSPITAL | Age: 86
End: 2021-07-29
Attending: INTERNAL MEDICINE
Payer: MEDICARE

## 2021-07-29 DIAGNOSIS — E11.9 CONTROLLED TYPE 2 DIABETES MELLITUS WITHOUT COMPLICATION, WITHOUT LONG-TERM CURRENT USE OF INSULIN: ICD-10-CM

## 2021-07-29 DIAGNOSIS — E21.0 PRIMARY HYPERPARATHYROIDISM: ICD-10-CM

## 2021-07-29 DIAGNOSIS — E03.9 ACQUIRED HYPOTHYROIDISM: ICD-10-CM

## 2021-07-29 DIAGNOSIS — E11.69 HYPERLIPIDEMIA ASSOCIATED WITH TYPE 2 DIABETES MELLITUS: ICD-10-CM

## 2021-07-29 DIAGNOSIS — N18.32 STAGE 3B CHRONIC KIDNEY DISEASE: ICD-10-CM

## 2021-07-29 DIAGNOSIS — N18.30 CHRONIC KIDNEY DISEASE (CKD), ACTIVE MEDICAL MANAGEMENT WITHOUT DIALYSIS, STAGE 3 (MODERATE): Primary | ICD-10-CM

## 2021-07-29 DIAGNOSIS — E78.5 HYPERLIPIDEMIA ASSOCIATED WITH TYPE 2 DIABETES MELLITUS: ICD-10-CM

## 2021-07-29 LAB
ALBUMIN SERPL BCP-MCNC: 3.1 G/DL (ref 3.5–5.2)
ALBUMIN SERPL BCP-MCNC: 3.1 G/DL (ref 3.5–5.2)
ALBUMIN/CREAT UR: 26.5 UG/MG (ref 0–30)
ALP SERPL-CCNC: 99 U/L (ref 55–135)
ALT SERPL W/O P-5'-P-CCNC: 16 U/L (ref 10–44)
ANION GAP SERPL CALC-SCNC: 13 MMOL/L (ref 8–16)
ANION GAP SERPL CALC-SCNC: 13 MMOL/L (ref 8–16)
AST SERPL-CCNC: 9 U/L (ref 10–40)
BASOPHILS # BLD AUTO: 0.05 K/UL (ref 0–0.2)
BASOPHILS # BLD AUTO: 0.05 K/UL (ref 0–0.2)
BASOPHILS NFR BLD: 0.5 % (ref 0–1.9)
BASOPHILS NFR BLD: 0.5 % (ref 0–1.9)
BILIRUB SERPL-MCNC: 0.6 MG/DL (ref 0.1–1)
BUN SERPL-MCNC: 23 MG/DL (ref 8–23)
BUN SERPL-MCNC: 23 MG/DL (ref 8–23)
CALCIUM SERPL-MCNC: 9.7 MG/DL (ref 8.7–10.5)
CALCIUM SERPL-MCNC: 9.7 MG/DL (ref 8.7–10.5)
CHLORIDE SERPL-SCNC: 100 MMOL/L (ref 95–110)
CHLORIDE SERPL-SCNC: 100 MMOL/L (ref 95–110)
CHOLEST SERPL-MCNC: 157 MG/DL (ref 120–199)
CHOLEST/HDLC SERPL: 4.8 {RATIO} (ref 2–5)
CO2 SERPL-SCNC: 26 MMOL/L (ref 23–29)
CO2 SERPL-SCNC: 26 MMOL/L (ref 23–29)
CREAT SERPL-MCNC: 1.4 MG/DL (ref 0.5–1.4)
CREAT SERPL-MCNC: 1.4 MG/DL (ref 0.5–1.4)
CREAT UR-MCNC: 34 MG/DL (ref 15–325)
DIFFERENTIAL METHOD: ABNORMAL
DIFFERENTIAL METHOD: ABNORMAL
EOSINOPHIL # BLD AUTO: 0.3 K/UL (ref 0–0.5)
EOSINOPHIL # BLD AUTO: 0.3 K/UL (ref 0–0.5)
EOSINOPHIL NFR BLD: 2.8 % (ref 0–8)
EOSINOPHIL NFR BLD: 2.8 % (ref 0–8)
ERYTHROCYTE [DISTWIDTH] IN BLOOD BY AUTOMATED COUNT: 14 % (ref 11.5–14.5)
ERYTHROCYTE [DISTWIDTH] IN BLOOD BY AUTOMATED COUNT: 14 % (ref 11.5–14.5)
EST. GFR  (AFRICAN AMERICAN): 40 ML/MIN/1.73 M^2
EST. GFR  (AFRICAN AMERICAN): 40 ML/MIN/1.73 M^2
EST. GFR  (NON AFRICAN AMERICAN): 34 ML/MIN/1.73 M^2
EST. GFR  (NON AFRICAN AMERICAN): 34 ML/MIN/1.73 M^2
GLUCOSE SERPL-MCNC: 113 MG/DL (ref 70–110)
GLUCOSE SERPL-MCNC: 113 MG/DL (ref 70–110)
HCT VFR BLD AUTO: 36.6 % (ref 37–48.5)
HCT VFR BLD AUTO: 36.6 % (ref 37–48.5)
HDLC SERPL-MCNC: 33 MG/DL (ref 40–75)
HDLC SERPL: 21 % (ref 20–50)
HGB BLD-MCNC: 11.6 G/DL (ref 12–16)
HGB BLD-MCNC: 11.6 G/DL (ref 12–16)
IMM GRANULOCYTES # BLD AUTO: 0.08 K/UL (ref 0–0.04)
IMM GRANULOCYTES # BLD AUTO: 0.08 K/UL (ref 0–0.04)
IMM GRANULOCYTES NFR BLD AUTO: 0.7 % (ref 0–0.5)
IMM GRANULOCYTES NFR BLD AUTO: 0.7 % (ref 0–0.5)
LDLC SERPL CALC-MCNC: 70.4 MG/DL (ref 63–159)
LYMPHOCYTES # BLD AUTO: 2.6 K/UL (ref 1–4.8)
LYMPHOCYTES # BLD AUTO: 2.6 K/UL (ref 1–4.8)
LYMPHOCYTES NFR BLD: 23.6 % (ref 18–48)
LYMPHOCYTES NFR BLD: 23.6 % (ref 18–48)
MCH RBC QN AUTO: 30.5 PG (ref 27–31)
MCH RBC QN AUTO: 30.5 PG (ref 27–31)
MCHC RBC AUTO-ENTMCNC: 31.7 G/DL (ref 32–36)
MCHC RBC AUTO-ENTMCNC: 31.7 G/DL (ref 32–36)
MCV RBC AUTO: 96 FL (ref 82–98)
MCV RBC AUTO: 96 FL (ref 82–98)
MICROALBUMIN UR DL<=1MG/L-MCNC: 9 UG/ML
MONOCYTES # BLD AUTO: 0.6 K/UL (ref 0.3–1)
MONOCYTES # BLD AUTO: 0.6 K/UL (ref 0.3–1)
MONOCYTES NFR BLD: 5.8 % (ref 4–15)
MONOCYTES NFR BLD: 5.8 % (ref 4–15)
NEUTROPHILS # BLD AUTO: 7.3 K/UL (ref 1.8–7.7)
NEUTROPHILS # BLD AUTO: 7.3 K/UL (ref 1.8–7.7)
NEUTROPHILS NFR BLD: 66.6 % (ref 38–73)
NEUTROPHILS NFR BLD: 66.6 % (ref 38–73)
NONHDLC SERPL-MCNC: 124 MG/DL
NRBC BLD-RTO: 0 /100 WBC
NRBC BLD-RTO: 0 /100 WBC
PHOSPHATE SERPL-MCNC: 3 MG/DL (ref 2.7–4.5)
PLATELET # BLD AUTO: 357 K/UL (ref 150–450)
PLATELET # BLD AUTO: 357 K/UL (ref 150–450)
PMV BLD AUTO: 10.3 FL (ref 9.2–12.9)
PMV BLD AUTO: 10.3 FL (ref 9.2–12.9)
POTASSIUM SERPL-SCNC: 4.5 MMOL/L (ref 3.5–5.1)
POTASSIUM SERPL-SCNC: 4.5 MMOL/L (ref 3.5–5.1)
PROT SERPL-MCNC: 7.5 G/DL (ref 6–8.4)
PTH-INTACT SERPL-MCNC: 472 PG/ML (ref 9–77)
RBC # BLD AUTO: 3.8 M/UL (ref 4–5.4)
RBC # BLD AUTO: 3.8 M/UL (ref 4–5.4)
SODIUM SERPL-SCNC: 139 MMOL/L (ref 136–145)
SODIUM SERPL-SCNC: 139 MMOL/L (ref 136–145)
TRIGL SERPL-MCNC: 268 MG/DL (ref 30–150)
TSH SERPL DL<=0.005 MIU/L-ACNC: 1.96 UIU/ML (ref 0.4–4)
WBC # BLD AUTO: 10.94 K/UL (ref 3.9–12.7)
WBC # BLD AUTO: 10.94 K/UL (ref 3.9–12.7)

## 2021-07-29 PROCEDURE — 84443 ASSAY THYROID STIM HORMONE: CPT | Performed by: INTERNAL MEDICINE

## 2021-07-29 PROCEDURE — 36415 COLL VENOUS BLD VENIPUNCTURE: CPT | Performed by: INTERNAL MEDICINE

## 2021-07-29 PROCEDURE — 82043 UR ALBUMIN QUANTITATIVE: CPT | Performed by: INTERNAL MEDICINE

## 2021-07-29 PROCEDURE — 82570 ASSAY OF URINE CREATININE: CPT | Performed by: INTERNAL MEDICINE

## 2021-07-29 PROCEDURE — 85025 COMPLETE CBC W/AUTO DIFF WBC: CPT | Performed by: INTERNAL MEDICINE

## 2021-07-29 PROCEDURE — 80061 LIPID PANEL: CPT | Performed by: INTERNAL MEDICINE

## 2021-07-29 PROCEDURE — 80069 RENAL FUNCTION PANEL: CPT | Performed by: INTERNAL MEDICINE

## 2021-07-29 PROCEDURE — 83036 HEMOGLOBIN GLYCOSYLATED A1C: CPT | Performed by: INTERNAL MEDICINE

## 2021-07-29 PROCEDURE — 83970 ASSAY OF PARATHORMONE: CPT | Performed by: INTERNAL MEDICINE

## 2021-07-29 PROCEDURE — 80053 COMPREHEN METABOLIC PANEL: CPT | Performed by: INTERNAL MEDICINE

## 2021-07-30 LAB
ESTIMATED AVG GLUCOSE: 134 MG/DL (ref 68–131)
HBA1C MFR BLD: 6.3 % (ref 4–5.6)

## 2021-08-02 ENCOUNTER — PES CALL (OUTPATIENT)
Dept: ADMINISTRATIVE | Facility: CLINIC | Age: 86
End: 2021-08-02

## 2021-08-06 DIAGNOSIS — M17.0 PRIMARY OSTEOARTHRITIS OF BOTH KNEES: ICD-10-CM

## 2021-08-09 RX ORDER — HYDROCODONE BITARTRATE AND ACETAMINOPHEN 7.5; 325 MG/1; MG/1
1 TABLET ORAL
Qty: 30 TABLET | Refills: 0 | Status: SHIPPED | OUTPATIENT
Start: 2021-08-09 | End: 2021-09-13 | Stop reason: SDUPTHER

## 2021-08-17 ENCOUNTER — OFFICE VISIT (OUTPATIENT)
Dept: INTERNAL MEDICINE | Facility: CLINIC | Age: 86
End: 2021-08-17
Payer: MEDICARE

## 2021-08-17 VITALS
HEIGHT: 68 IN | RESPIRATION RATE: 18 BRPM | HEART RATE: 69 BPM | BODY MASS INDEX: 36.89 KG/M2 | OXYGEN SATURATION: 95 % | SYSTOLIC BLOOD PRESSURE: 100 MMHG | WEIGHT: 243.38 LBS | DIASTOLIC BLOOD PRESSURE: 60 MMHG

## 2021-08-17 DIAGNOSIS — E11.69 HYPERLIPIDEMIA ASSOCIATED WITH TYPE 2 DIABETES MELLITUS: Primary | ICD-10-CM

## 2021-08-17 DIAGNOSIS — E03.9 ACQUIRED HYPOTHYROIDISM: ICD-10-CM

## 2021-08-17 DIAGNOSIS — E79.0 HYPERURICEMIA: ICD-10-CM

## 2021-08-17 DIAGNOSIS — Z86.711 HISTORY OF PULMONARY EMBOLUS (PE): ICD-10-CM

## 2021-08-17 DIAGNOSIS — N18.31 ANEMIA DUE TO STAGE 3A CHRONIC KIDNEY DISEASE: ICD-10-CM

## 2021-08-17 DIAGNOSIS — I48.3 TYPICAL ATRIAL FLUTTER: ICD-10-CM

## 2021-08-17 DIAGNOSIS — E78.5 HYPERLIPIDEMIA ASSOCIATED WITH TYPE 2 DIABETES MELLITUS: Primary | ICD-10-CM

## 2021-08-17 DIAGNOSIS — D63.1 ANEMIA DUE TO STAGE 3A CHRONIC KIDNEY DISEASE: ICD-10-CM

## 2021-08-17 DIAGNOSIS — E11.9 CONTROLLED TYPE 2 DIABETES MELLITUS WITHOUT COMPLICATION, WITHOUT LONG-TERM CURRENT USE OF INSULIN: ICD-10-CM

## 2021-08-17 PROBLEM — E66.01 MORBID OBESITY WITH BODY MASS INDEX (BMI) OF 40.0 TO 44.9 IN ADULT: Status: RESOLVED | Noted: 2019-02-28 | Resolved: 2021-08-17

## 2021-08-17 PROCEDURE — 1101F PT FALLS ASSESS-DOCD LE1/YR: CPT | Mod: CPTII,S$GLB,, | Performed by: INTERNAL MEDICINE

## 2021-08-17 PROCEDURE — 3288F FALL RISK ASSESSMENT DOCD: CPT | Mod: CPTII,S$GLB,, | Performed by: INTERNAL MEDICINE

## 2021-08-17 PROCEDURE — 1160F RVW MEDS BY RX/DR IN RCRD: CPT | Mod: CPTII,S$GLB,, | Performed by: INTERNAL MEDICINE

## 2021-08-17 PROCEDURE — 99999 PR PBB SHADOW E&M-EST. PATIENT-LVL IV: CPT | Mod: PBBFAC,,, | Performed by: INTERNAL MEDICINE

## 2021-08-17 PROCEDURE — 1125F PR PAIN SEVERITY QUANTIFIED, PAIN PRESENT: ICD-10-PCS | Mod: CPTII,S$GLB,, | Performed by: INTERNAL MEDICINE

## 2021-08-17 PROCEDURE — 1159F MED LIST DOCD IN RCRD: CPT | Mod: CPTII,S$GLB,, | Performed by: INTERNAL MEDICINE

## 2021-08-17 PROCEDURE — 3078F PR MOST RECENT DIASTOLIC BLOOD PRESSURE < 80 MM HG: ICD-10-PCS | Mod: CPTII,S$GLB,, | Performed by: INTERNAL MEDICINE

## 2021-08-17 PROCEDURE — 99214 PR OFFICE/OUTPT VISIT, EST, LEVL IV, 30-39 MIN: ICD-10-PCS | Mod: S$GLB,,, | Performed by: INTERNAL MEDICINE

## 2021-08-17 PROCEDURE — 3288F PR FALLS RISK ASSESSMENT DOCUMENTED: ICD-10-PCS | Mod: CPTII,S$GLB,, | Performed by: INTERNAL MEDICINE

## 2021-08-17 PROCEDURE — 1101F PR PT FALLS ASSESS DOC 0-1 FALLS W/OUT INJ PAST YR: ICD-10-PCS | Mod: CPTII,S$GLB,, | Performed by: INTERNAL MEDICINE

## 2021-08-17 PROCEDURE — 99499 UNLISTED E&M SERVICE: CPT | Mod: HCNC,S$GLB,, | Performed by: INTERNAL MEDICINE

## 2021-08-17 PROCEDURE — 99999 PR PBB SHADOW E&M-EST. PATIENT-LVL IV: ICD-10-PCS | Mod: PBBFAC,,, | Performed by: INTERNAL MEDICINE

## 2021-08-17 PROCEDURE — 3074F PR MOST RECENT SYSTOLIC BLOOD PRESSURE < 130 MM HG: ICD-10-PCS | Mod: CPTII,S$GLB,, | Performed by: INTERNAL MEDICINE

## 2021-08-17 PROCEDURE — 3078F DIAST BP <80 MM HG: CPT | Mod: CPTII,S$GLB,, | Performed by: INTERNAL MEDICINE

## 2021-08-17 PROCEDURE — 1160F PR REVIEW ALL MEDS BY PRESCRIBER/CLIN PHARMACIST DOCUMENTED: ICD-10-PCS | Mod: CPTII,S$GLB,, | Performed by: INTERNAL MEDICINE

## 2021-08-17 PROCEDURE — 3074F SYST BP LT 130 MM HG: CPT | Mod: CPTII,S$GLB,, | Performed by: INTERNAL MEDICINE

## 2021-08-17 PROCEDURE — 99214 OFFICE O/P EST MOD 30 MIN: CPT | Mod: S$GLB,,, | Performed by: INTERNAL MEDICINE

## 2021-08-17 PROCEDURE — 99499 RISK ADDL DX/OHS AUDIT: ICD-10-PCS | Mod: HCNC,S$GLB,, | Performed by: INTERNAL MEDICINE

## 2021-08-17 PROCEDURE — 1159F PR MEDICATION LIST DOCUMENTED IN MEDICAL RECORD: ICD-10-PCS | Mod: CPTII,S$GLB,, | Performed by: INTERNAL MEDICINE

## 2021-08-17 PROCEDURE — 1125F AMNT PAIN NOTED PAIN PRSNT: CPT | Mod: CPTII,S$GLB,, | Performed by: INTERNAL MEDICINE

## 2021-08-19 ENCOUNTER — PES CALL (OUTPATIENT)
Dept: ADMINISTRATIVE | Facility: CLINIC | Age: 86
End: 2021-08-19

## 2021-09-13 DIAGNOSIS — M17.0 PRIMARY OSTEOARTHRITIS OF BOTH KNEES: ICD-10-CM

## 2021-09-15 RX ORDER — HYDROCODONE BITARTRATE AND ACETAMINOPHEN 7.5; 325 MG/1; MG/1
1 TABLET ORAL
Qty: 30 TABLET | Refills: 0 | Status: SHIPPED | OUTPATIENT
Start: 2021-09-15 | End: 2021-10-13 | Stop reason: SDUPTHER

## 2021-10-13 DIAGNOSIS — M17.0 PRIMARY OSTEOARTHRITIS OF BOTH KNEES: ICD-10-CM

## 2021-10-13 RX ORDER — HYDROCODONE BITARTRATE AND ACETAMINOPHEN 7.5; 325 MG/1; MG/1
1 TABLET ORAL
Qty: 30 TABLET | Refills: 0 | Status: CANCELLED | OUTPATIENT
Start: 2021-10-13

## 2021-10-13 RX ORDER — HYDROCODONE BITARTRATE AND ACETAMINOPHEN 7.5; 325 MG/1; MG/1
1 TABLET ORAL
Qty: 30 TABLET | Refills: 0 | Status: SHIPPED | OUTPATIENT
Start: 2021-10-13 | End: 2021-11-15 | Stop reason: SDUPTHER

## 2021-10-14 ENCOUNTER — LAB VISIT (OUTPATIENT)
Dept: LAB | Facility: HOSPITAL | Age: 86
End: 2021-10-14
Attending: INTERNAL MEDICINE
Payer: MEDICARE

## 2021-10-14 DIAGNOSIS — E21.0 PRIMARY HYPERPARATHYROIDISM: ICD-10-CM

## 2021-10-14 DIAGNOSIS — N18.30 CHRONIC KIDNEY DISEASE (CKD), STAGE III (MODERATE): Primary | ICD-10-CM

## 2021-10-14 LAB
ALBUMIN SERPL BCP-MCNC: 3 G/DL (ref 3.5–5.2)
ANION GAP SERPL CALC-SCNC: 10 MMOL/L (ref 8–16)
BASOPHILS # BLD AUTO: 0.07 K/UL (ref 0–0.2)
BASOPHILS NFR BLD: 0.5 % (ref 0–1.9)
BUN SERPL-MCNC: 22 MG/DL (ref 8–23)
CALCIUM SERPL-MCNC: 9.6 MG/DL (ref 8.7–10.5)
CHLORIDE SERPL-SCNC: 102 MMOL/L (ref 95–110)
CO2 SERPL-SCNC: 26 MMOL/L (ref 23–29)
CREAT SERPL-MCNC: 1.4 MG/DL (ref 0.5–1.4)
DIFFERENTIAL METHOD: ABNORMAL
EOSINOPHIL # BLD AUTO: 0.3 K/UL (ref 0–0.5)
EOSINOPHIL NFR BLD: 2 % (ref 0–8)
ERYTHROCYTE [DISTWIDTH] IN BLOOD BY AUTOMATED COUNT: 14.2 % (ref 11.5–14.5)
EST. GFR  (AFRICAN AMERICAN): 40 ML/MIN/1.73 M^2
EST. GFR  (NON AFRICAN AMERICAN): 34 ML/MIN/1.73 M^2
GLUCOSE SERPL-MCNC: 121 MG/DL (ref 70–110)
HCT VFR BLD AUTO: 36.7 % (ref 37–48.5)
HGB BLD-MCNC: 11.7 G/DL (ref 12–16)
IMM GRANULOCYTES # BLD AUTO: 0.12 K/UL (ref 0–0.04)
IMM GRANULOCYTES NFR BLD AUTO: 0.9 % (ref 0–0.5)
LYMPHOCYTES # BLD AUTO: 3.5 K/UL (ref 1–4.8)
LYMPHOCYTES NFR BLD: 26 % (ref 18–48)
MCH RBC QN AUTO: 30.2 PG (ref 27–31)
MCHC RBC AUTO-ENTMCNC: 31.9 G/DL (ref 32–36)
MCV RBC AUTO: 95 FL (ref 82–98)
MONOCYTES # BLD AUTO: 0.8 K/UL (ref 0.3–1)
MONOCYTES NFR BLD: 6.3 % (ref 4–15)
NEUTROPHILS # BLD AUTO: 8.6 K/UL (ref 1.8–7.7)
NEUTROPHILS NFR BLD: 64.3 % (ref 38–73)
NRBC BLD-RTO: 0 /100 WBC
PHOSPHATE SERPL-MCNC: 2.9 MG/DL (ref 2.7–4.5)
PLATELET # BLD AUTO: 370 K/UL (ref 150–450)
PMV BLD AUTO: 10 FL (ref 9.2–12.9)
POTASSIUM SERPL-SCNC: 5.1 MMOL/L (ref 3.5–5.1)
PTH-INTACT SERPL-MCNC: 486.4 PG/ML (ref 9–77)
RBC # BLD AUTO: 3.88 M/UL (ref 4–5.4)
SODIUM SERPL-SCNC: 138 MMOL/L (ref 136–145)
WBC # BLD AUTO: 13.33 K/UL (ref 3.9–12.7)

## 2021-10-14 PROCEDURE — 36415 COLL VENOUS BLD VENIPUNCTURE: CPT | Mod: HCNC | Performed by: INTERNAL MEDICINE

## 2021-10-14 PROCEDURE — 80069 RENAL FUNCTION PANEL: CPT | Mod: HCNC | Performed by: INTERNAL MEDICINE

## 2021-10-14 PROCEDURE — 83970 ASSAY OF PARATHORMONE: CPT | Mod: HCNC | Performed by: INTERNAL MEDICINE

## 2021-10-14 PROCEDURE — 85025 COMPLETE CBC W/AUTO DIFF WBC: CPT | Mod: HCNC | Performed by: INTERNAL MEDICINE

## 2021-11-15 ENCOUNTER — OFFICE VISIT (OUTPATIENT)
Dept: INTERNAL MEDICINE | Facility: CLINIC | Age: 86
End: 2021-11-15
Payer: MEDICARE

## 2021-11-15 VITALS
WEIGHT: 248.44 LBS | HEIGHT: 68 IN | DIASTOLIC BLOOD PRESSURE: 64 MMHG | OXYGEN SATURATION: 97 % | SYSTOLIC BLOOD PRESSURE: 104 MMHG | RESPIRATION RATE: 16 BRPM | HEART RATE: 76 BPM | BODY MASS INDEX: 37.65 KG/M2

## 2021-11-15 DIAGNOSIS — M17.0 PRIMARY OSTEOARTHRITIS OF BOTH KNEES: ICD-10-CM

## 2021-11-15 DIAGNOSIS — E11.69 HYPERLIPIDEMIA ASSOCIATED WITH TYPE 2 DIABETES MELLITUS: ICD-10-CM

## 2021-11-15 DIAGNOSIS — E78.5 HYPERLIPIDEMIA ASSOCIATED WITH TYPE 2 DIABETES MELLITUS: ICD-10-CM

## 2021-11-15 PROCEDURE — 3078F DIAST BP <80 MM HG: CPT | Mod: HCNC,CPTII,S$GLB, | Performed by: INTERNAL MEDICINE

## 2021-11-15 PROCEDURE — 99999 PR PBB SHADOW E&M-EST. PATIENT-LVL IV: CPT | Mod: PBBFAC,HCNC,, | Performed by: INTERNAL MEDICINE

## 2021-11-15 PROCEDURE — 1101F PT FALLS ASSESS-DOCD LE1/YR: CPT | Mod: HCNC,CPTII,S$GLB, | Performed by: INTERNAL MEDICINE

## 2021-11-15 PROCEDURE — 99213 OFFICE O/P EST LOW 20 MIN: CPT | Mod: 25,HCNC,S$GLB, | Performed by: INTERNAL MEDICINE

## 2021-11-15 PROCEDURE — 3078F PR MOST RECENT DIASTOLIC BLOOD PRESSURE < 80 MM HG: ICD-10-PCS | Mod: HCNC,CPTII,S$GLB, | Performed by: INTERNAL MEDICINE

## 2021-11-15 PROCEDURE — 99499 RISK ADDL DX/OHS AUDIT: ICD-10-PCS | Mod: HCNC,S$GLB,, | Performed by: INTERNAL MEDICINE

## 2021-11-15 PROCEDURE — 1126F PR PAIN SEVERITY QUANTIFIED, NO PAIN PRESENT: ICD-10-PCS | Mod: HCNC,CPTII,S$GLB, | Performed by: INTERNAL MEDICINE

## 2021-11-15 PROCEDURE — G0008 ADMIN INFLUENZA VIRUS VAC: HCPCS | Mod: HCNC,S$GLB,, | Performed by: INTERNAL MEDICINE

## 2021-11-15 PROCEDURE — 90694 FLU VACCINE - QUADRIVALENT - ADJUVANTED: ICD-10-PCS | Mod: HCNC,S$GLB,, | Performed by: INTERNAL MEDICINE

## 2021-11-15 PROCEDURE — 1159F PR MEDICATION LIST DOCUMENTED IN MEDICAL RECORD: ICD-10-PCS | Mod: HCNC,CPTII,S$GLB, | Performed by: INTERNAL MEDICINE

## 2021-11-15 PROCEDURE — 90694 VACC AIIV4 NO PRSRV 0.5ML IM: CPT | Mod: HCNC,S$GLB,, | Performed by: INTERNAL MEDICINE

## 2021-11-15 PROCEDURE — 3074F SYST BP LT 130 MM HG: CPT | Mod: HCNC,CPTII,S$GLB, | Performed by: INTERNAL MEDICINE

## 2021-11-15 PROCEDURE — 99999 PR PBB SHADOW E&M-EST. PATIENT-LVL IV: ICD-10-PCS | Mod: PBBFAC,HCNC,, | Performed by: INTERNAL MEDICINE

## 2021-11-15 PROCEDURE — 99213 PR OFFICE/OUTPT VISIT, EST, LEVL III, 20-29 MIN: ICD-10-PCS | Mod: 25,HCNC,S$GLB, | Performed by: INTERNAL MEDICINE

## 2021-11-15 PROCEDURE — G0008 FLU VACCINE - QUADRIVALENT - ADJUVANTED: ICD-10-PCS | Mod: HCNC,S$GLB,, | Performed by: INTERNAL MEDICINE

## 2021-11-15 PROCEDURE — 3074F PR MOST RECENT SYSTOLIC BLOOD PRESSURE < 130 MM HG: ICD-10-PCS | Mod: HCNC,CPTII,S$GLB, | Performed by: INTERNAL MEDICINE

## 2021-11-15 PROCEDURE — 1160F PR REVIEW ALL MEDS BY PRESCRIBER/CLIN PHARMACIST DOCUMENTED: ICD-10-PCS | Mod: HCNC,CPTII,S$GLB, | Performed by: INTERNAL MEDICINE

## 2021-11-15 PROCEDURE — 1159F MED LIST DOCD IN RCRD: CPT | Mod: HCNC,CPTII,S$GLB, | Performed by: INTERNAL MEDICINE

## 2021-11-15 PROCEDURE — 3288F FALL RISK ASSESSMENT DOCD: CPT | Mod: HCNC,CPTII,S$GLB, | Performed by: INTERNAL MEDICINE

## 2021-11-15 PROCEDURE — 1160F RVW MEDS BY RX/DR IN RCRD: CPT | Mod: HCNC,CPTII,S$GLB, | Performed by: INTERNAL MEDICINE

## 2021-11-15 PROCEDURE — 1126F AMNT PAIN NOTED NONE PRSNT: CPT | Mod: HCNC,CPTII,S$GLB, | Performed by: INTERNAL MEDICINE

## 2021-11-15 PROCEDURE — 1101F PR PT FALLS ASSESS DOC 0-1 FALLS W/OUT INJ PAST YR: ICD-10-PCS | Mod: HCNC,CPTII,S$GLB, | Performed by: INTERNAL MEDICINE

## 2021-11-15 PROCEDURE — 99499 UNLISTED E&M SERVICE: CPT | Mod: HCNC,S$GLB,, | Performed by: INTERNAL MEDICINE

## 2021-11-15 PROCEDURE — 3288F PR FALLS RISK ASSESSMENT DOCUMENTED: ICD-10-PCS | Mod: HCNC,CPTII,S$GLB, | Performed by: INTERNAL MEDICINE

## 2021-11-15 RX ORDER — HYDROCODONE BITARTRATE AND ACETAMINOPHEN 7.5; 325 MG/1; MG/1
1 TABLET ORAL
Qty: 30 TABLET | Refills: 0 | Status: SHIPPED | OUTPATIENT
Start: 2021-11-15 | End: 2021-12-14 | Stop reason: SDUPTHER

## 2021-11-15 RX ORDER — ROSUVASTATIN CALCIUM 20 MG/1
20 TABLET, COATED ORAL DAILY
Qty: 90 TABLET | Refills: 1 | Status: SHIPPED | OUTPATIENT
Start: 2021-11-15 | End: 2022-05-12

## 2021-12-14 DIAGNOSIS — M17.0 PRIMARY OSTEOARTHRITIS OF BOTH KNEES: ICD-10-CM

## 2021-12-14 RX ORDER — HYDROCODONE BITARTRATE AND ACETAMINOPHEN 7.5; 325 MG/1; MG/1
1 TABLET ORAL
Qty: 30 TABLET | Refills: 0 | Status: SHIPPED | OUTPATIENT
Start: 2021-12-14 | End: 2022-01-13 | Stop reason: SDUPTHER

## 2022-01-10 ENCOUNTER — PATIENT MESSAGE (OUTPATIENT)
Dept: ADMINISTRATIVE | Facility: OTHER | Age: 87
End: 2022-01-10
Payer: MEDICARE

## 2022-01-10 ENCOUNTER — HOSPITAL ENCOUNTER (EMERGENCY)
Facility: HOSPITAL | Age: 87
Discharge: HOME | End: 2022-01-10
Attending: STUDENT IN AN ORGANIZED HEALTH CARE EDUCATION/TRAINING PROGRAM
Payer: MEDICARE

## 2022-01-10 VITALS
RESPIRATION RATE: 20 BRPM | BODY MASS INDEX: 39.08 KG/M2 | TEMPERATURE: 98 F | WEIGHT: 257 LBS | OXYGEN SATURATION: 98 % | HEART RATE: 58 BPM | DIASTOLIC BLOOD PRESSURE: 72 MMHG | SYSTOLIC BLOOD PRESSURE: 164 MMHG

## 2022-01-10 DIAGNOSIS — E03.4 HYPOTHYROIDISM DUE TO ACQUIRED ATROPHY OF THYROID: ICD-10-CM

## 2022-01-10 DIAGNOSIS — U07.1 COVID: ICD-10-CM

## 2022-01-10 DIAGNOSIS — R19.7 DIARRHEA, UNSPECIFIED TYPE: Primary | ICD-10-CM

## 2022-01-10 DIAGNOSIS — N39.0 URINARY TRACT INFECTION WITHOUT HEMATURIA, SITE UNSPECIFIED: ICD-10-CM

## 2022-01-10 LAB
ALBUMIN SERPL BCP-MCNC: 3 G/DL (ref 3.5–5.2)
ALP SERPL-CCNC: 111 U/L (ref 55–135)
ALT SERPL W/O P-5'-P-CCNC: 16 U/L (ref 10–44)
ANION GAP SERPL CALC-SCNC: 12 MMOL/L (ref 8–16)
AST SERPL-CCNC: 13 U/L (ref 10–40)
BACTERIA #/AREA URNS HPF: ABNORMAL /HPF
BASOPHILS # BLD AUTO: 0.03 K/UL (ref 0–0.2)
BASOPHILS NFR BLD: 0.4 % (ref 0–1.9)
BILIRUB SERPL-MCNC: 0.4 MG/DL (ref 0.1–1)
BILIRUB UR QL STRIP: NEGATIVE
BUN SERPL-MCNC: 28 MG/DL (ref 8–23)
CALCIUM SERPL-MCNC: 7.2 MG/DL (ref 8.7–10.5)
CHLORIDE SERPL-SCNC: 102 MMOL/L (ref 95–110)
CLARITY UR: ABNORMAL
CO2 SERPL-SCNC: 24 MMOL/L (ref 23–29)
COLOR UR: YELLOW
CREAT SERPL-MCNC: 1.6 MG/DL (ref 0.5–1.4)
DIFFERENTIAL METHOD: ABNORMAL
EOSINOPHIL # BLD AUTO: 0.1 K/UL (ref 0–0.5)
EOSINOPHIL NFR BLD: 1.4 % (ref 0–8)
ERYTHROCYTE [DISTWIDTH] IN BLOOD BY AUTOMATED COUNT: 14.6 % (ref 11.5–14.5)
EST. GFR  (AFRICAN AMERICAN): 34 ML/MIN/1.73 M^2
EST. GFR  (NON AFRICAN AMERICAN): 29 ML/MIN/1.73 M^2
GLUCOSE SERPL-MCNC: 115 MG/DL (ref 70–110)
GLUCOSE UR QL STRIP: NEGATIVE
HCT VFR BLD AUTO: 34 % (ref 37–48.5)
HGB BLD-MCNC: 10.9 G/DL (ref 12–16)
HGB UR QL STRIP: ABNORMAL
HYALINE CASTS #/AREA URNS LPF: 0 /LPF
IMM GRANULOCYTES # BLD AUTO: 0.05 K/UL (ref 0–0.04)
IMM GRANULOCYTES NFR BLD AUTO: 0.7 % (ref 0–0.5)
KETONES UR QL STRIP: NEGATIVE
LEUKOCYTE ESTERASE UR QL STRIP: ABNORMAL
LYMPHOCYTES # BLD AUTO: 2 K/UL (ref 1–4.8)
LYMPHOCYTES NFR BLD: 27.5 % (ref 18–48)
MCH RBC QN AUTO: 29.9 PG (ref 27–31)
MCHC RBC AUTO-ENTMCNC: 32.1 G/DL (ref 32–36)
MCV RBC AUTO: 93 FL (ref 82–98)
MICROSCOPIC COMMENT: ABNORMAL
MONOCYTES # BLD AUTO: 0.6 K/UL (ref 0.3–1)
MONOCYTES NFR BLD: 8.1 % (ref 4–15)
NEUTROPHILS # BLD AUTO: 4.6 K/UL (ref 1.8–7.7)
NEUTROPHILS NFR BLD: 61.9 % (ref 38–73)
NITRITE UR QL STRIP: POSITIVE
NRBC BLD-RTO: 0 /100 WBC
PH UR STRIP: 8 [PH] (ref 5–8)
PLATELET # BLD AUTO: 278 K/UL (ref 150–450)
PMV BLD AUTO: 10 FL (ref 9.2–12.9)
POTASSIUM SERPL-SCNC: 4.5 MMOL/L (ref 3.5–5.1)
PROT SERPL-MCNC: 7.5 G/DL (ref 6–8.4)
PROT UR QL STRIP: ABNORMAL
RBC # BLD AUTO: 3.65 M/UL (ref 4–5.4)
RBC #/AREA URNS HPF: 50 /HPF (ref 0–4)
SARS-COV-2 RDRP RESP QL NAA+PROBE: POSITIVE
SODIUM SERPL-SCNC: 138 MMOL/L (ref 136–145)
SP GR UR STRIP: 1.02 (ref 1–1.03)
SQUAMOUS #/AREA URNS HPF: 6 /HPF
TRI-PHOS CRY URNS QL MICRO: ABNORMAL
URN SPEC COLLECT METH UR: ABNORMAL
UROBILINOGEN UR STRIP-ACNC: NEGATIVE EU/DL
WBC # BLD AUTO: 7.39 K/UL (ref 3.9–12.7)
WBC #/AREA URNS HPF: >100 /HPF (ref 0–5)

## 2022-01-10 PROCEDURE — 87186 SC STD MICRODIL/AGAR DIL: CPT | Mod: HCNC | Performed by: STUDENT IN AN ORGANIZED HEALTH CARE EDUCATION/TRAINING PROGRAM

## 2022-01-10 PROCEDURE — 99284 EMERGENCY DEPT VISIT MOD MDM: CPT | Mod: 25,HCNC

## 2022-01-10 PROCEDURE — 87088 URINE BACTERIA CULTURE: CPT | Mod: HCNC | Performed by: STUDENT IN AN ORGANIZED HEALTH CARE EDUCATION/TRAINING PROGRAM

## 2022-01-10 PROCEDURE — 36415 COLL VENOUS BLD VENIPUNCTURE: CPT | Mod: HCNC | Performed by: STUDENT IN AN ORGANIZED HEALTH CARE EDUCATION/TRAINING PROGRAM

## 2022-01-10 PROCEDURE — 25000003 PHARM REV CODE 250: Mod: HCNC | Performed by: STUDENT IN AN ORGANIZED HEALTH CARE EDUCATION/TRAINING PROGRAM

## 2022-01-10 PROCEDURE — 96361 HYDRATE IV INFUSION ADD-ON: CPT | Mod: HCNC

## 2022-01-10 PROCEDURE — 96365 THER/PROPH/DIAG IV INF INIT: CPT | Mod: HCNC

## 2022-01-10 PROCEDURE — U0002 COVID-19 LAB TEST NON-CDC: HCPCS | Mod: HCNC | Performed by: STUDENT IN AN ORGANIZED HEALTH CARE EDUCATION/TRAINING PROGRAM

## 2022-01-10 PROCEDURE — 80053 COMPREHEN METABOLIC PANEL: CPT | Mod: HCNC | Performed by: STUDENT IN AN ORGANIZED HEALTH CARE EDUCATION/TRAINING PROGRAM

## 2022-01-10 PROCEDURE — 63600175 PHARM REV CODE 636 W HCPCS: Mod: HCNC | Performed by: STUDENT IN AN ORGANIZED HEALTH CARE EDUCATION/TRAINING PROGRAM

## 2022-01-10 PROCEDURE — 87086 URINE CULTURE/COLONY COUNT: CPT | Mod: HCNC | Performed by: STUDENT IN AN ORGANIZED HEALTH CARE EDUCATION/TRAINING PROGRAM

## 2022-01-10 PROCEDURE — 87077 CULTURE AEROBIC IDENTIFY: CPT | Mod: HCNC | Performed by: STUDENT IN AN ORGANIZED HEALTH CARE EDUCATION/TRAINING PROGRAM

## 2022-01-10 PROCEDURE — 85025 COMPLETE CBC W/AUTO DIFF WBC: CPT | Mod: HCNC | Performed by: STUDENT IN AN ORGANIZED HEALTH CARE EDUCATION/TRAINING PROGRAM

## 2022-01-10 PROCEDURE — 81000 URINALYSIS NONAUTO W/SCOPE: CPT | Mod: HCNC | Performed by: STUDENT IN AN ORGANIZED HEALTH CARE EDUCATION/TRAINING PROGRAM

## 2022-01-10 RX ORDER — CEFPODOXIME PROXETIL 200 MG/1
200 TABLET, FILM COATED ORAL 2 TIMES DAILY
Qty: 20 TABLET | Refills: 0 | Status: SHIPPED | OUTPATIENT
Start: 2022-01-10 | End: 2022-01-19

## 2022-01-10 RX ADMIN — CEFTRIAXONE 1 G: 1 INJECTION, SOLUTION INTRAVENOUS at 04:01

## 2022-01-10 RX ADMIN — SODIUM CHLORIDE 1000 ML: 0.9 INJECTION, SOLUTION INTRAVENOUS at 03:01

## 2022-01-10 NOTE — DISCHARGE INSTRUCTIONS
Please follow up with your primary care physician within 2 days. Ensure that you review all lab work results and imaging results. If you have any questions about your discharge paperwork please call the Emergency Department.     Return to the Emergency Department for any fevers, worsening cough or congestion, shortness of breath, chest pain, nausea, vomiting, diarrhea, if symptoms do not improve, or for any new or worsening symptoms, unless otherwise told.  If you have been discharged pending a COVID test, please isolate as per the instructions given to you, and follow-up using the MyChart instructions in your discharge paperwork.  If you test positive, please contact the INFUSION SITE AT [facility] ([phone]) and schedule your infusion appointment, if you qualify.    Thank you for visiting [facility], Department of Emergency Medicine. Please see the entirety of the educational materials provided. Please note that a visit to the emergency department does not substitute ongoing care from a primary medical provider or specialist. Please ensure to follow up as recommended. However, please return to the emergency department immediately if symptoms do not improve as discussed, symptoms worsen, new symptoms develop, difficulty in following up or for any of your concerns or issues. Please note on discharge you are acknowledging understanding and agreement on medical evaluation, management recommendations and follow up recommendations.        If you have a COVID Test PENDING:    Please access [facility] to review the results of your test. Until the results of your COVID test return, you should isolate yourself so as not to potentially spread illness to others.    If your COVID test returns positive, you should isolate yourself so as not to spread illness to others. After five full days, if you are feeling better and you have not had fever for 24 hours, you can return to your typical daily  activities, but you must wear a mask around others for an additional 5 days.    If your COVID test returns negative and you are either unvaccinated or more than six months out from your two-dose vaccine and are not yet boosted, you should still quarantine for 5 full days followed by strict mask use for an additional 5 full days.    If your COVID test returns negative and you have received your 2-dose initial vaccine as well as a booster, you should continue strict mask use for 10 full days after the exposure.    For all those exposed, best practice includes a test at day 5 after the exposure. This can be a home test or a test through one of the many testing centers throughout our community.    Masking is always advised to limit the spread of COVID. Cdc.gov is an excellent site to obtain the latest up to date recommendations regarding COVID and COVID testing.         After your evaluation today, we ruled out any emergent condition. However, if you develop shortness of breath, chest pain, or ANY OTHER CONCERNS please return to the emergency department for further care.         CDC Testing and Quarantine Guidelines for patients with exposure to a known-positive COVID-19 person:    A close exposure is defined as anyone who has had an exposure (masked or unmasked) to a known COVID -19 positive person within 6 feet of someone for a cumulative total of 15 minutes or more over a 24-hour period.    Vaccinated and/or if you recently had a positive covid test within 90 days do NOT need to quarantine after contact with someone who had COVID-19 unless you develop symptoms.    Fully vaccinated people who have not had a positive test within 90 days, should get tested 3-5 days after their exposure, even if they don't have symptoms and wear a mask indoors in public for 14 days following exposure or until their test result is negative.         Unvaccinated and/or NOT had a positive test within 90 days and meet close  exposure    You are required by CDC guidelines to quarantine for at least 5 days from time of exposure followed by 5 days of strict masking. It is recommended, but not required to test after 5 days, unless you develop symptoms, in which case you should test at that time.    If you get tested after 5 days and your test is positive, your 5 day period of isolation starts the day of the positive test.     If your exposure does not meet the above definition, you can return to your normal daily activities to include social distancing, wearing a mask and frequent handwashing.         Here is a link to guidance from the CDC:    https://www.cdc.gov/media/releases/2021/s1227-isolation-quarantine-guidance.html      [facility] Testing Sites:    https://ldh.la.gov/page/3934      [facility] website with testing locations and guidance:    https://[email or web address].[email or web address]/[email or web address]

## 2022-01-10 NOTE — TELEPHONE ENCOUNTER
No new care gaps identified.  Powered by [employer]. Reference number: [id number].   1/10/2022 9:44:22 AM CST

## 2022-01-10 NOTE — ED PROVIDER NOTES
Encounter Date: 1/10/2022       History     Chief Complaint   Patient presents with    Fatigue     86-year-old female with history of AFib, asthma, hypothyroidism, diabetes, previous pulmonary embolism, presenting with diarrhea and generalized weakness for 1 day.  Patient was recently exposed to COVID.  Patient endorses 3 days of cough, congestion.  Denies fever or body aches.  No nausea or vomiting.  Denies any abdominal pain.  No other complaints.        Review of patient's allergies indicates:  No Known Allergies  Past Medical History:   Diagnosis Date    A-fib     Acute bronchitis with asthma     Anemia due to stage 3 chronic kidney disease     Angina pectoris     Anticoagulant long-term use     Asthma     Back pain     Cancer     Chest pain, musculoskeletal 7/16/2013    Chronic bronchitis     Class 3 obesity with serious comorbidity and body mass index (BMI) of 45.0 to 49.9 in adult 11/17/2016    Colon polyps     COPD exacerbation 3/13/2020    Gout, unspecified     History of cervical cancer     Hypertension associated with stage 4 chronic kidney disease due to type 2 diabetes mellitus 8/14/2019    Hypothyroidism     Obesity     Osteoarthritis     Other and unspecified hyperlipidemia     PE (pulmonary embolism)     Renal manifestation of secondary diabetes mellitus     Thyroid disease     Trouble in sleeping     Type 2 diabetes mellitus with ophthalmic manifestations     Type 2 diabetes with peripheral circulatory disorder, controlled     Type II or unspecified type diabetes mellitus without mention of complication, uncontrolled     States everything okay-previous dx    Unspecified essential hypertension     Urinary incontinence     Uterine cancer     Uterine cancer      Past Surgical History:   Procedure Laterality Date    ADENOIDECTOMY      EYE SURGERY Right     right eye cataract    HYSTERECTOMY  2008    LIZ-BSO    tonsilectomy      TONSILLECTOMY  1945    TOTAL ABDOMINAL  HYSTERECTOMY  2008    TOTAL ABDOMINAL HYSTERECTOMY W/ BILATERAL SALPINGOOPHORECTOMY  2008     Family History   Problem Relation Age of Onset    Heart disease Mother     Arthritis Father     Breast cancer Sister     Ovarian cancer Sister     Cancer Brother         Throat cancer    Arthritis Daughter         back    No Known Problems Son     Heart disease Brother     Stroke Brother     Heart disease Brother         stents heart and legs     Diabetes Brother     Hyperlipidemia Brother     Anemia Daughter     Arthritis Daughter         RA    Diabetes Daughter     Arthritis Daughter         RA    Glaucoma Daughter     Diabetes Daughter     Liver disease Daughter     Arthritis Daughter         back     Stroke Son     No Known Problems Son      Social History     Tobacco Use    Smoking status: Former Smoker     Packs/day: 0.33     Years: 13.00     Pack years: 4.29     Types: Cigarettes     Start date: 1951     Quit date: 1964     Years since quittin.4    Smokeless tobacco: Never Used   Substance Use Topics    Alcohol use: No    Drug use: No     Review of Systems   Constitutional: Negative for fever.   HENT: Positive for congestion. Negative for sore throat.    Respiratory: Positive for cough. Negative for shortness of breath.    Cardiovascular: Negative for chest pain.   Gastrointestinal: Positive for diarrhea. Negative for nausea and vomiting.   Genitourinary: Negative for dysuria.   Musculoskeletal: Negative for back pain.   Skin: Negative for rash.   Neurological: Negative for weakness.   Hematological: Does not bruise/bleed easily.       Physical Exam     Initial Vitals   BP Pulse Resp Temp SpO2   01/10/22 0247 01/10/22 0245 01/10/22 0245 01/10/22 0247 01/10/22 0247   (!) 169/75 63 20 99.1 °F (37.3 °C) 97 %      MAP       --                Physical Exam    Nursing note and vitals reviewed.  Constitutional: She appears well-developed. She is not diaphoretic. No distress.   HENT:    Head: Normocephalic.   Eyes: Pupils are equal, round, and reactive to light.   Neck:   Normal range of motion.  Cardiovascular:   No murmur heard.  Pulmonary/Chest: No respiratory distress.   Clear lungs bilaterally.  No respiratory distress.  No wheezing or rales.  Good air movement.     Abdominal: Abdomen is soft.   No TTP diffusely. No guarding, rebound, or masses. Negative Gonzales's sign. No TTP at McBurney's point. No CVAT bilaterally.     Musculoskeletal:         General: No edema.      Cervical back: Normal range of motion.     Neurological: She is alert.   Skin: Skin is warm.   Psychiatric: She has a normal mood and affect.         ED Course   Procedures  Labs Reviewed   CBC W/ AUTO DIFFERENTIAL - Abnormal; Notable for the following components:       Result Value    RBC 3.65 (*)     Hemoglobin 10.9 (*)     Hematocrit 34.0 (*)     RDW 14.6 (*)     Immature Granulocytes 0.7 (*)     Immature Grans (Abs) 0.05 (*)     All other components within normal limits   COMPREHENSIVE METABOLIC PANEL - Abnormal; Notable for the following components:    Glucose 115 (*)     BUN 28 (*)     Creatinine 1.6 (*)     Calcium 7.2 (*)     Albumin 3.0 (*)     eGFR if  34 (*)     eGFR if non  29 (*)     All other components within normal limits   URINALYSIS, REFLEX TO URINE CULTURE - Abnormal; Notable for the following components:    Appearance, UA Cloudy (*)     Protein, UA 2+ (*)     Occult Blood UA 3+ (*)     Nitrite, UA Positive (*)     Leukocytes, UA 3+ (*)     All other components within normal limits    Narrative:     Specimen Source->Urine   SARS-COV-2 RNA AMPLIFICATION, QUAL - Abnormal; Notable for the following components:    SARS-CoV-2 RNA, Amplification, Qual Positive (*)     All other components within normal limits   URINALYSIS MICROSCOPIC - Abnormal; Notable for the following components:    RBC, UA 50 (*)     WBC, UA >100 (*)     Bacteria Many (*)     All other components within normal  limits    Narrative:     Specimen Source->Urine   CULTURE, URINE          Imaging Results          X-Ray Chest AP Portable (Final result)  Result time 01/10/22 08:06:27    Final result by Keyonna Hartman MD (01/10/22 08:06:27)                 Impression:      No acute abnormality.      Electronically signed by: Keyonna Hartman MD  Date:    01/10/2022  Time:    08:06             Narrative:    EXAMINATION:  XR CHEST AP PORTABLE    CLINICAL HISTORY:  Cough;    TECHNIQUE:  Single frontal view of the chest was performed.    COMPARISON:  None    FINDINGS:  The lungs are clear with normal appearance of pulmonary vasculature. No pleural effusion. No evident pneumothorax.    The cardiac silhouette is enlarged..  The hilar and mediastinal contours are unremarkable.    Bones are intact.                                 Medications   sodium chloride 0.9% bolus 1,000 mL (0 mLs Intravenous Stopped 1/10/22 0411)   cefTRIAXone (ROCEPHIN) 1 g/50 mL D5W IVPB (0 g Intravenous Stopped 1/10/22 0452)     Medical Decision Making:   Differential Diagnosis:   DDX:  Patient presenting with symptoms of an upper respiratory virus.  Concern for COVID, especially given recent surges in the current pandemic.  Will screen for pneumonia with chest x-ray.  Given patient's age, and recent volume losses, will check electrolytes, and give volume repletion.  Do not suspect OM given sxs or step throat given centor criteria.  DX:  COVID.  CBC, CMP  TX: Analgesia PRN. IV fluids.  Consider antibiotics pending chest x-ray read.  Given patient is not hypoxic, no indications for steroids this time.  Dispo: Discharge to follow up with PMD within 3-5 days with precautions for RTED and supportive care recommendations.               ED Course as of 01/10/22 2006   Mon Chris 10, 2022   0413 Patient COVID positive and urine shows some limited view to.  Will treat urinary tract infection discharged home. [NB]      ED Course User Index  [NB] Carlos Farias MD              Clinical Impression:   Final diagnoses:  [R19.7] Diarrhea, unspecified type (Primary)  [U07.1] COVID  [N39.0] Urinary tract infection without hematuria, site unspecified          ED Disposition Condition    Discharge Stable        ED Prescriptions     Medication Sig Dispense Start Date End Date Auth. Provider    cefpodoxime (VANTIN) 200 MG tablet Take 1 tablet (200 mg total) by mouth 2 (two) times daily. for 10 days 20 tablet 1/10/2022 1/20/2022 Carlos Farias MD        Follow-up Information     Follow up With Specialties Details Why Contact Info    Tasha Atkins MD Internal Medicine Schedule an appointment as soon as possible for a visit in 2 days  [address] [place] [id number]  [phone]      [facility] - Emergency Dept Emergency Medicine  If symptoms worsen [address] [place] [id number]  [phone]           Carlos Farias MD  01/10/22 0246       Carlos Farias MD  01/10/22 2006

## 2022-01-10 NOTE — PATIENT INSTRUCTIONS
FACT SHEET FOR PATIENTS, PARENTS AND CAREGIVERS   EMERGENCY USE AUTHORIZATION (EUA) OF REGEN-COVTM   (casirivimab and imdevimab) FOR CORONAVIRUS DISEASE 2019 (COVID-19)     You are being given a medicine called REGEN-COV (casirivimab and imdevimab) for the treatment or post-exposure prevention of coronavirus disease 2019 (COVID-19). SARS-CoV-2 is the virus that causes COVID-19. This Fact Sheet contains information to help you understand the potential risks and potential benefits of taking REGEN-COV.   Receiving REGEN-COV may benefit certain people with COVID-19 and may help prevent certain people who have been exposed to someone who is infected with SARS-CoV-2 from getting SARS-CoV-2 infection, or may prevent certain people who are at high risk of exposure to someone who is infected with SARS-CoV-2 from getting SARS-CoV-2 infection.   Read this Fact Sheet for information about REGEN-COV. Talk to your healthcare provider if you have questions. It is your choice to receive REGEN-COV or stop at any time.     WHAT IS COVID-19?   COVID-19 is caused by a virus called a coronavirus, SARS-CoV-2. People can get COVID-19 through contact with another person who has the virus.   COVID-19 illnesses have ranged from very mild (including some with no reported symptoms) to severe, including illness resulting in death. While information so far suggests that most COVID-19 illness is mild, serious illness can happen and may cause some of your other medical conditions to become worse. People of all ages with severe, long-lasting (chronic) medical conditions like heart disease, lung disease, and diabetes, for example, and other conditions including obesity, seem to be at higher risk of being hospitalized for COVID-19. Older age, with or without other conditions, also places people at higher risk of being hospitalized for COVID-19.     WHAT ARE THE SYMPTOMS OF COVID-19?   The symptoms of COVID-19 include fever, cough, and shortness of  breath, which may appear 2 to 14 days after exposure. Serious illness including breathing problems can occur and may cause your other medical conditions to become worse.     WHAT IS REGEN-COV (casirivimab and imdevimab)?   REGEN-COV is an investigational medicine used in adults and adolescents (12 years of age and older who weigh at least 88 pounds (40 kg)) who are at high risk for severe COVID-19, including hospitalization or death for:    treatment of mild to moderate symptoms of COVID-19    post-exposure prevention of COVID-19 in persons who are: o not fully vaccinated against COVID-19 (Individuals are considered to be fully vaccinated 2 weeks after their second vaccine dose in a 2-dose series [such as the Pfizer or Moderna vaccines], or 2 weeks after a single-dose vaccine [such as Zenter's Jordana vaccine]), or,   o are not expected to build up enough of an immune response to the complete COVID-19 vaccination (for example, someone with immunocompromising conditions, including someone who is taking immunosuppressive medications), and- have been exposed to someone who is infected with SARS-CoV-2. Close contact with someone who is infected with SARS-CoV-2 is defined as being within 6 feet for a total of 15 minutes or more, providing care at home to someone who is sick, having direct physical contact with the person (hugging or kissing, for example), sharing eating or drinking utensils, or being exposed to respiratory droplets from an infected person (sneezing or coughing, for example). For additional details, go to https://www.cdc.gov/coronavirus/2019-ncov/if-you-are-sick/quarantine.html, or   - someone who is at high risk of being exposed to someone who is infected with SARS-CoV-2 because of occurrence of SARS-CoV-2 infection in other individuals in the same institutional setting (for example, as nursing homes, prisons,). REGEN-COV is investigational because it is still being studied. There is limited  "information known about the safety and effectiveness of using REGEN-COV to treat people with COVID-19 or to prevent COVID-19 in people who are at high risk of being exposed to someone who is infected with SARS-CoV-2. REGEN-COV is not authorized for pre-exposure prophylaxis for prevention of COVID-19.   The FDA has authorized the emergency use of REGEN-COV for the treatment of COVID-19 and the post-exposure prevention of COVID-19 under an Emergency Use Authorization (EUA). For more information on EUA, see the "What is an Emergency Use Authorization (EUA)?" section at the end of this Fact Sheet.     WHO SHOULD NOT TAKE REGEN-COV?   Do not take REGEN-COV if you have had a severe allergic reaction to REGEN-COV.     WHAT SHOULD I TELL MY HEALTH CARE PROVIDER BEFORE I RECEIVE REGEN-COV?   Tell your healthcare provider about all of your medical conditions, including if you:    Have any allergies    Have had a severe allergic reaction including anaphylaxis to REGEN-COV previously    Have received a COVID-19 vaccine.    Have any serious illnesses    Are pregnant or plan to become pregnant    Are breastfeeding or plan to breastfeed    Are taking any medications (prescription, over-the-counter, vitamins, and herbal products)     HOW WILL I RECEIVE REGEN-COV (casirivimab and imdevimab)?    REGEN-COV consists of two investigational medicines, casirivimab and imdevimab, given together at the same time through a vein (intravenous or IV) or injected in the tissue just under the skin (subcutaneous injections). Your healthcare provider will determine the most appropriate way for you to be given REGEN-COV.    Treatment: If you are receiving an intravenous infusion, the infusion will take 20 to 50 minutes or longer. Your healthcare provider will determine the duration of your infusion. o If your healthcare provider determines that you are unable to receive REGEN-COV as an intravenous infusion which would lead to a delay in " treatment, then as an alternative, REGEN-COV can be given in the form of subcutaneous injections. If you are receiving subcutaneous injections, your dose will be provided as multiple injections given in separate locations around the same time.    Post-exposure prevention: If you are receiving subcutaneous injections, your dose will be provided as multiple injections given in separate locations around the same time. If you are receiving an intravenous infusion, the infusion will take 20 to 50 minutes or longer. o After the initial dose, if your healthcare provider determines that you need to receive additional doses of REGEN-COV for ongoing protection, the additional intravenous or subcutaneous doses would be administered monthly.     WHAT ARE THE IMPORTANT POSSIBLE SIDE EFFECTS OF REGEN-COV (casirivimab and imdevimab)?   Possible side effects of REGEN-COV are:    Allergic reactions. Allergic reactions can happen during and after infusion or injection of REGEN-COV. Tell your healthcare provider right away or seek immediate medical attention if you get any of the following signs and symptoms of allergic reactions: fever, chills, nausea, headache, shortness of breath, low or high blood pressure, rapid or slow heart rate, chest discomfort or pain, weakness, confusion, feeling tired, wheezing, swelling of your lips, face, or throat, rash including hives, itching, muscle aches, feeling faint, dizziness and sweating. These reactions may be severe or life threatening.    Worsening symptoms after treatment: You may experience new or worsening symptoms after infusion or injection, including fever, difficulty breathing, rapid or slow heart rate, tiredness, weakness or confusion. If these symptoms occur, contact your healthcare provider or seek immediate medical attention as some of these symptoms have required hospitalization. It is unknown if these symptoms are related to treatment or are due to the progression of  COVID-19. The side effects of getting any medicine by vein may include brief pain, bleeding, bruising of the skin, soreness, swelling, and possible infection at the infusion site. The side effects of getting any medicine by subcutaneous injection may include pain, bruising of the skin, soreness, swelling, and possible infection at the injection site.   These are not all the possible side effects of REGEN-COV. Not a lot of people have been given REGEN-COV. Serious and unexpected side effects may happen. REGEN-COV is still being studied so it is possible that all of the risks are not known at this time.   It is possible that REGEN-COV could interfere with your body's own ability to fight off a future infection of SARS-CoV-2. Similarly, REGEN-COV may reduce your body's immune response to a vaccine for SARS-CoV-2. Specific studies have not been conducted to address these possible risks. Talk to your healthcare provider if you have any questions.    WHAT OTHER TREATMENT CHOICES ARE THERE?   Like REGEN-COV (casirivimab and imdevimab), FDA may allow for the emergency use of other medicines to treat people with COVID-19. Go to https://www.fda.gov/emergency-preparedness-and-response/mcm-legal-regulatory-and-policy-framework/emergency-use-authorization for information on the emergency use of other medicines that are not approved by FDA that are used to treat people with COVID-19. Your healthcare provider may talk with you about clinical trials you may be eligible for.   It is your choice to be treated or not to be treated with REGEN-COV. Should you decide not to receive REGEN-COV or stop it at any time, it will not change your standard medical care.     WHAT OTHER PREVENTION CHOICES ARE THERE?   Vaccines to prevent COVID-19 are also available under Emergency Use Authorization. Use of REGEN-COV does not replace vaccination against COVID-19. REGEN-COV is not authorized for pre-exposure prophylaxis for prevention of COVID-19.      WHAT IF I AM PREGNANT OR BREASTFEEDING?   There is limited experience using REGEN-COV (casirivimab and imdevimab) in pregnant women or breastfeeding mothers. For a mother and unborn baby, the benefit of receiving REGEN-COV may be greater than the risk of using the product. If you are pregnant or breastfeeding, discuss your options and specific situation with your healthcare provider.     HOW DO I REPORT SIDE EFFECTSWITH REGEN-COV (casirivimab and imdevimab)?   Tell your healthcare provider right away if you have any side effect that bothers you or does not go away.   Report side effects to FDA MedWatch at www.fda.gov/medwatch or call 8-002-BBV-1836 or call 1-617.497.7181.     HOW CAN I LEARN MORE?    Ask your health care provider.    Visit [email or web address].Metaversum    Visit https://www.tjecp17bvmxookqmwgjkpquysc.nih.gov/    Contact your local or state public health department.     WHAT IS AN EMERGENCY USE AUTHORIZATION (EUA)?   The [place] FDA has made REGEN-COV (casirivimab and imdevimab) available under an emergency access mechanism called an EUA. The EUA is supported by a [place] of Health and Human Service (HHS) declaration that circumstances exist to justify the emergency use of drugs and biological products during the COVID-19 pandemic. REGEN-COV has not undergone the same type of review as an FDA-approved product. In issuing an EUA under the COVID-19 public health emergency, the FDA must determine, among other things, that based on the totality of scientific evidence available, it is reasonable to believe that the product may be effective for diagnosing, treating, or preventing COVID-19, or a serious or life-threatening disease or condition caused by COVID-19; that the known and potential benefits of the product, when used to diagnose, treat, or prevent such disease or condition, outweigh the known and potential risks of such product; and that there are no adequate, approved and available alternatives.  All of these criteria must be met to allow for the medicine to be used in the treatment of COVID-19 or prevention of COVID-19 during the COVID-19 pandemic.   The EUA for REGEN-COV is in effect for the duration of the COVID-19 declaration justifying emergency use of these products, unless terminated or revoked (after which the products may no longer be used).     Manufactured by:   [employer]   [address] [place] [id number]   ©2021 [employer] All rights reserved.   Revised: 07/2021

## 2022-01-11 ENCOUNTER — INFUSION (OUTPATIENT)
Dept: INFECTIOUS DISEASES | Facility: HOSPITAL | Age: 87
End: 2022-01-11
Attending: STUDENT IN AN ORGANIZED HEALTH CARE EDUCATION/TRAINING PROGRAM
Payer: MEDICARE

## 2022-01-11 VITALS
RESPIRATION RATE: 20 BRPM | SYSTOLIC BLOOD PRESSURE: 156 MMHG | DIASTOLIC BLOOD PRESSURE: 91 MMHG | TEMPERATURE: 98 F | HEART RATE: 53 BPM | OXYGEN SATURATION: 98 %

## 2022-01-11 DIAGNOSIS — U07.1 COVID: ICD-10-CM

## 2022-01-11 PROCEDURE — 25000003 PHARM REV CODE 250: Mod: HCNC | Performed by: FAMILY MEDICINE

## 2022-01-11 PROCEDURE — M0243 CASIRIVI AND IMDEVI INFUSION: HCPCS | Performed by: FAMILY MEDICINE

## 2022-01-11 PROCEDURE — 63600175 PHARM REV CODE 636 W HCPCS: Mod: HCNC | Performed by: FAMILY MEDICINE

## 2022-01-11 RX ORDER — ALBUTEROL SULFATE 90 UG/1
2 AEROSOL, METERED RESPIRATORY (INHALATION)
Status: DISCONTINUED | OUTPATIENT
Start: 2022-01-11 | End: 2022-01-26 | Stop reason: ALTCHOICE

## 2022-01-11 RX ORDER — SODIUM CHLORIDE 0.9 % (FLUSH) 0.9 %
10 SYRINGE (ML) INJECTION
Status: DISCONTINUED | OUTPATIENT
Start: 2022-01-11 | End: 2022-01-26 | Stop reason: ALTCHOICE

## 2022-01-11 RX ORDER — ACETAMINOPHEN 325 MG/1
650 TABLET ORAL ONCE AS NEEDED
Status: DISCONTINUED | OUTPATIENT
Start: 2022-01-11 | End: 2022-01-26 | Stop reason: ALTCHOICE

## 2022-01-11 RX ORDER — EPINEPHRINE 0.3 MG/.3ML
0.3 INJECTION SUBCUTANEOUS
Status: DISCONTINUED | OUTPATIENT
Start: 2022-01-11 | End: 2022-01-26 | Stop reason: ALTCHOICE

## 2022-01-11 RX ORDER — ONDANSETRON 4 MG/1
4 TABLET, ORALLY DISINTEGRATING ORAL ONCE AS NEEDED
Status: DISCONTINUED | OUTPATIENT
Start: 2022-01-11 | End: 2022-01-26 | Stop reason: ALTCHOICE

## 2022-01-11 RX ORDER — DIPHENHYDRAMINE HYDROCHLORIDE 50 MG/ML
25 INJECTION INTRAMUSCULAR; INTRAVENOUS ONCE AS NEEDED
Status: DISCONTINUED | OUTPATIENT
Start: 2022-01-11 | End: 2022-01-26 | Stop reason: ALTCHOICE

## 2022-01-11 RX ADMIN — CASIRIVIMAB AND IMDEVIMAB 600 MG: 600; 600 INJECTION, SOLUTION, CONCENTRATE INTRAVENOUS at 08:01

## 2022-01-12 RX ORDER — LEVOTHYROXINE SODIUM 75 UG/1
TABLET ORAL
Qty: 90 TABLET | Refills: 2 | Status: SHIPPED | OUTPATIENT
Start: 2022-01-12 | End: 2022-10-18

## 2022-01-12 NOTE — TELEPHONE ENCOUNTER
Refill Routing Note   Medication(s) are not appropriate for processing by [facility] for the following reason(s):      - Patient has been seen in the Emergency Room/Department since the last PCP visit    [facility] action(s):  Defer       Medication Therapy Plan: Recent ED visit for covid (1/10/22); Defer to PCP  --->Care Gap information included in message below if applicable.   Medication reconciliation completed: No   Automatic Epic Generated Protocol Data:        Requested Prescriptions   Pending Prescriptions Disp Refills    EUTHYROX 75 mcg tablet [Pharmacy Med Name: Euthyrox 75 MCG Oral Tablet] 90 tablet 2     Sig: Take 1 tablet by mouth once daily       Endocrinology:  Hypothyroid Agents Passed - 1/10/2022  9:43 AM        Passed - Patient is at least 18 years old        Passed - Valid encounter within last 15 months     Recent Visits  Date Type Provider Dept   11/15/21 Office Visit Tasha Atkins MD [facility]   08/17/21 Office Visit Tasha Atkins MD [facility]   05/06/21 Office Visit Tasha Atkins MD [facility]   02/10/21 Office Visit Tasha Atkins MD [facility]   11/25/20 Office Visit Tasha Atkins MD [facility]   08/12/20 Office Visit Tasha Atkins MD [facility]   05/14/20 Office Visit Tasha Atkins MD [facility]   03/19/20 Office Visit Tasha Atkins MD [facility]   02/13/20 Office Visit Tasha Atkins MD [facility]   Showing recent visits within past 720 days and meeting all other requirements  Future Appointments  No visits were found meeting these conditions.  Showing future appointments within next 150 days and meeting all other requirements      Future Appointments              In 3 weeks [facility]    In 1 month [facility]                 Passed - Manual Review: FT4 is not required if last TSH is WNL.        Passed - TSH in normal range and within 360 days     TSH   Date Value Ref Range Status   07/29/2021 1.955 0.400 - 4.000 uIU/mL Final   02/03/2021 2.179 0.400 - 4.000 uIU/mL Final   02/03/2021 2.179 0.400 - 4.000 uIU/mL Final              Passed - T4 free within 1080 days     Free T4   Date Value Ref Range Status   02/03/2021 0.99 0.71 - 1.51 ng/dL Final   03/07/2019 1.07 0.71 - 1.51 ng/dL Final   07/27/2015 0.87 0.71 - 1.51 ng/dL Final                    Appointments  past 12m or future 3m with PCP    Date Provider   Last Visit   11/15/2021 Tasha Atkins MD   Next Visit   2/15/2022 Tasha Atkins MD   ED visits in past 90 days: 1        Note composed:11:06 AM 01/12/2022

## 2022-01-13 DIAGNOSIS — M17.0 PRIMARY OSTEOARTHRITIS OF BOTH KNEES: ICD-10-CM

## 2022-01-13 LAB — BACTERIA UR CULT: ABNORMAL

## 2022-01-13 RX ORDER — HYDROCODONE BITARTRATE AND ACETAMINOPHEN 7.5; 325 MG/1; MG/1
1 TABLET ORAL
Qty: 30 TABLET | Refills: 0 | Status: SHIPPED | OUTPATIENT
Start: 2022-01-13 | End: 2022-02-15 | Stop reason: SDUPTHER

## 2022-01-13 NOTE — TELEPHONE ENCOUNTER
----- Message from Rosanne Dawn sent at 2022  2:52 PM CST -----  Regarding: Rx Refill  Contact: self  Tonya Bucio  MRN: [id number]  : 1935  PCP: Tasha Atkins  Home Phone      [phone]  Work Phone      Not on file.  Mobile          [phone]      MESSAGE:   Rx Refill - HYDROcodone-acetaminophen (NORCO) 7.5-325 mg per tablet.  Patient has one pill left.     Phone # [phone]    Pharmacy - [facility] [address] [place] [address]

## 2022-01-13 NOTE — TELEPHONE ENCOUNTER
LOV 11/15/2021  Scheduled visit 2/15/2022    Requested Prescriptions     Pending Prescriptions Disp Refills    HYDROcodone-acetaminophen (NORCO) 7.5-325 mg per tablet 30 tablet 0     Sig: Take 1 tablet by mouth every 24 hours as needed for Pain.

## 2022-01-13 NOTE — TELEPHONE ENCOUNTER
Care Due:                  Date            Visit Type   Department     Provider  --------------------------------------------------------------------------------                                             [facility] INTER[facility]  Last Visit: 11-      [facility]    Tasha Atkins                                           [facility] INTERNAL  Next Visit: 02-      [facility]    Tasha Atkins                                                            Last  Test          Frequency    Reason                     Performed    Due Date  --------------------------------------------------------------------------------    Uric Acid...  12 months..  allopurinoL..............  Not Found    Overdue    Powered by [employer]. Reference number: [id number].   1/13/2022 2:57:32 PM CST

## 2022-01-14 ENCOUNTER — TELEPHONE (OUTPATIENT)
Dept: INTERNAL MEDICINE | Facility: CLINIC | Age: 87
End: 2022-01-14
Payer: MEDICARE

## 2022-01-14 NOTE — TELEPHONE ENCOUNTER
----- Message from Stefani Cotter MA sent at 2022  9:13 AM CST -----  Tonya Bucio  MRN: [id number]  : 1935  PCP: Tasha Atkins  Home Phone      [phone]  Work Phone      Not on file.  Mobile          [phone]      MESSAGE: Patient positive for COVID and has a UTI.  Daughter says they had to call the ambulance 3 times.  Patient is very weak,  Concerned that it may be secondary to medications.     Please Advise:  [phone]

## 2022-01-18 ENCOUNTER — TELEPHONE (OUTPATIENT)
Dept: INTERNAL MEDICINE | Facility: CLINIC | Age: 87
End: 2022-01-18
Payer: MEDICARE

## 2022-01-18 NOTE — TELEPHONE ENCOUNTER
----- Message from Stefani Cotter MA sent at 2022 10:46 AM CST -----  Tonya Bucio  MRN: [id number]  : 1935  PCP: Tasha Atkins  Home Phone      [phone]  Work Phone      Not on file.  Mobile          [phone]      MESSAGE:     Please see previous message from Friday, the .     Please call Mita-patient's daughter--[phone]

## 2022-01-19 ENCOUNTER — OFFICE VISIT (OUTPATIENT)
Dept: INTERNAL MEDICINE | Facility: CLINIC | Age: 87
End: 2022-01-19
Payer: MEDICARE

## 2022-01-19 VITALS
WEIGHT: 257.06 LBS | HEIGHT: 68 IN | BODY MASS INDEX: 38.96 KG/M2 | HEART RATE: 56 BPM | SYSTOLIC BLOOD PRESSURE: 120 MMHG | DIASTOLIC BLOOD PRESSURE: 64 MMHG | OXYGEN SATURATION: 97 % | RESPIRATION RATE: 16 BRPM

## 2022-01-19 DIAGNOSIS — R53.1 WEAKNESS: ICD-10-CM

## 2022-01-19 DIAGNOSIS — M17.0 PRIMARY OSTEOARTHRITIS OF BOTH KNEES: ICD-10-CM

## 2022-01-19 DIAGNOSIS — N30.00 ACUTE CYSTITIS WITHOUT HEMATURIA: ICD-10-CM

## 2022-01-19 DIAGNOSIS — R53.83 FATIGUE, UNSPECIFIED TYPE: ICD-10-CM

## 2022-01-19 DIAGNOSIS — R30.0 DYSURIA: ICD-10-CM

## 2022-01-19 DIAGNOSIS — I48.3 TYPICAL ATRIAL FLUTTER: ICD-10-CM

## 2022-01-19 DIAGNOSIS — R00.1 BRADYCARDIA: ICD-10-CM

## 2022-01-19 DIAGNOSIS — U07.1 COVID-19: Primary | ICD-10-CM

## 2022-01-19 DIAGNOSIS — F41.8 SITUATIONAL ANXIETY: ICD-10-CM

## 2022-01-19 DIAGNOSIS — R19.7 DIARRHEA, UNSPECIFIED TYPE: ICD-10-CM

## 2022-01-19 LAB
BACTERIA #/AREA URNS HPF: ABNORMAL /HPF
BILIRUB SERPL-MCNC: NORMAL MG/DL
BILIRUB UR QL STRIP: NEGATIVE
BLOOD URINE, POC: NORMAL
CLARITY UR: ABNORMAL
CLARITY, POC UA: CLEAR
COLOR UR: YELLOW
COLOR, POC UA: YELLOW
GLUCOSE UR QL STRIP: NEGATIVE
GLUCOSE UR QL STRIP: NORMAL
HGB UR QL STRIP: ABNORMAL
KETONES UR QL STRIP: NEGATIVE
KETONES UR QL STRIP: NORMAL
LEUKOCYTE ESTERASE UR QL STRIP: ABNORMAL
LEUKOCYTE ESTERASE URINE, POC: NORMAL
MICROSCOPIC COMMENT: ABNORMAL
NITRITE UR QL STRIP: NEGATIVE
NITRITE, POC UA: NORMAL
PH UR STRIP: 7 [PH] (ref 5–8)
PH, POC UA: 7
PROT UR QL STRIP: ABNORMAL
PROTEIN, POC: 30
RBC #/AREA URNS HPF: 3 /HPF (ref 0–4)
SP GR UR STRIP: 1.01 (ref 1–1.03)
SPECIFIC GRAVITY, POC UA: NORMAL
SQUAMOUS #/AREA URNS HPF: 50 /HPF
URN SPEC COLLECT METH UR: ABNORMAL
UROBILINOGEN UR STRIP-ACNC: NEGATIVE EU/DL
UROBILINOGEN, POC UA: NORMAL
WBC #/AREA URNS HPF: 62 /HPF (ref 0–5)
YEAST URNS QL MICRO: ABNORMAL

## 2022-01-19 PROCEDURE — 99999 PR PBB SHADOW E&M-EST. PATIENT-LVL IV: ICD-10-PCS | Mod: PBBFAC,HCNC,, | Performed by: NURSE PRACTITIONER

## 2022-01-19 PROCEDURE — 87077 CULTURE AEROBIC IDENTIFY: CPT | Mod: HCNC | Performed by: NURSE PRACTITIONER

## 2022-01-19 PROCEDURE — 3074F SYST BP LT 130 MM HG: CPT | Mod: HCNC,CPTII,S$GLB, | Performed by: NURSE PRACTITIONER

## 2022-01-19 PROCEDURE — 1159F PR MEDICATION LIST DOCUMENTED IN MEDICAL RECORD: ICD-10-PCS | Mod: HCNC,CPTII,S$GLB, | Performed by: NURSE PRACTITIONER

## 2022-01-19 PROCEDURE — 81002 URINALYSIS NONAUTO W/O SCOPE: CPT | Mod: HCNC,S$GLB,, | Performed by: NURSE PRACTITIONER

## 2022-01-19 PROCEDURE — 87088 URINE BACTERIA CULTURE: CPT | Mod: HCNC | Performed by: NURSE PRACTITIONER

## 2022-01-19 PROCEDURE — 99999 PR PBB SHADOW E&M-EST. PATIENT-LVL IV: CPT | Mod: PBBFAC,HCNC,, | Performed by: NURSE PRACTITIONER

## 2022-01-19 PROCEDURE — 99499 UNLISTED E&M SERVICE: CPT | Mod: S$GLB,,, | Performed by: NURSE PRACTITIONER

## 2022-01-19 PROCEDURE — 1126F AMNT PAIN NOTED NONE PRSNT: CPT | Mod: HCNC,CPTII,S$GLB, | Performed by: NURSE PRACTITIONER

## 2022-01-19 PROCEDURE — 3078F DIAST BP <80 MM HG: CPT | Mod: HCNC,CPTII,S$GLB, | Performed by: NURSE PRACTITIONER

## 2022-01-19 PROCEDURE — 3288F FALL RISK ASSESSMENT DOCD: CPT | Mod: HCNC,CPTII,S$GLB, | Performed by: NURSE PRACTITIONER

## 2022-01-19 PROCEDURE — 1101F PR PT FALLS ASSESS DOC 0-1 FALLS W/OUT INJ PAST YR: ICD-10-PCS | Mod: HCNC,CPTII,S$GLB, | Performed by: NURSE PRACTITIONER

## 2022-01-19 PROCEDURE — 81000 URINALYSIS NONAUTO W/SCOPE: CPT | Mod: HCNC | Performed by: NURSE PRACTITIONER

## 2022-01-19 PROCEDURE — 1126F PR PAIN SEVERITY QUANTIFIED, NO PAIN PRESENT: ICD-10-PCS | Mod: HCNC,CPTII,S$GLB, | Performed by: NURSE PRACTITIONER

## 2022-01-19 PROCEDURE — 1101F PT FALLS ASSESS-DOCD LE1/YR: CPT | Mod: HCNC,CPTII,S$GLB, | Performed by: NURSE PRACTITIONER

## 2022-01-19 PROCEDURE — 3288F PR FALLS RISK ASSESSMENT DOCUMENTED: ICD-10-PCS | Mod: HCNC,CPTII,S$GLB, | Performed by: NURSE PRACTITIONER

## 2022-01-19 PROCEDURE — 96372 THER/PROPH/DIAG INJ SC/IM: CPT | Mod: HCNC,S$GLB,, | Performed by: NURSE PRACTITIONER

## 2022-01-19 PROCEDURE — 99499 RISK ADDL DX/OHS AUDIT: ICD-10-PCS | Mod: S$GLB,,, | Performed by: NURSE PRACTITIONER

## 2022-01-19 PROCEDURE — 99214 PR OFFICE/OUTPT VISIT, EST, LEVL IV, 30-39 MIN: ICD-10-PCS | Mod: HCNC,25,S$GLB, | Performed by: NURSE PRACTITIONER

## 2022-01-19 PROCEDURE — 96372 PR INJECTION,THERAP/PROPH/DIAG2ST, IM OR SUBCUT: ICD-10-PCS | Mod: HCNC,S$GLB,, | Performed by: NURSE PRACTITIONER

## 2022-01-19 PROCEDURE — 99214 OFFICE O/P EST MOD 30 MIN: CPT | Mod: HCNC,25,S$GLB, | Performed by: NURSE PRACTITIONER

## 2022-01-19 PROCEDURE — 87086 URINE CULTURE/COLONY COUNT: CPT | Mod: HCNC | Performed by: NURSE PRACTITIONER

## 2022-01-19 PROCEDURE — 1159F MED LIST DOCD IN RCRD: CPT | Mod: HCNC,CPTII,S$GLB, | Performed by: NURSE PRACTITIONER

## 2022-01-19 PROCEDURE — 3074F PR MOST RECENT SYSTOLIC BLOOD PRESSURE < 130 MM HG: ICD-10-PCS | Mod: HCNC,CPTII,S$GLB, | Performed by: NURSE PRACTITIONER

## 2022-01-19 PROCEDURE — 87186 SC STD MICRODIL/AGAR DIL: CPT | Mod: HCNC | Performed by: NURSE PRACTITIONER

## 2022-01-19 PROCEDURE — 3078F PR MOST RECENT DIASTOLIC BLOOD PRESSURE < 80 MM HG: ICD-10-PCS | Mod: HCNC,CPTII,S$GLB, | Performed by: NURSE PRACTITIONER

## 2022-01-19 PROCEDURE — 81002 POCT URINE DIPSTICK WITHOUT MICROSCOPE: ICD-10-PCS | Mod: HCNC,S$GLB,, | Performed by: NURSE PRACTITIONER

## 2022-01-19 RX ORDER — DIPHENOXYLATE HYDROCHLORIDE AND ATROPINE SULFATE 2.5; .025 MG/1; MG/1
1 TABLET ORAL 4 TIMES DAILY PRN
Qty: 20 TABLET | Refills: 0 | Status: SHIPPED | OUTPATIENT
Start: 2022-01-19 | End: 2022-01-29

## 2022-01-19 RX ORDER — CEPHALEXIN 500 MG/1
500 CAPSULE ORAL EVERY 8 HOURS
Qty: 21 CAPSULE | Refills: 0 | Status: SHIPPED | OUTPATIENT
Start: 2022-01-19 | End: 2022-01-25

## 2022-01-19 RX ORDER — CEFTRIAXONE 1 G/1
1 INJECTION, POWDER, FOR SOLUTION INTRAMUSCULAR; INTRAVENOUS
Status: COMPLETED | OUTPATIENT
Start: 2022-01-19 | End: 2022-01-19

## 2022-01-19 RX ORDER — ESCITALOPRAM OXALATE 5 MG/1
5 TABLET ORAL DAILY
Qty: 30 TABLET | Refills: 0 | Status: SHIPPED | OUTPATIENT
Start: 2022-01-19 | End: 2022-04-06

## 2022-01-19 RX ORDER — LIDOCAINE HYDROCHLORIDE 10 MG/ML
2.1 INJECTION INFILTRATION; PERINEURAL
Status: COMPLETED | OUTPATIENT
Start: 2022-01-19 | End: 2022-01-19

## 2022-01-19 RX ORDER — SOTALOL HYDROCHLORIDE 80 MG/1
80 TABLET ORAL DAILY
Qty: 60 TABLET | Refills: 0 | Status: SHIPPED | OUTPATIENT
Start: 2022-01-19 | End: 2024-01-04

## 2022-01-19 RX ADMIN — LIDOCAINE HYDROCHLORIDE 2.1 ML: 10 INJECTION INFILTRATION; PERINEURAL at 02:01

## 2022-01-19 RX ADMIN — CEFTRIAXONE 1 G: 1 INJECTION, POWDER, FOR SOLUTION INTRAMUSCULAR; INTRAVENOUS at 02:01

## 2022-01-19 NOTE — PROGRESS NOTES
Subjective:           Patient ID: Tonya Bucio is a 86 y.o. female.    Chief Complaint: Diarrhea, Nausea, Urinary Tract Infection, and Anxiety    Tonya Bucio is a 86 y.o. female, new to me; known to fellow providers;  Known  Chronic pain syndrome and degenerative joint disease; chronic pain meds , type II Dm, CKD, HLD here with c/o Diarrhea, Nausea, Urinary Tract Infection, and Anxiety  Seen in ER    1/10 presenting with diarrhea and generalized weakness for 1 day.  Patient was recently exposed to COVID.  Patient endorses 3 days of cough, congestion.  Denies fever or body aches.  No nausea or vomiting.  Denies any abdominal pain.  No other complaints.  Dx with covid 19 1/10 CXR- normal on 1/10  Sent home with tx for uTI - cefpodoxime 200mg bid ; has missed some doses    Presents today for f/u with daughter. Pt is [facility]  Notes she is still having significant diarrhea;   Not eating much  Has called 911 since tx in ER; was given IVF per paramedics but did not go into hospital  No fever , last had chills 1 week ago  Feeling very anxious, recently lost her sister in law   Brother had heart attack   Today   /64, HR 56 - low for her- last vitals reviewed HR 70s ; will lower Sotolol until f/u next week on Monday with Dr. Atkins, hold other BP meds     Takes sotolol bid   Discussed with Dr. Atkins in exam room with pt and family      9 d ago   Urine Culture, Routine  Abnormal   PROTEUS MIRABILIS   >100,000 cfu/ml    Resulting Agency [facility]        Susceptibility       Proteus mirabilis    CULTURE, URINE    Amox/K Clav'ate <=8/4 mcg/mL Sensitive    Amp/Sulbactam <=8/4 mcg/mL Sensitive    Ampicillin <=8 mcg/mL Sensitive    Cefazolin <=2 mcg/mL Sensitive    Cefepime <=2 mcg/mL Sensitive    Ceftriaxone <=1 mcg/mL Sensitive    Ciprofloxacin <=1 mcg/mL Sensitive    Ertapenem <=0.5 mcg/mL Sensitive    Gentamicin <=4 mcg/mL Sensitive    Levofloxacin <=2 mcg/mL Sensitive    Meropenem <=1 mcg/mL Sensitive    Piperacillin/Tazo <=16  mcg/mL Sensitive    Tobramycin <=4 mcg/mL Sensitive    Trimeth/Sulfa <=2/38 mcg/mL Sensitive       U/A today 1+ leukocytes ; send for repeat micro with reflex   Discussed with Dr. Atkins;   Rocephin 1gm IM then keflex Rx  Stop other rx               Review of Systems   Constitutional: Negative for chills, fatigue and fever.   HENT: Negative for congestion, ear pain, postnasal drip, sinus pressure, sneezing and sore throat.    Eyes: Negative for discharge.   Respiratory: Negative for cough, chest tightness and shortness of breath.    Cardiovascular: Negative.    Gastrointestinal: Positive for diarrhea and nausea. Negative for abdominal pain, constipation and vomiting.   Genitourinary: Positive for dysuria and frequency. Negative for difficulty urinating, flank pain and hematuria.   Musculoskeletal: Negative.  Negative for arthralgias and joint swelling.   Skin: Negative.    Neurological: Negative for dizziness and headaches.   Psychiatric/Behavioral: Negative for behavioral problems and confusion. The patient is nervous/anxious.        Objective:      Physical Exam  Vitals and nursing note reviewed.   Constitutional:       Appearance: She is well-developed. She is obese.   HENT:      Head: Normocephalic and atraumatic.      Right Ear: Decreased hearing noted.      Left Ear: Decreased hearing noted. There is no impacted cerumen.      Nose: Nose normal.   Eyes:      Pupils: Pupils are equal, round, and reactive to light.   Cardiovascular:      Rate and Rhythm: Normal rate and regular rhythm.      Heart sounds: No murmur heard.      Pulmonary:      Effort: Pulmonary effort is normal.      Breath sounds: Normal breath sounds.   Abdominal:      General: Bowel sounds are normal.      Palpations: Abdomen is soft.   Musculoskeletal:         General: Normal range of motion.      Cervical back: Normal range of motion and neck supple.      Comments: Bilateral knees OA changes    W/C    Skin:     General: Skin is warm and dry.       Capillary Refill: Capillary refill takes less than 2 seconds.   Neurological:      Mental Status: She is alert and oriented to person, place, and time.   Psychiatric:         Behavior: Behavior normal.         Thought Content: Thought content normal.         Judgment: Judgment normal.         Assessment:       1. COVID-19    2. Acute cystitis without hematuria    3. Dysuria    4. Primary osteoarthritis of both knees    5. Typical atrial flutter    6. Bradycardia    7. Situational anxiety    8. Diarrhea, unspecified type    9. Weakness    10. Fatigue, unspecified type        Plan:   Tonya was seen today for diarrhea, nausea, urinary tract infection and anxiety.    Diagnoses and all orders for this visit:    COVID-19    Acute cystitis without hematuria  -     cefTRIAXone injection 1 g  -     LIDOcaine HCL 10 mg/ml (1%) injection 2.1 mL  -     cephALEXin (KEFLEX) 500 MG capsule; Take 1 capsule (500 mg total) by mouth every 8 (eight) hours. for 7 days  -     Urinalysis, Reflex to Urine Culture Urine, Clean Catch    Dysuria  -     POCT urine dipstick without microscope    Primary osteoarthritis of both knees    Typical atrial flutter  -     sotaloL (BETAPACE) 80 MG tablet; Take 1 tablet (80 mg total) by mouth once daily.    Bradycardia    Situational anxiety  -     EScitalopram oxalate (LEXAPRO) 5 MG Tab; Take 1 tablet (5 mg total) by mouth once daily.    Diarrhea, unspecified type  -     diphenoxylate-atropine 2.5-0.025 mg (LOMOTIL) 2.5-0.025 mg per tablet; Take 1 tablet by mouth 4 (four) times daily as needed for Diarrhea.    Weakness    Fatigue, unspecified type      Problem List Items Addressed This Visit        Cardiac/Vascular    Typical atrial flutter    Relevant Medications    sotaloL (BETAPACE) 80 MG tablet       Orthopedic    Primary osteoarthritis of both knees      Other Visit Diagnoses     COVID-19    -  Primary    Acute cystitis without hematuria        Relevant Medications    cefTRIAXone injection 1 g  (Completed)    LIDOcaine HCL 10 mg/ml (1%) injection 2.1 mL (Completed)    cephALEXin (KEFLEX) 500 MG capsule    Other Relevant Orders    Urinalysis, Reflex to Urine Culture Urine, Clean Catch    Dysuria        Relevant Orders    POCT urine dipstick without microscope (Completed)    Bradycardia        Situational anxiety        Relevant Medications    EScitalopram oxalate (LEXAPRO) 5 MG Tab    Diarrhea, unspecified type        Relevant Medications    diphenoxylate-atropine 2.5-0.025 mg (LOMOTIL) 2.5-0.025 mg per tablet    Weakness        Fatigue, unspecified type

## 2022-01-19 NOTE — PROGRESS NOTES
Patient, Tonya Bucio (MRN #[id number]), presented with a recent Estimated Glumerular Filtration Rate (EGFR) between 15 and 29 consistent with the definition of chronic kidney disease stage 4 (ICD10 - N18.4).    eGFR if non    Date Value Ref Range Status   01/10/2022 29 (A) >60 mL/min/1.73 m^2 Final     Comment:     Calculation used to obtain the estimated glomerular filtration  rate (eGFR) is the CKD-EPI equation.          The patient's chronic kidney disease stage 4 was monitored, evaluated, addressed and/or treated. This addendum to the medical record is made on 01/19/2022.

## 2022-01-20 NOTE — PROGRESS NOTES
Urine sent to hospital a little better but definitely still had infection; given IM rocephin yesterday and sent home with keflex; will await full culture and get back with them to make sure clearing

## 2022-01-21 ENCOUNTER — TELEPHONE (OUTPATIENT)
Dept: INTERNAL MEDICINE | Facility: CLINIC | Age: 87
End: 2022-01-21
Payer: MEDICARE

## 2022-01-21 RX ORDER — ONDANSETRON 4 MG/1
4 TABLET, FILM COATED ORAL EVERY 8 HOURS PRN
Qty: 6 TABLET | Refills: 0 | Status: SHIPPED | OUTPATIENT
Start: 2022-01-21 | End: 2022-02-02 | Stop reason: SDUPTHER

## 2022-01-21 NOTE — TELEPHONE ENCOUNTER
Who prescribed it? I don't see it from her ER visit or from her PCP. I sent 6 pills to last her the weekend and then her PCP can review and decide if wants to continue or see her for chronic nausea.

## 2022-01-21 NOTE — TELEPHONE ENCOUNTER
----- Message from Rosaura Reeves sent at 2022  2:45 PM CST -----  Contact: Self  Tonya Bucio  MRN: [id number]  : 1935  PCP: Tasha Atkins  Home Phone      [phone]  Work Phone      Not on file.  [employer]          [phone]      MESSAGE:     Refill  ondansetron disintegrating tablet 4 mg     [place] [address]   Phone: [phone]  Fax:  [phone]

## 2022-01-21 NOTE — TELEPHONE ENCOUNTER
Pt was recently dx with covid. Stats she got an RX for zofran, but is now out. Pt would like a refill sent to [facility] in [place]. It is not on her medication list for me to pend. Please advise in Dr. Atkins's absence. Pt states she has had nausea all day.

## 2022-01-21 NOTE — TELEPHONE ENCOUNTER
Informed pt that medication was sent in to last through weekend, but that if she were to need more we would need to discuss with Wilbert. Verbalized understanding.

## 2022-01-23 ENCOUNTER — HOSPITAL ENCOUNTER (EMERGENCY)
Facility: HOSPITAL | Age: 87
Discharge: HOME | End: 2022-01-23
Attending: SURGERY
Payer: MEDICARE

## 2022-01-23 VITALS
TEMPERATURE: 99 F | RESPIRATION RATE: 22 BRPM | OXYGEN SATURATION: 97 % | HEART RATE: 74 BPM | SYSTOLIC BLOOD PRESSURE: 161 MMHG | DIASTOLIC BLOOD PRESSURE: 67 MMHG

## 2022-01-23 DIAGNOSIS — R45.89 ANXIETY ABOUT HEALTH: ICD-10-CM

## 2022-01-23 DIAGNOSIS — Z87.440 HISTORY OF UTI: ICD-10-CM

## 2022-01-23 DIAGNOSIS — R53.83 FATIGUE, UNSPECIFIED TYPE: ICD-10-CM

## 2022-01-23 DIAGNOSIS — R53.1 WEAKNESS: Primary | ICD-10-CM

## 2022-01-23 LAB
ALBUMIN SERPL BCP-MCNC: 3.1 G/DL (ref 3.5–5.2)
ALP SERPL-CCNC: 86 U/L (ref 55–135)
ALT SERPL W/O P-5'-P-CCNC: 17 U/L (ref 10–44)
ANION GAP SERPL CALC-SCNC: 13 MMOL/L (ref 8–16)
AST SERPL-CCNC: 12 U/L (ref 10–40)
BACTERIA #/AREA URNS HPF: ABNORMAL /HPF
BACTERIA UR CULT: ABNORMAL
BASOPHILS # BLD AUTO: 0.06 K/UL (ref 0–0.2)
BASOPHILS NFR BLD: 0.5 % (ref 0–1.9)
BILIRUB SERPL-MCNC: 0.6 MG/DL (ref 0.1–1)
BILIRUB UR QL STRIP: NEGATIVE
BNP SERPL-MCNC: 75 PG/ML (ref 0–99)
BNP SERPL-MCNC: 75 PG/ML (ref 0–99)
BUN SERPL-MCNC: 15 MG/DL (ref 8–23)
CALCIUM SERPL-MCNC: 8.7 MG/DL (ref 8.7–10.5)
CHLORIDE SERPL-SCNC: 97 MMOL/L (ref 95–110)
CK MB SERPL-MCNC: 1.4 NG/ML (ref 0.1–6.5)
CK MB SERPL-MCNC: 1.5 NG/ML (ref 0.1–6.5)
CK MB SERPL-RTO: 4.8 % (ref 0–5)
CK MB SERPL-RTO: 5.6 % (ref 0–5)
CK SERPL-CCNC: 27 U/L (ref 20–180)
CK SERPL-CCNC: 27 U/L (ref 20–180)
CK SERPL-CCNC: 29 U/L (ref 20–180)
CK SERPL-CCNC: 29 U/L (ref 20–180)
CLARITY UR: ABNORMAL
CO2 SERPL-SCNC: 26 MMOL/L (ref 23–29)
COLOR UR: YELLOW
CREAT SERPL-MCNC: 1.3 MG/DL (ref 0.5–1.4)
DIFFERENTIAL METHOD: ABNORMAL
EOSINOPHIL # BLD AUTO: 0.1 K/UL (ref 0–0.5)
EOSINOPHIL NFR BLD: 0.9 % (ref 0–8)
ERYTHROCYTE [DISTWIDTH] IN BLOOD BY AUTOMATED COUNT: 14.7 % (ref 11.5–14.5)
EST. GFR  (AFRICAN AMERICAN): 43 ML/MIN/1.73 M^2
EST. GFR  (NON AFRICAN AMERICAN): 37 ML/MIN/1.73 M^2
GLUCOSE SERPL-MCNC: 116 MG/DL (ref 70–110)
GLUCOSE UR QL STRIP: NEGATIVE
HCT VFR BLD AUTO: 38.5 % (ref 37–48.5)
HGB BLD-MCNC: 12.3 G/DL (ref 12–16)
HGB UR QL STRIP: ABNORMAL
HYALINE CASTS #/AREA URNS LPF: 0 /LPF
IMM GRANULOCYTES # BLD AUTO: 0.07 K/UL (ref 0–0.04)
IMM GRANULOCYTES NFR BLD AUTO: 0.6 % (ref 0–0.5)
INFLUENZA A, MOLECULAR: NEGATIVE
INFLUENZA B, MOLECULAR: NEGATIVE
KETONES UR QL STRIP: NEGATIVE
LACTATE SERPL-SCNC: 2.2 MMOL/L (ref 0.5–2.2)
LEUKOCYTE ESTERASE UR QL STRIP: ABNORMAL
LIPASE SERPL-CCNC: 102 U/L (ref 4–60)
LYMPHOCYTES # BLD AUTO: 2.4 K/UL (ref 1–4.8)
LYMPHOCYTES NFR BLD: 19.2 % (ref 18–48)
MCH RBC QN AUTO: 29.5 PG (ref 27–31)
MCHC RBC AUTO-ENTMCNC: 31.9 G/DL (ref 32–36)
MCV RBC AUTO: 92 FL (ref 82–98)
MICROSCOPIC COMMENT: ABNORMAL
MONOCYTES # BLD AUTO: 1.1 K/UL (ref 0.3–1)
MONOCYTES NFR BLD: 8.6 % (ref 4–15)
NEUTROPHILS # BLD AUTO: 8.7 K/UL (ref 1.8–7.7)
NEUTROPHILS NFR BLD: 70.2 % (ref 38–73)
NITRITE UR QL STRIP: NEGATIVE
NRBC BLD-RTO: 0 /100 WBC
PH UR STRIP: 7 [PH] (ref 5–8)
PLATELET # BLD AUTO: 325 K/UL (ref 150–450)
PMV BLD AUTO: 10.4 FL (ref 9.2–12.9)
POTASSIUM SERPL-SCNC: 4.4 MMOL/L (ref 3.5–5.1)
PROT SERPL-MCNC: 7.2 G/DL (ref 6–8.4)
PROT UR QL STRIP: ABNORMAL
RBC # BLD AUTO: 4.17 M/UL (ref 4–5.4)
RBC #/AREA URNS HPF: 1 /HPF (ref 0–4)
SARS-COV-2 RDRP RESP QL NAA+PROBE: NEGATIVE
SODIUM SERPL-SCNC: 136 MMOL/L (ref 136–145)
SP GR UR STRIP: 1.01 (ref 1–1.03)
SPECIMEN SOURCE: NORMAL
SQUAMOUS #/AREA URNS HPF: 30 /HPF
TROPONIN I SERPL DL<=0.01 NG/ML-MCNC: 0.04 NG/ML (ref 0–0.03)
TROPONIN I SERPL DL<=0.01 NG/ML-MCNC: 0.04 NG/ML (ref 0–0.03)
URN SPEC COLLECT METH UR: ABNORMAL
UROBILINOGEN UR STRIP-ACNC: NEGATIVE EU/DL
WBC # BLD AUTO: 12.36 K/UL (ref 3.9–12.7)
WBC #/AREA URNS HPF: 20 /HPF (ref 0–5)
YEAST URNS QL MICRO: ABNORMAL

## 2022-01-23 PROCEDURE — 81000 URINALYSIS NONAUTO W/SCOPE: CPT | Mod: HCNC | Performed by: SURGERY

## 2022-01-23 PROCEDURE — 25000003 PHARM REV CODE 250: Mod: HCNC | Performed by: SURGERY

## 2022-01-23 PROCEDURE — 93010 ELECTROCARDIOGRAM REPORT: CPT | Mod: HCNC,,, | Performed by: INTERNAL MEDICINE

## 2022-01-23 PROCEDURE — 93010 EKG 12-LEAD: ICD-10-PCS | Mod: HCNC,,, | Performed by: INTERNAL MEDICINE

## 2022-01-23 PROCEDURE — 96365 THER/PROPH/DIAG IV INF INIT: CPT | Mod: HCNC

## 2022-01-23 PROCEDURE — 82553 CREATINE MB FRACTION: CPT | Mod: HCNC | Performed by: SURGERY

## 2022-01-23 PROCEDURE — 80053 COMPREHEN METABOLIC PANEL: CPT | Mod: HCNC | Performed by: SURGERY

## 2022-01-23 PROCEDURE — 83605 ASSAY OF LACTIC ACID: CPT | Mod: HCNC | Performed by: SURGERY

## 2022-01-23 PROCEDURE — 87086 URINE CULTURE/COLONY COUNT: CPT | Mod: HCNC | Performed by: SURGERY

## 2022-01-23 PROCEDURE — 87502 INFLUENZA DNA AMP PROBE: CPT | Mod: HCNC | Performed by: SURGERY

## 2022-01-23 PROCEDURE — 96361 HYDRATE IV INFUSION ADD-ON: CPT | Mod: HCNC

## 2022-01-23 PROCEDURE — 99285 EMERGENCY DEPT VISIT HI MDM: CPT | Mod: 25,HCNC

## 2022-01-23 PROCEDURE — 87040 BLOOD CULTURE FOR BACTERIA: CPT | Mod: 59,HCNC | Performed by: SURGERY

## 2022-01-23 PROCEDURE — 36415 COLL VENOUS BLD VENIPUNCTURE: CPT | Mod: HCNC | Performed by: SURGERY

## 2022-01-23 PROCEDURE — 85025 COMPLETE CBC W/AUTO DIFF WBC: CPT | Mod: HCNC | Performed by: SURGERY

## 2022-01-23 PROCEDURE — 83690 ASSAY OF LIPASE: CPT | Mod: HCNC | Performed by: SURGERY

## 2022-01-23 PROCEDURE — U0002 COVID-19 LAB TEST NON-CDC: HCPCS | Mod: HCNC | Performed by: SURGERY

## 2022-01-23 PROCEDURE — 83880 ASSAY OF NATRIURETIC PEPTIDE: CPT | Mod: HCNC | Performed by: SURGERY

## 2022-01-23 PROCEDURE — 84484 ASSAY OF TROPONIN QUANT: CPT | Mod: HCNC | Performed by: SURGERY

## 2022-01-23 PROCEDURE — 93005 ELECTROCARDIOGRAM TRACING: CPT | Mod: HCNC

## 2022-01-23 PROCEDURE — 63600175 PHARM REV CODE 636 W HCPCS: Mod: HCNC | Performed by: SURGERY

## 2022-01-23 RX ADMIN — CEFTRIAXONE 2 G: 2 INJECTION, SOLUTION INTRAVENOUS at 04:01

## 2022-01-23 RX ADMIN — SODIUM CHLORIDE 1000 ML: 0.9 INJECTION, SOLUTION INTRAVENOUS at 04:01

## 2022-01-23 RX ADMIN — SODIUM CHLORIDE 1000 ML: 0.9 INJECTION, SOLUTION INTRAVENOUS at 03:01

## 2022-01-23 NOTE — ED PROVIDER NOTES
[facility]                                                 I reviewed the ER triage nurse's note before evaluating the patient    Chief Complaint  86 y.o. female with Fatigue   -- Patient arrived by EMS. EMS reports weakness starting today.   -- Pt covid + 2 weeks ago and was taken off of her afib medication Wednesday   -- Pt currently on antibiotics for a UTI. NADN in triage.    History of Present Illness  Tonya Bucio presents to the emergency room with chronic fatigue issues today  Patient was diagnosed with COVID 2 weeks ago, has felt weak since that diagnosis  Patient has felt weak and fatigued, saw her PCP last week for this fatigue as well  Patient's beta-blocker was reduced due to bradycardia, currently on ABX for UTI  Patient felt weak tonight at home, called EMS for evaluation the ER this morning  Daughter states she is called EMS 5 times since COVID diagnosis 2 weeks ago    The history is provided by the patient  Previous medical records were obtained from [employer]  Previous records are summarized from prior ER visits and hospitalizations    Past Medical History   -- Acute bronchitis with asthma     -- Angina pectoris     -- Anticoagulant long-term use     -- Asthma     -- Back pain     -- Cancer     -- Chest pain, musculoskeletal     -- Colon polyps     -- Gout, unspecified     -- History of cervical cancer     -- Hypothyroidism     -- Obesity     -- Osteoarthritis     -- Other and unspecified hyperlipidemia     -- PE (pulmonary embolism)     -- Renal manifestation of secondary diabetes mellitus     -- Thyroid disease     -- Type II or unspecified type diabetes mellitus      -- Unspecified essential hypertension     -- Urinary incontinence     -- Uterine cancer        Past Surgical History   -- Tonsilectomy       -- Hysterectomy       -- Total abdominal hysterectomy       -- Total abdominal hysterectomy w/ bilateral salpingoophorectomy       -- Tonsillectomy       -- Adenoidectomy        -- Eye surgery       No Known Allergies   No significant family history  No significant social history, nonsmoker    I have reviewed all of this patient's past medical, surgical, family, and social   histories as well as active allergies and medications documented in the  electronic medical record    Review of Systems and Physical Exam      Review of Systems (all other ROS are otherwise negative)  -- Constitution - fatigue, weakness, no chills, night sweats or fever  -- Eyes - no tearing or redness, no visual disturbance  -- Ear, Nose - no tinnitus or earache, no nasal congestion or discharge  -- Mouth,Throat - no sore throat, no toothache, normal voice, normal swallowing  -- Respiratory - denies cough and congestion, no shortness of breath, no RICARDO  -- Cardiovascular - denies chest pain, no palpitations, denies claudication  -- Gastrointestinal - denies abdominal pain, nausea, vomiting, or diarrhea  -- Genitourinary - no dysuria, no hematuria, no flank pain, no bladder pain  -- Musculoskeletal - denies back pain, negative for trauma or injury  -- Neurological - no headache, no numbness, tingling, seizure, balance issues  -- Hematologic- no bruising, no bleeding, nose bleed, bleeding disorders    Vital Signs (reviewed by the physician)  Oral temperature is 98.7 °F (37.1 °C).   Her blood pressure is 141/92 and her pulse is 80.   Her respiration is 22 and oxygen saturation is 97%.     Physical Exam  -- Nursing note and vitals reviewed  -- Constitutional: Appears well-developed and well-nourished  -- Head: Atraumatic. Normocephalic. No obvious abnormality  -- Eyes: Pupils are equal and reactive to light. Normal conjunctiva and lids  -- Nose: Nose normal in appearance, nares grossly normal. No discharge  -- Throat: Mucous membranes moist, pharynx normal, normal tonsils. No lesions   -- Ears: External ears and TM normal bilaterally. Normal hearing and no drainage  -- Neck: Normal range of motion. Neck supple. No masses,  trachea midline  -- Cardiac: Normal rate, regular rhythm and normal heart sounds  -- Respiratory: Normal respiratory effort, breath sounds clear to auscultation  -- Gastrointestinal: Soft, no tenderness. Normal bowel sounds. Normal liver edge  -- Genitourinary: no flank pain on exam, no suprapubic pain by palpation   -- Musculoskeletal: Normal range of motion, no effusions. Joints stable   -- Neurological: No focal deficits. Showed good interaction with staff  -- Vascular: Posterior tibial, dorsalis pedis and radial pulses 2+ bilaterally      Emergency Room Course      Urinalysis  Appearance, UA Hazy (*)   Protein, UA 2+ (*)   Occult Blood UA Trace (*)   Leukocytes, UA Trace (*)   WBC, UA 20 (*)   Bacteria Moderate (*)   Yeast, UA Few (*)     Lab Results (reviewed by the physician)     K 4.4   CL 97   CO2 26   BUN 15   CREATININE 1.3    (H)   ALKPHOS 86   AST 12   ALT 17   BILITOT 0.6   ALBUMIN 3.1 (L)   PROT 7.2   WBC 12.36   HGB 12.3   HCT 38.5      CPK 27   CPK 27   CPKMB 1.5   TROPONINI 0.044 (H)   BNP 75   BNP 75   LACTATE 2.2     EKG (interpreted by the physician)  Overall Interpretation: normal rate and rhythm with no obvious ischemic changes  Normal sinus rhythm @ 80 bpm  No ST elevation or depression  No arrhythmia or QRS change noted  Similar when compared to previous EKG    Radiology (images visualized & reports reviewed by the physician)  -- Chest x-ray showed no infiltrate and showed no acute pathology     Additional Work up (reviewed by the physician)  -- rapid Coronavirus PCR was negative  -- the patient tested negative for influenza  -- Blood cultures have also been drawn, results are pending  -- The urine today has been sent for lab culture, results pending  -- The troponin drawn in the ER today was within normal limits  -- The 2nd troponin drawn in the ER today was within normal limits      Medications Given  sodium chloride 0.9% bolus 1,000 mL (1,000 mLs Intravenous New Bag  1/23/22 0414)   cefTRIAXone (ROCEPHIN) 2 g/50 mL D5W IVPB   sodium chloride 0.9% bolus 1,000 mL (1,000 mLs Intravenous New Bag 1/23/22 0310)     Medical Decision Making     ED Course/ED Management  -- patient with weakness fatigue after COVID diagnosis 2 weeks ago  -- patient is also antibiotics for urinary tract infection, Proteus on culture from 10th  -- patient's lab work is stable, chest x-ray is stable, urine is improved  -- blood in urine culture sent as a precaution, IV fluids and IV Rocephin given  -- precautionary stable EKG with 2 sets of negative troponins in the ER    Additional ED Management  -- I spoke with the patient's daughter Mita at length this early morning at 4:00 a.m.  -- patient has felt weak and debilitated since COVID diagnosis, COVID negative now  -- patient was not ambulating well before COVID with worse weakness after COVID  -- daughter states she feels nauseated all the time, lab work is stable in the ER today   -- patient feels better after IV fluids, daughter states patient has hard time with ADLs    Additional ED Management  -- patient has an appointment with her internal medicine physician Monday at 2:30 p.m.  -- I have encouraged the patient and her daughter discuss options regarding debility  -- patient needs evaluation for physical therapy an outpatient rehab after COVID  -- I carefully explained to daughter the patient meets no admission criteria today  -- urinalysis improved, lab work stable, no fever; will follow-up in 36 hours with PCP  -- carefully counseled return to the ER with any concerning symptoms after DC    Assessment, Disposition, & Plan      Diagnosis  [R53.1] Weakness (Primary)  [R53.83] Fatigue, unspecified type  [F41.8] Anxiety about health  [Z87.440] History of UTI    Disposition and Plan  -- Disposition: home  -- Condition: stable  -- Follow-up: Patient to follow up with Tasha Atkins MD in 48 hrs  -- I advised the patient that we have found no life  threatening condition today  -- At this time, I believe the patient is clinically stable for discharge.   -- Pt understands that the visit today is to address immediate concerns   -- Further workup and evaluation as an outpatient may be required  -- The patient acknowledges that close follow up with a MD is required   -- Patient agrees to comply with all instruction and direction given in the ER    This note is dictated on M*Modal word recognition program.  There are word recognition mistakes that are occasionally missed on review.         David Guzmán MD  01/23/22 0537

## 2022-01-24 LAB
BACTERIA UR CULT: NORMAL
BACTERIA UR CULT: NORMAL

## 2022-01-25 ENCOUNTER — TELEPHONE (OUTPATIENT)
Dept: INTERNAL MEDICINE | Facility: CLINIC | Age: 87
End: 2022-01-25
Payer: MEDICARE

## 2022-01-25 RX ORDER — NITROFURANTOIN 25; 75 MG/1; MG/1
100 CAPSULE ORAL DAILY
Qty: 10 CAPSULE | Refills: 0 | Status: SHIPPED | OUTPATIENT
Start: 2022-01-25 | End: 2022-03-28

## 2022-01-25 RX ORDER — PHENAZOPYRIDINE HYDROCHLORIDE 100 MG/1
100 TABLET, FILM COATED ORAL 2 TIMES DAILY
Qty: 30 TABLET | Refills: 0 | Status: SHIPPED | OUTPATIENT
Start: 2022-01-25 | End: 2022-04-06

## 2022-01-25 NOTE — TELEPHONE ENCOUNTER
Appt scheduled with Dr. Atkins for tomorrow @ 10:00. Instructed that she go to ER for worsening symptoms. She is requesting meds to help with burning w/ urination. Please advise. Thanks.

## 2022-01-25 NOTE — TELEPHONE ENCOUNTER
----- Message from Rosanne Jacobopierrejavier sent at 2022  8:10 AM CST -----  Regarding: Request to speak to a nurse  Contact: Mita (daughter)  Tonya Bucio  MRN: [id number]  : 1935  PCP: Tasha Atkins  Home Phone      [phone]  Work Phone      Not on file.  Mobile          [phone]      MESSAGE:   Request to speak to a nurse rewarding patient's symptoms c/o weak, trouble walking, feeling faint, recovering from positive covid 189, UTI, not sleeping, burning, and painful urination with no fever noted. Mita (daughter) would like a sooner appointment with PCP because Dr. Berrios instructed her to have patient follow up with Dr. Atkins. In 2 days.  Appointment with Dr. Atkins - 22    Phone # [phone]    Pharmacy - [place] [address]

## 2022-01-25 NOTE — PROGRESS NOTES
Discussed with Dr. Atkins; stop keflex, start macrobid- will send 100mg daily x 10 days  Repeat urine on 1/23 was better , culture showed multiple organisms   but will send this to clear up completely   Recommend   stop keflex, start macrobid- will send 100mg daily x 10 days

## 2022-01-26 ENCOUNTER — OFFICE VISIT (OUTPATIENT)
Dept: INTERNAL MEDICINE | Facility: CLINIC | Age: 87
End: 2022-01-26
Payer: MEDICARE

## 2022-01-26 VITALS
DIASTOLIC BLOOD PRESSURE: 58 MMHG | SYSTOLIC BLOOD PRESSURE: 100 MMHG | HEIGHT: 68 IN | OXYGEN SATURATION: 95 % | BODY MASS INDEX: 39.09 KG/M2 | RESPIRATION RATE: 18 BRPM | HEART RATE: 68 BPM

## 2022-01-26 DIAGNOSIS — Z74.09 IMPAIRED PHYSICAL MOBILITY: Primary | ICD-10-CM

## 2022-01-26 DIAGNOSIS — R31.9 URINARY TRACT INFECTION WITH HEMATURIA, SITE UNSPECIFIED: ICD-10-CM

## 2022-01-26 DIAGNOSIS — N39.0 URINARY TRACT INFECTION WITH HEMATURIA, SITE UNSPECIFIED: ICD-10-CM

## 2022-01-26 DIAGNOSIS — R53.1 GENERALIZED WEAKNESS: ICD-10-CM

## 2022-01-26 DIAGNOSIS — R32 INCONTINENCE OF URINE IN FEMALE: ICD-10-CM

## 2022-01-26 PROCEDURE — 99213 PR OFFICE/OUTPT VISIT, EST, LEVL III, 20-29 MIN: ICD-10-PCS | Mod: HCNC,S$GLB,, | Performed by: INTERNAL MEDICINE

## 2022-01-26 PROCEDURE — 1160F RVW MEDS BY RX/DR IN RCRD: CPT | Mod: HCNC,CPTII,S$GLB, | Performed by: INTERNAL MEDICINE

## 2022-01-26 PROCEDURE — 3288F FALL RISK ASSESSMENT DOCD: CPT | Mod: HCNC,CPTII,S$GLB, | Performed by: INTERNAL MEDICINE

## 2022-01-26 PROCEDURE — 99999 PR PBB SHADOW E&M-EST. PATIENT-LVL IV: CPT | Mod: PBBFAC,HCNC,, | Performed by: INTERNAL MEDICINE

## 2022-01-26 PROCEDURE — 3288F PR FALLS RISK ASSESSMENT DOCUMENTED: ICD-10-PCS | Mod: HCNC,CPTII,S$GLB, | Performed by: INTERNAL MEDICINE

## 2022-01-26 PROCEDURE — 1159F MED LIST DOCD IN RCRD: CPT | Mod: HCNC,CPTII,S$GLB, | Performed by: INTERNAL MEDICINE

## 2022-01-26 PROCEDURE — 1101F PT FALLS ASSESS-DOCD LE1/YR: CPT | Mod: HCNC,CPTII,S$GLB, | Performed by: INTERNAL MEDICINE

## 2022-01-26 PROCEDURE — 1160F PR REVIEW ALL MEDS BY PRESCRIBER/CLIN PHARMACIST DOCUMENTED: ICD-10-PCS | Mod: HCNC,CPTII,S$GLB, | Performed by: INTERNAL MEDICINE

## 2022-01-26 PROCEDURE — 99999 PR PBB SHADOW E&M-EST. PATIENT-LVL IV: ICD-10-PCS | Mod: PBBFAC,HCNC,, | Performed by: INTERNAL MEDICINE

## 2022-01-26 PROCEDURE — 3074F PR MOST RECENT SYSTOLIC BLOOD PRESSURE < 130 MM HG: ICD-10-PCS | Mod: HCNC,CPTII,S$GLB, | Performed by: INTERNAL MEDICINE

## 2022-01-26 PROCEDURE — 1125F PR PAIN SEVERITY QUANTIFIED, PAIN PRESENT: ICD-10-PCS | Mod: HCNC,CPTII,S$GLB, | Performed by: INTERNAL MEDICINE

## 2022-01-26 PROCEDURE — 1125F AMNT PAIN NOTED PAIN PRSNT: CPT | Mod: HCNC,CPTII,S$GLB, | Performed by: INTERNAL MEDICINE

## 2022-01-26 PROCEDURE — 99213 OFFICE O/P EST LOW 20 MIN: CPT | Mod: HCNC,S$GLB,, | Performed by: INTERNAL MEDICINE

## 2022-01-26 PROCEDURE — 3078F PR MOST RECENT DIASTOLIC BLOOD PRESSURE < 80 MM HG: ICD-10-PCS | Mod: HCNC,CPTII,S$GLB, | Performed by: INTERNAL MEDICINE

## 2022-01-26 PROCEDURE — 3078F DIAST BP <80 MM HG: CPT | Mod: HCNC,CPTII,S$GLB, | Performed by: INTERNAL MEDICINE

## 2022-01-26 PROCEDURE — 1101F PR PT FALLS ASSESS DOC 0-1 FALLS W/OUT INJ PAST YR: ICD-10-PCS | Mod: HCNC,CPTII,S$GLB, | Performed by: INTERNAL MEDICINE

## 2022-01-26 PROCEDURE — 3074F SYST BP LT 130 MM HG: CPT | Mod: HCNC,CPTII,S$GLB, | Performed by: INTERNAL MEDICINE

## 2022-01-26 PROCEDURE — 1159F PR MEDICATION LIST DOCUMENTED IN MEDICAL RECORD: ICD-10-PCS | Mod: HCNC,CPTII,S$GLB, | Performed by: INTERNAL MEDICINE

## 2022-01-26 NOTE — PROGRESS NOTES
"Subjective:       Patient ID: Tonya Bucio is a 86 y.o. female.    Chief Complaint: ER follow up (Pt states she feels "terrible" unable to walk or stand for long periods of time) and Urinary Hesitancy (Pt dx with UTI in ER, switched her medication last PM)    Tonya Bucio is a 86 y.o. female  Here with ER follow up .  Brought by daughter ; weakness is worse .      Review of Systems   Constitutional: Negative for chills, fatigue and fever.   HENT: Negative for congestion, ear pain, postnasal drip, sinus pressure, sneezing and sore throat.    Eyes: Negative for discharge.   Respiratory: Negative for cough, chest tightness and shortness of breath.    Cardiovascular: Negative.    Gastrointestinal: Negative for abdominal pain, constipation and vomiting.   Genitourinary: Positive for dysuria and frequency. Negative for difficulty urinating, flank pain and hematuria.   Musculoskeletal: Negative.  Negative for arthralgias and joint swelling.   Skin: Negative.    Neurological: Negative for dizziness and headaches.   Psychiatric/Behavioral: Negative for behavioral problems and confusion. The patient is nervous/anxious.        Objective:      Physical Exam  Vitals and nursing note reviewed.   Constitutional:       Appearance: She is well-developed. She is obese.   HENT:      Head: Normocephalic and atraumatic.      Right Ear: Decreased hearing noted.      Left Ear: Decreased hearing noted. There is no impacted cerumen.      Nose: Nose normal.   Eyes:      Pupils: Pupils are equal, round, and reactive to light.   Cardiovascular:      Rate and Rhythm: Normal rate and regular rhythm.      Heart sounds: No murmur heard.      Pulmonary:      Effort: Pulmonary effort is normal.      Breath sounds: Normal breath sounds.   Abdominal:      General: Bowel sounds are normal.      Palpations: Abdomen is soft.   Musculoskeletal:         General: Normal range of motion.      Cervical back: Normal range of motion and neck supple.      " Comments: Bilateral knees OA changes    W/C    Skin:     General: Skin is warm and dry.      Capillary Refill: Capillary refill takes less than 2 seconds.   Neurological:      Mental Status: She is alert and oriented to person, place, and time.   Psychiatric:         Behavior: Behavior normal.         Thought Content: Thought content normal.         Judgment: Judgment normal.         Assessment:       1. Impaired physical mobility    2. Incontinence of urine in female    3. Generalized weakness    4. Urinary tract infection with hematuria, site unspecified        Plan:   Tonya was seen today for er follow up and urinary hesitancy.    Diagnoses and all orders for this visit:    Impaired physical mobility  -     Ambulatory referral/consult to Home Health; Future    Incontinence of urine in female  -     Ambulatory referral/consult to Home Health; Future    Generalized weakness  -     Ambulatory referral/consult to Home Health; Future    Urinary tract infection with hematuria, site unspecified    continue macrobid  We discussed 24 hr sitters .  Would try home health and home PT   We discussed about NH placement ; family not ready .        Problem List Items Addressed This Visit    None

## 2022-01-26 NOTE — PROGRESS NOTES
Patient, Tonya Bucio (MRN #[id number]), presented with a recent Estimated Glumerular Filtration Rate (EGFR) between 30 and 45 consistent with the definition of chronic kidney disease stage 3 - moderate (ICD10 - N18.3).    eGFR if non    Date Value Ref Range Status   01/23/2022 37 (A) >60 mL/min/1.73 m^2 Final     Comment:     Calculation used to obtain the estimated glomerular filtration  rate (eGFR) is the CKD-EPI equation.          The patient's chronic kidney disease stage 3 was monitored, evaluated, addressed and/or treated. This addendum to the medical record is made on 01/26/2022.

## 2022-01-28 LAB
BACTERIA BLD CULT: NORMAL
BACTERIA BLD CULT: NORMAL

## 2022-02-01 ENCOUNTER — TELEPHONE (OUTPATIENT)
Dept: INTERNAL MEDICINE | Facility: CLINIC | Age: 87
End: 2022-02-01
Payer: MEDICARE

## 2022-02-01 NOTE — TELEPHONE ENCOUNTER
----- Message from Rosaura Reeves sent at 2022  2:53 PM CST -----  Contact: Mita/Daughter  Tonya Bucio  MRN: [id number]  : 1935  PCP: Tasha Atkins  Home Phone      [phone]  Work Phone      Not on file.  Mobile          [phone]      MESSAGE:     Requesting to speak to nurse because they still have not received a call from Home Health to arrange to go out to see her mother.  She was told it would take a few days and it has been approximately a week.  Please call to discuss and advise.    Phone: [phone]

## 2022-02-02 ENCOUNTER — TELEPHONE (OUTPATIENT)
Dept: INTERNAL MEDICINE | Facility: CLINIC | Age: 87
End: 2022-02-02
Payer: MEDICARE

## 2022-02-02 RX ORDER — ONDANSETRON 4 MG/1
4 TABLET, FILM COATED ORAL EVERY 8 HOURS PRN
Qty: 10 TABLET | Refills: 0 | Status: SHIPPED | OUTPATIENT
Start: 2022-02-02 | End: 2022-04-06

## 2022-02-02 NOTE — TELEPHONE ENCOUNTER
Attempted to contact patient. She answered phone, but then it got disconnected. Will try again later.

## 2022-02-02 NOTE — TELEPHONE ENCOUNTER
----- Message from Stefani Cotter MA sent at 2022  9:59 AM CST -----  Tonya Bucio  MRN: [id number]  : 1935  PCP: Tasha Atkins  Home Phone      [phone]  Work Phone      Not on file.  Mobile          [phone]      MESSAGE:     Patient called stating that she has been very sick and needs something sent to the pharmacy for nausea.      Please Advise:  [phone]    Send to [facility] ([place])

## 2022-02-03 DIAGNOSIS — J20.9 ACUTE BRONCHITIS, UNSPECIFIED ORGANISM: ICD-10-CM

## 2022-02-03 DIAGNOSIS — J44.1 COPD EXACERBATION: ICD-10-CM

## 2022-02-03 PROCEDURE — G0180 PR HOME HEALTH MD CERTIFICATION: ICD-10-PCS | Mod: ,,, | Performed by: INTERNAL MEDICINE

## 2022-02-03 PROCEDURE — G0180 MD CERTIFICATION HHA PATIENT: HCPCS | Mod: ,,, | Performed by: INTERNAL MEDICINE

## 2022-02-03 RX ORDER — LEVALBUTEROL 1.25 MG/.5ML
1.25 SOLUTION, CONCENTRATE RESPIRATORY (INHALATION) EVERY 8 HOURS PRN
Qty: 100 EACH | Refills: 0 | Status: SHIPPED | OUTPATIENT
Start: 2022-02-03 | End: 2022-04-06

## 2022-02-03 NOTE — TELEPHONE ENCOUNTER
No new care gaps identified.  Powered by [employer]. Reference number: [id number].   2/03/2022 11:45:59 AM CST

## 2022-02-07 ENCOUNTER — TELEPHONE (OUTPATIENT)
Dept: INTERNAL MEDICINE | Facility: CLINIC | Age: 87
End: 2022-02-07
Payer: MEDICARE

## 2022-02-07 DIAGNOSIS — R30.0 DYSURIA: Primary | ICD-10-CM

## 2022-02-07 NOTE — TELEPHONE ENCOUNTER
----- Message from Stefani Cotter MA sent at 2022  3:48 PM CST -----  Tonya Bucio  MRN: [id number]  : 1935  PCP: Tasha Atkins  Home Phone      [phone]  Work Phone      Not on file.  Mobile          [phone]      MESSAGE:     [facility] needs lab orders.  She just finished a 3rd round of antibiotics for UTI.     She needs  orders for a U/A  as well as fasting labs.     Please fax to [phone]

## 2022-02-09 ENCOUNTER — LAB VISIT (OUTPATIENT)
Dept: LAB | Facility: HOSPITAL | Age: 87
End: 2022-02-09
Attending: INTERNAL MEDICINE
Payer: MEDICARE

## 2022-02-09 DIAGNOSIS — E11.9 DIABETES MELLITUS WITHOUT COMPLICATION: ICD-10-CM

## 2022-02-09 DIAGNOSIS — N18.1 CHRONIC KIDNEY DISEASE, STAGE I: Primary | ICD-10-CM

## 2022-02-09 DIAGNOSIS — R30.0 DYSURIA: ICD-10-CM

## 2022-02-09 DIAGNOSIS — E11.69 DIABETES MELLITUS ASSOCIATED WITH HORMONAL ETIOLOGY: ICD-10-CM

## 2022-02-09 DIAGNOSIS — E03.9 MYXEDEMA HEART DISEASE: ICD-10-CM

## 2022-02-09 DIAGNOSIS — I51.9 MYXEDEMA HEART DISEASE: ICD-10-CM

## 2022-02-09 LAB
ALBUMIN SERPL BCP-MCNC: 2.9 G/DL (ref 3.5–5.2)
ALBUMIN/CREAT UR: 296.5 UG/MG (ref 0–30)
ALP SERPL-CCNC: 101 U/L (ref 55–135)
ALT SERPL W/O P-5'-P-CCNC: 21 U/L (ref 10–44)
ANION GAP SERPL CALC-SCNC: 13 MMOL/L (ref 8–16)
AST SERPL-CCNC: 15 U/L (ref 10–40)
BACTERIA #/AREA URNS HPF: ABNORMAL /HPF
BASOPHILS # BLD AUTO: 0.04 K/UL (ref 0–0.2)
BASOPHILS NFR BLD: 0.4 % (ref 0–1.9)
BILIRUB SERPL-MCNC: 0.6 MG/DL (ref 0.1–1)
BILIRUB UR QL STRIP: NEGATIVE
BUN SERPL-MCNC: 20 MG/DL (ref 8–23)
CALCIUM SERPL-MCNC: 9.8 MG/DL (ref 8.7–10.5)
CHLORIDE SERPL-SCNC: 97 MMOL/L (ref 95–110)
CHOLEST SERPL-MCNC: 149 MG/DL (ref 120–199)
CHOLEST/HDLC SERPL: 4.4 {RATIO} (ref 2–5)
CLARITY UR: ABNORMAL
CO2 SERPL-SCNC: 26 MMOL/L (ref 23–29)
COLOR UR: ABNORMAL
CREAT SERPL-MCNC: 1.2 MG/DL (ref 0.5–1.4)
CREAT UR-MCNC: 37.1 MG/DL (ref 15–325)
DIFFERENTIAL METHOD: ABNORMAL
EOSINOPHIL # BLD AUTO: 0.2 K/UL (ref 0–0.5)
EOSINOPHIL NFR BLD: 2 % (ref 0–8)
ERYTHROCYTE [DISTWIDTH] IN BLOOD BY AUTOMATED COUNT: 14.8 % (ref 11.5–14.5)
EST. GFR  (AFRICAN AMERICAN): 48 ML/MIN/1.73 M^2
EST. GFR  (NON AFRICAN AMERICAN): 41 ML/MIN/1.73 M^2
ESTIMATED AVG GLUCOSE: 103 MG/DL (ref 68–131)
GLUCOSE SERPL-MCNC: 116 MG/DL (ref 70–110)
GLUCOSE UR QL STRIP: ABNORMAL
HBA1C MFR BLD: 5.2 % (ref 4–5.6)
HCT VFR BLD AUTO: 39.2 % (ref 37–48.5)
HDLC SERPL-MCNC: 34 MG/DL (ref 40–75)
HDLC SERPL: 22.8 % (ref 20–50)
HGB BLD-MCNC: 11.8 G/DL (ref 12–16)
HGB UR QL STRIP: ABNORMAL
HYALINE CASTS #/AREA URNS LPF: 0 /LPF
IMM GRANULOCYTES # BLD AUTO: 0.06 K/UL (ref 0–0.04)
IMM GRANULOCYTES NFR BLD AUTO: 0.6 % (ref 0–0.5)
KETONES UR QL STRIP: NEGATIVE
LDLC SERPL CALC-MCNC: 69.6 MG/DL (ref 63–159)
LEUKOCYTE ESTERASE UR QL STRIP: ABNORMAL
LYMPHOCYTES # BLD AUTO: 2.7 K/UL (ref 1–4.8)
LYMPHOCYTES NFR BLD: 25.6 % (ref 18–48)
MCH RBC QN AUTO: 29.7 PG (ref 27–31)
MCHC RBC AUTO-ENTMCNC: 30.1 G/DL (ref 32–36)
MCV RBC AUTO: 99 FL (ref 82–98)
MICROALBUMIN UR DL<=1MG/L-MCNC: 110 UG/ML
MICROSCOPIC COMMENT: ABNORMAL
MONOCYTES # BLD AUTO: 0.7 K/UL (ref 0.3–1)
MONOCYTES NFR BLD: 6.5 % (ref 4–15)
NEUTROPHILS # BLD AUTO: 6.7 K/UL (ref 1.8–7.7)
NEUTROPHILS NFR BLD: 64.9 % (ref 38–73)
NITRITE UR QL STRIP: POSITIVE
NONHDLC SERPL-MCNC: 115 MG/DL
NRBC BLD-RTO: 0 /100 WBC
PH UR STRIP: 7 [PH] (ref 5–8)
PLATELET # BLD AUTO: 355 K/UL (ref 150–450)
PMV BLD AUTO: 10.6 FL (ref 9.2–12.9)
POTASSIUM SERPL-SCNC: 4.5 MMOL/L (ref 3.5–5.1)
PROT SERPL-MCNC: 6.9 G/DL (ref 6–8.4)
PROT UR QL STRIP: ABNORMAL
RBC # BLD AUTO: 3.97 M/UL (ref 4–5.4)
RBC #/AREA URNS HPF: 3 /HPF (ref 0–4)
SODIUM SERPL-SCNC: 136 MMOL/L (ref 136–145)
SP GR UR STRIP: 1.01 (ref 1–1.03)
SQUAMOUS #/AREA URNS HPF: 8 /HPF
TRIGL SERPL-MCNC: 227 MG/DL (ref 30–150)
URN SPEC COLLECT METH UR: ABNORMAL
UROBILINOGEN UR STRIP-ACNC: 1 EU/DL
WBC # BLD AUTO: 10.37 K/UL (ref 3.9–12.7)
WBC #/AREA URNS HPF: 50 /HPF (ref 0–5)
WBC CLUMPS URNS QL MICRO: ABNORMAL

## 2022-02-09 PROCEDURE — 82043 UR ALBUMIN QUANTITATIVE: CPT | Mod: HCNC

## 2022-02-09 PROCEDURE — 81000 URINALYSIS NONAUTO W/SCOPE: CPT | Mod: HCNC

## 2022-02-09 PROCEDURE — 80061 LIPID PANEL: CPT | Mod: HCNC

## 2022-02-09 PROCEDURE — 82570 ASSAY OF URINE CREATININE: CPT | Mod: HCNC

## 2022-02-09 PROCEDURE — 87088 URINE BACTERIA CULTURE: CPT | Mod: HCNC

## 2022-02-09 PROCEDURE — 87086 URINE CULTURE/COLONY COUNT: CPT | Mod: HCNC

## 2022-02-09 PROCEDURE — 85025 COMPLETE CBC W/AUTO DIFF WBC: CPT | Mod: HCNC

## 2022-02-09 PROCEDURE — 87077 CULTURE AEROBIC IDENTIFY: CPT | Mod: HCNC

## 2022-02-09 PROCEDURE — 87186 SC STD MICRODIL/AGAR DIL: CPT | Mod: HCNC

## 2022-02-09 PROCEDURE — 83036 HEMOGLOBIN GLYCOSYLATED A1C: CPT | Mod: HCNC

## 2022-02-09 PROCEDURE — 80053 COMPREHEN METABOLIC PANEL: CPT | Mod: HCNC

## 2022-02-11 ENCOUNTER — EXTERNAL HOME HEALTH (OUTPATIENT)
Dept: HOME HEALTH SERVICES | Facility: HOSPITAL | Age: 87
End: 2022-02-11
Payer: MEDICARE

## 2022-02-11 LAB — BACTERIA UR CULT: ABNORMAL

## 2022-02-14 ENCOUNTER — TELEPHONE (OUTPATIENT)
Dept: INTERNAL MEDICINE | Facility: CLINIC | Age: 87
End: 2022-02-14
Payer: MEDICARE

## 2022-02-14 NOTE — TELEPHONE ENCOUNTER
----- Message from Rosanne Dawn sent at 2022  3:23 PM CST -----  Regarding: Request to speak to a nurse  Contact: Mita (daughter)  Tonya Bucio  MRN: [id number]  : 1935  PCP: Tasha Atkins  Home Phone      [phone]  Work Phone      Not on file.  Mobile          [phone]      MESSAGE:   Request to speak to a nurse regarding questions concerning PCP appointment scheduled on 2/15/22  Patient has home health, and is unsure if she continues to follow up with PCP.   Mita (daughter) viewed lab results of My Chart. She would like to discuss results with a nurse.  Patient c/o pressure, burning urination, urgency, and weak with no fever noted x 5 weeks.     Phone # [phone]    Pharmacy - [facility] [address] [place] [address]

## 2022-02-15 DIAGNOSIS — M17.0 PRIMARY OSTEOARTHRITIS OF BOTH KNEES: ICD-10-CM

## 2022-02-15 RX ORDER — AMOXICILLIN AND CLAVULANATE POTASSIUM 875; 125 MG/1; MG/1
1 TABLET, FILM COATED ORAL 2 TIMES DAILY
Qty: 20 TABLET | Refills: 0 | Status: SHIPPED | OUTPATIENT
Start: 2022-02-15 | End: 2022-02-25

## 2022-02-15 RX ORDER — HYDROCODONE BITARTRATE AND ACETAMINOPHEN 7.5; 325 MG/1; MG/1
1 TABLET ORAL
Qty: 30 TABLET | Refills: 0 | Status: SHIPPED | OUTPATIENT
Start: 2022-02-15 | End: 2022-03-21 | Stop reason: SDUPTHER

## 2022-02-15 NOTE — TELEPHONE ENCOUNTER
Attempted to contact Mita, but no answer. Notified patient of UTI and antibiotics ordered. She states that she is unable to come in for appt; requesting that you review lab results and we phone her with results. Thanks.

## 2022-02-15 NOTE — TELEPHONE ENCOUNTER
No new care gaps identified.  Powered by [employer]. Reference number: [id number].   2/15/2022 11:35:32 AM CST

## 2022-02-15 NOTE — TELEPHONE ENCOUNTER
----- Message from Stefani Cotter MA sent at 2/15/2022 11:27 AM CST -----  Tonya Bucio  MRN: [id number]  : 1935  PCP: Tasha Atkins  Home Phone      [phone]  Work Phone      Not on file.  Mobile          [phone]      MESSAGE:     Patient is calling for a refill on Zamzee.     Send to [facility] ([place])

## 2022-02-21 ENCOUNTER — DOCUMENT SCAN (OUTPATIENT)
Dept: HOME HEALTH SERVICES | Facility: HOSPITAL | Age: 87
End: 2022-02-21
Payer: MEDICARE

## 2022-02-22 ENCOUNTER — DOCUMENT SCAN (OUTPATIENT)
Dept: HOME HEALTH SERVICES | Facility: HOSPITAL | Age: 87
End: 2022-02-22
Payer: MEDICARE

## 2022-02-25 ENCOUNTER — TELEPHONE (OUTPATIENT)
Dept: INTERNAL MEDICINE | Facility: CLINIC | Age: 87
End: 2022-02-25
Payer: MEDICARE

## 2022-02-25 DIAGNOSIS — L89.310: Primary | ICD-10-CM

## 2022-02-25 NOTE — TELEPHONE ENCOUNTER
----- Message from Rosaura Reeves sent at 2022  2:54 PM CST -----  Contact: Mildred/[employer]  Tonya Bucio  MRN: [id number]  : 1935  PCP: Tasha Atkins  Home Phone      [phone]  Work Phone      Not on file.  Mobile          [phone]      MESSAGE:     Requesting to speak to nurse regarding bed sore to left buttocks.  Requesting wound care orders.  Please call to discuss and advise.    Phone: [phone]

## 2022-02-28 NOTE — TELEPHONE ENCOUNTER
Spoke with Mildred. She said that it is not bad enough for wound care. It's 0.5X0.5 and states they just need a dermapad to support since she is sitting on it all day. Gave verbal. Pt is also getting repeated UTIs. Mildred states that when the pt uses the restroom for samples she goes in a potty hat, but she said that BM gets in there sometimes as well. They would like a UA with CNS via in and out cath to make sure it is a clean sample not that pt is done with abx. Verbal order given. Verbalized understanding.

## 2022-02-28 NOTE — TELEPHONE ENCOUNTER
I think best option is to see wound care clinc  Wound care ordered  Avoid pressure on buttocks   neosporin bid

## 2022-03-03 ENCOUNTER — APPOINTMENT (OUTPATIENT)
Dept: LAB | Facility: HOSPITAL | Age: 87
End: 2022-03-03
Attending: INTERNAL MEDICINE
Payer: MEDICARE

## 2022-03-03 DIAGNOSIS — N39.0 UTI (URINARY TRACT INFECTION): Primary | ICD-10-CM

## 2022-03-03 LAB
BACTERIA #/AREA URNS HPF: ABNORMAL /HPF
BILIRUB UR QL STRIP: NEGATIVE
CLARITY UR: CLEAR
COLOR UR: YELLOW
GLUCOSE UR QL STRIP: NEGATIVE
HGB UR QL STRIP: NEGATIVE
KETONES UR QL STRIP: NEGATIVE
LEUKOCYTE ESTERASE UR QL STRIP: ABNORMAL
MICROSCOPIC COMMENT: ABNORMAL
NITRITE UR QL STRIP: NEGATIVE
PH UR STRIP: 7 [PH] (ref 5–8)
PROT UR QL STRIP: NEGATIVE
SP GR UR STRIP: 1.01 (ref 1–1.03)
URN SPEC COLLECT METH UR: ABNORMAL
UROBILINOGEN UR STRIP-ACNC: NEGATIVE EU/DL
WBC #/AREA URNS HPF: 28 /HPF (ref 0–5)

## 2022-03-03 PROCEDURE — 81000 URINALYSIS NONAUTO W/SCOPE: CPT | Mod: HCNC | Performed by: INTERNAL MEDICINE

## 2022-03-03 PROCEDURE — 87186 SC STD MICRODIL/AGAR DIL: CPT | Mod: HCNC | Performed by: INTERNAL MEDICINE

## 2022-03-03 PROCEDURE — 87077 CULTURE AEROBIC IDENTIFY: CPT | Mod: HCNC | Performed by: INTERNAL MEDICINE

## 2022-03-03 PROCEDURE — 87088 URINE BACTERIA CULTURE: CPT | Mod: HCNC | Performed by: INTERNAL MEDICINE

## 2022-03-03 PROCEDURE — 87086 URINE CULTURE/COLONY COUNT: CPT | Mod: HCNC | Performed by: INTERNAL MEDICINE

## 2022-03-06 LAB — BACTERIA UR CULT: ABNORMAL

## 2022-03-07 ENCOUNTER — DOCUMENT SCAN (OUTPATIENT)
Dept: HOME HEALTH SERVICES | Facility: HOSPITAL | Age: 87
End: 2022-03-07
Payer: MEDICARE

## 2022-03-08 ENCOUNTER — DOCUMENT SCAN (OUTPATIENT)
Dept: HOME HEALTH SERVICES | Facility: HOSPITAL | Age: 87
End: 2022-03-08
Payer: MEDICARE

## 2022-03-08 ENCOUNTER — TELEPHONE (OUTPATIENT)
Dept: HEPATOLOGY | Facility: HOSPITAL | Age: 87
End: 2022-03-08

## 2022-03-08 RX ORDER — NITROFURANTOIN (MACROCRYSTALS) 100 MG/1
100 CAPSULE ORAL NIGHTLY
Qty: 10 CAPSULE | Refills: 0 | Status: SHIPPED | OUTPATIENT
Start: 2022-03-08 | End: 2022-03-18

## 2022-03-09 ENCOUNTER — TELEPHONE (OUTPATIENT)
Dept: INTERNAL MEDICINE | Facility: CLINIC | Age: 87
End: 2022-03-09
Payer: MEDICARE

## 2022-03-09 NOTE — TELEPHONE ENCOUNTER
----- Message from Rosaura Reeves sent at 3/9/2022 10:46 AM CST -----  Contact: Vanessa/ [facility]  Tonya Bucio  MRN: [id number]  : 1935  PCP: Tasha Atkins  Home Phone      [phone]  Work Phone      Not on file.  Mobile          [phone]      MESSAGE:     Calling to update provider that they still have not been able to schedule patient for appointment.  They attempted to contact family and someone was supposed to contact them back but they still have not called back to set up.    If any questions please call number below.    Phone: [phone] ext [phone]

## 2022-03-11 ENCOUNTER — DOCUMENT SCAN (OUTPATIENT)
Dept: HOME HEALTH SERVICES | Facility: HOSPITAL | Age: 87
End: 2022-03-11
Payer: MEDICARE

## 2022-03-17 ENCOUNTER — TELEPHONE (OUTPATIENT)
Dept: INTERNAL MEDICINE | Facility: CLINIC | Age: 87
End: 2022-03-17
Payer: MEDICARE

## 2022-03-17 NOTE — HOSPITAL COURSE
Patient is still coughing and wheezing.  She is feeling better.  She is receiving breathing treatments 4 times daily.  She is tolerating her azithromycin and Rocephin.  Cardiology evaluated patient for atrial flutter.  They changed her metoprolol to sotalol 80 mg twice daily.  They added magnesium 400 mg twice daily as well.  She is currently taking Xarelto 15 mg daily for anticoagulation.  Cardiology continued losartan 50 mg daily    3/15  Sats 96% on RA. Improved coughing and breathing. No fever. Tolerating sotalol. 70s flutter.   Detail Level: Detailed Detail Level: Generalized Detail Level: Zone I will START or STAY ON the medications listed below when I get home from the hospital:    ibuprofen 100 mg/5 mL oral suspension  -- 20 milliliter(s) by mouth every 4 hours, As Needed -for moderate pain  -- Do not take this drug if you are pregnant.  It is very important that you take or use this exactly as directed.  Do not skip doses or discontinue unless directed by your doctor.  May cause drowsiness or dizziness.  Obtain medical advice before taking any non-prescription drugs as some may affect the action of this medication.  Shake well before use.  Take with food or milk.    -- Indication: For moerate pain    Prenatal 1 oral capsule  --  by mouth   -- Indication: For Supplement    ferrous sulfate 300 mg/5 mL oral liquid  -- 5 milliliter(s) by mouth 3 times a day  -- Indication: For anemia    docusate sodium 100 mg oral capsule  -- 1 cap(s) by mouth 2 times a day  -- Indication: For constipation as needed    Acerola 500 mg oral tablet, chewable  -- 1 tab(s) by mouth once a day  -- Chew tablets before swallowing    -- Indication: For vitamin c

## 2022-03-17 NOTE — TELEPHONE ENCOUNTER
----- Message from Rosaura Reeves sent at 3/17/2022 10:51 AM CDT -----  Contact: Dhruv/[facility]  Tonya Bucio  MRN: [id number]  : 1935  PCP: Tasha Atkins  Home Phone      [phone]  Work Phone      Not on file.  Mobile          [phone]      MESSAGE:     Requesting to speak to nurse to get verbal order for home physical therapy.  Please call to assist.      Phone: [phone]

## 2022-03-21 ENCOUNTER — TELEPHONE (OUTPATIENT)
Dept: INTERNAL MEDICINE | Facility: CLINIC | Age: 87
End: 2022-03-21
Payer: MEDICARE

## 2022-03-21 DIAGNOSIS — M17.0 PRIMARY OSTEOARTHRITIS OF BOTH KNEES: ICD-10-CM

## 2022-03-21 RX ORDER — HYDROCODONE BITARTRATE AND ACETAMINOPHEN 7.5; 325 MG/1; MG/1
1 TABLET ORAL
Qty: 30 TABLET | Refills: 0 | Status: SHIPPED | OUTPATIENT
Start: 2022-03-21 | End: 2022-04-19 | Stop reason: SDUPTHER

## 2022-03-21 NOTE — TELEPHONE ENCOUNTER
LOV 1/26/2022    Requested Prescriptions     Pending Prescriptions Disp Refills    HYDROcodone-acetaminophen (NORCO) 7.5-325 mg per tablet 30 tablet 0     Sig: Take 1 tablet by mouth every 24 hours as needed for Pain.

## 2022-03-21 NOTE — TELEPHONE ENCOUNTER
Care Due:                  Date            Visit Type   Department     Provider  --------------------------------------------------------------------------------                                EP -                              PRIMARY      [facility] INTER[facility]  Last Visit: 01-      [facility] ([facility]   MEDICINE CATALINO Atkins  Next Visit: None Scheduled  None         None Found                                                            Last  Test          Frequency    Reason                     Performed    Due Date  --------------------------------------------------------------------------------    Uric Acid...  12 months..  allopurinoL..............  Not Found    Overdue    Powered by [employer]. Reference number: [id number].   3/21/2022 9:29:26 AM CDT

## 2022-03-21 NOTE — TELEPHONE ENCOUNTER
----- Message from Rosanne Dawn sent at 3/21/2022  2:48 PM CDT -----  Regarding: Rx Refill  Contact: naomi Bucio  MRN: [id number]  : 1935  PCP: Tasha Atkins  Home Phone      [phone]  Work Phone      Not on file.  Mobile          [phone]      MESSAGE:   Rx Refill - HYDROcodone-acetaminophen (NORCO) 7.5-325 mg per tablet.   Patient is out of medication.   Pharmacy instructed patient no Rx received - contract PCP     Phone #  [phone]    Pharmacy - [facility] [address] [place] [address]

## 2022-03-22 ENCOUNTER — DOCUMENT SCAN (OUTPATIENT)
Dept: HOME HEALTH SERVICES | Facility: HOSPITAL | Age: 87
End: 2022-03-22
Payer: MEDICARE

## 2022-03-22 ENCOUNTER — TELEPHONE (OUTPATIENT)
Dept: INTERNAL MEDICINE | Facility: CLINIC | Age: 87
End: 2022-03-22

## 2022-03-22 NOTE — TELEPHONE ENCOUNTER
----- Message from Rosaura Reeves sent at 3/22/2022 10:29 AM CDT -----  Contact: Vik/ [facility]  Tonya Bucio  MRN: [id number]  : 1935  PCP: Tasha Atkins  Home Phone      [phone]  Work Phone      Not on file.  Mobile          [phone]      MESSAGE:     Requesting to speak to nurse to get an order for another U/A.  She will be visiting patient tomorrow.  Patient is complaining of pressure and pain when she urinates.  She has finished the previous antibiotics that was prescribed.  Please call to advise.      Phone: [phone]

## 2022-03-23 ENCOUNTER — LAB VISIT (OUTPATIENT)
Dept: LAB | Facility: HOSPITAL | Age: 87
End: 2022-03-23
Attending: INTERNAL MEDICINE
Payer: MEDICARE

## 2022-03-23 DIAGNOSIS — R30.0 DYSURIA: Primary | ICD-10-CM

## 2022-03-23 LAB
BACTERIA #/AREA URNS HPF: ABNORMAL /HPF
BILIRUB UR QL STRIP: NEGATIVE
CLARITY UR: ABNORMAL
COLOR UR: ABNORMAL
GLUCOSE UR QL STRIP: NEGATIVE
HGB UR QL STRIP: ABNORMAL
HYALINE CASTS #/AREA URNS LPF: 0 /LPF
KETONES UR QL STRIP: NEGATIVE
LEUKOCYTE ESTERASE UR QL STRIP: ABNORMAL
MICROSCOPIC COMMENT: ABNORMAL
NITRITE UR QL STRIP: POSITIVE
PH UR STRIP: 6 [PH] (ref 5–8)
PROT UR QL STRIP: ABNORMAL
RBC #/AREA URNS HPF: 10 /HPF (ref 0–4)
SP GR UR STRIP: 1.02 (ref 1–1.03)
SQUAMOUS #/AREA URNS HPF: 3 /HPF
URN SPEC COLLECT METH UR: ABNORMAL
UROBILINOGEN UR STRIP-ACNC: 1 EU/DL
WBC #/AREA URNS HPF: >100 /HPF (ref 0–5)

## 2022-03-23 PROCEDURE — 87077 CULTURE AEROBIC IDENTIFY: CPT

## 2022-03-23 PROCEDURE — 87088 URINE BACTERIA CULTURE: CPT

## 2022-03-23 PROCEDURE — 87086 URINE CULTURE/COLONY COUNT: CPT

## 2022-03-23 PROCEDURE — 87186 SC STD MICRODIL/AGAR DIL: CPT

## 2022-03-23 PROCEDURE — 81000 URINALYSIS NONAUTO W/SCOPE: CPT

## 2022-03-24 ENCOUNTER — TELEPHONE (OUTPATIENT)
Dept: INTERNAL MEDICINE | Facility: CLINIC | Age: 87
End: 2022-03-24
Payer: MEDICARE

## 2022-03-24 DIAGNOSIS — N39.0 RECURRENT UTI: Primary | ICD-10-CM

## 2022-03-24 RX ORDER — NITROFURANTOIN (MACROCRYSTALS) 100 MG/1
100 CAPSULE ORAL NIGHTLY
Qty: 10 CAPSULE | Refills: 0 | Status: SHIPPED | OUTPATIENT
Start: 2022-03-24 | End: 2022-03-28

## 2022-03-24 NOTE — TELEPHONE ENCOUNTER
Patient's daughter notified or results and abx ordered. She states that the patient is currently homebound, so it would be very difficult for her to schedule with urology @ this time. I told her to notify us when the patient is more mobile, so an appt can be scheduled. She will keep us posted as to patient's progress.

## 2022-03-24 NOTE — TELEPHONE ENCOUNTER
Patient has UTI  Started on antibiotics.  Drink plenty of water.  If high fevers and chills call.    macrobid    Since she keeps having UTI   Schedule to see urology   Referral placed

## 2022-03-25 LAB — BACTERIA UR CULT: ABNORMAL

## 2022-03-28 ENCOUNTER — TELEPHONE (OUTPATIENT)
Dept: HEPATOLOGY | Facility: HOSPITAL | Age: 87
End: 2022-03-28
Payer: MEDICARE

## 2022-03-28 RX ORDER — SULFAMETHOXAZOLE AND TRIMETHOPRIM 800; 160 MG/1; MG/1
1 TABLET ORAL 2 TIMES DAILY
Qty: 14 TABLET | Refills: 0 | Status: SHIPPED | OUTPATIENT
Start: 2022-03-28 | End: 2022-04-04

## 2022-03-28 NOTE — TELEPHONE ENCOUNTER
Attempted to contact patient's daughter, but no answer. I notified the patient of Dr. Atkins' orders. I informed her that she needs to stop the macrobid and start the bactrim as soon as possible to treat UTI. She verbalized understanding, but I asked that she have her daughter contact us if she has any questions.

## 2022-03-29 ENCOUNTER — DOCUMENT SCAN (OUTPATIENT)
Dept: HOME HEALTH SERVICES | Facility: HOSPITAL | Age: 87
End: 2022-03-29
Payer: MEDICARE

## 2022-03-31 ENCOUNTER — TELEPHONE (OUTPATIENT)
Dept: INTERNAL MEDICINE | Facility: CLINIC | Age: 87
End: 2022-03-31
Payer: MEDICARE

## 2022-03-31 NOTE — TELEPHONE ENCOUNTER
----- Message from Rosaura Reeves sent at 3/31/2022 11:11 AM CDT -----  Contact: Dhruv/[facility]  Tonya Bucio  MRN: [id number]  : 1935  PCP: Tasha Atkins  Home Phone      [phone]  Work Phone      Not on file.  Mobile          [phone]      MESSAGE:     Requesting assistance with orders for patient to continue home physical therapy for 5 more weeks.  Please call to assist.      Phone: [phone]

## 2022-04-04 PROCEDURE — G0180 PR HOME HEALTH MD CERTIFICATION: ICD-10-PCS | Mod: ,,, | Performed by: INTERNAL MEDICINE

## 2022-04-04 PROCEDURE — G0180 MD CERTIFICATION HHA PATIENT: HCPCS | Mod: ,,, | Performed by: INTERNAL MEDICINE

## 2022-04-05 ENCOUNTER — TELEPHONE (OUTPATIENT)
Dept: HEPATOLOGY | Facility: HOSPITAL | Age: 87
End: 2022-04-05
Payer: MEDICARE

## 2022-04-05 ENCOUNTER — TELEPHONE (OUTPATIENT)
Dept: INTERNAL MEDICINE | Facility: CLINIC | Age: 87
End: 2022-04-05
Payer: MEDICARE

## 2022-04-05 ENCOUNTER — LAB VISIT (OUTPATIENT)
Dept: LAB | Facility: HOSPITAL | Age: 87
End: 2022-04-05
Attending: INTERNAL MEDICINE
Payer: MEDICARE

## 2022-04-05 DIAGNOSIS — R30.0 DYSURIA: Primary | ICD-10-CM

## 2022-04-05 LAB
BACTERIA #/AREA URNS HPF: ABNORMAL /HPF
BILIRUB UR QL STRIP: NEGATIVE
CLARITY UR: ABNORMAL
COLOR UR: YELLOW
GLUCOSE UR QL STRIP: NEGATIVE
HGB UR QL STRIP: ABNORMAL
KETONES UR QL STRIP: NEGATIVE
LEUKOCYTE ESTERASE UR QL STRIP: ABNORMAL
MICROSCOPIC COMMENT: ABNORMAL
NITRITE UR QL STRIP: NEGATIVE
PH UR STRIP: 7 [PH] (ref 5–8)
PROT UR QL STRIP: NEGATIVE
RBC #/AREA URNS HPF: 0 /HPF (ref 0–4)
SP GR UR STRIP: 1.02 (ref 1–1.03)
SQUAMOUS #/AREA URNS HPF: 5 /HPF
URN SPEC COLLECT METH UR: ABNORMAL
UROBILINOGEN UR STRIP-ACNC: NEGATIVE EU/DL
WBC #/AREA URNS HPF: >100 /HPF (ref 0–5)

## 2022-04-05 PROCEDURE — 87086 URINE CULTURE/COLONY COUNT: CPT

## 2022-04-05 PROCEDURE — 81000 URINALYSIS NONAUTO W/SCOPE: CPT

## 2022-04-05 RX ORDER — CIPROFLOXACIN 250 MG/1
250 TABLET, FILM COATED ORAL EVERY 12 HOURS
Qty: 14 TABLET | Refills: 0 | Status: SHIPPED | OUTPATIENT
Start: 2022-04-05 | End: 2022-04-12

## 2022-04-05 NOTE — TELEPHONE ENCOUNTER
----- Message from Rosanne Dawn sent at 2022  2:54 PM CDT -----  Regarding: Re  Contact: Dhruv with [facility]  Tonya Bucio  MRN: [id number]  : 1935  PCP: Tasha Atkins  Home Phone      [phone]  Work Phone      Not on file.  Mobile          [phone]      MESSAGE:   The home health nurse visit with the patient on 22 at 11:00 am - patient's pulse was 65  The home health physical therapy visit with the on 22 at 2:00 pm - patient's pulse was 39.  Patient has no symptoms to report.   Dhruv phoned patient to check on her at 2:50 pm pulse was still in the 30's    Phone # [phone]    Pharmacy - [facility] [address] [place] [address]

## 2022-04-05 NOTE — TELEPHONE ENCOUNTER
Spoke to patient's daughter Mita, and she checked the patient's pulse while I was on the phone with her. Pulse: 56. Patient feels good. She has an appt with Dr. Atkins tomorrow. I advised that she go to ER for worsening symptoms. Dhruv notified

## 2022-04-06 ENCOUNTER — OFFICE VISIT (OUTPATIENT)
Dept: INTERNAL MEDICINE | Facility: CLINIC | Age: 87
End: 2022-04-06
Payer: MEDICARE

## 2022-04-06 VITALS
WEIGHT: 231 LBS | OXYGEN SATURATION: 96 % | HEIGHT: 68 IN | DIASTOLIC BLOOD PRESSURE: 80 MMHG | BODY MASS INDEX: 35.01 KG/M2 | SYSTOLIC BLOOD PRESSURE: 116 MMHG | HEART RATE: 63 BPM | RESPIRATION RATE: 20 BRPM

## 2022-04-06 DIAGNOSIS — R53.81 DEBILITY: ICD-10-CM

## 2022-04-06 DIAGNOSIS — I27.82 CHRONIC SADDLE PULMONARY EMBOLISM WITHOUT ACUTE COR PULMONALE: ICD-10-CM

## 2022-04-06 DIAGNOSIS — I10 PRIMARY HYPERTENSION: Primary | ICD-10-CM

## 2022-04-06 DIAGNOSIS — J44.9 PULMONARY HYPERTENSION DUE TO COPD: ICD-10-CM

## 2022-04-06 DIAGNOSIS — I27.23 PULMONARY HYPERTENSION DUE TO COPD: ICD-10-CM

## 2022-04-06 DIAGNOSIS — I26.92 CHRONIC SADDLE PULMONARY EMBOLISM WITHOUT ACUTE COR PULMONALE: ICD-10-CM

## 2022-04-06 DIAGNOSIS — E78.5 HYPERLIPIDEMIA ASSOCIATED WITH TYPE 2 DIABETES MELLITUS: ICD-10-CM

## 2022-04-06 DIAGNOSIS — E21.0 PRIMARY HYPERPARATHYROIDISM: ICD-10-CM

## 2022-04-06 DIAGNOSIS — N18.4 CHRONIC KIDNEY DISEASE, STAGE 4 (SEVERE): ICD-10-CM

## 2022-04-06 DIAGNOSIS — E11.69 HYPERLIPIDEMIA ASSOCIATED WITH TYPE 2 DIABETES MELLITUS: ICD-10-CM

## 2022-04-06 LAB — BACTERIA UR CULT: NO GROWTH

## 2022-04-06 PROCEDURE — 99499 UNLISTED E&M SERVICE: CPT | Mod: S$GLB,,, | Performed by: INTERNAL MEDICINE

## 2022-04-06 PROCEDURE — 3079F DIAST BP 80-89 MM HG: CPT | Mod: CPTII,S$GLB,, | Performed by: INTERNAL MEDICINE

## 2022-04-06 PROCEDURE — 99999 PR PBB SHADOW E&M-EST. PATIENT-LVL IV: ICD-10-PCS | Mod: PBBFAC,,, | Performed by: INTERNAL MEDICINE

## 2022-04-06 PROCEDURE — 3288F FALL RISK ASSESSMENT DOCD: CPT | Mod: CPTII,S$GLB,, | Performed by: INTERNAL MEDICINE

## 2022-04-06 PROCEDURE — 3074F PR MOST RECENT SYSTOLIC BLOOD PRESSURE < 130 MM HG: ICD-10-PCS | Mod: CPTII,S$GLB,, | Performed by: INTERNAL MEDICINE

## 2022-04-06 PROCEDURE — 99499 RISK ADDL DX/OHS AUDIT: ICD-10-PCS | Mod: S$GLB,,, | Performed by: INTERNAL MEDICINE

## 2022-04-06 PROCEDURE — 1126F AMNT PAIN NOTED NONE PRSNT: CPT | Mod: CPTII,S$GLB,, | Performed by: INTERNAL MEDICINE

## 2022-04-06 PROCEDURE — 3074F SYST BP LT 130 MM HG: CPT | Mod: CPTII,S$GLB,, | Performed by: INTERNAL MEDICINE

## 2022-04-06 PROCEDURE — 1126F PR PAIN SEVERITY QUANTIFIED, NO PAIN PRESENT: ICD-10-PCS | Mod: CPTII,S$GLB,, | Performed by: INTERNAL MEDICINE

## 2022-04-06 PROCEDURE — 1101F PR PT FALLS ASSESS DOC 0-1 FALLS W/OUT INJ PAST YR: ICD-10-PCS | Mod: CPTII,S$GLB,, | Performed by: INTERNAL MEDICINE

## 2022-04-06 PROCEDURE — 99999 PR PBB SHADOW E&M-EST. PATIENT-LVL IV: CPT | Mod: PBBFAC,,, | Performed by: INTERNAL MEDICINE

## 2022-04-06 PROCEDURE — 1160F RVW MEDS BY RX/DR IN RCRD: CPT | Mod: CPTII,S$GLB,, | Performed by: INTERNAL MEDICINE

## 2022-04-06 PROCEDURE — 1159F PR MEDICATION LIST DOCUMENTED IN MEDICAL RECORD: ICD-10-PCS | Mod: CPTII,S$GLB,, | Performed by: INTERNAL MEDICINE

## 2022-04-06 PROCEDURE — 3288F PR FALLS RISK ASSESSMENT DOCUMENTED: ICD-10-PCS | Mod: CPTII,S$GLB,, | Performed by: INTERNAL MEDICINE

## 2022-04-06 PROCEDURE — 99214 PR OFFICE/OUTPT VISIT, EST, LEVL IV, 30-39 MIN: ICD-10-PCS | Mod: S$GLB,,, | Performed by: INTERNAL MEDICINE

## 2022-04-06 PROCEDURE — 99214 OFFICE O/P EST MOD 30 MIN: CPT | Mod: S$GLB,,, | Performed by: INTERNAL MEDICINE

## 2022-04-06 PROCEDURE — 1159F MED LIST DOCD IN RCRD: CPT | Mod: CPTII,S$GLB,, | Performed by: INTERNAL MEDICINE

## 2022-04-06 PROCEDURE — 1160F PR REVIEW ALL MEDS BY PRESCRIBER/CLIN PHARMACIST DOCUMENTED: ICD-10-PCS | Mod: CPTII,S$GLB,, | Performed by: INTERNAL MEDICINE

## 2022-04-06 PROCEDURE — 1101F PT FALLS ASSESS-DOCD LE1/YR: CPT | Mod: CPTII,S$GLB,, | Performed by: INTERNAL MEDICINE

## 2022-04-06 PROCEDURE — 3079F PR MOST RECENT DIASTOLIC BLOOD PRESSURE 80-89 MM HG: ICD-10-PCS | Mod: CPTII,S$GLB,, | Performed by: INTERNAL MEDICINE

## 2022-04-06 RX ORDER — HYDROCHLOROTHIAZIDE 12.5 MG/1
12.5 TABLET ORAL DAILY
COMMUNITY
End: 2022-04-06

## 2022-04-06 RX ORDER — CINACALCET 90 MG/1
90 TABLET, FILM COATED ORAL
COMMUNITY
End: 2024-01-04 | Stop reason: ALTCHOICE

## 2022-04-06 RX ORDER — OMEGA-3-ACID ETHYL ESTERS 1 G/1
1 CAPSULE, LIQUID FILLED ORAL 2 TIMES DAILY
Qty: 180 CAPSULE | Refills: 1 | Status: SHIPPED | OUTPATIENT
Start: 2022-04-06 | End: 2022-07-06 | Stop reason: SDUPTHER

## 2022-04-06 RX ORDER — CINACALCET 30 MG/1
30 TABLET, FILM COATED ORAL
COMMUNITY
End: 2022-10-05

## 2022-04-06 NOTE — PROGRESS NOTES
Patient, Tonya Bucio (MRN #[id number]), presented with a recorded BMI of 35.12 kg/m^2 and a documented comorbidity(s):  - Diabetes Mellitus Type 2  - Hypertension  - Hyperlipidemia  to which the severe obesity is a contributing factor. This is consistent with the definition of severe obesity (BMI 35.0-39.9) with comorbidity (ICD-10 E66.01, Z68.35). The patient's severe obesity was monitored, evaluated, addressed and/or treated. This addendum to the medical record is made on 04/06/2022.

## 2022-04-06 NOTE — PROGRESS NOTES
Subjective:       Patient ID: Tonya Bucio is a 86 y.o. female.    Chief Complaint: Follow-up (Check up - following up since getting with HH and discuss meds )    Tonya Bucio is a 86 y.o. female  Here with follow up .  Last time I stopped her losartan, HCTZ and reduced sotalol to once a day .    Her pulse is 50s  And /70 .  Her pulse is going lower 30 .  I advised her to see         Follow-up  Associated symptoms include arthralgias, fatigue, myalgias and neck pain. Pertinent negatives include no chest pain, chills, congestion, coughing, fever, nausea, numbness, sore throat, vomiting or weakness.     Review of Systems   Constitutional: Positive for fatigue. Negative for chills and fever.   HENT: Negative for congestion, hearing loss, sinus pressure and sore throat.    Eyes: Negative for photophobia.   Respiratory: Negative for cough, choking, chest tightness, shortness of breath and wheezing.    Cardiovascular: Negative for chest pain and palpitations.   Gastrointestinal: Negative for blood in stool, nausea and vomiting.   Endocrine: Negative for polydipsia and polyphagia.   Genitourinary: Negative for dysuria and hematuria.   Musculoskeletal: Positive for arthralgias, myalgias and neck pain.   Skin: Negative for pallor.   Neurological: Negative for dizziness, weakness and numbness.   Hematological: Does not bruise/bleed easily.   Psychiatric/Behavioral: Negative for confusion and suicidal ideas. The patient is not nervous/anxious.        Objective:      Physical Exam  Vitals and nursing note reviewed.   Constitutional:       Appearance: She is well-developed. She is obese.   HENT:      Head: Normocephalic and atraumatic.      Right Ear: Decreased hearing noted.      Left Ear: Decreased hearing noted. There is no impacted cerumen.      Nose: Nose normal.   Eyes:      Pupils: Pupils are equal, round, and reactive to light.   Cardiovascular:      Rate and Rhythm: Normal rate and regular rhythm.      Heart  sounds: No murmur heard.  Pulmonary:      Effort: Pulmonary effort is normal.      Breath sounds: Normal breath sounds.   Abdominal:      General: Bowel sounds are normal.      Palpations: Abdomen is soft.   Musculoskeletal:         General: Normal range of motion.      Cervical back: Normal range of motion and neck supple.      Comments: Bilateral knees OA changes    W/C    Skin:     General: Skin is warm and dry.      Capillary Refill: Capillary refill takes less than 2 seconds.   Neurological:      Mental Status: She is alert and oriented to person, place, and time.   Psychiatric:         Behavior: Behavior normal.         Thought Content: Thought content normal.         Judgment: Judgment normal.         Assessment:       1. Primary hypertension    2. Debility    3. Chronic kidney disease, stage 4 (severe)    4. Hyperlipidemia associated with type 2 diabetes mellitus    5. Primary hyperparathyroidism    6. Pulmonary hypertension due to COPD    7. Chronic saddle pulmonary embolism without acute cor pulmonale        Plan:   Tonya was seen today for follow-up.    Diagnoses and all orders for this visit:    Primary hypertension    Well controlled.  Continue same medication and dose.  1. Keep weight close to ideal body weight.   2.   Avoid high salt foods (olives, pickles, smoked meats, salted potato chips, etc.).   Do not add salt to your food at the table.   Use only small amounts of salt when cooking.   3. Begin an exercise program. Discuss with your doctor what type of exercise program would be best for you. It doesn't have to be difficult. Even brisk walking for 20 minutes three times a week is a good form of exercise.   4. Avoid medicines which contain heart stimulants. This includes many cold and sinus decongestant pills and sprays as well as diet pills. Check the warnings about hypertension on the label. Stimulants such as amphetamine or cocaine could be lethal for someone with hypertension. Never take  these.    Debility  Continue home health and home PT       See cardiology . For bradycardia I decreased her sotalol .   But worry is bradycardia ; daughter will make appointment             Chronic kidney disease, stage 4 (severe)  Stop HCTZ     Hyperlipidemia associated with type 2 diabetes mellitus  -     Lipid Panel; Future  -     TSH; Future  -     Hemoglobin A1C; Future  Continue statin   Lab Results   Component Value Date    LDLCALC 69.6 02/09/2022       Primary hyperparathyroidism  chcek labs    Pulmonary hypertension due to COPD  No need for oxygen     Chronic saddle pulmonary embolism without acute cor pulmonale  Continue xarelto         Problem List Items Addressed This Visit    None

## 2022-04-12 ENCOUNTER — EXTERNAL HOME HEALTH (OUTPATIENT)
Dept: HOME HEALTH SERVICES | Facility: HOSPITAL | Age: 87
End: 2022-04-12
Payer: MEDICARE

## 2022-04-12 ENCOUNTER — TELEPHONE (OUTPATIENT)
Dept: INTERNAL MEDICINE | Facility: CLINIC | Age: 87
End: 2022-04-12
Payer: MEDICARE

## 2022-04-12 NOTE — TELEPHONE ENCOUNTER
If her swelling is getting worse then she may need to resume hCTZ ; start every other day not daily

## 2022-04-12 NOTE — TELEPHONE ENCOUNTER
Spoke with home health. She stated that patient has been having a lot of swelling in her legs. They contacted Dr. Ibarra since he is the pre scriber of the medication. He gave the OK to resume HCTZ on 4/5/2022. Then when patient came in to her appointment with you on the 6th you discontinued the HCTZ. They are asking if patient is supposed to be on this medication or not. They are unsure of what was discussed at the visit yesterday as far as the patient's swelling goes. Please advise.

## 2022-04-12 NOTE — TELEPHONE ENCOUNTER
----- Message from Rosanne Dawn sent at 2022  2:43 PM CDT -----  Regarding: Request to speak to a nurse  Contact: Dee with [facility]  Tonya Bucio  MRN: [id number]  : 1935  PCP: Tasha Atkins  Home Phone      [phone]  Work Phone      Not on file.  Mobile          [phone]      MESSAGE:   Request to speak to a nurse regarding verification of medication    22 patient restarted the medication because she had increased fluid to legs.  The patient also had a blood pressure of 122/70 on the home health visit.      22 Dr. Atkins instructed to discontinue medication, and see cardiologist    Dee ([facility]) spoke to Dr. Ibarra's office.  She was notified the patient has not scheduled an appointment with cardiologist     Phone # [phone]    Pharmacy - [facility] [address] [place] [address]

## 2022-04-19 DIAGNOSIS — M17.0 PRIMARY OSTEOARTHRITIS OF BOTH KNEES: ICD-10-CM

## 2022-04-19 NOTE — TELEPHONE ENCOUNTER
----- Message from Keo Antonio sent at 2022  4:15 PM CDT -----  Contact: self  Tonya Bucio  MRN: [id number]  : 1935  PCP: Tasha Atkins  Home Phone      [phone]  Work Phone      Not on file.  Mobile          [phone]      MESSAGE:     Patient is needing refill on hydrocodone sent to [facility]

## 2022-04-19 NOTE — TELEPHONE ENCOUNTER
No new care gaps identified.  Powered by [employer]. Reference number: [id number].   4/19/2022 4:19:05 PM CDT

## 2022-04-20 RX ORDER — HYDROCODONE BITARTRATE AND ACETAMINOPHEN 7.5; 325 MG/1; MG/1
1 TABLET ORAL
Qty: 30 TABLET | Refills: 0 | Status: SHIPPED | OUTPATIENT
Start: 2022-04-20 | End: 2022-05-20 | Stop reason: SDUPTHER

## 2022-04-25 ENCOUNTER — DOCUMENT SCAN (OUTPATIENT)
Dept: HOME HEALTH SERVICES | Facility: HOSPITAL | Age: 87
End: 2022-04-25
Payer: MEDICARE

## 2022-04-28 ENCOUNTER — PES CALL (OUTPATIENT)
Dept: ADMINISTRATIVE | Facility: CLINIC | Age: 87
End: 2022-04-28
Payer: MEDICARE

## 2022-04-28 ENCOUNTER — DOCUMENT SCAN (OUTPATIENT)
Dept: HOME HEALTH SERVICES | Facility: HOSPITAL | Age: 87
End: 2022-04-28
Payer: MEDICARE

## 2022-05-03 DIAGNOSIS — M1A.39X0 CHRONIC GOUT DUE TO RENAL IMPAIRMENT OF MULTIPLE SITES WITHOUT TOPHUS: ICD-10-CM

## 2022-05-03 NOTE — TELEPHONE ENCOUNTER
No new care gaps identified.  [facility] Embedded Care Gaps. Reference number: [id number]. 5/03/2022   12:57:31 PM CDT

## 2022-05-04 RX ORDER — ALLOPURINOL 300 MG/1
TABLET ORAL
Qty: 90 TABLET | Refills: 0 | Status: SHIPPED | OUTPATIENT
Start: 2022-05-04 | End: 2022-08-02

## 2022-05-04 NOTE — TELEPHONE ENCOUNTER
Refill Routing Note   Medication(s) are not appropriate for processing by [facility] for the following reason(s):      - Patient has not been seen in over 15 months by PCP    [facility] action(s):  Defer Medication-related problems identified: Requires labs        Medication reconciliation completed: No     Appointments  past 12m or future 3m with PCP    Date Provider   Last Visit   4/6/2022 Tasha Atkins MD   Next Visit   7/6/2022 Tasha Atkins MD   ED visits in past 90 days: 0        Note composed:7:56 AM 05/04/2022

## 2022-05-06 ENCOUNTER — TELEPHONE (OUTPATIENT)
Dept: INTERNAL MEDICINE | Facility: CLINIC | Age: 87
End: 2022-05-06
Payer: MEDICARE

## 2022-05-06 NOTE — TELEPHONE ENCOUNTER
----- Message from Rosanne Dawn sent at 2022  1:09 PM CDT -----  Regarding: Request to speak to a nurse  Contact: Dhruv with [facility]  Tonya Bucio  MRN: [id number]  : 1935  PCP: Tasha Atkins  Home Phone      [phone]  Work Phone      Not on file.  Mobile          [phone]      MESSAGE:   Request to speak to a nurse regarding home health visit on 22.  Dhruv with [facility] is requesting PCP orders to extend physical therapy 1 time a week for 1 month.       Phone # [phone]    Pharmacy - [facility] [address] [place] [address]

## 2022-05-12 DIAGNOSIS — E78.5 HYPERLIPIDEMIA ASSOCIATED WITH TYPE 2 DIABETES MELLITUS: ICD-10-CM

## 2022-05-12 DIAGNOSIS — E11.69 HYPERLIPIDEMIA ASSOCIATED WITH TYPE 2 DIABETES MELLITUS: ICD-10-CM

## 2022-05-12 RX ORDER — ROSUVASTATIN CALCIUM 20 MG/1
TABLET, COATED ORAL
Qty: 90 TABLET | Refills: 2 | Status: SHIPPED | OUTPATIENT
Start: 2022-05-12 | End: 2023-02-20 | Stop reason: SDUPTHER

## 2022-05-12 NOTE — TELEPHONE ENCOUNTER
Refill Authorization Note   Tonya Bucio  is requesting a refill authorization.  Brief Assessment and Rationale for Refill:  Approve     Medication Therapy Plan:       Medication Reconciliation Completed: No   Comments:     No Care Gaps recommended.     Note composed:4:29 PM 05/12/2022

## 2022-05-12 NOTE — TELEPHONE ENCOUNTER
No new care gaps identified.  [facility] Embedded Care Gaps. Reference number: [id number]. 5/12/2022   2:51:18 PM CDT

## 2022-05-20 DIAGNOSIS — M17.0 PRIMARY OSTEOARTHRITIS OF BOTH KNEES: ICD-10-CM

## 2022-05-20 NOTE — TELEPHONE ENCOUNTER
Care Due:                  Date            Visit Type   Department     Provider  --------------------------------------------------------------------------------                                EP -                              PRIMARY      [facility] INTER[facility]  Last Visit: 04-      CARE ([facility])   MEDICINE II    Tasha Atkins                              EP -                              PRIMARY      [facility] INTERNAL  Next Visit: 07-      CARE ([facility])   MEDICINE II    Tasha Atkins                                                            Last  Test          Frequency    Reason                     Performed    Due Date  --------------------------------------------------------------------------------    Uric Acid...  12 months..  allopurinoL..............  Not Found    Overdue    [employer] Embedded Care Gaps. Reference number: [id number]. 5/20/2022   1:12:26 PM CDT

## 2022-05-23 RX ORDER — HYDROCODONE BITARTRATE AND ACETAMINOPHEN 7.5; 325 MG/1; MG/1
1 TABLET ORAL
Qty: 30 TABLET | Refills: 0 | Status: SHIPPED | OUTPATIENT
Start: 2022-05-23 | End: 2022-06-22 | Stop reason: SDUPTHER

## 2022-05-25 ENCOUNTER — TELEPHONE (OUTPATIENT)
Dept: INTERNAL MEDICINE | Facility: CLINIC | Age: 87
End: 2022-05-25
Payer: MEDICARE

## 2022-05-25 DIAGNOSIS — B35.4 TINEA CORPORIS: Primary | ICD-10-CM

## 2022-05-25 RX ORDER — CEPHALEXIN 500 MG/1
500 CAPSULE ORAL 3 TIMES DAILY
Qty: 30 CAPSULE | Refills: 0 | Status: SHIPPED | OUTPATIENT
Start: 2022-05-25 | End: 2022-06-04

## 2022-05-25 RX ORDER — NYSTATIN 100000 [USP'U]/G
POWDER TOPICAL 2 TIMES DAILY
Qty: 60 G | Refills: 1 | Status: SHIPPED | OUTPATIENT
Start: 2022-05-25 | End: 2023-10-04 | Stop reason: SDUPTHER

## 2022-05-25 RX ORDER — FLUCONAZOLE 150 MG/1
150 TABLET ORAL ONCE
Qty: 2 TABLET | Refills: 0 | Status: SHIPPED | OUTPATIENT
Start: 2022-05-25 | End: 2022-05-25

## 2022-05-25 NOTE — TELEPHONE ENCOUNTER
Antibiotics for boils; keflex  Diflucan and nystain ordered.  Diflucan has interaction with sotalol ; hold sotalol for two days when on diflucan

## 2022-05-25 NOTE — TELEPHONE ENCOUNTER
"Nurse Vik called from patient's home to notify you of:   1) 2 small dime-sized "boils" to inner left buttock. There is a visible head on them, but no drainage noted. She will instruct the patient to apply warm compresses. Please advise of any other recommendations or meds ordered.  2) Yeast rash beneath breasts. Requesting Nystatin powder and diflucan. [facility]    "

## 2022-06-07 ENCOUNTER — DOCUMENT SCAN (OUTPATIENT)
Dept: HOME HEALTH SERVICES | Facility: HOSPITAL | Age: 87
End: 2022-06-07
Payer: MEDICARE

## 2022-06-22 DIAGNOSIS — M17.0 PRIMARY OSTEOARTHRITIS OF BOTH KNEES: ICD-10-CM

## 2022-06-22 RX ORDER — HYDROCODONE BITARTRATE AND ACETAMINOPHEN 7.5; 325 MG/1; MG/1
1 TABLET ORAL
Qty: 30 TABLET | Refills: 0 | Status: SHIPPED | OUTPATIENT
Start: 2022-06-22 | End: 2022-07-21 | Stop reason: SDUPTHER

## 2022-06-22 NOTE — TELEPHONE ENCOUNTER
No new care gaps identified.  [facility] Embedded Care Gaps. Reference number: [id number]. 6/22/2022   9:47:04 AM STORMYT

## 2022-06-22 NOTE — TELEPHONE ENCOUNTER
----- Message from Rosanne Dawn sent at 2022  9:42 AM CDT -----  Regarding: Rx Refill  Contact: self  Tonya Bucio  MRN: [id number]  : 1935  PCP: Tasha Atkins  Home Phone      [phone]  Work Phone      Not on file.  Mobile          [phone]      MESSAGE:   Rx Refill -  HYDROcodone-acetaminophen (NORCO) 7.5-325 mg per tablet  Patient is out of medication    Phone # [phone]    Pharmacy - [facility] [address] [place] [address]

## 2022-06-23 ENCOUNTER — LAB VISIT (OUTPATIENT)
Dept: LAB | Facility: HOSPITAL | Age: 87
End: 2022-06-23
Attending: INTERNAL MEDICINE
Payer: MEDICARE

## 2022-06-23 DIAGNOSIS — E78.5 HYPERLIPIDEMIA ASSOCIATED WITH TYPE 2 DIABETES MELLITUS: ICD-10-CM

## 2022-06-23 DIAGNOSIS — E11.69 HYPERLIPIDEMIA ASSOCIATED WITH TYPE 2 DIABETES MELLITUS: ICD-10-CM

## 2022-06-23 DIAGNOSIS — R94.31 ABNORMAL EKG: ICD-10-CM

## 2022-06-23 DIAGNOSIS — I10 PRIMARY HYPERTENSION: ICD-10-CM

## 2022-06-23 DIAGNOSIS — I10 HTN (HYPERTENSION): Primary | ICD-10-CM

## 2022-06-23 LAB
ALBUMIN SERPL BCP-MCNC: 3 G/DL (ref 3.5–5.2)
ALP SERPL-CCNC: 109 U/L (ref 55–135)
ALT SERPL W/O P-5'-P-CCNC: 19 U/L (ref 10–44)
ANION GAP SERPL CALC-SCNC: 13 MMOL/L (ref 8–16)
AST SERPL-CCNC: 13 U/L (ref 10–40)
BACTERIA #/AREA URNS HPF: ABNORMAL /HPF
BASOPHILS # BLD AUTO: 0.03 K/UL (ref 0–0.2)
BASOPHILS NFR BLD: 0.3 % (ref 0–1.9)
BILIRUB SERPL-MCNC: 0.7 MG/DL (ref 0.1–1)
BILIRUB UR QL STRIP: NEGATIVE
BUN SERPL-MCNC: 28 MG/DL (ref 8–23)
CALCIUM SERPL-MCNC: 9.8 MG/DL (ref 8.7–10.5)
CHLORIDE SERPL-SCNC: 102 MMOL/L (ref 95–110)
CHOLEST SERPL-MCNC: 155 MG/DL (ref 120–199)
CHOLEST/HDLC SERPL: 4.2 {RATIO} (ref 2–5)
CLARITY UR: ABNORMAL
CO2 SERPL-SCNC: 25 MMOL/L (ref 23–29)
COLOR UR: YELLOW
CREAT SERPL-MCNC: 1.3 MG/DL (ref 0.5–1.4)
DIFFERENTIAL METHOD: ABNORMAL
EOSINOPHIL # BLD AUTO: 0.7 K/UL (ref 0–0.5)
EOSINOPHIL NFR BLD: 7.5 % (ref 0–8)
ERYTHROCYTE [DISTWIDTH] IN BLOOD BY AUTOMATED COUNT: 14.6 % (ref 11.5–14.5)
EST. GFR  (AFRICAN AMERICAN): 43 ML/MIN/1.73 M^2
EST. GFR  (NON AFRICAN AMERICAN): 37 ML/MIN/1.73 M^2
ESTIMATED AVG GLUCOSE: 123 MG/DL (ref 68–131)
GLUCOSE SERPL-MCNC: 109 MG/DL (ref 70–110)
GLUCOSE UR QL STRIP: NEGATIVE
HBA1C MFR BLD: 5.9 % (ref 4–5.6)
HCT VFR BLD AUTO: 33.7 % (ref 37–48.5)
HDLC SERPL-MCNC: 37 MG/DL (ref 40–75)
HDLC SERPL: 23.9 % (ref 20–50)
HGB BLD-MCNC: 10.6 G/DL (ref 12–16)
HGB UR QL STRIP: ABNORMAL
IMM GRANULOCYTES # BLD AUTO: 0.06 K/UL (ref 0–0.04)
IMM GRANULOCYTES NFR BLD AUTO: 0.6 % (ref 0–0.5)
KETONES UR QL STRIP: NEGATIVE
LDLC SERPL CALC-MCNC: 73.6 MG/DL (ref 63–159)
LEUKOCYTE ESTERASE UR QL STRIP: ABNORMAL
LYMPHOCYTES # BLD AUTO: 2.7 K/UL (ref 1–4.8)
LYMPHOCYTES NFR BLD: 27.2 % (ref 18–48)
MAGNESIUM SERPL-MCNC: 1.6 MG/DL (ref 1.6–2.6)
MCH RBC QN AUTO: 29.4 PG (ref 27–31)
MCHC RBC AUTO-ENTMCNC: 31.5 G/DL (ref 32–36)
MCV RBC AUTO: 94 FL (ref 82–98)
MICROSCOPIC COMMENT: ABNORMAL
MONOCYTES # BLD AUTO: 0.7 K/UL (ref 0.3–1)
MONOCYTES NFR BLD: 7.1 % (ref 4–15)
NEUTROPHILS # BLD AUTO: 5.6 K/UL (ref 1.8–7.7)
NEUTROPHILS NFR BLD: 57.3 % (ref 38–73)
NITRITE UR QL STRIP: NEGATIVE
NONHDLC SERPL-MCNC: 118 MG/DL
NRBC BLD-RTO: 0 /100 WBC
PH UR STRIP: 7 [PH] (ref 5–8)
PLATELET # BLD AUTO: 290 K/UL (ref 150–450)
PMV BLD AUTO: 10.4 FL (ref 9.2–12.9)
POTASSIUM SERPL-SCNC: 5 MMOL/L (ref 3.5–5.1)
PROT SERPL-MCNC: 7.6 G/DL (ref 6–8.4)
PROT UR QL STRIP: NEGATIVE
RBC # BLD AUTO: 3.6 M/UL (ref 4–5.4)
RBC #/AREA URNS HPF: 2 /HPF (ref 0–4)
SODIUM SERPL-SCNC: 140 MMOL/L (ref 136–145)
SP GR UR STRIP: 1.01 (ref 1–1.03)
T3 SERPL-MCNC: 71 NG/DL (ref 60–180)
T4 FREE SERPL-MCNC: 1.02 NG/DL (ref 0.71–1.51)
TRIGL SERPL-MCNC: 222 MG/DL (ref 30–150)
TSH SERPL DL<=0.005 MIU/L-ACNC: 1.67 UIU/ML (ref 0.4–4)
TSH SERPL DL<=0.005 MIU/L-ACNC: 1.67 UIU/ML (ref 0.4–4)
URN SPEC COLLECT METH UR: ABNORMAL
UROBILINOGEN UR STRIP-ACNC: NEGATIVE EU/DL
WBC # BLD AUTO: 9.76 K/UL (ref 3.9–12.7)
WBC #/AREA URNS HPF: 11 /HPF (ref 0–5)

## 2022-06-23 PROCEDURE — 83036 HEMOGLOBIN GLYCOSYLATED A1C: CPT | Performed by: INTERNAL MEDICINE

## 2022-06-23 PROCEDURE — 85025 COMPLETE CBC W/AUTO DIFF WBC: CPT | Performed by: INTERNAL MEDICINE

## 2022-06-23 PROCEDURE — 80061 LIPID PANEL: CPT | Performed by: INTERNAL MEDICINE

## 2022-06-23 PROCEDURE — 83735 ASSAY OF MAGNESIUM: CPT | Performed by: INTERNAL MEDICINE

## 2022-06-23 PROCEDURE — 84480 ASSAY TRIIODOTHYRONINE (T3): CPT | Performed by: INTERNAL MEDICINE

## 2022-06-23 PROCEDURE — 80053 COMPREHEN METABOLIC PANEL: CPT | Performed by: INTERNAL MEDICINE

## 2022-06-23 PROCEDURE — 84443 ASSAY THYROID STIM HORMONE: CPT | Performed by: INTERNAL MEDICINE

## 2022-06-23 PROCEDURE — 84439 ASSAY OF FREE THYROXINE: CPT | Performed by: INTERNAL MEDICINE

## 2022-06-23 PROCEDURE — 36415 COLL VENOUS BLD VENIPUNCTURE: CPT | Performed by: INTERNAL MEDICINE

## 2022-06-23 PROCEDURE — 81000 URINALYSIS NONAUTO W/SCOPE: CPT | Performed by: INTERNAL MEDICINE

## 2022-06-23 NOTE — ED NOTES
Bright red blood when wiping pt. Pt reports she has blisters on her butt that cause bleeding. Pt denies blood in stool.   Neurology Consult Note  Reason for Consult: CVA    Chief complaint: unable to obtain from the patient at this time    Dr Erin Halsted, MD asked me to see Hussain Hogan in consultation for evaluation of CVA    History of Present Illness:  Hussain Hogan is a 81 y.o. male who presents with AMS. I obtained my information via interview w/ the patient's grandson at the bedside, supplemented by chart review. The patient was just discharged from [facility] after being treated for acute L hemispheric stroke. He reportedly had minimal R sided motor deficit. He was discharged on asa/statin. Significant carotid and cerebrovascular disease was identified though opted to manage conservatively for the time being. The patient's grandson says that the patient had went to lay down yesterday afternoon and seemed to be fine. He says that he went to check on him about a half hour later and essentially found him unresponsive having vomited on himself. He says he seemed to be breathing hard. No bleeding from his mouth. No incontinence that he is aware of. No convulsive behavior. EMS was called and he was transported to the ED in order to be evaluated. Initial BP was 128/47 though had significant hypotension documented here (70s/50s). He was intubated, sedated (fentanyl, propofol, versed), and started on pressor support. It appears he was administered a dose of Keppra. CT head was w/out any evidence of acute hemorrhage. No CTA head/neck imaging was pursued. Treatment was initiated for aspiration PNA and sepsis. Versed was stopped last night and fentanyl/propofol in the early hours this morning. Currently he is essentially unresponsive      He does have a hx of hepatocellular carcinoma and appears to follow w/ [facility]. Medical History:  Past Medical History:   Diagnosis Date    Asthma     Diabetes mellitus ([facility] [address]     Hypertension     Thyroid disease      No past surgical history on file. Scheduled Meds:   magnesium sulfate  2,000 mg IntraVENous Once    chlorhexidine  15 mL Mouth/Throat BID    pantoprazole (PROTONIX) 40 mg injection  40 mg IntraVENous Daily    insulin lispro  0-18 Units SubCUTAneous Q4H    sodium chloride flush  10 mL IntraVENous 2 times per day    enoxaparin  40 mg SubCUTAneous Nightly    metroNIDAZOLE  500 mg IntraVENous Q8H    cefepime  2,000 mg IntraVENous Q12H    vancomycin (VANCOCIN) intermittent dosing (placeholder)   Other RX Placeholder     Continuous Infusions:   midazolam Stopped (06/23/22 0032)    norepinephrine 4 mcg/min (06/23/22 0617)     Medications Prior to Admission:   rosuvastatin (CRESTOR) 40 MG tablet, Take 1 tablet by mouth nightly  amLODIPine (NORVASC) 5 MG tablet, Take 1 tablet by mouth daily  aspirin 81 MG EC tablet, Take 81 mg by mouth daily  hydrOXYzine HCl (ATARAX) 25 MG tablet, Take 25 mg by mouth 3 times daily  ondansetron (ZOFRAN ODT) 4 MG disintegrating tablet, Take 1-2 tablets by mouth every 12 hours as needed for Nausea (Patient taking differently: Take 4-8 mg by mouth every 8 hours as needed for Nausea )  Lenvatinib Mesylate (LENVATINIB, 8 MG DAILY DOSE, PO), Take by mouth (Patient not taking: Reported on 6/17/2022)  budesonide-formoterol (SYMBICORT) 160-4.5 MCG/ACT AERO, Inhale 2 puffs into the lungs 2 times daily (Patient not taking: Reported on 6/17/2022)  albuterol sulfate HFA (VENTOLIN HFA) 108 (90 Base) MCG/ACT inhaler, Inhale 2 puffs into the lungs 4 times daily as needed for Wheezing  lisinopril (PRINIVIL;ZESTRIL) 20 MG tablet, Take 20 mg by mouth daily  ibuprofen (ADVIL;MOTRIN) 800 MG tablet, Take 800 mg by mouth every 6 hours as needed for Pain  hydroCHLOROthiazide (HYDRODIURIL) 25 MG tablet, Take 25 mg by mouth daily  metFORMIN (GLUCOPHAGE) 500 MG tablet, Take 500 mg by mouth daily (with breakfast)   levothyroxine (SYNTHROID) 100 MCG tablet, Take 100 mcg by mouth Daily  omeprazole (PRILOSEC) 40 MG delayed release capsule, Take 40 mg by mouth daily   traMADol (ULTRAM) 50 MG tablet, Take 50 mg by mouth every 6 hours as needed for Pain.  prochlorperazine (COMPAZINE) 10 MG tablet, Take 1 tablet by mouth every 6 hours as needed (pain, nausea)    No Known Allergies    No family history on file. Social History     Tobacco Use   Smoking Status Former Smoker    Packs/day: 1.00    Years: 15.00    Pack years: 15.00    Types: Cigarettes    Quit date: 1980    Years since quittin.5   Smokeless Tobacco Never Used   Tobacco Comment    smoked 3 cigaretts a day     Social History     Substance and Sexual Activity   Drug Use Never     Social History     Substance and Sexual Activity   Alcohol Use Never     ROS - afebrile. Chart reviewed. Exam  Blood pressure 112/61, pulse 65, temperature 97 °F (36.1 °C), temperature source Axillary, resp. rate 18, weight 124 lb 5.4 oz (56.4 kg), SpO2 100 %. Constitutional    Vital signs: BP, HR, and RR reviewed   General depressed mental status, no distress, intubated. Eyes: unable to visualize the fundi. Cardiovascular: no peripheral edema. Psychiatric: limited exam given encephalopathy   Neurologic  Mental status:   orientation jass due to mental status. General fund of knowledge jass due to mental status. Memory jass due to mental status. Attention poor  Language jass due to mental status. Comprehension not following any commands  CN2: really not able to obtain a corneal reflex. CN 3,4,6: does not appear to have any forced gaze deviation or preference. Very difficult to assess pupils adequately. His lids are difficult to open. CN5: facial sensation jass due to mental status. CN7: face symmetric but exam limited by ET tube  CN8: hearing limited due to encephalopathy. CN9: gag not personally tested at this time    CN11: limited exam given encephalopathy  CN12: limited exam given encephalopathy  Strength: jass due to mental status.     Sensory: the patient is not responding to any sort of noxious pain stimulus. Cerebellar/coordination:limited exam given encephalopathy  Tone: no increased tone or rigidity. Gait: limited exam given encephalopathy and intubated with ventilator. Labs  Glucose 176  Na 134  K 4.6  Mg 1.80  BUN 30  Cr 1.8  CO2 - 20    ALT 67      Lactic acid 2.5    WBC 17.1K  Hg 13.5  Platelets 330    UA no infection  Blood cultures pending    Studies  CT head w/o 6/22/22, independently reviewed  New low-attenuation in the left corona radiata consistent with an evolving   infarct as described on the recent MRI   No acute hemorrhage     CT chest 6/22/22  No evidence of a pulmonary embolus. 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 Questionable mucous plugging scattered in the bronchi along the right lung   base which may be due to recent aspiration.  Recommend pulmonology follow-up. 7 cm exophytic right hepatic mass which has increased in size.  Suggest   PET-CT correlation. Postop changes right apex and upper lobe with fibrosis and scarring   throughout the area which is unchanged. MRI brain w/o 6/18/22      CTA head/neck 6/17/22  1. Severe stenosis in a proximal M2 branch of the left MCA   2. 75% stenosis in the cavernous/supraclinoid segment of the left internal carotid   3. Right dominance of the vertebral arteries with hypoplastic intracranial   left vertebral artery.  Superimposed occlusion in the mid to distal   intracranial left vertebral artery. 4. 80% stenosis in the proximal V4 segment of right vertebral artery.    5. 80% stenosis in the proximal P2 segment of the left PCA.  50% stenosis in   the proximal P2 segment of the right PCA. 6. 95% stenosis in the proximal right internal carotid artery. 7. 85% stenosis in the proximal left internal carotid artery. 8. 40% stenosis in the proximal left common carotid artery. 9. Right dominance of the vertebral arteries with severe stenosis in the   proximal V1 segment of the left vertebral artery. 10. Chronic 3.0 x 5.9 cm wide-necked saccular aneurysm at the anterior aspect   of the aortic arch, stable since July 2, 2021. 11. 65% stenosis in the mid left subclavian artery. 12. 40% stenosis in the distal right subclavian artery. 13. Airspace opacity in the right upper lobe with volume loss, likely related   to chronic scarring, grossly stable since July 2, 2021.  Mild pericardial   effusion. Impression  1. The patient was found altered yesterday afternoon having vomited. No seizure like behavior was observed. He was intubated and sedated for a period of time last evening. Currently he remains essentially unresponsive. It is unclear if there was an acute neurologic event that preceded this. His risk for stroke and cerebral hypoperfusion his high. 2.  Severe carotid/cerebrovascular disease. 3.  Aspiration pneumonia. 4.  Hepatocellular carcinoma. 5.  Recent L hemispheric stroke. Recommendations  1. MRI brain w/o. MRA head/neck w/o.   2.  EEG. 3.  He needs his antiplatelet therapy resumed. RN was notified. 4.  Please try to avoid any significant hypotension given his risk for hypoperfusion.       Enrrique Spence NP  [address] [place] [address] [address] Neurology    A copy of this note was provided for Dr Marti Ballard MD

## 2022-07-06 ENCOUNTER — OFFICE VISIT (OUTPATIENT)
Dept: INTERNAL MEDICINE | Facility: CLINIC | Age: 87
End: 2022-07-06
Payer: MEDICARE

## 2022-07-06 VITALS
HEART RATE: 66 BPM | DIASTOLIC BLOOD PRESSURE: 60 MMHG | BODY MASS INDEX: 37.29 KG/M2 | SYSTOLIC BLOOD PRESSURE: 120 MMHG | WEIGHT: 246.06 LBS | RESPIRATION RATE: 18 BRPM | OXYGEN SATURATION: 97 % | HEIGHT: 68 IN

## 2022-07-06 DIAGNOSIS — N39.0 RECURRENT UTI: ICD-10-CM

## 2022-07-06 DIAGNOSIS — E78.5 HYPERLIPIDEMIA ASSOCIATED WITH TYPE 2 DIABETES MELLITUS: Primary | ICD-10-CM

## 2022-07-06 DIAGNOSIS — E03.9 ACQUIRED HYPOTHYROIDISM: ICD-10-CM

## 2022-07-06 DIAGNOSIS — E11.69 HYPERLIPIDEMIA ASSOCIATED WITH TYPE 2 DIABETES MELLITUS: Primary | ICD-10-CM

## 2022-07-06 DIAGNOSIS — N18.32 STAGE 3B CHRONIC KIDNEY DISEASE: ICD-10-CM

## 2022-07-06 PROBLEM — J40 BRONCHITIS: Status: RESOLVED | Noted: 2020-03-13 | Resolved: 2022-07-06

## 2022-07-06 PROBLEM — N18.4 CHRONIC KIDNEY DISEASE, STAGE 4 (SEVERE): Status: RESOLVED | Noted: 2022-04-06 | Resolved: 2022-07-06

## 2022-07-06 PROCEDURE — 99999 PR PBB SHADOW E&M-EST. PATIENT-LVL IV: ICD-10-PCS | Mod: PBBFAC,,, | Performed by: INTERNAL MEDICINE

## 2022-07-06 PROCEDURE — 3288F FALL RISK ASSESSMENT DOCD: CPT | Mod: CPTII,S$GLB,, | Performed by: INTERNAL MEDICINE

## 2022-07-06 PROCEDURE — 1159F MED LIST DOCD IN RCRD: CPT | Mod: CPTII,S$GLB,, | Performed by: INTERNAL MEDICINE

## 2022-07-06 PROCEDURE — 99999 PR PBB SHADOW E&M-EST. PATIENT-LVL IV: CPT | Mod: PBBFAC,,, | Performed by: INTERNAL MEDICINE

## 2022-07-06 PROCEDURE — 1101F PR PT FALLS ASSESS DOC 0-1 FALLS W/OUT INJ PAST YR: ICD-10-PCS | Mod: CPTII,S$GLB,, | Performed by: INTERNAL MEDICINE

## 2022-07-06 PROCEDURE — 3288F PR FALLS RISK ASSESSMENT DOCUMENTED: ICD-10-PCS | Mod: CPTII,S$GLB,, | Performed by: INTERNAL MEDICINE

## 2022-07-06 PROCEDURE — 1159F PR MEDICATION LIST DOCUMENTED IN MEDICAL RECORD: ICD-10-PCS | Mod: CPTII,S$GLB,, | Performed by: INTERNAL MEDICINE

## 2022-07-06 PROCEDURE — 99214 PR OFFICE/OUTPT VISIT, EST, LEVL IV, 30-39 MIN: ICD-10-PCS | Mod: S$GLB,,, | Performed by: INTERNAL MEDICINE

## 2022-07-06 PROCEDURE — 1160F PR REVIEW ALL MEDS BY PRESCRIBER/CLIN PHARMACIST DOCUMENTED: ICD-10-PCS | Mod: CPTII,S$GLB,, | Performed by: INTERNAL MEDICINE

## 2022-07-06 PROCEDURE — 1126F AMNT PAIN NOTED NONE PRSNT: CPT | Mod: CPTII,S$GLB,, | Performed by: INTERNAL MEDICINE

## 2022-07-06 PROCEDURE — 1101F PT FALLS ASSESS-DOCD LE1/YR: CPT | Mod: CPTII,S$GLB,, | Performed by: INTERNAL MEDICINE

## 2022-07-06 PROCEDURE — 1160F RVW MEDS BY RX/DR IN RCRD: CPT | Mod: CPTII,S$GLB,, | Performed by: INTERNAL MEDICINE

## 2022-07-06 PROCEDURE — 99214 OFFICE O/P EST MOD 30 MIN: CPT | Mod: S$GLB,,, | Performed by: INTERNAL MEDICINE

## 2022-07-06 PROCEDURE — 1126F PR PAIN SEVERITY QUANTIFIED, NO PAIN PRESENT: ICD-10-PCS | Mod: CPTII,S$GLB,, | Performed by: INTERNAL MEDICINE

## 2022-07-06 RX ORDER — OMEGA-3-ACID ETHYL ESTERS 1 G/1
1 CAPSULE, LIQUID FILLED ORAL 2 TIMES DAILY
Qty: 180 CAPSULE | Refills: 1 | Status: SHIPPED | OUTPATIENT
Start: 2022-07-06 | End: 2023-04-05 | Stop reason: SDUPTHER

## 2022-07-06 NOTE — PROGRESS NOTES
Subjective:       Patient ID: Tonya Bucio is a 87 y.o. female.    Chief Complaint: Follow-up    Tonya Bucio is a 87 y.o. female  Here with follow up.  She had covid and since then she went downhill; losartan was stopped ; HCTZ was stopped     BP is better   Home health brought her back ; PT helped.      Follow-up  Associated symptoms include arthralgias, fatigue, myalgias and neck pain. Pertinent negatives include no chest pain, chills, congestion, coughing, fever, nausea, numbness, sore throat, vomiting or weakness.     Review of Systems   Constitutional: Positive for fatigue. Negative for chills and fever.   HENT: Negative for congestion, hearing loss, sinus pressure and sore throat.    Eyes: Negative for photophobia.   Respiratory: Negative for cough, choking, chest tightness, shortness of breath and wheezing.    Cardiovascular: Negative for chest pain and palpitations.   Gastrointestinal: Negative for blood in stool, nausea and vomiting.   Endocrine: Negative for polydipsia and polyphagia.   Genitourinary: Negative for dysuria and hematuria.   Musculoskeletal: Positive for arthralgias, myalgias and neck pain.   Skin: Negative for pallor.   Neurological: Negative for dizziness, weakness and numbness.   Hematological: Does not bruise/bleed easily.   Psychiatric/Behavioral: Negative for confusion and suicidal ideas. The patient is not nervous/anxious.        Objective:      Physical Exam  Vitals and nursing note reviewed.   Constitutional:       Appearance: She is well-developed. She is obese.   HENT:      Head: Normocephalic and atraumatic.      Right Ear: Decreased hearing noted.      Left Ear: Decreased hearing noted. There is no impacted cerumen.      Nose: Nose normal.   Eyes:      Pupils: Pupils are equal, round, and reactive to light.   Cardiovascular:      Rate and Rhythm: Normal rate and regular rhythm.      Heart sounds: No murmur heard.  Pulmonary:      Effort: Pulmonary effort is normal.       Breath sounds: Normal breath sounds.   Abdominal:      General: Bowel sounds are normal.      Palpations: Abdomen is soft.   Musculoskeletal:         General: Normal range of motion.      Cervical back: Normal range of motion and neck supple.      Comments: Bilateral knees OA changes    W/C    Skin:     General: Skin is warm and dry.      Capillary Refill: Capillary refill takes less than 2 seconds.   Neurological:      Mental Status: She is alert and oriented to person, place, and time.   Psychiatric:         Behavior: Behavior normal.         Thought Content: Thought content normal.         Judgment: Judgment normal.         Assessment:       1. Acquired hypothyroidism    2. Hyperlipidemia associated with type 2 diabetes mellitus    3. Stage 3b chronic kidney disease    4. Recurrent UTI        Plan:   Tonya was seen today for follow-up.    Diagnoses and all orders for this visit:    Hyperlipidemia associated with type 2 diabetes mellitus  -     omega-3 acid ethyl esters (LOVAZA) 1 gram capsule; Take 1 capsule (1 g total) by mouth 2 (two) times daily.  -     CBC Auto Differential; Future  -     Comprehensive Metabolic Panel; Future  -     Hemoglobin A1C; Future  -     Microalbumin/Creatinine Ratio, Urine; Future  The current medical regimen is effective;  continue present plan and medications.  Acquired hypothyroidism  -     CBC Auto Differential; Future  -     Comprehensive Metabolic Panel; Future  -     Lipid Panel; Future  -     Hemoglobin A1C; Future  -     TSH; Future  Well controlled.  Continue same medication and dose.  Stage 3b chronic kidney disease  -     Comprehensive Metabolic Panel; Future  Stable;      Recurrent UTI  Last antibiotics about 4 wks ago   Asymptomatic     Problem List Items Addressed This Visit    None

## 2022-07-21 DIAGNOSIS — M17.0 PRIMARY OSTEOARTHRITIS OF BOTH KNEES: ICD-10-CM

## 2022-07-21 RX ORDER — HYDROCODONE BITARTRATE AND ACETAMINOPHEN 7.5; 325 MG/1; MG/1
1 TABLET ORAL
Qty: 30 TABLET | Refills: 0 | Status: SHIPPED | OUTPATIENT
Start: 2022-07-21 | End: 2022-08-19 | Stop reason: SDUPTHER

## 2022-07-21 NOTE — TELEPHONE ENCOUNTER
LOV 7/6/2022  Scheduled visit 10/5/2022    Requested Prescriptions     Pending Prescriptions Disp Refills    HYDROcodone-acetaminophen (NORCO) 7.5-325 mg per tablet 30 tablet 0     Sig: Take 1 tablet by mouth every 24 hours as needed for Pain.

## 2022-07-21 NOTE — TELEPHONE ENCOUNTER
----- Message from Connie Pineda sent at 2022 12:51 PM CDT -----  Contact: pt  Tonya Bucio  MRN: [id number]  : 1935  PCP: Tasha Atkins  Home Phone      [phone]  Work Phone      Not on file.  [place]          [phone]      MESSAGE: Pt needs a refill on her HYDROcodone-acetaminophen (NORCO) 7.5-325 mg per tablet      [facility] [place] [address] [address]  Phone: [phone] Fax: [phone]

## 2022-07-21 NOTE — TELEPHONE ENCOUNTER
Care Due:                  Date            Visit Type   Department     Provider  --------------------------------------------------------------------------------                                EP -                              PRIMARY      [facility] INTER[facility]  Last Visit: 07-      CARE ([facility])   MEDICINE II    Tasha Atkins                              EP -                              PRIMARY      [facility] INTERNAL  Next Visit: 10-      CARE ([facility])   MEDICINE II    Tasha Atkins                                                            Last  Test          Frequency    Reason                     Performed    Due Date  --------------------------------------------------------------------------------    Uric Acid...  12 months..  allopurinoL..............  Not Found    Overdue    [employer] Embedded Care Gaps. Reference number: [id number]. 7/21/2022   1:38:25 PM CDT

## 2022-07-25 ENCOUNTER — OFFICE VISIT (OUTPATIENT)
Dept: FAMILY MEDICINE | Facility: CLINIC | Age: 87
End: 2022-07-25
Payer: MEDICARE

## 2022-07-25 VITALS
DIASTOLIC BLOOD PRESSURE: 66 MMHG | RESPIRATION RATE: 16 BRPM | BODY MASS INDEX: 37.41 KG/M2 | HEIGHT: 68 IN | HEART RATE: 58 BPM | SYSTOLIC BLOOD PRESSURE: 112 MMHG

## 2022-07-25 DIAGNOSIS — R22.2 LUMP IN CHEST: Primary | ICD-10-CM

## 2022-07-25 PROCEDURE — 99213 PR OFFICE/OUTPT VISIT, EST, LEVL III, 20-29 MIN: ICD-10-PCS | Mod: S$GLB,,, | Performed by: NURSE PRACTITIONER

## 2022-07-25 PROCEDURE — 1101F PT FALLS ASSESS-DOCD LE1/YR: CPT | Mod: CPTII,S$GLB,, | Performed by: NURSE PRACTITIONER

## 2022-07-25 PROCEDURE — 1126F PR PAIN SEVERITY QUANTIFIED, NO PAIN PRESENT: ICD-10-PCS | Mod: CPTII,S$GLB,, | Performed by: NURSE PRACTITIONER

## 2022-07-25 PROCEDURE — 99999 PR PBB SHADOW E&M-EST. PATIENT-LVL IV: CPT | Mod: PBBFAC,,, | Performed by: NURSE PRACTITIONER

## 2022-07-25 PROCEDURE — 1159F MED LIST DOCD IN RCRD: CPT | Mod: CPTII,S$GLB,, | Performed by: NURSE PRACTITIONER

## 2022-07-25 PROCEDURE — 1160F PR REVIEW ALL MEDS BY PRESCRIBER/CLIN PHARMACIST DOCUMENTED: ICD-10-PCS | Mod: CPTII,S$GLB,, | Performed by: NURSE PRACTITIONER

## 2022-07-25 PROCEDURE — 3288F FALL RISK ASSESSMENT DOCD: CPT | Mod: CPTII,S$GLB,, | Performed by: NURSE PRACTITIONER

## 2022-07-25 PROCEDURE — 3288F PR FALLS RISK ASSESSMENT DOCUMENTED: ICD-10-PCS | Mod: CPTII,S$GLB,, | Performed by: NURSE PRACTITIONER

## 2022-07-25 PROCEDURE — 99999 PR PBB SHADOW E&M-EST. PATIENT-LVL IV: ICD-10-PCS | Mod: PBBFAC,,, | Performed by: NURSE PRACTITIONER

## 2022-07-25 PROCEDURE — 99213 OFFICE O/P EST LOW 20 MIN: CPT | Mod: S$GLB,,, | Performed by: NURSE PRACTITIONER

## 2022-07-25 PROCEDURE — 1101F PR PT FALLS ASSESS DOC 0-1 FALLS W/OUT INJ PAST YR: ICD-10-PCS | Mod: CPTII,S$GLB,, | Performed by: NURSE PRACTITIONER

## 2022-07-25 PROCEDURE — 1159F PR MEDICATION LIST DOCUMENTED IN MEDICAL RECORD: ICD-10-PCS | Mod: CPTII,S$GLB,, | Performed by: NURSE PRACTITIONER

## 2022-07-25 PROCEDURE — 1160F RVW MEDS BY RX/DR IN RCRD: CPT | Mod: CPTII,S$GLB,, | Performed by: NURSE PRACTITIONER

## 2022-07-25 PROCEDURE — 1126F AMNT PAIN NOTED NONE PRSNT: CPT | Mod: CPTII,S$GLB,, | Performed by: NURSE PRACTITIONER

## 2022-07-25 RX ORDER — HYDROCHLOROTHIAZIDE 12.5 MG/1
12.5 TABLET ORAL
COMMUNITY
Start: 2022-04-05 | End: 2023-05-29

## 2022-07-25 RX ORDER — LEVALBUTEROL INHALATION SOLUTION 1.25 MG/3ML
SOLUTION RESPIRATORY (INHALATION)
COMMUNITY
Start: 2022-02-10

## 2022-07-25 NOTE — PROGRESS NOTES
Subjective:       Patient ID: Tonya Bucio is a 87 y.o. female.    Chief Complaint: Mass (Patient states she felt a lump in between her breast about a month ago and it went away, states she noticed it again about 2 weeks ago)    Feeling mass at the lower sternal area. No pain or itching. No redness     Mass  Pertinent negatives include no abdominal pain, arthralgias, congestion, coughing, fatigue, fever, headaches, myalgias, nausea, sore throat or vomiting.     Review of Systems   Constitutional: Negative.  Negative for appetite change, fatigue and fever.   HENT: Negative.  Negative for congestion, ear pain and sore throat.    Eyes: Negative.  Negative for visual disturbance.   Respiratory: Negative.  Negative for cough, shortness of breath and wheezing.    Cardiovascular: Negative.         Mass at the lower sternal area noted a couple of weeks ago.   Gastrointestinal: Negative.  Negative for abdominal pain, diarrhea, nausea and vomiting.   Endocrine: Negative.    Genitourinary: Negative.  Negative for difficulty urinating and urgency.   Musculoskeletal: Negative.  Negative for arthralgias and myalgias.   Skin: Negative.  Negative for color change.   Allergic/Immunologic: Negative.    Neurological: Negative.  Negative for dizziness and headaches.   Hematological: Negative.    Psychiatric/Behavioral: Negative.  Negative for sleep disturbance.   All other systems reviewed and are negative.      Objective:      Physical Exam  Vitals and nursing note reviewed. Exam conducted with a chaperone present.   Constitutional:       General: She is not in acute distress.     Appearance: Normal appearance. She is well-developed.   HENT:      Head: Normocephalic and atraumatic.   Eyes:      Pupils: Pupils are equal, round, and reactive to light.   Cardiovascular:      Rate and Rhythm: Normal rate and regular rhythm.      Heart sounds: Murmur heard.   Pulmonary:      Effort: Pulmonary effort is normal. No respiratory distress.       Breath sounds: Normal breath sounds.      Comments: Flat firm area at the end of the sternum. Non-tender, no redness  Musculoskeletal:      Cervical back: Normal range of motion and neck supple.      Comments: Wheelchair bound   Skin:     General: Skin is warm and dry.   Neurological:      Mental Status: She is alert and oriented to person, place, and time.   Psychiatric:         Mood and Affect: Mood normal.         Assessment:       1. Lump in chest        Plan:   Tonya was seen today for mass.    Diagnoses and all orders for this visit:    Lump in chest  -     US Soft Tissue Chest_Upper Back; Future    RTC PRN

## 2022-07-26 ENCOUNTER — HOSPITAL ENCOUNTER (OUTPATIENT)
Dept: RADIOLOGY | Facility: HOSPITAL | Age: 87
Discharge: HOME | End: 2022-07-26
Attending: NURSE PRACTITIONER
Payer: MEDICARE

## 2022-07-26 DIAGNOSIS — R22.2 LUMP IN CHEST: ICD-10-CM

## 2022-07-26 PROCEDURE — 76604 US EXAM CHEST: CPT | Mod: 26,,, | Performed by: RADIOLOGY

## 2022-07-26 PROCEDURE — 76604 US SOFT TISSUE CHEST_UPPER BACK: ICD-10-PCS | Mod: 26,,, | Performed by: RADIOLOGY

## 2022-07-26 PROCEDURE — 76604 US EXAM CHEST: CPT | Mod: TC

## 2022-08-14 ENCOUNTER — HOSPITAL ENCOUNTER (EMERGENCY)
Facility: HOSPITAL | Age: 87
Discharge: HOME | End: 2022-08-14
Attending: STUDENT IN AN ORGANIZED HEALTH CARE EDUCATION/TRAINING PROGRAM
Payer: MEDICARE

## 2022-08-14 VITALS
DIASTOLIC BLOOD PRESSURE: 78 MMHG | RESPIRATION RATE: 19 BRPM | HEART RATE: 65 BPM | HEIGHT: 68 IN | OXYGEN SATURATION: 98 % | BODY MASS INDEX: 37.05 KG/M2 | WEIGHT: 244.5 LBS | TEMPERATURE: 98 F | SYSTOLIC BLOOD PRESSURE: 168 MMHG

## 2022-08-14 DIAGNOSIS — N30.00 ACUTE CYSTITIS WITHOUT HEMATURIA: Primary | ICD-10-CM

## 2022-08-14 DIAGNOSIS — R53.83 FATIGUE: ICD-10-CM

## 2022-08-14 LAB
ALBUMIN SERPL BCP-MCNC: 2.9 G/DL (ref 3.5–5.2)
ALP SERPL-CCNC: 101 U/L (ref 55–135)
ALT SERPL W/O P-5'-P-CCNC: 16 U/L (ref 10–44)
ANION GAP SERPL CALC-SCNC: 13 MMOL/L (ref 8–16)
AST SERPL-CCNC: 14 U/L (ref 10–40)
BACTERIA #/AREA URNS HPF: ABNORMAL /HPF
BASOPHILS # BLD AUTO: 0.05 K/UL (ref 0–0.2)
BASOPHILS NFR BLD: 0.4 % (ref 0–1.9)
BILIRUB SERPL-MCNC: 0.5 MG/DL (ref 0.1–1)
BILIRUB UR QL STRIP: NEGATIVE
BUN SERPL-MCNC: 29 MG/DL (ref 8–23)
CALCIUM SERPL-MCNC: 8.9 MG/DL (ref 8.7–10.5)
CHLORIDE SERPL-SCNC: 100 MMOL/L (ref 95–110)
CLARITY UR: ABNORMAL
CO2 SERPL-SCNC: 24 MMOL/L (ref 23–29)
COLOR UR: YELLOW
CREAT SERPL-MCNC: 1.2 MG/DL (ref 0.5–1.4)
DIFFERENTIAL METHOD: ABNORMAL
EOSINOPHIL # BLD AUTO: 0.3 K/UL (ref 0–0.5)
EOSINOPHIL NFR BLD: 2.8 % (ref 0–8)
ERYTHROCYTE [DISTWIDTH] IN BLOOD BY AUTOMATED COUNT: 14.6 % (ref 11.5–14.5)
EST. GFR  (NO RACE VARIABLE): 44 ML/MIN/1.73 M^2
GLUCOSE SERPL-MCNC: 109 MG/DL (ref 70–110)
GLUCOSE UR QL STRIP: NEGATIVE
HCT VFR BLD AUTO: 32.4 % (ref 37–48.5)
HGB BLD-MCNC: 10.4 G/DL (ref 12–16)
HGB UR QL STRIP: ABNORMAL
HYALINE CASTS #/AREA URNS LPF: 0 /LPF
IMM GRANULOCYTES # BLD AUTO: 0.08 K/UL (ref 0–0.04)
IMM GRANULOCYTES NFR BLD AUTO: 0.7 % (ref 0–0.5)
KETONES UR QL STRIP: NEGATIVE
LEUKOCYTE ESTERASE UR QL STRIP: ABNORMAL
LYMPHOCYTES # BLD AUTO: 2.8 K/UL (ref 1–4.8)
LYMPHOCYTES NFR BLD: 24.3 % (ref 18–48)
MCH RBC QN AUTO: 29.6 PG (ref 27–31)
MCHC RBC AUTO-ENTMCNC: 32.1 G/DL (ref 32–36)
MCV RBC AUTO: 92 FL (ref 82–98)
MICROSCOPIC COMMENT: ABNORMAL
MONOCYTES # BLD AUTO: 1.1 K/UL (ref 0.3–1)
MONOCYTES NFR BLD: 9.8 % (ref 4–15)
NEUTROPHILS # BLD AUTO: 7.2 K/UL (ref 1.8–7.7)
NEUTROPHILS NFR BLD: 62 % (ref 38–73)
NITRITE UR QL STRIP: NEGATIVE
NRBC BLD-RTO: 0 /100 WBC
PH UR STRIP: 7 [PH] (ref 5–8)
PLATELET # BLD AUTO: 336 K/UL (ref 150–450)
PMV BLD AUTO: 10 FL (ref 9.2–12.9)
POTASSIUM SERPL-SCNC: 4.5 MMOL/L (ref 3.5–5.1)
PROT SERPL-MCNC: 7.2 G/DL (ref 6–8.4)
PROT UR QL STRIP: ABNORMAL
RBC # BLD AUTO: 3.51 M/UL (ref 4–5.4)
RBC #/AREA URNS HPF: 1 /HPF (ref 0–4)
SODIUM SERPL-SCNC: 137 MMOL/L (ref 136–145)
SP GR UR STRIP: 1.01 (ref 1–1.03)
SQUAMOUS #/AREA URNS HPF: 1 /HPF
URN SPEC COLLECT METH UR: ABNORMAL
UROBILINOGEN UR STRIP-ACNC: NEGATIVE EU/DL
WBC # BLD AUTO: 11.64 K/UL (ref 3.9–12.7)
WBC #/AREA URNS HPF: >100 /HPF (ref 0–5)

## 2022-08-14 PROCEDURE — 85025 COMPLETE CBC W/AUTO DIFF WBC: CPT | Performed by: STUDENT IN AN ORGANIZED HEALTH CARE EDUCATION/TRAINING PROGRAM

## 2022-08-14 PROCEDURE — 81000 URINALYSIS NONAUTO W/SCOPE: CPT | Performed by: STUDENT IN AN ORGANIZED HEALTH CARE EDUCATION/TRAINING PROGRAM

## 2022-08-14 PROCEDURE — 96360 HYDRATION IV INFUSION INIT: CPT

## 2022-08-14 PROCEDURE — 93010 EKG 12-LEAD: ICD-10-PCS | Mod: ,,, | Performed by: INTERNAL MEDICINE

## 2022-08-14 PROCEDURE — 93005 ELECTROCARDIOGRAM TRACING: CPT

## 2022-08-14 PROCEDURE — 87186 SC STD MICRODIL/AGAR DIL: CPT | Performed by: STUDENT IN AN ORGANIZED HEALTH CARE EDUCATION/TRAINING PROGRAM

## 2022-08-14 PROCEDURE — 87088 URINE BACTERIA CULTURE: CPT | Performed by: STUDENT IN AN ORGANIZED HEALTH CARE EDUCATION/TRAINING PROGRAM

## 2022-08-14 PROCEDURE — 63600175 PHARM REV CODE 636 W HCPCS: Performed by: STUDENT IN AN ORGANIZED HEALTH CARE EDUCATION/TRAINING PROGRAM

## 2022-08-14 PROCEDURE — 99284 EMERGENCY DEPT VISIT MOD MDM: CPT | Mod: 25

## 2022-08-14 PROCEDURE — 93010 ELECTROCARDIOGRAM REPORT: CPT | Mod: ,,, | Performed by: INTERNAL MEDICINE

## 2022-08-14 PROCEDURE — 80053 COMPREHEN METABOLIC PANEL: CPT | Performed by: STUDENT IN AN ORGANIZED HEALTH CARE EDUCATION/TRAINING PROGRAM

## 2022-08-14 PROCEDURE — 96361 HYDRATE IV INFUSION ADD-ON: CPT

## 2022-08-14 PROCEDURE — 87086 URINE CULTURE/COLONY COUNT: CPT | Performed by: STUDENT IN AN ORGANIZED HEALTH CARE EDUCATION/TRAINING PROGRAM

## 2022-08-14 PROCEDURE — 87077 CULTURE AEROBIC IDENTIFY: CPT | Performed by: STUDENT IN AN ORGANIZED HEALTH CARE EDUCATION/TRAINING PROGRAM

## 2022-08-14 RX ORDER — LEVOFLOXACIN 500 MG/1
500 TABLET, FILM COATED ORAL DAILY
Qty: 5 TABLET | Refills: 0 | Status: SHIPPED | OUTPATIENT
Start: 2022-08-14 | End: 2022-08-15 | Stop reason: SDUPTHER

## 2022-08-14 RX ADMIN — SODIUM CHLORIDE, SODIUM LACTATE, POTASSIUM CHLORIDE, AND CALCIUM CHLORIDE 500 ML: .6; .31; .03; .02 INJECTION, SOLUTION INTRAVENOUS at 06:08

## 2022-08-14 NOTE — ED PROVIDER NOTES
[facility]                                                  Chief Complaint     Chief Complaint   Patient presents with    Urinary Problem     Pt reports over the last several days she has not urinated as much as she normally does and has burning with urination. Also complaining of fatigue.       History of Present Illness  87 y.o. female with past medical history of atrial fibrillation, asthma, CKD 3, COPD, hypothyroidism, thyroid disease, diabetes mellitus type 2 and uterine cancer who presents with decreased urination, dysuria and generalized fatigue over the past 3 days. Patient denies chest pain, lightheadedness or shortness of breath..    History obtained from:  Patient    Review of patient's allergies indicates:  No Known Allergies  Past Medical History:   Diagnosis Date    A-fib     Acute bronchitis with asthma     Anemia due to stage 3 chronic kidney disease     Angina pectoris     Anticoagulant long-term use     Asthma     Back pain     Cancer     Chest pain, musculoskeletal 7/16/2013    Chronic bronchitis     Class 3 obesity with serious comorbidity and body mass index (BMI) of 45.0 to 49.9 in adult 11/17/2016    Colon polyps     COPD exacerbation 3/13/2020    Gout, unspecified     History of cervical cancer     Hypertension associated with stage 4 chronic kidney disease due to type 2 diabetes mellitus 8/14/2019    Hypothyroidism     Obesity     Osteoarthritis     Other and unspecified hyperlipidemia     PE (pulmonary embolism)     Renal manifestation of secondary diabetes mellitus     Thyroid disease     Trouble in sleeping     Type 2 diabetes mellitus with ophthalmic manifestations     Type 2 diabetes with peripheral circulatory disorder, controlled     Type II or unspecified type diabetes mellitus without mention of complication, uncontrolled     States everything okay-previous dx    Unspecified essential hypertension     Urinary incontinence     Uterine  cancer     Uterine cancer      Past Surgical History:   Procedure Laterality Date    ADENOIDECTOMY      EYE SURGERY Right     right eye cataract    HYSTERECTOMY  2008    LIZ-BSO    tonsilectomy      TONSILLECTOMY  1945    TOTAL ABDOMINAL HYSTERECTOMY      TOTAL ABDOMINAL HYSTERECTOMY W/ BILATERAL SALPINGOOPHORECTOMY        Family History   Problem Relation Age of Onset    Heart disease Mother     Arthritis Father     Breast cancer Sister     Ovarian cancer Sister     Cancer Brother         Throat cancer    Arthritis Daughter         back    No Known Problems Son     Heart disease Brother     Stroke Brother     Heart disease Brother         stents heart and legs     Diabetes Brother     Hyperlipidemia Brother     Anemia Daughter     Arthritis Daughter         RA    Diabetes Daughter     Arthritis Daughter         RA    Glaucoma Daughter     Diabetes Daughter     Liver disease Daughter     Arthritis Daughter         back     Stroke Son     No Known Problems Son      Social History     Tobacco Use    Smoking status: Former Smoker     Packs/day: 0.33     Years: 13.00     Pack years: 4.29     Types: Cigarettes     Start date: 1951     Quit date: 1964     Years since quittin.0    Smokeless tobacco: Never Used   Substance Use Topics    Alcohol use: No    Drug use: No          Review of Systems and Physical Exam     Review of Systems      + dysuria, decreased urination, fatigue    All other symptoms negative as stated below    --Constitutional - Denies fever, appetite change, chills  --Eyes - Denies eye discharge, eye pain, eye redness or visual disturbance  --HENT- Denies congestion, sore throat, drooling, ear discharge, rhinorrhea or trouble swallowing  --Respiratory - Denies cough, shortness of breath, wheezing  --Cardiovascular - Denies chest pain, leg swelling, palpitations  --Gastrointestinal - Denies abdominal pain, abdominal distension, constipation, diarrhea,  "nausea or vomiting  --Genitourinary - Denies hematuria, urgency  --Musculoskeletal - Denies arthralgias, myalgias  --Neurological - Denies dizziness, headaches, lightheadedness, weakness  --Hematologic - Denies easy bruising or easy bleeding  --Skin - Denies rash, wound or pallor  --Psychiatric- Denies dysphoric mood, nervousness/anxiety    Vital Signs   height is 5' 8" (1.727 m) and weight is 110.9 kg (244 lb 8 oz). Her oral temperature is 98.3 °F (36.8 °C). Her blood pressure is 187/80 (abnormal) and her pulse is 65. Her respiration is 22 (abnormal) and oxygen saturation is 98%.      Physical Exam   Nursing note and vitals reviewed  --Constitutional:  Well developed, well nourished. In no acute distress.  --HENT: Normocephalic, atraumatic. No rhinorrhea. Moist oral mucosa. No oropharyngeal edema, erythema or exudates.   --Eyes: PERRL. Extraocular movements intact. No periorbital swelling. Normal conjunctiva.  --Neck: Normal range of motion. Neck supple. No adenopathy  --Cardiac: Regular rhythm, normal S1, normal S2, no murmur, normal rate, intact distal pulses  --Pulmonary: Normal respiratory effort, breath sounds normal and equal bilaterally, no accessory muscle use, no respiratory distress  --Abdominal: Soft, normal bowel sounds, no tenderness, no guarding, no rebound  --Musculoskeletal: Normal range of motion. No deformity, tenderness or edema.  --Neurological:  Alert and oriented x 4. Follows commands appropriately.    --Skin: Warm and dry. No rash, pallor, cyanosis or jaundice.  --Psych: Normal mood    ED Course     EKG (interpreted by me)  Rate: 62 bpm  Rhythm: Sinus rhythm with 1st degree AV block  Axis: normal  ST-segments: No elevation or depression. Lateral T-wave inversions present.  Overall Interpretation: Sinus rhythm with 1st degree AV block with lateral t-wave inversions    This EKG is similar to previous EKG from 3/2020    Lab Results (reviewed by me)  Labs Reviewed   CBC W/ AUTO DIFFERENTIAL - " "Abnormal; Notable for the following components:       Result Value    RBC 3.51 (*)     Hemoglobin 10.4 (*)     Hematocrit 32.4 (*)     RDW 14.6 (*)     Immature Granulocytes 0.7 (*)     Immature Grans (Abs) 0.08 (*)     Mono # 1.1 (*)     All other components within normal limits   COMPREHENSIVE METABOLIC PANEL - Abnormal; Notable for the following components:    BUN 29 (*)     Albumin 2.9 (*)     eGFR 44 (*)     All other components within normal limits   URINALYSIS, REFLEX TO URINE CULTURE - Abnormal; Notable for the following components:    Appearance, UA Cloudy (*)     Protein, UA 1+ (*)     Occult Blood UA 2+ (*)     Leukocytes, UA 3+ (*)     All other components within normal limits    Narrative:     Specimen Source->Urine   URINALYSIS MICROSCOPIC - Abnormal; Notable for the following components:    WBC, UA >100 (*)     Bacteria Many (*)     All other components within normal limits    Narrative:     Specimen Source->Urine   CULTURE, URINE       Radiology (images visualized & reports reviewed by me)  No orders to display       Medications Given  Medications   lactated ringers bolus 500 mL (500 mLs Intravenous New Bag 8/14/22 7413)       Differential Diagnosis:  Cystitis, pyelonephritis, volume depletion, electrolyte abnormality    Clinical Tests:  Lab Tests: Ordered and reviewed  Radiological Study: Ordered and reviewed  Medical Tests: Ordered and reviewed    ED Management     height is 5' 8" (1.727 m) and weight is 110.9 kg (244 lb 8 oz). Her oral temperature is 98.3 °F (36.8 °C). Her blood pressure is 187/80 (abnormal) and her pulse is 65. Her respiration is 22 (abnormal) and oxygen saturation is 98%.     Physical exam unremarkable. Labs revealed UTI. EKG with no signs of ischemia or infarction.  IVF given.  Patient deemed stable for discharge. Prescription for levaquin provided. Patient to follow up with primary care in 1-2 days.  Strict return precautions given. Understanding of the plan was expressed and " all questions were answered.    Diagnosis  The primary encounter diagnosis was Acute cystitis without hematuria. A diagnosis of Fatigue was also pertinent to this visit.    Disposition and Plan  Condition: Stable  Disposition: Discharge    ED Prescriptions     Medication Sig Dispense Start Date End Date Auth. Provider    levoFLOXacin (LEVAQUIN) 500 MG tablet Take 1 tablet (500 mg total) by mouth once daily. for 5 days 5 tablet 8/14/2022 8/19/2022 Jeronimo Eden MD        Follow-up Information     Follow up With Specialties Details Why Contact Info    [facility] - Emergency Dept Emergency Medicine Go to  As needed, If symptoms worsen [address] [place] [id number]  [phone]    Tasha Atkins MD Internal Medicine Schedule an appointment as soon as possible for a visit in 2 days For follow-up of your ED visit [address] [place] [id number]  [phone]               Jeronimo Eden MD  08/14/22 1958

## 2022-08-15 RX ORDER — LEVOFLOXACIN 500 MG/1
500 TABLET, FILM COATED ORAL DAILY
Qty: 5 TABLET | Refills: 0 | Status: SHIPPED | OUTPATIENT
Start: 2022-08-15 | End: 2022-08-20

## 2022-08-15 NOTE — TELEPHONE ENCOUNTER
Patient was given Rx levaquin at ER yesterday. Pharmacy states no Rx received. Can [facility]?

## 2022-08-17 LAB — BACTERIA UR CULT: ABNORMAL

## 2022-08-19 DIAGNOSIS — M17.0 PRIMARY OSTEOARTHRITIS OF BOTH KNEES: ICD-10-CM

## 2022-08-19 RX ORDER — HYDROCODONE BITARTRATE AND ACETAMINOPHEN 7.5; 325 MG/1; MG/1
1 TABLET ORAL
Qty: 30 TABLET | Refills: 0 | Status: SHIPPED | OUTPATIENT
Start: 2022-08-19 | End: 2022-09-19 | Stop reason: SDUPTHER

## 2022-08-19 NOTE — TELEPHONE ENCOUNTER
----- Message from Leila Burch sent at 2022 11:03 AM CDT -----  Contact: self  Tonya Bucio  MRN: [id number]  : 1935  PCP: Tasha Atkins  Home Phone      [phone]  Work Phone      Not on file.  Mobile          [phone]      MESSAGE:   Pt requesting refill or new Rx.   Is this a refill or new RX:  refill   RX name and strength: HYDROcodone-acetaminophen (NORCO) 7.5-325 mg per tablet  Last office visit: 2022  Is this a 30-day or 90-day RX:  30 day   [facility]  Comments:      Phone:  [phone]

## 2022-08-19 NOTE — TELEPHONE ENCOUNTER
No new care gaps identified.  [facility] Embedded Care Gaps. Reference number: [id number]. 8/19/2022   11:09:18 AM STORMYT

## 2022-09-19 DIAGNOSIS — M17.0 PRIMARY OSTEOARTHRITIS OF BOTH KNEES: ICD-10-CM

## 2022-09-19 RX ORDER — HYDROCODONE BITARTRATE AND ACETAMINOPHEN 7.5; 325 MG/1; MG/1
1 TABLET ORAL
Qty: 30 TABLET | Refills: 0 | Status: SHIPPED | OUTPATIENT
Start: 2022-09-19 | End: 2022-10-21 | Stop reason: SDUPTHER

## 2022-09-19 NOTE — TELEPHONE ENCOUNTER
----- Message from Rosanne Dawn sent at 2022  9:55 AM CDT -----  Regarding: Rx Refill  Contact: self  Tonya Bucio  MRN: [id number]  : 1935  PCP: Tasha Atkins  Home Phone      [phone]  Work Phone      Not on file.  Mobile          [phone]      MESSAGE:   Rx Refill -  HYDROcodone-acetaminophen (NORCO) 7.5-325 mg per tablet.  Patient is out of medication.     Phone # [phone]    Pharmacy - [facility] [address] [place] [address]

## 2022-09-19 NOTE — TELEPHONE ENCOUNTER
No new care gaps identified.  [facility] Embedded Care Gaps. Reference number: [id number]. 9/19/2022   10:10:55 AM STORMYT

## 2022-09-19 NOTE — TELEPHONE ENCOUNTER
LOV 7/6/2022  Scheduled visit 10/5/2022    Requested Prescriptions     Pending Prescriptions Disp Refills    HYDROcodone-acetaminophen (NORCO) 7.5-325 mg per tablet 30 tablet 0     Sig: Take 1 tablet by mouth every 24 hours as needed for Pain.

## 2022-09-28 ENCOUNTER — LAB VISIT (OUTPATIENT)
Dept: LAB | Facility: HOSPITAL | Age: 87
End: 2022-09-28
Attending: INTERNAL MEDICINE
Payer: MEDICARE

## 2022-09-28 DIAGNOSIS — E11.69 HYPERLIPIDEMIA ASSOCIATED WITH TYPE 2 DIABETES MELLITUS: ICD-10-CM

## 2022-09-28 DIAGNOSIS — N18.32 STAGE 3B CHRONIC KIDNEY DISEASE: ICD-10-CM

## 2022-09-28 DIAGNOSIS — E03.9 ACQUIRED HYPOTHYROIDISM: ICD-10-CM

## 2022-09-28 DIAGNOSIS — E78.5 HYPERLIPIDEMIA ASSOCIATED WITH TYPE 2 DIABETES MELLITUS: ICD-10-CM

## 2022-09-28 LAB
ALBUMIN SERPL BCP-MCNC: 3.1 G/DL (ref 3.5–5.2)
ALBUMIN/CREAT UR: 136.1 UG/MG (ref 0–30)
ALP SERPL-CCNC: 114 U/L (ref 55–135)
ALT SERPL W/O P-5'-P-CCNC: 15 U/L (ref 10–44)
ANION GAP SERPL CALC-SCNC: 10 MMOL/L (ref 8–16)
AST SERPL-CCNC: 14 U/L (ref 10–40)
BASOPHILS # BLD AUTO: 0.04 K/UL (ref 0–0.2)
BASOPHILS NFR BLD: 0.4 % (ref 0–1.9)
BILIRUB SERPL-MCNC: 0.5 MG/DL (ref 0.1–1)
BUN SERPL-MCNC: 22 MG/DL (ref 8–23)
CALCIUM SERPL-MCNC: 9.3 MG/DL (ref 8.7–10.5)
CHLORIDE SERPL-SCNC: 103 MMOL/L (ref 95–110)
CHOLEST SERPL-MCNC: 162 MG/DL (ref 120–199)
CHOLEST/HDLC SERPL: 4.1 {RATIO} (ref 2–5)
CO2 SERPL-SCNC: 27 MMOL/L (ref 23–29)
CREAT SERPL-MCNC: 1.2 MG/DL (ref 0.5–1.4)
CREAT UR-MCNC: 36 MG/DL (ref 15–325)
DIFFERENTIAL METHOD: ABNORMAL
EOSINOPHIL # BLD AUTO: 0.4 K/UL (ref 0–0.5)
EOSINOPHIL NFR BLD: 3.8 % (ref 0–8)
ERYTHROCYTE [DISTWIDTH] IN BLOOD BY AUTOMATED COUNT: 14.6 % (ref 11.5–14.5)
EST. GFR  (NO RACE VARIABLE): 44 ML/MIN/1.73 M^2
ESTIMATED AVG GLUCOSE: 128 MG/DL (ref 68–131)
GLUCOSE SERPL-MCNC: 112 MG/DL (ref 70–110)
HBA1C MFR BLD: 6.1 % (ref 4–5.6)
HCT VFR BLD AUTO: 34.1 % (ref 37–48.5)
HDLC SERPL-MCNC: 40 MG/DL (ref 40–75)
HDLC SERPL: 24.7 % (ref 20–50)
HGB BLD-MCNC: 10.5 G/DL (ref 12–16)
IMM GRANULOCYTES # BLD AUTO: 0.05 K/UL (ref 0–0.04)
IMM GRANULOCYTES NFR BLD AUTO: 0.5 % (ref 0–0.5)
LDLC SERPL CALC-MCNC: 72.2 MG/DL (ref 63–159)
LYMPHOCYTES # BLD AUTO: 2.7 K/UL (ref 1–4.8)
LYMPHOCYTES NFR BLD: 26 % (ref 18–48)
MCH RBC QN AUTO: 28.4 PG (ref 27–31)
MCHC RBC AUTO-ENTMCNC: 30.8 G/DL (ref 32–36)
MCV RBC AUTO: 92 FL (ref 82–98)
MICROALBUMIN UR DL<=1MG/L-MCNC: 49 UG/ML
MONOCYTES # BLD AUTO: 0.7 K/UL (ref 0.3–1)
MONOCYTES NFR BLD: 6.4 % (ref 4–15)
NEUTROPHILS # BLD AUTO: 6.5 K/UL (ref 1.8–7.7)
NEUTROPHILS NFR BLD: 62.9 % (ref 38–73)
NONHDLC SERPL-MCNC: 122 MG/DL
NRBC BLD-RTO: 0 /100 WBC
PLATELET # BLD AUTO: 357 K/UL (ref 150–450)
PMV BLD AUTO: 9.9 FL (ref 9.2–12.9)
POTASSIUM SERPL-SCNC: 5 MMOL/L (ref 3.5–5.1)
PROT SERPL-MCNC: 7.6 G/DL (ref 6–8.4)
RBC # BLD AUTO: 3.7 M/UL (ref 4–5.4)
SODIUM SERPL-SCNC: 140 MMOL/L (ref 136–145)
TRIGL SERPL-MCNC: 249 MG/DL (ref 30–150)
TSH SERPL DL<=0.005 MIU/L-ACNC: 2.69 UIU/ML (ref 0.4–4)
WBC # BLD AUTO: 10.33 K/UL (ref 3.9–12.7)

## 2022-09-28 PROCEDURE — 82570 ASSAY OF URINE CREATININE: CPT | Performed by: INTERNAL MEDICINE

## 2022-09-28 PROCEDURE — 36415 COLL VENOUS BLD VENIPUNCTURE: CPT | Performed by: INTERNAL MEDICINE

## 2022-09-28 PROCEDURE — 85025 COMPLETE CBC W/AUTO DIFF WBC: CPT | Performed by: INTERNAL MEDICINE

## 2022-09-28 PROCEDURE — 80061 LIPID PANEL: CPT | Performed by: INTERNAL MEDICINE

## 2022-09-28 PROCEDURE — 84443 ASSAY THYROID STIM HORMONE: CPT | Performed by: INTERNAL MEDICINE

## 2022-09-28 PROCEDURE — 82043 UR ALBUMIN QUANTITATIVE: CPT | Performed by: INTERNAL MEDICINE

## 2022-09-28 PROCEDURE — 80053 COMPREHEN METABOLIC PANEL: CPT | Performed by: INTERNAL MEDICINE

## 2022-09-28 PROCEDURE — 83036 HEMOGLOBIN GLYCOSYLATED A1C: CPT | Performed by: INTERNAL MEDICINE

## 2022-10-05 ENCOUNTER — OFFICE VISIT (OUTPATIENT)
Dept: INTERNAL MEDICINE | Facility: CLINIC | Age: 87
End: 2022-10-05
Payer: MEDICARE

## 2022-10-05 VITALS
HEART RATE: 67 BPM | BODY MASS INDEX: 37.43 KG/M2 | SYSTOLIC BLOOD PRESSURE: 120 MMHG | HEIGHT: 68 IN | WEIGHT: 246.94 LBS | DIASTOLIC BLOOD PRESSURE: 62 MMHG | RESPIRATION RATE: 18 BRPM | OXYGEN SATURATION: 96 %

## 2022-10-05 DIAGNOSIS — E78.5 HYPERLIPIDEMIA ASSOCIATED WITH TYPE 2 DIABETES MELLITUS: ICD-10-CM

## 2022-10-05 DIAGNOSIS — N18.32 STAGE 3B CHRONIC KIDNEY DISEASE: ICD-10-CM

## 2022-10-05 DIAGNOSIS — E11.9 CONTROLLED TYPE 2 DIABETES MELLITUS WITHOUT COMPLICATION, WITHOUT LONG-TERM CURRENT USE OF INSULIN: Primary | ICD-10-CM

## 2022-10-05 DIAGNOSIS — E03.9 ACQUIRED HYPOTHYROIDISM: ICD-10-CM

## 2022-10-05 DIAGNOSIS — E11.69 HYPERLIPIDEMIA ASSOCIATED WITH TYPE 2 DIABETES MELLITUS: ICD-10-CM

## 2022-10-05 PROCEDURE — 1160F RVW MEDS BY RX/DR IN RCRD: CPT | Mod: CPTII,S$GLB,, | Performed by: INTERNAL MEDICINE

## 2022-10-05 PROCEDURE — 3288F FALL RISK ASSESSMENT DOCD: CPT | Mod: CPTII,S$GLB,, | Performed by: INTERNAL MEDICINE

## 2022-10-05 PROCEDURE — 3288F PR FALLS RISK ASSESSMENT DOCUMENTED: ICD-10-PCS | Mod: CPTII,S$GLB,, | Performed by: INTERNAL MEDICINE

## 2022-10-05 PROCEDURE — 1159F MED LIST DOCD IN RCRD: CPT | Mod: CPTII,S$GLB,, | Performed by: INTERNAL MEDICINE

## 2022-10-05 PROCEDURE — 1101F PT FALLS ASSESS-DOCD LE1/YR: CPT | Mod: CPTII,S$GLB,, | Performed by: INTERNAL MEDICINE

## 2022-10-05 PROCEDURE — 1160F PR REVIEW ALL MEDS BY PRESCRIBER/CLIN PHARMACIST DOCUMENTED: ICD-10-PCS | Mod: CPTII,S$GLB,, | Performed by: INTERNAL MEDICINE

## 2022-10-05 PROCEDURE — G0008 ADMIN INFLUENZA VIRUS VAC: HCPCS | Mod: S$GLB,,, | Performed by: INTERNAL MEDICINE

## 2022-10-05 PROCEDURE — 90694 FLU VACCINE - QUADRIVALENT - ADJUVANTED: ICD-10-PCS | Mod: S$GLB,,, | Performed by: INTERNAL MEDICINE

## 2022-10-05 PROCEDURE — 1126F AMNT PAIN NOTED NONE PRSNT: CPT | Mod: CPTII,S$GLB,, | Performed by: INTERNAL MEDICINE

## 2022-10-05 PROCEDURE — 99214 PR OFFICE/OUTPT VISIT, EST, LEVL IV, 30-39 MIN: ICD-10-PCS | Mod: S$GLB,,, | Performed by: INTERNAL MEDICINE

## 2022-10-05 PROCEDURE — 1101F PR PT FALLS ASSESS DOC 0-1 FALLS W/OUT INJ PAST YR: ICD-10-PCS | Mod: CPTII,S$GLB,, | Performed by: INTERNAL MEDICINE

## 2022-10-05 PROCEDURE — 99999 PR PBB SHADOW E&M-EST. PATIENT-LVL IV: ICD-10-PCS | Mod: PBBFAC,,, | Performed by: INTERNAL MEDICINE

## 2022-10-05 PROCEDURE — 99214 OFFICE O/P EST MOD 30 MIN: CPT | Mod: S$GLB,,, | Performed by: INTERNAL MEDICINE

## 2022-10-05 PROCEDURE — 90694 VACC AIIV4 NO PRSRV 0.5ML IM: CPT | Mod: S$GLB,,, | Performed by: INTERNAL MEDICINE

## 2022-10-05 PROCEDURE — 99999 PR PBB SHADOW E&M-EST. PATIENT-LVL IV: CPT | Mod: PBBFAC,,, | Performed by: INTERNAL MEDICINE

## 2022-10-05 PROCEDURE — 99499 UNLISTED E&M SERVICE: CPT | Mod: HCNC,S$GLB,, | Performed by: INTERNAL MEDICINE

## 2022-10-05 PROCEDURE — 1159F PR MEDICATION LIST DOCUMENTED IN MEDICAL RECORD: ICD-10-PCS | Mod: CPTII,S$GLB,, | Performed by: INTERNAL MEDICINE

## 2022-10-05 PROCEDURE — G0008 FLU VACCINE - QUADRIVALENT - ADJUVANTED: ICD-10-PCS | Mod: S$GLB,,, | Performed by: INTERNAL MEDICINE

## 2022-10-05 PROCEDURE — 1126F PR PAIN SEVERITY QUANTIFIED, NO PAIN PRESENT: ICD-10-PCS | Mod: CPTII,S$GLB,, | Performed by: INTERNAL MEDICINE

## 2022-10-21 DIAGNOSIS — M17.0 PRIMARY OSTEOARTHRITIS OF BOTH KNEES: ICD-10-CM

## 2022-10-21 NOTE — TELEPHONE ENCOUNTER
----- Message from Rosanne Dawn sent at 10/21/2022 10:06 AM CDT -----  Regarding: Rx Refill  Contact: self  Tonya Bucio  MRN: [id number]  : 1935  PCP: Tasha Atkins  Home Phone      [phone]  Work Phone      Not on file.  Mobile          [phone]      MESSAGE:   Rx Refill - HYDROcodone-acetaminophen (NORCO) 7.5-325 mg per tablet    Phone # [phone]    Pharmacy - [facility] [address] [place] [address]

## 2022-10-21 NOTE — TELEPHONE ENCOUNTER
No new care gaps identified.  [facility] Embedded Care Gaps. Reference number: [id number]. 10/21/2022   10:19:27 AM STORMYT

## 2022-10-24 RX ORDER — HYDROCODONE BITARTRATE AND ACETAMINOPHEN 7.5; 325 MG/1; MG/1
1 TABLET ORAL
Qty: 30 TABLET | Refills: 0 | Status: SHIPPED | OUTPATIENT
Start: 2022-10-24 | End: 2022-11-23 | Stop reason: SDUPTHER

## 2022-11-09 RX ORDER — MECLIZINE HCL 12.5 MG 12.5 MG/1
12.5 TABLET ORAL 3 TIMES DAILY PRN
Qty: 30 TABLET | Refills: 0 | Status: SHIPPED | OUTPATIENT
Start: 2022-11-09 | End: 2023-08-23

## 2022-11-09 NOTE — TELEPHONE ENCOUNTER
----- Message from Connie Pineda sent at 2022 10:45 AM CST -----  Contact: pt  Tonya Bucio  MRN: [id number]  : 1935  PCP: Tasha Atkins  Home Phone      [phone]  Work Phone      Not on file.  [employer]          [phone]      MESSAGE: pt needs the following meds refilled  Meclizine       [facility] [address] [facility] [place] [address] [place] [address]  Phone: [phone] Fax: [phone]

## 2022-11-09 NOTE — TELEPHONE ENCOUNTER
No new care gaps identified.  [facility] Embedded Care Gaps. Reference number: [id number]. 11/09/2022   1:38:47 PM CST

## 2022-11-09 NOTE — TELEPHONE ENCOUNTER
Pt is requesting refills of medication. It was not on her active med list.     LOV 10/5/2022  Scheduled visit 1/5/2023    Requested Prescriptions     Pending Prescriptions Disp Refills    meclizine (ANTIVERT) 12.5 mg tablet 30 tablet 0     Sig: Take 1 tablet (12.5 mg total) by mouth 3 (three) times daily as needed for Dizziness.

## 2022-11-17 NOTE — TELEPHONE ENCOUNTER
----- Message from Shania Casillas sent at 2017  2:13 PM CDT -----  Contact: self  Tonya Bucio  MRN: [id number]  : 1935  PCP: Tasha Atkins  Home Phone      [phone]  Work Phone      Not on file.  [employer]          [phone]      MESSAGE: refill norco-----[id number]   Yes

## 2022-11-23 DIAGNOSIS — M17.0 PRIMARY OSTEOARTHRITIS OF BOTH KNEES: ICD-10-CM

## 2022-11-23 NOTE — TELEPHONE ENCOUNTER
Requested Prescriptions     Pending Prescriptions Disp Refills    HYDROcodone-acetaminophen (NORCO) 7.5-325 mg per tablet 30 tablet 0     Sig: Take 1 tablet by mouth every 24 hours as needed for Pain.   Dr Atkins patient. Please advise on this refill request during Dr. Atkins absence. LOV: 10/5/22. Last fill date: 10/22/22. Thanks.

## 2022-11-23 NOTE — TELEPHONE ENCOUNTER
----- Message from Connie Pineda sent at 2022  8:47 AM CST -----  Contact: pt  Tonya Bucio  MRN: [id number]  : 1935  PCP: Tasha Atkins  Home Phone      [phone]  Work Phone      Not on file.  [employer]          [phone]      MESSAGE: pt needs a refill    HYDROcodone-acetaminophen (NORCO) 7.5-325 mg per tablet      [facility]  [address] [address]  Phone: [phone] Fax: [phone]

## 2022-11-23 NOTE — TELEPHONE ENCOUNTER
No new care gaps identified.  [facility] Embedded Care Gaps. Reference number: [id number]. 11/23/2022   9:13:46 AM CST

## 2022-11-24 RX ORDER — HYDROCODONE BITARTRATE AND ACETAMINOPHEN 7.5; 325 MG/1; MG/1
1 TABLET ORAL
Qty: 30 TABLET | Refills: 0 | Status: SHIPPED | OUTPATIENT
Start: 2022-11-24 | End: 2022-12-28 | Stop reason: SDUPTHER

## 2022-12-28 DIAGNOSIS — M17.0 PRIMARY OSTEOARTHRITIS OF BOTH KNEES: ICD-10-CM

## 2022-12-28 RX ORDER — HYDROCODONE BITARTRATE AND ACETAMINOPHEN 7.5; 325 MG/1; MG/1
1 TABLET ORAL
Qty: 30 TABLET | Refills: 0 | Status: SHIPPED | OUTPATIENT
Start: 2022-12-28 | End: 2023-01-30 | Stop reason: SDUPTHER

## 2022-12-28 NOTE — TELEPHONE ENCOUNTER
Patient requesting medication refill. States she will be out of Norco 7.5-325 mg tablet by the weekend.  Can you fill in Dr. Atkins's absence

## 2022-12-28 NOTE — TELEPHONE ENCOUNTER
Care Due:                  Date            Visit Type   Department     Provider  --------------------------------------------------------------------------------                                EP -                              PRIMARY      [facility] INTER[facility]  Last Visit: 10-      CARE ([facility])   MEDICINE II    Tasha Atkins                              EP -                              PRIMARY      [facility] INTERNAL  Next Visit: 01-      CARE ([facility])   MEDICINE II    Tasha Atkins                                                            Last  Test          Frequency    Reason                     Performed    Due Date  --------------------------------------------------------------------------------    Uric Acid...  12 months..  allopurinoL..............  Not Found    Overdue    [employer] Embedded Care Gaps. Reference number: [id number]. 12/28/2022   9:56:13 AM CST

## 2023-01-05 ENCOUNTER — OFFICE VISIT (OUTPATIENT)
Dept: INTERNAL MEDICINE | Facility: CLINIC | Age: 88
End: 2023-01-05
Payer: MEDICARE

## 2023-01-05 ENCOUNTER — LAB VISIT (OUTPATIENT)
Dept: LAB | Facility: HOSPITAL | Age: 88
End: 2023-01-05
Attending: INTERNAL MEDICINE
Payer: MEDICARE

## 2023-01-05 VITALS
HEIGHT: 68 IN | HEART RATE: 68 BPM | SYSTOLIC BLOOD PRESSURE: 134 MMHG | WEIGHT: 258.63 LBS | RESPIRATION RATE: 18 BRPM | DIASTOLIC BLOOD PRESSURE: 72 MMHG | BODY MASS INDEX: 39.2 KG/M2 | OXYGEN SATURATION: 97 %

## 2023-01-05 DIAGNOSIS — I10 HTN (HYPERTENSION): Primary | ICD-10-CM

## 2023-01-05 DIAGNOSIS — N18.4 STAGE 4 CHRONIC KIDNEY DISEASE: ICD-10-CM

## 2023-01-05 DIAGNOSIS — M17.0 PRIMARY OSTEOARTHRITIS OF BOTH KNEES: ICD-10-CM

## 2023-01-05 LAB
ALBUMIN SERPL BCP-MCNC: 3 G/DL (ref 3.5–5.2)
ANION GAP SERPL CALC-SCNC: 10 MMOL/L (ref 8–16)
BASOPHILS # BLD AUTO: 0.05 K/UL (ref 0–0.2)
BASOPHILS NFR BLD: 0.5 % (ref 0–1.9)
BUN SERPL-MCNC: 29 MG/DL (ref 8–23)
CALCIUM SERPL-MCNC: 8.9 MG/DL (ref 8.7–10.5)
CHLORIDE SERPL-SCNC: 104 MMOL/L (ref 95–110)
CO2 SERPL-SCNC: 27 MMOL/L (ref 23–29)
CREAT SERPL-MCNC: 1.2 MG/DL (ref 0.5–1.4)
DIFFERENTIAL METHOD: ABNORMAL
EOSINOPHIL # BLD AUTO: 0.4 K/UL (ref 0–0.5)
EOSINOPHIL NFR BLD: 4.3 % (ref 0–8)
ERYTHROCYTE [DISTWIDTH] IN BLOOD BY AUTOMATED COUNT: 15.7 % (ref 11.5–14.5)
EST. GFR  (NO RACE VARIABLE): 44 ML/MIN/1.73 M^2
GLUCOSE SERPL-MCNC: 145 MG/DL (ref 70–110)
HCT VFR BLD AUTO: 33.6 % (ref 37–48.5)
HGB BLD-MCNC: 10 G/DL (ref 12–16)
IMM GRANULOCYTES # BLD AUTO: 0.09 K/UL (ref 0–0.04)
IMM GRANULOCYTES NFR BLD AUTO: 0.9 % (ref 0–0.5)
LYMPHOCYTES # BLD AUTO: 2.6 K/UL (ref 1–4.8)
LYMPHOCYTES NFR BLD: 24.7 % (ref 18–48)
MCH RBC QN AUTO: 26.7 PG (ref 27–31)
MCHC RBC AUTO-ENTMCNC: 29.8 G/DL (ref 32–36)
MCV RBC AUTO: 90 FL (ref 82–98)
MONOCYTES # BLD AUTO: 1 K/UL (ref 0.3–1)
MONOCYTES NFR BLD: 9.6 % (ref 4–15)
NEUTROPHILS # BLD AUTO: 6.2 K/UL (ref 1.8–7.7)
NEUTROPHILS NFR BLD: 60 % (ref 38–73)
NRBC BLD-RTO: 0 /100 WBC
PHOSPHATE SERPL-MCNC: 3.9 MG/DL (ref 2.7–4.5)
PLATELET # BLD AUTO: 356 K/UL (ref 150–450)
PMV BLD AUTO: 9.6 FL (ref 9.2–12.9)
POTASSIUM SERPL-SCNC: 4.8 MMOL/L (ref 3.5–5.1)
PTH-INTACT SERPL-MCNC: 416 PG/ML (ref 9–77)
RBC # BLD AUTO: 3.74 M/UL (ref 4–5.4)
SODIUM SERPL-SCNC: 141 MMOL/L (ref 136–145)
WBC # BLD AUTO: 10.32 K/UL (ref 3.9–12.7)

## 2023-01-05 PROCEDURE — 83970 ASSAY OF PARATHORMONE: CPT | Mod: HCNC | Performed by: INTERNAL MEDICINE

## 2023-01-05 PROCEDURE — 1101F PR PT FALLS ASSESS DOC 0-1 FALLS W/OUT INJ PAST YR: ICD-10-PCS | Mod: HCNC,CPTII,S$GLB, | Performed by: INTERNAL MEDICINE

## 2023-01-05 PROCEDURE — 99999 PR PBB SHADOW E&M-EST. PATIENT-LVL III: CPT | Mod: PBBFAC,HCNC,, | Performed by: INTERNAL MEDICINE

## 2023-01-05 PROCEDURE — 99213 PR OFFICE/OUTPT VISIT, EST, LEVL III, 20-29 MIN: ICD-10-PCS | Mod: HCNC,S$GLB,, | Performed by: INTERNAL MEDICINE

## 2023-01-05 PROCEDURE — 99213 OFFICE O/P EST LOW 20 MIN: CPT | Mod: HCNC,S$GLB,, | Performed by: INTERNAL MEDICINE

## 2023-01-05 PROCEDURE — 1125F AMNT PAIN NOTED PAIN PRSNT: CPT | Mod: HCNC,CPTII,S$GLB, | Performed by: INTERNAL MEDICINE

## 2023-01-05 PROCEDURE — 85025 COMPLETE CBC W/AUTO DIFF WBC: CPT | Mod: HCNC | Performed by: INTERNAL MEDICINE

## 2023-01-05 PROCEDURE — 1160F RVW MEDS BY RX/DR IN RCRD: CPT | Mod: HCNC,CPTII,S$GLB, | Performed by: INTERNAL MEDICINE

## 2023-01-05 PROCEDURE — 3288F PR FALLS RISK ASSESSMENT DOCUMENTED: ICD-10-PCS | Mod: HCNC,CPTII,S$GLB, | Performed by: INTERNAL MEDICINE

## 2023-01-05 PROCEDURE — 1125F PR PAIN SEVERITY QUANTIFIED, PAIN PRESENT: ICD-10-PCS | Mod: HCNC,CPTII,S$GLB, | Performed by: INTERNAL MEDICINE

## 2023-01-05 PROCEDURE — 99999 PR PBB SHADOW E&M-EST. PATIENT-LVL III: ICD-10-PCS | Mod: PBBFAC,HCNC,, | Performed by: INTERNAL MEDICINE

## 2023-01-05 PROCEDURE — 1101F PT FALLS ASSESS-DOCD LE1/YR: CPT | Mod: HCNC,CPTII,S$GLB, | Performed by: INTERNAL MEDICINE

## 2023-01-05 PROCEDURE — 1159F PR MEDICATION LIST DOCUMENTED IN MEDICAL RECORD: ICD-10-PCS | Mod: HCNC,CPTII,S$GLB, | Performed by: INTERNAL MEDICINE

## 2023-01-05 PROCEDURE — 36415 COLL VENOUS BLD VENIPUNCTURE: CPT | Mod: HCNC | Performed by: INTERNAL MEDICINE

## 2023-01-05 PROCEDURE — 1160F PR REVIEW ALL MEDS BY PRESCRIBER/CLIN PHARMACIST DOCUMENTED: ICD-10-PCS | Mod: HCNC,CPTII,S$GLB, | Performed by: INTERNAL MEDICINE

## 2023-01-05 PROCEDURE — 80069 RENAL FUNCTION PANEL: CPT | Mod: HCNC | Performed by: INTERNAL MEDICINE

## 2023-01-05 PROCEDURE — 3288F FALL RISK ASSESSMENT DOCD: CPT | Mod: HCNC,CPTII,S$GLB, | Performed by: INTERNAL MEDICINE

## 2023-01-05 PROCEDURE — 1159F MED LIST DOCD IN RCRD: CPT | Mod: HCNC,CPTII,S$GLB, | Performed by: INTERNAL MEDICINE

## 2023-01-05 NOTE — PROGRESS NOTES
Patient, Tonya Bucio (MRN #[id number]), presented with a recent Estimated Glumerular Filtration Rate (EGFR) between 30 and 45 consistent with the definition of chronic kidney disease stage 3 - moderate (ICD10 - N18.3).    eGFR if non    Date Value Ref Range Status   06/23/2022 37 (A) >60 mL/min/1.73 m^2 Final     Comment:     Calculation used to obtain the estimated glomerular filtration  rate (eGFR) is the CKD-EPI equation.          The patient's chronic kidney disease stage 3 was monitored, evaluated, addressed and/or treated. This addendum to the medical record is made on 01/05/2023.

## 2023-01-05 NOTE — PROGRESS NOTES
HPI:  Tonya Bucio is a 87 y.o. female here for Medication Refill and Dizziness    Chronic Pain  Past Medical History Includes::  Chronic pain syndrome and degenerative joint disease  Chronicity:  Chronic  Onset:  More than 1 year ago  Progression since onset:  Waxing and waning  Pain location:  Knee and lumbar spine  Medications Tried:  Hydrocodone/acetaminophen (Hycet, Lorcet, Lortab, Norco,Vicodin)  Previous Imaging:  X-Ray  Current treatment:  Hydrocodone/acetaminophen (Hycet, LorcetLortab, Norco, Vicodin)  Improvement on Current Treatment:  Moderate  Goals of therapy:  Improve ADL's and Improve Sleep  Associated symptoms: leg pain    Associated symptoms: no chest pain, no dizziness, no shortness of breath, no numbness, no tingling, no weakness, no weight loss and no nausea    Drug Screen: No    Pain Contract: Yes    Psychiatric Disorders:  None  History of Substance Abuse:  None  Medication Refill  Associated symptoms include arthralgias, fatigue and myalgias. Pertinent negatives include no chest pain, chills, congestion, coughing, fever, nausea, neck pain, numbness, sore throat, vomiting or weakness. The symptoms are aggravated by walking. She has tried oral narcotics for the symptoms.        Review of Systems   Constitutional:  Positive for fatigue. Negative for chills and fever.   HENT:  Negative for congestion, hearing loss, sinus pressure and sore throat.    Eyes:  Negative for photophobia.   Respiratory:  Negative for cough, choking, chest tightness, shortness of breath and wheezing.    Cardiovascular:  Negative for chest pain and palpitations.   Gastrointestinal:  Negative for blood in stool, nausea and vomiting.   Endocrine: Negative for polydipsia and polyphagia.   Genitourinary:  Negative for dysuria and hematuria.   Musculoskeletal:  Positive for arthralgias and myalgias. Negative for neck pain.   Skin:  Negative for pallor.   Neurological:  Negative for dizziness, weakness and numbness.  "  Hematological:  Does not bruise/bleed easily.   Psychiatric/Behavioral:  Negative for confusion and suicidal ideas. The patient is not nervous/anxious.     A review of systems was performed and is negative except as noted above.    Objective:  Vitals:    01/05/23 1434   BP: 134/72   Pulse: 68   Resp: 18   SpO2: 97%   Weight: 117.3 kg (258 lb 9.6 oz)   Height: 5' 8" (1.727 m)      Physical Exam  Vitals and nursing note reviewed.   Constitutional:       Appearance: She is well-developed.   HENT:      Head: Normocephalic and atraumatic.      Right Ear: External ear normal.      Left Ear: External ear normal.   Eyes:      Conjunctiva/sclera: Conjunctivae normal.      Pupils: Pupils are equal, round, and reactive to light.   Neck:      Thyroid: No thyromegaly.      Vascular: No JVD.      Trachea: No tracheal deviation.   Cardiovascular:      Rate and Rhythm: Normal rate and regular rhythm.      Pulses:           Dorsalis pedis pulses are 1+ on the right side and 1+ on the left side.        Posterior tibial pulses are 1+ on the right side and 1+ on the left side.      Heart sounds: Normal heart sounds.   Pulmonary:      Effort: Pulmonary effort is normal. No respiratory distress.      Breath sounds: Normal breath sounds. No wheezing or rales.   Chest:      Chest wall: No tenderness.   Abdominal:      General: Bowel sounds are normal. There is no distension.      Palpations: Abdomen is soft. There is no mass.      Tenderness: There is no abdominal tenderness. There is no guarding or rebound.   Musculoskeletal:         General: Normal range of motion.      Cervical back: Normal range of motion and neck supple.      Comments: Bilateral knee oa changes.     Feet:      Right foot:      Protective Sensation: 7 sites tested.        Skin integrity: Dry skin present. No ulcer or erythema.      Left foot:      Protective Sensation: 7 sites tested.  4 sites sensed.      Skin integrity: Dry skin present. No ulcer or erythema. "   Lymphadenopathy:      Cervical: No cervical adenopathy.   Skin:     General: Skin is warm and dry.      Comments: Scaly rash feet bilateral    Neurological:      Mental Status: She is alert and oriented to person, place, and time.      Cranial Nerves: No cranial nerve deficit.      Motor: No abnormal muscle tone.      Coordination: Coordination normal.      Deep Tendon Reflexes: Reflexes are normal and symmetric.        Assessment/Plan:  1. Primary osteoarthritis of both knees    we discussed narcotics for pain management :    Managing chronic pain with opioids is complicated and challenging. I explained to patient that Doctors need to know if patients can follow the treatment plan, if they get desired responses from the meds, and if there are signs of developing addiction. Physicians use medication contracts to monitor patients adherence, or to help check that patients are compliant with the medications ordered. Such agreements are most commonly used when narcotic pain relievers are prescribed. Narcotics can sometimes become addictive if not taken as prescribed by a doctor.    The use of a pain management agreement allows for the documentation of understanding between a doctor and patient. Such documentation, when used as a means of facilitating care, can improve communication between doctors and patients.      Chronic, pain controlled on current regimen  Cont for now  No escalation of narcotics  Agrees to random UDS   reviewed today

## 2023-01-30 DIAGNOSIS — M17.0 PRIMARY OSTEOARTHRITIS OF BOTH KNEES: ICD-10-CM

## 2023-01-30 RX ORDER — HYDROCODONE BITARTRATE AND ACETAMINOPHEN 7.5; 325 MG/1; MG/1
1 TABLET ORAL
Qty: 30 TABLET | Refills: 0 | Status: SHIPPED | OUTPATIENT
Start: 2023-01-30 | End: 2023-03-01 | Stop reason: SDUPTHER

## 2023-01-30 NOTE — TELEPHONE ENCOUNTER
No new care gaps identified.  [facility] Embedded Care Gaps. Reference number: [id number]. 1/30/2023   10:07:22 AM CST

## 2023-01-30 NOTE — TELEPHONE ENCOUNTER
----- Message from Roxanna Russ sent at 2023  9:54 AM CST -----  Contact: pt  Tonya Bucio  MRN: [id number]  : 1935  PCP: Tasha Atkins  Home Phone      [phone]  Work Phone      Not on file.  [employer]          [phone]      MESSAGE:     Pt requested a refill of HYDROcodone-acetaminophen (NORCO) 7.5-325 mg per tablet sent to [facility]  [phone]

## 2023-02-02 DIAGNOSIS — M1A.39X0 CHRONIC GOUT DUE TO RENAL IMPAIRMENT OF MULTIPLE SITES WITHOUT TOPHUS: ICD-10-CM

## 2023-02-02 RX ORDER — ALLOPURINOL 300 MG/1
300 TABLET ORAL DAILY
Qty: 90 TABLET | Refills: 0 | Status: SHIPPED | OUTPATIENT
Start: 2023-02-02 | End: 2023-05-03 | Stop reason: SDUPTHER

## 2023-02-02 NOTE — TELEPHONE ENCOUNTER
No new care gaps identified.  [facility] Embedded Care Gaps. Reference number: [id number]. 2/02/2023   1:18:22 PM CST

## 2023-02-02 NOTE — TELEPHONE ENCOUNTER
----- Message from Roxanna Russ sent at 2023  1:10 PM CST -----  Contact: pt  Tonya Bucio  MRN: [id number]  : 1935  PCP: Tasha Atkins  Home Phone      [phone]  Work Phone      Not on file.  [employer]          [phone]      MESSAGE:     Pt states she needs a refill of allopurinoL (ZYLOPRIM) 300 MG tablet [facility] [place].       Tonya   [phone]

## 2023-02-03 NOTE — TELEPHONE ENCOUNTER
Contacted mom and conveyed positive strep results, negative covid/flu/RSV, prescription instructions, and strep precautions. Mom Verbalizes understanding and agreeable to plan of care.      Yes she should do  Mammogram ;its ordered ; please schedule

## 2023-02-07 DIAGNOSIS — Z00.00 ENCOUNTER FOR MEDICARE ANNUAL WELLNESS EXAM: ICD-10-CM

## 2023-02-09 DIAGNOSIS — Z00.00 ENCOUNTER FOR MEDICARE ANNUAL WELLNESS EXAM: ICD-10-CM

## 2023-02-14 ENCOUNTER — TELEPHONE (OUTPATIENT)
Dept: INTERNAL MEDICINE | Facility: CLINIC | Age: 88
End: 2023-02-14
Payer: MEDICARE

## 2023-02-14 NOTE — TELEPHONE ENCOUNTER
----- Message from Roxanna Russ sent at 2023  2:04 PM CST -----  Contact: pt  Tonya Bucio  MRN: [id number]  : 1935  PCP: Tasha Atkins  Home Phone      [phone]  Work Phone      Not on file.  "[employer]          [phone]      MESSAGE:     Pt called needs a refill of rosuvastatin (CRESTOR) 20 MG tablet [facility] to [facility] [place].       Tonya   [phone]

## 2023-02-20 DIAGNOSIS — E78.5 HYPERLIPIDEMIA ASSOCIATED WITH TYPE 2 DIABETES MELLITUS: ICD-10-CM

## 2023-02-20 DIAGNOSIS — E11.69 HYPERLIPIDEMIA ASSOCIATED WITH TYPE 2 DIABETES MELLITUS: ICD-10-CM

## 2023-02-20 RX ORDER — ROSUVASTATIN CALCIUM 20 MG/1
20 TABLET, COATED ORAL DAILY
Qty: 90 TABLET | Refills: 1 | Status: SHIPPED | OUTPATIENT
Start: 2023-02-20 | End: 2023-08-22

## 2023-02-20 NOTE — TELEPHONE ENCOUNTER
----- Message from Lani Rosa sent at 2023 11:05 AM CST -----  Contact: self  Tonya Bucio  MRN: [id number]  : 1935  PCP: Tasha Atkins  Home Phone      [phone]  Work Phone      Not on file.  [employer]          [phone]      MESSAGE: refill on rosuvastatin   Pharm[facility] [place]        Phone    [phone]

## 2023-02-20 NOTE — TELEPHONE ENCOUNTER
Requested Prescriptions     Pending Prescriptions Disp Refills    rosuvastatin (CRESTOR) 20 MG tablet 90 tablet 1     Sig: Take 1 tablet (20 mg total) by mouth once daily.   LOV: 1/5/23

## 2023-02-20 NOTE — TELEPHONE ENCOUNTER
No new care gaps identified.  [facility] Embedded Care Gaps. Reference number: [id number]. 2/20/2023   1:37:06 PM CST

## 2023-02-24 ENCOUNTER — TELEPHONE (OUTPATIENT)
Dept: INTERNAL MEDICINE | Facility: CLINIC | Age: 88
End: 2023-02-24
Payer: MEDICARE

## 2023-02-24 NOTE — TELEPHONE ENCOUNTER
----- Message from Connielorenza Pineda sent at 2023  9:48 AM CST -----  Contact: pt  Tonya Bucio  MRN: [id number]  : 1935  PCP: Tasha Atkins  Home Phone      [phone]  Work Phone      Not on file.  Mobile          [phone]      MESSAGE: pt would like for Dr Atkins to send in a prescription for a UTI. She states he usually calls something in because she gets one every few months. Please advise    [facility] [place] [address] [address] [place] [address]  Phone: [phone] Fax: [phone]  Hours: Not open 24 hours        [phone]

## 2023-02-28 RX ORDER — NITROFURANTOIN MACROCRYSTALS 50 MG/1
50 CAPSULE ORAL DAILY
Qty: 10 CAPSULE | Refills: 0 | Status: SHIPPED | OUTPATIENT
Start: 2023-02-28 | End: 2023-03-10

## 2023-03-01 DIAGNOSIS — M17.0 PRIMARY OSTEOARTHRITIS OF BOTH KNEES: ICD-10-CM

## 2023-03-01 RX ORDER — HYDROCODONE BITARTRATE AND ACETAMINOPHEN 7.5; 325 MG/1; MG/1
1 TABLET ORAL
Qty: 30 TABLET | Refills: 0 | Status: SHIPPED | OUTPATIENT
Start: 2023-03-01 | End: 2023-03-30 | Stop reason: SDUPTHER

## 2023-03-01 NOTE — TELEPHONE ENCOUNTER
----- Message from Roxanna Russ sent at 3/1/2023  2:34 PM CST -----  Contact: pt  Tonya Bucio  MRN: [id number]  : 1935  PCP: Tasha Atkins  Home Phone      [phone]  Work Phone      Not on file.  [employer]          [phone]      MESSAGE:     Pt needs a refill of HYDROcodone-acetaminophen (NORCO) 7.5-325 mg per tablet sent to [facility] in [place].       Tonya   [phone]

## 2023-03-01 NOTE — TELEPHONE ENCOUNTER
Care Due:                  Date            Visit Type   Department     Provider  --------------------------------------------------------------------------------                                EP -                              PRIMARY      [facility]  Last Visit: 01-      CARE ([facility])   MEDICINE II    Tasha Atkins                              EP -                              PRIMARY      [facility]  Next Visit: 04-      CARE ([facility])   MEDICINE II    Tasha Atkins                                                            Last  Test          Frequency    Reason                     Performed    Due Date  --------------------------------------------------------------------------------    Uric Acid...  12 months..  allopurinoL..............  Not Found    Overdue    [employer] Embedded Care Gaps. Reference number: [id number]. 3/01/2023   2:46:11 PM CST

## 2023-03-24 ENCOUNTER — PES CALL (OUTPATIENT)
Dept: ADMINISTRATIVE | Facility: CLINIC | Age: 88
End: 2023-03-24
Payer: MEDICARE

## 2023-03-29 ENCOUNTER — LAB VISIT (OUTPATIENT)
Dept: LAB | Facility: HOSPITAL | Age: 88
End: 2023-03-29
Attending: INTERNAL MEDICINE
Payer: MEDICARE

## 2023-03-29 DIAGNOSIS — E11.9 CONTROLLED TYPE 2 DIABETES MELLITUS WITHOUT COMPLICATION, WITHOUT LONG-TERM CURRENT USE OF INSULIN: ICD-10-CM

## 2023-03-29 LAB
ALBUMIN/CREAT UR: 177.3 UG/MG (ref 0–30)
CREAT UR-MCNC: 20.3 MG/DL (ref 15–325)
ESTIMATED AVG GLUCOSE: 143 MG/DL (ref 68–131)
HBA1C MFR BLD: 6.6 % (ref 4–5.6)
MICROALBUMIN UR DL<=1MG/L-MCNC: 36 UG/ML

## 2023-03-29 PROCEDURE — 83036 HEMOGLOBIN GLYCOSYLATED A1C: CPT | Mod: HCNC | Performed by: INTERNAL MEDICINE

## 2023-03-29 PROCEDURE — 36415 COLL VENOUS BLD VENIPUNCTURE: CPT | Mod: HCNC | Performed by: INTERNAL MEDICINE

## 2023-03-29 PROCEDURE — 82570 ASSAY OF URINE CREATININE: CPT | Mod: HCNC | Performed by: INTERNAL MEDICINE

## 2023-03-30 DIAGNOSIS — M17.0 PRIMARY OSTEOARTHRITIS OF BOTH KNEES: ICD-10-CM

## 2023-03-30 RX ORDER — HYDROCODONE BITARTRATE AND ACETAMINOPHEN 7.5; 325 MG/1; MG/1
1 TABLET ORAL
Qty: 30 TABLET | Refills: 0 | Status: SHIPPED | OUTPATIENT
Start: 2023-03-30 | End: 2023-04-27 | Stop reason: SDUPTHER

## 2023-03-30 NOTE — TELEPHONE ENCOUNTER
----- Message from Connielorenza Pineda sent at 3/30/2023  9:19 AM CDT -----  Contact: pt  Tonya Bucio  MRN: [id number]  : 1935  PCP: Tasha Atkins  Home Phone      [phone]  Work Phone      Not on file.  Mobile          [phone]      MESSAGE: pt states she needs the following prescription refilled    HYDROcodone-acetaminophen (NORCO) 7.5-325 mg per tablet    [facility]  [address] [address]  Phone: [phone] Fax: [phone]  Hours: Not open 24 hours    [phone]

## 2023-03-30 NOTE — TELEPHONE ENCOUNTER
LOV 1/5/2023  Scheduled visit 4/5/2023    Requested Prescriptions     Pending Prescriptions Disp Refills    HYDROcodone-acetaminophen (NORCO) 7.5-325 mg per tablet 30 tablet 0     Sig: Take 1 tablet by mouth every 24 hours as needed for Pain.

## 2023-03-30 NOTE — TELEPHONE ENCOUNTER
No new care gaps identified.  [facility] Embedded Care Gaps. Reference number: [id number]. 3/30/2023   9:32:03 AM CDT

## 2023-04-05 ENCOUNTER — OFFICE VISIT (OUTPATIENT)
Dept: INTERNAL MEDICINE | Facility: CLINIC | Age: 88
End: 2023-04-05
Payer: MEDICARE

## 2023-04-05 VITALS
DIASTOLIC BLOOD PRESSURE: 70 MMHG | SYSTOLIC BLOOD PRESSURE: 102 MMHG | RESPIRATION RATE: 19 BRPM | OXYGEN SATURATION: 95 % | HEIGHT: 68 IN | HEART RATE: 66 BPM | WEIGHT: 265 LBS | BODY MASS INDEX: 40.16 KG/M2

## 2023-04-05 DIAGNOSIS — E11.69 HYPERLIPIDEMIA ASSOCIATED WITH TYPE 2 DIABETES MELLITUS: ICD-10-CM

## 2023-04-05 DIAGNOSIS — E66.01 MORBID (SEVERE) OBESITY DUE TO EXCESS CALORIES: ICD-10-CM

## 2023-04-05 DIAGNOSIS — E03.9 ACQUIRED HYPOTHYROIDISM: ICD-10-CM

## 2023-04-05 DIAGNOSIS — E11.9 CONTROLLED TYPE 2 DIABETES MELLITUS WITHOUT COMPLICATION, WITHOUT LONG-TERM CURRENT USE OF INSULIN: ICD-10-CM

## 2023-04-05 DIAGNOSIS — E78.5 HYPERLIPIDEMIA ASSOCIATED WITH TYPE 2 DIABETES MELLITUS: ICD-10-CM

## 2023-04-05 DIAGNOSIS — D63.1 ANEMIA DUE TO STAGE 3A CHRONIC KIDNEY DISEASE: Primary | ICD-10-CM

## 2023-04-05 DIAGNOSIS — N18.32 STAGE 3B CHRONIC KIDNEY DISEASE: ICD-10-CM

## 2023-04-05 DIAGNOSIS — I26.92 CHRONIC SADDLE PULMONARY EMBOLISM WITHOUT ACUTE COR PULMONALE: ICD-10-CM

## 2023-04-05 DIAGNOSIS — N18.31 ANEMIA DUE TO STAGE 3A CHRONIC KIDNEY DISEASE: Primary | ICD-10-CM

## 2023-04-05 DIAGNOSIS — I27.82 CHRONIC SADDLE PULMONARY EMBOLISM WITHOUT ACUTE COR PULMONALE: ICD-10-CM

## 2023-04-05 DIAGNOSIS — I48.3 TYPICAL ATRIAL FLUTTER: ICD-10-CM

## 2023-04-05 PROBLEM — J44.1 COPD EXACERBATION: Status: RESOLVED | Noted: 2020-03-13 | Resolved: 2023-04-05

## 2023-04-05 PROCEDURE — 3288F FALL RISK ASSESSMENT DOCD: CPT | Mod: HCNC,CPTII,S$GLB, | Performed by: INTERNAL MEDICINE

## 2023-04-05 PROCEDURE — 1101F PR PT FALLS ASSESS DOC 0-1 FALLS W/OUT INJ PAST YR: ICD-10-PCS | Mod: HCNC,CPTII,S$GLB, | Performed by: INTERNAL MEDICINE

## 2023-04-05 PROCEDURE — 99999 PR PBB SHADOW E&M-EST. PATIENT-LVL III: ICD-10-PCS | Mod: PBBFAC,HCNC,, | Performed by: INTERNAL MEDICINE

## 2023-04-05 PROCEDURE — 1159F MED LIST DOCD IN RCRD: CPT | Mod: HCNC,CPTII,S$GLB, | Performed by: INTERNAL MEDICINE

## 2023-04-05 PROCEDURE — 1160F RVW MEDS BY RX/DR IN RCRD: CPT | Mod: HCNC,CPTII,S$GLB, | Performed by: INTERNAL MEDICINE

## 2023-04-05 PROCEDURE — 1126F AMNT PAIN NOTED NONE PRSNT: CPT | Mod: HCNC,CPTII,S$GLB, | Performed by: INTERNAL MEDICINE

## 2023-04-05 PROCEDURE — 1101F PT FALLS ASSESS-DOCD LE1/YR: CPT | Mod: HCNC,CPTII,S$GLB, | Performed by: INTERNAL MEDICINE

## 2023-04-05 PROCEDURE — 1159F PR MEDICATION LIST DOCUMENTED IN MEDICAL RECORD: ICD-10-PCS | Mod: HCNC,CPTII,S$GLB, | Performed by: INTERNAL MEDICINE

## 2023-04-05 PROCEDURE — 99214 PR OFFICE/OUTPT VISIT, EST, LEVL IV, 30-39 MIN: ICD-10-PCS | Mod: HCNC,S$GLB,, | Performed by: INTERNAL MEDICINE

## 2023-04-05 PROCEDURE — 1126F PR PAIN SEVERITY QUANTIFIED, NO PAIN PRESENT: ICD-10-PCS | Mod: HCNC,CPTII,S$GLB, | Performed by: INTERNAL MEDICINE

## 2023-04-05 PROCEDURE — 99999 PR PBB SHADOW E&M-EST. PATIENT-LVL III: CPT | Mod: PBBFAC,HCNC,, | Performed by: INTERNAL MEDICINE

## 2023-04-05 PROCEDURE — 99214 OFFICE O/P EST MOD 30 MIN: CPT | Mod: HCNC,S$GLB,, | Performed by: INTERNAL MEDICINE

## 2023-04-05 PROCEDURE — 3288F PR FALLS RISK ASSESSMENT DOCUMENTED: ICD-10-PCS | Mod: HCNC,CPTII,S$GLB, | Performed by: INTERNAL MEDICINE

## 2023-04-05 PROCEDURE — 1160F PR REVIEW ALL MEDS BY PRESCRIBER/CLIN PHARMACIST DOCUMENTED: ICD-10-PCS | Mod: HCNC,CPTII,S$GLB, | Performed by: INTERNAL MEDICINE

## 2023-04-05 RX ORDER — OMEGA-3-ACID ETHYL ESTERS 1 G/1
1 CAPSULE, LIQUID FILLED ORAL 2 TIMES DAILY
Qty: 180 CAPSULE | Refills: 1 | Status: SHIPPED | OUTPATIENT
Start: 2023-04-05 | End: 2023-10-04 | Stop reason: SDUPTHER

## 2023-04-05 NOTE — PROGRESS NOTES
"HPI:  Tonya Bucio is a 87 y.o. female here for Follow-up  Labs from Dr cox office reviewed       Review of Systems   Constitutional:  Positive for fatigue. Negative for chills and fever.   HENT:  Negative for congestion, hearing loss, sinus pressure and sore throat.    Eyes:  Negative for photophobia.   Respiratory:  Negative for cough, choking, chest tightness, shortness of breath and wheezing.    Cardiovascular:  Negative for chest pain and palpitations.   Gastrointestinal:  Negative for blood in stool, nausea and vomiting.   Endocrine: Negative for polydipsia and polyphagia.   Genitourinary:  Negative for dysuria and hematuria.   Musculoskeletal:  Negative for arthralgias, myalgias and neck pain.   Skin:  Negative for pallor.   Neurological:  Negative for dizziness, weakness and numbness.   Hematological:  Does not bruise/bleed easily.   Psychiatric/Behavioral:  Negative for confusion and suicidal ideas. The patient is not nervous/anxious.     A review of systems was performed and is negative except as noted above.    Objective:  Vitals:    04/05/23 1328   BP: 102/70   Pulse: 66   Resp: 19   SpO2: 95%   Weight: 120.2 kg (264 lb 15.9 oz)   Height: 5' 8" (1.727 m)      Physical Exam  Vitals and nursing note reviewed.   Constitutional:       Appearance: She is well-developed.   HENT:      Head: Normocephalic and atraumatic.      Right Ear: External ear normal.      Left Ear: External ear normal.   Eyes:      Conjunctiva/sclera: Conjunctivae normal.      Pupils: Pupils are equal, round, and reactive to light.   Neck:      Thyroid: No thyromegaly.      Vascular: No JVD.      Trachea: No tracheal deviation.   Cardiovascular:      Rate and Rhythm: Normal rate and regular rhythm.      Pulses:           Dorsalis pedis pulses are 1+ on the right side and 1+ on the left side.        Posterior tibial pulses are 1+ on the right side and 1+ on the left side.      Heart sounds: Normal heart sounds.   Pulmonary:      Effort: " Pulmonary effort is normal. No respiratory distress.      Breath sounds: Normal breath sounds. No wheezing or rales.   Chest:      Chest wall: No tenderness.   Abdominal:      General: Bowel sounds are normal. There is no distension.      Palpations: Abdomen is soft. There is no mass.      Tenderness: There is no abdominal tenderness. There is no guarding or rebound.   Musculoskeletal:         General: Normal range of motion.      Cervical back: Normal range of motion and neck supple.      Comments: Bilateral knee oa changes.     Feet:      Right foot:      Protective Sensation: 7 sites tested.        Skin integrity: Dry skin present. No ulcer or erythema.      Left foot:      Protective Sensation: 7 sites tested.  4 sites sensed.      Skin integrity: Dry skin present. No ulcer or erythema.   Lymphadenopathy:      Cervical: No cervical adenopathy.   Skin:     General: Skin is warm and dry.      Comments: Scaly rash feet bilateral    Neurological:      Mental Status: She is alert and oriented to person, place, and time.      Cranial Nerves: No cranial nerve deficit.      Motor: No abnormal muscle tone.      Coordination: Coordination normal.      Deep Tendon Reflexes: Reflexes are normal and symmetric.        Assessment/Plan:  1. Anemia due to stage 3a chronic kidney disease  -     CBC Auto Differential; Future; Expected date: 10/02/2023  Add centrum silver daily   2. Hyperlipidemia associated with type 2 diabetes mellitus  -     CBC Auto Differential; Future; Expected date: 10/02/2023  -     Comprehensive Metabolic Panel; Future; Expected date: 10/02/2023  -     Lipid Panel; Future; Expected date: 10/02/2023  -     TSH; Future; Expected date: 10/02/2023  -     omega-3 acid ethyl esters (LOVAZA) 1 gram capsule; Take 1 capsule (1 g total) by mouth 2 (two) times daily.  Dispense: 180 capsule; Refill: 1  Well controlled.  Continue same medication and dose.   3. Typical atrial flutter  Continue sotalol    4. Chronic  saddle pulmonary embolism without acute cor pulmonale  Continue xarelto     5. Stage 3b chronic kidney disease  -     Comprehensive Metabolic Panel; Future; Expected date: 10/02/2023  Monitor BUN/SCr.  Monitor I/Os.  Monitor electrolytes.  Avoid non-steroidal anti-inflammatory medications.   6. Morbid (severe) obesity due to excess calories    # The patient is asked to make an attempt to improve diet and exercise patterns to aid in medical management of this problem.     # Eat  5 small meals a day.     # Cut out high carbohydrate  foods : bread, rice, pasta, potatoes.     # Exercise/walk 5x/week for at least 30-45  minutes.       7. Acquired hypothyroidism  -     Lipid Panel; Future; Expected date: 10/02/2023  -     TSH; Future; Expected date: 10/02/2023  Well controlled.  Continue same medication and dose.   8. Controlled type 2 diabetes mellitus without complication, without long-term current use of insulin  -     Hemoglobin A1C; Future; Expected date: 10/02/2023  -     Microalbumin/Creatinine Ratio, Urine; Future; Expected date: 10/02/2023     Dist controlled.

## 2023-04-27 ENCOUNTER — HOSPITAL ENCOUNTER (EMERGENCY)
Facility: HOSPITAL | Age: 88
Discharge: HOME | End: 2023-04-27
Attending: EMERGENCY MEDICINE
Payer: MEDICARE

## 2023-04-27 VITALS
SYSTOLIC BLOOD PRESSURE: 188 MMHG | OXYGEN SATURATION: 97 % | HEART RATE: 69 BPM | DIASTOLIC BLOOD PRESSURE: 84 MMHG | TEMPERATURE: 97 F | RESPIRATION RATE: 18 BRPM | BODY MASS INDEX: 38.65 KG/M2 | HEIGHT: 68 IN | WEIGHT: 255 LBS

## 2023-04-27 DIAGNOSIS — M17.0 PRIMARY OSTEOARTHRITIS OF BOTH KNEES: ICD-10-CM

## 2023-04-27 DIAGNOSIS — M54.31 SCIATICA OF RIGHT SIDE: Primary | ICD-10-CM

## 2023-04-27 DIAGNOSIS — G89.4 CHRONIC PAIN SYNDROME: ICD-10-CM

## 2023-04-27 PROCEDURE — 96372 THER/PROPH/DIAG INJ SC/IM: CPT | Performed by: EMERGENCY MEDICINE

## 2023-04-27 PROCEDURE — 99284 EMERGENCY DEPT VISIT MOD MDM: CPT | Mod: HCNC

## 2023-04-27 PROCEDURE — 63600175 PHARM REV CODE 636 W HCPCS: Mod: HCNC | Performed by: EMERGENCY MEDICINE

## 2023-04-27 RX ORDER — HYDROMORPHONE HYDROCHLORIDE 1 MG/ML
0.5 INJECTION, SOLUTION INTRAMUSCULAR; INTRAVENOUS; SUBCUTANEOUS
Status: COMPLETED | OUTPATIENT
Start: 2023-04-27 | End: 2023-04-27

## 2023-04-27 RX ORDER — HYDROCODONE BITARTRATE AND ACETAMINOPHEN 7.5; 325 MG/1; MG/1
1 TABLET ORAL
Qty: 30 TABLET | Refills: 0 | Status: SHIPPED | OUTPATIENT
Start: 2023-04-27 | End: 2023-05-25 | Stop reason: SDUPTHER

## 2023-04-27 RX ADMIN — HYDROMORPHONE HYDROCHLORIDE 0.5 MG: 1 INJECTION, SOLUTION INTRAMUSCULAR; INTRAVENOUS; SUBCUTANEOUS at 08:04

## 2023-04-27 NOTE — ED PROVIDER NOTES
Encounter Date: 4/27/2023       History     Chief Complaint   Patient presents with    Back Pain     88 yo female with extensive pmhx including chronic pain on Norco 7.5 prn here via POV with right low back pain radiating into lateral right leg for the last several days. Not improved with home meds. No trauma. No  symptoms. No fever. No alleviating factors. Worse with movement. No rash.    Review of patient's allergies indicates:  No Known Allergies  Past Medical History:   Diagnosis Date    A-fib     Acute bronchitis with asthma     Anemia due to stage 3 chronic kidney disease     Angina pectoris     Anticoagulant long-term use     Asthma     Back pain     Cancer     Chest pain, musculoskeletal 7/16/2013    Chronic bronchitis     Class 3 obesity with serious comorbidity and body mass index (BMI) of 45.0 to 49.9 in adult 11/17/2016    Colon polyps     COPD exacerbation 3/13/2020    Gout, unspecified     History of cervical cancer     Hypertension associated with stage 4 chronic kidney disease due to type 2 diabetes mellitus 8/14/2019    Hypothyroidism     Obesity     Osteoarthritis     Other and unspecified hyperlipidemia     PE (pulmonary embolism)     Renal manifestation of secondary diabetes mellitus     Thyroid disease     Trouble in sleeping     Type 2 diabetes mellitus with ophthalmic manifestations     Type 2 diabetes with peripheral circulatory disorder, controlled     Type II or unspecified type diabetes mellitus without mention of complication, uncontrolled     States everything okay-previous dx    Unspecified essential hypertension     Urinary incontinence     Uterine cancer     Uterine cancer      Past Surgical History:   Procedure Laterality Date    ADENOIDECTOMY      EYE SURGERY Right     right eye cataract    HYSTERECTOMY  2008    LIZ-BSO    tonsilectomy      TONSILLECTOMY  1945    TOTAL ABDOMINAL HYSTERECTOMY  2008    TOTAL ABDOMINAL HYSTERECTOMY W/ BILATERAL SALPINGOOPHORECTOMY  2008     Family  History   Problem Relation Age of Onset    Heart disease Mother     Arthritis Father     Breast cancer Sister     Ovarian cancer Sister     Cancer Brother         Throat cancer    Arthritis Daughter         back    No Known Problems Son     Heart disease Brother     Stroke Brother     Heart disease Brother         stents heart and legs     Diabetes Brother     Hyperlipidemia Brother     Anemia Daughter     Arthritis Daughter         RA    Diabetes Daughter     Arthritis Daughter         RA    Glaucoma Daughter     Diabetes Daughter     Liver disease Daughter     Arthritis Daughter         back     Stroke Son     No Known Problems Son      Social History     Tobacco Use    Smoking status: Former     Packs/day: 0.33     Years: 13.00     Pack years: 4.29     Types: Cigarettes     Start date: 1951     Quit date: 1964     Years since quittin.7    Smokeless tobacco: Never   Substance Use Topics    Alcohol use: No    Drug use: No     Review of Systems   Constitutional: Negative.    Respiratory: Negative.     Cardiovascular: Negative.    Gastrointestinal: Negative.    Musculoskeletal:  Positive for back pain.   All other systems reviewed and are negative.    Physical Exam     Initial Vitals [23 0749]   BP Pulse Resp Temp SpO2   (!) 188/84 69 20 97.3 °F (36.3 °C) 97 %      MAP       --         Physical Exam    Constitutional: She is not diaphoretic. No distress.   HENT:   Head: Normocephalic and atraumatic.   Eyes: EOM are normal. Pupils are equal, round, and reactive to light.   Neck: Neck supple.   Normal range of motion.  Cardiovascular:  Normal rate, regular rhythm and intact distal pulses.           Pulmonary/Chest: Breath sounds normal. No respiratory distress. She has no wheezes. She has no rales.   Abdominal: Abdomen is soft. Bowel sounds are normal. She exhibits no distension. There is no abdominal tenderness. There is no rebound.   Musculoskeletal:         General: No tenderness or edema.  Normal range of motion.      Cervical back: Normal range of motion and neck supple.     Neurological: She is alert and oriented to person, place, and time.   Skin: Skin is warm and dry. Capillary refill takes less than 2 seconds. No rash noted.       ED Course   Procedures  Labs Reviewed - No data to display       Imaging Results    None          Medications   HYDROmorphone injection 0.5 mg (0.5 mg Subcutaneous Given 4/27/23 0813)                              Clinical Impression:   Final diagnoses:  [M54.31] Sciatica of right side (Primary)  [G89.4] Chronic pain syndrome        ED Disposition Condition    Discharge Stable          ED Prescriptions    None       Follow-up Information       Follow up With Specialties Details Why Contact Info    Tasha Atkins MD Internal Medicine Schedule an appointment as soon as possible for a visit   [address]  [facility] [address]  [phone]               Tamir Bunn MD  04/29/23 0046

## 2023-04-27 NOTE — TELEPHONE ENCOUNTER
No new care gaps identified.  [facility] Embedded Care Gaps. Reference number: [id number]. 4/27/2023   8:55:38 AM STORMYT

## 2023-04-27 NOTE — ED TRIAGE NOTES
C/o right lower back pain radiating down right leg x 2 days. Patient reports the pain is making her weak and nauseated.  Denies urinary symptoms. Denies trauma.

## 2023-04-27 NOTE — TELEPHONE ENCOUNTER
Requested Prescriptions     Pending Prescriptions Disp Refills    HYDROcodone-acetaminophen (NORCO) 7.5-325 mg per tablet 30 tablet 0     Sig: Take 1 tablet by mouth every 24 hours as needed for Pain.   LOV: 23. Last fill date: 3/30/23      ---- Message from Roxanna Rsus sent at 2023  8:45 AM CDT -----  Contact: pt  Tonya Bucio  MRN: [id number]  : 1935  PCP: Tasha Atkins  Home Phone      [phone]  Work Phone      Not on file.  [employer]          [phone]      MESSAGE:     Pt needs a refill of HYDROcodone-acetaminophen (NORCO) 7.5-325 mg per tablet sent to [facility] in [place].      Tonya   [phone]

## 2023-05-03 DIAGNOSIS — M1A.39X0 CHRONIC GOUT DUE TO RENAL IMPAIRMENT OF MULTIPLE SITES WITHOUT TOPHUS: ICD-10-CM

## 2023-05-03 RX ORDER — ALLOPURINOL 300 MG/1
300 TABLET ORAL DAILY
Qty: 90 TABLET | Refills: 0 | Status: SHIPPED | OUTPATIENT
Start: 2023-05-03 | End: 2023-08-01

## 2023-05-03 NOTE — TELEPHONE ENCOUNTER
Care Due:                  Date            Visit Type   Department     Provider  --------------------------------------------------------------------------------                                EP -                              PRIMARY      [facility]  Last Visit: 04-      CARE ([facility])   MEDICINE II    Tasha Atkins                              EP -                              PRIMARY      [facility]  Next Visit: 05-      CARE ([facility])   MEDICINE II    Tasha Atkins                                                            Last  Test          Frequency    Reason                     Performed    Due Date  --------------------------------------------------------------------------------    Uric Acid...  12 months..  allopurinoL..............  Not Found    Overdue    [employer] Embedded Care Due Messages. Reference number: [id number].   5/03/2023 1:42:28 PM CDT

## 2023-05-03 NOTE — TELEPHONE ENCOUNTER
----- Message from Connie Edwin sent at 5/3/2023  1:38 PM CDT -----  Contact: pt  Tonya Bucio  MRN: [id number]  : 1935  PCP: Tasha Atkins  Home Phone      [phone]  Work Phone      Not on file.  [employer]          [phone]      MESSAGE: pt needs the following prescription refilled    allopurinoL (ZYLOPRIM) 300 MG tablet      [facility] [address] [place] [address] [address] [address]  Phone: [phone] Fax: [phone]  Hours: Not open 24 hours    [phone]

## 2023-05-18 ENCOUNTER — HOSPITAL ENCOUNTER (EMERGENCY)
Facility: HOSPITAL | Age: 88
Discharge: HOME | End: 2023-05-18
Attending: SURGERY
Payer: MEDICARE

## 2023-05-18 VITALS
WEIGHT: 255 LBS | BODY MASS INDEX: 38.65 KG/M2 | HEART RATE: 72 BPM | HEIGHT: 68 IN | RESPIRATION RATE: 18 BRPM | OXYGEN SATURATION: 97 % | SYSTOLIC BLOOD PRESSURE: 138 MMHG | DIASTOLIC BLOOD PRESSURE: 65 MMHG | TEMPERATURE: 99 F

## 2023-05-18 DIAGNOSIS — N17.9 ACUTE KIDNEY INJURY: ICD-10-CM

## 2023-05-18 DIAGNOSIS — I21.4 NSTEMI (NON-ST ELEVATED MYOCARDIAL INFARCTION): Primary | ICD-10-CM

## 2023-05-18 DIAGNOSIS — R53.1 WEAKNESS: ICD-10-CM

## 2023-05-18 DIAGNOSIS — R53.1 GENERALIZED WEAKNESS: ICD-10-CM

## 2023-05-18 LAB
ALBUMIN SERPL BCP-MCNC: 3 G/DL (ref 3.5–5.2)
ALP SERPL-CCNC: 87 U/L (ref 55–135)
ALT SERPL W/O P-5'-P-CCNC: 20 U/L (ref 10–44)
ANION GAP SERPL CALC-SCNC: 13 MMOL/L (ref 8–16)
AST SERPL-CCNC: 16 U/L (ref 10–40)
BACTERIA #/AREA URNS HPF: NORMAL /HPF
BASOPHILS # BLD AUTO: 0.04 K/UL (ref 0–0.2)
BASOPHILS NFR BLD: 0.5 % (ref 0–1.9)
BILIRUB SERPL-MCNC: 0.5 MG/DL (ref 0.1–1)
BILIRUB UR QL STRIP: NEGATIVE
BNP SERPL-MCNC: 97 PG/ML (ref 0–99)
BUN SERPL-MCNC: 26 MG/DL (ref 8–23)
CALCIUM SERPL-MCNC: 9.6 MG/DL (ref 8.7–10.5)
CHLORIDE SERPL-SCNC: 100 MMOL/L (ref 95–110)
CLARITY UR: CLEAR
CO2 SERPL-SCNC: 24 MMOL/L (ref 23–29)
COLOR UR: YELLOW
CREAT SERPL-MCNC: 1.5 MG/DL (ref 0.5–1.4)
DIFFERENTIAL METHOD: ABNORMAL
EOSINOPHIL # BLD AUTO: 0 K/UL (ref 0–0.5)
EOSINOPHIL NFR BLD: 0.2 % (ref 0–8)
ERYTHROCYTE [DISTWIDTH] IN BLOOD BY AUTOMATED COUNT: 17.2 % (ref 11.5–14.5)
EST. GFR  (NO RACE VARIABLE): 34 ML/MIN/1.73 M^2
GLUCOSE SERPL-MCNC: 158 MG/DL (ref 70–110)
GLUCOSE UR QL STRIP: NEGATIVE
HCT VFR BLD AUTO: 32.9 % (ref 37–48.5)
HGB BLD-MCNC: 10.1 G/DL (ref 12–16)
HGB UR QL STRIP: ABNORMAL
HYALINE CASTS #/AREA URNS LPF: 0 /LPF
IMM GRANULOCYTES # BLD AUTO: 0.07 K/UL (ref 0–0.04)
IMM GRANULOCYTES NFR BLD AUTO: 0.8 % (ref 0–0.5)
KETONES UR QL STRIP: NEGATIVE
LEUKOCYTE ESTERASE UR QL STRIP: NEGATIVE
LYMPHOCYTES # BLD AUTO: 1.4 K/UL (ref 1–4.8)
LYMPHOCYTES NFR BLD: 16.3 % (ref 18–48)
MAGNESIUM SERPL-MCNC: 1.6 MG/DL (ref 1.6–2.6)
MCH RBC QN AUTO: 27.2 PG (ref 27–31)
MCHC RBC AUTO-ENTMCNC: 30.7 G/DL (ref 32–36)
MCV RBC AUTO: 88 FL (ref 82–98)
MICROSCOPIC COMMENT: NORMAL
MONOCYTES # BLD AUTO: 0.7 K/UL (ref 0.3–1)
MONOCYTES NFR BLD: 8.6 % (ref 4–15)
NEUTROPHILS # BLD AUTO: 6.1 K/UL (ref 1.8–7.7)
NEUTROPHILS NFR BLD: 73.6 % (ref 38–73)
NITRITE UR QL STRIP: NEGATIVE
NRBC BLD-RTO: 0 /100 WBC
PH UR STRIP: 7 [PH] (ref 5–8)
PLATELET # BLD AUTO: 257 K/UL (ref 150–450)
PMV BLD AUTO: 9.6 FL (ref 9.2–12.9)
POTASSIUM SERPL-SCNC: 4.4 MMOL/L (ref 3.5–5.1)
PROT SERPL-MCNC: 7.3 G/DL (ref 6–8.4)
PROT UR QL STRIP: ABNORMAL
RBC # BLD AUTO: 3.72 M/UL (ref 4–5.4)
RBC #/AREA URNS HPF: 1 /HPF (ref 0–4)
SODIUM SERPL-SCNC: 137 MMOL/L (ref 136–145)
SP GR UR STRIP: 1.01 (ref 1–1.03)
SQUAMOUS #/AREA URNS HPF: 1 /HPF
TROPONIN I SERPL DL<=0.01 NG/ML-MCNC: 0.04 NG/ML (ref 0–0.03)
TROPONIN I SERPL DL<=0.01 NG/ML-MCNC: 0.04 NG/ML (ref 0–0.03)
URN SPEC COLLECT METH UR: ABNORMAL
UROBILINOGEN UR STRIP-ACNC: NEGATIVE EU/DL
WBC # BLD AUTO: 8.29 K/UL (ref 3.9–12.7)
WBC #/AREA URNS HPF: 4 /HPF (ref 0–5)

## 2023-05-18 PROCEDURE — 36415 COLL VENOUS BLD VENIPUNCTURE: CPT | Mod: HCNC | Performed by: EMERGENCY MEDICINE

## 2023-05-18 PROCEDURE — 83735 ASSAY OF MAGNESIUM: CPT | Mod: HCNC | Performed by: EMERGENCY MEDICINE

## 2023-05-18 PROCEDURE — 84484 ASSAY OF TROPONIN QUANT: CPT | Mod: 91,HCNC | Performed by: EMERGENCY MEDICINE

## 2023-05-18 PROCEDURE — 83880 ASSAY OF NATRIURETIC PEPTIDE: CPT | Mod: HCNC | Performed by: EMERGENCY MEDICINE

## 2023-05-18 PROCEDURE — 93010 EKG 12-LEAD: ICD-10-PCS | Mod: HCNC,,, | Performed by: INTERNAL MEDICINE

## 2023-05-18 PROCEDURE — 80053 COMPREHEN METABOLIC PANEL: CPT | Mod: HCNC | Performed by: EMERGENCY MEDICINE

## 2023-05-18 PROCEDURE — 85025 COMPLETE CBC W/AUTO DIFF WBC: CPT | Mod: HCNC | Performed by: EMERGENCY MEDICINE

## 2023-05-18 PROCEDURE — 93010 ELECTROCARDIOGRAM REPORT: CPT | Mod: HCNC,,, | Performed by: INTERNAL MEDICINE

## 2023-05-18 PROCEDURE — 25000003 PHARM REV CODE 250: Mod: HCNC | Performed by: EMERGENCY MEDICINE

## 2023-05-18 PROCEDURE — 96361 HYDRATE IV INFUSION ADD-ON: CPT | Mod: HCNC

## 2023-05-18 PROCEDURE — 81000 URINALYSIS NONAUTO W/SCOPE: CPT | Mod: HCNC | Performed by: EMERGENCY MEDICINE

## 2023-05-18 PROCEDURE — 93005 ELECTROCARDIOGRAM TRACING: CPT | Mod: HCNC

## 2023-05-18 PROCEDURE — 96360 HYDRATION IV INFUSION INIT: CPT | Mod: HCNC

## 2023-05-18 PROCEDURE — 99285 EMERGENCY DEPT VISIT HI MDM: CPT | Mod: 25,HCNC

## 2023-05-18 RX ORDER — SODIUM CHLORIDE 9 MG/ML
1000 INJECTION, SOLUTION INTRAVENOUS
Status: COMPLETED | OUTPATIENT
Start: 2023-05-18 | End: 2023-05-18

## 2023-05-18 RX ADMIN — SODIUM CHLORIDE 1000 ML: 9 INJECTION, SOLUTION INTRAVENOUS at 08:05

## 2023-05-19 NOTE — ED NOTES
Pt. laying on ED stretcher, well rested, AAOx3, no distress noted, respirations equal and unlabored, call bell within reach, bed locked, on lowest position. Pt's family at bedside. Awaiting MD orders.

## 2023-05-19 NOTE — DISCHARGE INSTRUCTIONS
RETURN TO THE ED FOR WORSENING OF CONDITION.  FOLLOW UP WITH DR. LEIVA OR DR. RIOS ON TOMORROW.  CALL FOR AN APPOINTMENT TO BE SEEN ON TOMORROW.

## 2023-05-19 NOTE — ED PROVIDER NOTES
Encounter Date: 5/18/2023       History     Chief Complaint   Patient presents with    Weakness     Pt c/o weakness that started this am.     88 YO FEMALE WHO COMES IN TODAY DUE TO GENERALIZED WEAKNESS.  SHE STATES THAT IT STARTED ON TODAY.  SHE DENIES ANY CHEST PAIN, FEVER, CHILLS, COUGH, SHORTNESS OF BREATH, ABDOMINAL PAIN, NAUSEA, VOMITING, DIARRHEA, OR OTHER COMPLAINTS.  FAMILY STATES THAT THE PATIENT NORMALLY HAS CHRONIC UTI'S.        Review of patient's allergies indicates:  No Known Allergies  Past Medical History:   Diagnosis Date    A-fib     Acute bronchitis with asthma     Anemia due to stage 3 chronic kidney disease     Angina pectoris     Anticoagulant long-term use     Asthma     Back pain     Cancer     Chest pain, musculoskeletal 7/16/2013    Chronic bronchitis     Class 3 obesity with serious comorbidity and body mass index (BMI) of 45.0 to 49.9 in adult 11/17/2016    Colon polyps     COPD exacerbation 3/13/2020    Gout, unspecified     History of cervical cancer     Hypertension associated with stage 4 chronic kidney disease due to type 2 diabetes mellitus 8/14/2019    Hypothyroidism     Obesity     Osteoarthritis     Other and unspecified hyperlipidemia     PE (pulmonary embolism)     Renal manifestation of secondary diabetes mellitus     Thyroid disease     Trouble in sleeping     Type 2 diabetes mellitus with ophthalmic manifestations     Type 2 diabetes with peripheral circulatory disorder, controlled     Type II or unspecified type diabetes mellitus without mention of complication, uncontrolled     States everything okay-previous dx    Unspecified essential hypertension     Urinary incontinence     Uterine cancer     Uterine cancer      Past Surgical History:   Procedure Laterality Date    ADENOIDECTOMY      EYE SURGERY Right     right eye cataract    HYSTERECTOMY  2008    LIZ-BSO    tonsilectomy      TONSILLECTOMY  1945    TOTAL ABDOMINAL HYSTERECTOMY  2008    TOTAL ABDOMINAL HYSTERECTOMY W/  BILATERAL SALPINGOOPHORECTOMY  2008     Family History   Problem Relation Age of Onset    Heart disease Mother     Arthritis Father     Breast cancer Sister     Ovarian cancer Sister     Cancer Brother         Throat cancer    Arthritis Daughter         back    No Known Problems Son     Heart disease Brother     Stroke Brother     Heart disease Brother         stents heart and legs     Diabetes Brother     Hyperlipidemia Brother     Anemia Daughter     Arthritis Daughter         RA    Diabetes Daughter     Arthritis Daughter         RA    Glaucoma Daughter     Diabetes Daughter     Liver disease Daughter     Arthritis Daughter         back     Stroke Son     No Known Problems Son      Social History     Tobacco Use    Smoking status: Former     Packs/day: 0.33     Years: 13.00     Pack years: 4.29     Types: Cigarettes     Start date: 1951     Quit date: 1964     Years since quittin.8    Smokeless tobacco: Never   Substance Use Topics    Alcohol use: No    Drug use: No     Review of Systems   Constitutional: Negative.    HENT: Negative.     Eyes: Negative.    Respiratory: Negative.     Cardiovascular: Negative.    Gastrointestinal: Negative.    Genitourinary: Negative.    Musculoskeletal: Negative.    Skin: Negative.    Neurological:  Positive for weakness.   Hematological: Negative.    Psychiatric/Behavioral: Negative.       Physical Exam     Initial Vitals [23 1734]   BP Pulse Resp Temp SpO2   (!) 172/74 79 18 98.6 °F (37 °C) 95 %      MAP       --         Physical Exam    Nursing note and vitals reviewed.  Constitutional: She appears well-developed and well-nourished.   HENT:   Head: Normocephalic and atraumatic.   Eyes: EOM are normal. Pupils are equal, round, and reactive to light.   Neck: Neck supple.   Normal range of motion.  Cardiovascular:  Normal rate and regular rhythm.           Pulmonary/Chest: Breath sounds normal.   Abdominal: Abdomen is soft.   Musculoskeletal:         General:  Normal range of motion.      Cervical back: Normal range of motion and neck supple.     Neurological: She is alert and oriented to person, place, and time. She has normal strength. GCS score is 15. GCS eye subscore is 4. GCS verbal subscore is 5. GCS motor subscore is 6.   Skin: Skin is warm.   Psychiatric: She has a normal mood and affect.       ED Course   Procedures  Labs Reviewed   CBC W/ AUTO DIFFERENTIAL - Abnormal; Notable for the following components:       Result Value    RBC 3.72 (*)     Hemoglobin 10.1 (*)     Hematocrit 32.9 (*)     MCHC 30.7 (*)     RDW 17.2 (*)     Immature Granulocytes 0.8 (*)     Immature Grans (Abs) 0.07 (*)     Gran % 73.6 (*)     Lymph % 16.3 (*)     All other components within normal limits   COMPREHENSIVE METABOLIC PANEL - Abnormal; Notable for the following components:    Glucose 158 (*)     BUN 26 (*)     Creatinine 1.5 (*)     Albumin 3.0 (*)     eGFR 34 (*)     All other components within normal limits   URINALYSIS, REFLEX TO URINE CULTURE - Abnormal; Notable for the following components:    Protein, UA 1+ (*)     Occult Blood UA Trace (*)     All other components within normal limits    Narrative:     Specimen Source->Urine   TROPONIN I - Abnormal; Notable for the following components:    Troponin I 0.042 (*)     All other components within normal limits   TROPONIN I - Abnormal; Notable for the following components:    Troponin I 0.044 (*)     All other components within normal limits    Narrative:     TWO HOUR TROPONIN PLEASE   MAGNESIUM   B-TYPE NATRIURETIC PEPTIDE   URINALYSIS MICROSCOPIC    Narrative:     Specimen Source->Urine     EKG Readings: (Independently Interpreted)   EKG REVEALED NSR WITH A RATE OF 71.  T WAVE INVERSION IN LEADS 1 AND AVL.       Imaging Results              CT Head Without Contrast (Final result)  Result time 05/18/23 19:41:27      Final result by Jovanny Arteaga MD (05/18/23 19:41:27)                   Impression:      1. No acute intracranial  process.  2. Right paranasal sinus disease.  3. Involutional changes with chronic microvascular ischemic changes.      Electronically signed by: Jovanny Arteaga  Date:    05/18/2023  Time:    19:41               Narrative:    EXAMINATION:  CT HEAD WITHOUT CONTRAST    CLINICAL HISTORY:  HEADACHE/WEAKNESS;    TECHNIQUE:  Low dose axial CT images obtained throughout the head without intravenous contrast. Sagittal and coronal reconstructions were performed.    COMPARISON:  05/29/2018    FINDINGS:  Intracranial compartment:    Ventricles and sulci are normal in size for age without evidence of hydrocephalus. No extra-axial blood or fluid collections.    Mild involutional changes.  Moderate probable chronic microvascular ischemic changes in the periventricular white matter.  No parenchymal mass, hemorrhage, edema or major vascular distribution infarct.    Skull/extracranial contents (limited evaluation): No fracture. Right maxillary and ethmoid sinus disease.                                       X-Ray Chest AP Portable (Final result)  Result time 05/18/23 19:42:11      Final result by Jovanny Arteaga MD (05/18/23 19:42:11)                   Impression:      No acute abnormality.      Electronically signed by: Jovanny Arteaga  Date:    05/18/2023  Time:    19:42               Narrative:    EXAMINATION:  XR CHEST AP PORTABLE    CLINICAL HISTORY:  Weakness    TECHNIQUE:  Single frontal view of the chest was performed.    COMPARISON:  01/23/2022    FINDINGS:  The lungs are clear, with normal appearance of pulmonary vasculature and no pleural effusion or pneumothorax.    The cardiac silhouette is normal in size. The hilar and mediastinal contours are unremarkable.    Bones are intact.                                    X-Rays:   Independently Interpreted Readings:   Other Readings:  CT AND RADIOGRAPHIC IMAGING IS NEGATIVE FOR ANY ACUTE PATHOLOGY.   Medications   0.9%  NaCl infusion (1,000 mLs Intravenous New Bag 5/18/23 2018)      Medical Decision Making:   Differential Diagnosis:   NSTEMI, STEMI, CVA/TIA, UTI, PNEUMONIA  ED Management:  86 YO FEMALE WHO COMES IN TODAY DUE TO GENERALIZED WEAKNESS.  HER EVALUATION DID REVEAL  AN ELEVATED TROPONIN IN ADDITION TO T WAVE INVERSION IN THE LATERAL LEADS.  I HAVE   SPOKEN WITH THE PATIENT AND HER FAMILY IN REGARDS TO BEING ADMITTED.  THEY ARE   IN AGREEMENT.  WILL REACH OUT TO THE HOSPITALIST IN REGARDS TO BEING ADMITTED WHEN   HIS EVALUATION IS COMPLETE.     ED EVALUATION REVEALED AN NSTEMI AND ACUTE KIDNEY INJURY.  WILL REACH OUT TO THE HOSPITALIST  IN REGARDS TO BEING ADMITTED.      I DID SPEAK WITH THE HOSPITALIST IN REGARDS TO ADMITTING THE PATIENT.  UPON   FURTHER REVIEW, THE PATIENT'S TROPONIN HAS BEEN STABLE OVER THE LAST YEAR.  SHE DENIES  ANY CHEST PAIN, FEVER, CHILLS, COUGH, SHORTNESS OF BREATH, NAUSEA, VOMITING, OR DIARRHEA.    WILL CHECK A TWO HOUR TROPONIN AND GIVE IVF'S.  HOME PENDING NO CHANGES IN HER  TROPONIN.     TWO HOUR TROPONIN IS STABLE AT 0.44.  THE PATIENT DENIES ANY CHEST  PAIN, SHORTNESS OF BREATH, FEVER, CHILLS, COUGH, OR OTHER COMPLAINTS.  I DID SPEAK   WITH THE HOSPITALIST IN REGARDS TO THE PATIENT.  WE BOTH AGREED THAT HER TROPONIN   HAS BEEN STABLE OVER THE LAST YEAR IN THE 0.40 TO 0.44 RANGE.  DR. RIOS STATED THAT  THE PATIENT CAN BE SAFELY DISCHARGED HOME WITH FOLLOW UP WITH DR. RIOS AND/OR   DR. LEIVA ON TOMORROW MORNING.  FAMILY WAS INSTRUCTED TO CALL THE OFFICE IN THE   MORNING FOR AN APPOINTMENT ON TOMORROW.  THE PATIENT AND FAMILY WERE INSTRUCTED TO RETURN   TO THE ED FOR WORSENING OF CONDITION.  HOME TODAY.                         Clinical Impression:   Final diagnoses:  [R53.1] Weakness  [I21.4] NSTEMI (non-ST elevated myocardial infarction) (Primary)  [R53.1] Generalized weakness  [N17.9] Acute kidney injury        ED Disposition Condition    Discharge Stable          ED Prescriptions    None       Follow-up Information       Follow up With  Specialties Details Why Contact Info    Tasha Atkins MD [facility] TO RECHECK TODAY'S COMPLAINT. [address]  [facility] [address]  [phone]               Kia Zurita MD  05/18/23 2563

## 2023-05-23 ENCOUNTER — OFFICE VISIT (OUTPATIENT)
Dept: INTERNAL MEDICINE | Facility: CLINIC | Age: 88
End: 2023-05-23
Payer: MEDICARE

## 2023-05-23 VITALS
DIASTOLIC BLOOD PRESSURE: 70 MMHG | OXYGEN SATURATION: 96 % | HEART RATE: 71 BPM | SYSTOLIC BLOOD PRESSURE: 140 MMHG | WEIGHT: 257.25 LBS | RESPIRATION RATE: 18 BRPM | BODY MASS INDEX: 38.99 KG/M2 | HEIGHT: 68 IN

## 2023-05-23 DIAGNOSIS — N17.9 AKI (ACUTE KIDNEY INJURY): ICD-10-CM

## 2023-05-23 DIAGNOSIS — R45.89 ANXIETY ABOUT HEALTH: Primary | ICD-10-CM

## 2023-05-23 DIAGNOSIS — R79.89 ELEVATED TROPONIN: ICD-10-CM

## 2023-05-23 PROCEDURE — 1159F MED LIST DOCD IN RCRD: CPT | Mod: CPTII,S$GLB,, | Performed by: INTERNAL MEDICINE

## 2023-05-23 PROCEDURE — 1101F PT FALLS ASSESS-DOCD LE1/YR: CPT | Mod: CPTII,S$GLB,, | Performed by: INTERNAL MEDICINE

## 2023-05-23 PROCEDURE — 99999 PR PBB SHADOW E&M-EST. PATIENT-LVL IV: ICD-10-PCS | Mod: PBBFAC,HCNC,, | Performed by: INTERNAL MEDICINE

## 2023-05-23 PROCEDURE — 1126F AMNT PAIN NOTED NONE PRSNT: CPT | Mod: CPTII,S$GLB,, | Performed by: INTERNAL MEDICINE

## 2023-05-23 PROCEDURE — 99213 OFFICE O/P EST LOW 20 MIN: CPT | Mod: S$GLB,,, | Performed by: INTERNAL MEDICINE

## 2023-05-23 PROCEDURE — 3288F FALL RISK ASSESSMENT DOCD: CPT | Mod: CPTII,S$GLB,, | Performed by: INTERNAL MEDICINE

## 2023-05-23 PROCEDURE — 1160F RVW MEDS BY RX/DR IN RCRD: CPT | Mod: CPTII,S$GLB,, | Performed by: INTERNAL MEDICINE

## 2023-05-23 PROCEDURE — 1160F PR REVIEW ALL MEDS BY PRESCRIBER/CLIN PHARMACIST DOCUMENTED: ICD-10-PCS | Mod: CPTII,S$GLB,, | Performed by: INTERNAL MEDICINE

## 2023-05-23 PROCEDURE — 99999 PR PBB SHADOW E&M-EST. PATIENT-LVL IV: CPT | Mod: PBBFAC,HCNC,, | Performed by: INTERNAL MEDICINE

## 2023-05-23 PROCEDURE — 1101F PR PT FALLS ASSESS DOC 0-1 FALLS W/OUT INJ PAST YR: ICD-10-PCS | Mod: CPTII,S$GLB,, | Performed by: INTERNAL MEDICINE

## 2023-05-23 PROCEDURE — 99213 PR OFFICE/OUTPT VISIT, EST, LEVL III, 20-29 MIN: ICD-10-PCS | Mod: S$GLB,,, | Performed by: INTERNAL MEDICINE

## 2023-05-23 PROCEDURE — 1126F PR PAIN SEVERITY QUANTIFIED, NO PAIN PRESENT: ICD-10-PCS | Mod: CPTII,S$GLB,, | Performed by: INTERNAL MEDICINE

## 2023-05-23 PROCEDURE — 1159F PR MEDICATION LIST DOCUMENTED IN MEDICAL RECORD: ICD-10-PCS | Mod: CPTII,S$GLB,, | Performed by: INTERNAL MEDICINE

## 2023-05-23 PROCEDURE — 3288F PR FALLS RISK ASSESSMENT DOCUMENTED: ICD-10-PCS | Mod: CPTII,S$GLB,, | Performed by: INTERNAL MEDICINE

## 2023-05-23 RX ORDER — SERTRALINE HYDROCHLORIDE 50 MG/1
50 TABLET, FILM COATED ORAL DAILY
Qty: 30 TABLET | Refills: 2 | Status: SHIPPED | OUTPATIENT
Start: 2023-05-23 | End: 2023-07-03 | Stop reason: SDUPTHER

## 2023-05-23 NOTE — PROGRESS NOTES
HPI:  Tonya Bucio is a 87 y.o. female here for Hospital Follow Up     Weakness       Pt c/o weakness that started this am.      86 YO FEMALE WHO COMES IN TODAY DUE TO GENERALIZED WEAKNESS.  SHE STATES THAT IT STARTED ON TODAY.  SHE DENIES ANY CHEST PAIN, FEVER, CHILLS, COUGH, SHORTNESS OF BREATH, ABDOMINAL PAIN, NAUSEA, VOMITING, DIARRHEA, OR OTHER COMPLAINTS.  FAMILY STATES THAT THE PATIENT NORMALLY HAS CHRONIC UTI'S.          Review of patient's allergies indicates:  No Known Allergies       Past Medical History:   Diagnosis Date    A-fib      Acute bronchitis with asthma      Anemia due to stage 3 chronic kidney disease      Angina pectoris      Anticoagulant long-term use      Asthma      Back pain      Cancer      Chest pain, musculoskeletal 7/16/2013    Chronic bronchitis      Class 3 obesity with serious comorbidity and body mass index (BMI) of 45.0 to 49.9 in adult 11/17/2016    Colon polyps      COPD exacerbation 3/13/2020    Gout, unspecified      History of cervical cancer      Hypertension associated with stage 4 chronic kidney disease due to type 2 diabetes mellitus 8/14/2019    Hypothyroidism      Obesity      Osteoarthritis      Other and unspecified hyperlipidemia      PE (pulmonary embolism)      Renal manifestation of secondary diabetes mellitus      Thyroid disease      Trouble in sleeping      Type 2 diabetes mellitus with ophthalmic manifestations      Type 2 diabetes with peripheral circulatory disorder, controlled      Type II or unspecified type diabetes mellitus without mention of complication, uncontrolled       States everything okay-previous dx    Unspecified essential hypertension      Urinary incontinence      Uterine cancer      Uterine cancer              Past Surgical History:   Procedure Laterality Date    ADENOIDECTOMY        EYE SURGERY Right       right eye cataract    HYSTERECTOMY   2008     LIZ-BSO    tonsilectomy        TONSILLECTOMY   1945    TOTAL ABDOMINAL HYSTERECTOMY        TOTAL ABDOMINAL HYSTERECTOMY W/ BILATERAL SALPINGOOPHORECTOMY   2008            Family History   Problem Relation Age of Onset    Heart disease Mother      Arthritis Father      Breast cancer Sister      Ovarian cancer Sister      Cancer Brother           Throat cancer    Arthritis Daughter           back    No Known Problems Son      Heart disease Brother      Stroke Brother      Heart disease Brother           stents heart and legs     Diabetes Brother      Hyperlipidemia Brother      Anemia Daughter      Arthritis Daughter           RA    Diabetes Daughter      Arthritis Daughter           RA    Glaucoma Daughter      Diabetes Daughter      Liver disease Daughter      Arthritis Daughter           back     Stroke Son      No Known Problems Son        Social History            Tobacco Use    Smoking status: Former       Packs/day: 0.33       Years: 13.00       Pack years: 4.29       Types: Cigarettes       Start date: 1951       Quit date: 1964       Years since quittin.8    Smokeless tobacco: Never   Substance Use Topics    Alcohol use: No    Drug use: No      Review of Systems   Constitutional: Negative.    HENT: Negative.     Eyes: Negative.    Respiratory: Negative.     Cardiovascular: Negative.    Gastrointestinal: Negative.    Genitourinary: Negative.    Musculoskeletal: Negative.    Skin: Negative.    Neurological:  Positive for weakness.   Hematological: Negative.    Psychiatric/Behavioral: Negative.        Physical Exam             Initial Vitals [23 1734]   BP Pulse Resp Temp SpO2   (!) 172/74 79 18 98.6 °F (37 °C) 95 %       MAP           --              Physical Exam     Nursing note and vitals reviewed.  Constitutional: She appears well-developed and well-nourished.   HENT:   Head: Normocephalic and atraumatic.   Eyes: EOM are normal. Pupils are equal, round, and reactive to light.   Neck: Neck supple.   Normal range of motion.  Cardiovascular:  Normal rate and regular  rhythm.           Pulmonary/Chest: Breath sounds normal.   Abdominal: Abdomen is soft.   Musculoskeletal:         General: Normal range of motion.      Cervical back: Normal range of motion and neck supple.      Neurological: She is alert and oriented to person, place, and time. She has normal strength. GCS score is 15. GCS eye subscore is 4. GCS verbal subscore is 5. GCS motor subscore is 6.   Skin: Skin is warm.   Psychiatric: She has a normal mood and affect.         ED Course   Procedures        Labs Reviewed   CBC W/ AUTO DIFFERENTIAL - Abnormal; Notable for the following components:       Result Value      RBC 3.72 (*)       Hemoglobin 10.1 (*)       Hematocrit 32.9 (*)       MCHC 30.7 (*)       RDW 17.2 (*)       Immature Granulocytes 0.8 (*)       Immature Grans (Abs) 0.07 (*)       Gran % 73.6 (*)       Lymph % 16.3 (*)       All other components within normal limits   COMPREHENSIVE METABOLIC PANEL - Abnormal; Notable for the following components:     Glucose 158 (*)       BUN 26 (*)       Creatinine 1.5 (*)       Albumin 3.0 (*)       eGFR 34 (*)       All other components within normal limits   URINALYSIS, REFLEX TO URINE CULTURE - Abnormal; Notable for the following components:     Protein, UA 1+ (*)       Occult Blood UA Trace (*)       All other components within normal limits     Narrative:      Specimen Source->Urine   TROPONIN I - Abnormal; Notable for the following components:     Troponin I 0.042 (*)       All other components within normal limits   TROPONIN I - Abnormal; Notable for the following components:     Troponin I 0.044 (*)       All other components within normal limits     Narrative:      TWO HOUR TROPONIN PLEASE   MAGNESIUM   B-TYPE NATRIURETIC PEPTIDE   URINALYSIS MICROSCOPIC     Narrative:      Specimen Source->Urine      EKG Readings: (Independently Interpreted)   EKG REVEALED NSR WITH A RATE OF 71.  T WAVE INVERSION IN LEADS 1 AND AVL.        Imaging Results                  CT  Head Without Contrast (Final result)  Result time 05/18/23 19:41:27            Final result by Jovanny Arteaga MD (05/18/23 19:41:27)                           Impression:        1. No acute intracranial process.  2. Right paranasal sinus disease.  3. Involutional changes with chronic microvascular ischemic changes.        Electronically signed by:        Jovanny Arteaga  Date:                                        05/18/2023  Time:                                                19:41                     Narrative:     EXAMINATION:  CT HEAD WITHOUT CONTRAST     CLINICAL HISTORY:  HEADACHE/WEAKNESS;     TECHNIQUE:  Low dose axial CT images obtained throughout the head without intravenous contrast. Sagittal and coronal reconstructions were performed.     COMPARISON:  05/29/2018     FINDINGS:  Intracranial compartment:     Ventricles and sulci are normal in size for age without evidence of hydrocephalus. No extra-axial blood or fluid collections.     Mild involutional changes.  Moderate probable chronic microvascular ischemic changes in the periventricular white matter.  No parenchymal mass, hemorrhage, edema or major vascular distribution infarct.     Skull/extracranial contents (limited evaluation): No fracture. Right maxillary and ethmoid sinus disease.                                                X-Ray Chest AP Portable (Final result)  Result time 05/18/23 19:42:11            Final result by Jovanny Arteaga MD (05/18/23 19:42:11)                           Impression:        No acute abnormality.        Electronically signed by:        Jovanny Arteaga  Date:                                        05/18/2023  Time:                                                19:42                     Narrative:     EXAMINATION:  XR CHEST AP PORTABLE     CLINICAL HISTORY:  Weakness     TECHNIQUE:  Single frontal view of the chest was performed.     COMPARISON:  01/23/2022     FINDINGS:  The lungs are clear, with normal appearance  of pulmonary vasculature and no pleural effusion or pneumothorax.     The cardiac silhouette is normal in size. The hilar and mediastinal contours are unremarkable.     Bones are intact.                                            X-Rays:   Independently Interpreted Readings:   Other Readings:  CT AND RADIOGRAPHIC IMAGING IS NEGATIVE FOR ANY ACUTE PATHOLOGY.   Medications   0.9%  NaCl infusion (1,000 mLs Intravenous New Bag 5/18/23 2018)      Medical Decision Making:   Differential Diagnosis:   NSTEMI, STEMI, CVA/TIA, UTI, PNEUMONIA  ED Management:  88 YO FEMALE WHO COMES IN TODAY DUE TO GENERALIZED WEAKNESS.  HER EVALUATION DID REVEAL  AN ELEVATED TROPONIN IN ADDITION TO T WAVE INVERSION IN THE LATERAL LEADS.  I HAVE   SPOKEN WITH THE PATIENT AND HER FAMILY IN REGARDS TO BEING ADMITTED.  THEY ARE   IN AGREEMENT.  WILL REACH OUT TO THE HOSPITALIST IN REGARDS TO BEING ADMITTED WHEN   HIS EVALUATION IS COMPLETE.      ED EVALUATION REVEALED AN NSTEMI AND ACUTE KIDNEY INJURY.  WILL REACH OUT TO THE HOSPITALIST  IN REGARDS TO BEING ADMITTED.       I DID SPEAK WITH THE HOSPITALIST IN REGARDS TO ADMITTING THE PATIENT.  UPON   FURTHER REVIEW, THE PATIENT'S TROPONIN HAS BEEN STABLE OVER THE LAST YEAR.  SHE DENIES  ANY CHEST PAIN, FEVER, CHILLS, COUGH, SHORTNESS OF BREATH, NAUSEA, VOMITING, OR DIARRHEA.    WILL CHECK A TWO HOUR TROPONIN AND GIVE IVF'S.  HOME PENDING NO CHANGES IN HER  TROPONIN.      TWO HOUR TROPONIN IS STABLE AT 0.44.  THE PATIENT DENIES ANY CHEST  PAIN, SHORTNESS OF BREATH, FEVER, CHILLS, COUGH, OR OTHER COMPLAINTS.  I DID SPEAK   WITH THE HOSPITALIST IN REGARDS TO THE PATIENT.  WE BOTH AGREED THAT HER TROPONIN   HAS BEEN STABLE OVER THE LAST YEAR IN THE 0.40 TO 0.44 RANGE.  DR. RIOS STATED THAT  THE PATIENT CAN BE SAFELY DISCHARGED HOME WITH FOLLOW UP WITH DR. RIOS AND/OR   DR. LEIVA ON TOMORROW MORNING.  FAMILY WAS INSTRUCTED TO CALL THE OFFICE IN THE   MORNING FOR AN APPOINTMENT ON TOMORROW.  THE  "PATIENT AND FAMILY WERE INSTRUCTED TO RETURN   TO THE ED FOR WORSENING OF CONDITION.  HOME TODAY.        5/23/23  Tonya Bucio is a 87 y.o. female   Went to ER " I was weak " ; keeps checking her BP's.  She has been seeing Dr cox .  She is reporting anxiety about her health .                     Clinical Impression:   Final diagnoses:  [R53.1] Weakness  [I21.4] NSTEMI (non-ST elevated myocardial infarction) (Primary)  [R53.1] Generalized weakness  [N17.9] Acute kidney injury       Review of Systems   Constitutional:  Positive for fatigue. Negative for chills and fever.   HENT:  Negative for congestion, hearing loss, sinus pressure and sore throat.    Eyes:  Negative for photophobia.   Respiratory:  Negative for cough, choking, chest tightness, shortness of breath and wheezing.    Cardiovascular:  Negative for chest pain and palpitations.   Gastrointestinal:  Negative for blood in stool, nausea and vomiting.   Endocrine: Negative for polydipsia and polyphagia.   Genitourinary:  Negative for dysuria and hematuria.   Musculoskeletal:  Negative for arthralgias, myalgias and neck pain.   Skin:  Negative for pallor.   Neurological:  Negative for dizziness, weakness and numbness.   Hematological:  Does not bruise/bleed easily.   Psychiatric/Behavioral:  Negative for confusion and suicidal ideas. The patient is not nervous/anxious.     A review of systems was performed and is negative except as noted above.    Objective:  Vitals:    05/23/23 1328   BP: (!) 140/70   Pulse: 71   Resp: 18   SpO2: 96%   Weight: 116.7 kg (257 lb 4.4 oz)   Height: 5' 8" (1.727 m)      Physical Exam  Vitals and nursing note reviewed.   Constitutional:       Appearance: She is well-developed.   HENT:      Head: Normocephalic and atraumatic.      Right Ear: External ear normal.      Left Ear: External ear normal.   Eyes:      Conjunctiva/sclera: Conjunctivae normal.      Pupils: Pupils are equal, round, and reactive to light.   Neck:      " Thyroid: No thyromegaly.      Vascular: No JVD.      Trachea: No tracheal deviation.   Cardiovascular:      Rate and Rhythm: Normal rate and regular rhythm.      Pulses:           Dorsalis pedis pulses are 1+ on the right side and 1+ on the left side.        Posterior tibial pulses are 1+ on the right side and 1+ on the left side.      Heart sounds: Normal heart sounds.   Pulmonary:      Effort: Pulmonary effort is normal. No respiratory distress.      Breath sounds: Normal breath sounds. No wheezing or rales.   Chest:      Chest wall: No tenderness.   Abdominal:      General: Bowel sounds are normal. There is no distension.      Palpations: Abdomen is soft. There is no mass.      Tenderness: There is no abdominal tenderness. There is no guarding or rebound.   Musculoskeletal:         General: Normal range of motion.      Cervical back: Normal range of motion and neck supple.      Comments: Bilateral knee oa changes.     Feet:      Right foot:      Protective Sensation: 7 sites tested.        Skin integrity: Dry skin present. No ulcer or erythema.      Left foot:      Protective Sensation: 7 sites tested.  4 sites sensed.      Skin integrity: Dry skin present. No ulcer or erythema.   Lymphadenopathy:      Cervical: No cervical adenopathy.   Skin:     General: Skin is warm and dry.      Comments: Scaly rash feet bilateral    Neurological:      Mental Status: She is alert and oriented to person, place, and time.      Cranial Nerves: No cranial nerve deficit.      Motor: No abnormal muscle tone.      Coordination: Coordination normal.      Deep Tendon Reflexes: Reflexes are normal and symmetric.        Assessment/Plan:  1. Anxiety about health  -     sertraline (ZOLOFT) 50 MG tablet; Take 1 tablet (50 mg total) by mouth once daily.  Dispense: 30 tablet; Refill: 2  She is very anxious   Lets try to see if ssri can help her with anxiety     2. Elevated troponin  -     Ambulatory referral/consult to Cardiology; Future;  Expected date: 05/30/2023     EKG reviewed   Sinus rhythm with 1st degree A-V block T wave abnormality, consider lateral ischemia Abnormal ECG When compared        3. BAYRON   She is taking HCTZ 12.5 mg   DC HCTZ   If any leg swelling or SOB call me  Check BMP in 4 weeks.

## 2023-05-25 DIAGNOSIS — M17.0 PRIMARY OSTEOARTHRITIS OF BOTH KNEES: ICD-10-CM

## 2023-05-25 NOTE — TELEPHONE ENCOUNTER
----- Message from Roxanna Russ sent at 2023 12:52 PM CDT -----  Contact: pt  Tonya Bucio  MRN: [id number]  : 1935  PCP: Tasha Atkins  Home Phone      [phone]  Work Phone      Not on file.  [employer]          [phone]      MESSAGE:     Pt needs a refill of HYDROcodone-acetaminophen (NORCO) 7.5-325 mg per tablet sent to [facility] in [place].        Tonya   [phone]

## 2023-05-25 NOTE — TELEPHONE ENCOUNTER
No care due was identified.  [facility] Embedded Care Due Messages. Reference number: [id number].   5/25/2023 1:30:53 PM CDT

## 2023-05-26 RX ORDER — HYDROCODONE BITARTRATE AND ACETAMINOPHEN 7.5; 325 MG/1; MG/1
1 TABLET ORAL
Qty: 30 TABLET | Refills: 0 | Status: SHIPPED | OUTPATIENT
Start: 2023-05-26 | End: 2023-06-28 | Stop reason: SDUPTHER

## 2023-05-26 NOTE — TELEPHONE ENCOUNTER
----- Message from Rosanne Dawn sent at 2020  2:47 PM CDT -----  Contact: self   Tonya Bucio  MRN: [id number]  : 1935  PCP: Tasha Atkins  Home Phone      [phone]  Work Phone      Not on file.  Mobile          [phone]    MESSAGE:   Patient request to speak to a nurse regarding medical records from Dr. Ibarra.     Phone # [phone]    Pharmacy - [place] [address]     no

## 2023-05-29 ENCOUNTER — OFFICE VISIT (OUTPATIENT)
Dept: CARDIOLOGY | Facility: CLINIC | Age: 88
End: 2023-05-29
Payer: MEDICARE

## 2023-05-29 VITALS
DIASTOLIC BLOOD PRESSURE: 80 MMHG | HEART RATE: 64 BPM | BODY MASS INDEX: 39.12 KG/M2 | HEIGHT: 68 IN | SYSTOLIC BLOOD PRESSURE: 140 MMHG | OXYGEN SATURATION: 97 % | RESPIRATION RATE: 18 BRPM

## 2023-05-29 DIAGNOSIS — I70.0 AORTIC ATHEROSCLEROSIS: ICD-10-CM

## 2023-05-29 DIAGNOSIS — I27.82 CHRONIC SADDLE PULMONARY EMBOLISM WITHOUT ACUTE COR PULMONALE: ICD-10-CM

## 2023-05-29 DIAGNOSIS — E11.9 CONTROLLED TYPE 2 DIABETES MELLITUS WITHOUT COMPLICATION, WITHOUT LONG-TERM CURRENT USE OF INSULIN: ICD-10-CM

## 2023-05-29 DIAGNOSIS — N18.32 STAGE 3B CHRONIC KIDNEY DISEASE: ICD-10-CM

## 2023-05-29 DIAGNOSIS — I26.92 CHRONIC SADDLE PULMONARY EMBOLISM WITHOUT ACUTE COR PULMONALE: ICD-10-CM

## 2023-05-29 DIAGNOSIS — I10 PRIMARY HYPERTENSION: Primary | ICD-10-CM

## 2023-05-29 DIAGNOSIS — E11.69 HYPERLIPIDEMIA ASSOCIATED WITH TYPE 2 DIABETES MELLITUS: ICD-10-CM

## 2023-05-29 DIAGNOSIS — I48.3 TYPICAL ATRIAL FLUTTER: ICD-10-CM

## 2023-05-29 DIAGNOSIS — R79.89 ELEVATED TROPONIN: ICD-10-CM

## 2023-05-29 DIAGNOSIS — E78.5 HYPERLIPIDEMIA ASSOCIATED WITH TYPE 2 DIABETES MELLITUS: ICD-10-CM

## 2023-05-29 DIAGNOSIS — R79.89 TROPONIN LEVEL ELEVATED: ICD-10-CM

## 2023-05-29 DIAGNOSIS — E66.9 OBESITY, UNSPECIFIED CLASSIFICATION, UNSPECIFIED OBESITY TYPE, UNSPECIFIED WHETHER SERIOUS COMORBIDITY PRESENT: ICD-10-CM

## 2023-05-29 PROCEDURE — 1159F PR MEDICATION LIST DOCUMENTED IN MEDICAL RECORD: ICD-10-PCS | Mod: CPTII,S$GLB,, | Performed by: INTERNAL MEDICINE

## 2023-05-29 PROCEDURE — 1126F PR PAIN SEVERITY QUANTIFIED, NO PAIN PRESENT: ICD-10-PCS | Mod: CPTII,S$GLB,, | Performed by: INTERNAL MEDICINE

## 2023-05-29 PROCEDURE — 1159F MED LIST DOCD IN RCRD: CPT | Mod: CPTII,S$GLB,, | Performed by: INTERNAL MEDICINE

## 2023-05-29 PROCEDURE — 3288F PR FALLS RISK ASSESSMENT DOCUMENTED: ICD-10-PCS | Mod: CPTII,S$GLB,, | Performed by: INTERNAL MEDICINE

## 2023-05-29 PROCEDURE — 1126F AMNT PAIN NOTED NONE PRSNT: CPT | Mod: CPTII,S$GLB,, | Performed by: INTERNAL MEDICINE

## 2023-05-29 PROCEDURE — 99999 PR PBB SHADOW E&M-EST. PATIENT-LVL V: CPT | Mod: PBBFAC,,, | Performed by: INTERNAL MEDICINE

## 2023-05-29 PROCEDURE — 99999 PR PBB SHADOW E&M-EST. PATIENT-LVL V: ICD-10-PCS | Mod: PBBFAC,,, | Performed by: INTERNAL MEDICINE

## 2023-05-29 PROCEDURE — 1160F RVW MEDS BY RX/DR IN RCRD: CPT | Mod: CPTII,S$GLB,, | Performed by: INTERNAL MEDICINE

## 2023-05-29 PROCEDURE — 3288F FALL RISK ASSESSMENT DOCD: CPT | Mod: CPTII,S$GLB,, | Performed by: INTERNAL MEDICINE

## 2023-05-29 PROCEDURE — 99204 PR OFFICE/OUTPT VISIT, NEW, LEVL IV, 45-59 MIN: ICD-10-PCS | Mod: S$GLB,,, | Performed by: INTERNAL MEDICINE

## 2023-05-29 PROCEDURE — 99204 OFFICE O/P NEW MOD 45 MIN: CPT | Mod: S$GLB,,, | Performed by: INTERNAL MEDICINE

## 2023-05-29 PROCEDURE — 1160F PR REVIEW ALL MEDS BY PRESCRIBER/CLIN PHARMACIST DOCUMENTED: ICD-10-PCS | Mod: CPTII,S$GLB,, | Performed by: INTERNAL MEDICINE

## 2023-05-29 PROCEDURE — 1101F PR PT FALLS ASSESS DOC 0-1 FALLS W/OUT INJ PAST YR: ICD-10-PCS | Mod: CPTII,S$GLB,, | Performed by: INTERNAL MEDICINE

## 2023-05-29 PROCEDURE — 1101F PT FALLS ASSESS-DOCD LE1/YR: CPT | Mod: CPTII,S$GLB,, | Performed by: INTERNAL MEDICINE

## 2023-05-29 RX ORDER — METOPROLOL SUCCINATE 25 MG/1
25 TABLET, EXTENDED RELEASE ORAL DAILY
Qty: 90 TABLET | Refills: 3 | Status: SHIPPED | OUTPATIENT
Start: 2023-05-29

## 2023-05-29 NOTE — ASSESSMENT & PLAN NOTE
She follows with Nephrology.  Most recent A1c 6.6.  There is 1+ urinary protein.  She is considered diet-controlled.

## 2023-05-29 NOTE — ASSESSMENT & PLAN NOTE
A single event in 2020.  No cardioversion required.  Most recent Electrocardiogram sinus rhythm.  Sotalol 40 mg b.i.d. to continue, Xarelto 15 mg daily to continue.    She is anemic.  She states she has hemorrhoidal bleeding.

## 2023-05-29 NOTE — ASSESSMENT & PLAN NOTE
HCTZ now withdrawn.  Toprol 25 mg prescribed today.  Her blood pressure in the ED was greater than 170 systolic.  I think single agent therapy will be adequate for control.

## 2023-05-29 NOTE — ASSESSMENT & PLAN NOTE
Rosuvastatin and Omega fish oil therapy to continue.  Most recent LDL 75 mg% on rosuvastatin 20 mg daily.

## 2023-05-29 NOTE — ASSESSMENT & PLAN NOTE
A 2015 event.  Long-term anticoagulation currently advised.  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  She has a history of atrial flutter.  Her last echo did not show pulmonary hypertension or RV enlargement.  The study was in 2020.

## 2023-05-29 NOTE — ASSESSMENT & PLAN NOTE
ED no reviewed in detail.  There is no support for ACS based on her presentation and I told the patient she did not have a myocardial infarction.  I can not connect the elevated troponin to a specific diagnosis but I do not think there is clinical significance to the elevation at this time.

## 2023-05-29 NOTE — PROGRESS NOTES
[facility]     Subjective:    Patient ID:  Tonya Bucio is a 87 y.o. female who presents for evaluation of Atrial Flutter, Hypertension, Hyperlipidemia, Chronic Renal Failure, and troponin elevation    Review of patient's allergies indicates:  No Known Allergies   She was in the ED for generalized weakness.  Troponins were drawn with peak troponin 0.044 ng/mL.  Her Electrocardiogram did not show abnormalities and she was in sinus rhythm.  She has had elevated troponins in the past related to atrial flutter, COVID infection.  Several troponins are in the 0.4 elevation range.  She was released.    She has aortic atherosclerosis.  Her last stress test was 2020 which did not show active ischemia with normal ejection fraction.  In 2020 she had a flutter in setting of lung infection and bronchitis.  She takes sotalol 40 mg b.i.d. and Xarelto 15 mg daily.  She has GFR 40 range and follows with Nephrology, serum creatinine 1.5 range.  She has hemoglobin A1c 6.6.  Her urinalysis shows 1+ protein.    She had pulmonary embolus in 2015.  She has had 2 DVTs.  She remembers 1 was after a surgery.  Her last echo in 2020 did not show pulmonary hypertension.  Because of elevated serum creatinine on recent ED visit HCTZ and furosemide were stopped.  Currently her hypertension diagnosis is not treated with medication.  She may be taking HCTZ currently.  In the past she has been on losartan and metoprolol.  She is not a smoker.    She is very sedentary at home.  She does not ambulate very much.  She has not had chest pain in the past.  There is no confirmation of coronary artery disease.  She is a previous smoker but quit many years ago.  She is on Crestor 20 mg with most recent LDL 75 mg %.  She has not been with active lifestyle for years.      Review of Systems   Constitutional: Positive for malaise/fatigue. Negative for chills, decreased appetite,  diaphoresis, fever, night sweats, weight gain and weight loss.   HENT:  Negative for congestion, ear discharge, ear pain, hearing loss, hoarse voice, nosebleeds, odynophagia, sore throat, stridor and tinnitus.    Eyes:  Negative for blurred vision, discharge, double vision, pain, photophobia, redness, vision loss in left eye, vision loss in right eye, visual disturbance and visual halos.   Cardiovascular:  Negative for chest pain, claudication, cyanosis, dyspnea on exertion, irregular heartbeat, leg swelling, near-syncope, orthopnea, palpitations, paroxysmal nocturnal dyspnea and syncope.   Respiratory:  Negative for cough, hemoptysis, shortness of breath, sleep disturbances due to breathing, snoring, sputum production and wheezing.    Endocrine: Negative for cold intolerance, heat intolerance, polydipsia, polyphagia and polyuria.   Hematologic/Lymphatic: Negative for adenopathy and bleeding problem. Does not bruise/bleed easily.   Skin:  Negative for color change, dry skin, flushing, itching, nail changes, poor wound healing, rash, skin cancer, suspicious lesions and unusual hair distribution.   Musculoskeletal:  Negative for arthritis, back pain, falls, gout, joint pain, joint swelling, muscle cramps, muscle weakness, myalgias, neck pain and stiffness.   Gastrointestinal:  Negative for bloating, abdominal pain, anorexia, change in bowel habit, bowel incontinence, constipation, diarrhea, dysphagia, excessive appetite, flatus, heartburn, hematemesis, hematochezia, hemorrhoids, jaundice, melena, nausea and vomiting.   Genitourinary:  Negative for bladder incontinence, decreased libido, dysuria, flank pain, frequency, genital sores, hematuria, hesitancy, incomplete emptying, nocturia and urgency.   Neurological:  Positive for dizziness and weakness. Negative for aphonia, brief paralysis, difficulty with concentration, disturbances in coordination, excessive daytime sleepiness, focal weakness, headaches,  "light-headedness, loss of balance, numbness, paresthesias, seizures, sensory change, tremors and vertigo.   Psychiatric/Behavioral:  Negative for altered mental status, depression, hallucinations, memory loss, substance abuse, suicidal ideas and thoughts of violence. The patient does not have insomnia and is not nervous/anxious.    Allergic/Immunologic: Negative for hives and persistent infections.      Objective:       Vitals:    05/29/23 1025   BP: (!) 140/80   Pulse: 64   Resp: 18   SpO2: 97%   Height: 5' 8" (1.727 m)    Physical Exam  Constitutional:       General: She is not in acute distress.     Appearance: She is well-developed. She is not diaphoretic.   HENT:      Head: Normocephalic and atraumatic.      Nose: Nose normal.   Eyes:      General: No scleral icterus.        Right eye: No discharge.      Conjunctiva/sclera: Conjunctivae normal.      Pupils: Pupils are equal, round, and reactive to light.   Neck:      Thyroid: No thyromegaly.      Vascular: No JVD.      Trachea: No tracheal deviation.   Cardiovascular:      Rate and Rhythm: Normal rate and regular rhythm.      Pulses:           Carotid pulses are 2+ on the right side and 2+ on the left side.       Radial pulses are 2+ on the right side and 2+ on the left side.        Dorsalis pedis pulses are 1+ on the right side and 1+ on the left side.        Posterior tibial pulses are 1+ on the right side and 1+ on the left side.      Heart sounds: Normal heart sounds. No murmur heard.    No friction rub. No gallop.   Pulmonary:      Effort: Pulmonary effort is normal. No respiratory distress.      Breath sounds: Normal breath sounds. No stridor. No wheezing or rales.   Chest:      Chest wall: No tenderness.   Abdominal:      General: Bowel sounds are normal. There is no distension.      Palpations: Abdomen is soft. There is no mass.      Tenderness: There is no abdominal tenderness. There is no guarding or rebound.   Musculoskeletal:         General: No " tenderness. Normal range of motion.      Cervical back: Normal range of motion and neck supple.   Lymphadenopathy:      Cervical: No cervical adenopathy.   Skin:     General: Skin is warm and dry.      Coloration: Skin is not pale.      Findings: No erythema or rash.   Neurological:      Mental Status: She is alert and oriented to person, place, and time.      Cranial Nerves: No cranial nerve deficit.      Coordination: Coordination normal.   Psychiatric:         Behavior: Behavior normal.         Thought Content: Thought content normal.         Judgment: Judgment normal.         Assessment:       1. Primary hypertension    2. Elevated troponin    3. Troponin level elevated    4. Aortic atherosclerosis    5. Stage 3b chronic kidney disease    6. Typical atrial flutter    7. Hyperlipidemia associated with type 2 diabetes mellitus    8. Chronic saddle pulmonary embolism without acute cor pulmonale    9. Controlled type 2 diabetes mellitus without complication, without long-term current use of insulin    10. Obesity, unspecified classification, unspecified obesity type, unspecified whether serious comorbidity present      Results for orders placed or performed during the hospital encounter of 05/18/23   CBC auto differential   Result Value Ref Range    WBC 8.29 3.90 - 12.70 K/uL    RBC 3.72 (L) 4.00 - 5.40 M/uL    Hemoglobin 10.1 (L) 12.0 - 16.0 g/dL    Hematocrit 32.9 (L) 37.0 - 48.5 %    MCV 88 82 - 98 fL    MCH 27.2 27.0 - 31.0 pg    MCHC 30.7 (L) 32.0 - 36.0 g/dL    RDW 17.2 (H) 11.5 - 14.5 %    Platelets 257 150 - 450 K/uL    MPV 9.6 9.2 - 12.9 fL    Immature Granulocytes 0.8 (H) 0.0 - 0.5 %    Gran # (ANC) 6.1 1.8 - 7.7 K/uL    Immature Grans (Abs) 0.07 (H) 0.00 - 0.04 K/uL    Lymph # 1.4 1.0 - 4.8 K/uL    Mono # 0.7 0.3 - 1.0 K/uL    Eos # 0.0 0.0 - 0.5 K/uL    Baso # 0.04 0.00 - 0.20 K/uL    nRBC 0 0 /100 WBC    Gran % 73.6 (H) 38.0 - 73.0 %    Lymph % 16.3 (L) 18.0 - 48.0 %    Mono % 8.6 4.0 - 15.0 %     Eosinophil % 0.2 0.0 - 8.0 %    Basophil % 0.5 0.0 - 1.9 %    Differential Method Automated    Comprehensive metabolic panel   Result Value Ref Range    Sodium 137 136 - 145 mmol/L    Potassium 4.4 3.5 - 5.1 mmol/L    Chloride 100 95 - 110 mmol/L    CO2 24 23 - 29 mmol/L    Glucose 158 (H) 70 - 110 mg/dL    BUN 26 (H) 8 - 23 mg/dL    Creatinine 1.5 (H) 0.5 - 1.4 mg/dL    Calcium 9.6 8.7 - 10.5 mg/dL    Total Protein 7.3 6.0 - 8.4 g/dL    Albumin 3.0 (L) 3.5 - 5.2 g/dL    Total Bilirubin 0.5 0.1 - 1.0 mg/dL    Alkaline Phosphatase 87 55 - 135 U/L    AST 16 10 - 40 U/L    ALT 20 10 - 44 U/L    Anion Gap 13 8 - 16 mmol/L    eGFR 34 (A) >60 mL/min/1.73 m^2   Urinalysis, Reflex to Urine Culture Urine, Clean Catch    Specimen: Urine   Result Value Ref Range    Specimen UA Urine, Clean Catch     Color, UA Yellow Yellow, Straw, Sharda    Appearance, UA Clear Clear    pH, UA 7.0 5.0 - 8.0    Specific Gravity, UA 1.015 1.005 - 1.030    Protein, UA 1+ (A) Negative    Glucose, UA Negative Negative    Ketones, UA Negative Negative    Bilirubin (UA) Negative Negative    Occult Blood UA Trace (A) Negative    Nitrite, UA Negative Negative    Urobilinogen, UA Negative <2.0 EU/dL    Leukocytes, UA Negative Negative   Troponin I   Result Value Ref Range    Troponin I 0.042 (H) 0.000 - 0.026 ng/mL   Magnesium   Result Value Ref Range    Magnesium 1.6 1.6 - 2.6 mg/dL   Brain natriuretic peptide   Result Value Ref Range    BNP 97 0 - 99 pg/mL   Urinalysis Microscopic   Result Value Ref Range    RBC, UA 1 0 - 4 /hpf    WBC, UA 4 0 - 5 /hpf    Bacteria Rare None-Occ /hpf    Squam Epithel, UA 1 /hpf    Hyaline Casts, UA 0 0-1/lpf /lpf    Microscopic Comment SEE COMMENT    Troponin I   Result Value Ref Range    Troponin I 0.044 (H) 0.000 - 0.026 ng/mL         Current Outpatient Medications:     allopurinoL (ZYLOPRIM) 300 MG tablet, Take 1 tablet (300 mg total) by mouth once daily., Disp: 90 tablet, Rfl: 0    cinacalcet (SENSIPAR) 90 MG Tab,  Take 90 mg by mouth daily with breakfast., Disp: , Rfl:     HYDROcodone-acetaminophen (NORCO) 7.5-325 mg per tablet, Take 1 tablet by mouth every 24 hours as needed for Pain., Disp: 30 tablet, Rfl: 0    hydrocortisone 2.5 % cream, Apply topically 2 (two) times daily., Disp: 1 Tube, Rfl: 5    levalbuterol (XOPENEX) 1.25 mg/3 mL nebulizer solution, USE 1 VIAL IN NEBULIZER EVERY 8 HOURS AS NEEDED FOR WHEEZING. RESCUE, Disp: , Rfl:     levothyroxine (SYNTHROID) 75 MCG tablet, Take 1 tablet by mouth once daily, Disp: 90 tablet, Rfl: 3    magnesium oxide (MAG-OX) 400 mg (241.3 mg magnesium) tablet, Take 1 tablet (400 mg total) by mouth 2 (two) times daily. (Patient taking differently: Take 400 mg by mouth once daily.), Disp: 180 tablet, Rfl: 1    meclizine (ANTIVERT) 12.5 mg tablet, Take 1 tablet (12.5 mg total) by mouth 3 (three) times daily as needed for Dizziness., Disp: 30 tablet, Rfl: 0    nystatin (MYCOSTATIN) powder, Apply topically 2 (two) times daily., Disp: 60 g, Rfl: 1    omega-3 acid ethyl esters (LOVAZA) 1 gram capsule, Take 1 capsule (1 g total) by mouth 2 (two) times daily., Disp: 180 capsule, Rfl: 1    rosuvastatin (CRESTOR) 20 MG tablet, Take 1 tablet (20 mg total) by mouth once daily., Disp: 90 tablet, Rfl: 1    sertraline (ZOLOFT) 50 MG tablet, Take 1 tablet (50 mg total) by mouth once daily., Disp: 30 tablet, Rfl: 2    sotaloL (BETAPACE) 80 MG tablet, Take 1 tablet (80 mg total) by mouth once daily., Disp: 60 tablet, Rfl: 0    triamcinolone acetonide 0.1% (KENALOG) 0.1 % cream, Apply topically 2 (two) times daily. for 10 days, Disp: 45 g, Rfl: 0    wheelchair Melly, Use as directed, Disp: 1 each, Rfl: 0    XARELTO 15 mg Tab, every evening. , Disp: , Rfl:     metoprolol succinate (TOPROL-XL) 25 MG 24 hr tablet, Take 1 tablet (25 mg total) by mouth once daily., Disp: 90 tablet, Rfl: 3     Lab Results   Component Value Date    WBC 8.29 05/18/2023    RBC 3.72 (L) 05/18/2023    HGB 10.1 (L) 05/18/2023    HCT  32.9 (L) 05/18/2023    MCV 88 05/18/2023    MCH 27.2 05/18/2023    MCHC 30.7 (L) 05/18/2023    RDW 17.2 (H) 05/18/2023     05/18/2023    MPV 9.6 05/18/2023    GRAN 6.1 05/18/2023    GRAN 73.6 (H) 05/18/2023    LYMPH 1.4 05/18/2023    LYMPH 16.3 (L) 05/18/2023    MONO 0.7 05/18/2023    MONO 8.6 05/18/2023    EOS 0.0 05/18/2023    BASO 0.04 05/18/2023    EOSINOPHIL 0.2 05/18/2023    BASOPHIL 0.5 05/18/2023    MG 1.6 05/18/2023        CMP  Lab Results   Component Value Date     05/18/2023    K 4.4 05/18/2023     05/18/2023    CO2 24 05/18/2023     (H) 05/18/2023    BUN 26 (H) 05/18/2023    CREATININE 1.5 (H) 05/18/2023    CALCIUM 9.6 05/18/2023    PROT 7.3 05/18/2023    ALBUMIN 3.0 (L) 05/18/2023    BILITOT 0.5 05/18/2023    ALKPHOS 87 05/18/2023    AST 16 05/18/2023    ALT 20 05/18/2023    ANIONGAP 13 05/18/2023    ESTGFRAFRICA 43 (A) 06/23/2022    EGFRNONAA 37 (A) 06/23/2022        Lab Results   Component Value Date    LABBLOO No growth after 5 days. 01/23/2022    LABBLOO No growth after 5 days. 01/23/2022    LABURIN ESCHERICHIA COLI  50,000 - 99,999 cfu/ml   (A) 08/14/2022    RESPIRATORYC Normal respiratory vu 08/12/2014    GSRESP <10 epithelial cells per low power field. 08/12/2014    GSRESP Rare WBC's 08/12/2014    GSRESP No organisms seen 08/12/2014            Results for orders placed or performed during the hospital encounter of 05/18/23   EKG 12-lead    Collection Time: 05/18/23  6:41 PM    Narrative    Test Reason : R53.1    Vent. Rate : 071 BPM     Atrial Rate : 071 BPM     P-R Int : 292 ms          QRS Dur : 088 ms      QT Int : 398 ms       P-R-T Axes : 059 000 108 degrees     QTc Int : 432 ms    Sinus rhythm with 1st degree A-V block  T wave abnormality, consider lateral ischemia  Abnormal ECG  When compared with ECG of 14-AUG-2022 18:28,  T wave inversion less evident in Lateral leads  Confirmed by Terrance VILLEDA, Tutu CHAVIRA ([id number]) on 5/19/2023 8:23:35 AM    Referred By: [email or web address]    SELF           Confirmed By:Tutu Carlos MD        CT Head Without Contrast 05/18/2023 [id number] Final   X-Ray Chest AP Portable 05/18/2023 [id number] Final   US Soft Tissue Chest_Upper Back 07/26/2022 [id number] Final            Plan:       Problem List Items Addressed This Visit          Cardiac/Vascular    Hyperlipidemia associated with type 2 diabetes mellitus     Rosuvastatin and Omega fish oil therapy to continue.  Most recent LDL 75 mg% on rosuvastatin 20 mg daily.         Typical atrial flutter     A single event in 2020.  No cardioversion required.  Most recent Electrocardiogram sinus rhythm.  Sotalol 40 mg b.i.d. to continue, Xarelto 15 mg daily to continue.    She is anemic.  She states she has hemorrhoidal bleeding.         Troponin level elevated     ED no reviewed in detail.  There is no support for ACS based on her presentation and I told the patient she did not have a myocardial infarction.  I can not connect the elevated troponin to a specific diagnosis but I do not think there is clinical significance to the elevation at this time.         Aortic atherosclerosis     Noted on imaging studies.  Condition unchanged.         Primary hypertension - Primary     HCTZ now withdrawn.  Toprol 25 mg prescribed today.  Her blood pressure in the ED was greater than 170 systolic.  I think single agent therapy will be adequate for control.         Relevant Medications    metoprolol succinate (TOPROL-XL) 25 MG 24 hr tablet       Renal/    Stage 3b chronic kidney disease     She follows with Nephrology.  Most recent A1c 6.6.  There is 1+ urinary protein.  She is considered diet-controlled.            Hematology    Chronic saddle pulmonary embolism without acute cor pulmonale     A 2015 event.  Long-term anticoagulation currently advised.  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  She has a history of atrial flutter.  Her last echo did not show pulmonary hypertension or RV enlargement.  The study was in 2020.             Endocrine    Controlled type 2 diabetes mellitus without complication, without long-term current use of insulin     A1c 6.6.  Condition unchanged.         Obesity     Weight loss encouraged.          Other Visit Diagnoses       Elevated troponin                   Toprol 25 mg ordered.  She will stay off diuretics.  Blood pressure today 140/80 mm Hg.    I discussed her anemia with her.  It is trending down.  She is been on anticoagulation for years.  She has hemorrhoids.    I will see her back in 3 months.  I told the patient that she did not have a myocardial infarction based on the troponin elevation.  No repeat stress testing or other cardiac imaging tests ordered today.    Thank you for allowing me to participate in your patient's care.               Tutu Carlos MD  05/29/2023   11:12 AM

## 2023-06-01 RX ORDER — RIVAROXABAN 15 MG/1
15 TABLET, FILM COATED ORAL NIGHTLY
Qty: 90 TABLET | Refills: 3 | Status: SHIPPED | OUTPATIENT
Start: 2023-06-01

## 2023-06-08 ENCOUNTER — PES CALL (OUTPATIENT)
Dept: ADMINISTRATIVE | Facility: CLINIC | Age: 88
End: 2023-06-08
Payer: MEDICARE

## 2023-06-28 ENCOUNTER — LAB VISIT (OUTPATIENT)
Dept: LAB | Facility: HOSPITAL | Age: 88
End: 2023-06-28
Attending: INTERNAL MEDICINE
Payer: MEDICARE

## 2023-06-28 DIAGNOSIS — E55.9 VITAMIN D DEFICIENCY DISEASE: ICD-10-CM

## 2023-06-28 DIAGNOSIS — E11.69 HYPERLIPIDEMIA ASSOCIATED WITH TYPE 2 DIABETES MELLITUS: ICD-10-CM

## 2023-06-28 DIAGNOSIS — N18.31 ANEMIA DUE TO STAGE 3A CHRONIC KIDNEY DISEASE: ICD-10-CM

## 2023-06-28 DIAGNOSIS — D63.1 ANEMIA DUE TO STAGE 3A CHRONIC KIDNEY DISEASE: ICD-10-CM

## 2023-06-28 DIAGNOSIS — E11.9 CONTROLLED TYPE 2 DIABETES MELLITUS WITHOUT COMPLICATION, WITHOUT LONG-TERM CURRENT USE OF INSULIN: ICD-10-CM

## 2023-06-28 DIAGNOSIS — E78.5 HYPERLIPIDEMIA ASSOCIATED WITH TYPE 2 DIABETES MELLITUS: ICD-10-CM

## 2023-06-28 DIAGNOSIS — N18.32 STAGE 3B CHRONIC KIDNEY DISEASE: ICD-10-CM

## 2023-06-28 DIAGNOSIS — E03.9 ACQUIRED HYPOTHYROIDISM: ICD-10-CM

## 2023-06-28 DIAGNOSIS — I12.9 HYPERTENSIVE KIDNEY DISEASE WITH CHRONIC KIDNEY DISEASE: Primary | ICD-10-CM

## 2023-06-28 DIAGNOSIS — M17.0 PRIMARY OSTEOARTHRITIS OF BOTH KNEES: ICD-10-CM

## 2023-06-28 LAB
25(OH)D3+25(OH)D2 SERPL-MCNC: 32 NG/ML (ref 30–96)
ALBUMIN SERPL BCP-MCNC: 3.2 G/DL (ref 3.5–5.2)
ALBUMIN SERPL BCP-MCNC: 3.2 G/DL (ref 3.5–5.2)
ALBUMIN/CREAT UR: 217.7 UG/MG (ref 0–30)
ALP SERPL-CCNC: 94 U/L (ref 55–135)
ALP SERPL-CCNC: 94 U/L (ref 55–135)
ALT SERPL W/O P-5'-P-CCNC: 17 U/L (ref 10–44)
ALT SERPL W/O P-5'-P-CCNC: 17 U/L (ref 10–44)
ANION GAP SERPL CALC-SCNC: 11 MMOL/L (ref 8–16)
ANION GAP SERPL CALC-SCNC: 11 MMOL/L (ref 8–16)
AST SERPL-CCNC: 14 U/L (ref 10–40)
AST SERPL-CCNC: 14 U/L (ref 10–40)
BASOPHILS # BLD AUTO: 0.05 K/UL (ref 0–0.2)
BASOPHILS # BLD AUTO: 0.05 K/UL (ref 0–0.2)
BASOPHILS NFR BLD: 0.4 % (ref 0–1.9)
BASOPHILS NFR BLD: 0.4 % (ref 0–1.9)
BILIRUB SERPL-MCNC: 0.5 MG/DL (ref 0.1–1)
BILIRUB SERPL-MCNC: 0.5 MG/DL (ref 0.1–1)
BUN SERPL-MCNC: 23 MG/DL (ref 8–23)
BUN SERPL-MCNC: 23 MG/DL (ref 8–23)
CALCIUM SERPL-MCNC: 10.1 MG/DL (ref 8.7–10.5)
CALCIUM SERPL-MCNC: 10.1 MG/DL (ref 8.7–10.5)
CHLORIDE SERPL-SCNC: 103 MMOL/L (ref 95–110)
CHLORIDE SERPL-SCNC: 103 MMOL/L (ref 95–110)
CHOLEST SERPL-MCNC: 155 MG/DL (ref 120–199)
CHOLEST/HDLC SERPL: 4.2 {RATIO} (ref 2–5)
CO2 SERPL-SCNC: 26 MMOL/L (ref 23–29)
CO2 SERPL-SCNC: 26 MMOL/L (ref 23–29)
CREAT SERPL-MCNC: 1.5 MG/DL (ref 0.5–1.4)
CREAT SERPL-MCNC: 1.5 MG/DL (ref 0.5–1.4)
CREAT UR-MCNC: 29.4 MG/DL (ref 15–325)
DIFFERENTIAL METHOD: ABNORMAL
DIFFERENTIAL METHOD: ABNORMAL
EOSINOPHIL # BLD AUTO: 0.2 K/UL (ref 0–0.5)
EOSINOPHIL # BLD AUTO: 0.2 K/UL (ref 0–0.5)
EOSINOPHIL NFR BLD: 2 % (ref 0–8)
EOSINOPHIL NFR BLD: 2 % (ref 0–8)
ERYTHROCYTE [DISTWIDTH] IN BLOOD BY AUTOMATED COUNT: 16.6 % (ref 11.5–14.5)
ERYTHROCYTE [DISTWIDTH] IN BLOOD BY AUTOMATED COUNT: 16.6 % (ref 11.5–14.5)
EST. GFR  (NO RACE VARIABLE): 34 ML/MIN/1.73 M^2
EST. GFR  (NO RACE VARIABLE): 34 ML/MIN/1.73 M^2
ESTIMATED AVG GLUCOSE: 128 MG/DL (ref 68–131)
GLUCOSE SERPL-MCNC: 113 MG/DL (ref 70–110)
GLUCOSE SERPL-MCNC: 113 MG/DL (ref 70–110)
HBA1C MFR BLD: 6.1 % (ref 4–5.6)
HCT VFR BLD AUTO: 34.9 % (ref 37–48.5)
HCT VFR BLD AUTO: 34.9 % (ref 37–48.5)
HDLC SERPL-MCNC: 37 MG/DL (ref 40–75)
HDLC SERPL: 23.9 % (ref 20–50)
HGB BLD-MCNC: 10.8 G/DL (ref 12–16)
HGB BLD-MCNC: 10.8 G/DL (ref 12–16)
IMM GRANULOCYTES # BLD AUTO: 0.08 K/UL (ref 0–0.04)
IMM GRANULOCYTES # BLD AUTO: 0.08 K/UL (ref 0–0.04)
IMM GRANULOCYTES NFR BLD AUTO: 0.7 % (ref 0–0.5)
IMM GRANULOCYTES NFR BLD AUTO: 0.7 % (ref 0–0.5)
LDLC SERPL CALC-MCNC: 59.8 MG/DL (ref 63–159)
LYMPHOCYTES # BLD AUTO: 2.7 K/UL (ref 1–4.8)
LYMPHOCYTES # BLD AUTO: 2.7 K/UL (ref 1–4.8)
LYMPHOCYTES NFR BLD: 23.7 % (ref 18–48)
LYMPHOCYTES NFR BLD: 23.7 % (ref 18–48)
MCH RBC QN AUTO: 28.3 PG (ref 27–31)
MCH RBC QN AUTO: 28.3 PG (ref 27–31)
MCHC RBC AUTO-ENTMCNC: 30.9 G/DL (ref 32–36)
MCHC RBC AUTO-ENTMCNC: 30.9 G/DL (ref 32–36)
MCV RBC AUTO: 91 FL (ref 82–98)
MCV RBC AUTO: 91 FL (ref 82–98)
MICROALBUMIN UR DL<=1MG/L-MCNC: 64 UG/ML
MONOCYTES # BLD AUTO: 0.7 K/UL (ref 0.3–1)
MONOCYTES # BLD AUTO: 0.7 K/UL (ref 0.3–1)
MONOCYTES NFR BLD: 5.9 % (ref 4–15)
MONOCYTES NFR BLD: 5.9 % (ref 4–15)
NEUTROPHILS # BLD AUTO: 7.8 K/UL (ref 1.8–7.7)
NEUTROPHILS # BLD AUTO: 7.8 K/UL (ref 1.8–7.7)
NEUTROPHILS NFR BLD: 67.3 % (ref 38–73)
NEUTROPHILS NFR BLD: 67.3 % (ref 38–73)
NONHDLC SERPL-MCNC: 118 MG/DL
NRBC BLD-RTO: 0 /100 WBC
NRBC BLD-RTO: 0 /100 WBC
PHOSPHATE SERPL-MCNC: 2.9 MG/DL (ref 2.7–4.5)
PLATELET # BLD AUTO: 348 K/UL (ref 150–450)
PLATELET # BLD AUTO: 348 K/UL (ref 150–450)
PMV BLD AUTO: 9.7 FL (ref 9.2–12.9)
PMV BLD AUTO: 9.7 FL (ref 9.2–12.9)
POTASSIUM SERPL-SCNC: 4.6 MMOL/L (ref 3.5–5.1)
POTASSIUM SERPL-SCNC: 4.6 MMOL/L (ref 3.5–5.1)
PROT SERPL-MCNC: 7.5 G/DL (ref 6–8.4)
PROT SERPL-MCNC: 7.5 G/DL (ref 6–8.4)
PTH-INTACT SERPL-MCNC: 339.2 PG/ML (ref 9–77)
RBC # BLD AUTO: 3.82 M/UL (ref 4–5.4)
RBC # BLD AUTO: 3.82 M/UL (ref 4–5.4)
SODIUM SERPL-SCNC: 140 MMOL/L (ref 136–145)
SODIUM SERPL-SCNC: 140 MMOL/L (ref 136–145)
TRIGL SERPL-MCNC: 291 MG/DL (ref 30–150)
TSH SERPL DL<=0.005 MIU/L-ACNC: 2.23 UIU/ML (ref 0.4–4)
URATE SERPL-MCNC: 5 MG/DL (ref 2.4–5.7)
WBC # BLD AUTO: 11.58 K/UL (ref 3.9–12.7)
WBC # BLD AUTO: 11.58 K/UL (ref 3.9–12.7)

## 2023-06-28 PROCEDURE — 84100 ASSAY OF PHOSPHORUS: CPT | Mod: HCNC | Performed by: INTERNAL MEDICINE

## 2023-06-28 PROCEDURE — 83970 ASSAY OF PARATHORMONE: CPT | Mod: HCNC | Performed by: INTERNAL MEDICINE

## 2023-06-28 PROCEDURE — 83036 HEMOGLOBIN GLYCOSYLATED A1C: CPT | Mod: HCNC | Performed by: INTERNAL MEDICINE

## 2023-06-28 PROCEDURE — 84443 ASSAY THYROID STIM HORMONE: CPT | Mod: HCNC | Performed by: INTERNAL MEDICINE

## 2023-06-28 PROCEDURE — 82570 ASSAY OF URINE CREATININE: CPT | Mod: HCNC | Performed by: INTERNAL MEDICINE

## 2023-06-28 PROCEDURE — 82306 VITAMIN D 25 HYDROXY: CPT | Mod: HCNC | Performed by: INTERNAL MEDICINE

## 2023-06-28 PROCEDURE — 84550 ASSAY OF BLOOD/URIC ACID: CPT | Mod: HCNC | Performed by: INTERNAL MEDICINE

## 2023-06-28 PROCEDURE — 80053 COMPREHEN METABOLIC PANEL: CPT | Mod: HCNC | Performed by: INTERNAL MEDICINE

## 2023-06-28 PROCEDURE — 85025 COMPLETE CBC W/AUTO DIFF WBC: CPT | Mod: HCNC | Performed by: INTERNAL MEDICINE

## 2023-06-28 PROCEDURE — 80061 LIPID PANEL: CPT | Mod: HCNC | Performed by: INTERNAL MEDICINE

## 2023-06-28 PROCEDURE — 36415 COLL VENOUS BLD VENIPUNCTURE: CPT | Mod: HCNC | Performed by: INTERNAL MEDICINE

## 2023-06-28 RX ORDER — HYDROCODONE BITARTRATE AND ACETAMINOPHEN 7.5; 325 MG/1; MG/1
1 TABLET ORAL
Qty: 30 TABLET | Refills: 0 | Status: SHIPPED | OUTPATIENT
Start: 2023-06-28 | End: 2023-07-27 | Stop reason: SDUPTHER

## 2023-06-28 NOTE — TELEPHONE ENCOUNTER
Requested Prescriptions     Pending Prescriptions Disp Refills    HYDROcodone-acetaminophen (NORCO) 7.5-325 mg per tablet 30 tablet 0     Sig: Take 1 tablet by mouth every 24 hours as needed for Pain.   LOV: 5/23/23

## 2023-06-28 NOTE — TELEPHONE ENCOUNTER
Care Due:                  Date            Visit Type   Department     Provider  --------------------------------------------------------------------------------                                HOSPITAL     [facility] INTERNAL  Last Visit: 05-      FOLLOW UP    MEDICINE CATALINO Atkins                               -                              PRIMARY      [facility] INTERNAL  Next Visit: 07-      CARE ([facility])   MEDICINE II    Tasha Atkins                                                            Last  Test          Frequency    Reason                     Performed    Due Date  --------------------------------------------------------------------------------    Lipid Panel.  12 months..  omega-3, rosuvastatin....  09- 09-    [facility] Embedded Care Due Messages. Reference number: [id number].   6/28/2023 9:05:23 AM CDT

## 2023-06-28 NOTE — TELEPHONE ENCOUNTER
----- Message from Roxanna Russ sent at 2023  8:56 AM CDT -----  Contact: pt  Tonya Bucio  MRN: [id number]  : 1935  PCP: Tasha Atkins  Home Phone      [phone]  Work Phone      Not on file.  [employer]          [phone]      MESSAGE:     Pt needs a refill of HYDROcodone-acetaminophen (NORCO) 7.5-325 mg per tablet sent to [facility] in [place].      Tonya  [phone]

## 2023-07-03 ENCOUNTER — OFFICE VISIT (OUTPATIENT)
Dept: INTERNAL MEDICINE | Facility: CLINIC | Age: 88
End: 2023-07-03
Payer: MEDICARE

## 2023-07-03 VITALS
HEART RATE: 67 BPM | DIASTOLIC BLOOD PRESSURE: 66 MMHG | SYSTOLIC BLOOD PRESSURE: 122 MMHG | RESPIRATION RATE: 16 BRPM | HEIGHT: 68 IN | BODY MASS INDEX: 39.12 KG/M2

## 2023-07-03 DIAGNOSIS — E11.9 CONTROLLED TYPE 2 DIABETES MELLITUS WITHOUT COMPLICATION, WITHOUT LONG-TERM CURRENT USE OF INSULIN: Primary | ICD-10-CM

## 2023-07-03 DIAGNOSIS — M17.0 PRIMARY OSTEOARTHRITIS OF BOTH KNEES: ICD-10-CM

## 2023-07-03 DIAGNOSIS — N18.32 STAGE 3B CHRONIC KIDNEY DISEASE: ICD-10-CM

## 2023-07-03 DIAGNOSIS — E11.69 HYPERLIPIDEMIA ASSOCIATED WITH TYPE 2 DIABETES MELLITUS: ICD-10-CM

## 2023-07-03 DIAGNOSIS — R45.89 ANXIETY ABOUT HEALTH: ICD-10-CM

## 2023-07-03 DIAGNOSIS — E78.5 HYPERLIPIDEMIA ASSOCIATED WITH TYPE 2 DIABETES MELLITUS: ICD-10-CM

## 2023-07-03 DIAGNOSIS — I10 PRIMARY HYPERTENSION: ICD-10-CM

## 2023-07-03 DIAGNOSIS — F41.9 ANXIETY DISORDER, UNSPECIFIED TYPE: ICD-10-CM

## 2023-07-03 DIAGNOSIS — D64.9 ANEMIA, UNSPECIFIED TYPE: ICD-10-CM

## 2023-07-03 PROCEDURE — 1160F RVW MEDS BY RX/DR IN RCRD: CPT | Mod: HCNC,CPTII,S$GLB, | Performed by: INTERNAL MEDICINE

## 2023-07-03 PROCEDURE — 99999 PR PBB SHADOW E&M-EST. PATIENT-LVL III: ICD-10-PCS | Mod: PBBFAC,HCNC,, | Performed by: INTERNAL MEDICINE

## 2023-07-03 PROCEDURE — 1101F PR PT FALLS ASSESS DOC 0-1 FALLS W/OUT INJ PAST YR: ICD-10-PCS | Mod: HCNC,CPTII,S$GLB, | Performed by: INTERNAL MEDICINE

## 2023-07-03 PROCEDURE — 3288F PR FALLS RISK ASSESSMENT DOCUMENTED: ICD-10-PCS | Mod: HCNC,CPTII,S$GLB, | Performed by: INTERNAL MEDICINE

## 2023-07-03 PROCEDURE — 99999 PR PBB SHADOW E&M-EST. PATIENT-LVL III: CPT | Mod: PBBFAC,HCNC,, | Performed by: INTERNAL MEDICINE

## 2023-07-03 PROCEDURE — 1101F PT FALLS ASSESS-DOCD LE1/YR: CPT | Mod: HCNC,CPTII,S$GLB, | Performed by: INTERNAL MEDICINE

## 2023-07-03 PROCEDURE — 99214 OFFICE O/P EST MOD 30 MIN: CPT | Mod: HCNC,S$GLB,, | Performed by: INTERNAL MEDICINE

## 2023-07-03 PROCEDURE — 1159F PR MEDICATION LIST DOCUMENTED IN MEDICAL RECORD: ICD-10-PCS | Mod: HCNC,CPTII,S$GLB, | Performed by: INTERNAL MEDICINE

## 2023-07-03 PROCEDURE — 1159F MED LIST DOCD IN RCRD: CPT | Mod: HCNC,CPTII,S$GLB, | Performed by: INTERNAL MEDICINE

## 2023-07-03 PROCEDURE — 1160F PR REVIEW ALL MEDS BY PRESCRIBER/CLIN PHARMACIST DOCUMENTED: ICD-10-PCS | Mod: HCNC,CPTII,S$GLB, | Performed by: INTERNAL MEDICINE

## 2023-07-03 PROCEDURE — 1126F AMNT PAIN NOTED NONE PRSNT: CPT | Mod: HCNC,CPTII,S$GLB, | Performed by: INTERNAL MEDICINE

## 2023-07-03 PROCEDURE — 99214 PR OFFICE/OUTPT VISIT, EST, LEVL IV, 30-39 MIN: ICD-10-PCS | Mod: HCNC,S$GLB,, | Performed by: INTERNAL MEDICINE

## 2023-07-03 PROCEDURE — 3288F FALL RISK ASSESSMENT DOCD: CPT | Mod: HCNC,CPTII,S$GLB, | Performed by: INTERNAL MEDICINE

## 2023-07-03 PROCEDURE — 1126F PR PAIN SEVERITY QUANTIFIED, NO PAIN PRESENT: ICD-10-PCS | Mod: HCNC,CPTII,S$GLB, | Performed by: INTERNAL MEDICINE

## 2023-07-03 RX ORDER — SERTRALINE HYDROCHLORIDE 50 MG/1
50 TABLET, FILM COATED ORAL DAILY
Qty: 30 TABLET | Refills: 2 | Status: SHIPPED | OUTPATIENT
Start: 2023-07-03 | End: 2023-08-30

## 2023-07-03 RX ORDER — FERROUS SULFATE 325(65) MG
325 TABLET, DELAYED RELEASE (ENTERIC COATED) ORAL DAILY
Qty: 90 TABLET | Refills: 1 | Status: SHIPPED | OUTPATIENT
Start: 2023-07-03 | End: 2024-01-03 | Stop reason: SDUPTHER

## 2023-07-03 NOTE — PROGRESS NOTES
"HPI:  Tonya Bucio is a 88 y.o. female here for Follow-up  She is here for chronic pain visit  and  labs reviewed   Labs are reviewed         Review of Systems   Constitutional:  Positive for fatigue. Negative for chills and fever.   HENT:  Negative for congestion, hearing loss, sinus pressure and sore throat.    Eyes:  Negative for photophobia.   Respiratory:  Negative for cough, choking, chest tightness, shortness of breath and wheezing.    Cardiovascular:  Negative for chest pain and palpitations.   Gastrointestinal:  Negative for blood in stool, nausea and vomiting.   Endocrine: Negative for polydipsia and polyphagia.   Genitourinary:  Negative for dysuria and hematuria.   Musculoskeletal:  Positive for arthralgias and myalgias. Negative for neck pain.        Still needing narcotics ; no confusion or hang over .     Skin:  Negative for pallor.   Neurological:  Negative for dizziness, weakness and numbness.   Hematological:  Does not bruise/bleed easily.   Psychiatric/Behavioral:  Negative for confusion and suicidal ideas. The patient is not nervous/anxious.     A review of systems was performed and is negative except as noted above.    Objective:  Vitals:    07/03/23 1039   BP: 122/66   Pulse: 67   Resp: 16   Height: 5' 8" (1.727 m)      Physical Exam  Vitals and nursing note reviewed.   Constitutional:       Appearance: She is well-developed. She is obese.   HENT:      Head: Normocephalic and atraumatic.      Right Ear: External ear normal.      Left Ear: External ear normal.   Eyes:      Conjunctiva/sclera: Conjunctivae normal.      Pupils: Pupils are equal, round, and reactive to light.   Neck:      Thyroid: No thyromegaly.      Vascular: No JVD.      Trachea: No tracheal deviation.   Cardiovascular:      Rate and Rhythm: Normal rate and regular rhythm.      Pulses:           Dorsalis pedis pulses are 1+ on the right side and 1+ on the left side.        Posterior tibial pulses are 1+ on the right side and " 1+ on the left side.      Heart sounds: Normal heart sounds.   Pulmonary:      Effort: Pulmonary effort is normal. No respiratory distress.      Breath sounds: Normal breath sounds. No wheezing or rales.   Chest:      Chest wall: No tenderness.   Abdominal:      General: Bowel sounds are normal. There is no distension.      Palpations: Abdomen is soft. There is no mass.      Tenderness: There is no abdominal tenderness. There is no guarding or rebound.   Musculoskeletal:         General: Normal range of motion.      Cervical back: Normal range of motion and neck supple.      Comments: Bilateral knee oa changes.     Feet:      Right foot:      Protective Sensation: 7 sites tested.        Skin integrity: Dry skin present. No ulcer or erythema.      Left foot:      Protective Sensation: 7 sites tested.  4 sites sensed.      Skin integrity: Dry skin present. No ulcer or erythema.   Lymphadenopathy:      Cervical: No cervical adenopathy.   Skin:     General: Skin is warm and dry.      Comments: Scaly rash feet bilateral    Neurological:      Mental Status: She is alert and oriented to person, place, and time.      Cranial Nerves: No cranial nerve deficit.      Motor: No abnormal muscle tone.      Coordination: Coordination normal.      Deep Tendon Reflexes: Reflexes are normal and symmetric.        Assessment/Plan:  1. Controlled type 2 diabetes mellitus without complication, without long-term current use of insulin  -     CBC Auto Differential; Future; Expected date: 12/30/2023  -     Comprehensive Metabolic Panel; Future; Expected date: 12/30/2023  -     Lipid Panel; Future; Expected date: 12/30/2023  -     Hemoglobin A1C; Future; Expected date: 12/30/2023  -     Microalbumin/Creatinine Ratio, Urine; Future; Expected date: 12/30/2023  -     TSH; Future; Expected date: 12/30/2023    2. Primary osteoarthritis of both knees  On narcotics    3. Anemia, unspecified type  -     ferrous sulfate 325 (65 FE) MG EC tablet; Take  1 tablet (325 mg total) by mouth once daily.  Dispense: 90 tablet; Refill: 1  -     CBC Auto Differential; Future; Expected date: 12/30/2023    4. Hyperlipidemia associated with type 2 diabetes mellitus  -     Lipid Panel; Future; Expected date: 12/30/2023    5. Stage 3b chronic kidney disease  -     Comprehensive Metabolic Panel; Future; Expected date: 12/30/2023    6. Primary hypertension  -     CBC Auto Differential; Future; Expected date: 12/30/2023  -     Comprehensive Metabolic Panel; Future; Expected date: 12/30/2023  -     Lipid Panel; Future; Expected date: 12/30/2023     Anxiety disorder, unspecified type  -     sertraline (ZOLOFT) 50 MG tablet; Take 1 tablet (50 mg total) by mouth once daily.    Try 1/2 dose ; 25 mg daily

## 2023-07-05 NOTE — TELEPHONE ENCOUNTER
----- Message from Lani Rosa sent at 2023 10:43 AM CDT -----  Contact: self  Tonya Bucio  MRN: [id number]  : 1935  PCP: Tasha Atkins  Home Phone      [phone]  Work Phone      Not on file.  CSS99          [phone]      MESSAGE: refill on XARELTO   [facility]    Phone  [phone]

## 2023-07-27 DIAGNOSIS — M17.0 PRIMARY OSTEOARTHRITIS OF BOTH KNEES: ICD-10-CM

## 2023-07-27 NOTE — TELEPHONE ENCOUNTER
No care due was identified.  [facility] Embedded Care Due Messages. Reference number: [id number].   7/27/2023 1:13:54 PM CDT

## 2023-07-28 RX ORDER — HYDROCODONE BITARTRATE AND ACETAMINOPHEN 7.5; 325 MG/1; MG/1
1 TABLET ORAL
Qty: 30 TABLET | Refills: 0 | Status: SHIPPED | OUTPATIENT
Start: 2023-07-28 | End: 2023-08-28 | Stop reason: SDUPTHER

## 2023-08-01 DIAGNOSIS — M1A.39X0 CHRONIC GOUT DUE TO RENAL IMPAIRMENT OF MULTIPLE SITES WITHOUT TOPHUS: ICD-10-CM

## 2023-08-01 RX ORDER — ALLOPURINOL 300 MG/1
TABLET ORAL
Qty: 90 TABLET | Refills: 3 | Status: SHIPPED | OUTPATIENT
Start: 2023-08-01

## 2023-08-01 NOTE — TELEPHONE ENCOUNTER
Refill Decision Note   Tonya Pandyaue  is requesting a refill authorization.  Brief Assessment and Rationale for Refill:  Approve     Medication Therapy Plan:  dose and eGFR has been consistent      Pharmacist review requested: Yes   Comments:     Note composed:1:48 PM 08/01/2023

## 2023-08-01 NOTE — TELEPHONE ENCOUNTER
No care due was identified.  [facility] Embedded Care Due Messages. Reference number: [id number].   8/01/2023 11:20:54 AM CDT

## 2023-08-08 ENCOUNTER — TELEPHONE (OUTPATIENT)
Dept: INTERNAL MEDICINE | Facility: CLINIC | Age: 88
End: 2023-08-08
Payer: MEDICARE

## 2023-08-08 DIAGNOSIS — R35.0 URINARY FREQUENCY: Primary | ICD-10-CM

## 2023-08-08 NOTE — TELEPHONE ENCOUNTER
----- Message from Roxanna Russ sent at 2023  4:15 PM CDT -----  Contact: pt  Tonya Bucio  MRN: [id number]  : 1935  PCP: Tasha Atkins  Home Phone      [phone]  Work Phone      Not on file.  Mobile          [phone]      MESSAGE:     Pt states she is urinating frequently and just a little bit is coming out. She is requesting antibiotics be sent to [place] [facility].      Please advise  [phone]

## 2023-08-09 ENCOUNTER — LAB VISIT (OUTPATIENT)
Dept: LAB | Facility: HOSPITAL | Age: 88
End: 2023-08-09
Attending: INTERNAL MEDICINE
Payer: MEDICARE

## 2023-08-09 ENCOUNTER — TELEPHONE (OUTPATIENT)
Dept: INTERNAL MEDICINE | Facility: CLINIC | Age: 88
End: 2023-08-09
Payer: MEDICARE

## 2023-08-09 DIAGNOSIS — R35.0 URINARY FREQUENCY: ICD-10-CM

## 2023-08-09 LAB
BACTERIA #/AREA URNS HPF: ABNORMAL /HPF
BILIRUB UR QL STRIP: NEGATIVE
CLARITY UR: CLEAR
COLOR UR: YELLOW
GLUCOSE UR QL STRIP: NEGATIVE
HGB UR QL STRIP: ABNORMAL
HYALINE CASTS #/AREA URNS LPF: 0 /LPF
KETONES UR QL STRIP: NEGATIVE
LEUKOCYTE ESTERASE UR QL STRIP: ABNORMAL
MICROSCOPIC COMMENT: ABNORMAL
NITRITE UR QL STRIP: NEGATIVE
PH UR STRIP: 6 [PH] (ref 5–8)
PROT UR QL STRIP: ABNORMAL
RBC #/AREA URNS HPF: 1 /HPF (ref 0–4)
SP GR UR STRIP: 1.01 (ref 1–1.03)
SQUAMOUS #/AREA URNS HPF: 4 /HPF
URN SPEC COLLECT METH UR: ABNORMAL
UROBILINOGEN UR STRIP-ACNC: NEGATIVE EU/DL
WBC #/AREA URNS HPF: 25 /HPF (ref 0–5)
WBC CLUMPS URNS QL MICRO: ABNORMAL

## 2023-08-09 PROCEDURE — 87186 SC STD MICRODIL/AGAR DIL: CPT | Mod: HCNC | Performed by: INTERNAL MEDICINE

## 2023-08-09 PROCEDURE — 87086 URINE CULTURE/COLONY COUNT: CPT | Mod: HCNC | Performed by: INTERNAL MEDICINE

## 2023-08-09 PROCEDURE — 87088 URINE BACTERIA CULTURE: CPT | Mod: HCNC | Performed by: INTERNAL MEDICINE

## 2023-08-09 PROCEDURE — 87184 SC STD DISK METHOD PER PLATE: CPT | Mod: HCNC | Performed by: INTERNAL MEDICINE

## 2023-08-09 PROCEDURE — 81000 URINALYSIS NONAUTO W/SCOPE: CPT | Mod: HCNC | Performed by: INTERNAL MEDICINE

## 2023-08-09 PROCEDURE — 87077 CULTURE AEROBIC IDENTIFY: CPT | Mod: HCNC | Performed by: INTERNAL MEDICINE

## 2023-08-09 RX ORDER — NITROFURANTOIN MACROCRYSTALS 50 MG/1
50 CAPSULE ORAL DAILY
Qty: 10 CAPSULE | Refills: 0 | Status: SHIPPED | OUTPATIENT
Start: 2023-08-09 | End: 2023-08-19

## 2023-08-09 NOTE — TELEPHONE ENCOUNTER
Patient has UTI  Started on antibiotics. Macrobid sent in   Drink plenty of water.  If high fevers and chills call.  Culture is pending

## 2023-08-13 LAB — BACTERIA UR CULT: ABNORMAL

## 2023-08-16 ENCOUNTER — PES CALL (OUTPATIENT)
Dept: ADMINISTRATIVE | Facility: CLINIC | Age: 88
End: 2023-08-16
Payer: MEDICARE

## 2023-08-19 NOTE — ASSESSMENT & PLAN NOTE
-new RUL 6mm nodule noted on CTA 7/31/23  -surveillance CT recommended 1/2024 Resume allopurinol

## 2023-08-21 DIAGNOSIS — E11.69 HYPERLIPIDEMIA ASSOCIATED WITH TYPE 2 DIABETES MELLITUS: ICD-10-CM

## 2023-08-21 DIAGNOSIS — E78.5 HYPERLIPIDEMIA ASSOCIATED WITH TYPE 2 DIABETES MELLITUS: ICD-10-CM

## 2023-08-21 NOTE — TELEPHONE ENCOUNTER
No care due was identified.  [facility] Embedded Care Due Messages. Reference number: [id number].   8/21/2023 3:35:03 PM CDT

## 2023-08-22 RX ORDER — ROSUVASTATIN CALCIUM 20 MG/1
20 TABLET, COATED ORAL
Qty: 90 TABLET | Refills: 3 | Status: SHIPPED | OUTPATIENT
Start: 2023-08-22

## 2023-08-22 NOTE — TELEPHONE ENCOUNTER
Refill Decision Note   Tonya Bucio  is requesting a refill authorization.  Brief Assessment and Rationale for Refill:  Approve     Medication Therapy Plan:         Comments:     Note composed:10:12 AM 08/22/2023

## 2023-08-23 RX ORDER — MECLIZINE HCL 12.5 MG 12.5 MG/1
12.5 TABLET ORAL
Qty: 30 TABLET | Refills: 0 | Status: SHIPPED | OUTPATIENT
Start: 2023-08-23

## 2023-08-23 NOTE — TELEPHONE ENCOUNTER
No care due was identified.  [facility] Embedded Care Due Messages. Reference number: [id number].   8/23/2023 11:50:01 AM CDT

## 2023-08-28 DIAGNOSIS — M17.0 PRIMARY OSTEOARTHRITIS OF BOTH KNEES: ICD-10-CM

## 2023-08-28 RX ORDER — HYDROCODONE BITARTRATE AND ACETAMINOPHEN 7.5; 325 MG/1; MG/1
1 TABLET ORAL
Qty: 30 TABLET | Refills: 0 | Status: SHIPPED | OUTPATIENT
Start: 2023-08-28 | End: 2023-09-27 | Stop reason: SDUPTHER

## 2023-08-28 NOTE — TELEPHONE ENCOUNTER
No care due was identified.  [facility] Embedded Care Due Messages. Reference number: [id number].   8/28/2023 12:02:02 PM CDT

## 2023-08-28 NOTE — TELEPHONE ENCOUNTER
----- Message from Roxanna Russ sent at 2023 11:24 AM CDT -----  Contact: pt  Tonya Bucio  MRN: [id number]  : 1935  PCP: Tasha Atkins  Home Phone      [phone]  Work Phone      Not on file.  Mobile          [phone]      MESSAGE:     Pt needs a refill of HYDROcodone-acetaminophen (NORCO) 7.5-325 mg per tablet sent to [facility] in [place].     Tonya   [phone]

## 2023-08-28 NOTE — TELEPHONE ENCOUNTER
Requested Prescriptions     Pending Prescriptions Disp Refills    HYDROcodone-acetaminophen (NORCO) 7.5-325 mg per tablet 30 tablet 0     Sig: Take 1 tablet by mouth every 24 hours as needed for Pain.   LOV: 7/3/23.. Last fill date: 7/28/23

## 2023-08-30 ENCOUNTER — OFFICE VISIT (OUTPATIENT)
Dept: CARDIOLOGY | Facility: CLINIC | Age: 88
End: 2023-08-30
Payer: MEDICARE

## 2023-08-30 VITALS
SYSTOLIC BLOOD PRESSURE: 132 MMHG | HEART RATE: 62 BPM | HEIGHT: 68 IN | DIASTOLIC BLOOD PRESSURE: 62 MMHG | WEIGHT: 257.25 LBS | BODY MASS INDEX: 38.99 KG/M2 | OXYGEN SATURATION: 94 %

## 2023-08-30 DIAGNOSIS — E11.69 HYPERLIPIDEMIA ASSOCIATED WITH TYPE 2 DIABETES MELLITUS: ICD-10-CM

## 2023-08-30 DIAGNOSIS — E78.5 HYPERLIPIDEMIA ASSOCIATED WITH TYPE 2 DIABETES MELLITUS: ICD-10-CM

## 2023-08-30 DIAGNOSIS — N18.32 STAGE 3B CHRONIC KIDNEY DISEASE: Primary | ICD-10-CM

## 2023-08-30 DIAGNOSIS — E11.9 CONTROLLED TYPE 2 DIABETES MELLITUS WITHOUT COMPLICATION, WITHOUT LONG-TERM CURRENT USE OF INSULIN: ICD-10-CM

## 2023-08-30 DIAGNOSIS — I70.0 AORTIC ATHEROSCLEROSIS: ICD-10-CM

## 2023-08-30 DIAGNOSIS — I10 PRIMARY HYPERTENSION: ICD-10-CM

## 2023-08-30 DIAGNOSIS — I48.3 TYPICAL ATRIAL FLUTTER: ICD-10-CM

## 2023-08-30 DIAGNOSIS — Z86.711 HISTORY OF PULMONARY EMBOLUS (PE): ICD-10-CM

## 2023-08-30 DIAGNOSIS — E66.9 OBESITY, UNSPECIFIED CLASSIFICATION, UNSPECIFIED OBESITY TYPE, UNSPECIFIED WHETHER SERIOUS COMORBIDITY PRESENT: ICD-10-CM

## 2023-08-30 DIAGNOSIS — R42 DIZZINESS: ICD-10-CM

## 2023-08-30 PROBLEM — R79.89 TROPONIN LEVEL ELEVATED: Status: RESOLVED | Noted: 2023-05-29 | Resolved: 2023-08-30

## 2023-08-30 PROCEDURE — 1160F PR REVIEW ALL MEDS BY PRESCRIBER/CLIN PHARMACIST DOCUMENTED: ICD-10-PCS | Mod: HCNC,CPTII,S$GLB, | Performed by: INTERNAL MEDICINE

## 2023-08-30 PROCEDURE — 1159F PR MEDICATION LIST DOCUMENTED IN MEDICAL RECORD: ICD-10-PCS | Mod: HCNC,CPTII,S$GLB, | Performed by: INTERNAL MEDICINE

## 2023-08-30 PROCEDURE — 99214 PR OFFICE/OUTPT VISIT, EST, LEVL IV, 30-39 MIN: ICD-10-PCS | Mod: HCNC,S$GLB,, | Performed by: INTERNAL MEDICINE

## 2023-08-30 PROCEDURE — 1160F RVW MEDS BY RX/DR IN RCRD: CPT | Mod: HCNC,CPTII,S$GLB, | Performed by: INTERNAL MEDICINE

## 2023-08-30 PROCEDURE — 99999 PR PBB SHADOW E&M-EST. PATIENT-LVL III: ICD-10-PCS | Mod: PBBFAC,HCNC,, | Performed by: INTERNAL MEDICINE

## 2023-08-30 PROCEDURE — 99214 OFFICE O/P EST MOD 30 MIN: CPT | Mod: HCNC,S$GLB,, | Performed by: INTERNAL MEDICINE

## 2023-08-30 PROCEDURE — 99999 PR PBB SHADOW E&M-EST. PATIENT-LVL III: CPT | Mod: PBBFAC,HCNC,, | Performed by: INTERNAL MEDICINE

## 2023-08-30 PROCEDURE — 1126F AMNT PAIN NOTED NONE PRSNT: CPT | Mod: HCNC,CPTII,S$GLB, | Performed by: INTERNAL MEDICINE

## 2023-08-30 PROCEDURE — 1159F MED LIST DOCD IN RCRD: CPT | Mod: HCNC,CPTII,S$GLB, | Performed by: INTERNAL MEDICINE

## 2023-08-30 PROCEDURE — 1126F PR PAIN SEVERITY QUANTIFIED, NO PAIN PRESENT: ICD-10-PCS | Mod: HCNC,CPTII,S$GLB, | Performed by: INTERNAL MEDICINE

## 2023-08-30 RX ORDER — LANOLIN ALCOHOL/MO/W.PET/CERES
400 CREAM (GRAM) TOPICAL DAILY
Start: 2023-08-30

## 2023-08-30 NOTE — PROGRESS NOTES
[facility]     Subjective:    Patient ID:  Tonya Bucio is a 88 y.o. female who presents for follow-up of Hypertension, Atrial Flutter, Diabetes Mellitus, and Hyperlipidemia    Review of patient's allergies indicates:  No Known Allergies   She is followed for paroxysmal atrial flutter.  She remains on sotalol and Xarelto.  Her flutter event was in 2020 without recurrence.  Records confirmed that she was discharged in a flutter but chemically converted to sinus rhythm and has maintained sotalol since then.  I converted from HCTZ to Toprol for blood pressure control.  Her readings have been in good range at home.  She is diabetic.    There is history of normal ejection fraction.  She tolerates Xarelto well.  She has had recent complaints of dizziness.  She took meclizine and it improved.  Some of her dizzy spells have been with standing from sitting position.  It is an intermittent problem.  She is quite sedentary at home.  She is in a wheelchair today.        Review of Systems   Constitutional: Negative for chills, decreased appetite, diaphoresis, fever, malaise/fatigue, night sweats, weight gain and weight loss.   HENT:  Positive for hearing loss. Negative for congestion, ear discharge, ear pain, hoarse voice, nosebleeds, odynophagia, sore throat, stridor and tinnitus.    Eyes:  Negative for blurred vision, discharge, double vision, pain, photophobia, redness, vision loss in left eye, vision loss in right eye, visual disturbance and visual halos.   Cardiovascular:  Negative for chest pain, claudication, cyanosis, dyspnea on exertion, irregular heartbeat, leg swelling, near-syncope, orthopnea, palpitations, paroxysmal nocturnal dyspnea and syncope.   Respiratory:  Negative for cough, hemoptysis, shortness of breath, sleep disturbances due to breathing, snoring, sputum production and wheezing.    Endocrine: Negative for cold  "intolerance, heat intolerance, polydipsia, polyphagia and polyuria.   Hematologic/Lymphatic: Negative for adenopathy and bleeding problem. Does not bruise/bleed easily.   Skin:  Negative for color change, dry skin, flushing, itching, nail changes, poor wound healing, rash, skin cancer, suspicious lesions and unusual hair distribution.   Musculoskeletal:  Positive for joint pain. Negative for arthritis, back pain, falls, gout, joint swelling, muscle cramps, muscle weakness, myalgias, neck pain and stiffness.   Gastrointestinal:  Negative for bloating, abdominal pain, anorexia, change in bowel habit, bowel incontinence, constipation, diarrhea, dysphagia, excessive appetite, flatus, heartburn, hematemesis, hematochezia, hemorrhoids, jaundice, melena, nausea and vomiting.   Genitourinary:  Negative for bladder incontinence, decreased libido, dysuria, flank pain, frequency, genital sores, hematuria, hesitancy, incomplete emptying, nocturia and urgency.   Neurological:  Positive for dizziness and weakness. Negative for aphonia, brief paralysis, difficulty with concentration, disturbances in coordination, excessive daytime sleepiness, focal weakness, headaches, light-headedness, loss of balance, numbness, paresthesias, seizures, sensory change, tremors and vertigo.   Psychiatric/Behavioral:  Negative for altered mental status, depression, hallucinations, memory loss, substance abuse, suicidal ideas and thoughts of violence. The patient does not have insomnia and is not nervous/anxious.    Allergic/Immunologic: Negative for hives and persistent infections.        Objective:       Vitals:    08/30/23 1050   BP: 132/62   BP Location: Right arm   Patient Position: Sitting   BP Method: Medium (Automatic)   Pulse: 62   SpO2: (!) 94%   Weight: 116.7 kg (257 lb 4.4 oz)   Height: 5' 8" (1.727 m)    Physical Exam  Constitutional:       General: She is not in acute distress.     Appearance: She is well-developed. She is not " diaphoretic.   HENT:      Head: Normocephalic and atraumatic.      Nose: Nose normal.   Eyes:      General: No scleral icterus.        Right eye: No discharge.      Conjunctiva/sclera: Conjunctivae normal.      Pupils: Pupils are equal, round, and reactive to light.   Neck:      Thyroid: No thyromegaly.      Vascular: No JVD.      Trachea: No tracheal deviation.   Cardiovascular:      Rate and Rhythm: Normal rate and regular rhythm.      Pulses:           Carotid pulses are 2+ on the right side and 2+ on the left side.       Radial pulses are 2+ on the right side and 2+ on the left side.        Dorsalis pedis pulses are 1+ on the right side and 1+ on the left side.        Posterior tibial pulses are 1+ on the right side and 1+ on the left side.      Heart sounds: Normal heart sounds. No murmur heard.     No friction rub. No gallop.   Pulmonary:      Effort: Pulmonary effort is normal. No respiratory distress.      Breath sounds: Normal breath sounds. No stridor. No wheezing or rales.   Chest:      Chest wall: No tenderness.   Abdominal:      General: Bowel sounds are normal. There is no distension.      Palpations: Abdomen is soft. There is no mass.      Tenderness: There is no abdominal tenderness. There is no guarding or rebound.   Musculoskeletal:         General: No tenderness. Normal range of motion.      Cervical back: Normal range of motion and neck supple.   Lymphadenopathy:      Cervical: No cervical adenopathy.   Skin:     General: Skin is warm and dry.      Coloration: Skin is not pale.      Findings: No erythema or rash.   Neurological:      Mental Status: She is alert and oriented to person, place, and time.      Cranial Nerves: No cranial nerve deficit.      Coordination: Coordination normal.   Psychiatric:         Behavior: Behavior normal.         Thought Content: Thought content normal.         Judgment: Judgment normal.           Assessment:       1. Stage 3b chronic kidney disease    2. Typical  atrial flutter    3. Primary hypertension    4. Hyperlipidemia associated with type 2 diabetes mellitus    5. Aortic atherosclerosis    6. Controlled type 2 diabetes mellitus without complication, without long-term current use of insulin    7. Obesity, unspecified classification, unspecified obesity type, unspecified whether serious comorbidity present    8. Dizziness    9. History of pulmonary embolus (PE)      Results for orders placed or performed in visit on 08/09/23   Urine culture    Specimen: Urine   Result Value Ref Range    Urine Culture, Routine (A)      PROTEUS MIRABILIS  10,000 - 49,999 cfu/ml  No other significant isolate         Susceptibility    Proteus mirabilis - CULTURE, URINE     Amp/Sulbactam <=8/4 Sensitive mcg/mL     Ampicillin <=8 Sensitive mcg/mL     Amox/K Clav'ate <=8/4 Sensitive mcg/mL     Ceftriaxone <=1 Sensitive mcg/mL     Cefazolin <=2 Sensitive mcg/mL     Ciprofloxacin <=1 Sensitive mcg/mL     Cefepime <=2 Sensitive mcg/mL     Ertapenem <=0.5 Sensitive mcg/mL     Gentamicin <=4 Sensitive mcg/mL     Levofloxacin <=2 Sensitive mcg/mL     Meropenem <=1 Sensitive mcg/mL     Piperacillin/Tazo <=16 Sensitive mcg/mL     Trimeth/Sulfa <=2/38 Sensitive mcg/mL     Tobramycin <=4 Sensitive mcg/mL   Urinalysis, Reflex to Urine Culture Urine, Clean Catch    Specimen: Urine   Result Value Ref Range    Specimen UA Urine, Clean Catch     Color, UA Yellow Yellow, Straw, Sharda    Appearance, UA Clear Clear    pH, UA 6.0 5.0 - 8.0    Specific Gravity, UA 1.015 1.005 - 1.030    Protein, UA 1+ (A) Negative    Glucose, UA Negative Negative    Ketones, UA Negative Negative    Bilirubin (UA) Negative Negative    Occult Blood UA Trace (A) Negative    Nitrite, UA Negative Negative    Urobilinogen, UA Negative <2.0 EU/dL    Leukocytes, UA 3+ (A) Negative   Urinalysis Microscopic   Result Value Ref Range    RBC, UA 1 0 - 4 /hpf    WBC, UA 25 (H) 0 - 5 /hpf    WBC Clumps, UA Few (A) None-Rare    Bacteria Few (A)  None-Occ /hpf    Squam Epithel, UA 4 /hpf    Hyaline Casts, UA 0 0-1/lpf /lpf    Microscopic Comment SEE COMMENT          Current Outpatient Medications:     allopurinoL (ZYLOPRIM) 300 MG tablet, Take 1 tablet (300 mg total) by mouth once daily., Disp: 90 tablet, Rfl: 3    cinacalcet (SENSIPAR) 90 MG Tab, Take 90 mg by mouth daily with breakfast., Disp: , Rfl:     ferrous sulfate 325 (65 FE) MG EC tablet, Take 1 tablet (325 mg total) by mouth once daily., Disp: 90 tablet, Rfl: 1    HYDROcodone-acetaminophen (NORCO) 7.5-325 mg per tablet, Take 1 tablet by mouth every 24 hours as needed for Pain., Disp: 30 tablet, Rfl: 0    hydrocortisone 2.5 % cream, Apply topically 2 (two) times daily., Disp: 1 Tube, Rfl: 5    levalbuterol (XOPENEX) 1.25 mg/3 mL nebulizer solution, USE 1 VIAL IN NEBULIZER EVERY 8 HOURS AS NEEDED FOR WHEEZING. RESCUE, Disp: , Rfl:     levothyroxine (SYNTHROID) 75 MCG tablet, Take 1 tablet by mouth once daily, Disp: 90 tablet, Rfl: 3    meclizine (ANTIVERT) 12.5 mg tablet, TAKE 1 TABLET BY MOUTH THREE TIMES DAILY AS NEEDED FOR DIZZINESS, Disp: 30 tablet, Rfl: 0    metoprolol succinate (TOPROL-XL) 25 MG 24 hr tablet, Take 1 tablet (25 mg total) by mouth once daily., Disp: 90 tablet, Rfl: 3    nystatin (MYCOSTATIN) powder, Apply topically 2 (two) times daily., Disp: 60 g, Rfl: 1    omega-3 acid ethyl esters (LOVAZA) 1 gram capsule, Take 1 capsule (1 g total) by mouth 2 (two) times daily., Disp: 180 capsule, Rfl: 1    rosuvastatin (CRESTOR) 20 MG tablet, Take 1 tablet by mouth once daily, Disp: 90 tablet, Rfl: 3    sotaloL (BETAPACE) 80 MG tablet, Take 1 tablet (80 mg total) by mouth once daily., Disp: 60 tablet, Rfl: 0    triamcinolone acetonide 0.1% (KENALOG) 0.1 % cream, Apply topically 2 (two) times daily. for 10 days, Disp: 45 g, Rfl: 0    wheelchair Melly, Use as directed, Disp: 1 each, Rfl: 0    XARELTO 15 mg Tab, Take 1 tablet (15 mg total) by mouth every evening., Disp: 90 tablet, Rfl: 3     magnesium oxide (MAG-OX) 400 mg (241.3 mg magnesium) tablet, Take 1 tablet (400 mg total) by mouth once daily., Disp: , Rfl:      Lab Results   Component Value Date    WBC 11.58 06/28/2023    WBC 11.58 06/28/2023    RBC 3.82 (L) 06/28/2023    RBC 3.82 (L) 06/28/2023    HGB 10.8 (L) 06/28/2023    HGB 10.8 (L) 06/28/2023    HCT 34.9 (L) 06/28/2023    HCT 34.9 (L) 06/28/2023    MCV 91 06/28/2023    MCV 91 06/28/2023    MCH 28.3 06/28/2023    MCH 28.3 06/28/2023    MCHC 30.9 (L) 06/28/2023    MCHC 30.9 (L) 06/28/2023    RDW 16.6 (H) 06/28/2023    RDW 16.6 (H) 06/28/2023     06/28/2023     06/28/2023    MPV 9.7 06/28/2023    MPV 9.7 06/28/2023    GRAN 7.8 (H) 06/28/2023    GRAN 67.3 06/28/2023    GRAN 7.8 (H) 06/28/2023    GRAN 67.3 06/28/2023    LYMPH 2.7 06/28/2023    LYMPH 23.7 06/28/2023    LYMPH 2.7 06/28/2023    LYMPH 23.7 06/28/2023    MONO 0.7 06/28/2023    MONO 5.9 06/28/2023    MONO 0.7 06/28/2023    MONO 5.9 06/28/2023    EOS 0.2 06/28/2023    EOS 0.2 06/28/2023    BASO 0.05 06/28/2023    BASO 0.05 06/28/2023    EOSINOPHIL 2.0 06/28/2023    EOSINOPHIL 2.0 06/28/2023    BASOPHIL 0.4 06/28/2023    BASOPHIL 0.4 06/28/2023    MG 1.6 05/18/2023        CMP  Lab Results   Component Value Date     06/28/2023     06/28/2023    K 4.6 06/28/2023    K 4.6 06/28/2023     06/28/2023     06/28/2023    CO2 26 06/28/2023    CO2 26 06/28/2023     (H) 06/28/2023     (H) 06/28/2023    BUN 23 06/28/2023    BUN 23 06/28/2023    CREATININE 1.5 (H) 06/28/2023    CREATININE 1.5 (H) 06/28/2023    CALCIUM 10.1 06/28/2023    CALCIUM 10.1 06/28/2023    PROT 7.5 06/28/2023    PROT 7.5 06/28/2023    ALBUMIN 3.2 (L) 06/28/2023    ALBUMIN 3.2 (L) 06/28/2023    BILITOT 0.5 06/28/2023    BILITOT 0.5 06/28/2023    ALKPHOS 94 06/28/2023    ALKPHOS 94 06/28/2023    AST 14 06/28/2023    AST 14 06/28/2023    ALT 17 06/28/2023    ALT 17 06/28/2023    ANIONGAP 11 06/28/2023    ANIONGAP 11 06/28/2023     ESTGFRAFRICA 43 (A) 06/23/2022    EGFRNONAA 37 (A) 06/23/2022        Lab Results   Component Value Date    LABBLOO No growth after 5 days. 01/23/2022    LABBLOO No growth after 5 days. 01/23/2022    LABURIN (A) 08/09/2023     PROTEUS MIRABILIS  10,000 - 49,999 cfu/ml  No other significant isolate      RESPIRATORYC Normal respiratory vu 08/12/2014    GSRESP <10 epithelial cells per low power field. 08/12/2014    GSRESP Rare WBC's 08/12/2014    GSRESP No organisms seen 08/12/2014            Results for orders placed or performed during the hospital encounter of 05/18/23   EKG 12-lead    Collection Time: 05/18/23  6:41 PM    Narrative    Test Reason : R53.1    Vent. Rate : 071 BPM     Atrial Rate : 071 BPM     P-R Int : 292 ms          QRS Dur : 088 ms      QT Int : 398 ms       P-R-T Axes : 059 000 108 degrees     QTc Int : 432 ms    Sinus rhythm with 1st degree A-V block  T wave abnormality, consider lateral ischemia  Abnormal ECG  When compared with ECG of 14-AUG-2022 18:28,  T wave inversion less evident in Lateral leads  Confirmed by Tutu Carlos MD ([id number]) on 5/19/2023 8:23:35 AM    Referred By: [email or web address]   SELF           Confirmed By:Tutu Carlos MD                  Plan:       Problem List Items Addressed This Visit          Cardiac/Vascular    Hyperlipidemia associated with type 2 diabetes mellitus     Rosuvastatin 20 mg and Lovaza tolerated well.  Current LDL 59.8 mg % by labs June 2023.          Typical atrial flutter     Single episode occurred in 2020 in setting of acute bronchitis requiring hospitalization.  Sotalol and chemical conversion developed after hospital discharge.  She remains on sotalol and Xarelto.    She did not have awareness of palpitations when she was in atial flutter.         Aortic atherosclerosis     Condition stable.         Primary hypertension     Her readings are checked at home and have been controlled.  Condition stable.  She remains on Toprol 25 mg for  hypertension.            Renal/    Stage 3b chronic kidney disease - Primary     GFR 40 range.  Condition stable.         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  Chronic Xarelto utilized.            Endocrine    Controlled type 2 diabetes mellitus without complication, without long-term current use of insulin     Most recent hemoglobin A1c 6.6.  It appears she is diet controlled only.         Obesity     Weight loss encouraged.            Other    Dizziness     She has a history of vertigo and has been using meclizine.  At 1 point she described orthostatic dizziness but it does not appear to all be related to position changes.    Holter monitor not advised at this time.  I told the patient and her daughter that if the symptoms persist or worsen they should contact my office.             No medication changes made today.  A 6 month follow-up was advised.               Tutu Carlos MD  08/30/2023   11:25 AM

## 2023-08-30 NOTE — ASSESSMENT & PLAN NOTE
She has a history of vertigo and has been using meclizine.  At 1 point she described orthostatic dizziness but it does not appear to all be related to position changes.    Holter monitor not advised at this time.  I told the patient and her daughter that if the symptoms persist or worsen they should contact my office.

## 2023-08-30 NOTE — ASSESSMENT & PLAN NOTE
Her readings are checked at home and have been controlled.  Condition stable.  She remains on Toprol 25 mg for hypertension.

## 2023-08-30 NOTE — ASSESSMENT & PLAN NOTE
Single episode occurred in 2020 in setting of acute bronchitis requiring hospitalization.  Sotalol and chemical conversion developed after hospital discharge.  She remains on sotalol and Xarelto.    She did not have awareness of palpitations when she was in atial flutter.

## 2023-09-08 ENCOUNTER — OFFICE VISIT (OUTPATIENT)
Dept: HOME HEALTH SERVICES | Facility: CLINIC | Age: 88
End: 2023-09-08
Payer: MEDICARE

## 2023-09-08 VITALS
DIASTOLIC BLOOD PRESSURE: 72 MMHG | SYSTOLIC BLOOD PRESSURE: 132 MMHG | OXYGEN SATURATION: 97 % | BODY MASS INDEX: 38.95 KG/M2 | HEIGHT: 68 IN | HEART RATE: 58 BPM | TEMPERATURE: 98 F | RESPIRATION RATE: 16 BRPM | WEIGHT: 257 LBS

## 2023-09-08 DIAGNOSIS — Z00.00 ENCOUNTER FOR MEDICARE ANNUAL WELLNESS EXAM: ICD-10-CM

## 2023-09-08 DIAGNOSIS — I70.0 AORTIC ATHEROSCLEROSIS: ICD-10-CM

## 2023-09-08 DIAGNOSIS — E11.69 HYPERLIPIDEMIA ASSOCIATED WITH TYPE 2 DIABETES MELLITUS: ICD-10-CM

## 2023-09-08 DIAGNOSIS — E78.5 HYPERLIPIDEMIA ASSOCIATED WITH TYPE 2 DIABETES MELLITUS: ICD-10-CM

## 2023-09-08 DIAGNOSIS — E11.9 CONTROLLED TYPE 2 DIABETES MELLITUS WITHOUT COMPLICATION, WITHOUT LONG-TERM CURRENT USE OF INSULIN: ICD-10-CM

## 2023-09-08 DIAGNOSIS — I27.82 CHRONIC SADDLE PULMONARY EMBOLISM WITHOUT ACUTE COR PULMONALE: ICD-10-CM

## 2023-09-08 DIAGNOSIS — E66.01 CLASS 2 SEVERE OBESITY DUE TO EXCESS CALORIES WITH SERIOUS COMORBIDITY IN ADULT, UNSPECIFIED BMI: ICD-10-CM

## 2023-09-08 DIAGNOSIS — E21.0 PRIMARY HYPERPARATHYROIDISM: ICD-10-CM

## 2023-09-08 DIAGNOSIS — Z00.00 ENCOUNTER FOR PREVENTIVE HEALTH EXAMINATION: Primary | ICD-10-CM

## 2023-09-08 DIAGNOSIS — I48.3 TYPICAL ATRIAL FLUTTER: ICD-10-CM

## 2023-09-08 DIAGNOSIS — I26.92 CHRONIC SADDLE PULMONARY EMBOLISM WITHOUT ACUTE COR PULMONALE: ICD-10-CM

## 2023-09-08 DIAGNOSIS — Z99.3 DEPENDENCE ON WHEELCHAIR: ICD-10-CM

## 2023-09-08 DIAGNOSIS — N18.32 STAGE 3B CHRONIC KIDNEY DISEASE: ICD-10-CM

## 2023-09-08 PROCEDURE — 1101F PT FALLS ASSESS-DOCD LE1/YR: CPT | Mod: CPTII,S$GLB,, | Performed by: NURSE PRACTITIONER

## 2023-09-08 PROCEDURE — G9919 PR SCREENING AND POSITIVE: ICD-10-PCS | Mod: CPTII,S$GLB,, | Performed by: NURSE PRACTITIONER

## 2023-09-08 PROCEDURE — G9919 SCRN ND POS ND PROV OF REC: HCPCS | Mod: CPTII,S$GLB,, | Performed by: NURSE PRACTITIONER

## 2023-09-08 PROCEDURE — G0439 PR MEDICARE ANNUAL WELLNESS SUBSEQUENT VISIT: ICD-10-PCS | Mod: S$GLB,,, | Performed by: NURSE PRACTITIONER

## 2023-09-08 PROCEDURE — 3288F PR FALLS RISK ASSESSMENT DOCUMENTED: ICD-10-PCS | Mod: CPTII,S$GLB,, | Performed by: NURSE PRACTITIONER

## 2023-09-08 PROCEDURE — G0439 PPPS, SUBSEQ VISIT: HCPCS | Mod: S$GLB,,, | Performed by: NURSE PRACTITIONER

## 2023-09-08 PROCEDURE — 3288F FALL RISK ASSESSMENT DOCD: CPT | Mod: CPTII,S$GLB,, | Performed by: NURSE PRACTITIONER

## 2023-09-08 PROCEDURE — 1101F PR PT FALLS ASSESS DOC 0-1 FALLS W/OUT INJ PAST YR: ICD-10-PCS | Mod: CPTII,S$GLB,, | Performed by: NURSE PRACTITIONER

## 2023-09-08 RX ORDER — MULTIVITAMIN
1 TABLET ORAL DAILY
COMMUNITY

## 2023-09-08 RX ORDER — ACETAMINOPHEN 500 MG
500 TABLET ORAL EVERY 6 HOURS PRN
COMMUNITY

## 2023-09-08 RX ORDER — CINACALCET 60 MG/1
TABLET, FILM COATED ORAL
COMMUNITY
Start: 2023-08-31

## 2023-09-08 NOTE — PROGRESS NOTES
"  Tonya Bucio presented for a  Medicare AWV and comprehensive Health Risk Assessment today. The following components were reviewed and updated:    Medical history  Family History  Social history  Allergies and Current Medications  Health Risk Assessment  Health Maintenance  Care Team     Patient screened moderate and/or high risk for one or more social determinants of health (SDOH). Patient connected to community resources through the ED Navigator.      ** See Completed Assessments for Annual Wellness Visit within the encounter summary.**         The following assessments were completed:  Living Situation  CAGE  Depression Screening  Timed Get Up and Go  Whisper Test  Cognitive Function Screening      Nutrition Screening  ADL Screening  PAQ Screening        Vitals:    09/08/23 1346   BP: 132/72   Pulse: (!) 58   Resp: 16   Temp: 97.8 °F (36.6 °C)   SpO2: 97%   Weight: 116.6 kg (257 lb)   Height: 5' 8" (1.727 m)     Body mass index is 39.08 kg/m².  Physical Exam  Vitals and nursing note reviewed.   Constitutional:       General: She is not in acute distress.     Appearance: She is obese. She is not ill-appearing.   HENT:      Head: Normocephalic and atraumatic.      Nose: Nose normal.      Mouth/Throat:      Mouth: Mucous membranes are moist.   Eyes:      Pupils: Pupils are equal, round, and reactive to light.   Cardiovascular:      Rate and Rhythm: Regular rhythm. Bradycardia present.      Pulses: Normal pulses.      Heart sounds: Normal heart sounds.   Pulmonary:      Effort: Pulmonary effort is normal.      Breath sounds: Normal breath sounds.   Abdominal:      General: Bowel sounds are normal.      Palpations: Abdomen is soft.   Musculoskeletal:         General: Normal range of motion.      Cervical back: Normal range of motion.      Right lower leg: Edema present.      Left lower leg: Edema present.   Skin:     General: Skin is warm and dry.      Coloration: Skin is pale.   Neurological:      Mental Status: " She is alert and oriented to person, place, and time.      Motor: Weakness present.   Psychiatric:         Mood and Affect: Mood normal.         Behavior: Behavior normal.         Thought Content: Thought content normal.               Diagnoses and health risks identified today and associated recommendations/orders:    1. Encounter for preventive health examination  Assessments completed.  HM recommendations reviewed.  F/u with PCP as instructed.     Patient on chronic opioids.   Risk factors reviewed for any potential opioid use disorder   Pain evaluated during visit.  Current treatment plan documented.  Will refer to specialist, as appropriate.      2. Encounter for Medicare annual wellness exam  - Ambulatory Referral/Consult to Enhanced Annual Wellness Visit (eAWV)    3. Stage 3b chronic kidney disease  Chronic, stable on current regimen. Followed by PCP    4. Aortic atherosclerosis  Chronic, stable on current regimen. Followed by cardiology    5. Chronic saddle pulmonary embolism without acute cor pulmonale  Chronic, stable on current regimen. Followed by cardiology    6. Typical atrial flutter  Chronic, stable on current regimen. Followed by cardiology      7. Primary hyperparathyroidism  Chronic, stable on current regimen. Followed by PCP    8. Class 2 severe obesity due to excess calories with serious comorbidity in adult, unspecified BMI  Chronic, stable on current regimen. Followed by PCP    9. Controlled type 2 diabetes mellitus without complication, without long-term current use of insulin  Chronic, stable on current regimen. Followed by PCP    10. Hyperlipidemia associated with type 2 diabetes mellitus  Chronic, stable on current regimen. Followed by PCP    11. 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 Followed by PCP      Provided Tonya with a 5-10 year written screening schedule and personal prevention plan. Recommendations were developed using the USPSTF age appropriate  recommendations. Education, counseling, and referrals were provided as needed. After Visit Summary printed and given to patient which includes a list of additional screenings\tests needed.    Follow up in about 1 year (around 9/8/2024) for Medicare AWV.    Annalise Padilla NP  I offered to discuss advanced care planning, including how to pick a person who would make decisions for you if you were unable to make them for yourself, called a health care power of , and what kind of decisions you might make such as use of life sustaining treatments such as ventilators and tube feeding when faced with a life limiting illness recorded on a living will that they will need to know. (How you want to be cared for as you near the end of your natural life)     X  Patient is unwilling to engage in a discussion regarding advance directives at this time.

## 2023-09-13 NOTE — PATIENT INSTRUCTIONS
Counseling and Referral of Other Preventative  (Italic type indicates deductible and co-insurance are waived)    Patient Name: Tonya Bucio  Today's Date: 9/13/2023    Health Maintenance       Date Due Completion Date    Shingles Vaccine (1 of 2) Never done ---    Eye Exam 10/22/2019 10/22/2018    COVID-19 Vaccine (3 - Moderna series) 04/21/2021 2/24/2021    Influenza Vaccine (1) 09/01/2023 10/5/2022    Hemoglobin A1c 12/28/2023 6/28/2023    Diabetes Urine Screening 06/28/2024 6/28/2023    Lipid Panel 06/28/2024 6/28/2023    TETANUS VACCINE 05/14/2030 5/14/2020        No orders of the defined types were placed in this encounter.    The following information is provided to all patients.  This information is to help you find resources for any of the problems found today that may be affecting your health:                Living healthy guide: www.[facility].[place].[facility]      Understanding Diabetes: www.diabetes.org      Eating healthy: www.cdc.gov/healthyweight      CDC home safety checklist: www.cdc.gov/steadi/patient.html      Agency on Aging: www.goea.[place].[facility]      Alcoholics anonymous (AA): www.aa.org      Physical Activity: www.kristyn.nih.gov/vo5spci      Tobacco use: www.quitwithusla.org

## 2023-09-27 DIAGNOSIS — M17.0 PRIMARY OSTEOARTHRITIS OF BOTH KNEES: ICD-10-CM

## 2023-09-27 RX ORDER — HYDROCODONE BITARTRATE AND ACETAMINOPHEN 7.5; 325 MG/1; MG/1
1 TABLET ORAL
Qty: 30 TABLET | Refills: 0 | Status: SHIPPED | OUTPATIENT
Start: 2023-09-27 | End: 2023-10-25 | Stop reason: SDUPTHER

## 2023-09-27 NOTE — TELEPHONE ENCOUNTER
----- Message from Connie Pineda sent at 2023 10:47 AM CDT -----  Contact: pt  Tonya Bucio  MRN: [id number]  : 1935  PCP: Tasha Atkins  Home Phone      [phone]  Work Phone      Not on file.  Mobile          [phone]      MESSAGE: pt states she needs the following prescription refilled HYDROcodone-acetaminophen (NORCO) 7.5-325 mg per tablet    [facility] [address] KE[address] [address]  Phone: [phone] Fax: [phone]

## 2023-09-27 NOTE — TELEPHONE ENCOUNTER
No care due was identified.  [facility] Embedded Care Due Messages. Reference number: [id number].   9/27/2023 10:50:58 AM CDT

## 2023-09-29 ENCOUNTER — LAB VISIT (OUTPATIENT)
Dept: LAB | Facility: HOSPITAL | Age: 88
End: 2023-09-29
Attending: INTERNAL MEDICINE
Payer: MEDICARE

## 2023-09-29 DIAGNOSIS — E11.69 HYPERLIPIDEMIA ASSOCIATED WITH TYPE 2 DIABETES MELLITUS: ICD-10-CM

## 2023-09-29 DIAGNOSIS — E78.5 HYPERLIPIDEMIA ASSOCIATED WITH TYPE 2 DIABETES MELLITUS: ICD-10-CM

## 2023-09-29 DIAGNOSIS — E03.9 ACQUIRED HYPOTHYROIDISM: ICD-10-CM

## 2023-09-29 DIAGNOSIS — I12.9 PARENCHYMAL RENAL HYPERTENSION: Primary | ICD-10-CM

## 2023-09-29 LAB
ALBUMIN SERPL BCP-MCNC: 3.2 G/DL (ref 3.5–5.2)
ALP SERPL-CCNC: 100 U/L (ref 55–135)
ALT SERPL W/O P-5'-P-CCNC: 18 U/L (ref 10–44)
ANION GAP SERPL CALC-SCNC: 10 MMOL/L (ref 8–16)
AST SERPL-CCNC: 13 U/L (ref 10–40)
BASOPHILS # BLD AUTO: 0.03 K/UL (ref 0–0.2)
BASOPHILS NFR BLD: 0.3 % (ref 0–1.9)
BILIRUB SERPL-MCNC: 0.5 MG/DL (ref 0.1–1)
BUN SERPL-MCNC: 23 MG/DL (ref 8–23)
CALCIUM SERPL-MCNC: 9.7 MG/DL (ref 8.7–10.5)
CHLORIDE SERPL-SCNC: 104 MMOL/L (ref 95–110)
CHOLEST SERPL-MCNC: 161 MG/DL (ref 120–199)
CHOLEST/HDLC SERPL: 4.5 {RATIO} (ref 2–5)
CO2 SERPL-SCNC: 28 MMOL/L (ref 23–29)
CREAT SERPL-MCNC: 1.3 MG/DL (ref 0.5–1.4)
DIFFERENTIAL METHOD: ABNORMAL
EOSINOPHIL # BLD AUTO: 0.3 K/UL (ref 0–0.5)
EOSINOPHIL NFR BLD: 2.8 % (ref 0–8)
ERYTHROCYTE [DISTWIDTH] IN BLOOD BY AUTOMATED COUNT: 14.8 % (ref 11.5–14.5)
EST. GFR  (NO RACE VARIABLE): 40 ML/MIN/1.73 M^2
GLUCOSE SERPL-MCNC: 113 MG/DL (ref 70–110)
HCT VFR BLD AUTO: 34.7 % (ref 37–48.5)
HDLC SERPL-MCNC: 36 MG/DL (ref 40–75)
HDLC SERPL: 22.4 % (ref 20–50)
HGB BLD-MCNC: 10.8 G/DL (ref 12–16)
IMM GRANULOCYTES # BLD AUTO: 0.05 K/UL (ref 0–0.04)
IMM GRANULOCYTES NFR BLD AUTO: 0.5 % (ref 0–0.5)
LDLC SERPL CALC-MCNC: 66.6 MG/DL (ref 63–159)
LYMPHOCYTES # BLD AUTO: 2.3 K/UL (ref 1–4.8)
LYMPHOCYTES NFR BLD: 24.7 % (ref 18–48)
MCH RBC QN AUTO: 29.3 PG (ref 27–31)
MCHC RBC AUTO-ENTMCNC: 31.1 G/DL (ref 32–36)
MCV RBC AUTO: 94 FL (ref 82–98)
MONOCYTES # BLD AUTO: 0.6 K/UL (ref 0.3–1)
MONOCYTES NFR BLD: 6.6 % (ref 4–15)
NEUTROPHILS # BLD AUTO: 6 K/UL (ref 1.8–7.7)
NEUTROPHILS NFR BLD: 65.1 % (ref 38–73)
NONHDLC SERPL-MCNC: 125 MG/DL
NRBC BLD-RTO: 0 /100 WBC
PLATELET # BLD AUTO: 326 K/UL (ref 150–450)
PMV BLD AUTO: 10.2 FL (ref 9.2–12.9)
POTASSIUM SERPL-SCNC: 4.3 MMOL/L (ref 3.5–5.1)
PROT SERPL-MCNC: 7.5 G/DL (ref 6–8.4)
RBC # BLD AUTO: 3.68 M/UL (ref 4–5.4)
SODIUM SERPL-SCNC: 142 MMOL/L (ref 136–145)
TRIGL SERPL-MCNC: 292 MG/DL (ref 30–150)
TSH SERPL DL<=0.005 MIU/L-ACNC: 1.92 UIU/ML (ref 0.4–4)
WBC # BLD AUTO: 9.15 K/UL (ref 3.9–12.7)

## 2023-09-29 PROCEDURE — 85025 COMPLETE CBC W/AUTO DIFF WBC: CPT | Mod: HCNC | Performed by: INTERNAL MEDICINE

## 2023-09-29 PROCEDURE — 84443 ASSAY THYROID STIM HORMONE: CPT | Mod: HCNC | Performed by: INTERNAL MEDICINE

## 2023-09-29 PROCEDURE — 36415 COLL VENOUS BLD VENIPUNCTURE: CPT | Mod: HCNC | Performed by: INTERNAL MEDICINE

## 2023-09-29 PROCEDURE — 80061 LIPID PANEL: CPT | Mod: HCNC | Performed by: INTERNAL MEDICINE

## 2023-09-29 PROCEDURE — 80053 COMPREHEN METABOLIC PANEL: CPT | Mod: HCNC | Performed by: INTERNAL MEDICINE

## 2023-10-04 ENCOUNTER — OFFICE VISIT (OUTPATIENT)
Dept: INTERNAL MEDICINE | Facility: CLINIC | Age: 88
End: 2023-10-04
Payer: MEDICARE

## 2023-10-04 VITALS
OXYGEN SATURATION: 98 % | WEIGHT: 257.94 LBS | BODY MASS INDEX: 39.09 KG/M2 | DIASTOLIC BLOOD PRESSURE: 60 MMHG | HEIGHT: 68 IN | HEART RATE: 59 BPM | RESPIRATION RATE: 19 BRPM | SYSTOLIC BLOOD PRESSURE: 100 MMHG

## 2023-10-04 DIAGNOSIS — B35.4 TINEA CORPORIS: ICD-10-CM

## 2023-10-04 DIAGNOSIS — I10 PRIMARY HYPERTENSION: Primary | ICD-10-CM

## 2023-10-04 DIAGNOSIS — C55 MALIGNANT NEOPLASM OF UTERUS, UNSPECIFIED SITE: ICD-10-CM

## 2023-10-04 DIAGNOSIS — E78.5 HYPERLIPIDEMIA ASSOCIATED WITH TYPE 2 DIABETES MELLITUS: ICD-10-CM

## 2023-10-04 DIAGNOSIS — N18.31 ANEMIA DUE TO STAGE 3A CHRONIC KIDNEY DISEASE: ICD-10-CM

## 2023-10-04 DIAGNOSIS — D63.1 ANEMIA DUE TO STAGE 3A CHRONIC KIDNEY DISEASE: ICD-10-CM

## 2023-10-04 DIAGNOSIS — E11.69 HYPERLIPIDEMIA ASSOCIATED WITH TYPE 2 DIABETES MELLITUS: ICD-10-CM

## 2023-10-04 DIAGNOSIS — E11.9 CONTROLLED TYPE 2 DIABETES MELLITUS WITHOUT COMPLICATION, WITHOUT LONG-TERM CURRENT USE OF INSULIN: ICD-10-CM

## 2023-10-04 PROCEDURE — 99214 OFFICE O/P EST MOD 30 MIN: CPT | Mod: 25,HCNC,S$GLB, | Performed by: INTERNAL MEDICINE

## 2023-10-04 PROCEDURE — 90694 VACC AIIV4 NO PRSRV 0.5ML IM: CPT | Mod: HCNC,S$GLB,, | Performed by: INTERNAL MEDICINE

## 2023-10-04 PROCEDURE — 1126F PR PAIN SEVERITY QUANTIFIED, NO PAIN PRESENT: ICD-10-PCS | Mod: HCNC,CPTII,S$GLB, | Performed by: INTERNAL MEDICINE

## 2023-10-04 PROCEDURE — 1160F PR REVIEW ALL MEDS BY PRESCRIBER/CLIN PHARMACIST DOCUMENTED: ICD-10-PCS | Mod: HCNC,CPTII,S$GLB, | Performed by: INTERNAL MEDICINE

## 2023-10-04 PROCEDURE — 1101F PT FALLS ASSESS-DOCD LE1/YR: CPT | Mod: HCNC,CPTII,S$GLB, | Performed by: INTERNAL MEDICINE

## 2023-10-04 PROCEDURE — 1159F PR MEDICATION LIST DOCUMENTED IN MEDICAL RECORD: ICD-10-PCS | Mod: HCNC,CPTII,S$GLB, | Performed by: INTERNAL MEDICINE

## 2023-10-04 PROCEDURE — 99999 PR PBB SHADOW E&M-EST. PATIENT-LVL IV: ICD-10-PCS | Mod: PBBFAC,HCNC,, | Performed by: INTERNAL MEDICINE

## 2023-10-04 PROCEDURE — 1126F AMNT PAIN NOTED NONE PRSNT: CPT | Mod: HCNC,CPTII,S$GLB, | Performed by: INTERNAL MEDICINE

## 2023-10-04 PROCEDURE — 1160F RVW MEDS BY RX/DR IN RCRD: CPT | Mod: HCNC,CPTII,S$GLB, | Performed by: INTERNAL MEDICINE

## 2023-10-04 PROCEDURE — 99999 PR PBB SHADOW E&M-EST. PATIENT-LVL IV: CPT | Mod: PBBFAC,HCNC,, | Performed by: INTERNAL MEDICINE

## 2023-10-04 PROCEDURE — 3288F FALL RISK ASSESSMENT DOCD: CPT | Mod: HCNC,CPTII,S$GLB, | Performed by: INTERNAL MEDICINE

## 2023-10-04 PROCEDURE — 99214 PR OFFICE/OUTPT VISIT, EST, LEVL IV, 30-39 MIN: ICD-10-PCS | Mod: 25,HCNC,S$GLB, | Performed by: INTERNAL MEDICINE

## 2023-10-04 PROCEDURE — 1159F MED LIST DOCD IN RCRD: CPT | Mod: HCNC,CPTII,S$GLB, | Performed by: INTERNAL MEDICINE

## 2023-10-04 PROCEDURE — 3288F PR FALLS RISK ASSESSMENT DOCUMENTED: ICD-10-PCS | Mod: HCNC,CPTII,S$GLB, | Performed by: INTERNAL MEDICINE

## 2023-10-04 PROCEDURE — G0008 ADMIN INFLUENZA VIRUS VAC: HCPCS | Mod: HCNC,S$GLB,, | Performed by: INTERNAL MEDICINE

## 2023-10-04 PROCEDURE — 1101F PR PT FALLS ASSESS DOC 0-1 FALLS W/OUT INJ PAST YR: ICD-10-PCS | Mod: HCNC,CPTII,S$GLB, | Performed by: INTERNAL MEDICINE

## 2023-10-04 PROCEDURE — 90694 FLU VACCINE - QUADRIVALENT - ADJUVANTED: ICD-10-PCS | Mod: HCNC,S$GLB,, | Performed by: INTERNAL MEDICINE

## 2023-10-04 PROCEDURE — G0008 FLU VACCINE - QUADRIVALENT - ADJUVANTED: ICD-10-PCS | Mod: HCNC,S$GLB,, | Performed by: INTERNAL MEDICINE

## 2023-10-04 RX ORDER — NYSTATIN 100000 [USP'U]/G
POWDER TOPICAL 2 TIMES DAILY
Qty: 60 G | Refills: 1 | Status: SHIPPED | OUTPATIENT
Start: 2023-10-04 | End: 2024-01-04 | Stop reason: SDUPTHER

## 2023-10-04 RX ORDER — OMEGA-3-ACID ETHYL ESTERS 1 G/1
1 CAPSULE, LIQUID FILLED ORAL 2 TIMES DAILY
Qty: 180 CAPSULE | Refills: 1 | Status: SHIPPED | OUTPATIENT
Start: 2023-10-04 | End: 2024-04-03 | Stop reason: SDUPTHER

## 2023-10-04 RX ORDER — NYSTATIN 100000 [USP'U]/G
POWDER TOPICAL 2 TIMES DAILY
Qty: 60 G | Refills: 1 | Status: SHIPPED | OUTPATIENT
Start: 2023-10-04

## 2023-10-04 RX ORDER — KETOCONAZOLE 20 MG/G
CREAM TOPICAL 2 TIMES DAILY
Qty: 60 G | Refills: 1 | Status: SHIPPED | OUTPATIENT
Start: 2023-10-04 | End: 2023-11-03

## 2023-10-04 NOTE — PROGRESS NOTES
"HPI:  Tonya Bucio is a 88 y.o. female here for Follow-up  Labs are reviewed      Review of Systems   Constitutional:  Positive for fatigue. Negative for chills and fever.   HENT:  Negative for congestion, hearing loss, sinus pressure and sore throat.    Eyes:  Negative for photophobia.   Respiratory:  Negative for cough, choking, chest tightness, shortness of breath and wheezing.    Cardiovascular:  Negative for chest pain and palpitations.   Gastrointestinal:  Negative for blood in stool, nausea and vomiting.   Endocrine: Negative for polydipsia and polyphagia.   Genitourinary:  Negative for dysuria and hematuria.   Musculoskeletal:  Negative for arthralgias, myalgias and neck pain.        Still needing narcotics ; no confusion or hang over .     Skin:  Negative for pallor.   Neurological:  Negative for dizziness, weakness and numbness.   Hematological:  Does not bruise/bleed easily.   Psychiatric/Behavioral:  Negative for confusion and suicidal ideas. The patient is not nervous/anxious.     A review of systems was performed and is negative except as noted above.    Objective:  Vitals:    10/04/23 1426   BP: 100/60   Pulse: (!) 59   Resp: 19   SpO2: 98%   Weight: 117 kg (257 lb 15 oz)   Height: 5' 8" (1.727 m)      Physical Exam  Vitals and nursing note reviewed.   Constitutional:       Appearance: She is well-developed. She is obese.   HENT:      Head: Normocephalic and atraumatic.      Right Ear: External ear normal.      Left Ear: External ear normal.   Eyes:      Conjunctiva/sclera: Conjunctivae normal.      Pupils: Pupils are equal, round, and reactive to light.   Neck:      Thyroid: No thyromegaly.      Vascular: No JVD.      Trachea: No tracheal deviation.   Cardiovascular:      Rate and Rhythm: Normal rate and regular rhythm.      Pulses:           Dorsalis pedis pulses are 1+ on the right side and 1+ on the left side.        Posterior tibial pulses are 1+ on the right side and 1+ on the left side. "      Heart sounds: Normal heart sounds.   Pulmonary:      Effort: Pulmonary effort is normal. No respiratory distress.      Breath sounds: Normal breath sounds. No wheezing or rales.   Chest:      Chest wall: No tenderness.   Abdominal:      General: Bowel sounds are normal. There is no distension.      Palpations: Abdomen is soft. There is no mass.      Tenderness: There is no abdominal tenderness. There is no guarding or rebound.   Musculoskeletal:         General: Normal range of motion.      Cervical back: Normal range of motion and neck supple.      Comments: Bilateral knee oa changes.     Feet:      Right foot:      Protective Sensation: 7 sites tested.        Skin integrity: Dry skin present. No ulcer or erythema.      Left foot:      Protective Sensation: 7 sites tested.  4 sites sensed.      Skin integrity: Dry skin present. No ulcer or erythema.   Lymphadenopathy:      Cervical: No cervical adenopathy.   Skin:     General: Skin is warm and dry.      Comments: Scaly rash feet bilateral    Neurological:      Mental Status: She is alert and oriented to person, place, and time.      Cranial Nerves: No cranial nerve deficit.      Motor: No abnormal muscle tone.      Coordination: Coordination normal.      Deep Tendon Reflexes: Reflexes are normal and symmetric.        Assessment/Plan:  1. Primary hypertension  -     CBC Auto Differential; Future; Expected date: 04/01/2024  -     Comprehensive Metabolic Panel; Future; Expected date: 04/01/2024  -     TSH; Future; Expected date: 04/01/2024    Well controlled.  Continue same medication and dose.  Keep weight close to ideal body weight.     Avoid high salt foods (olives, pickles, smoked meats, salted potato chips, etc.).   Do not add salt to your food at the table.   Use only small amounts of salt when cooking.   Begin an exercise program. Discuss with your doctor what type of exercise program would be best for you. It doesn't have to be difficult. Even brisk  walking for 20 minutes three times a week is a good form of exercise.   Avoid medicines which contain heart stimulants. This includes many cold and sinus decongestant pills and sprays as well as diet pills. Check the warnings about hypertension on the label. Stimulants such as amphetamine or cocaine could be lethal for someone with hypertension. Never take these.    2. Malignant neoplasm of uterus, unspecified site  S/p hysterectomy     3. 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 Controlled type 2 diabetes mellitus without complication, without long-term current use of insulin  -     Hemoglobin A1C; Future; Expected date: 04/01/2024  -     Microalbumin/Creatinine Ratio, Urine; Future; Expected date: 04/01/2024  Patient has controlled Diabetes .  We discussed about diet ;low in calories. Avoid sweats, sodas.  Also increasing activity;walking 2-3 miles a day.  I also adjusted medications and gave patient  instructions about adherence to plan.  Goal of  A1c  less than 7 % stressed.  Also goal of LDL less than 70 highlighted to patient.       5. Hyperlipidemia associated with type 2 diabetes mellitus  -     Lipid Panel; Future; Expected date: 04/01/2024  Stable and controlled. Continue current treatment plan as previously prescribed with your PCP.        6. Tinea corporis  -     ketoconazole (NIZORAL) 2 % cream; Apply topically 2 (two) times daily.  Dispense: 60 g; Refill: 1  -     nystatin (MYCOSTATIN) powder; Apply topically 2 (two) times daily.  Dispense: 60 g; Refill: 1  -     nystatin (MYCOSTATIN) powder; Apply topically 2 (two) times daily.  Dispense: 60 g; Refill: 1

## 2023-10-22 DIAGNOSIS — E03.4 HYPOTHYROIDISM DUE TO ACQUIRED ATROPHY OF THYROID: ICD-10-CM

## 2023-10-22 RX ORDER — LEVOTHYROXINE SODIUM 75 UG/1
TABLET ORAL
Qty: 90 TABLET | Refills: 3 | Status: SHIPPED | OUTPATIENT
Start: 2023-10-22

## 2023-10-22 NOTE — TELEPHONE ENCOUNTER
No care due was identified.  [facility] Embedded Care Due Messages. Reference number: [id number].   10/22/2023 12:23:25 PM CDT

## 2023-10-22 NOTE — TELEPHONE ENCOUNTER
Refill Decision Note   Tonya Bucio  is requesting a refill authorization.  Brief Assessment and Rationale for Refill:  Approve     Medication Therapy Plan:         Comments:     Note composed:5:39 PM 10/22/2023

## 2023-10-25 DIAGNOSIS — M17.0 PRIMARY OSTEOARTHRITIS OF BOTH KNEES: ICD-10-CM

## 2023-10-25 NOTE — TELEPHONE ENCOUNTER
----- Message from Connie Pineda sent at 10/25/2023 10:22 AM CDT -----  Contact: pt  Tonya Bucio  MRN: [id number]  : 1935  PCP: Tasha Atkins  Home Phone      [phone]  Work Phone      Not on file.  Mobile          [phone]      MESSAGE: pt states she needs the following prescriptions refilled      HYDROcodone-acetaminophen (NORCO) 7.5-325 mg per tablet  levothyroxine (SYNTHROID) 75 MCG tablet    [facility] [address] [place] [address]  Phone: [phone] Fax: [phone]  Hours: Not open 24 hours    [phone]

## 2023-10-25 NOTE — TELEPHONE ENCOUNTER
No care due was identified.  [facility] Embedded Care Due Messages. Reference number: [id number].   10/25/2023 10:30:27 AM CDT

## 2023-10-27 RX ORDER — HYDROCODONE BITARTRATE AND ACETAMINOPHEN 7.5; 325 MG/1; MG/1
1 TABLET ORAL
Qty: 30 TABLET | Refills: 0 | Status: SHIPPED | OUTPATIENT
Start: 2023-10-27 | End: 2023-11-29 | Stop reason: SDUPTHER

## 2023-11-29 DIAGNOSIS — M17.0 PRIMARY OSTEOARTHRITIS OF BOTH KNEES: ICD-10-CM

## 2023-11-29 RX ORDER — HYDROCODONE BITARTRATE AND ACETAMINOPHEN 7.5; 325 MG/1; MG/1
1 TABLET ORAL
Qty: 30 TABLET | Refills: 0 | Status: SHIPPED | OUTPATIENT
Start: 2023-11-29 | End: 2023-12-28 | Stop reason: SDUPTHER

## 2023-11-29 NOTE — TELEPHONE ENCOUNTER
No care due was identified.  [facility] Embedded Care Due Messages. Reference number: [id number].   11/29/2023 1:36:48 PM CST

## 2023-11-29 NOTE — TELEPHONE ENCOUNTER
----- Message from Roxanna Russ sent at 2023 12:40 PM CST -----  Contact: pt  Tonya Tasneem Bucio  MRN: [id number]  : 1935  PCP: Tasha Atkins  Home Phone      [phone]  Work Phone      Not on file.  [employer]          [phone]      MESSAGE:     Pt needs a refill of HYDROcodone-acetaminophen (NORCO) 7.5-325 mg per tablet sent to [facility] [place].       Tonya   [phone]

## 2023-12-28 DIAGNOSIS — M17.0 PRIMARY OSTEOARTHRITIS OF BOTH KNEES: ICD-10-CM

## 2023-12-28 RX ORDER — HYDROCODONE BITARTRATE AND ACETAMINOPHEN 7.5; 325 MG/1; MG/1
1 TABLET ORAL
Qty: 30 TABLET | Refills: 0 | Status: SHIPPED | OUTPATIENT
Start: 2023-12-28 | End: 2024-01-30 | Stop reason: SDUPTHER

## 2023-12-28 NOTE — TELEPHONE ENCOUNTER
----- Message from Roxanna Russ sent at 2023 11:39 AM CST -----  Contact: pt  Tonya Bucio  MRN: [id number]  : 1935  PCP: Tasha Atkins  Home Phone      [phone]  Work Phone      Not on file.  [employer]          [phone]      MESSAGE:     Pt needs a refill of HYDROcodone-acetaminophen (NORCO) 7.5-325 mg per tablet sent to [facility] in [place].       [facility]   [phone]

## 2023-12-28 NOTE — TELEPHONE ENCOUNTER
No care due was identified.  [facility] Embedded Care Due Messages. Reference number: [id number].   12/28/2023 2:43:32 PM CST

## 2024-01-03 DIAGNOSIS — D64.9 ANEMIA, UNSPECIFIED TYPE: ICD-10-CM

## 2024-01-03 RX ORDER — FERROUS SULFATE 325(65) MG
325 TABLET, DELAYED RELEASE (ENTERIC COATED) ORAL DAILY
Qty: 90 TABLET | Refills: 1 | Status: SHIPPED | OUTPATIENT
Start: 2024-01-03

## 2024-01-03 NOTE — TELEPHONE ENCOUNTER
----- Message from Connie Edwin sent at 1/3/2024 11:58 AM CST -----  Contact: pt  Tonya Bucio  MRN: [id number]  : 1935  PCP: Tasha Atkins  Home Phone      [phone]  Work Phone      Not on file.  Mobile          [phone]      MESSAGE: pt states she needs the following prescription refilled ferrous sulfate 325 (65 FE) MG EC tablet        [facility] [address] KEITH [address] [address]  Phone: [phone] Fax: [phone]  Hours: Not open 24 hours      [phone]

## 2024-01-04 ENCOUNTER — OFFICE VISIT (OUTPATIENT)
Dept: INTERNAL MEDICINE | Facility: CLINIC | Age: 89
End: 2024-01-04
Payer: MEDICARE

## 2024-01-04 VITALS
HEIGHT: 68 IN | DIASTOLIC BLOOD PRESSURE: 74 MMHG | RESPIRATION RATE: 16 BRPM | OXYGEN SATURATION: 95 % | BODY MASS INDEX: 39.22 KG/M2 | HEART RATE: 60 BPM | SYSTOLIC BLOOD PRESSURE: 132 MMHG

## 2024-01-04 DIAGNOSIS — M54.50 CHRONIC BILATERAL LOW BACK PAIN WITHOUT SCIATICA: Primary | ICD-10-CM

## 2024-01-04 DIAGNOSIS — F11.90 CHRONIC NARCOTIC USE: ICD-10-CM

## 2024-01-04 DIAGNOSIS — G89.29 CHRONIC BILATERAL LOW BACK PAIN WITHOUT SCIATICA: Primary | ICD-10-CM

## 2024-01-04 DIAGNOSIS — M17.0 PRIMARY OSTEOARTHRITIS OF BOTH KNEES: ICD-10-CM

## 2024-01-04 PROCEDURE — 3288F FALL RISK ASSESSMENT DOCD: CPT | Mod: HCNC,CPTII,S$GLB, | Performed by: INTERNAL MEDICINE

## 2024-01-04 PROCEDURE — 1160F RVW MEDS BY RX/DR IN RCRD: CPT | Mod: HCNC,CPTII,S$GLB, | Performed by: INTERNAL MEDICINE

## 2024-01-04 PROCEDURE — 1101F PT FALLS ASSESS-DOCD LE1/YR: CPT | Mod: HCNC,CPTII,S$GLB, | Performed by: INTERNAL MEDICINE

## 2024-01-04 PROCEDURE — 1125F AMNT PAIN NOTED PAIN PRSNT: CPT | Mod: HCNC,CPTII,S$GLB, | Performed by: INTERNAL MEDICINE

## 2024-01-04 PROCEDURE — 1159F MED LIST DOCD IN RCRD: CPT | Mod: HCNC,CPTII,S$GLB, | Performed by: INTERNAL MEDICINE

## 2024-01-04 PROCEDURE — 99213 OFFICE O/P EST LOW 20 MIN: CPT | Mod: HCNC,S$GLB,, | Performed by: INTERNAL MEDICINE

## 2024-01-04 PROCEDURE — 99999 PR PBB SHADOW E&M-EST. PATIENT-LVL IV: CPT | Mod: PBBFAC,HCNC,, | Performed by: INTERNAL MEDICINE

## 2024-01-04 NOTE — PROGRESS NOTES
"HPI:  Tonya Bucio is a 88 y.o. female here for Follow-up  On chronic narcotics   3 m visit   Pain issues continue     Review of Systems   Constitutional:  Positive for fatigue. Negative for chills and fever.   HENT:  Negative for congestion, hearing loss, sinus pressure and sore throat.    Eyes:  Negative for photophobia.   Respiratory:  Negative for cough, choking, chest tightness, shortness of breath and wheezing.    Cardiovascular:  Negative for chest pain and palpitations.   Gastrointestinal:  Negative for blood in stool, nausea and vomiting.   Endocrine: Negative for polydipsia and polyphagia.   Genitourinary:  Negative for dysuria and hematuria.   Musculoskeletal:  Positive for arthralgias, back pain and gait problem. Negative for myalgias and neck pain.        Still needing narcotics ; no confusion or hang over .     Skin:  Negative for pallor.   Neurological:  Negative for dizziness, weakness and numbness.   Hematological:  Does not bruise/bleed easily.   Psychiatric/Behavioral:  Negative for confusion and suicidal ideas. The patient is not nervous/anxious.     A review of systems was performed and is negative except as noted above.    Objective:  Vitals:    01/04/24 1441   BP: 132/74   Pulse: 60   Resp: 16   SpO2: 95%   Height: 5' 8" (1.727 m)      Physical Exam  Vitals and nursing note reviewed.   Constitutional:       Appearance: She is well-developed.   HENT:      Head: Normocephalic and atraumatic.      Right Ear: External ear normal.      Left Ear: External ear normal.   Eyes:      Conjunctiva/sclera: Conjunctivae normal.      Pupils: Pupils are equal, round, and reactive to light.   Neck:      Thyroid: No thyromegaly.      Vascular: No JVD.      Trachea: No tracheal deviation.   Cardiovascular:      Rate and Rhythm: Normal rate and regular rhythm.      Pulses:           Dorsalis pedis pulses are 1+ on the right side and 1+ on the left side.        Posterior tibial pulses are 1+ on the right " side and 1+ on the left side.      Heart sounds: Normal heart sounds.   Pulmonary:      Effort: Pulmonary effort is normal. No respiratory distress.      Breath sounds: Normal breath sounds. No wheezing or rales.   Chest:      Chest wall: No tenderness.   Abdominal:      General: Bowel sounds are normal. There is no distension.      Palpations: Abdomen is soft. There is no mass.      Tenderness: There is no abdominal tenderness. There is no guarding or rebound.   Musculoskeletal:         General: Normal range of motion.      Cervical back: Normal range of motion and neck supple.      Comments: Bilateral knee oa changes.     Feet:      Right foot:      Protective Sensation: 7 sites tested.        Skin integrity: Dry skin present. No ulcer or erythema.      Left foot:      Protective Sensation: 7 sites tested.  4 sites sensed.      Skin integrity: Dry skin present. No ulcer or erythema.   Lymphadenopathy:      Cervical: No cervical adenopathy.   Skin:     General: Skin is warm and dry.      Comments: Scaly rash feet bilateral    Neurological:      Mental Status: She is alert and oriented to person, place, and time.      Cranial Nerves: No cranial nerve deficit.      Motor: No abnormal muscle tone.      Coordination: Coordination normal.      Deep Tendon Reflexes: Reflexes are normal and symmetric.        Assessment/Plan:  1. Chronic bilateral low back pain without sciatica    2. Primary osteoarthritis of both knees    3. Chronic narcotic use    we discussed narcotics for pain management :    Managing chronic pain with opioids is complicated and challenging. I explained to patient that Doctors need to know if patients can follow the treatment plan, if they get desired responses from the meds, and if there are signs of developing addiction. Physicians use medication contracts to monitor patients adherence, or to help check that patients are compliant with the medications ordered. Such agreements are most commonly used  when narcotic pain relievers are prescribed. Narcotics can sometimes become addictive if not taken as prescribed by a doctor.    The use of a pain management agreement allows for the documentation of understanding between a doctor and patient. Such documentation, when used as a means of facilitating care, can improve communication between doctors and patients.      Chronic, pain controlled on current regimen  Cont for now  No escalation of narcotics  Agrees to random UDS   reviewed today

## 2024-01-30 DIAGNOSIS — M17.0 PRIMARY OSTEOARTHRITIS OF BOTH KNEES: ICD-10-CM

## 2024-01-30 RX ORDER — HYDROCODONE BITARTRATE AND ACETAMINOPHEN 7.5; 325 MG/1; MG/1
1 TABLET ORAL
Qty: 30 TABLET | Refills: 0 | Status: SHIPPED | OUTPATIENT
Start: 2024-01-30 | End: 2024-02-29 | Stop reason: SDUPTHER

## 2024-01-30 NOTE — TELEPHONE ENCOUNTER
----- Message from Connie Pineda sent at 2024 10:27 AM CST -----  Contact: pt  Tonya Bucio  MRN: [id number]  : 1935  PCP: Tasha Atkins  Home Phone      [phone]  Work Phone      Not on file.  Mobile          [phone]      MESSAGE: pt states she needs the following prescription refilled    HYDROcodone-acetaminophen (NORCO) 7.5-325 mg per tablet      [facility] [address] [address]  Phone: [phone] Fax: [phone]  Hours: Not open 24 hours    [phone]    
No care due was identified.  [facility] Embedded Care Due Messages. Reference number: [id number].   1/30/2024 10:41:34 AM CST  
right upper arm
right upper arm

## 2024-02-29 DIAGNOSIS — M17.0 PRIMARY OSTEOARTHRITIS OF BOTH KNEES: ICD-10-CM

## 2024-02-29 RX ORDER — HYDROCODONE BITARTRATE AND ACETAMINOPHEN 7.5; 325 MG/1; MG/1
1 TABLET ORAL
Qty: 30 TABLET | Refills: 0 | Status: SHIPPED | OUTPATIENT
Start: 2024-02-29 | End: 2024-04-03 | Stop reason: SDUPTHER

## 2024-02-29 NOTE — TELEPHONE ENCOUNTER
No care due was identified.  [facility] Embedded Care Due Messages. Reference number: [id number].   2/29/2024 10:11:01 AM CST

## 2024-02-29 NOTE — TELEPHONE ENCOUNTER
----- Message from Roxanna Russ sent at 2024  9:32 AM CST -----  Contact: pt  Tonya Tasneem Bucio  MRN: [id number]  : 1935  PCP: Tasha Atkins  Home Phone      [phone]  Work Phone      Not on file.  [employer]          [phone]      MESSAGE:     Pt needs a refill of HYDROcodone-acetaminophen (NORCO) 7.5-325 mg per tablet sent to [facility] in [place].         Tonya  [phone]

## 2024-03-29 ENCOUNTER — LAB VISIT (OUTPATIENT)
Dept: LAB | Facility: HOSPITAL | Age: 89
End: 2024-03-29
Attending: INTERNAL MEDICINE
Payer: MEDICARE

## 2024-03-29 DIAGNOSIS — E11.9 CONTROLLED TYPE 2 DIABETES MELLITUS WITHOUT COMPLICATION, WITHOUT LONG-TERM CURRENT USE OF INSULIN: ICD-10-CM

## 2024-03-29 DIAGNOSIS — E11.69 HYPERLIPIDEMIA ASSOCIATED WITH TYPE 2 DIABETES MELLITUS: ICD-10-CM

## 2024-03-29 DIAGNOSIS — E78.5 HYPERLIPIDEMIA ASSOCIATED WITH TYPE 2 DIABETES MELLITUS: ICD-10-CM

## 2024-03-29 DIAGNOSIS — I10 PRIMARY HYPERTENSION: ICD-10-CM

## 2024-03-29 DIAGNOSIS — N18.32 STAGE 3B CHRONIC KIDNEY DISEASE: ICD-10-CM

## 2024-03-29 DIAGNOSIS — D64.9 ANEMIA, UNSPECIFIED TYPE: ICD-10-CM

## 2024-03-29 LAB
ALBUMIN SERPL BCP-MCNC: 3.1 G/DL (ref 3.5–5.2)
ALBUMIN/CREAT UR: 383 UG/MG (ref 0–30)
ALP SERPL-CCNC: 102 U/L (ref 55–135)
ALT SERPL W/O P-5'-P-CCNC: 19 U/L (ref 10–44)
ANION GAP SERPL CALC-SCNC: 11 MMOL/L (ref 8–16)
AST SERPL-CCNC: 14 U/L (ref 10–40)
BASOPHILS # BLD AUTO: 0.03 K/UL (ref 0–0.2)
BASOPHILS NFR BLD: 0.3 % (ref 0–1.9)
BILIRUB SERPL-MCNC: 0.4 MG/DL (ref 0.1–1)
BUN SERPL-MCNC: 21 MG/DL (ref 8–23)
CALCIUM SERPL-MCNC: 9 MG/DL (ref 8.7–10.5)
CHLORIDE SERPL-SCNC: 104 MMOL/L (ref 95–110)
CHOLEST SERPL-MCNC: 157 MG/DL (ref 120–199)
CHOLEST/HDLC SERPL: 4 {RATIO} (ref 2–5)
CO2 SERPL-SCNC: 27 MMOL/L (ref 23–29)
CREAT SERPL-MCNC: 1.2 MG/DL (ref 0.5–1.4)
CREAT UR-MCNC: 18.8 MG/DL (ref 15–325)
DIFFERENTIAL METHOD BLD: ABNORMAL
EOSINOPHIL # BLD AUTO: 0.3 K/UL (ref 0–0.5)
EOSINOPHIL NFR BLD: 2.5 % (ref 0–8)
ERYTHROCYTE [DISTWIDTH] IN BLOOD BY AUTOMATED COUNT: 14.6 % (ref 11.5–14.5)
EST. GFR  (NO RACE VARIABLE): 44 ML/MIN/1.73 M^2
ESTIMATED AVG GLUCOSE: 128 MG/DL (ref 68–131)
GLUCOSE SERPL-MCNC: 122 MG/DL (ref 70–110)
HBA1C MFR BLD: 6.1 % (ref 4–5.6)
HCT VFR BLD AUTO: 35 % (ref 37–48.5)
HDLC SERPL-MCNC: 39 MG/DL (ref 40–75)
HDLC SERPL: 24.8 % (ref 20–50)
HGB BLD-MCNC: 11 G/DL (ref 12–16)
IMM GRANULOCYTES # BLD AUTO: 0.06 K/UL (ref 0–0.04)
IMM GRANULOCYTES NFR BLD AUTO: 0.6 % (ref 0–0.5)
LDLC SERPL CALC-MCNC: 60.6 MG/DL (ref 63–159)
LYMPHOCYTES # BLD AUTO: 2.3 K/UL (ref 1–4.8)
LYMPHOCYTES NFR BLD: 22.2 % (ref 18–48)
MCH RBC QN AUTO: 29.4 PG (ref 27–31)
MCHC RBC AUTO-ENTMCNC: 31.4 G/DL (ref 32–36)
MCV RBC AUTO: 94 FL (ref 82–98)
MICROALBUMIN UR DL<=1MG/L-MCNC: 72 UG/ML
MONOCYTES # BLD AUTO: 0.6 K/UL (ref 0.3–1)
MONOCYTES NFR BLD: 5.9 % (ref 4–15)
NEUTROPHILS # BLD AUTO: 7.1 K/UL (ref 1.8–7.7)
NEUTROPHILS NFR BLD: 68.5 % (ref 38–73)
NONHDLC SERPL-MCNC: 118 MG/DL
NRBC BLD-RTO: 0 /100 WBC
PLATELET # BLD AUTO: 334 K/UL (ref 150–450)
PMV BLD AUTO: 10 FL (ref 9.2–12.9)
POTASSIUM SERPL-SCNC: 4.8 MMOL/L (ref 3.5–5.1)
PROT SERPL-MCNC: 7.1 G/DL (ref 6–8.4)
RBC # BLD AUTO: 3.74 M/UL (ref 4–5.4)
SODIUM SERPL-SCNC: 142 MMOL/L (ref 136–145)
TRIGL SERPL-MCNC: 287 MG/DL (ref 30–150)
TSH SERPL DL<=0.005 MIU/L-ACNC: 2.55 UIU/ML (ref 0.4–4)
WBC # BLD AUTO: 10.31 K/UL (ref 3.9–12.7)

## 2024-03-29 PROCEDURE — 84443 ASSAY THYROID STIM HORMONE: CPT | Mod: HCNC | Performed by: INTERNAL MEDICINE

## 2024-03-29 PROCEDURE — 80053 COMPREHEN METABOLIC PANEL: CPT | Mod: HCNC | Performed by: INTERNAL MEDICINE

## 2024-03-29 PROCEDURE — 36415 COLL VENOUS BLD VENIPUNCTURE: CPT | Mod: HCNC | Performed by: INTERNAL MEDICINE

## 2024-03-29 PROCEDURE — 82043 UR ALBUMIN QUANTITATIVE: CPT | Mod: HCNC | Performed by: INTERNAL MEDICINE

## 2024-03-29 PROCEDURE — 80061 LIPID PANEL: CPT | Mod: HCNC | Performed by: INTERNAL MEDICINE

## 2024-03-29 PROCEDURE — 83036 HEMOGLOBIN GLYCOSYLATED A1C: CPT | Mod: HCNC | Performed by: INTERNAL MEDICINE

## 2024-03-29 PROCEDURE — 85025 COMPLETE CBC W/AUTO DIFF WBC: CPT | Mod: HCNC | Performed by: INTERNAL MEDICINE

## 2024-04-03 DIAGNOSIS — E78.5 HYPERLIPIDEMIA ASSOCIATED WITH TYPE 2 DIABETES MELLITUS: ICD-10-CM

## 2024-04-03 DIAGNOSIS — M17.0 PRIMARY OSTEOARTHRITIS OF BOTH KNEES: ICD-10-CM

## 2024-04-03 DIAGNOSIS — E11.69 HYPERLIPIDEMIA ASSOCIATED WITH TYPE 2 DIABETES MELLITUS: ICD-10-CM

## 2024-04-03 RX ORDER — HYDROCODONE BITARTRATE AND ACETAMINOPHEN 7.5; 325 MG/1; MG/1
1 TABLET ORAL
Qty: 30 TABLET | Refills: 0 | Status: SHIPPED | OUTPATIENT
Start: 2024-04-03 | End: 2024-05-08 | Stop reason: SDUPTHER

## 2024-04-03 RX ORDER — OMEGA-3-ACID ETHYL ESTERS 1 G/1
1 CAPSULE, LIQUID FILLED ORAL 2 TIMES DAILY
Qty: 60 CAPSULE | Refills: 0 | Status: SHIPPED | OUTPATIENT
Start: 2024-04-03 | End: 2024-05-08 | Stop reason: SDUPTHER

## 2024-04-03 NOTE — TELEPHONE ENCOUNTER
Care Due:                  Date            Visit Type   Department     Provider  --------------------------------------------------------------------------------                                EP -                              PRIMARY      [facility] INTERNAL  Last Visit: 01-      CARE ([facility])   MEDICINE II    Tasha Atkins                              EP -                              PRIMARY      [facility] INTERNAL  Next Visit: 04-      CARE ([facility])   MEDICINE II    Tasha Atkins                                                            Last  Test          Frequency    Reason                     Performed    Due Date  --------------------------------------------------------------------------------    Uric Acid...  12 months..  allopurinoL..............  06- 06-    [facility] Embedded Care Due Messages. Reference number: [id number].   4/03/2024 10:01:09 AM CDT

## 2024-04-03 NOTE — TELEPHONE ENCOUNTER
----- Message from Roxanna Russ sent at 4/3/2024  9:52 AM CDT -----  Contact: pt  Tonyadon Bucio  MRN: [id number]  : 1935  PCP: Tasha Atkins  Home Phone      [phone]  Work Phone      Not on file.  Mobile          [phone]      MESSAGE:     Pt needs a refill of omega-3 acid ethyl esters (LOVAZA) 1 gram capsule, Fish oil, and HYDROcodone-acetaminophen (NORCO) 7.5-325 mg per tablet sent to [facility] in [place].        Tonya   [phone]

## 2024-04-11 ENCOUNTER — OFFICE VISIT (OUTPATIENT)
Dept: INTERNAL MEDICINE | Facility: CLINIC | Age: 89
End: 2024-04-11
Payer: MEDICARE

## 2024-04-11 VITALS
DIASTOLIC BLOOD PRESSURE: 80 MMHG | HEART RATE: 60 BPM | HEIGHT: 68 IN | RESPIRATION RATE: 18 BRPM | BODY MASS INDEX: 37.89 KG/M2 | OXYGEN SATURATION: 95 % | SYSTOLIC BLOOD PRESSURE: 134 MMHG | WEIGHT: 250 LBS

## 2024-04-11 DIAGNOSIS — E78.5 HYPERLIPIDEMIA ASSOCIATED WITH TYPE 2 DIABETES MELLITUS: ICD-10-CM

## 2024-04-11 DIAGNOSIS — E11.69 HYPERLIPIDEMIA ASSOCIATED WITH TYPE 2 DIABETES MELLITUS: ICD-10-CM

## 2024-04-11 DIAGNOSIS — I48.3 TYPICAL ATRIAL FLUTTER: ICD-10-CM

## 2024-04-11 DIAGNOSIS — I27.82 CHRONIC SADDLE PULMONARY EMBOLISM WITHOUT ACUTE COR PULMONALE: ICD-10-CM

## 2024-04-11 DIAGNOSIS — N18.32 STAGE 3B CHRONIC KIDNEY DISEASE: ICD-10-CM

## 2024-04-11 DIAGNOSIS — C55 MALIGNANT NEOPLASM OF UTERUS, UNSPECIFIED SITE: ICD-10-CM

## 2024-04-11 DIAGNOSIS — N18.31 ANEMIA DUE TO STAGE 3A CHRONIC KIDNEY DISEASE: ICD-10-CM

## 2024-04-11 DIAGNOSIS — I26.92 CHRONIC SADDLE PULMONARY EMBOLISM WITHOUT ACUTE COR PULMONALE: ICD-10-CM

## 2024-04-11 DIAGNOSIS — D63.1 ANEMIA DUE TO STAGE 3A CHRONIC KIDNEY DISEASE: ICD-10-CM

## 2024-04-11 DIAGNOSIS — E21.0 PRIMARY HYPERPARATHYROIDISM: ICD-10-CM

## 2024-04-11 DIAGNOSIS — E66.01 CLASS 2 SEVERE OBESITY DUE TO EXCESS CALORIES WITH SERIOUS COMORBIDITY IN ADULT, UNSPECIFIED BMI: ICD-10-CM

## 2024-04-11 PROCEDURE — 1101F PT FALLS ASSESS-DOCD LE1/YR: CPT | Mod: HCNC,CPTII,S$GLB, | Performed by: INTERNAL MEDICINE

## 2024-04-11 PROCEDURE — 3288F FALL RISK ASSESSMENT DOCD: CPT | Mod: HCNC,CPTII,S$GLB, | Performed by: INTERNAL MEDICINE

## 2024-04-11 PROCEDURE — 99999 PR PBB SHADOW E&M-EST. PATIENT-LVL IV: CPT | Mod: PBBFAC,HCNC,, | Performed by: INTERNAL MEDICINE

## 2024-04-11 PROCEDURE — 1126F AMNT PAIN NOTED NONE PRSNT: CPT | Mod: HCNC,CPTII,S$GLB, | Performed by: INTERNAL MEDICINE

## 2024-04-11 PROCEDURE — 1159F MED LIST DOCD IN RCRD: CPT | Mod: HCNC,CPTII,S$GLB, | Performed by: INTERNAL MEDICINE

## 2024-04-11 PROCEDURE — 99214 OFFICE O/P EST MOD 30 MIN: CPT | Mod: HCNC,S$GLB,, | Performed by: INTERNAL MEDICINE

## 2024-04-11 PROCEDURE — 1160F RVW MEDS BY RX/DR IN RCRD: CPT | Mod: HCNC,CPTII,S$GLB, | Performed by: INTERNAL MEDICINE

## 2024-04-11 NOTE — PROGRESS NOTES
"HPI:  Tonya Bucio is a 88 y.o. female here for Follow-up (6 months/)  Labs are reviewed.      Review of Systems   Constitutional:  Positive for fatigue. Negative for chills and fever.   HENT:  Negative for congestion, hearing loss, sinus pressure and sore throat.    Eyes:  Negative for photophobia.   Respiratory:  Negative for cough, choking, chest tightness, shortness of breath and wheezing.    Cardiovascular:  Negative for chest pain and palpitations.   Gastrointestinal:  Negative for blood in stool, nausea and vomiting.   Endocrine: Negative for polydipsia and polyphagia.   Genitourinary:  Negative for dysuria and hematuria.   Musculoskeletal:  Positive for arthralgias, back pain and gait problem. Negative for myalgias and neck pain.        Still needing narcotics ; no confusion or hang over .     Skin:  Negative for pallor.   Neurological:  Negative for dizziness, weakness and numbness.   Hematological:  Does not bruise/bleed easily.   Psychiatric/Behavioral:  Negative for confusion and suicidal ideas. The patient is not nervous/anxious.     A review of systems was performed and is negative except as noted above.    Objective:  Vitals:    04/11/24 1400   BP: 134/80   Pulse: 60   Resp: 18   SpO2: 95%   Weight: 113.4 kg (250 lb)   Height: 5' 8" (1.727 m)      Physical Exam  Vitals and nursing note reviewed.   Constitutional:       Appearance: She is well-developed. She is obese.   HENT:      Head: Normocephalic and atraumatic.      Right Ear: External ear normal.      Left Ear: External ear normal.   Eyes:      Conjunctiva/sclera: Conjunctivae normal.      Pupils: Pupils are equal, round, and reactive to light.   Neck:      Thyroid: No thyromegaly.      Vascular: No JVD.      Trachea: No tracheal deviation.   Cardiovascular:      Rate and Rhythm: Normal rate and regular rhythm.      Heart sounds: Normal heart sounds.   Pulmonary:      Effort: Pulmonary effort is normal. No respiratory distress.      " Breath sounds: Normal breath sounds. No wheezing or rales.   Chest:      Chest wall: No tenderness.   Abdominal:      General: Bowel sounds are normal. There is no distension.      Palpations: Abdomen is soft. There is no mass.      Tenderness: There is no abdominal tenderness. There is no guarding or rebound.   Musculoskeletal:         General: Normal range of motion.      Cervical back: Normal range of motion and neck supple.   Lymphadenopathy:      Cervical: No cervical adenopathy.   Skin:     General: Skin is warm and dry.   Neurological:      Mental Status: She is alert and oriented to person, place, and time.      Cranial Nerves: No cranial nerve deficit.      Motor: No abnormal muscle tone.      Coordination: Coordination normal.      Deep Tendon Reflexes: Reflexes are normal and symmetric. Reflexes normal.      Comments: CN: Optic discs are flat with normal vasculature, PERRL, Extraoccular movements and visual fields are full. Normal facial sensation and strength, Hearing symmetric, Tongue and Palate are midline and strong. Shoulder Shrug symmetric and strong.        Assessment/Plan:  1. Hyperlipidemia associated with type 2 diabetes mellitus  -     CBC Auto Differential; Future; Expected date: 10/08/2024  -     Comprehensive Metabolic Panel; Future; Expected date: 10/08/2024  -     Lipid Panel; Future; Expected date: 10/08/2024  -     Hemoglobin A1C; Future; Expected date: 10/08/2024  -     Microalbumin/Creatinine Ratio, Urine; Future; Expected date: 10/08/2024  -     TSH; Future; Expected date: 10/08/2024  Well controlled.  Continue same medication and dose.   2. Malignant neoplasm of uterus, unspecified site  S/p surgery   S/p radiation   Doing well     3. Typical atrial flutter  Continue metipranolol  Continue xarelto     4. Chronic saddle pulmonary embolism without acute cor pulmonale  Stay on xarelto   No oxygen     5. Stage 3b chronic kidney disease  Monitor BUN/SCr.  Monitor I/Os.  Monitor  electrolytes.  Avoid non-steroidal anti-inflammatory medications.   6. Primary hyperparathyroidism  Lab Results   Component Value Date    .2 (H) 06/28/2023    CALCIUM 9.0 03/29/2024    PHOS 2.9 06/28/2023       7. Anemia due to stage 3a chronic kidney disease  -     CBC Auto Differential; Future; Expected date: 10/08/2024  Stable;   Continue same monitoring ;likely CKD relate d    8. Class 2 severe obesity due to excess calories with serious comorbidity in adult, unspecified BMI  -     TSH; Future; Expected date: 10/08/2024       # The patient is asked to make an attempt to improve diet and exercise patterns to aid in medical management of this problem.     # Eat  5 small meals a day.     # Cut out high carbohydrate  foods : bread, rice, pasta, potatoes.

## 2024-04-20 ENCOUNTER — HOSPITAL ENCOUNTER (INPATIENT)
Facility: HOSPITAL | Age: 89
LOS: 1 days | Discharge: HOME HEALTH | DRG: 683 | End: 2024-04-21
Attending: SURGERY | Admitting: INTERNAL MEDICINE
Payer: MEDICARE

## 2024-04-20 DIAGNOSIS — N30.00 ACUTE CYSTITIS WITHOUT HEMATURIA: ICD-10-CM

## 2024-04-20 DIAGNOSIS — R53.83 FATIGUE: ICD-10-CM

## 2024-04-20 DIAGNOSIS — N17.9 AKI (ACUTE KIDNEY INJURY): ICD-10-CM

## 2024-04-20 DIAGNOSIS — E86.0 DEHYDRATION: Primary | ICD-10-CM

## 2024-04-20 DIAGNOSIS — R00.1 BRADYCARDIA: ICD-10-CM

## 2024-04-20 LAB
ALBUMIN SERPL BCP-MCNC: 3 G/DL (ref 3.5–5.2)
ALP SERPL-CCNC: 103 U/L (ref 55–135)
ALT SERPL W/O P-5'-P-CCNC: 22 U/L (ref 10–44)
ANION GAP SERPL CALC-SCNC: 12 MMOL/L (ref 8–16)
AST SERPL-CCNC: 20 U/L (ref 10–40)
BACTERIA #/AREA URNS HPF: ABNORMAL /HPF
BASOPHILS # BLD AUTO: 0.03 K/UL (ref 0–0.2)
BASOPHILS NFR BLD: 0.2 % (ref 0–1.9)
BILIRUB SERPL-MCNC: 0.7 MG/DL (ref 0.1–1)
BILIRUB UR QL STRIP: NEGATIVE
BNP SERPL-MCNC: 97 PG/ML (ref 0–99)
BUN SERPL-MCNC: 26 MG/DL (ref 8–23)
CALCIUM SERPL-MCNC: 9.3 MG/DL (ref 8.7–10.5)
CHLORIDE SERPL-SCNC: 105 MMOL/L (ref 95–110)
CLARITY UR: CLEAR
CO2 SERPL-SCNC: 20 MMOL/L (ref 23–29)
COLOR UR: YELLOW
CREAT SERPL-MCNC: 1.4 MG/DL (ref 0.5–1.4)
DIFFERENTIAL METHOD BLD: ABNORMAL
EOSINOPHIL # BLD AUTO: 0.1 K/UL (ref 0–0.5)
EOSINOPHIL NFR BLD: 0.3 % (ref 0–8)
ERYTHROCYTE [DISTWIDTH] IN BLOOD BY AUTOMATED COUNT: 14.8 % (ref 11.5–14.5)
EST. GFR  (NO RACE VARIABLE): 36 ML/MIN/1.73 M^2
GLUCOSE SERPL-MCNC: 120 MG/DL (ref 70–110)
GLUCOSE UR QL STRIP: NEGATIVE
GROUP A STREP, MOLECULAR: NEGATIVE
HCT VFR BLD AUTO: 34.8 % (ref 37–48.5)
HGB BLD-MCNC: 11.1 G/DL (ref 12–16)
HGB UR QL STRIP: ABNORMAL
HYALINE CASTS #/AREA URNS LPF: 2 /LPF
IMM GRANULOCYTES # BLD AUTO: 0.12 K/UL (ref 0–0.04)
IMM GRANULOCYTES NFR BLD AUTO: 0.6 % (ref 0–0.5)
INFLUENZA A, MOLECULAR: NEGATIVE
INFLUENZA B, MOLECULAR: NEGATIVE
KETONES UR QL STRIP: NEGATIVE
LACTATE SERPL-SCNC: 2 MMOL/L (ref 0.5–2.2)
LEUKOCYTE ESTERASE UR QL STRIP: ABNORMAL
LYMPHOCYTES # BLD AUTO: 1.1 K/UL (ref 1–4.8)
LYMPHOCYTES NFR BLD: 5.3 % (ref 18–48)
MCH RBC QN AUTO: 29.5 PG (ref 27–31)
MCHC RBC AUTO-ENTMCNC: 31.9 G/DL (ref 32–36)
MCV RBC AUTO: 93 FL (ref 82–98)
MICROSCOPIC COMMENT: ABNORMAL
MONOCYTES # BLD AUTO: 1 K/UL (ref 0.3–1)
MONOCYTES NFR BLD: 4.9 % (ref 4–15)
NEUTROPHILS # BLD AUTO: 17.5 K/UL (ref 1.8–7.7)
NEUTROPHILS NFR BLD: 88.7 % (ref 38–73)
NITRITE UR QL STRIP: POSITIVE
NRBC BLD-RTO: 0 /100 WBC
PH UR STRIP: 6.5 [PH] (ref 5–8)
PLATELET # BLD AUTO: 309 K/UL (ref 150–450)
PMV BLD AUTO: 10 FL (ref 9.2–12.9)
POTASSIUM SERPL-SCNC: 4 MMOL/L (ref 3.5–5.1)
PROCALCITONIN SERPL IA-MCNC: 0.57 NG/ML
PROT SERPL-MCNC: 7.5 G/DL (ref 6–8.4)
PROT UR QL STRIP: ABNORMAL
RBC # BLD AUTO: 3.76 M/UL (ref 4–5.4)
RBC #/AREA URNS HPF: 2 /HPF (ref 0–4)
SARS-COV-2 RDRP RESP QL NAA+PROBE: NEGATIVE
SODIUM SERPL-SCNC: 137 MMOL/L (ref 136–145)
SP GR UR STRIP: 1.01 (ref 1–1.03)
SPECIMEN SOURCE: NORMAL
SQUAMOUS #/AREA URNS HPF: 5 /HPF
TROPONIN I SERPL DL<=0.01 NG/ML-MCNC: 0.05 NG/ML (ref 0–0.03)
URN SPEC COLLECT METH UR: ABNORMAL
UROBILINOGEN UR STRIP-ACNC: NEGATIVE EU/DL
WBC # BLD AUTO: 19.7 K/UL (ref 3.9–12.7)
WBC #/AREA URNS HPF: >100 /HPF (ref 0–5)
WBC CLUMPS URNS QL MICRO: ABNORMAL

## 2024-04-20 PROCEDURE — 93005 ELECTROCARDIOGRAM TRACING: CPT

## 2024-04-20 PROCEDURE — 84484 ASSAY OF TROPONIN QUANT: CPT | Performed by: SURGERY

## 2024-04-20 PROCEDURE — 99900035 HC TECH TIME PER 15 MIN (STAT)

## 2024-04-20 PROCEDURE — 84145 PROCALCITONIN (PCT): CPT | Performed by: SURGERY

## 2024-04-20 PROCEDURE — 51702 INSERT TEMP BLADDER CATH: CPT | Mod: HCNC

## 2024-04-20 PROCEDURE — 96365 THER/PROPH/DIAG IV INF INIT: CPT | Mod: 59,HCNC

## 2024-04-20 PROCEDURE — U0002 COVID-19 LAB TEST NON-CDC: HCPCS | Performed by: SURGERY

## 2024-04-20 PROCEDURE — 87651 STREP A DNA AMP PROBE: CPT | Performed by: SURGERY

## 2024-04-20 PROCEDURE — 83880 ASSAY OF NATRIURETIC PEPTIDE: CPT | Performed by: SURGERY

## 2024-04-20 PROCEDURE — 99285 EMERGENCY DEPT VISIT HI MDM: CPT | Mod: 25,HCNC

## 2024-04-20 PROCEDURE — 85025 COMPLETE CBC W/AUTO DIFF WBC: CPT | Performed by: SURGERY

## 2024-04-20 PROCEDURE — 25000003 PHARM REV CODE 250: Performed by: SURGERY

## 2024-04-20 PROCEDURE — 87086 URINE CULTURE/COLONY COUNT: CPT | Performed by: SURGERY

## 2024-04-20 PROCEDURE — 81000 URINALYSIS NONAUTO W/SCOPE: CPT | Performed by: SURGERY

## 2024-04-20 PROCEDURE — G0378 HOSPITAL OBSERVATION PER HR: HCPCS

## 2024-04-20 PROCEDURE — 83605 ASSAY OF LACTIC ACID: CPT | Performed by: SURGERY

## 2024-04-20 PROCEDURE — 96361 HYDRATE IV INFUSION ADD-ON: CPT | Mod: 59,HCNC

## 2024-04-20 PROCEDURE — 80053 COMPREHEN METABOLIC PANEL: CPT | Performed by: SURGERY

## 2024-04-20 PROCEDURE — 87502 INFLUENZA DNA AMP PROBE: CPT | Performed by: SURGERY

## 2024-04-20 PROCEDURE — 93010 ELECTROCARDIOGRAM REPORT: CPT | Mod: HCNC,,, | Performed by: INTERNAL MEDICINE

## 2024-04-20 PROCEDURE — 87040 BLOOD CULTURE FOR BACTERIA: CPT | Performed by: SURGERY

## 2024-04-20 PROCEDURE — 87077 CULTURE AEROBIC IDENTIFY: CPT | Mod: HCNC | Performed by: SURGERY

## 2024-04-20 PROCEDURE — 63600175 PHARM REV CODE 636 W HCPCS: Performed by: SURGERY

## 2024-04-20 PROCEDURE — 96361 HYDRATE IV INFUSION ADD-ON: CPT

## 2024-04-20 PROCEDURE — 87088 URINE BACTERIA CULTURE: CPT | Mod: HCNC | Performed by: SURGERY

## 2024-04-20 PROCEDURE — 87186 SC STD MICRODIL/AGAR DIL: CPT | Mod: HCNC | Performed by: SURGERY

## 2024-04-20 RX ORDER — TALC
6 POWDER (GRAM) TOPICAL NIGHTLY PRN
Status: DISCONTINUED | OUTPATIENT
Start: 2024-04-20 | End: 2024-04-21 | Stop reason: HOSPADM

## 2024-04-20 RX ORDER — SODIUM CHLORIDE 0.9 % (FLUSH) 0.9 %
10 SYRINGE (ML) INJECTION
Status: DISCONTINUED | OUTPATIENT
Start: 2024-04-20 | End: 2024-04-21 | Stop reason: HOSPADM

## 2024-04-20 RX ORDER — ONDANSETRON HYDROCHLORIDE 2 MG/ML
4 INJECTION, SOLUTION INTRAVENOUS EVERY 8 HOURS PRN
Status: DISCONTINUED | OUTPATIENT
Start: 2024-04-20 | End: 2024-04-21 | Stop reason: HOSPADM

## 2024-04-20 RX ORDER — IBUPROFEN 800 MG/1
800 TABLET ORAL
Status: COMPLETED | OUTPATIENT
Start: 2024-04-20 | End: 2024-04-20

## 2024-04-20 RX ORDER — SODIUM CHLORIDE 9 MG/ML
INJECTION, SOLUTION INTRAVENOUS CONTINUOUS
Status: DISCONTINUED | OUTPATIENT
Start: 2024-04-20 | End: 2024-04-21 | Stop reason: HOSPADM

## 2024-04-20 RX ORDER — ACETAMINOPHEN 500 MG
1000 TABLET ORAL
Status: COMPLETED | OUTPATIENT
Start: 2024-04-20 | End: 2024-04-20

## 2024-04-20 RX ORDER — HYDROCODONE BITARTRATE AND ACETAMINOPHEN 5; 325 MG/1; MG/1
1 TABLET ORAL EVERY 4 HOURS PRN
Status: DISCONTINUED | OUTPATIENT
Start: 2024-04-20 | End: 2024-04-21 | Stop reason: HOSPADM

## 2024-04-20 RX ORDER — ACETAMINOPHEN 325 MG/1
650 TABLET ORAL EVERY 8 HOURS PRN
Status: DISCONTINUED | OUTPATIENT
Start: 2024-04-20 | End: 2024-04-21 | Stop reason: HOSPADM

## 2024-04-20 RX ORDER — METOPROLOL SUCCINATE 25 MG/1
25 TABLET, EXTENDED RELEASE ORAL DAILY
Status: DISCONTINUED | OUTPATIENT
Start: 2024-04-21 | End: 2024-04-21

## 2024-04-20 RX ADMIN — IBUPROFEN 800 MG: 800 TABLET, FILM COATED ORAL at 09:04

## 2024-04-20 RX ADMIN — ACETAMINOPHEN 1000 MG: 500 TABLET ORAL at 09:04

## 2024-04-20 RX ADMIN — SODIUM CHLORIDE: 9 INJECTION, SOLUTION INTRAVENOUS at 11:04

## 2024-04-20 RX ADMIN — CEFTRIAXONE SODIUM 2 G: 2 INJECTION, POWDER, FOR SOLUTION INTRAMUSCULAR; INTRAVENOUS at 10:04

## 2024-04-20 RX ADMIN — RIVAROXABAN 15 MG: 15 TABLET, FILM COATED ORAL at 11:04

## 2024-04-20 RX ADMIN — SODIUM CHLORIDE 1000 ML: 9 INJECTION, SOLUTION INTRAVENOUS at 08:04

## 2024-04-21 ENCOUNTER — HOSPITAL ENCOUNTER (INPATIENT)
Facility: HOSPITAL | Age: 89
LOS: 2 days | Discharge: HOME | DRG: 309 | End: 2024-04-23
Attending: INTERNAL MEDICINE | Admitting: INTERNAL MEDICINE
Payer: MEDICARE

## 2024-04-21 VITALS
RESPIRATION RATE: 22 BRPM | HEIGHT: 68 IN | OXYGEN SATURATION: 91 % | SYSTOLIC BLOOD PRESSURE: 176 MMHG | DIASTOLIC BLOOD PRESSURE: 72 MMHG | HEART RATE: 72 BPM | BODY MASS INDEX: 41.96 KG/M2 | TEMPERATURE: 97 F | WEIGHT: 276.88 LBS

## 2024-04-21 DIAGNOSIS — Z86.711 HISTORY OF PULMONARY EMBOLUS (PE): ICD-10-CM

## 2024-04-21 DIAGNOSIS — I10 PRIMARY HYPERTENSION: Primary | ICD-10-CM

## 2024-04-21 DIAGNOSIS — R00.1 SYMPTOMATIC BRADYCARDIA: ICD-10-CM

## 2024-04-21 DIAGNOSIS — R00.1 BRADYCARDIA: ICD-10-CM

## 2024-04-21 PROBLEM — N18.31 STAGE 3A CHRONIC KIDNEY DISEASE: Status: ACTIVE | Noted: 2024-04-21

## 2024-04-21 PROBLEM — N30.00 ACUTE CYSTITIS WITHOUT HEMATURIA: Status: ACTIVE | Noted: 2024-04-21

## 2024-04-21 PROBLEM — J44.9 COPD (CHRONIC OBSTRUCTIVE PULMONARY DISEASE): Status: ACTIVE | Noted: 2020-03-13

## 2024-04-21 PROBLEM — N17.9 AKI (ACUTE KIDNEY INJURY): Status: ACTIVE | Noted: 2024-04-21

## 2024-04-21 PROBLEM — E86.0 DEHYDRATION: Status: ACTIVE | Noted: 2024-04-21

## 2024-04-21 LAB
ALBUMIN SERPL BCP-MCNC: 2.6 G/DL (ref 3.5–5.2)
ALP SERPL-CCNC: 83 U/L (ref 55–135)
ALT SERPL W/O P-5'-P-CCNC: 18 U/L (ref 10–44)
ANION GAP SERPL CALC-SCNC: 10 MMOL/L (ref 8–16)
ANION GAP SERPL CALC-SCNC: 9 MMOL/L (ref 8–16)
APTT PPP: 25 SEC (ref 21–32)
AST SERPL-CCNC: 14 U/L (ref 10–40)
BASOPHILS # BLD AUTO: 0.05 K/UL (ref 0–0.2)
BASOPHILS # BLD AUTO: 0.07 K/UL (ref 0–0.2)
BASOPHILS NFR BLD: 0.2 % (ref 0–1.9)
BASOPHILS NFR BLD: 0.5 % (ref 0–1.9)
BILIRUB SERPL-MCNC: 0.3 MG/DL (ref 0.1–1)
BUN SERPL-MCNC: 27 MG/DL (ref 8–23)
BUN SERPL-MCNC: 28 MG/DL (ref 8–23)
CALCIUM SERPL-MCNC: 8.5 MG/DL (ref 8.7–10.5)
CALCIUM SERPL-MCNC: 9.1 MG/DL (ref 8.7–10.5)
CHLORIDE SERPL-SCNC: 106 MMOL/L (ref 95–110)
CHLORIDE SERPL-SCNC: 109 MMOL/L (ref 95–110)
CO2 SERPL-SCNC: 21 MMOL/L (ref 23–29)
CO2 SERPL-SCNC: 24 MMOL/L (ref 23–29)
CREAT SERPL-MCNC: 1.2 MG/DL (ref 0.5–1.4)
CREAT SERPL-MCNC: 1.5 MG/DL (ref 0.5–1.4)
DIFFERENTIAL METHOD BLD: ABNORMAL
DIFFERENTIAL METHOD BLD: ABNORMAL
EOSINOPHIL # BLD AUTO: 0 K/UL (ref 0–0.5)
EOSINOPHIL # BLD AUTO: 0.2 K/UL (ref 0–0.5)
EOSINOPHIL NFR BLD: 0.1 % (ref 0–8)
EOSINOPHIL NFR BLD: 1.5 % (ref 0–8)
ERYTHROCYTE [DISTWIDTH] IN BLOOD BY AUTOMATED COUNT: 14.7 % (ref 11.5–14.5)
ERYTHROCYTE [DISTWIDTH] IN BLOOD BY AUTOMATED COUNT: 15.1 % (ref 11.5–14.5)
EST. GFR  (NO RACE VARIABLE): 34 ML/MIN/1.73 M^2
EST. GFR  (NO RACE VARIABLE): 43.8 ML/MIN/1.73 M^2
GLUCOSE SERPL-MCNC: 166 MG/DL (ref 70–110)
GLUCOSE SERPL-MCNC: 176 MG/DL (ref 70–110)
HCT VFR BLD AUTO: 29.4 % (ref 37–48.5)
HCT VFR BLD AUTO: 33.5 % (ref 37–48.5)
HGB BLD-MCNC: 10.6 G/DL (ref 12–16)
HGB BLD-MCNC: 9.4 G/DL (ref 12–16)
IMM GRANULOCYTES # BLD AUTO: 0.12 K/UL (ref 0–0.04)
IMM GRANULOCYTES # BLD AUTO: 0.18 K/UL (ref 0–0.04)
IMM GRANULOCYTES NFR BLD AUTO: 0.6 % (ref 0–0.5)
IMM GRANULOCYTES NFR BLD AUTO: 1.2 % (ref 0–0.5)
INR PPP: 1 (ref 0.8–1.2)
LYMPHOCYTES # BLD AUTO: 1.3 K/UL (ref 1–4.8)
LYMPHOCYTES # BLD AUTO: 2.1 K/UL (ref 1–4.8)
LYMPHOCYTES NFR BLD: 10.4 % (ref 18–48)
LYMPHOCYTES NFR BLD: 8.5 % (ref 18–48)
MAGNESIUM SERPL-MCNC: 1.6 MG/DL (ref 1.6–2.6)
MCH RBC QN AUTO: 29.7 PG (ref 27–31)
MCH RBC QN AUTO: 30.1 PG (ref 27–31)
MCHC RBC AUTO-ENTMCNC: 31.6 G/DL (ref 32–36)
MCHC RBC AUTO-ENTMCNC: 32 G/DL (ref 32–36)
MCV RBC AUTO: 93 FL (ref 82–98)
MCV RBC AUTO: 95 FL (ref 82–98)
MONOCYTES # BLD AUTO: 0.5 K/UL (ref 0.3–1)
MONOCYTES # BLD AUTO: 0.8 K/UL (ref 0.3–1)
MONOCYTES NFR BLD: 2.4 % (ref 4–15)
MONOCYTES NFR BLD: 5.5 % (ref 4–15)
NEUTROPHILS # BLD AUTO: 12.7 K/UL (ref 1.8–7.7)
NEUTROPHILS # BLD AUTO: 17.6 K/UL (ref 1.8–7.7)
NEUTROPHILS NFR BLD: 82.8 % (ref 38–73)
NEUTROPHILS NFR BLD: 86.3 % (ref 38–73)
NRBC BLD-RTO: 0 /100 WBC
NRBC BLD-RTO: 0 /100 WBC
PLATELET # BLD AUTO: 274 K/UL (ref 150–450)
PLATELET # BLD AUTO: 278 K/UL (ref 150–450)
PMV BLD AUTO: 10.1 FL (ref 9.2–12.9)
PMV BLD AUTO: 10.5 FL (ref 9.2–12.9)
POCT GLUCOSE: 131 MG/DL (ref 70–110)
POCT GLUCOSE: 137 MG/DL (ref 70–110)
POCT GLUCOSE: 175 MG/DL (ref 70–110)
POTASSIUM SERPL-SCNC: 4 MMOL/L (ref 3.5–5.1)
POTASSIUM SERPL-SCNC: 4.3 MMOL/L (ref 3.5–5.1)
PROT SERPL-MCNC: 6.1 G/DL (ref 6–8.4)
PROTHROMBIN TIME: 11.3 SEC (ref 9–12.5)
RBC # BLD AUTO: 3.17 M/UL (ref 4–5.4)
RBC # BLD AUTO: 3.52 M/UL (ref 4–5.4)
SODIUM SERPL-SCNC: 139 MMOL/L (ref 136–145)
SODIUM SERPL-SCNC: 140 MMOL/L (ref 136–145)
TSH SERPL DL<=0.005 MIU/L-ACNC: 0.92 UIU/ML (ref 0.4–4)
WBC # BLD AUTO: 15.36 K/UL (ref 3.9–12.7)
WBC # BLD AUTO: 20.45 K/UL (ref 3.9–12.7)

## 2024-04-21 PROCEDURE — 83735 ASSAY OF MAGNESIUM: CPT | Mod: HCNC | Performed by: STUDENT IN AN ORGANIZED HEALTH CARE EDUCATION/TRAINING PROGRAM

## 2024-04-21 PROCEDURE — 80053 COMPREHEN METABOLIC PANEL: CPT | Mod: HCNC | Performed by: SURGERY

## 2024-04-21 PROCEDURE — 94760 N-INVAS EAR/PLS OXIMETRY 1: CPT

## 2024-04-21 PROCEDURE — 20000000 HC ICU ROOM: Mod: HCNC

## 2024-04-21 PROCEDURE — 96361 HYDRATE IV INFUSION ADD-ON: CPT

## 2024-04-21 PROCEDURE — 80048 BASIC METABOLIC PNL TOTAL CA: CPT | Mod: HCNC,XB | Performed by: STUDENT IN AN ORGANIZED HEALTH CARE EDUCATION/TRAINING PROGRAM

## 2024-04-21 PROCEDURE — 94761 N-INVAS EAR/PLS OXIMETRY MLT: CPT

## 2024-04-21 PROCEDURE — 36415 COLL VENOUS BLD VENIPUNCTURE: CPT | Performed by: SURGERY

## 2024-04-21 PROCEDURE — 93005 ELECTROCARDIOGRAM TRACING: CPT

## 2024-04-21 PROCEDURE — 25000003 PHARM REV CODE 250: Performed by: STUDENT IN AN ORGANIZED HEALTH CARE EDUCATION/TRAINING PROGRAM

## 2024-04-21 PROCEDURE — 85025 COMPLETE CBC W/AUTO DIFF WBC: CPT | Performed by: SURGERY

## 2024-04-21 PROCEDURE — 25000003 PHARM REV CODE 250: Performed by: INTERNAL MEDICINE

## 2024-04-21 PROCEDURE — 85730 THROMBOPLASTIN TIME PARTIAL: CPT | Mod: HCNC | Performed by: STUDENT IN AN ORGANIZED HEALTH CARE EDUCATION/TRAINING PROGRAM

## 2024-04-21 PROCEDURE — 84443 ASSAY THYROID STIM HORMONE: CPT | Mod: HCNC | Performed by: STUDENT IN AN ORGANIZED HEALTH CARE EDUCATION/TRAINING PROGRAM

## 2024-04-21 PROCEDURE — 63600175 PHARM REV CODE 636 W HCPCS: Performed by: INTERNAL MEDICINE

## 2024-04-21 PROCEDURE — 85610 PROTHROMBIN TIME: CPT | Mod: HCNC | Performed by: STUDENT IN AN ORGANIZED HEALTH CARE EDUCATION/TRAINING PROGRAM

## 2024-04-21 PROCEDURE — 93010 ELECTROCARDIOGRAM REPORT: CPT | Mod: HCNC,,, | Performed by: INTERNAL MEDICINE

## 2024-04-21 PROCEDURE — 25000003 PHARM REV CODE 250: Performed by: SURGERY

## 2024-04-21 PROCEDURE — 63600175 PHARM REV CODE 636 W HCPCS: Performed by: STUDENT IN AN ORGANIZED HEALTH CARE EDUCATION/TRAINING PROGRAM

## 2024-04-21 PROCEDURE — 99223 1ST HOSP IP/OBS HIGH 75: CPT | Mod: AI,,, | Performed by: INTERNAL MEDICINE

## 2024-04-21 PROCEDURE — 85025 COMPLETE CBC W/AUTO DIFF WBC: CPT | Mod: 91,HCNC | Performed by: STUDENT IN AN ORGANIZED HEALTH CARE EDUCATION/TRAINING PROGRAM

## 2024-04-21 PROCEDURE — 99223 1ST HOSP IP/OBS HIGH 75: CPT | Mod: AI,GC,, | Performed by: INTERNAL MEDICINE

## 2024-04-21 PROCEDURE — 63600175 PHARM REV CODE 636 W HCPCS

## 2024-04-21 PROCEDURE — 20000000 HC ICU ROOM

## 2024-04-21 RX ORDER — SODIUM CHLORIDE 0.9 % (FLUSH) 0.9 %
10 SYRINGE (ML) INJECTION
Status: DISCONTINUED | OUTPATIENT
Start: 2024-04-21 | End: 2024-04-23 | Stop reason: HOSPADM

## 2024-04-21 RX ORDER — IBUPROFEN 200 MG
16 TABLET ORAL
Status: DISCONTINUED | OUTPATIENT
Start: 2024-04-21 | End: 2024-04-23 | Stop reason: HOSPADM

## 2024-04-21 RX ORDER — SOTALOL HYDROCHLORIDE 80 MG/1
80 TABLET ORAL DAILY
Status: DISCONTINUED | OUTPATIENT
Start: 2024-04-21 | End: 2024-04-21

## 2024-04-21 RX ORDER — IBUPROFEN 200 MG
16 TABLET ORAL
Status: DISCONTINUED | OUTPATIENT
Start: 2024-04-21 | End: 2024-04-21 | Stop reason: HOSPADM

## 2024-04-21 RX ORDER — NICARDIPINE HYDROCHLORIDE 0.2 MG/ML
0-15 INJECTION INTRAVENOUS CONTINUOUS
Status: DISCONTINUED | OUTPATIENT
Start: 2024-04-21 | End: 2024-04-22

## 2024-04-21 RX ORDER — IBUPROFEN 200 MG
24 TABLET ORAL
Status: DISCONTINUED | OUTPATIENT
Start: 2024-04-21 | End: 2024-04-23 | Stop reason: HOSPADM

## 2024-04-21 RX ORDER — DOPAMINE HCL IN DEXTROSE 5 % 400MG/.25L
2 INFUSION BOTTLE (ML) INTRAVENOUS CONTINUOUS
Status: DISCONTINUED | OUTPATIENT
Start: 2024-04-21 | End: 2024-04-21

## 2024-04-21 RX ORDER — DOPAMINE HYDROCHLORIDE 160 MG/100ML
INJECTION, SOLUTION INTRAVENOUS
Status: COMPLETED
Start: 2024-04-21 | End: 2024-04-21

## 2024-04-21 RX ORDER — ACETAMINOPHEN 500 MG
500 TABLET ORAL NIGHTLY
Status: DISCONTINUED | OUTPATIENT
Start: 2024-04-21 | End: 2024-04-21 | Stop reason: HOSPADM

## 2024-04-21 RX ORDER — GLUCAGON 1 MG
1 KIT INJECTION
Status: DISCONTINUED | OUTPATIENT
Start: 2024-04-21 | End: 2024-04-23 | Stop reason: HOSPADM

## 2024-04-21 RX ORDER — FAMOTIDINE 20 MG/1
20 TABLET, FILM COATED ORAL DAILY
Status: DISCONTINUED | OUTPATIENT
Start: 2024-04-22 | End: 2024-04-23 | Stop reason: HOSPADM

## 2024-04-21 RX ORDER — MUPIROCIN 20 MG/G
OINTMENT TOPICAL 2 TIMES DAILY
Status: DISCONTINUED | OUTPATIENT
Start: 2024-04-21 | End: 2024-04-21 | Stop reason: HOSPADM

## 2024-04-21 RX ORDER — HYDROCODONE BITARTRATE AND ACETAMINOPHEN 7.5; 325 MG/1; MG/1
1 TABLET ORAL
Status: DISCONTINUED | OUTPATIENT
Start: 2024-04-21 | End: 2024-04-21 | Stop reason: HOSPADM

## 2024-04-21 RX ORDER — GLUCAGON 1 MG
1 KIT INJECTION
Status: DISCONTINUED | OUTPATIENT
Start: 2024-04-21 | End: 2024-04-21 | Stop reason: HOSPADM

## 2024-04-21 RX ORDER — DOPAMINE HYDROCHLORIDE 160 MG/100ML
0-20 INJECTION, SOLUTION INTRAVENOUS CONTINUOUS
Status: DISCONTINUED | OUTPATIENT
Start: 2024-04-21 | End: 2024-04-21 | Stop reason: HOSPADM

## 2024-04-21 RX ORDER — HEPARIN SODIUM,PORCINE/D5W 25000/250
0-40 INTRAVENOUS SOLUTION INTRAVENOUS CONTINUOUS
Status: DISCONTINUED | OUTPATIENT
Start: 2024-04-21 | End: 2024-04-23

## 2024-04-21 RX ORDER — ATORVASTATIN CALCIUM 20 MG/1
40 TABLET, FILM COATED ORAL DAILY
Status: DISCONTINUED | OUTPATIENT
Start: 2024-04-21 | End: 2024-04-21 | Stop reason: HOSPADM

## 2024-04-21 RX ORDER — IBUPROFEN 200 MG
24 TABLET ORAL
Status: DISCONTINUED | OUTPATIENT
Start: 2024-04-21 | End: 2024-04-21 | Stop reason: HOSPADM

## 2024-04-21 RX ORDER — LEVOTHYROXINE SODIUM 75 UG/1
75 TABLET ORAL DAILY
Status: DISCONTINUED | OUTPATIENT
Start: 2024-04-21 | End: 2024-04-21 | Stop reason: HOSPADM

## 2024-04-21 RX ORDER — MICONAZOLE NITRATE 2 %
POWDER (GRAM) TOPICAL 2 TIMES DAILY
Status: DISCONTINUED | OUTPATIENT
Start: 2024-04-21 | End: 2024-04-21 | Stop reason: HOSPADM

## 2024-04-21 RX ORDER — INSULIN ASPART 100 [IU]/ML
0-5 INJECTION, SOLUTION INTRAVENOUS; SUBCUTANEOUS
Status: DISCONTINUED | OUTPATIENT
Start: 2024-04-21 | End: 2024-04-21 | Stop reason: HOSPADM

## 2024-04-21 RX ORDER — HYDRALAZINE HYDROCHLORIDE 25 MG/1
25 TABLET, FILM COATED ORAL EVERY 8 HOURS
Status: DISCONTINUED | OUTPATIENT
Start: 2024-04-21 | End: 2024-04-22

## 2024-04-21 RX ORDER — INSULIN ASPART 100 [IU]/ML
0-5 INJECTION, SOLUTION INTRAVENOUS; SUBCUTANEOUS
Status: DISCONTINUED | OUTPATIENT
Start: 2024-04-21 | End: 2024-04-23 | Stop reason: HOSPADM

## 2024-04-21 RX ORDER — ACETAMINOPHEN 325 MG/1
650 TABLET ORAL EVERY 4 HOURS PRN
Status: DISCONTINUED | OUTPATIENT
Start: 2024-04-21 | End: 2024-04-23 | Stop reason: HOSPADM

## 2024-04-21 RX ORDER — LEVOTHYROXINE SODIUM 75 UG/1
75 TABLET ORAL
Status: DISCONTINUED | OUTPATIENT
Start: 2024-04-22 | End: 2024-04-23 | Stop reason: HOSPADM

## 2024-04-21 RX ADMIN — SODIUM CHLORIDE: 9 INJECTION, SOLUTION INTRAVENOUS at 07:04

## 2024-04-21 RX ADMIN — HYDRALAZINE HYDROCHLORIDE 25 MG: 25 TABLET, FILM COATED ORAL at 08:04

## 2024-04-21 RX ADMIN — ATORVASTATIN CALCIUM 40 MG: 40 TABLET, FILM COATED ORAL at 09:04

## 2024-04-21 RX ADMIN — HEPARIN SODIUM 12 UNITS/KG/HR: 10000 INJECTION, SOLUTION INTRAVENOUS at 09:04

## 2024-04-21 RX ADMIN — DOPAMINE HYDROCHLORIDE 2 MCG/KG/MIN: 160 INJECTION, SOLUTION INTRAVENOUS at 11:04

## 2024-04-21 RX ADMIN — Medication 2 MCG/KG/MIN: at 11:04

## 2024-04-21 RX ADMIN — MICONAZOLE NITRATE: 20 POWDER TOPICAL at 09:04

## 2024-04-21 RX ADMIN — LEVOTHYROXINE SODIUM 75 MCG: 75 TABLET ORAL at 09:04

## 2024-04-21 RX ADMIN — DOPAMINE HYDROCHLORIDE 5 MCG/KG/MIN: 160 INJECTION, SOLUTION INTRAVENOUS at 05:04

## 2024-04-21 RX ADMIN — CEFTRIAXONE 1 G: 1 INJECTION, POWDER, FOR SOLUTION INTRAMUSCULAR; INTRAVENOUS at 09:04

## 2024-04-21 RX ADMIN — METOPROLOL SUCCINATE 12.5 MG: 25 TABLET, EXTENDED RELEASE ORAL at 09:04

## 2024-04-21 RX ADMIN — ACETAMINOPHEN 650 MG: 325 TABLET ORAL at 10:04

## 2024-04-21 RX ADMIN — MUPIROCIN: 20 OINTMENT TOPICAL at 01:04

## 2024-04-21 RX ADMIN — NICARDIPINE HYDROCHLORIDE 5 MG/HR: 0.2 INJECTION, SOLUTION INTRAVENOUS at 10:04

## 2024-04-21 NOTE — HPI
Tonya Bucio is a 88 y.o. female with DM-2, knee OA,hypothyroidism ,  recurrent UTI,  presents with fever yesterday .  Fever at home this evening with long history of urinary tract infections noted  Patient is a bed-bound type 2 diabetic with no longer walks, uses a wheelchair  Patient is morbidly obese, no signs of distress, normal blood pressure noted   No overt severe sacral decubitus, miscellaneous buttock breakdown noted. Work up in ER showed  leucocytosis , UTI , and BAYRON .  She is  admitted for IVF and IV antibiotics

## 2024-04-21 NOTE — NURSING
Patient left via [facility]. All belongings with family. Phone report given to on coming nurse at [facility] [place].

## 2024-04-21 NOTE — ASSESSMENT & PLAN NOTE
Patient with acute kidney injury/acute renal failure likely due to pre-renal azotemia due to dehydration BAYRON is currently worsening. Baseline creatinine  1.2  - Labs reviewed- Renal function/electrolytes with Estimated Creatinine Clearance: 37 mL/min (A) (based on SCr of 1.5 mg/dL (H)). according to latest data. Monitor urine output and serial BMP and adjust therapy as needed. Avoid nephrotoxins and renally dose meds for GFR listed above.    Continue IVF

## 2024-04-21 NOTE — SUBJECTIVE & OBJECTIVE
Past Medical History:   Diagnosis Date    Acute bronchitis with asthma     Anemia due to stage 3 chronic kidney disease     Anticoagulant long-term use     Asthma     Back pain     Cancer     Chest pain, musculoskeletal 7/16/2013    Chronic bronchitis     Colon polyps     COPD exacerbation 3/13/2020    Gout, unspecified     History of cervical cancer     Hypothyroidism     Obesity     Osteoarthritis     Renal manifestation of secondary diabetes mellitus     Thyroid disease     Trouble in sleeping     Type 2 diabetes mellitus with ophthalmic manifestations     Type 2 diabetes with peripheral circulatory disorder, controlled     Urinary incontinence     Uterine cancer     Uterine cancer        Past Surgical History:   Procedure Laterality Date    ADENOIDECTOMY      EYE SURGERY Right     right eye cataract    HYSTERECTOMY  2008    LIZ-BSO    tonsilectomy      TONSILLECTOMY  1945    TOTAL ABDOMINAL HYSTERECTOMY  2008    TOTAL ABDOMINAL HYSTERECTOMY W/ BILATERAL SALPINGOOPHORECTOMY  2008       Review of patient's allergies indicates:  No Known Allergies    Current Facility-Administered Medications   Medication Dose Route Frequency Provider Last Rate Last Admin    0.9%  NaCl infusion   Intravenous Continuous David Guzmán  mL/hr at 04/21/24 0725 New Bag at 04/21/24 0725    acetaminophen tablet 500 mg  500 mg Oral QHS Tasha Atkins MD        acetaminophen tablet 650 mg  650 mg Oral Q8H PRN David Guzmán MD        atorvastatin tablet 40 mg  40 mg Oral Daily Tasha Atkins MD        cefTRIAXone (ROCEPHIN) 2 g in dextrose 5 % in water (D5W) 100 mL IVPB (MB+)  2 g Intravenous Q24H David Guzmán MD   Stopped at 04/20/24 2232    HYDROcodone-acetaminophen 5-325 mg per tablet 1 tablet  1 tablet Oral Q4H PRN David Guzmán MD        HYDROcodone-acetaminophen 7.5-325 mg per tablet 1 tablet  1 tablet Oral Q24H PRN Tasha Atkins MD        levothyroxine tablet 75 mcg  75 mcg Oral Daily Wilbert  Tasha MATTHEW MD        melatonin tablet 6 mg  6 mg Oral Nightly PRN David Guzmán MD        miconazole NITRATE 2 % top powder   Topical (Top) BID Tasha Atkins MD        ondansetron injection 4 mg  4 mg Intravenous Q8H PRDavid Peralta MD        rivaroxaban tablet 15 mg  15 mg Oral QHS David Guzmán MD   15 mg at 24 2305    sodium chloride 0.9% flush 10 mL  10 mL Intravenous PRN David Guzmán MD        sotaloL tablet 80 mg  80 mg Oral Daily Tasha Atkins MD         Family History       Problem Relation (Age of Onset)    Anemia Daughter    Arthritis Father, Daughter, Daughter, Daughter, Daughter    Breast cancer Sister    Cancer Brother    Diabetes Brother, Daughter, Daughter    Glaucoma Daughter    Heart disease Mother, Brother, Brother    Hyperlipidemia Brother    Liver disease Daughter    No Known Problems Son, Son    Ovarian cancer Sister    Stroke Brother, Son          Tobacco Use    Smoking status: Former     Current packs/day: 0.00     Average packs/day: 0.3 packs/day for 13.0 years (4.3 ttl pk-yrs)     Types: Cigarettes     Start date: 1951     Quit date: 1964     Years since quittin.7     Passive exposure: Past    Smokeless tobacco: Never   Substance and Sexual Activity    Alcohol use: No    Drug use: No    Sexual activity: Never     Birth control/protection: Surgical     Comment:      Review of Systems   Constitutional:  Positive for chills, fatigue and fever.   HENT:  Negative for congestion, hearing loss, sinus pressure and sore throat.    Eyes:  Negative for photophobia.   Respiratory:  Positive for cough. Negative for choking, chest tightness and wheezing.    Cardiovascular:  Negative for chest pain and palpitations.   Gastrointestinal:  Negative for blood in stool, nausea and vomiting.   Genitourinary:  Positive for frequency. Negative for dysuria, flank pain, hematuria and urgency.   Musculoskeletal:  Positive for arthralgias and gait problem.  Negative for myalgias.        Knee pain   Skin:  Negative for pallor.   Neurological:  Negative for dizziness and numbness.   Hematological:  Does not bruise/bleed easily.   Psychiatric/Behavioral:  Negative for confusion and suicidal ideas. The patient is not nervous/anxious.      Objective:     Vital Signs (Most Recent):  Temp: 98 °F (36.7 °C) (04/21/24 0730)  Pulse: 73 (04/21/24 0800)  Resp: 18 (04/21/24 0730)  BP: (!) 150/65 (04/21/24 0730)  SpO2: 98 % (04/21/24 0732) Vital Signs (24h Range):  Temp:  [97.6 °F (36.4 °C)-100.4 °F (38 °C)] 98 °F (36.7 °C)  Pulse:  [51-83] 73  Resp:  [18-26] 18  SpO2:  [92 %-98 %] 98 %  BP: (125-150)/(58-68) 150/65     Weight: 125.6 kg (276 lb 14.4 oz)  Body mass index is 42.1 kg/m².     Physical Exam  Vitals and nursing note reviewed.   Constitutional:       Appearance: She is well-developed. She is obese.   HENT:      Head: Normocephalic and atraumatic.      Right Ear: External ear normal.      Left Ear: External ear normal.   Eyes:      Conjunctiva/sclera: Conjunctivae normal.      Pupils: Pupils are equal, round, and reactive to light.   Neck:      Thyroid: No thyromegaly.      Vascular: No JVD.      Trachea: No tracheal deviation.   Cardiovascular:      Rate and Rhythm: Normal rate and regular rhythm.      Heart sounds: Normal heart sounds.   Pulmonary:      Effort: Pulmonary effort is normal. No respiratory distress.      Breath sounds: Normal breath sounds. No wheezing or rales.   Chest:      Chest wall: No tenderness.   Abdominal:      General: Bowel sounds are normal. There is no distension.      Palpations: Abdomen is soft. There is no mass.      Tenderness: There is no abdominal tenderness. There is no guarding or rebound.   Musculoskeletal:         General: Normal range of motion.      Cervical back: Normal range of motion and neck supple.   Lymphadenopathy:      Cervical: No cervical adenopathy.   Skin:     General: Skin is warm and dry.   Neurological:      Mental  "Status: She is alert and oriented to person, place, and time.      Cranial Nerves: No cranial nerve deficit.      Motor: No abnormal muscle tone.      Coordination: Coordination normal.      Deep Tendon Reflexes: Reflexes are normal and symmetric. Reflexes normal.      Comments: CN: Optic discs are flat with normal vasculature, PERRL, Extraoccular movements and visual fields are full. Normal facial sensation and strength, Hearing symmetric, Tongue and Palate are midline and strong. Shoulder Shrug symmetric and strong.              CRANIAL NERVES     CN III, IV, VI   Pupils are equal, round, and reactive to light.       Significant Labs: All pertinent labs within the past 24 hours have been reviewed.  A1C:   Recent Labs   Lab 03/29/24  1026   HGBA1C 6.1*     CBC:   Recent Labs   Lab 04/20/24 2047 04/21/24  0410   WBC 19.70* 20.45*   HGB 11.1* 9.4*   HCT 34.8* 29.4*    278     CMP:   Recent Labs   Lab 04/20/24 2047 04/21/24  0410    139   K 4.0 4.3    106   CO2 20* 24   * 166*   BUN 26* 28*   CREATININE 1.4 1.5*   CALCIUM 9.3 8.5*   PROT 7.5 6.1   ALBUMIN 3.0* 2.6*   BILITOT 0.7 0.3   ALKPHOS 103 83   AST 20 14   ALT 22 18   ANIONGAP 12 9     Troponin:   Recent Labs   Lab 04/20/24 2047   TROPONINI 0.049*     Urine Culture: No results for input(s): "LABURIN" in the last 48 hours.  Urine Studies:   Recent Labs   Lab 04/20/24  2105   COLORU Yellow   APPEARANCEUA Clear   PHUR 6.5   SPECGRAV 1.015   PROTEINUA 3+*   GLUCUA Negative   KETONESU Negative   BILIRUBINUA Negative   OCCULTUA 3+*   NITRITE Positive*   UROBILINOGEN Negative   LEUKOCYTESUR 2+*   RBCUA 2   WBCUA >100*   BACTERIA Many*   SQUAMEPITHEL 5   HYALINECASTS 2*       Significant Imaging: I have reviewed all pertinent imaging results/findings within the past 24 hours.  CXR: I have reviewed all pertinent results/findings within the past 24 hours and my personal findings are:  Cardiac monitoring leads overlie the chest.  There is " unchanged enlargement of the cardiomediastinal silhouette.  There is atherosclerosis of the thoracic aorta.  The lungs are symmetrically expanded without evidence of confluent airspace consolidation, substantial volume of pleural fluid or pneumothorax.  Osseous structures are intact with degenerative change.

## 2024-04-21 NOTE — H&P
[facility] ([address] [place] [address] Medicine  History & Physical    Patient Name: Tonya Bucio  MRN: [id number]  Patient Class: OP- Observation  Admission Date: 4/20/2024  Attending Physician: Tasha Atkins MD   Primary Care Provider: Tasha Atkins MD         Patient information was obtained from patient and ER records.     Subjective:     Principal Problem:BAYRON (acute kidney injury)    Chief Complaint:   Chief Complaint   Patient presents with    General Illness     PT TO ER VIA [facility] WITH C/O FEVER SINCE 1400. PT ALSO C/O COUGH        HPI: Tonya Bucio is a 88 y.o. female with DM-2, knee OA,hypothyroidism ,  recurrent UTI,  presents with fever yesterday .  Fever at home this evening with long history of urinary tract infections noted  Patient is a bed-bound type 2 diabetic with no longer walks, uses a wheelchair  Patient is morbidly obese, no signs of distress, normal blood pressure noted   No overt severe sacral decubitus, miscellaneous buttock breakdown noted. Work up in ER showed  leucocytosis , UTI , and BAYRON .  She is  admitted for IVF and IV antibiotics            Past Medical History:   Diagnosis Date    Acute bronchitis with asthma     Anemia due to stage 3 chronic kidney disease     Anticoagulant long-term use     Asthma     Back pain     Cancer     Chest pain, musculoskeletal 7/16/2013    Chronic bronchitis     Colon polyps     COPD exacerbation 3/13/2020    Gout, unspecified     History of cervical cancer     Hypothyroidism     Obesity     Osteoarthritis     Renal manifestation of secondary diabetes mellitus     Thyroid disease     Trouble in sleeping     Type 2 diabetes mellitus with ophthalmic manifestations     Type 2 diabetes with peripheral circulatory disorder, controlled     Urinary incontinence     Uterine cancer     Uterine cancer        Past Surgical History:   Procedure Laterality Date    ADENOIDECTOMY      EYE SURGERY Right     right eye cataract    HYSTERECTOMY   2008    LIZ-BSO    tonsilectomy      TONSILLECTOMY  1945    TOTAL ABDOMINAL HYSTERECTOMY  2008    TOTAL ABDOMINAL HYSTERECTOMY W/ BILATERAL SALPINGOOPHORECTOMY  2008       Review of patient's allergies indicates:  No Known Allergies    Current Facility-Administered Medications   Medication Dose Route Frequency Provider Last Rate Last Admin    0.9%  NaCl infusion   Intravenous Continuous David Guzmán  mL/hr at 04/21/24 0725 New Bag at 04/21/24 0725    acetaminophen tablet 500 mg  500 mg Oral QHS Tasha Atkins MD        acetaminophen tablet 650 mg  650 mg Oral Q8H PRN David Guzmán MD        atorvastatin tablet 40 mg  40 mg Oral Daily Tasha Atkins MD        cefTRIAXone (ROCEPHIN) 2 g in dextrose 5 % in water (D5W) 100 mL IVPB (MB+)  2 g Intravenous Q24H David Guzmán MD   Stopped at 04/20/24 2232    HYDROcodone-acetaminophen 5-325 mg per tablet 1 tablet  1 tablet Oral Q4H PRN David Guzmán MD        HYDROcodone-acetaminophen 7.5-325 mg per tablet 1 tablet  1 tablet Oral Q24H PRN Tasha Atkins MD        levothyroxine tablet 75 mcg  75 mcg Oral Daily Tasha Atkins MD        melatonin tablet 6 mg  6 mg Oral Nightly PRN David Guzmán MD        miconazole NITRATE 2 % top powder   Topical (Top) BID Tasha Atkins MD        ondansetron injection 4 mg  4 mg Intravenous Q8H PRN David Guzmán MD        rivaroxaban tablet 15 mg  15 mg Oral QHS David Guzmán MD   15 mg at 04/20/24 2305    sodium chloride 0.9% flush 10 mL  10 mL Intravenous PRN David Guzmán MD        sotaloL tablet 80 mg  80 mg Oral Daily Tasha Atkins MD         Family History       Problem Relation (Age of Onset)    Anemia Daughter    Arthritis Father, Daughter, Daughter, Daughter, Daughter    Breast cancer Sister    Cancer Brother    Diabetes Brother, Daughter, Daughter    Glaucoma Daughter    Heart disease Mother, Brother, Brother    Hyperlipidemia Brother    Liver disease Daughter     No Known Problems Son, Son    Ovarian cancer Sister    Stroke Brother, Son          Tobacco Use    Smoking status: Former     Current packs/day: 0.00     Average packs/day: 0.3 packs/day for 13.0 years (4.3 ttl pk-yrs)     Types: Cigarettes     Start date: 1951     Quit date: 1964     Years since quittin.7     Passive exposure: Past    Smokeless tobacco: Never   Substance and Sexual Activity    Alcohol use: No    Drug use: No    Sexual activity: Never     Birth control/protection: Surgical     Comment:      Review of Systems   Constitutional:  Positive for chills, fatigue and fever.   HENT:  Negative for congestion, hearing loss, sinus pressure and sore throat.    Eyes:  Negative for photophobia.   Respiratory:  Positive for cough. Negative for choking, chest tightness and wheezing.    Cardiovascular:  Negative for chest pain and palpitations.   Gastrointestinal:  Negative for blood in stool, nausea and vomiting.   Genitourinary:  Positive for frequency. Negative for dysuria, flank pain, hematuria and urgency.   Musculoskeletal:  Positive for arthralgias and gait problem. Negative for myalgias.        Knee pain   Skin:  Negative for pallor.   Neurological:  Negative for dizziness and numbness.   Hematological:  Does not bruise/bleed easily.   Psychiatric/Behavioral:  Negative for confusion and suicidal ideas. The patient is not nervous/anxious.      Objective:     Vital Signs (Most Recent):  Temp: 98 °F (36.7 °C) (24 0730)  Pulse: 73 (24 0800)  Resp: 18 (24 0730)  BP: (!) 150/65 (24 0730)  SpO2: 98 % (24 0732) Vital Signs (24h Range):  Temp:  [97.6 °F (36.4 °C)-100.4 °F (38 °C)] 98 °F (36.7 °C)  Pulse:  [51-83] 73  Resp:  [18-26] 18  SpO2:  [92 %-98 %] 98 %  BP: (125-150)/(58-68) 150/65     Weight: 125.6 kg (276 lb 14.4 oz)  Body mass index is 42.1 kg/m².     Physical Exam  Vitals and nursing note reviewed.   Constitutional:       Appearance: She is well-developed.  She is obese.   HENT:      Head: Normocephalic and atraumatic.      Right Ear: External ear normal.      Left Ear: External ear normal.   Eyes:      Conjunctiva/sclera: Conjunctivae normal.      Pupils: Pupils are equal, round, and reactive to light.   Neck:      Thyroid: No thyromegaly.      Vascular: No JVD.      Trachea: No tracheal deviation.   Cardiovascular:      Rate and Rhythm: Normal rate and regular rhythm.      Heart sounds: Normal heart sounds.   Pulmonary:      Effort: Pulmonary effort is normal. No respiratory distress.      Breath sounds: Normal breath sounds. No wheezing or rales.   Chest:      Chest wall: No tenderness.   Abdominal:      General: Bowel sounds are normal. There is no distension.      Palpations: Abdomen is soft. There is no mass.      Tenderness: There is no abdominal tenderness. There is no guarding or rebound.   Musculoskeletal:         General: Normal range of motion.      Cervical back: Normal range of motion and neck supple.   Lymphadenopathy:      Cervical: No cervical adenopathy.   Skin:     General: Skin is warm and dry.   Neurological:      Mental Status: She is alert and oriented to person, place, and time.      Cranial Nerves: No cranial nerve deficit.      Motor: No abnormal muscle tone.      Coordination: Coordination normal.      Deep Tendon Reflexes: Reflexes are normal and symmetric. Reflexes normal.      Comments: CN: Optic discs are flat with normal vasculature, PERRL, Extraoccular movements and visual fields are full. Normal facial sensation and strength, Hearing symmetric, Tongue and Palate are midline and strong. Shoulder Shrug symmetric and strong.              CRANIAL NERVES     CN III, IV, VI   Pupils are equal, round, and reactive to light.       Significant Labs: All pertinent labs within the past 24 hours have been reviewed.  A1C:   Recent Labs   Lab 03/29/24  1026   HGBA1C 6.1*     CBC:   Recent Labs   Lab 04/20/24 2047 04/21/24  0410   WBC 19.70* 20.45*  "  HGB 11.1* 9.4*   HCT 34.8* 29.4*    278     CMP:   Recent Labs   Lab 04/20/24 2047 04/21/24  0410    139   K 4.0 4.3    106   CO2 20* 24   * 166*   BUN 26* 28*   CREATININE 1.4 1.5*   CALCIUM 9.3 8.5*   PROT 7.5 6.1   ALBUMIN 3.0* 2.6*   BILITOT 0.7 0.3   ALKPHOS 103 83   AST 20 14   ALT 22 18   ANIONGAP 12 9     Troponin:   Recent Labs   Lab 04/20/24 2047   TROPONINI 0.049*     Urine Culture: No results for input(s): "LABURIN" in the last 48 hours.  Urine Studies:   Recent Labs   Lab 04/20/24 2105   COLORU Yellow   APPEARANCEUA Clear   PHUR 6.5   SPECGRAV 1.015   PROTEINUA 3+*   GLUCUA Negative   KETONESU Negative   BILIRUBINUA Negative   OCCULTUA 3+*   NITRITE Positive*   UROBILINOGEN Negative   LEUKOCYTESUR 2+*   RBCUA 2   WBCUA >100*   BACTERIA Many*   SQUAMEPITHEL 5   HYALINECASTS 2*       Significant Imaging: I have reviewed all pertinent imaging results/findings within the past 24 hours.  CXR: I have reviewed all pertinent results/findings within the past 24 hours and my personal findings are:  Cardiac monitoring leads overlie the chest.  There is unchanged enlargement of the cardiomediastinal silhouette.  There is atherosclerosis of the thoracic aorta.  The lungs are symmetrically expanded without evidence of confluent airspace consolidation, substantial volume of pleural fluid or pneumothorax.  Osseous structures are intact with degenerative change.  Assessment/Plan:     * BAYRON (acute kidney injury)  Patient with acute kidney injury/acute renal failure likely due to pre-renal azotemia due to dehydration BAYRON is currently worsening. Baseline creatinine  1.2  - Labs reviewed- Renal function/electrolytes with Estimated Creatinine Clearance: 37 mL/min (A) (based on SCr of 1.5 mg/dL (H)). according to latest data. Monitor urine output and serial BMP and adjust therapy as needed. Avoid nephrotoxins and renally dose meds for GFR listed above.    Continue IVF     Dehydration  Continue " IVF       Acute cystitis without hematuria  She has recurrent UTIs.  Continue IV Rocephin       Last urine  culture      Abnormal   PROTEUS MIRABILIS  10,000 - 49,999 cfu/ml  No other significant isolate     Resulting Agency [facility]        Susceptibility     Proteus mirabilis     CULTURE, URINE     Amox/K Clav'ate <=8/4 mcg/mL Sensitive     Amp/Sulbactam <=8/4 mcg/mL Sensitive     Ampicillin <=8 mcg/mL Sensitive     Cefazolin <=2 mcg/mL Sensitive     Cefepime <=2 mcg/mL Sensitive     Ceftriaxone <=1 mcg/mL Sensitive     Ciprofloxacin <=1 mcg/mL Sensitive     Ertapenem <=0.5 mcg/mL Sensitive     Gentamicin <=4 mcg/mL Sensitive     Levofloxacin <=2 mcg/mL Sensitive     Meropenem <=1 mcg/mL Sensitive     Piperacillin/Tazo <=16 mcg/mL Sensitive     Tobramycin <=4 mcg/mL Sensitive     Trimeth/Sulfa <=2/38 mcg/mL Sensitive                           Type 2 diabetes mellitus with ophthalmic manifestations  Lab Results   Component Value Date    HGBA1C 6.1 (H) 03/29/2024     Insulin correction scale      Chronic saddle pulmonary embolism without acute cor pulmonale  Continue xarelto      Hyperlipidemia associated with type 2 diabetes mellitus  Lab Results   Component Value Date    LDLCALC 60.6 (L) 03/29/2024     Continue statin      Hypothyroidism  Lab Results   Component Value Date    TSH 2.546 03/29/2024    Continue home dose levothyroxine        VTE Risk Mitigation (From admission, onward)           Ordered     rivaroxaban tablet 15 mg  Nightly         04/20/24 2229     IP VTE HIGH RISK PATIENT  Once         04/20/24 2229     Place sequential compression device  Until discontinued         04/20/24 2229                       On 04/21/2024, patient should be placed in hospital observation services under my care.             Tasha Atkins MD  Department of Hospital Medicine  [place] - Med Surg (3rd Fl)

## 2024-04-21 NOTE — NURSING
Patient with two more pause episodes for 5 seconds each. Also becoming bradycardic in the low 30's.Remained asymptomatic. Dr. Atkins notified. Changed dopamine gtt to titratable order and started at 5mcg/kg. Patient responded well. HR 70's. Patient SR with 1st AVB on tele. [place] and oncoming nurse aware of changes.

## 2024-04-21 NOTE — NURSING
Pt up from ed via stretcher staff positioned pt. In bed report from mateus holbrook rn oriented pt to room and surroundings call light in reach vss plan of care reviewed with pt. And family at bedside verbalized understanding no distress noted

## 2024-04-21 NOTE — PLAN OF CARE
Patient admitted to ICU r/t having systematic pauses on tele. Awaiting transfer for interventional cardiology. Bedside report received from DILLON Stover. Patient alert/oriented and stable. VS stable with some HTN noted. IV fluids and dopamine gtt administered as ordered. Nguyen in place r/t excoriation and nonblanchable redness on sacrum/buttock. Plan at the moment is to transfer patient, patient and family understanding. Patient daughter informed staff that patient may have been taking both metoprolol (Dr. Carlos) and sotalol (Dr. Ibarra) by mistake. Patient daughter stated [facility] filled a prescription for sotalol on 3/31 for Dr. Ibarra but patient hasn't been a patient of [facility] since early 2023. Patient in a accelerated junctional rhythm at this time. Will continue to monitor. Plan of care reviewed and followed.     Problem: Infection  Goal: Absence of Infection Signs and Symptoms  Outcome: Ongoing, Progressing     Problem: Adult Inpatient Plan of Care  Goal: Plan of Care Review  Outcome: Ongoing, Progressing  Flowsheets (Taken 4/21/2024 1503)  Plan of Care Reviewed With:   patient   daughter  Goal: Optimal Comfort and Wellbeing  Outcome: Ongoing, Progressing  Goal: Readiness for Transition of Care  Outcome: Ongoing, Progressing     Problem: Diabetes Comorbidity  Goal: Blood Glucose Level Within Targeted Range  Outcome: Ongoing, Progressing     Problem: Bariatric Environmental Safety  Goal: Safety Maintained with Care  Outcome: Ongoing, Progressing     Problem: Fall Injury Risk  Goal: Absence of Fall and Fall-Related Injury  Outcome: Ongoing, Progressing     Problem: Skin Injury Risk Increased  Goal: Skin Health and Integrity  Outcome: Ongoing, Progressing     Problem: Fluid and Electrolyte Imbalance (Acute Kidney Injury/Impairment)  Goal: Fluid and Electrolyte Balance  Outcome: Ongoing, Progressing     Problem: Oral Intake Inadequate (Acute Kidney Injury/Impairment)  Goal: Optimal Nutrition Intake  Outcome: Ongoing,  Progressing     Problem: Renal Function Impairment (Acute Kidney Injury/Impairment)  Goal: Effective Renal Function  Outcome: Ongoing, Progressing

## 2024-04-21 NOTE — NURSING
"Pt with multiple pauses on telemetry with the longest being approximately 7 seconds long. HR dropped to 31 but recovered to low 60's-62.  Pt stating she feels like she is having "weak spells and flutters." Telemetry box pads/wires changed and adjusted. Dr. Atkins immediately notified, orders to transfer patient to ICU for dopamine drip and place patient on pacer pads. Patient and family notified and updated on plan of care.   "

## 2024-04-21 NOTE — PLAN OF CARE
[facility] - Med Surg (3rd Fl)  Initial Discharge Assessment       Primary Care Provider: Tasha Atkins MD    Admission Diagnosis: Dehydration [E86.0]  Fatigue [R53.83]  Acute cystitis without hematuria [N30.00]    Admission Date: 4/20/2024  Expected Discharge Date:     Transition of Care Barriers: None    Payor: HUMANA MANAGED MEDICARE / Plan: HUMANA SNP HMO PPO SPECIAL NEEDS / Product Type: Medicare Advantage /     Extended Emergency Contact Information  Primary Emergency Contact: Mita Bucio   [place]  Mobile Phone: [phone]  Relation: Daughter    Discharge Plan A: Home with family, Home  Discharge Plan B: Home with family      [facility] - [facility] [address]  [facility] [facility] [address]  [facility] [address]  Phone: [phone] Fax: [phone]    [facility] - [facility] [address] [facility]  [address] [facility] [place] [address]  Phone: [phone] Fax: [phone]    [facility] - [facility] [address] [facility]  [address] [facility]  [place] [address]  Phone: [phone] Fax: [phone]      Initial Assessment (most recent)       Adult Discharge Assessment - 04/21/24 0832          Discharge Assessment    Assessment Type Discharge Planning Assessment     Confirmed/corrected address, phone number and insurance Yes     Confirmed Demographics Correct on Facesheet     Source of Information family     If unable to respond/provide information was family/caregiver contacted? Yes     Contact Name/Number Mita Sanchez, [phone]     Reason For Admission urinary tract infection     People in Home child(yesenia), adult     Do you expect to return to your current living situation? Yes     Do you have help at home or someone to help you manage your care at home? Yes     Who are your caregiver(s) and their phone number(s)? Adult Children     Walking or Climbing Stairs Difficulty yes     Walking or Climbing Stairs ambulation difficulty,  requires equipment;stair climbing difficulty, assistance 1 person     Mobility Management Patient uses a rolling walker (Short distance) and wheelchair (Long distance)     Dressing/Bathing Difficulty yes     Dressing/Bathing bathing difficulty, assistance 1 person;bathing difficulty, requires equipment     Dressing/Bathing Management Adult Children assists     Do you have any problems with: Errands/Grocery     Home Accessibility wheelchair accessible   Has a ramp    Home Layout Able to live on 1st floor     Equipment Currently Used at Home shower chair;bath bench;wheelchair;walker, rolling     Readmission within 30 days? No     Patient currently being followed by outpatient case management? No     Do you currently have service(s) that help you manage your care at home? No     Do you take prescription medications? Yes     Do you have prescription coverage? Yes     Coverage HUMANA MANAGED MEDICARE - HUMANA SNP HMO PPO SPECIAL NEEDS - CAPITATED     Do you have any problems affording any of your prescribed medications? TBD     Is the patient taking medications as prescribed? yes     Who is going to help you get home at discharge? Daughter, Mita Bucio, [phone]     How do you get to doctors appointments? family or friend will provide     Are you on dialysis? No     Discharge Plan A Home with family;Home     Discharge Plan B Home with family     DME Needed Upon Discharge  none     Discharge Plan discussed with: Adult children     Transition of Care Barriers None                   Discharge Planning assessment completed with patient's daughter, Mita, via patient's room phone. Mita's contact number was updated in patient chart, as her phone number changed. No Case Management needs were identified at this time. Mita was provided the contact information for Case Management and encouraged to reach out should there be any needs or concerns.     to remain available.    HAIDER Patrick,  LCSW

## 2024-04-21 NOTE — SIGNIFICANT EVENT
Telemetry tech noticed   Sinus pauses   Almost 7 second pause noted .strip reviewed   Patient examined   She did reports sense of passing out . Pacer pad /atropine near buy  Right now her telemetry is sinus , inverted p waves ; rate 62   She did get 12.5 mg metoprolol.  She sees Dr Gonzales [facility] .  Transfer to ICU   Low dose dopamine.

## 2024-04-21 NOTE — ASSESSMENT & PLAN NOTE
She has recurrent UTIs.  Continue IV Rocephin       Last urine  culture      Abnormal   PROTEUS MIRABILIS  10,000 - 49,999 cfu/ml  No other significant isolate     Resulting Agency [facility]        Susceptibility     Proteus mirabilis     CULTURE, URINE     Amox/K Clav'ate <=8/4 mcg/mL Sensitive     Amp/Sulbactam <=8/4 mcg/mL Sensitive     Ampicillin <=8 mcg/mL Sensitive     Cefazolin <=2 mcg/mL Sensitive     Cefepime <=2 mcg/mL Sensitive     Ceftriaxone <=1 mcg/mL Sensitive     Ciprofloxacin <=1 mcg/mL Sensitive     Ertapenem <=0.5 mcg/mL Sensitive     Gentamicin <=4 mcg/mL Sensitive     Levofloxacin <=2 mcg/mL Sensitive     Meropenem <=1 mcg/mL Sensitive     Piperacillin/Tazo <=16 mcg/mL Sensitive     Tobramycin <=4 mcg/mL Sensitive     Trimeth/Sulfa <=2/38 mcg/mL Sensitive

## 2024-04-21 NOTE — NURSING
Patient with another 5 second pause. Asymptomatic. Dr. Atkins notified. Continue dopamine gtt and monitor for any changes.

## 2024-04-22 LAB
ANION GAP SERPL CALC-SCNC: 10 MMOL/L (ref 8–16)
APTT PPP: 29.1 SEC (ref 21–32)
APTT PPP: 29.8 SEC (ref 21–32)
APTT PPP: 39.4 SEC (ref 21–32)
ASCENDING AORTA: 3.51 CM
AV INDEX (PROSTH): 0.83
AV MEAN GRADIENT: 5 MMHG
AV PEAK GRADIENT: 10 MMHG
AV VALVE AREA BY VELOCITY RATIO: 3.34 CM²
AV VALVE AREA: 3.13 CM²
AV VELOCITY RATIO: 0.89
BASOPHILS # BLD AUTO: 0.07 K/UL (ref 0–0.2)
BASOPHILS NFR BLD: 0.5 % (ref 0–1.9)
BSA FOR ECHO PROCEDURE: 2.36 M2
BUN SERPL-MCNC: 26 MG/DL (ref 8–23)
CALCIUM SERPL-MCNC: 9.1 MG/DL (ref 8.7–10.5)
CHLORIDE SERPL-SCNC: 107 MMOL/L (ref 95–110)
CK SERPL-CCNC: 21 U/L (ref 20–180)
CO2 SERPL-SCNC: 21 MMOL/L (ref 23–29)
CREAT SERPL-MCNC: 1.2 MG/DL (ref 0.5–1.4)
CV ECHO LV RWT: 0.45 CM
DIFFERENTIAL METHOD BLD: ABNORMAL
DOP CALC AO PEAK VEL: 1.59 M/S
DOP CALC AO VTI: 27.79 CM
DOP CALC LVOT AREA: 3.8 CM2
DOP CALC LVOT DIAMETER: 2.19 CM
DOP CALC LVOT PEAK VEL: 1.41 M/S
DOP CALC LVOT STROKE VOLUME: 86.93 CM3
DOP CALCLVOT PEAK VEL VTI: 23.09 CM
E WAVE DECELERATION TIME: 211.71 MSEC
E/A RATIO: 0.76
E/E' RATIO: 15.25 M/S
ECHO LV POSTERIOR WALL: 1.13 CM (ref 0.6–1.1)
EOSINOPHIL # BLD AUTO: 0.2 K/UL (ref 0–0.5)
EOSINOPHIL NFR BLD: 1.6 % (ref 0–8)
ERYTHROCYTE [DISTWIDTH] IN BLOOD BY AUTOMATED COUNT: 14.9 % (ref 11.5–14.5)
EST. GFR  (NO RACE VARIABLE): 43.8 ML/MIN/1.73 M^2
FRACTIONAL SHORTENING: 28 % (ref 28–44)
GLUCOSE SERPL-MCNC: 131 MG/DL (ref 70–110)
HCT VFR BLD AUTO: 32.3 % (ref 37–48.5)
HGB BLD-MCNC: 10.2 G/DL (ref 12–16)
IMM GRANULOCYTES # BLD AUTO: 0.16 K/UL (ref 0–0.04)
IMM GRANULOCYTES NFR BLD AUTO: 1.2 % (ref 0–0.5)
INTERVENTRICULAR SEPTUM: 1.35 CM (ref 0.6–1.1)
LA MAJOR: 6.26 CM
LA MINOR: 6.37 CM
LA WIDTH: 5.14 CM
LEFT ATRIUM SIZE: 3.96 CM
LEFT ATRIUM VOLUME INDEX MOD: 46.4 ML/M2
LEFT ATRIUM VOLUME INDEX: 48.1 ML/M2
LEFT ATRIUM VOLUME MOD: 105.28 CM3
LEFT ATRIUM VOLUME: 109.25 CM3
LEFT INTERNAL DIMENSION IN SYSTOLE: 3.59 CM (ref 2.1–4)
LEFT VENTRICLE DIASTOLIC VOLUME INDEX: 52.64 ML/M2
LEFT VENTRICLE DIASTOLIC VOLUME: 119.5 ML
LEFT VENTRICLE MASS INDEX: 109 G/M2
LEFT VENTRICLE SYSTOLIC VOLUME INDEX: 23.7 ML/M2
LEFT VENTRICLE SYSTOLIC VOLUME: 53.9 ML
LEFT VENTRICULAR INTERNAL DIMENSION IN DIASTOLE: 5.02 CM (ref 3.5–6)
LEFT VENTRICULAR MASS: 246.35 G
LV LATERAL E/E' RATIO: 15.25 M/S
LV SEPTAL E/E' RATIO: 15.25 M/S
LYMPHOCYTES # BLD AUTO: 1.7 K/UL (ref 1–4.8)
LYMPHOCYTES NFR BLD: 12 % (ref 18–48)
MAGNESIUM SERPL-MCNC: 1.6 MG/DL (ref 1.6–2.6)
MCH RBC QN AUTO: 29.8 PG (ref 27–31)
MCHC RBC AUTO-ENTMCNC: 31.6 G/DL (ref 32–36)
MCV RBC AUTO: 94 FL (ref 82–98)
MONOCYTES # BLD AUTO: 1 K/UL (ref 0.3–1)
MONOCYTES NFR BLD: 7 % (ref 4–15)
MV PEAK A VEL: 0.8 M/S
MV PEAK E VEL: 0.61 M/S
MV STENOSIS PRESSURE HALF TIME: 61.4 MS
MV VALVE AREA P 1/2 METHOD: 3.58 CM2
NEUTROPHILS # BLD AUTO: 10.7 K/UL (ref 1.8–7.7)
NEUTROPHILS NFR BLD: 77.7 % (ref 38–73)
NRBC BLD-RTO: 0 /100 WBC
OHS CV RV/LV RATIO: 0.69 CM
OHS QRS DURATION: 88 MS
OHS QRS DURATION: 90 MS
OHS QRS DURATION: 90 MS
OHS QRS DURATION: 92 MS
OHS QTC CALCULATION: 408 MS
OHS QTC CALCULATION: 424 MS
OHS QTC CALCULATION: 431 MS
OHS QTC CALCULATION: 438 MS
PHOSPHATE SERPL-MCNC: 2.7 MG/DL (ref 2.7–4.5)
PISA TR MAX VEL: 2.53 M/S
PLATELET # BLD AUTO: 264 K/UL (ref 150–450)
PMV BLD AUTO: 10.5 FL (ref 9.2–12.9)
POCT GLUCOSE: 126 MG/DL (ref 70–110)
POCT GLUCOSE: 131 MG/DL (ref 70–110)
POCT GLUCOSE: 154 MG/DL (ref 70–110)
POCT GLUCOSE: 172 MG/DL (ref 70–110)
POTASSIUM SERPL-SCNC: 4.5 MMOL/L (ref 3.5–5.1)
RA MAJOR: 5.34 CM
RA PRESSURE ESTIMATED: 3 MMHG
RA WIDTH: 4.42 CM
RBC # BLD AUTO: 3.42 M/UL (ref 4–5.4)
RIGHT VENTRICULAR END-DIASTOLIC DIMENSION: 3.45 CM
RV TB RVSP: 6 MMHG
SINUS: 3.08 CM
SODIUM SERPL-SCNC: 138 MMOL/L (ref 136–145)
STJ: 2.77 CM
TDI LATERAL: 0.04 M/S
TDI SEPTAL: 0.04 M/S
TDI: 0.04 M/S
TR MAX PG: 26 MMHG
TRICUSPID ANNULAR PLANE SYSTOLIC EXCURSION: 2.1 CM
TV REST PULMONARY ARTERY PRESSURE: 29 MMHG
WBC # BLD AUTO: 13.81 K/UL (ref 3.9–12.7)
Z-SCORE OF LEFT VENTRICULAR DIMENSION IN END DIASTOLE: -5.19
Z-SCORE OF LEFT VENTRICULAR DIMENSION IN END SYSTOLE: -2.78

## 2024-04-22 PROCEDURE — 97165 OT EVAL LOW COMPLEX 30 MIN: CPT | Mod: HCNC

## 2024-04-22 PROCEDURE — 25000003 PHARM REV CODE 250: Mod: HCNC | Performed by: STUDENT IN AN ORGANIZED HEALTH CARE EDUCATION/TRAINING PROGRAM

## 2024-04-22 PROCEDURE — 82550 ASSAY OF CK (CPK): CPT | Mod: HCNC | Performed by: STUDENT IN AN ORGANIZED HEALTH CARE EDUCATION/TRAINING PROGRAM

## 2024-04-22 PROCEDURE — 63600175 PHARM REV CODE 636 W HCPCS: Performed by: STUDENT IN AN ORGANIZED HEALTH CARE EDUCATION/TRAINING PROGRAM

## 2024-04-22 PROCEDURE — 85730 THROMBOPLASTIN TIME PARTIAL: CPT | Mod: HCNC | Performed by: STUDENT IN AN ORGANIZED HEALTH CARE EDUCATION/TRAINING PROGRAM

## 2024-04-22 PROCEDURE — 20600001 HC STEP DOWN PRIVATE ROOM: Mod: HCNC

## 2024-04-22 PROCEDURE — 94761 N-INVAS EAR/PLS OXIMETRY MLT: CPT

## 2024-04-22 PROCEDURE — 97535 SELF CARE MNGMENT TRAINING: CPT | Mod: HCNC

## 2024-04-22 PROCEDURE — 97161 PT EVAL LOW COMPLEX 20 MIN: CPT | Mod: HCNC

## 2024-04-22 PROCEDURE — 97530 THERAPEUTIC ACTIVITIES: CPT | Mod: HCNC

## 2024-04-22 PROCEDURE — 25500020 PHARM REV CODE 255: Mod: HCNC | Performed by: INTERNAL MEDICINE

## 2024-04-22 PROCEDURE — 84100 ASSAY OF PHOSPHORUS: CPT | Mod: HCNC | Performed by: STUDENT IN AN ORGANIZED HEALTH CARE EDUCATION/TRAINING PROGRAM

## 2024-04-22 PROCEDURE — 25000003 PHARM REV CODE 250: Mod: HCNC | Performed by: INTERNAL MEDICINE

## 2024-04-22 PROCEDURE — 25000003 PHARM REV CODE 250

## 2024-04-22 PROCEDURE — 99291 CRITICAL CARE FIRST HOUR: CPT | Mod: HCNC,,, | Performed by: INTERNAL MEDICINE

## 2024-04-22 PROCEDURE — 83735 ASSAY OF MAGNESIUM: CPT | Mod: HCNC | Performed by: STUDENT IN AN ORGANIZED HEALTH CARE EDUCATION/TRAINING PROGRAM

## 2024-04-22 PROCEDURE — 63600175 PHARM REV CODE 636 W HCPCS: Mod: HCNC | Performed by: INTERNAL MEDICINE

## 2024-04-22 PROCEDURE — 85730 THROMBOPLASTIN TIME PARTIAL: CPT | Mod: 91,HCNC | Performed by: INTERNAL MEDICINE

## 2024-04-22 PROCEDURE — 80048 BASIC METABOLIC PNL TOTAL CA: CPT | Mod: HCNC | Performed by: STUDENT IN AN ORGANIZED HEALTH CARE EDUCATION/TRAINING PROGRAM

## 2024-04-22 PROCEDURE — 85025 COMPLETE CBC W/AUTO DIFF WBC: CPT | Mod: HCNC | Performed by: STUDENT IN AN ORGANIZED HEALTH CARE EDUCATION/TRAINING PROGRAM

## 2024-04-22 RX ORDER — HYDRALAZINE HYDROCHLORIDE 50 MG/1
50 TABLET, FILM COATED ORAL EVERY 8 HOURS
Status: DISCONTINUED | OUTPATIENT
Start: 2024-04-22 | End: 2024-04-23

## 2024-04-22 RX ORDER — HYDROCODONE BITARTRATE AND ACETAMINOPHEN 7.5; 325 MG/1; MG/1
1 TABLET ORAL DAILY
Status: DISCONTINUED | OUTPATIENT
Start: 2024-04-22 | End: 2024-04-23 | Stop reason: HOSPADM

## 2024-04-22 RX ORDER — HYDRALAZINE HYDROCHLORIDE 25 MG/1
25 TABLET, FILM COATED ORAL ONCE
Status: COMPLETED | OUTPATIENT
Start: 2024-04-22 | End: 2024-04-22

## 2024-04-22 RX ORDER — MAGNESIUM SULFATE 1 G/100ML
1 INJECTION INTRAVENOUS ONCE
Status: COMPLETED | OUTPATIENT
Start: 2024-04-22 | End: 2024-04-22

## 2024-04-22 RX ADMIN — ACETAMINOPHEN 650 MG: 325 TABLET ORAL at 02:04

## 2024-04-22 RX ADMIN — HYDROCODONE BITARTRATE AND ACETAMINOPHEN 1 TABLET: 7.5; 325 TABLET ORAL at 08:04

## 2024-04-22 RX ADMIN — HEPARIN SODIUM 15 UNITS/KG/HR: 10000 INJECTION, SOLUTION INTRAVENOUS at 02:04

## 2024-04-22 RX ADMIN — HYDRALAZINE HYDROCHLORIDE 50 MG: 50 TABLET ORAL at 09:04

## 2024-04-22 RX ADMIN — HYDRALAZINE HYDROCHLORIDE 50 MG: 50 TABLET ORAL at 02:04

## 2024-04-22 RX ADMIN — MAGNESIUM SULFATE HEPTAHYDRATE 1 G: 500 INJECTION, SOLUTION INTRAMUSCULAR; INTRAVENOUS at 05:04

## 2024-04-22 RX ADMIN — CEFTRIAXONE 1 G: 1 INJECTION, POWDER, FOR SOLUTION INTRAMUSCULAR; INTRAVENOUS at 09:04

## 2024-04-22 RX ADMIN — LOSARTAN POTASSIUM 12.5 MG: 25 TABLET, FILM COATED ORAL at 08:04

## 2024-04-22 RX ADMIN — HYDRALAZINE HYDROCHLORIDE 50 MG: 50 TABLET ORAL at 05:04

## 2024-04-22 RX ADMIN — HYDRALAZINE HYDROCHLORIDE 25 MG: 25 TABLET ORAL at 12:04

## 2024-04-22 RX ADMIN — HUMAN ALBUMIN MICROSPHERES AND PERFLUTREN 0.11 MG: 10; .22 INJECTION, SOLUTION INTRAVENOUS at 01:04

## 2024-04-22 RX ADMIN — LEVOTHYROXINE SODIUM 75 MCG: 75 TABLET ORAL at 05:04

## 2024-04-22 RX ADMIN — FAMOTIDINE 20 MG: 20 TABLET ORAL at 08:04

## 2024-04-22 RX ADMIN — ACETAMINOPHEN 650 MG: 325 TABLET ORAL at 10:04

## 2024-04-22 NOTE — ASSESSMENT & PLAN NOTE
Creatine stable for now. BMP reviewed    - Monitor UOP and BMP and adjust therapy as needed.   - Renally dose meds.  - Avoid nephrotoxic medications and procedures.

## 2024-04-22 NOTE — PLAN OF CARE
SICU PLAN OF CARE    Dx: <principal problem not specified>    Goals of Care: MAP>65, SBP<180    Vital Signs (last 12 hours):   Temp:  [98.5 °F (36.9 °C)-98.9 °F (37.2 °C)]   Pulse:  [58-74]   Resp:  [16-31]   BP: (157-204)/(67-83)   SpO2:  [91 %-99 %]      Neuro: AAOx4, Follows commands , and Moves all extremities spontaneously     Cardiac: Sinus Denny    Respiratory: Room Air    Gtts: Heparin    Urine Output: Urethral Catheter  790 mL/shift    Diet: Cardiac      Labs/Accuchecks: Daily Labs, Q6 aptt, Accuchecks 4 times daily    Skin:  Previously documented altered skin integrity noted. Wound care consulted.  Patient turned q2h as tolerated, bony prominences protected, and mattress inflated/working correctly.   Maycol Score: 14. If Maycol Score is 16 or less, complete 4EYES note each shift.    Shift Events:  issues controlling BP overnight, MD aware, interventions added.  See flowsheet for further assessment/details.  Family updated on current condition/plan of care, questions answered, and emotional support provided.  MD updated on current condition, vitals, labs, and gtts.

## 2024-04-22 NOTE — PROGRESS NOTES
[facility]  Cardiology  Progress Note    Patient Name: Tonya Bucio  MRN: [id number]  Admission Date: 4/21/2024  Hospital Length of Stay: 1 days  Code Status: Full Code   Attending Physician: Juan Govea MD   Primary Care Physician: Tasha Atkins MD  Expected Discharge Date: 4/29/2024  Principal Problem:<principal problem not specified>    Subjective:     Hospital Course:   Patient admitted to CCU for further management. Sotalol and metoprolol held on admission. Patient was noted to be bradycardic overnight of 4/22 but asymptomatic.     Interval History: Noted to be bradycardic last night to 40s.     Review of Systems   Constitutional: Negative for chills, diaphoresis and night sweats.   Cardiovascular:  Positive for irregular heartbeat. Negative for chest pain, dyspnea on exertion, leg swelling, near-syncope and palpitations.   Respiratory:  Negative for cough, shortness of breath and wheezing.    Skin:  Negative for color change and dry skin.   Musculoskeletal:  Negative for joint pain and joint swelling.   Gastrointestinal:  Negative for abdominal pain, diarrhea, nausea and vomiting.   Genitourinary:  Negative for dysuria.   Neurological:  Positive for dizziness and weakness. Negative for headaches and light-headedness.   All other systems reviewed and are negative.    Objective:     Vital Signs (Most Recent):  Temp: 97.9 °F (36.6 °C) (04/22/24 0730)  Pulse: 64 (04/22/24 0815)  Resp: 19 (04/22/24 0856)  BP: (!) 180/79 (04/22/24 0856)  SpO2: 98 % (04/22/24 0815) Vital Signs (24h Range):  Temp:  [97.3 °F (36.3 °C)-98.9 °F (37.2 °C)] 97.9 °F (36.6 °C)  Pulse:  [58-74] 64  Resp:  [16-31] 19  SpO2:  [91 %-99 %] 98 %  BP: (128-204)/(57-84) 180/79     Weight: 116.6 kg (257 lb)  Body mass index is 39.08 kg/m².     SpO2: 98 %         Intake/Output Summary (Last 24 hours) at 4/22/2024 0934  Last data filed at 4/22/2024 0717  Gross per 24 hour   Intake 410.42 ml   Output 930 ml    Net -519.58 ml       Lines/Drains/Airways       Drain  Duration                  Urethral Catheter 04/20/24 2106 16 Fr. 1 day              Peripheral Intravenous Line  Duration                  Peripheral IV - Single Lumen 04/20/24 2052 20 G Left Wrist 1 day         Peripheral IV - Single Lumen 04/20/24 2054 20 G Left Hand 1 day         Peripheral IV - Single Lumen 04/21/24 2200 20 G Posterior;Right Hand <1 day                       Physical Exam  Vitals and nursing note reviewed.   Constitutional:       Appearance: Normal appearance. She is obese. She is not ill-appearing or diaphoretic.   HENT:      Head: Normocephalic and atraumatic.      Right Ear: External ear normal.      Left Ear: External ear normal.      Mouth/Throat:      Mouth: Mucous membranes are moist.      Pharynx: Oropharynx is clear.   Eyes:      General: No scleral icterus.        Right eye: No discharge.         Left eye: No discharge.      Extraocular Movements: Extraocular movements intact.   Neck:      Vascular: No carotid bruit.   Cardiovascular:      Rate and Rhythm: Normal rate and regular rhythm.      Pulses: Normal pulses.      Heart sounds: Murmur (Systolic murmur II/VI) heard.      No friction rub. No gallop.   Pulmonary:      Effort: Pulmonary effort is normal. No respiratory distress.      Breath sounds: Normal breath sounds.   Abdominal:      General: Abdomen is flat. Bowel sounds are normal. There is no distension.      Palpations: Abdomen is soft. There is no mass.      Tenderness: There is no abdominal tenderness.   Musculoskeletal:         General: No swelling. Normal range of motion.      Cervical back: Normal range of motion.      Right lower leg: Edema present.      Left lower leg: Edema present.   Skin:     General: Skin is warm and dry.      Capillary Refill: Capillary refill takes less than 2 seconds.      Coloration: Skin is not pale.   Neurological:      General: No focal deficit present.      Mental Status: She is alert  and oriented to person, place, and time.   Psychiatric:         Mood and Affect: Mood normal.         Behavior: Behavior normal.         Thought Content: Thought content normal.            Significant Labs: All pertinent lab results from the last 24 hours have been reviewed.    Significant Imaging:  all imaging reviewed  Assessment and Plan:       Stage 3a chronic kidney disease  Creatine stable for now. BMP reviewed    - Monitor UOP and BMP and adjust therapy as needed.   - Renally dose meds.  - Avoid nephrotoxic medications and procedures.    Bradycardia  Bradycardia  - Bradycardia noted at OSH, no strips available for review  - History of  Aflutter on Sotalol and HTN on metoprolol  - Unknown type of bradycardia, will need to monitor closely on telemetry in ICU  - Ddx include Sinus bradycardia, Sinus node dysfunction, or AV conduction disorder   - P waves on prior EKGs were negative   - Currently  HR 70s on dopamine, will stop dopamine in case patient is having high degree AVB (although no pauses at the moment)  - Even though Sotalol main mechanism of action is not through beta blockage, it does have some beta blocker activity and can cause bradycardia if associated with metoprolol.    - No pacing indication at moment     - will step down to floor  - Observe telemetry  - Avoid AV cyndie blockers or negative intoropic medications    Primary hypertension  - History of HTN on metoprolol  - Will avoid AVN blockers    - Start hydralazine 25mg tid    UTI (urinary tract infection)  - Admitted at OSH with fever and culture positive for pansensitive Proteus mirabilis    - Continue ceftriaxone 1g qd for 5 days    Typical atrial flutter  - Episode of atrial flutter in 03/2020  - Per notes converted to NSR and then discharged on sotalol  - No recurrence as per outpatient notes  - CHADSVAS 7 (Age, sex, HTN, thormboembolism hx, DM)    - Rhythm control: Currently not on Aflutter, will hold sotalol for   - Rate control: Will  avoid rate control in the setting of bradycardia  - Anticoagulation: Xarelto 15mg as outpatient, will switch to heparin gtt as inpatient  - Patient would benefit from eventual EP evaluation for aflutter management    COPD (chronic obstructive pulmonary disease)  - History of COPd, no medications as outpatient  - Monitor respiratory status    Risk for falls  - Patient requiring walker and wheelchair at home, questionable mobility    - PT/OT eval  - Fall precautions    Controlled type 2 diabetes mellitus without complication, without long-term current use of insulin  - Patient HbA1c 6.1 on 03/29/2024   - Not on any antidiabetes medications    - Insulins sliding scale  - POCT glucose TID and AC    History of pulmonary embolism  - As per history, no acute issues  - Unknown strategy of anticoagulation (lifelong vs temporary)  - Anticoagulation as per Aflutter      Hypothyroidism  - Last TSH 3 weeks ago 2.54  - Will continue home dose of levothyroxine  - Continue levothryoxine 75mcg qd        VTE Risk Mitigation (From admission, onward)           Ordered     heparin 25,000 units in dextrose 5% (100 units/ml) IV bolus from bag LOW INTENSITY nomogram - [facility]  As needed (PRN)        Question:  Heparin Infusion Adjustment (DO NOT MODIFY ANSWER)  Answer:  \[email or web address]\Images\Pharmacy\HeparinInfusions\heparin LOW INTENSITY nomogram for [facility] [id number]    04/21/24 2037     heparin 25,000 units in dextrose 5% (100 units/ml) IV bolus from bag LOW INTENSITY nomogram - [facility]  As needed (PRN)        Question:  Heparin Infusion Adjustment (DO NOT MODIFY ANSWER)  Answer:  \[email or web address]\epic\Images\Pharmacy\HeparinInfusions\heparin LOW INTENSITY nomogram for [facility] [id number].pdf    04/21/24 2037     heparin 25,000 units in dextrose 5% 250 mL (100 units/mL) infusion LOW INTENSITY nomogram - [facility]  Continuous        Question:  Begin at (units/kg/hr)  Answer:  12    04/21/24 2037     IP VTE HIGH RISK PATIENT  Once         04/21/24 2042     Place  sequential compression device  Until discontinued         04/21/24 2042                    Britt Iverson MD  Cardiology  [facility]      Staff Attestation:  I have seen the patient, reviewed the Resident's assessment and plan, personally interviewed and examined the patient at bedside and agree with the findings.Total critical care time: Approximately 45 minutes. Due to a high probability of clinically significant, life threatening deterioration, I personally spent this critical care time directly and personally managing the patient. This critical care time included obtaining a history; examining the patient; ordering and review of studies; arranging urgent treatment with development of a management plan; evaluation of patient's response to treatment; frequent reassessment; and, discussions with other providers.   This critical care time was performed to assess and manage the high probability of imminent, life-threatening deterioration that could result in multi-organ failure. It was medically reasonable and necessary. It was exclusive of separately billable procedures and treating other patients and teaching time.    MD [facility] - Cardiology

## 2024-04-22 NOTE — ASSESSMENT & PLAN NOTE
- Patient HbA1c 6.1 on 03/29/2024   - Not on any antidiabetes medications    - Insulins sliding scale  - POCT glucose TID and AC

## 2024-04-22 NOTE — ASSESSMENT & PLAN NOTE
- Admitted at OSH with fever and culture positive for pansensitive Proteus mirabilis    - Continue ceftriaxone 1g qd for 5 days

## 2024-04-22 NOTE — SUBJECTIVE & OBJECTIVE
Past Medical History:   Diagnosis Date    Acute bronchitis with asthma     Anemia due to stage 3 chronic kidney disease     Anticoagulant long-term use     Asthma     Back pain     Cancer     Chest pain, musculoskeletal 2013    Chronic bronchitis     Colon polyps     COPD exacerbation 3/13/2020    Gout, unspecified     History of cervical cancer     Hypothyroidism     Obesity     Osteoarthritis     Renal manifestation of secondary diabetes mellitus     Thyroid disease     Trouble in sleeping     Type 2 diabetes mellitus with ophthalmic manifestations     Type 2 diabetes with peripheral circulatory disorder, controlled     Urinary incontinence     Uterine cancer     Uterine cancer        Past Surgical History:   Procedure Laterality Date    ADENOIDECTOMY      EYE SURGERY Right     right eye cataract    HYSTERECTOMY      LIZ-BSO    tonsilectomy      TONSILLECTOMY      TOTAL ABDOMINAL HYSTERECTOMY      TOTAL ABDOMINAL HYSTERECTOMY W/ BILATERAL SALPINGOOPHORECTOMY         Review of patient's allergies indicates:  No Known Allergies    No current facility-administered medications for this encounter.     Family History       Problem Relation (Age of Onset)    Anemia Daughter    Arthritis Father, Daughter, Daughter, Daughter, Daughter    Breast cancer Sister    Cancer Brother    Diabetes Brother, Daughter, Daughter    Glaucoma Daughter    Heart disease Mother, Brother, Brother    Hyperlipidemia Brother    Liver disease Daughter    No Known Problems Son, Son    Ovarian cancer Sister    Stroke Brother, Son          Tobacco Use    Smoking status: Former     Current packs/day: 0.00     Average packs/day: 0.3 packs/day for 13.0 years (4.3 ttl pk-yrs)     Types: Cigarettes     Start date: 1951     Quit date: 1964     Years since quittin.7     Passive exposure: Past    Smokeless tobacco: Never   Substance and Sexual Activity    Alcohol use: No    Drug use: No    Sexual activity: Never     Birth  control/protection: Surgical     Comment:      Review of Systems   Constitutional: Positive for fever.   HENT: Negative.     Cardiovascular:  Positive for syncope. Negative for chest pain, claudication, cyanosis, dyspnea on exertion, irregular heartbeat, leg swelling, near-syncope, orthopnea, palpitations and paroxysmal nocturnal dyspnea.        Funny feeling   Respiratory: Negative.     Endocrine: Negative.    Musculoskeletal: Negative.    Gastrointestinal: Negative.    Genitourinary: Negative.      Objective:     Vital Signs (Most Recent):  Temp: 98.9 °F (37.2 °C) (04/21/24 1945)  Pulse: 70 (04/21/24 2000)  Resp: (!) 24 (04/21/24 2000)  BP: (!) 163/72 (04/21/24 1947)  SpO2: 98 % (04/21/24 2000) Vital Signs (24h Range):  Temp:  [97.3 °F (36.3 °C)-100.4 °F (38 °C)] 98.9 °F (37.2 °C)  Pulse:  [51-83] 70  Resp:  [18-31] 24  SpO2:  [91 %-99 %] 98 %  BP: (125-183)/(57-84) 163/72        There is no height or weight on file to calculate BMI.    SpO2: 98 %         Intake/Output Summary (Last 24 hours) at 4/21/2024 2003  Last data filed at 4/21/2024 2000  Gross per 24 hour   Intake --   Output 45 ml   Net -45 ml       Lines/Drains/Airways       Drain  Duration                  Urethral Catheter 04/20/24 2106 16 Fr. <1 day              Peripheral Intravenous Line  Duration                  Peripheral IV - Single Lumen 04/20/24 2052 20 G Left Wrist <1 day         Peripheral IV - Single Lumen 04/20/24 2054 20 G Left Hand <1 day                     Physical Exam  Vitals and nursing note reviewed.   Constitutional:       Appearance: Normal appearance.   HENT:      Head: Normocephalic and atraumatic.   Eyes:      Extraocular Movements: Extraocular movements intact.      Pupils: Pupils are equal, round, and reactive to light.   Neck:      Vascular: No carotid bruit.   Cardiovascular:      Rate and Rhythm: Normal rate and regular rhythm.      Pulses: Normal pulses.      Heart sounds: Murmur (Systolic murmur II/VI) heard.       No friction rub. No gallop.   Pulmonary:      Effort: Pulmonary effort is normal.      Breath sounds: Normal breath sounds.   Abdominal:      General: Abdomen is flat. Bowel sounds are normal. There is no distension.      Palpations: Abdomen is soft. There is no mass.      Tenderness: There is no abdominal tenderness.   Musculoskeletal:         General: No swelling. Normal range of motion.      Cervical back: Normal range of motion.   Skin:     General: Skin is warm.      Capillary Refill: Capillary refill takes less than 2 seconds.   Neurological:      General: No focal deficit present.      Mental Status: She is alert and oriented to person, place, and time.          Significant Labs:     Recent Labs   Lab 04/20/24 2047 04/21/24  0410   WBC 19.70* 20.45*   HGB 11.1* 9.4*   HCT 34.8* 29.4*    278       Recent Labs   Lab 04/20/24 2047 04/21/24  0410    139   K 4.0 4.3    106   CO2 20* 24   BUN 26* 28*   CREATININE 1.4 1.5*   CALCIUM 9.3 8.5*       Recent Labs   Lab 04/20/24 2047 04/21/24  0410   ALKPHOS 103 83   BILITOT 0.7 0.3   PROT 7.5 6.1   ALT 22 18   AST 20 14       Recent Labs   Lab 04/20/24 2047   TROPONINI 0.049*         Significant Imaging:       EKG 05/20/2024   - 1st degree AVB, inverted p waves on lead II, nonspecific ST-T wave changes. No significant change compared to prior EKG.     EKG 04/21/24   - 1st degree AVB, inverted p waves on lead II, nonspecific ST-T wave changes. No significant change compared to prior EKG.         EKG 05/18/2023   - 1st degree AVB, inverted p waves on lead II, inverted T wave in lateral leads (I and AvL).         03/15/2020    Atrial flutter with variable AVB,

## 2024-04-22 NOTE — PLAN OF CARE
[facility] - Int[facility]  Initial Discharge Assessment       Primary Care Provider: Tasha Atkins MD    Admission Diagnosis: Dehydration [E86.0]  Fatigue [R53.83]  Acute cystitis without hematuria [N30.00]    Admission Date: 4/20/2024  Expected Discharge Date: 4/21/2024    Transition of Care Barriers: None    Payor: HUMANA MANAGED MEDICARE / Plan: HUMANA SNP HMO PPO SPECIAL NEEDS / Product Type: Medicare Advantage /     Extended Emergency Contact Information  Primary Emergency Contact: Nilda Bucio   [place]  Mobile Phone: [phone]  Relation: Daughter    Discharge Plan A: Home, Home with family, Home Health  Discharge Plan B: Home with family, Home      [facility] - [place], [place] - [address]  [facility] [address]  Phone: [phone] Fax: [phone]    [facility] - [facility] [address]  [address] [facility] [address]  Phone: [phone] Fax: [phone]    [facility] [place] - [address]  [address]  [place] [address]  Phone: [phone] Fax: [phone]      Initial Assessment (most recent)       Adult Discharge Assessment - 04/22/24 1146          Discharge Assessment    Assessment Type Discharge Planning Assessment     Confirmed/corrected address, phone number and insurance Yes     Confirmed Demographics Correct on Facesheet     Source of Information family     Communicated JACINDA with patient/caregiver Date not available/Unable to determine     People in Home child(yesenia), adult     Facility Arrived From: [facility]     Do you expect to return to your current living situation? Yes     Do you have help at home or someone to help you manage your care at home? Yes     Who are your caregiver(s) and their phone number(s)? DAUGHTER NILDA      Prior to hospitilization cognitive status: Unable to Assess     Walking or Climbing Stairs ambulation difficulty, requires  equipment     Mobility Management walker wheelchair     Dressing/Bathing Difficulty no     Home Accessibility not wheelchair accessible     Home Layout Able to live on 1st floor     Equipment Currently Used at Home cane, straight;walker, rolling;wheelchair;bath bench;shower chair     Readmission within 30 days? No     Patient currently being followed by outpatient case management? Unable to determine (comments)     Do you currently have service(s) that help you manage your care at home? No     Do you take prescription medications? Yes     Do you have prescription coverage? Yes     Do you have any problems affording any of your prescribed medications? No     Is the patient taking medications as prescribed? yes     Who is going to help you get home at discharge? Daughter NILDA     How do you get to doctors appointments? family or friend will provide     Are you on dialysis? No     Discharge Plan A Home;Home with family;Home Health     Discharge Plan B Home with family;Home     DME Needed Upon Discharge  none     Transition of Care Barriers None        Physical Activity    On average, how many days per week do you engage in moderate to strenuous exercise (like a brisk walk)? 0 days     On average, how many minutes do you engage in exercise at this level? 0 min        Housing Stability    In the last 12 months, was there a time when you were not able to pay the mortgage or rent on time? No     At any time in the past 12 months, were you homeless or living in a shelter (including now)? No        Transportation Needs    In the past 12 months, has lack of transportation kept you from medical appointments or from getting medications? No     In the past 12 months, has lack of transportation kept you from meetings, work, or from getting things needed for daily living? No        Food Insecurity    Within the past 12 months, you worried that your food would run out before you got the money to buy more. Never true     Within the  past 12 months, the food you bought just didn't last and you didn't have money to get more. Never true        Stress    Do you feel stress - tense, restless, nervous, or anxious, or unable to sleep at night because your mind is troubled all the time - these days? Patient declined        Social Connections    In a typical week, how many times do you talk on the phone with family, friends, or neighbors? More than three times a week     How often do you get together with friends or relatives? More than three times a week        Alcohol Use    Q1: How often do you have a drink containing alcohol? Patient declined     Q2: How many drinks containing alcohol do you have on a typical day when you are drinking? Patient declined     Q3: How often do you have six or more drinks on one occasion? Patient declined                   Patient is a transfer from [facility] she lives with her adult children she is not on dialysis she has a ride home  Discharge Plan A home health and Plan B home with familyhave been determined by review of patient's clinical status, future medical and therapeutic needs, and coverage/benefits for post-acute care in coordination with multidisciplinary team members.

## 2024-04-22 NOTE — ASSESSMENT & PLAN NOTE
- Episode of atrial flutter in 03/2020  - Per notes converted to NSR and then discharged on sotalol  - No recurrence as per outpatient notes  - CHADSVASC 7 (Age, sex, HTN, thormboembolism hx, DM)    - Rhythm control: Currently not on Aflutter, will hold sotalol for   - Rate control: Will avoid rate control in the setting of bradycardia  - Anticoagulation: Xarelto 15mg as outpatient, will switch to heparin gtt as inpatient  - Patient would benefit from eventual EP evaluation for aflutter management

## 2024-04-22 NOTE — ASSESSMENT & PLAN NOTE
- Patient HbA1c 6.1 on 03/29/2024   - Not on any antidiabetes medications    - Insulins liding scale  - POCT glucose TID and AC

## 2024-04-22 NOTE — HPI
87yo F patient with atrial flutter, HTN, HLD, T2DM (A1c 6.1 - diet controlleD), CKD IIIb, knee OA, hypothryoidism, tobacco (for 10 years 1/2ppd, quit 60 years ago), recurrent UTI, history of PE, who was admitted to [facility] on 04/20/24 with fever and diagnosed with UTI, and transferred to [facility] on 04/21/24 for new  onset complete heart block.     Patient was admitted to OSH for antibiotic hterpay after she was found to have fever and UTI. Patient has been feeling weak and near syncope episodes for the last couple of weeks.     On admission as per notes the patient presented multiple pauses on telemetry , longest approximately 7 seconds, with a HR measured at 31bpm, and feeling weak spells, flutters, and near syncope. Patient was transferred to ICU for low dose dopamine gtt and placed on pacer pads. As per notes patient has been taking both metoprolol and sotalol. Dopamine gtt was made titratable and started at 5mcg/kg due to continue pauses.      Labs remakrable for WBC 19.7, Hb 11.1, Plt 309, Trop 0.049 (Prior trop 11 months and 2 years ago ~0.4), Cr 1.4, BNP 97, Lactic acid 2, Procalcitonin 0.57,     As per outpatient cardiology notes, patient was followed for paroxysmal atrial flutter on sotalol and xarelto  (flutter in 2020 without recurrence, chemically converted to NSR and maintained on sotalol since then).     Due to pauses, patient was transferred to [facility].

## 2024-04-22 NOTE — PT/OT/SLP EVAL
Physical Therapy Co-Evaluation with OT and Treatment     Patient Name:  Tonya Bucio  MRN: [id number]    Admit Date: 4/21/2024  Admitting Diagnosis:  <principal problem not specified>  Length of Stay: 1 days  Recent Surgery: * No surgery found *      Recommendations:     Discharge Recommendations: low intensity therapy  Equipment recommendations: none  Barriers to discharge: None Identified     Assessment:     Tonya Bucio is a 88 y.o. female admitted to [facility] [place]21/2024 with medical diagnosis of bradycardia. Pt presents with weakness, impaired endurance, impaired functional mobility, pain. These deficits effect their roles and responsibilities in which they were able to complete prior to admit.     Pt is agreeable to therapy evaluation and session. Daughters at bedside. At baseline, pt sleeps in a lift recliner chair. She is able to ambulate 6-10 ft with RW, with close w/c follow by family. Aside from that distance to her kitchen table, she is in her w/c during the day. Pt has 24/7 assist/supervision from her family.     Patient able to sit eob, stand, and pivot to recliner. No assist required during pivot. Patient left sitting up with all VSS and daughter at bedside. Nsg aware.    Tonya Bucio would benefit from acute PT intervention to improve quality of life, focus on recovery of impairments, provide patient/caregiver education, reduce fall risk, and maximize (I) and safety with functional mobility. Once medically stable, recommending pt discharge to low intensity therapy. Patient currently demonstrates a need for low intensity therapy on a scheduled basis secondary to a decline in functional status due to illness .      Rehab Prognosis: Good    Plan:     During this hospitalization, patient to be seen 4 x/week to address the identified rehab impairments via therapeutic exercises, gait training, therapeutic activities, neuromuscular re-education and progress towards stated goals.     Plan of  Care Expires:  05/22/24  Plan of Care reviewed with: patient, family    This plan of care has been discussed with the patient/caregiver, who was included in its development and is in agreement with the identified goals and treatment plan.     Subjective     Communicated with RN prior to session.  Patient found supine upon PT entry to room, agreeable to evaluation. Pt's daughters present during session.    Chief Complaint: None stated    Patient/Family Comments/goals: return to baseline    Pain/Comfort:  Pain Rating 1: 6/10  Location 1: back  Pain Addressed 1: Reposition, Distraction, Cessation of Activity    Patients cultural, spiritual, Adventist conflicts given the current situation: None identified     Patient History: information obtained from pt and daughters     Living Environment: Pt lives with daughter, son, and daughter-in-law in single level home  with ramp access. Bathroom set-up: walk-in shower  Prior Level of Function:  ambulates 6-10 ft with RW; propels herself in w/c  DME owned: RW, SC, raised toilet, BSC, w/c, lift recliner  Support Available/Caregiver Assistance:  family provides 24/7 assist/supervision    Objective:   OT present for coeval due to pt's multiple medical comorbidities and functional/cognition deficits requiring two skilled therapists to appropriately progress pt's musculoskeletal strength, neuromuscular control, and endurance while taking into consideration medical acuity and pt safety.    Patient found with: peripheral IV, hogue catheter, pulse ox (continuous), blood pressure cuff, telemetry, SCD    Recent Surgery: * No surgery found *    General Precautions: Standard, fall   Orthopedic Precautions:    Braces:     Oxygen Device: room air      Exams:    Cognition:  Alert  Command following: Follows multistep verbal commands  Communication: clear/fluent    Sensation:   Light touch sensation: Intact BLEs    Gross Motor Coordination: No deficits noted during functional mobility tasks      Edema/Skin Integrity: None noted; Visible skin intact    Postural examination/scapula alignment: no deficits noted    Lower Extremity Range of Motion:  Right Lower Extremity: WFL  Left Lower Extremity: WFL    Lower Extremity Strength:  Right Lower Extremity: WFL  Left Lower Extremity: WFL    Functional Mobility:    Bed Mobility:  Scooting with stand by assistance  Supine > Sit with minimum assistance    Transfers:   Sit <> Stand Transfer: Minimal Assistance x 1 trials from eob with no AD   Bed <> Chair: Minimal Assistance with no AD via stand pivot transfer    Balance:  Dynamic Sitting: GOOD: Maintains balance through MODERATE excursions of active trunk movement  Standing:  Static: FAIR+: Takes MINIMAL challenges from all directions   Dynamic: POOR+: Needs MIN (minimal ) assist during transfer    Outcome Measure: AM-PAC 6 CLICK MOBILITY  Total Score:16     Patient/Caregiver Education:     Therapist educated pt/caregiver regarding:   PT POC and goals for therapy   Safety with mobility and fall risk   Instruction on use of call button and importance of calling nursing staff for assistance with mobility   Time provided for therapeutic counseling and discussion of current health disposition. All questions answered to satisfaction, within scope of PT practice     Patient/caregiver able to verbalize understanding and expressed no further questions this visit; will follow-up with pt/caregiver during current admit for additional questions/concerns within scope of practice.     White board updated.     Patient left up in chair with all lines intact, call button in reach, and nsg notified.    GOALS:   Multidisciplinary Problems       Physical Therapy Goals          Problem: Physical Therapy    Goal Priority Disciplines Outcome Goal Variances Interventions   Physical Therapy Goal     PT, PT/OT Ongoing, Progressing     Description: Goals to be met by: 5/22/24     Patient will increase functional independence with mobility  by performin. Supine to sit with Stand-by Assistance  2. Sit to supine with Stand-by Assistance  3. Sit to stand transfer with Supervision  4. Bed to chair transfer with Supervision using Rolling Walker  5. Gait  x 10 feet with Stand-by Assistance using Rolling Walker.   6. Wheelchair propulsion x200 feet with Supervision using bilateral uppper extremities                           History:     Past Medical History:   Diagnosis Date    Acute bronchitis with asthma     Anemia due to stage 3 chronic kidney disease     Anticoagulant long-term use     Asthma     Back pain     Cancer     Chest pain, musculoskeletal 2013    Chronic bronchitis     Colon polyps     COPD exacerbation 3/13/2020    Gout, unspecified     History of cervical cancer     Hypothyroidism     Obesity     Osteoarthritis     Renal manifestation of secondary diabetes mellitus     Thyroid disease     Trouble in sleeping     Type 2 diabetes mellitus with ophthalmic manifestations     Type 2 diabetes with peripheral circulatory disorder, controlled     Urinary incontinence     Uterine cancer     Uterine cancer        Past Surgical History:   Procedure Laterality Date    ADENOIDECTOMY      EYE SURGERY Right     right eye cataract    HYSTERECTOMY      LIZ-BSO    tonsilectomy      TONSILLECTOMY  1945    TOTAL ABDOMINAL HYSTERECTOMY      TOTAL ABDOMINAL HYSTERECTOMY W/ BILATERAL SALPINGOOPHORECTOMY         Time Tracking:     PT Received On: 24  PT Start Time: 1100     PT Stop Time: 1133  PT Total Time (min): 33 min     Billable Minutes: Evaluation 10 and Therapeutic Activity 23    2024

## 2024-04-22 NOTE — PLAN OF CARE
PT evaluation complete - see note for details. POC and goals established.    Problem: Physical Therapy  Goal: Physical Therapy Goal  Description: Goals to be met by: 24     Patient will increase functional independence with mobility by performin. Supine to sit with Stand-by Assistance  2. Sit to supine with Stand-by Assistance  3. Sit to stand transfer with Supervision  4. Bed to chair transfer with Supervision using Rolling Walker  5. Gait  x 10 feet with Stand-by Assistance using Rolling Walker.   6. Wheelchair propulsion x200 feet with Supervision using bilateral uppper extremities    Outcome: Ongoing, Progressing   2024

## 2024-04-22 NOTE — ASSESSMENT & PLAN NOTE
- Patient requiring walker and wheelchair at home, questionable mobility    - PT/OT eval  - Fall precautions

## 2024-04-22 NOTE — NURSING
Nurses Note -- 4 Eyes      4/21/2024   8:37 PM      Skin assessed during: Admit      [] No Altered Skin Integrity Present    []Prevention Measures Documented      [x] Yes- Altered Skin Integrity Present or Discovered   [] LDA Added if Not in Epic (Describe Wound)   [x] New Altered Skin Integrity was Present on Admit and Documented in LDA   [x] Wound Image Taken    Wound Care Consulted? Yes    Attending Nurse:  DILLON Rangel     Second RN/Staff Member: DILLON Ruvalcaba

## 2024-04-22 NOTE — ASSESSMENT & PLAN NOTE
- As per history, no acute issues  - Unknown strategy of anticoagulation (lifelong vs temporary)  - Anticoagulation as per Sandee

## 2024-04-22 NOTE — H&P
[facility]  Cardiology  History and Physical     Patient Name: Tonya Bucio  MRN: [id number]  Admission Date: 4/21/2024  Code Status: Prior   Attending Provider: Juan Govea MD   Primary Care Physician: Tasha Atkins MD  Principal Problem:<principal problem not specified>    Patient information was obtained from patient and ER records.     Subjective:     Chief Complaint:  Bradycardia     HPI:  89yo F patient with atrial flutter, HTN, HLD, T2DM (A1c 6.1 - diet controlleD), CKD IIIb, knee OA, hypothryoidism, tobacco (for 10 years 1/2ppd, quit 60 years ago), recurrent UTI, history of PE, who was admitted to [facility] on 04/20/24 with fever and diagnosed with UTI, and transferred to [facility] on 04/21/24 for new  onset complete heart block.     Patient was admitted to OSH for antibiotic hterpay after she was found to have fever and UTI. Patient has been feeling weak and near syncope episodes for the last couple of weeks.     On admission as per notes the patient presented multiple pauses on telemetry , longest approximately 7 seconds, with a HR measured at 31bpm, and feeling weak spells, flutters, and near syncope. Patient was transferred to ICU for low dose dopamine gtt and placed on pacer pads. As per notes patient has been taking both metoprolol and sotalol. Dopamine gtt was made titratable and started at 5mcg/kg due to continue pauses.      Labs remakrable for WBC 19.7, Hb 11.1, Plt 309, Trop 0.049 (Prior trop 11 months and 2 years ago ~0.4), Cr 1.4, BNP 97, Lactic acid 2, Procalcitonin 0.57,     As per outpatient cardiology notes, patient was followed for paroxysmal atrial flutter on sotalol and xarelto  (flutter in 2020 without recurrence, chemically converted to NSR and maintained on sotalol since then).     Due to pauses, patient was transferred to [facility].       Past Medical History:   Diagnosis Date    Acute bronchitis with asthma     Anemia due to stage 3  chronic kidney disease     Anticoagulant long-term use     Asthma     Back pain     Cancer     Chest pain, musculoskeletal 2013    Chronic bronchitis     Colon polyps     COPD exacerbation 3/13/2020    Gout, unspecified     History of cervical cancer     Hypothyroidism     Obesity     Osteoarthritis     Renal manifestation of secondary diabetes mellitus     Thyroid disease     Trouble in sleeping     Type 2 diabetes mellitus with ophthalmic manifestations     Type 2 diabetes with peripheral circulatory disorder, controlled     Urinary incontinence     Uterine cancer     Uterine cancer        Past Surgical History:   Procedure Laterality Date    ADENOIDECTOMY      EYE SURGERY Right     right eye cataract    HYSTERECTOMY      LIZ-BSO    tonsilectomy      TONSILLECTOMY      TOTAL ABDOMINAL HYSTERECTOMY      TOTAL ABDOMINAL HYSTERECTOMY W/ BILATERAL SALPINGOOPHORECTOMY         Review of patient's allergies indicates:  No Known Allergies    No current facility-administered medications for this encounter.     Family History       Problem Relation (Age of Onset)    Anemia Daughter    Arthritis Father, Daughter, Daughter, Daughter, Daughter    Breast cancer Sister    Cancer Brother    Diabetes Brother, Daughter, Daughter    Glaucoma Daughter    Heart disease Mother, Brother, Brother    Hyperlipidemia Brother    Liver disease Daughter    No Known Problems Son, Son    Ovarian cancer Sister    Stroke Brother, Son          Tobacco Use    Smoking status: Former     Current packs/day: 0.00     Average packs/day: 0.3 packs/day for 13.0 years (4.3 ttl pk-yrs)     Types: Cigarettes     Start date: 1951     Quit date: 1964     Years since quittin.7     Passive exposure: Past    Smokeless tobacco: Never   Substance and Sexual Activity    Alcohol use: No    Drug use: No    Sexual activity: Never     Birth control/protection: Surgical     Comment:      Review of Systems   Constitutional:  Positive for fever.   HENT: Negative.     Cardiovascular:  Positive for syncope. Negative for chest pain, claudication, cyanosis, dyspnea on exertion, irregular heartbeat, leg swelling, near-syncope, orthopnea, palpitations and paroxysmal nocturnal dyspnea.        Funny feeling   Respiratory: Negative.     Endocrine: Negative.    Musculoskeletal: Negative.    Gastrointestinal: Negative.    Genitourinary: Negative.      Objective:     Vital Signs (Most Recent):  Temp: 98.9 °F (37.2 °C) (04/21/24 1945)  Pulse: 70 (04/21/24 2000)  Resp: (!) 24 (04/21/24 2000)  BP: (!) 163/72 (04/21/24 1947)  SpO2: 98 % (04/21/24 2000) Vital Signs (24h Range):  Temp:  [97.3 °F (36.3 °C)-100.4 °F (38 °C)] 98.9 °F (37.2 °C)  Pulse:  [51-83] 70  Resp:  [18-31] 24  SpO2:  [91 %-99 %] 98 %  BP: (125-183)/(57-84) 163/72        There is no height or weight on file to calculate BMI.    SpO2: 98 %         Intake/Output Summary (Last 24 hours) at 4/21/2024 2003  Last data filed at 4/21/2024 2000  Gross per 24 hour   Intake --   Output 45 ml   Net -45 ml       Lines/Drains/Airways       Drain  Duration                  Urethral Catheter 04/20/24 2106 16 Fr. <1 day              Peripheral Intravenous Line  Duration                  Peripheral IV - Single Lumen 04/20/24 2052 20 G Left Wrist <1 day         Peripheral IV - Single Lumen 04/20/24 2054 20 G Left Hand <1 day                     Physical Exam  Vitals and nursing note reviewed.   Constitutional:       Appearance: Normal appearance.   HENT:      Head: Normocephalic and atraumatic.   Eyes:      Extraocular Movements: Extraocular movements intact.      Pupils: Pupils are equal, round, and reactive to light.   Neck:      Vascular: No carotid bruit.   Cardiovascular:      Rate and Rhythm: Normal rate and regular rhythm.      Pulses: Normal pulses.      Heart sounds: Murmur (Systolic murmur II/VI) heard.      No friction rub. No gallop.   Pulmonary:      Effort: Pulmonary effort is normal.       Breath sounds: Normal breath sounds.   Abdominal:      General: Abdomen is flat. Bowel sounds are normal. There is no distension.      Palpations: Abdomen is soft. There is no mass.      Tenderness: There is no abdominal tenderness.   Musculoskeletal:         General: No swelling. Normal range of motion.      Cervical back: Normal range of motion.   Skin:     General: Skin is warm.      Capillary Refill: Capillary refill takes less than 2 seconds.   Neurological:      General: No focal deficit present.      Mental Status: She is alert and oriented to person, place, and time.          Significant Labs:     Recent Labs   Lab 04/20/24 2047 04/21/24  0410   WBC 19.70* 20.45*   HGB 11.1* 9.4*   HCT 34.8* 29.4*    278       Recent Labs   Lab 04/20/24 2047 04/21/24  0410    139   K 4.0 4.3    106   CO2 20* 24   BUN 26* 28*   CREATININE 1.4 1.5*   CALCIUM 9.3 8.5*       Recent Labs   Lab 04/20/24 2047 04/21/24  0410   ALKPHOS 103 83   BILITOT 0.7 0.3   PROT 7.5 6.1   ALT 22 18   AST 20 14       Recent Labs   Lab 04/20/24 2047   TROPONINI 0.049*         Significant Imaging:       EKG 05/20/2024   - 1st degree AVB, inverted p waves on lead II, nonspecific ST-T wave changes. No significant change compared to prior EKG.     EKG 04/21/24   - 1st degree AVB, inverted p waves on lead II, nonspecific ST-T wave changes. No significant change compared to prior EKG.         EKG 05/18/2023   - 1st degree AVB, inverted p waves on lead II, inverted T wave in lateral leads (I and AvL).         03/15/2020    Atrial flutter with variable AVB,                       Assessment and Plan:       Bradycardia  Bradycardia  - Bradycardia noted at OSH, no strips available for review  - History of  Aflutter on Sotalol and HTN on metoprolol  - Unknown type of bradycardia, will need to monitor closely on telemetry in ICU  - Ddx include Sinus bradycardia, Sinus node dysfunction, or AV conduction disorder   - P waves on prior EKGs  were negative   - Currently  HR 70s on dopamine, will stop dopamine in case patient is having high degree AVB (although no pauses at the moment)  - Even though Sotalol main mechanism of action is not through beta blockage, it does have some beta blocker activity and can cause bradycardia if associated with metoprolol.    - No pacing indication at moment     - Admit to CCU  - Stop dopamine  - Observe telemetry  - Avoid AV cyndie blockers or negative intoropic medications  - TTE tomorrow am    Stage 3a chronic kidney disease  Creatine stable for now. BMP reviewed    - Monitor UOP and BMP and adjust therapy as needed.   - Renally dose meds.  - Avoid nephrotoxic medications and procedures.    Primary hypertension  - History of HTN on metoprolol  - Will avoid AVN blockers    - Start hydralazine 25mg tid    UTI (urinary tract infection)  - Admitted at OSH with fever and culture positive for pansensitive Proteus mirabilis    - Continue ceftriaxone 1g qd for 5 days    Typical atrial flutter  - Episode of atrial flutter in 03/2020  - Per notes converted to NSR and then discharged on sotalol  - No recurrence as per outpatient notes  - CHADSVASC 7 (Age, sex, HTN, thormboembolism hx, DM)    - Rhythm control: Currently not on Aflutter, will hold sotalol for   - Rate control: Will avoid rate control in the setting of bradycardia  - Anticoagulation: Xarelto 15mg as outpatient, will switch to heparin gtt as inpatient  - Patient would benefit from eventual EP evaluation for aflutter management    COPD (chronic obstructive pulmonary disease)  - History of COPd, no medications as outpatient  - Monitor respiratory status    Risk for falls  - Patient requiring walker and wheelchair at home, questionable mobility    - PT/OT eval  - Fall precautions    Controlled type 2 diabetes mellitus without complication, without long-term current use of insulin  - Patient HbA1c 6.1 on 03/29/2024   - Not on any antidiabetes medications    - Insulins  liding scale  - POCT glucose TID and AC    History of pulmonary embolism  - As per history, no acute issues  - Unknown strategy of anticoagulation (lifelong vs temporary)  - Anticoagulation as per Aflutter      Hypothyroidism  - Last TSH 3 weeks ago 2.54  - Will continue home dose of levothyroxine    - Continue levothryoxine 75mcg qd        VTE Risk Mitigation (From admission, onward)           Ordered     heparin 25,000 units in dextrose 5% (100 units/ml) IV bolus from bag LOW INTENSITY nomogram - [facility]  As needed (PRN)        Question:  Heparin Infusion Adjustment (DO NOT MODIFY ANSWER)  Answer:  \\[email or web address]\epic\Images\Pharmacy\HeparinInfusions\heparin LOW INTENSITY nomogram for [facility] [id number]    04/21/24 2037     heparin 25,000 units in dextrose 5% (100 units/ml) IV bolus from bag LOW INTENSITY nomogram - [facility]  As needed (PRN)        Question:  Heparin Infusion Adjustment (DO NOT MODIFY ANSWER)  Answer:  \[email or web address]\[email or web address]\Images\Pharmacy\HeparinInfusions\heparin LOW INTENSITY nomogram for [facility] [id number]    04/21/24 2037     heparin 25,000 units in dextrose 5% (100 units/ml) IV bolus from bag LOW INTENSITY nomogram - [facility]  Once        Question:  Heparin Infusion Adjustment (DO NOT MODIFY ANSWER)  Answer:  \\[email or web address]\epic\Images\Pharmacy\HeparinInfusions\heparin LOW INTENSITY nomogram for [facility] [id number]    04/21/24 2037     heparin 25,000 units in dextrose 5% 250 mL (100 units/mL) infusion LOW INTENSITY nomogram - [facility]  Continuous        Question:  Begin at (units/kg/hr)  Answer:  12    04/21/24 2037                    Keith Brizuela MD  Cardiology   [facility] - Surgical Intensive [facility]    I have seen the patient, reviewed the Fellow's history and physical, assessment and plan. I have personally interviewed and examined the patient and agree with the findings.     VALENTE Govea MD

## 2024-04-22 NOTE — DISCHARGE SUMMARY
HPI: Tonya Bucio is a 88 y.o. female with DM-2, knee OA,hypothyroidism ,  recurrent UTI,  presents with fever yesterday .  Fever at home this evening with long history of urinary tract infections noted  Patient is a bed-bound type 2 diabetic with no longer walks, uses a wheelchair  Patient is morbidly obese, no signs of distress, normal blood pressure noted   No overt severe sacral decubitus, miscellaneous buttock breakdown noted. Work up in ER showed  leucocytosis , UTI , and BAYRON .  She is  admitted for IVF and IV antibiotics                    Past Medical History:   Diagnosis Date    Acute bronchitis with asthma      Anemia due to stage 3 chronic kidney disease      Anticoagulant long-term use      Asthma      Back pain      Cancer      Chest pain, musculoskeletal 7/16/2013    Chronic bronchitis      Colon polyps      COPD exacerbation 3/13/2020    Gout, unspecified      History of cervical cancer      Hypothyroidism      Obesity      Osteoarthritis      Renal manifestation of secondary diabetes mellitus      Thyroid disease      Trouble in sleeping      Type 2 diabetes mellitus with ophthalmic manifestations      Type 2 diabetes with peripheral circulatory disorder, controlled      Urinary incontinence      Uterine cancer      Uterine cancer                 Past Surgical History:   Procedure Laterality Date    ADENOIDECTOMY        EYE SURGERY Right       right eye cataract    HYSTERECTOMY   2008     LIZ-BSO    tonsilectomy        TONSILLECTOMY   1945    TOTAL ABDOMINAL HYSTERECTOMY   2008    TOTAL ABDOMINAL HYSTERECTOMY W/ BILATERAL SALPINGOOPHORECTOMY   2008         Review of patient's allergies indicates:  No Known Allergies               Current Facility-Administered Medications   Medication Dose Route Frequency Provider Last Rate Last Admin    0.9%  NaCl infusion   Intravenous Continuous David Guzmán  mL/hr at 04/21/24 0725 New Bag at 04/21/24 0725    acetaminophen tablet 500 mg  500 mg  Oral QHS Tasha Atkins MD        acetaminophen tablet 650 mg  650 mg Oral Q8H PRDavid Peralta MD        atorvastatin tablet 40 mg  40 mg Oral Daily Tasha Atkins MD        cefTRIAXone (ROCEPHIN) 2 g in dextrose 5 % in water (D5W) 100 mL IVPB (MB+)  2 g Intravenous Q24H David Guzmán MD   Stopped at 24 2232    HYDROcodone-acetaminophen 5-325 mg per tablet 1 tablet  1 tablet Oral Q4H PRN David Guzmán MD        HYDROcodone-acetaminophen 7.5-325 mg per tablet 1 tablet  1 tablet Oral Q24H PRN Tasha Atkins MD        levothyroxine tablet 75 mcg  75 mcg Oral Daily Tasha Atkins MD        melatonin tablet 6 mg  6 mg Oral Nightly PRN David Guzmán MD        miconazole NITRATE 2 % top powder   Topical (Top) BID Tasha Atkins MD        ondansetron injection 4 mg  4 mg Intravenous Q8H PRN David Guzmán MD        rivaroxaban tablet 15 mg  15 mg Oral QHS David Guzmán MD   15 mg at 24 2305    sodium chloride 0.9% flush 10 mL  10 mL Intravenous PRN David Guzmán MD        sotaloL tablet 80 mg  80 mg Oral Daily Tasha Atkins MD          Family History         Problem Relation (Age of Onset)     Anemia Daughter     Arthritis Father, Daughter, Daughter, Daughter, Daughter     Breast cancer Sister     Cancer Brother     Diabetes Brother, Daughter, Daughter     Glaucoma Daughter     Heart disease Mother, Brother, Brother     Hyperlipidemia Brother     Liver disease Daughter     No Known Problems Son, Son     Ovarian cancer Sister     Stroke Brother, Son                   Tobacco Use    Smoking status: Former       Current packs/day: 0.00       Average packs/day: 0.3 packs/day for 13.0 years (4.3 ttl pk-yrs)       Types: Cigarettes       Start date: 1951       Quit date: 1964       Years since quittin.7       Passive exposure: Past    Smokeless tobacco: Never   Substance and Sexual Activity    Alcohol use: No    Drug use: No    Sexual activity:  Never       Birth control/protection: Surgical       Comment:       Review of Systems   Constitutional:  Positive for chills, fatigue and fever.   HENT:  Negative for congestion, hearing loss, sinus pressure and sore throat.    Eyes:  Negative for photophobia.   Respiratory:  Positive for cough. Negative for choking, chest tightness and wheezing.    Cardiovascular:  Negative for chest pain and palpitations.   Gastrointestinal:  Negative for blood in stool, nausea and vomiting.   Genitourinary:  Positive for frequency. Negative for dysuria, flank pain, hematuria and urgency.   Musculoskeletal:  Positive for arthralgias and gait problem. Negative for myalgias.        Knee pain   Skin:  Negative for pallor.   Neurological:  Negative for dizziness and numbness.   Hematological:  Does not bruise/bleed easily.   Psychiatric/Behavioral:  Negative for confusion and suicidal ideas. The patient is not nervous/anxious.       Objective:      Vital Signs (Most Recent):  Temp: 98 °F (36.7 °C) (04/21/24 0730)  Pulse: 73 (04/21/24 0800)  Resp: 18 (04/21/24 0730)  BP: (!) 150/65 (04/21/24 0730)  SpO2: 98 % (04/21/24 0732) Vital Signs (24h Range):  Temp:  [97.6 °F (36.4 °C)-100.4 °F (38 °C)] 98 °F (36.7 °C)  Pulse:  [51-83] 73  Resp:  [18-26] 18  SpO2:  [92 %-98 %] 98 %  BP: (125-150)/(58-68) 150/65      Weight: 125.6 kg (276 lb 14.4 oz)  Body mass index is 42.1 kg/m².     Physical Exam  Vitals and nursing note reviewed.   Constitutional:       Appearance: She is well-developed. She is obese.   HENT:      Head: Normocephalic and atraumatic.      Right Ear: External ear normal.      Left Ear: External ear normal.   Eyes:      Conjunctiva/sclera: Conjunctivae normal.      Pupils: Pupils are equal, round, and reactive to light.   Neck:      Thyroid: No thyromegaly.      Vascular: No JVD.      Trachea: No tracheal deviation.   Cardiovascular:      Rate and Rhythm: Normal rate and regular rhythm.      Heart sounds: Normal heart  sounds.   Pulmonary:      Effort: Pulmonary effort is normal. No respiratory distress.      Breath sounds: Normal breath sounds. No wheezing or rales.   Chest:      Chest wall: No tenderness.   Abdominal:      General: Bowel sounds are normal. There is no distension.      Palpations: Abdomen is soft. There is no mass.      Tenderness: There is no abdominal tenderness. There is no guarding or rebound.   Musculoskeletal:         General: Normal range of motion.      Cervical back: Normal range of motion and neck supple.   Lymphadenopathy:      Cervical: No cervical adenopathy.   Skin:     General: Skin is warm and dry.   Neurological:      Mental Status: She is alert and oriented to person, place, and time.      Cranial Nerves: No cranial nerve deficit.      Motor: No abnormal muscle tone.      Coordination: Coordination normal.      Deep Tendon Reflexes: Reflexes are normal and symmetric. Reflexes normal.      Comments: CN: Optic discs are flat with normal vasculature, PERRL, Extraoccular movements and visual fields are full. Normal facial sensation and strength, Hearing symmetric, Tongue and Palate are midline and strong. Shoulder Shrug symmetric and strong.                  CRANIAL NERVES      CN III, IV, VI   Pupils are equal, round, and reactive to light.        Significant Labs: All pertinent labs within the past 24 hours have been reviewed.  A1C:       Recent Labs   Lab 03/29/24  1026   HGBA1C 6.1*      CBC:        Recent Labs   Lab 04/20/24 2047 04/21/24  0410   WBC 19.70* 20.45*   HGB 11.1* 9.4*   HCT 34.8* 29.4*    278      CMP:        Recent Labs   Lab 04/20/24 2047 04/21/24  0410    139   K 4.0 4.3    106   CO2 20* 24   * 166*   BUN 26* 28*   CREATININE 1.4 1.5*   CALCIUM 9.3 8.5*   PROT 7.5 6.1   ALBUMIN 3.0* 2.6*   BILITOT 0.7 0.3   ALKPHOS 103 83   AST 20 14   ALT 22 18   ANIONGAP 12 9      Troponin:       Recent Labs   Lab 04/20/24 2047   TROPONINI 0.049*      Urine  "Culture: No results for input(s): "LABURIN" in the last 48 hours.  Urine Studies:       Recent Labs   Lab 04/20/24  2105   COLORU Yellow   APPEARANCEUA Clear   PHUR 6.5   SPECGRAV 1.015   PROTEINUA 3+*   GLUCUA Negative   KETONESU Negative   BILIRUBINUA Negative   OCCULTUA 3+*   NITRITE Positive*   UROBILINOGEN Negative   LEUKOCYTESUR 2+*   RBCUA 2   WBCUA >100*   BACTERIA Many*   SQUAMEPITHEL 5   HYALINECASTS 2*         Significant Imaging: I have reviewed all pertinent imaging results/findings within the past 24 hours.  CXR: I have reviewed all pertinent results/findings within the past 24 hours and my personal findings are:  Cardiac monitoring leads overlie the chest.  There is unchanged enlargement of the cardiomediastinal silhouette.  There is atherosclerosis of the thoracic aorta.  The lungs are symmetrically expanded without evidence of confluent airspace consolidation, substantial volume of pleural fluid or pneumothorax.  Osseous structures are intact with degenerative change.  Assessment/Plan:      * BAYRON (acute kidney injury)  Patient with acute kidney injury/acute renal failure likely due to pre-renal azotemia due to dehydration BAYRON is currently worsening. Baseline creatinine  1.2  - Labs reviewed- Renal function/electrolytes with Estimated Creatinine Clearance: 37 mL/min (A) (based on SCr of 1.5 mg/dL (H)). according to latest data. Monitor urine output and serial BMP and adjust therapy as needed. Avoid nephrotoxins and renally dose meds for GFR listed above.     Continue IVF      Dehydration  Continue IVF         Acute cystitis without hematuria  She has recurrent UTIs.  Continue IV Rocephin         Last urine  culture      Abnormal   PROTEUS MIRABILIS  10,000 - 49,999 cfu/ml  No other significant isolate      Resulting Agency [facility]         Susceptibility              Proteus mirabilis       CULTURE, URINE       Amox/K Clav'ate <=8/4 mcg/mL Sensitive       Amp/Sulbactam <=8/4 mcg/mL Sensitive       " Ampicillin <=8 mcg/mL Sensitive       Cefazolin <=2 mcg/mL Sensitive       Cefepime <=2 mcg/mL Sensitive       Ceftriaxone <=1 mcg/mL Sensitive       Ciprofloxacin <=1 mcg/mL Sensitive       Ertapenem <=0.5 mcg/mL Sensitive       Gentamicin <=4 mcg/mL Sensitive       Levofloxacin <=2 mcg/mL Sensitive       Meropenem <=1 mcg/mL Sensitive       Piperacillin/Tazo <=16 mcg/mL Sensitive       Tobramycin <=4 mcg/mL Sensitive       Trimeth/Sulfa <=2/38 mcg/mL Sensitive                                    Type 2 diabetes mellitus with ophthalmic manifestations        Lab Results   Component Value Date     HGBA1C 6.1 (H) 03/29/2024      Insulin correction scale        Hyperlipidemia associated with type 2 diabetes mellitus        Lab Results   Component Value Date     LDLCALC 60.6 (L) 03/29/2024      Continue statin        Hypothyroidism        Lab Results   Component Value Date     TSH 2.546 03/29/2024    Continue home dose levothyroxine           VTE Risk Mitigation (From admission, onward)              Ordered       rivaroxaban tablet 15 mg  Nightly         04/20/24 2229       IP VTE HIGH RISK PATIENT  Once         04/20/24 2229       Place sequential compression device  Until discontinued         04/20/24 2229                             On 04/21/2024, patient should be placed in hospital observation services under my care.         Tasha Atkins MD  Physician  [facility]     Significant Event     Addendum     Creation Time: 4/21/2024 11:15 AM       Telemetry tech noticed   Sinus pauses   Almost 7 second pause noted .strip reviewed   Patient examined   She did reports sense of passing out . Pacer pad /atropine near buy  Right now her telemetry is sinus , inverted p waves ; rate 62   She did get 12.5 mg metoprolol.  She sees Dr Gonzales [facility] .  Transfer to ICU   Low dose dopamine.      She was later transferred for higher level of care : DR Govea help appreciated

## 2024-04-22 NOTE — SUBJECTIVE & OBJECTIVE
Interval History: Noted to be bradycardic last night to 40s.     Review of Systems   Constitutional: Negative for chills, diaphoresis and night sweats.   Cardiovascular:  Positive for irregular heartbeat. Negative for chest pain, dyspnea on exertion, leg swelling, near-syncope and palpitations.   Respiratory:  Negative for cough, shortness of breath and wheezing.    Skin:  Negative for color change and dry skin.   Musculoskeletal:  Negative for joint pain and joint swelling.   Gastrointestinal:  Negative for abdominal pain, diarrhea, nausea and vomiting.   Genitourinary:  Negative for dysuria.   Neurological:  Positive for dizziness and weakness. Negative for headaches and light-headedness.   All other systems reviewed and are negative.    Objective:     Vital Signs (Most Recent):  Temp: 97.9 °F (36.6 °C) (04/22/24 0730)  Pulse: 64 (04/22/24 0815)  Resp: 19 (04/22/24 0856)  BP: (!) 180/79 (04/22/24 0856)  SpO2: 98 % (04/22/24 0815) Vital Signs (24h Range):  Temp:  [97.3 °F (36.3 °C)-98.9 °F (37.2 °C)] 97.9 °F (36.6 °C)  Pulse:  [58-74] 64  Resp:  [16-31] 19  SpO2:  [91 %-99 %] 98 %  BP: (128-204)/(57-84) 180/79     Weight: 116.6 kg (257 lb)  Body mass index is 39.08 kg/m².     SpO2: 98 %         Intake/Output Summary (Last 24 hours) at 4/22/2024 0934  Last data filed at 4/22/2024 0717  Gross per 24 hour   Intake 410.42 ml   Output 930 ml   Net -519.58 ml       Lines/Drains/Airways       Drain  Duration                  Urethral Catheter 04/20/24 2106 16 Fr. 1 day              Peripheral Intravenous Line  Duration                  Peripheral IV - Single Lumen 04/20/24 2052 20 G Left Wrist 1 day         Peripheral IV - Single Lumen 04/20/24 2054 20 G Left Hand 1 day         Peripheral IV - Single Lumen 04/21/24 2200 20 G Posterior;Right Hand <1 day                       Physical Exam  Vitals and nursing note reviewed.   Constitutional:       Appearance: Normal appearance. She is obese. She is not ill-appearing or  diaphoretic.   HENT:      Head: Normocephalic and atraumatic.      Right Ear: External ear normal.      Left Ear: External ear normal.      Mouth/Throat:      Mouth: Mucous membranes are moist.      Pharynx: Oropharynx is clear.   Eyes:      General: No scleral icterus.        Right eye: No discharge.         Left eye: No discharge.      Extraocular Movements: Extraocular movements intact.   Neck:      Vascular: No carotid bruit.   Cardiovascular:      Rate and Rhythm: Normal rate and regular rhythm.      Pulses: Normal pulses.      Heart sounds: Murmur (Systolic murmur II/VI) heard.      No friction rub. No gallop.   Pulmonary:      Effort: Pulmonary effort is normal. No respiratory distress.      Breath sounds: Normal breath sounds.   Abdominal:      General: Abdomen is flat. Bowel sounds are normal. There is no distension.      Palpations: Abdomen is soft. There is no mass.      Tenderness: There is no abdominal tenderness.   Musculoskeletal:         General: No swelling. Normal range of motion.      Cervical back: Normal range of motion.      Right lower leg: Edema present.      Left lower leg: Edema present.   Skin:     General: Skin is warm and dry.      Capillary Refill: Capillary refill takes less than 2 seconds.      Coloration: Skin is not pale.   Neurological:      General: No focal deficit present.      Mental Status: She is alert and oriented to person, place, and time.   Psychiatric:         Mood and Affect: Mood normal.         Behavior: Behavior normal.         Thought Content: Thought content normal.            Significant Labs: All pertinent lab results from the last 24 hours have been reviewed.    Significant Imaging:  all imaging reviewed

## 2024-04-22 NOTE — PLAN OF CARE
SICU PLAN OF CARE    Dx: Bradycardia/Heart block    Goals of Care: MAP > 65, SBP < 180    Vital Signs (last 12 hours):   Temp:  [97.8 °F (36.6 °C)-98.1 °F (36.7 °C)]   Pulse:  [61-76]   Resp:  [17-23]   BP: (124-180)/(60-79)   SpO2:  [97 %-100 %]      Neuro: AAOx4, Follows commands , and Moves all extremities spontaneously     Cardiac: NSR with Sinus Tachycardia and frequent PVCs    Respiratory: Room Air    Gtts: Heparin    Urine Output: Urethral Catheter  950 mL/shift    Diet: Cardiac      Labs/Accuchecks: Accucheck q4hrs/ Daily AM-lab    Skin:  All current LDAs is present, no new skin breakdown is noted. Wound care orders carried out. Patient turned q2h, bony prominences protected, and mattress inflated/working correctly.   Maycol Score: 17.     Shift Events:  OOBTC with PT/OT. Heparin increased to meet therapeutic aPTT. Transfer order in.    See flowsheet for further assessment/details.  Family updated on current condition/plan of care, questions answered, and emotional support provided.  MD updated on current condition, vitals, labs, and gtts.

## 2024-04-22 NOTE — ASSESSMENT & PLAN NOTE
Bradycardia  - Bradycardia noted at OSH, no strips available for review  - History of  Aflutter on Sotalol and HTN on metoprolol  - Unknown type of bradycardia, will need to monitor closely on telemetry in ICU  - Ddx include Sinus bradycardia, Sinus node dysfunction, or AV conduction disorder   - P waves on prior EKGs were negative   - Currently  HR 70s on dopamine, will stop dopamine in case patient is having high degree AVB (although no pauses at the moment)  - Even though Sotalol main mechanism of action is not through beta blockage, it does have some beta blocker activity and can cause bradycardia if associated with metoprolol.    - No pacing indication at moment     - Admit to CCU  - Stop dopamine  - Observe telemetry  - Avoid AV cyndie blockers or negative intoropic medications

## 2024-04-22 NOTE — NURSING
Pt admitted to SICU room 89272. Cardiology MD, Charge RN and Primary RN at bedside to receive patient. Upon arrival pt connected to continuous cardiac monitoring. Pt arrives on a dopamine gtt but quickly discontinued by Gelacio VILLEDA. Orders received and implemented. Immediate pt needs met. Plans to start heparin gtt and monitor pt heart rhythm for bradycardia/ pauses.       Pt family at bedside. Welcome packet given and visiting hours explained. All questions and concerns answered.

## 2024-04-22 NOTE — PT/OT/SLP EVAL
Occupational Therapy   Evaluation/Treatment    Name: Tonya Bucio  MRN: [id number]  Admitting Diagnosis: <principal problem not specified>  Recent Surgery: * No surgery found *      Recommendations:     Discharge Recommendations: Low Intensity Therapy  Discharge Equipment Recommendations:  none  Barriers to discharge:  None    Assessment:     Tonya Bucio is a 88 y.o. female with a medical diagnosis of <principal problem not specified>. Performance deficits affecting function: impaired endurance, impaired self care skills, impaired functional mobility, weakness, gait instability, impaired balance, pain. Patient agreed to therapy and agreed to EOB activity. Patient sat EOB, performed ADLs and was able to transfer to bedside chair with HHA and no AD. All VSS throughout. Patient would benefit from continued skilled acute OT 4x/wk to improve functional mobility, increase independence with ADLs, and address established goals. Recommending low intensity therapy once medically appropriate for discharge to increase maximal independence, reduce burden of care, and ensure safety.     Rehab Prognosis: Good; patient would benefit from acute skilled OT services to address these deficits and reach maximum level of function.       Plan:     Patient to be seen 4 x/week to address the above listed problems via self-care/home management, therapeutic activities, therapeutic exercises  Plan of Care Expires: 05/22/24  Plan of Care Reviewed with: patient, family    Subjective     Chief Complaint: back pain  Patient/Family Comments/goals: patient agreed to therapy     Occupational Profile:  Living Environment: patient lives with daughter (daughter does work), daughter in law, and son (son is disabled and cannot assist). Someone is there 24/7. Patient lives in a [facility] with ramp access. Patient has a walk in shower with a bath bench (on a track system) with a grab bar. Patient has a high toilet with a grab bar. Patient typically  sleeps in a power chair lift recliner. Patient performs g/h seated in w/c in bathroom. Patient feeds self and does need assistance with dressing. Patient ambulates ~6 feet with RW and is closely followed by family members with w/c for safety at all times. Patient propels herself in w/c.   Equipment Used at Home: walker, rolling, wheelchair, bath bench, grab bar, bedside commode, raised toilet    Pain/Comfort:  Pain Rating 1: 6/10  Location - Side 1: Bilateral  Location - Orientation 1: generalized  Location 1: back  Pain Addressed 1: Reposition, Distraction, Cessation of Activity  Pain Rating Post-Intervention 1: 6/10    Patients cultural, spiritual, Yarsanism conflicts given the current situation: no    Objective:     Communicated with: NSG prior to session.  Patient found HOB elevated with peripheral IV, hogue catheter, pulse ox (continuous), blood pressure cuff, telemetry, SCD upon OT entry to room.    General Precautions: Standard, fall  Orthopedic Precautions: N/A  Braces: N/A  Respiratory Status: Room air    Occupational Performance:    Bed Mobility:    Patient completed Scooting/Bridging with stand by assistance anteriorly to EOB sitting EOB  Patient completed Supine to Sit with minimum assistance with HOB elevated    Functional Mobility/Transfers:  Patient completed Sit <> Stand Transfer with minimum assistance  with  no assistive device   Patient completed Bed > Chair Transfer using Stand Pivot technique with minimum assistance with no assistive device    Activities of Daily Living:  Grooming: contact guard assistance Sitting EOB for oral care with dentures, washing face, and combing hair    Cognitive/Visual Perceptual:  Cognitive/Psychosocial Skills:     -       Oriented to: Person, Place, Time, and Situation   -       Follows Commands/attention:Follows multistep  commands  -       Communication: clear/fluent  -       Memory: No Deficits noted  -       Safety awareness/insight to disability: intact   -        Mood/Affect/Coping skills/emotional control: Appropriate to situation  Visual/Perceptual:      -Intact      Physical Exam:  Upper Extremity Range of Motion:     -       Right Upper Extremity: WFL  -       Left Upper Extremity: WFL  Upper Extremity Strength:    -       Right Upper Extremity: WFL  -       Left Upper Extremity: WFL   Strength:    -       Right Upper Extremity: WFL  -       Left Upper Extremity: WFL  Fine Motor Coordination:    -       Intact  Gross motor coordination:   WFL    AMPAC 6 Click ADL:  AMPAC Total Score: 14    Treatment & Education:  Role of OT and POC  ADL retraining  Functional mobility training  Safety  Discharge planning  Importance EOB/OOB activity    Co-treatment performed due to patient's multiple deficits requiring two skilled therapists to appropriately and safely assess patient's strength and endurance while facilitating functional tasks in addition to accommodating for patient's activity tolerance.     Patient left up in chair with all lines intact, call button in reach, nurse notified, family present, and all needs met.     GOALS:   Multidisciplinary Problems       Occupational Therapy Goals          Problem: Occupational Therapy    Goal Priority Disciplines Outcome Interventions   Occupational Therapy Goal     OT, PT/OT Ongoing, Progressing    Description: Goals to be met by: 5/13/2014     Patient will increase functional independence with ADLs by performing:    UE Dressing with Set-up Assistance.  LE Dressing with Moderate Assistance.  Grooming while seated with Modified [place].  Toileting from toilet with Stand-by Assistance for hygiene and clothing management.   Supine to sit with Supervision.  Stand pivot transfers with Supervision.  Toilet transfer to toilet with Supervision.                         History:     Past Medical History:   Diagnosis Date    Acute bronchitis with asthma     Anemia due to stage 3 chronic kidney disease     Anticoagulant long-term use      Asthma     Back pain     Cancer     Chest pain, musculoskeletal 7/16/2013    Chronic bronchitis     Colon polyps     COPD exacerbation 3/13/2020    Gout, unspecified     History of cervical cancer     Hypothyroidism     Obesity     Osteoarthritis     Renal manifestation of secondary diabetes mellitus     Thyroid disease     Trouble in sleeping     Type 2 diabetes mellitus with ophthalmic manifestations     Type 2 diabetes with peripheral circulatory disorder, controlled     Urinary incontinence     Uterine cancer     Uterine cancer          Past Surgical History:   Procedure Laterality Date    ADENOIDECTOMY      EYE SURGERY Right     right eye cataract    HYSTERECTOMY  2008    LIZ-BSO    tonsilectomy      TONSILLECTOMY  1945    TOTAL ABDOMINAL HYSTERECTOMY  2008    TOTAL ABDOMINAL HYSTERECTOMY W/ BILATERAL SALPINGOOPHORECTOMY  2008       Time Tracking:     OT Date of Treatment: 04/22/24  OT Start Time: 1100  OT Stop Time: 1133  OT Total Time (min): 33 min    Billable Minutes:Evaluation 10  Self Care/Home Management 23    4/22/2024

## 2024-04-22 NOTE — ASSESSMENT & PLAN NOTE
- Last TSH 3 weeks ago 2.54  - Will continue home dose of levothyroxine  - Continue levothryoxine 75mcg qd

## 2024-04-22 NOTE — ASSESSMENT & PLAN NOTE
- Last TSH 3 weeks ago 2.54  - Will continue home dose of levothyroxine    - Continue levothryoxine 75mcg qd   (+1) L/R ankle edema doc'd on 9/25/23

## 2024-04-22 NOTE — HOSPITAL COURSE
Patient admitted to CCU for further management. Sotalol and metoprolol held on admission. Patient was noted to be bradycardic overnight of 4/22 but asymptomatic. Patient went into a 10 beat run of V tach noted on telemetry for which decision was made to restart patient's home toprol and patient was discharged with instructions to discontinue her sotalol, continue her home Toprol, and patient was mildly hypertensive here and was started on losartan. Patient will also be discharged with a holter monitor and instructions to follow up with cardiology.

## 2024-04-22 NOTE — PLAN OF CARE
ISMA met with the patient and her daughters to discuss post discharge needs Patient and family state that at this time family is living with and caring for the patient   ISMA CHESTER

## 2024-04-22 NOTE — PLAN OF CARE
Problem: Occupational Therapy  Goal: Occupational Therapy Goal  Description: Goals to be met by: 5/13/2014     Patient will increase functional independence with ADLs by performing:    UE Dressing with Set-up Assistance.  LE Dressing with Moderate Assistance.  Grooming while seated with Modified [place].  Toileting from toilet with Stand-by Assistance for hygiene and clothing management.   Supine to sit with Supervision.  Stand pivot transfers with Supervision.  Toilet transfer to toilet with Supervision.    Outcome: Ongoing, Progressing   Patient's goals are set.

## 2024-04-22 NOTE — PROGRESS NOTES
Pharmacist Renal Dose Adjustment Note    Tonya Bucio is a 88 y.o. female being treated with the medication Famotidine    Patient Data:    Vital Signs (Most Recent):  Temp: 98.9 °F (37.2 °C) (04/21/24 1945)  Pulse: 62 (04/21/24 2028)  Resp: (!) 22 (04/21/24 2028)  BP: (!) 179/81 (04/21/24 2000)  SpO2: 98 % (04/21/24 2028) Vital Signs (72h Range):  Temp:  [97.3 °F (36.3 °C)-100.4 °F (38 °C)]   Pulse:  [51-83]   Resp:  [18-31]   BP: (125-183)/(57-84)   SpO2:  [91 %-99 %]      Recent Labs   Lab 04/20/24 2047 04/21/24  0410   CREATININE 1.4 1.5*     Serum creatinine: 1.5 mg/dL (H) 04/21/24 0410  Estimated creatinine clearance: 35.5 mL/min (A)    Medication:Famotidine 20 mg po BID will be changed to Famotidine 20 mg po daily    Pharmacist's Name: Terry Hughes  Pharmacist's Extension: [phone]

## 2024-04-22 NOTE — ASSESSMENT & PLAN NOTE
Bradycardia  - Bradycardia noted at OSH, no strips available for review  - History of  Aflutter on Sotalol and HTN on metoprolol  - Unknown type of bradycardia, will need to monitor closely on telemetry in ICU  - Ddx include Sinus bradycardia, Sinus node dysfunction, or AV conduction disorder   - P waves on prior EKGs were negative   - Currently  HR 70s on dopamine, will stop dopamine in case patient is having high degree AVB (although no pauses at the moment)  - Even though Sotalol main mechanism of action is not through beta blockage, it does have some beta blocker activity and can cause bradycardia if associated with metoprolol.    - No pacing indication at moment     - will step down to floor  - Observe telemetry  - Avoid AV cyndie blockers or negative intoropic medications

## 2024-04-23 ENCOUNTER — CLINICAL SUPPORT (OUTPATIENT)
Dept: CARDIOLOGY | Facility: HOSPITAL | Age: 89
DRG: 309 | End: 2024-04-23
Attending: INTERNAL MEDICINE
Payer: MEDICARE

## 2024-04-23 VITALS
HEART RATE: 99 BPM | WEIGHT: 255.75 LBS | TEMPERATURE: 98 F | SYSTOLIC BLOOD PRESSURE: 142 MMHG | RESPIRATION RATE: 18 BRPM | DIASTOLIC BLOOD PRESSURE: 70 MMHG | OXYGEN SATURATION: 96 % | HEIGHT: 68 IN | BODY MASS INDEX: 38.76 KG/M2

## 2024-04-23 DIAGNOSIS — R00.1 BRADYCARDIA: ICD-10-CM

## 2024-04-23 PROBLEM — I50.32 CHRONIC DIASTOLIC HEART FAILURE: Status: ACTIVE | Noted: 2024-04-23

## 2024-04-23 LAB
ANION GAP SERPL CALC-SCNC: 10 MMOL/L (ref 8–16)
APTT PPP: 44 SEC (ref 21–32)
APTT PPP: 45.6 SEC (ref 21–32)
APTT PPP: 49.8 SEC (ref 21–32)
BACTERIA UR CULT: ABNORMAL
BASOPHILS # BLD AUTO: 0.07 K/UL (ref 0–0.2)
BASOPHILS NFR BLD: 0.6 % (ref 0–1.9)
BUN SERPL-MCNC: 22 MG/DL (ref 8–23)
CALCIUM SERPL-MCNC: 9.6 MG/DL (ref 8.7–10.5)
CHLORIDE SERPL-SCNC: 109 MMOL/L (ref 95–110)
CO2 SERPL-SCNC: 23 MMOL/L (ref 23–29)
CREAT SERPL-MCNC: 1.2 MG/DL (ref 0.5–1.4)
DIFFERENTIAL METHOD BLD: ABNORMAL
EOSINOPHIL # BLD AUTO: 0.7 K/UL (ref 0–0.5)
EOSINOPHIL NFR BLD: 5.7 % (ref 0–8)
ERYTHROCYTE [DISTWIDTH] IN BLOOD BY AUTOMATED COUNT: 15.2 % (ref 11.5–14.5)
EST. GFR  (NO RACE VARIABLE): 43.8 ML/MIN/1.73 M^2
GLUCOSE SERPL-MCNC: 103 MG/DL (ref 70–110)
HCT VFR BLD AUTO: 31.8 % (ref 37–48.5)
HGB BLD-MCNC: 9.9 G/DL (ref 12–16)
IMM GRANULOCYTES # BLD AUTO: 0.18 K/UL (ref 0–0.04)
IMM GRANULOCYTES NFR BLD AUTO: 1.6 % (ref 0–0.5)
LYMPHOCYTES # BLD AUTO: 1.9 K/UL (ref 1–4.8)
LYMPHOCYTES NFR BLD: 16.5 % (ref 18–48)
MCH RBC QN AUTO: 29.4 PG (ref 27–31)
MCHC RBC AUTO-ENTMCNC: 31.1 G/DL (ref 32–36)
MCV RBC AUTO: 94 FL (ref 82–98)
MONOCYTES # BLD AUTO: 1 K/UL (ref 0.3–1)
MONOCYTES NFR BLD: 9 % (ref 4–15)
NEUTROPHILS # BLD AUTO: 7.6 K/UL (ref 1.8–7.7)
NEUTROPHILS NFR BLD: 66.6 % (ref 38–73)
NRBC BLD-RTO: 0 /100 WBC
PHOSPHATE SERPL-MCNC: 2.2 MG/DL (ref 2.7–4.5)
PLATELET # BLD AUTO: 281 K/UL (ref 150–450)
PMV BLD AUTO: 10.6 FL (ref 9.2–12.9)
POCT GLUCOSE: 104 MG/DL (ref 70–110)
POCT GLUCOSE: 117 MG/DL (ref 70–110)
POCT GLUCOSE: 132 MG/DL (ref 70–110)
POTASSIUM SERPL-SCNC: 3.9 MMOL/L (ref 3.5–5.1)
RBC # BLD AUTO: 3.37 M/UL (ref 4–5.4)
SODIUM SERPL-SCNC: 142 MMOL/L (ref 136–145)
WBC # BLD AUTO: 11.38 K/UL (ref 3.9–12.7)

## 2024-04-23 PROCEDURE — 93272 ECG/REVIEW INTERPRET ONLY: CPT | Mod: HCNC,,, | Performed by: INTERNAL MEDICINE

## 2024-04-23 PROCEDURE — 85730 THROMBOPLASTIN TIME PARTIAL: CPT | Mod: 91,HCNC | Performed by: INTERNAL MEDICINE

## 2024-04-23 PROCEDURE — 85730 THROMBOPLASTIN TIME PARTIAL: CPT | Mod: 91,HCNC | Performed by: STUDENT IN AN ORGANIZED HEALTH CARE EDUCATION/TRAINING PROGRAM

## 2024-04-23 PROCEDURE — 25000003 PHARM REV CODE 250: Mod: HCNC

## 2024-04-23 PROCEDURE — 99291 CRITICAL CARE FIRST HOUR: CPT | Mod: HCNC,,, | Performed by: INTERNAL MEDICINE

## 2024-04-23 PROCEDURE — 80048 BASIC METABOLIC PNL TOTAL CA: CPT | Mod: HCNC | Performed by: STUDENT IN AN ORGANIZED HEALTH CARE EDUCATION/TRAINING PROGRAM

## 2024-04-23 PROCEDURE — 85730 THROMBOPLASTIN TIME PARTIAL: CPT | Mod: HCNC | Performed by: INTERNAL MEDICINE

## 2024-04-23 PROCEDURE — 85025 COMPLETE CBC W/AUTO DIFF WBC: CPT | Mod: HCNC | Performed by: STUDENT IN AN ORGANIZED HEALTH CARE EDUCATION/TRAINING PROGRAM

## 2024-04-23 PROCEDURE — 63600175 PHARM REV CODE 636 W HCPCS: Mod: HCNC | Performed by: STUDENT IN AN ORGANIZED HEALTH CARE EDUCATION/TRAINING PROGRAM

## 2024-04-23 PROCEDURE — 25000003 PHARM REV CODE 250: Mod: HCNC | Performed by: STUDENT IN AN ORGANIZED HEALTH CARE EDUCATION/TRAINING PROGRAM

## 2024-04-23 PROCEDURE — 36415 COLL VENOUS BLD VENIPUNCTURE: CPT | Mod: HCNC | Performed by: INTERNAL MEDICINE

## 2024-04-23 PROCEDURE — 84100 ASSAY OF PHOSPHORUS: CPT | Mod: HCNC | Performed by: STUDENT IN AN ORGANIZED HEALTH CARE EDUCATION/TRAINING PROGRAM

## 2024-04-23 PROCEDURE — 93271 ECG/MONITORING AND ANALYSIS: CPT | Mod: HCNC

## 2024-04-23 RX ORDER — SODIUM,POTASSIUM PHOSPHATES 280-250MG
2 POWDER IN PACKET (EA) ORAL
Status: COMPLETED | OUTPATIENT
Start: 2024-04-23 | End: 2024-04-23

## 2024-04-23 RX ORDER — METOPROLOL SUCCINATE 25 MG/1
12.5 TABLET, EXTENDED RELEASE ORAL DAILY
Start: 2024-04-23 | End: 2024-04-23

## 2024-04-23 RX ORDER — AMLODIPINE BESYLATE 5 MG/1
5 TABLET ORAL DAILY
Status: DISCONTINUED | OUTPATIENT
Start: 2024-04-23 | End: 2024-04-23 | Stop reason: HOSPADM

## 2024-04-23 RX ORDER — LOSARTAN POTASSIUM 25 MG/1
25 TABLET ORAL DAILY
Status: DISCONTINUED | OUTPATIENT
Start: 2024-04-23 | End: 2024-04-23 | Stop reason: HOSPADM

## 2024-04-23 RX ORDER — SODIUM,POTASSIUM PHOSPHATES 280-250MG
1 POWDER IN PACKET (EA) ORAL ONCE
Status: DISCONTINUED | OUTPATIENT
Start: 2024-04-23 | End: 2024-04-23

## 2024-04-23 RX ORDER — METOPROLOL SUCCINATE 25 MG/1
25 TABLET, EXTENDED RELEASE ORAL DAILY
Start: 2024-04-23 | End: 2024-04-24

## 2024-04-23 RX ORDER — LOSARTAN POTASSIUM 25 MG/1
25 TABLET ORAL DAILY
Qty: 90 TABLET | Refills: 3 | Status: SHIPPED | OUTPATIENT
Start: 2024-04-24 | End: 2024-06-05

## 2024-04-23 RX ADMIN — HEPARIN SODIUM 18 UNITS/KG/HR: 10000 INJECTION, SOLUTION INTRAVENOUS at 05:04

## 2024-04-23 RX ADMIN — LEVOTHYROXINE SODIUM 75 MCG: 75 TABLET ORAL at 05:04

## 2024-04-23 RX ADMIN — AMLODIPINE BESYLATE 5 MG: 5 TABLET ORAL at 09:04

## 2024-04-23 RX ADMIN — HYDROCODONE BITARTRATE AND ACETAMINOPHEN 1 TABLET: 7.5; 325 TABLET ORAL at 09:04

## 2024-04-23 RX ADMIN — HYDRALAZINE HYDROCHLORIDE 50 MG: 50 TABLET ORAL at 05:04

## 2024-04-23 RX ADMIN — FAMOTIDINE 20 MG: 20 TABLET ORAL at 09:04

## 2024-04-23 RX ADMIN — LOSARTAN POTASSIUM 25 MG: 25 TABLET, FILM COATED ORAL at 09:04

## 2024-04-23 RX ADMIN — POTASSIUM & SODIUM PHOSPHATES POWDER PACK 280-160-250 MG 2 PACKET: 280-160-250 PACK at 10:04

## 2024-04-23 RX ADMIN — POTASSIUM & SODIUM PHOSPHATES POWDER PACK 280-160-250 MG 2 PACKET: 280-160-250 PACK at 04:04

## 2024-04-23 NOTE — CARE UPDATE
I have reviewed the chart of Tonya Bucio who is hospitalized for the following:    Active Hospital Problems    Diagnosis    *Bradycardia    Chronic diastolic heart failure    Stage 3a chronic kidney disease    Primary hypertension    UTI (urinary tract infection)    Typical atrial flutter    COPD (chronic obstructive pulmonary disease)    Morbid obesity    Risk for falls    Anemia due to stage 3 chronic kidney disease    Controlled type 2 diabetes mellitus without complication, without long-term current use of insulin    History of pulmonary embolism    Hypothyroidism        Kenzie Schumacher NP  Unit Based [facility]

## 2024-04-23 NOTE — NURSING
Notified Raul.MD pt just had 10 beats V tach, and pt at ultrasound unit now, unable to assess now, and contact Barthel RT at ultrasound unit, and she said pt is fine, asymptomatic. Will assess as sone as pt back to room.

## 2024-04-23 NOTE — NURSING TRANSFER
Nursing Transfer Note      4/22/2024   10:45 PM    Nurse giving handoff: SICU RN  Nurse receiving handoff:Enmanuel    Reason patient is being transferred: Stepdown    Transfer From: SICU    Transfer via bed    Transfer with cardiac monitoring    Transported by SICU nurse/tech     Transfer Vital Signs:  Blood Pressure:146/74  Heart Rate:71  O2:RA  Temperature:98.1  Respirations:18    Telemetry: Rhythm NSR  Order for Tele Monitor? Yes    Additional Lines: Nguyen Catheter    4eyes on Skin: yes    Medicines sent: NA    Any special needs or follow-up needed: WOUND CARE     Patient belongings transferred with patient: Yes    Chart send with patient: Na    Notified: daughter    Patient reassessed at: 9133 04/22/2024 (date, time)  Upon arrival to floor: patient oriented to room, call bell in reach, and bed in lowest position

## 2024-04-23 NOTE — DISCHARGE INSTRUCTIONS
STOP taking Sotalol (Betapace)  CONTINUE taking metoprolol succinate 25 mg (Toprol XL)  START taking losartan 25 mg daily  You will be fit with a holter monitor, please make sure you follow up with the cardiologist.

## 2024-04-23 NOTE — PLAN OF CARE
[facility] Stepdown  Discharge Final Note    Primary Care Provider: Tasha Atkins MD    Expected Discharge Date: 4/23/2024    Future Appointments   Date Time Provider Department Center   4/24/2024  2:20 PM Tutu Carlos MD [facility] CARDIO [place] [facility]   7/15/2024  2:45 PM Tasha Atkins MD [facility] IM [place] [facility]   10/11/2024  8:20 AM LAB, [facility] STA LAB [place] [facility]   10/15/2024  3:30 PM Tasha Atkins MD [facility] IM [place] [facility]         Final Discharge Note (most recent)       Final Note - 04/23/24 1629          Final Note    Assessment Type Final Discharge Note     Anticipated Discharge Disposition Home or Self Care     What phone number can be called within the next 1-3 days to see how you are doing after discharge? [phone]   Pt daughter Mita #       Post-Acute Status    Discharge Delays None known at this time                 Pt discharging home today w/ cardio monitor (education was given by Annel). Pt daughter Mita will provide pt transportation home. Pt aware to f/u with scheduled f/u appts.     HAIDER Miles   [facility]    Case Management Dept  [phone]      Important Message from Medicare  Important Message from Medicare regarding Discharge Appeal Rights: Explained to patient/caregiver     Date IMM was signed: 04/23/24  Time IMM was signed: [id number]         Post-Anesthesia Evaluation and Assessment    Patient: Rob Rodriguez MRN: [id number]  SSN: [id number]    YOB: 1953  Age: 59 y.o. Sex: male       Cardiovascular Function/Vital Signs  Visit Vitals    /80    Pulse 62    Temp 36.4 °C (97.6 °F)    Resp 16    Wt 65.9 kg (145 lb 6 oz)    SpO2 98%    BMI 21.47 kg/m2       Patient is status post general anesthesia for Procedure(s):  VITRECTOMY POSTERIOR 25 GAUGE/ LASER/ GAS FLUID EXCHANGE-C3F8/ LENSECTOMY/ SCLERAL BUCKLE PLACEMENT/ RIGHT. Nausea/Vomiting: None    Postoperative hydration reviewed and adequate. Pain:  Pain Scale 1: Numeric (0 - 10) (05/18/17 1550)  Pain Intensity 1: 0 (05/18/17 1550)   Managed    Neurological Status:   Neuro (WDL): Within Defined Limits (05/18/17 1550)   At baseline    Mental Status and Level of Consciousness: Awake, alert    Pulmonary Status:   O2 Device: Room air (05/18/17 1522)   Adequate oxygenation and airway patent    Complications related to anesthesia: None    Post-anesthesia assessment completed.  No concerns    Signed By: Semaj Kidd MD     May 18, 2017

## 2024-04-23 NOTE — PT/OT/SLP PROGRESS
Physical Therapy      Patient Name:  Tonya Bucio   MRN:  [id number]    Patient not seen today secondary to Patient fatigue, pt reported she's tired from just getting into the chair from transport w/c. Will follow-up when appropriate.    4/23/2024

## 2024-04-23 NOTE — DISCHARGE SUMMARY
[facility] Stepdown  Cardiology  Discharge Summary      Patient Name: Tonya Bucio  MRN: [id number]  Admission Date: 4/21/2024  Hospital Length of Stay: 2 days  Discharge Date and Time:  04/23/2024 3:08 PM  Attending Physician: Juan Govea MD    Discharging Provider: Britt Iverson MD  Primary Care Physician: Tasha Atkins MD    HPI:   89yo F patient with atrial flutter, HTN, HLD, T2DM (A1c 6.1 - diet controlleD), CKD IIIb, knee OA, hypothryoidism, tobacco (for 10 years 1/2ppd, quit 60 years ago), recurrent UTI, history of PE, who was admitted to [facility] on 04/20/24 with fever and diagnosed with UTI, and transferred to [facility] on 04/21/24 for new  onset complete heart block.     Patient was admitted to OSH for antibiotic hterpay after she was found to have fever and UTI. Patient has been feeling weak and near syncope episodes for the last couple of weeks.     On admission as per notes the patient presented multiple pauses on telemetry , longest approximately 7 seconds, with a HR measured at 31bpm, and feeling weak spells, flutters, and near syncope. Patient was transferred to ICU for low dose dopamine gtt and placed on pacer pads. As per notes patient has been taking both metoprolol and sotalol. Dopamine gtt was made titratable and started at 5mcg/kg due to continue pauses.      Labs remakrable for WBC 19.7, Hb 11.1, Plt 309, Trop 0.049 (Prior trop 11 months and 2 years ago ~0.4), Cr 1.4, BNP 97, Lactic acid 2, Procalcitonin 0.57,     As per outpatient cardiology notes, patient was followed for paroxysmal atrial flutter on sotalol and xarelto  (flutter in 2020 without recurrence, chemically converted to NSR and maintained on sotalol since then).     Due to pauses, patient was transferred to [facility].       * No surgery found *     Indwelling Lines/Drains at time of discharge:  Lines/Drains/Airways       Drain  Duration                  Urethral Catheter 04/20/24 8896 16  Fr. 2 days                    Hospital Course:  Patient admitted to CCU for further management. Sotalol and metoprolol held on admission. Patient was noted to be bradycardic overnight of 4/22 but asymptomatic. Patient went into a 10 beat run of V tach noted on telemetry for which decision was made to restart patient's home toprol and patient was discharged with instructions to discontinue her sotalol, continue her home Toprol, and patient was mildly hypertensive here and was started on losartan. Patient will also be discharged with a holter monitor and instructions to follow up with cardiology.     Goals of Care Treatment Preferences:  Code Status: Full Code      Consults:     Significant Diagnostic Studies: Labs: All labs within the past 24 hours have been reviewed    Pending Diagnostic Studies:       None            Final Active Diagnoses:    Diagnosis Date Noted POA    PRINCIPAL PROBLEM:  Bradycardia [R00.1] 04/21/2024 Yes    Chronic diastolic heart failure [I50.32] 04/23/2024 Yes    Stage 3a chronic kidney disease [N18.31] 04/21/2024 Yes    Primary hypertension [I10] 05/29/2023 Yes    UTI (urinary tract infection) [N39.0] 07/06/2022 Yes    Typical atrial flutter [I48.3] 03/14/2020 Yes    COPD (chronic obstructive pulmonary disease) [J44.9] 03/13/2020 Yes    Morbid obesity [E66.01]  Yes    Risk for falls [Z91.81] 02/14/2019 Not Applicable    Anemia due to stage 3 chronic kidney disease [N18.30, D63.1] 11/17/2016 Yes    Controlled type 2 diabetes mellitus without complication, without long-term current use of insulin [E11.9] 11/12/2015 Yes    History of pulmonary embolism [Z86.711] 07/07/2014 Yes    Hypothyroidism [E03.9] 07/16/2013 Yes      Problems Resolved During this Admission:     No new Assessment & Plan notes have been filed under this hospital service since the last note was generated.  Service: Cardiology      Discharged Condition: stable    Disposition: Home or Self Care    Follow Up:    Patient  Instructions:      Ambulatory referral/consult to Cardiology   Standing Status: Future   Referral Priority: Routine Referral Type: Consultation   Referral Reason: Specialty Services Required   Requested Specialty: Cardiology   Number of Visits Requested: 1     Cardiac Monitor - 3-15 Day Adult (Cupid Only)   Standing Status: Future Standing Exp. Date: 25     Order Specific Question Answer Comments   Duration: 14 days    Release to patient Immediate      Medications:  Reconciled Home Medications:      Medication List        START taking these medications      losartan 25 MG tablet  Commonly known as: COZAAR  Take 1 tablet (25 mg total) by mouth once daily.  Start taking on: 2024            CONTINUE taking these medications      acetaminophen 500 MG tablet  Commonly known as: TYLENOL  Take 500 mg by mouth every evening. And as needed     allopurinoL 300 MG tablet  Commonly known as: ZYLOPRIM  Take 1 tablet (300 mg total) by mouth once daily.     cinacalcet 60 MG Tab  Commonly known as: SENSIPAR  SMARTSI Tablet(s) By Mouth Every Evening     ferrous sulfate 325 (65 FE) MG EC tablet  Take 1 tablet (325 mg total) by mouth once daily.     HYDROcodone-acetaminophen 7.5-325 mg per tablet  Commonly known as: NORCO  Take 1 tablet by mouth every 24 hours as needed for Pain.     levothyroxine 75 MCG tablet  Commonly known as: SYNTHROID  Take 1 tablet by mouth once daily     MAGNESIUM CITRATE ORAL  Take by mouth.     metoprolol succinate 25 MG 24 hr tablet  Commonly known as: TOPROL-XL  Take 1 tablet (25 mg total) by mouth once daily.     omega-3 acid ethyl esters 1 gram capsule  Commonly known as: LOVAZA  Take 1 capsule (1 g total) by mouth 2 (two) times daily.     ONE DAILY MULTIVITAMIN per tablet  Generic drug: multivitamin  Take 1 tablet by mouth once daily.     rosuvastatin 20 MG tablet  Commonly known as: CRESTOR  Take 1 tablet by mouth once daily     XARELTO 15 mg Tab  Generic drug: rivaroxaban  Take 1  tablet (15 mg total) by mouth every evening.            STOP taking these medications      sotaloL 80 MG tablet  Commonly known as: BETAPACE              Time spent on the discharge of patient: 35 minutes    Britt Iverson MD  Cardiology  [facility] - [facility] Stepdown    Staff Attestation:  I have seen the patient, reviewed the Resident's assessment and plan, personally interviewed and examined the patient at bedside and agree with the findings.Total critical care time: Approximately 45 minutes. Due to a high probability of clinically significant, life threatening deterioration, I personally spent this critical care time directly and personally managing the patient. This critical care time included obtaining a history; examining the patient; ordering and review of studies; arranging urgent treatment with development of a management plan; evaluation of patient's response to treatment; frequent reassessment; and, discussions with other providers.   This critical care time was performed to assess and manage the high probability of imminent, life-threatening deterioration that could result in multi-organ failure. It was medically reasonable and necessary. It was exclusive of separately billable procedures and treating other patients and teaching time.    MD [facility] - Cardiology

## 2024-04-23 NOTE — PLAN OF CARE
[facility] - Cardiology Stepdown  Initial Discharge Assessment       Primary Care Provider: Tasha Atkins MD    Admission Diagnosis: Symptomatic bradycardia [R00.1]    Admission Date: 4/21/2024  Expected Discharge Date: 4/23/2024         Payor: HUMANA MANAGED MEDICARE / Plan: HUMANA SNP HMO PPO SPECIAL NEEDS / Product Type: Medicare Advantage /     Extended Emergency Contact Information  Primary Emergency Contact: iMta Bucio   [place]  Mobile Phone: [phone]  Relation: Daughter    Discharge Plan A: Home, Home with family  Discharge Plan B: Home, Home with family      [facility] - [place], [place] - [address]  [address]  [facility] [address]  Phone: [phone] Fax: [phone]    [facility] - [place], [place] - [address]  [address] [address]  Phone: [phone] Fax: [phone]    [facility] - [facility] [address]  [address]  [place] [address]  Phone: [phone] Fax: [phone]      Initial Assessment (most recent)       Adult Discharge Assessment - 04/23/24 1610          Discharge Assessment    Assessment Type Discharge Planning Assessment     Confirmed/corrected address, phone number and insurance Yes     Confirmed Demographics Correct on Facesheet     Source of Information patient   Pt daughter Mita at bedside    When was your last doctors appointment? --   Per pt daughter pt has a appt scheduled w/ Cardiologist- Dr. Plaza 4/24/2024 at 2:20pm    Communicated JACINDA with patient/caregiver --   Pt discharging today 04/23/2024    Reason For Admission Bradycardia     People in Home child(yesenia), adult     Facility Arrived From: Per pt daughter pt was transfer from [place]     Do you expect to return to your current living situation? Yes     Do you have help at home or someone to help you manage your care at home? Yes     Who are your caregiver(s) and their phone number(s)? Mita (daughter)  [phone]     Prior to hospitilization cognitive status: Alert/Oriented     Current cognitive status: Alert/Oriented     Walking or Climbing Stairs ambulation difficulty, assistance 1 person     Mobility Management Per pt has a wheelchair, walker     Dressing/Bathing bathing difficulty, assistance 1 person     Dressing/Bathing Management Per pt daughter, pt has a shower chair, family assist when needed     Home Accessibility wheelchair accessible   Per pt daughter pt has a wheelchair ramp    Home Layout Able to live on 1st floor     Equipment Currently Used at Home walker, rolling;wheelchair;shower chair;bedside commode     Readmission within 30 days? --   Pt was transfer from [place]    Patient currently being followed by outpatient case management? No     Do you currently have service(s) that help you manage your care at home? No     Do you take prescription medications? Yes     Do you have prescription coverage? Yes     Coverage HUMANA MANAGED MEDICARE - HUMANA SNP HMO PPO SPECIAL NEEDS - CAPITATED     Do you have any problems affording any of your prescribed medications? No     Is the patient taking medications as prescribed? yes     Who is going to help you get home at discharge? Mita (daughter) [phone]     How do you get to doctors appointments? family or friend will provide     Are you on dialysis? No     Do you take coumadin? No     Discharge Plan A Home;Home with family     Discharge Plan B Home;Home with family     DME Needed Upon Discharge  --   Cardio Monitor    Discharge Plan discussed with: Parent(s);Adult children     Name(s) and Number(s) Mita (daughter) [phone]        Physical Activity    On average, how many days per week do you engage in moderate to strenuous exercise (like a brisk walk)? 7 days     On average, how many minutes do you engage in exercise at this level? --   Less than 10mins, per pt daughter, pt walk 12ft w/ rolling walker at home       Financial Resource Strain     "How hard is it for you to pay for the very basics like food, housing, medical care, and heating? Not hard at all        Housing Stability    In the last 12 months, was there a time when you were not able to pay the mortgage or rent on time? No     At any time in the past 12 months, were you homeless or living in a shelter (including now)? No        Transportation Needs    In the past 12 months, has lack of transportation kept you from medical appointments or from getting medications? No     In the past 12 months, has lack of transportation kept you from meetings, work, or from getting things needed for daily living? No        Food Insecurity    Within the past 12 months, you worried that your food would run out before you got the money to buy more. Never true     Within the past 12 months, the food you bought just didn't last and you didn't have money to get more. Never true        Social Connections    In a typical week, how many times do you talk on the phone with family, friends, or neighbors? More than three times a week     How often do you get together with friends or relatives? --   1-2x every 2weeks per pt daughter "family comes visit her at home"    How often do you attend Muslim or Hindu services? Patient declined     Do you belong to any clubs or organizations such as Muslim groups, unions, fraternal or athletic groups, or school groups? Patient declined     How often do you attend meetings of the clubs or organizations you belong to? Patient declined        Alcohol Use    Q1: How often do you have a drink containing alcohol? Patient declined     Q2: How many drinks containing alcohol do you have on a typical day when you are drinking? Patient declined     Q3: How often do you have six or more drinks on one occasion? Patient declined                 SW met with patient and  daughter Mita in room to complete DPA. Questions answered / contact numbers provided.  Use PREFERRED PHARMACY / BEDSIDE DELIVERY " for any necessary medications at time of discharge. Pt is independent with all ADLs but assistance is needed for safety per pt daughter. Per pt daughter pt has walker, wheelchair, shower chair and bedside commade.  Pt is not on HD but is on BTs (not coumadin) and pt not on home oxygen. Pt daughter will be assisting with help upon discharge. Pt daughter also will be providing transportation home. Hospital follow up will be scheduled with PCP. Will continue to follow for course of hospitalization. Pt daughter stated pt has a f/u with cardiologist- Dr. Carlos tomorrow 4/24/2024 at 2:20pm.     Pt is discharging today 4/23/2024, cardio monitor and education was done by Annel at bedside with pt and pt daughter before d/c.     Discharge Plan A and Plan B have been determined by review of patient's clinical status, future medical and therapeutic needs, and coverage/benefits for post-acute care in coordination with multidisciplinary team members.    HAIDER Miles   [facility]    Case Management Dept  [phone]

## 2024-04-23 NOTE — PLAN OF CARE
VSS. BG monitored. Heparin gtt discontinued per order. Pt and daughter given discharge instruction, medication reviewed with the pt, follow up appts reviewed. All questions and concerns addressed. Pt verbalized understanding. IV, telemetry removed. Discharge paperwork given to patient. Pt in room waiting for transport.

## 2024-04-23 NOTE — PT/OT/SLP PROGRESS
Occupational Therapy      Patient Name:  Tonya Bucio   MRN:  [id number]    Patient not seen today secondary to nursing providing patient with medication during time of attempt due patient having high BP and patient politely declining OT during time of attempt as well with getting OOB to chair. Attempted later in pm, but patient was already up in chair and patient had just received her lunch wanting to eat. This therapist unable to return. Will follow-up for therapy as scheduled.    4/23/2024

## 2024-04-23 NOTE — CONSULTS
[facility] - Cardiology Stepdown  Wound Care    Patient Name:  Tonya Bucio   MRN:  [id number]  Date: 2024  Diagnosis: Bradycardia    History:     Past Medical History:   Diagnosis Date    Acute bronchitis with asthma     Anemia due to stage 3 chronic kidney disease     Anticoagulant long-term use     Asthma     Back pain     Cancer     Chest pain, musculoskeletal 2013    Chronic bronchitis     Colon polyps     COPD exacerbation 3/13/2020    Gout, unspecified     History of cervical cancer     Hypothyroidism     Obesity     Osteoarthritis     Renal manifestation of secondary diabetes mellitus     Thyroid disease     Trouble in sleeping     Type 2 diabetes mellitus with ophthalmic manifestations     Type 2 diabetes with peripheral circulatory disorder, controlled     Urinary incontinence     Uterine cancer     Uterine cancer        Social History     Socioeconomic History    Marital status:    Tobacco Use    Smoking status: Former     Current packs/day: 0.00     Average packs/day: 0.3 packs/day for 13.0 years (4.3 ttl pk-yrs)     Types: Cigarettes     Start date: 1951     Quit date: 1964     Years since quittin.7     Passive exposure: Past    Smokeless tobacco: Never   Substance and Sexual Activity    Alcohol use: No    Drug use: No    Sexual activity: Never     Birth control/protection: Surgical     Comment:      Social Determinants of Health     Financial Resource Strain: Low Risk  (2024)    Overall Financial Resource Strain (CARDIA)     Difficulty of Paying Living Expenses: Not hard at all   Food Insecurity: No Food Insecurity (2024)    Hunger Vital Sign     Worried About Running Out of Food in the Last Year: Never true     Ran Out of Food in the Last Year: Never true   Transportation Needs: No Transportation Needs (2024)    PRAPARE - Transportation     Lack of Transportation (Medical): No     Lack of Transportation (Non-Medical): No   Physical Activity:  Inactive (4/23/2024)    Exercise Vital Sign     Days of Exercise per Week: 7 days     Minutes of Exercise per Session: 0 min   Stress: Patient Declined (4/22/2024)    [employer] [place] [employer] - Occupational Stress Questionnaire     Feeling of Stress : Patient declined   Social Connections: Unknown (4/23/2024)    Social Connection and Isolation Panel [NHANES]     Frequency of Communication with Friends and Family: More than three times a week     Frequency of Social Gatherings with Friends and Family: More than three times a week     Attends [employer] Services: Patient declined     Active Member of Clubs or Organizations: Patient declined     Attends Club or Organization Meetings: Patient declined     Marital Status:    Recent Concern: Social Connections - Socially Isolated (4/22/2024)    Social Connection and Isolation Panel [NHANES]     Frequency of Communication with Friends and Family: More than three times a week     Frequency of Social Gatherings with Friends and Family: More than three times a week     Attends [employer] Services: Never     Active Member of Clubs or Organizations: No     Attends Club or Organization Meetings: Never     Marital Status:    Housing Stability: Unknown (4/23/2024)    Housing Stability Vital Sign     Unable to Pay for Housing in the Last Year: No     Homeless in the Last Year: No       Precautions:     Allergies as of 04/21/2024    (No Known Allergies)       [facility] Assessment Details/Treatment     Patient seen for wound care consult for groin and buttocks. Her right side beneath her pannus has a red area of what appears to be ITD with potential yeast. Patient reports her MD had given her something at home for this and it is looking better. Recommend antifungal cream to the area.   Her sacrum is clear. Her glutes have a dusky purple grey appearance with peeling skin that resembles yeast/moisture damage combined with chronic tissue damage from scooting to get  up out of a chair. She is continent with some urge incontinence and wears a brief at home. This is not over a bony area and does not appear to be a pressure injury. The right glut had 2 pin holes that drainage creamy yellow fluid out with cleansing. The nurse reported a small amount of serosang drainage deal;earlier in the day so feel this may have been a pocket tat has ruptured. There is no heat, redness or induration. Discussed above with MD.  The patient is mobile with stand by assist to get from the chair to the bed and can turn on her own in the bed. She is discharging home today so reviewed all with patient and her daughter at the bedside.        04/23/24 1515   WOCN Assessment   WOCN Total Time (mins) 60   Visit Date 04/23/24   Visit Time 1515   Consult Type New   WOCN Speciality Wound   Intervention assessed;changed;chart review;coordination of care;orders   Teaching on-going;discharge        Wound 04/21/24 1945 Moisture associated dermatitis Buttocks   Date First Assessed/Time First Assessed: 04/21/24 1945   Present on Original Admission: Yes  Primary Wound Type: Moisture associated dermatitis  Location: Buttocks   Wound Image    Dressing Appearance Open to air   Drainage Amount Moderate   Drainage Characteristics/Odor Creamy;Yellow   Appearance Pink;Purple;Gray   Tissue loss description Not applicable   Care Cleansed with:   Dressing Changed;Foam;Applied  (antifungal barrier to buttocks, foam to sacrum)        Wound 04/20/24 2240 Rash Right anterior Groin   Date First Assessed/Time First Assessed: 04/20/24 2240   Present on Original Admission: Yes  Primary Wound Type: Rash  Side: Right  Orientation: anterior  Location: Groin   Wound Image    Distribution localized   Characteristics redness/erythema   Color pink;red   Rash Care cleansed with;soap and water   Dressing Appearance Open to air   Drainage Amount None   Appearance Pink;Red   Tissue loss description Not applicable      Recommendations discussed  with primary team.     04/23/2024

## 2024-04-23 NOTE — NURSING
Hogue removed per order, pt tolerated well, denies any pain or discomfort. Cardiac monitor delivered. 1745: pt urinated 100 mL after hogue removal, denies any discomfort.

## 2024-04-24 ENCOUNTER — OFFICE VISIT (OUTPATIENT)
Dept: CARDIOLOGY | Facility: CLINIC | Age: 89
End: 2024-04-24
Payer: MEDICARE

## 2024-04-24 ENCOUNTER — DOCUMENTATION ONLY (OUTPATIENT)
Dept: ELECTROPHYSIOLOGY | Facility: CLINIC | Age: 89
End: 2024-04-24
Payer: MEDICARE

## 2024-04-24 VITALS
DIASTOLIC BLOOD PRESSURE: 70 MMHG | HEIGHT: 68 IN | WEIGHT: 249.13 LBS | OXYGEN SATURATION: 96 % | BODY MASS INDEX: 37.76 KG/M2 | HEART RATE: 98 BPM | RESPIRATION RATE: 18 BRPM | SYSTOLIC BLOOD PRESSURE: 142 MMHG

## 2024-04-24 DIAGNOSIS — I50.32 CHRONIC DIASTOLIC HEART FAILURE: ICD-10-CM

## 2024-04-24 DIAGNOSIS — E78.5 HYPERLIPIDEMIA ASSOCIATED WITH TYPE 2 DIABETES MELLITUS: ICD-10-CM

## 2024-04-24 DIAGNOSIS — I10 PRIMARY HYPERTENSION: ICD-10-CM

## 2024-04-24 DIAGNOSIS — N18.31 STAGE 3A CHRONIC KIDNEY DISEASE: ICD-10-CM

## 2024-04-24 DIAGNOSIS — R00.1 BRADYCARDIA: ICD-10-CM

## 2024-04-24 DIAGNOSIS — I70.0 AORTIC ATHEROSCLEROSIS: Primary | ICD-10-CM

## 2024-04-24 DIAGNOSIS — E66.9 OBESITY (BMI 30.0-34.9): ICD-10-CM

## 2024-04-24 DIAGNOSIS — E11.69 HYPERLIPIDEMIA ASSOCIATED WITH TYPE 2 DIABETES MELLITUS: ICD-10-CM

## 2024-04-24 PROBLEM — E66.811 OBESITY (BMI 30.0-34.9): Status: ACTIVE | Noted: 2024-04-24

## 2024-04-24 PROCEDURE — 1126F AMNT PAIN NOTED NONE PRSNT: CPT | Mod: HCNC,CPTII,S$GLB, | Performed by: INTERNAL MEDICINE

## 2024-04-24 PROCEDURE — 1159F MED LIST DOCD IN RCRD: CPT | Mod: HCNC,CPTII,S$GLB, | Performed by: INTERNAL MEDICINE

## 2024-04-24 PROCEDURE — 1111F DSCHRG MED/CURRENT MED MERGE: CPT | Mod: HCNC,CPTII,S$GLB, | Performed by: INTERNAL MEDICINE

## 2024-04-24 PROCEDURE — 99999 PR PBB SHADOW E&M-EST. PATIENT-LVL III: CPT | Mod: PBBFAC,HCNC,, | Performed by: INTERNAL MEDICINE

## 2024-04-24 PROCEDURE — 99215 OFFICE O/P EST HI 40 MIN: CPT | Mod: HCNC,S$GLB,, | Performed by: INTERNAL MEDICINE

## 2024-04-24 NOTE — PROGRESS NOTES
Patient wearing 30 day event monitor for diagnosis bradycardia     Received auto-triggered alert notification for new onset AF on 4/23/24 at 1724        Hx of AF/AFL, on xarelto.  AVN blockers discontinued during hospitalization 4/21/24 for latasha/syncope.     Non actionable event.  Will continue to monitor until 5/23/24

## 2024-04-24 NOTE — ASSESSMENT & PLAN NOTE
No bradycardia currently.  Metoprolol SR 25 mg and sotalol 40 mg b.i.d. have been withdrawn.    Thus far no documented bradycardia or symptoms of bradycardia.  She has a 30 day event monitor on board currently.

## 2024-04-24 NOTE — PROGRESS NOTES
[facility] Cardiology     Subjective:    Patient ID:  Tonya Bucio is a 88 y.o. female who presents for follow-up of Bradycardia, Diabetes Mellitus, and Hyperlipidemia    Review of patient's allergies indicates:  No Known Allergies   She recently was hospitalized at this facility for urinary tract infection.  While on telemetry she had a 2nd pause and heart rate subsequent of 31 beats per minute spontaneous conversion.  [place] [facility] [place]  She was on dopamine drip for bradycardia prophylaxis.  Metoprolol and sotalol, home meds, were discontinued.  She has been fine since.  She was released yesterday.  She has a 30 day event monitor on.  She was not seen by electrophysiology.  Her follow-up Electrocardiogram showed heart rate 64 with QT interval 408 milliseconds.  There were no subsequent pauses or bradycardia while [place] [facility] [place]    Her daughter states she has had atrial fibrillation as well as atrial flutter.  In 2020 she had a flutter.  She was given sotalol.  She subsequently chemically converted to sinus rhythm after hospital discharge.  The Electrocardiogram is in the chart confirmed controlled heart rates with flutter.    She was on Toprol 25 mg for hypertension.  She was discharged on losartan.  She is on Crestor for hyperlipidemia.  She remains on Xarelto 15 mg daily.        Review of Systems   Constitutional: Negative for chills, decreased appetite, diaphoresis, fever, malaise/fatigue, night sweats, weight gain and weight loss.   HENT:  Negative for congestion, ear discharge, ear pain, hearing loss, hoarse voice, nosebleeds, odynophagia, sore throat, stridor and tinnitus.    Eyes:  Negative for blurred vision, discharge, double vision, pain, photophobia, redness, vision loss in left eye, vision loss in right eye, visual disturbance and visual halos.   Cardiovascular:  Negative for chest pain,  claudication, cyanosis, dyspnea on exertion, irregular heartbeat, leg swelling, near-syncope, orthopnea, palpitations, paroxysmal nocturnal dyspnea and syncope.   Respiratory:  Negative for cough, hemoptysis, shortness of breath, sleep disturbances due to breathing, snoring, sputum production and wheezing.    Endocrine: Negative for cold intolerance, heat intolerance, polydipsia, polyphagia and polyuria.   Hematologic/Lymphatic: Negative for adenopathy and bleeding problem. Does not bruise/bleed easily.   Skin:  Negative for color change, dry skin, flushing, itching, nail changes, poor wound healing, rash, skin cancer, suspicious lesions and unusual hair distribution.   Musculoskeletal:  Negative for arthritis, back pain, falls, gout, joint pain, joint swelling, muscle cramps, muscle weakness, myalgias, neck pain and stiffness.   Gastrointestinal:  Negative for bloating, abdominal pain, anorexia, change in bowel habit, bowel incontinence, constipation, diarrhea, dysphagia, excessive appetite, flatus, heartburn, hematemesis, hematochezia, hemorrhoids, jaundice, melena, nausea and vomiting.   Genitourinary:  Negative for bladder incontinence, decreased libido, dysuria, flank pain, frequency, genital sores, hematuria, hesitancy, incomplete emptying, nocturia and urgency.   Neurological:  Negative for aphonia, brief paralysis, difficulty with concentration, disturbances in coordination, excessive daytime sleepiness, dizziness, focal weakness, headaches, light-headedness, loss of balance, numbness, paresthesias, seizures, sensory change, tremors, vertigo and weakness.   Psychiatric/Behavioral:  Negative for altered mental status, depression, hallucinations, memory loss, substance abuse, suicidal ideas and thoughts of violence. The patient does not have insomnia and is not nervous/anxious.    Allergic/Immunologic: Negative for hives and persistent infections.        Objective:       Vitals:    04/24/24 1430   BP: (!)  "142/70   Pulse: 98   Resp: 18   SpO2: 96%   Weight: 113 kg (249 lb 1.9 oz)   Height: 5' 8" (1.727 m)    Physical Exam  Constitutional:       General: She is not in acute distress.     Appearance: She is well-developed. She is not diaphoretic.   HENT:      Head: Normocephalic and atraumatic.      Nose: Nose normal.   Eyes:      General: No scleral icterus.        Right eye: No discharge.      Conjunctiva/sclera: Conjunctivae normal.      Pupils: Pupils are equal, round, and reactive to light.   Neck:      Thyroid: No thyromegaly.      Vascular: No JVD.      Trachea: No tracheal deviation.   Cardiovascular:      Rate and Rhythm: Normal rate and regular rhythm.      Pulses:           Carotid pulses are 2+ on the right side and 2+ on the left side.       Radial pulses are 2+ on the right side and 2+ on the left side.        Dorsalis pedis pulses are 1+ on the right side and 1+ on the left side.        Posterior tibial pulses are 1+ on the right side and 1+ on the left side.      Heart sounds: Normal heart sounds. No murmur heard.     No friction rub. No gallop.   Pulmonary:      Effort: Pulmonary effort is normal. No respiratory distress.      Breath sounds: Normal breath sounds. No stridor. No wheezing or rales.   Chest:      Chest wall: No tenderness.   Abdominal:      General: Bowel sounds are normal. There is no distension.      Palpations: Abdomen is soft. There is no mass.      Tenderness: There is no abdominal tenderness. There is no guarding or rebound.   Musculoskeletal:         General: No tenderness. Normal range of motion.      Cervical back: Normal range of motion and neck supple.   Lymphadenopathy:      Cervical: No cervical adenopathy.   Skin:     General: Skin is warm and dry.      Coloration: Skin is not pale.      Findings: No erythema or rash.   Neurological:      Mental Status: She is alert and oriented to person, place, and time.      Cranial Nerves: No cranial nerve deficit.      Coordination: " Coordination normal.   Psychiatric:         Behavior: Behavior normal.         Thought Content: Thought content normal.         Judgment: Judgment normal.           Assessment:       1. Aortic atherosclerosis    2. Bradycardia    3. Chronic diastolic heart failure    4. Hyperlipidemia associated with type 2 diabetes mellitus    5. Primary hypertension    6. Stage 3a chronic kidney disease    7. Obesity (BMI 30.0-34.9)      Results for orders placed or performed during the hospital encounter of 04/21/24   TSH   Result Value Ref Range    TSH 0.920 0.400 - 4.000 uIU/mL   Magnesium   Result Value Ref Range    Magnesium 1.6 1.6 - 2.6 mg/dL   Basic metabolic panel   Result Value Ref Range    Sodium 140 136 - 145 mmol/L    Potassium 4.0 3.5 - 5.1 mmol/L    Chloride 109 95 - 110 mmol/L    CO2 21 (L) 23 - 29 mmol/L    Glucose 176 (H) 70 - 110 mg/dL    BUN 27 (H) 8 - 23 mg/dL    Creatinine 1.2 0.5 - 1.4 mg/dL    Calcium 9.1 8.7 - 10.5 mg/dL    Anion Gap 10 8 - 16 mmol/L    eGFR 43.8 (A) >60 mL/min/1.73 m^2   APTT   Result Value Ref Range    aPTT 25.0 21.0 - 32.0 sec   Protime-INR   Result Value Ref Range    Prothrombin Time 11.3 9.0 - 12.5 sec    INR 1.0 0.8 - 1.2   CBC auto differential   Result Value Ref Range    WBC 15.36 (H) 3.90 - 12.70 K/uL    RBC 3.52 (L) 4.00 - 5.40 M/uL    Hemoglobin 10.6 (L) 12.0 - 16.0 g/dL    Hematocrit 33.5 (L) 37.0 - 48.5 %    MCV 95 82 - 98 fL    MCH 30.1 27.0 - 31.0 pg    MCHC 31.6 (L) 32.0 - 36.0 g/dL    RDW 15.1 (H) 11.5 - 14.5 %    Platelets 274 150 - 450 K/uL    MPV 10.5 9.2 - 12.9 fL    Immature Granulocytes 1.2 (H) 0.0 - 0.5 %    Gran # (ANC) 12.7 (H) 1.8 - 7.7 K/uL    Immature Grans (Abs) 0.18 (H) 0.00 - 0.04 K/uL    Lymph # 1.3 1.0 - 4.8 K/uL    Mono # 0.8 0.3 - 1.0 K/uL    Eos # 0.2 0.0 - 0.5 K/uL    Baso # 0.07 0.00 - 0.20 K/uL    nRBC 0 0 /100 WBC    Gran % 82.8 (H) 38.0 - 73.0 %    Lymph % 8.5 (L) 18.0 - 48.0 %    Mono % 5.5 4.0 - 15.0 %    Eosinophil % 1.5 0.0 - 8.0 %    Basophil  % 0.5 0.0 - 1.9 %    Differential Method Automated    APTT   Result Value Ref Range    aPTT 29.1 21.0 - 32.0 sec   CBC Auto Differential   Result Value Ref Range    WBC 13.81 (H) 3.90 - 12.70 K/uL    RBC 3.42 (L) 4.00 - 5.40 M/uL    Hemoglobin 10.2 (L) 12.0 - 16.0 g/dL    Hematocrit 32.3 (L) 37.0 - 48.5 %    MCV 94 82 - 98 fL    MCH 29.8 27.0 - 31.0 pg    MCHC 31.6 (L) 32.0 - 36.0 g/dL    RDW 14.9 (H) 11.5 - 14.5 %    Platelets 264 150 - 450 K/uL    MPV 10.5 9.2 - 12.9 fL    Immature Granulocytes 1.2 (H) 0.0 - 0.5 %    Gran # (ANC) 10.7 (H) 1.8 - 7.7 K/uL    Immature Grans (Abs) 0.16 (H) 0.00 - 0.04 K/uL    Lymph # 1.7 1.0 - 4.8 K/uL    Mono # 1.0 0.3 - 1.0 K/uL    Eos # 0.2 0.0 - 0.5 K/uL    Baso # 0.07 0.00 - 0.20 K/uL    nRBC 0 0 /100 WBC    Gran % 77.7 (H) 38.0 - 73.0 %    Lymph % 12.0 (L) 18.0 - 48.0 %    Mono % 7.0 4.0 - 15.0 %    Eosinophil % 1.6 0.0 - 8.0 %    Basophil % 0.5 0.0 - 1.9 %    Differential Method Automated    Basic metabolic panel   Result Value Ref Range    Sodium 138 136 - 145 mmol/L    Potassium 4.5 3.5 - 5.1 mmol/L    Chloride 107 95 - 110 mmol/L    CO2 21 (L) 23 - 29 mmol/L    Glucose 131 (H) 70 - 110 mg/dL    BUN 26 (H) 8 - 23 mg/dL    Creatinine 1.2 0.5 - 1.4 mg/dL    Calcium 9.1 8.7 - 10.5 mg/dL    Anion Gap 10 8 - 16 mmol/L    eGFR 43.8 (A) >60 mL/min/1.73 m^2   Phosphorus   Result Value Ref Range    Phosphorus 2.7 2.7 - 4.5 mg/dL   Magnesium   Result Value Ref Range    Magnesium 1.6 1.6 - 2.6 mg/dL   CK   Result Value Ref Range    CPK 21 20 - 180 U/L   APTT   Result Value Ref Range    aPTT 39.4 (H) 21.0 - 32.0 sec   APTT   Result Value Ref Range    aPTT 29.8 21.0 - 32.0 sec   APTT   Result Value Ref Range    aPTT 49.8 (H) 21.0 - 32.0 sec   APTT   Result Value Ref Range    aPTT 45.6 (H) 21.0 - 32.0 sec   CBC Auto Differential   Result Value Ref Range    WBC 11.38 3.90 - 12.70 K/uL    RBC 3.37 (L) 4.00 - 5.40 M/uL    Hemoglobin 9.9 (L) 12.0 - 16.0 g/dL    Hematocrit 31.8 (L) 37.0 - 48.5  %    MCV 94 82 - 98 fL    MCH 29.4 27.0 - 31.0 pg    MCHC 31.1 (L) 32.0 - 36.0 g/dL    RDW 15.2 (H) 11.5 - 14.5 %    Platelets 281 150 - 450 K/uL    MPV 10.6 9.2 - 12.9 fL    Immature Granulocytes 1.6 (H) 0.0 - 0.5 %    Gran # (ANC) 7.6 1.8 - 7.7 K/uL    Immature Grans (Abs) 0.18 (H) 0.00 - 0.04 K/uL    Lymph # 1.9 1.0 - 4.8 K/uL    Mono # 1.0 0.3 - 1.0 K/uL    Eos # 0.7 (H) 0.0 - 0.5 K/uL    Baso # 0.07 0.00 - 0.20 K/uL    nRBC 0 0 /100 WBC    Gran % 66.6 38.0 - 73.0 %    Lymph % 16.5 (L) 18.0 - 48.0 %    Mono % 9.0 4.0 - 15.0 %    Eosinophil % 5.7 0.0 - 8.0 %    Basophil % 0.6 0.0 - 1.9 %    Differential Method Automated    Basic metabolic panel   Result Value Ref Range    Sodium 142 136 - 145 mmol/L    Potassium 3.9 3.5 - 5.1 mmol/L    Chloride 109 95 - 110 mmol/L    CO2 23 23 - 29 mmol/L    Glucose 103 70 - 110 mg/dL    BUN 22 8 - 23 mg/dL    Creatinine 1.2 0.5 - 1.4 mg/dL    Calcium 9.6 8.7 - 10.5 mg/dL    Anion Gap 10 8 - 16 mmol/L    eGFR 43.8 (A) >60 mL/min/1.73 m^2   Phosphorus   Result Value Ref Range    Phosphorus 2.2 (L) 2.7 - 4.5 mg/dL   APTT   Result Value Ref Range    aPTT 44.0 (H) 21.0 - 32.0 sec   EKG 12-lead   Result Value Ref Range    QRS Duration 88 ms    OHS QTC Calculation 431 ms   EKG 12-lead   Result Value Ref Range    QRS Duration 90 ms    OHS QTC Calculation 408 ms   Echo   Result Value Ref Range    RA Width 4.42 cm    LA volume (mod) 105.28 cm3    Left Atrium Major Axis 6.26 cm    Left Atrium Minor Axis 6.37 cm    RA Major Axis 5.34 cm    LV Diastolic Volume 119.50 mL    LV Systolic Volume 53.90 mL    MV Peak A Niranjan 0.80 m/s    MV stenosis pressure 1/2 time 61.40 ms    TR Max Niranjan 2.53 m/s    MV Peak E Niranjan 0.61 m/s    Ao VTI 27.79 cm    Ao peak niranjan 1.59 m/s    LVOT peak VTI 23.09 cm    LVOT peak niranjan 1.41 m/s    LVOT diameter 2.19 cm    E wave deceleration time 211.71 msec    AV mean gradient 5 mmHg    TAPSE 2.10 cm    RVDD 3.45 cm    LA size 3.96 cm    Ascending aorta 3.51 cm    STJ 2.77 cm     Sinus 3.08 cm    LVIDs 3.59 2.1 - 4.0 cm    Posterior Wall 1.13 (A) 0.6 - 1.1 cm    IVS 1.35 (A) 0.6 - 1.1 cm    LVIDd 5.02 3.5 - 6.0 cm    TDI LATERAL 0.04 m/s    LA WIDTH 5.14 cm    TDI SEPTAL 0.04 m/s    LV LATERAL E/E' RATIO 15.25 m/s    LV SEPTAL E/E' RATIO 15.25 m/s    RV/LV Ratio 0.69 cm    FS 28 28 - 44 %    LA volume 109.25 cm3    LV mass 246.35 g    Left Ventricle Relative Wall Thickness 0.45 cm    AV valve area 3.13 cm²    AV Velocity Ratio 0.89     AV index (prosthetic) 0.83     MV valve area p 1/2 method 3.58 cm2    E/A ratio 0.76     Mean e' 0.04 m/s    LVOT area 3.8 cm2    LVOT stroke volume 86.93 cm3    AV peak gradient 10 mmHg    E/E' ratio 15.25 m/s    Triscuspid Valve Regurgitation Peak Gradient 26 mmHg    RAYMOND by Velocity Ratio 3.34 cm²    BSA 2.36 m2    LV Systolic Volume Index 23.7 mL/m2    LV Diastolic Volume Index 52.64 mL/m2    LV Mass Index 109 g/m2    LA Volume Index 48.1 mL/m2    LA Volume Index (Mod) 46.4 mL/m2    ZLVIDS -2.78     ZLVIDD -5.19     TV resting pulmonary artery pressure 29 mmHg    RV TB RVSP 6 mmHg    Est. RA pres 3 mmHg   POCT glucose   Result Value Ref Range    POCT Glucose 175 (H) 70 - 110 mg/dL   POCT glucose   Result Value Ref Range    POCT Glucose 126 (H) 70 - 110 mg/dL   POCT glucose   Result Value Ref Range    POCT Glucose 154 (H) 70 - 110 mg/dL   POCT glucose   Result Value Ref Range    POCT Glucose 172 (H) 70 - 110 mg/dL   POCT glucose   Result Value Ref Range    POCT Glucose 131 (H) 70 - 110 mg/dL   POCT glucose   Result Value Ref Range    POCT Glucose 104 70 - 110 mg/dL   POCT glucose   Result Value Ref Range    POCT Glucose 117 (H) 70 - 110 mg/dL   POCT glucose   Result Value Ref Range    POCT Glucose 132 (H) 70 - 110 mg/dL         Current Outpatient Medications:     acetaminophen (TYLENOL) 500 MG tablet, Take 500 mg by mouth every evening. And as needed, Disp: , Rfl:     allopurinoL (ZYLOPRIM) 300 MG tablet, Take 1 tablet (300 mg total) by mouth once daily.,  Disp: 90 tablet, Rfl: 3    cinacalcet (SENSIPAR) 60 MG Tab, SMARTSI Tablet(s) By Mouth Every Evening, Disp: , Rfl:     ferrous sulfate 325 (65 FE) MG EC tablet, Take 1 tablet (325 mg total) by mouth once daily., Disp: 90 tablet, Rfl: 1    HYDROcodone-acetaminophen (NORCO) 7.5-325 mg per tablet, Take 1 tablet by mouth every 24 hours as needed for Pain., Disp: 30 tablet, Rfl: 0    levothyroxine (SYNTHROID) 75 MCG tablet, Take 1 tablet by mouth once daily, Disp: 90 tablet, Rfl: 3    losartan (COZAAR) 25 MG tablet, Take 1 tablet (25 mg total) by mouth once daily., Disp: 90 tablet, Rfl: 3    MAGNESIUM CITRATE ORAL, Take by mouth., Disp: , Rfl:     multivitamin (ONE DAILY MULTIVITAMIN) per tablet, Take 1 tablet by mouth once daily., Disp: , Rfl:     omega-3 acid ethyl esters (LOVAZA) 1 gram capsule, Take 1 capsule (1 g total) by mouth 2 (two) times daily., Disp: 60 capsule, Rfl: 0    rosuvastatin (CRESTOR) 20 MG tablet, Take 1 tablet by mouth once daily, Disp: 90 tablet, Rfl: 3    XARELTO 15 mg Tab, Take 1 tablet (15 mg total) by mouth every evening., Disp: 90 tablet, Rfl: 3  No current facility-administered medications for this visit.     Lab Results   Component Value Date    WBC 11.38 2024    RBC 3.37 (L) 2024    HGB 9.9 (L) 2024    HCT 31.8 (L) 2024    MCV 94 2024    MCH 29.4 2024    MCHC 31.1 (L) 2024    RDW 15.2 (H) 2024     2024    MPV 10.6 2024    GRAN 7.6 2024    GRAN 66.6 2024    LYMPH 1.9 2024    LYMPH 16.5 (L) 2024    MONO 1.0 2024    MONO 9.0 2024    EOS 0.7 (H) 2024    BASO 0.07 2024    EOSINOPHIL 5.7 2024    BASOPHIL 0.6 2024    MG 1.6 2024        CMP  Lab Results   Component Value Date     2024    K 3.9 2024     2024    CO2 23 2024     2024    BUN 22 2024    CREATININE 1.2 2024    CALCIUM 9.6 2024    PROT  6.1 04/21/2024    ALBUMIN 2.6 (L) 04/21/2024    BILITOT 0.3 04/21/2024    ALKPHOS 83 04/21/2024    AST 14 04/21/2024    ALT 18 04/21/2024    ANIONGAP 10 04/23/2024    ESTGFRAFRICA 43 (A) 06/23/2022    EGFRNONAA 37 (A) 06/23/2022        Lab Results   Component Value Date    LABBLOO No Growth to date 04/20/2024    LABBLOO No Growth to date 04/20/2024    LABBLOO No Growth to date 04/20/2024    LABBLOO No Growth to date 04/20/2024    LABURIN ESCHERICHIA COLI  >100,000 cfu/ml   (A) 04/20/2024    RESPIRATORYC Normal respiratory vu 08/12/2014    GSRESP <10 epithelial cells per low power field. 08/12/2014    GSRESP Rare WBC's 08/12/2014    GSRESP No organisms seen 08/12/2014            Results for orders placed or performed during the hospital encounter of 04/21/24   EKG 12-lead    Collection Time: 04/21/24 10:43 PM   Result Value Ref Range    QRS Duration 90 ms    OHS QTC Calculation 408 ms    Narrative    Test Reason :     Vent. Rate : 064 BPM     Atrial Rate : 064 BPM     P-R Int : 226 ms          QRS Dur : 090 ms      QT Int : 396 ms       P-R-T Axes : 000 008 090 degrees     QTc Int : 408 ms    Ectopic atrial pacemaker  Nonspecific T wave abnormality  Abnormal ECG  When compared with ECG of 21-APR-2024 20:26,  No significant change was found  Confirmed by SENIA MORE MD ([id number]) on 4/22/2024 12:25:53 PM    Referred By: NOE LEIVA           Confirmed By:SENIA MORE MD         Renal Artery Stenosis Hyperten (xpd) 04/23/2024 [id number] Final   Cardiac monitoring strips 04/23/2024  Final   Cardiac monitoring strips 04/22/2024  Final            Plan:       Problem List Items Addressed This Visit          Cardiac/Vascular    Hyperlipidemia associated with type 2 diabetes mellitus     Rosuvastatin 20 mg will be continued.  Most recent LDL 60 mg% by labs March 2024.         Aortic atherosclerosis - Primary     Condition unchanged.         Primary hypertension     Losartan 25 mg started recently.  Blood pressure  today 140/70 mm Hg.  I told the patient that increasing losartan to 50 would be advisable if the blood pressures remain elevated.         Bradycardia     No bradycardia currently.  Metoprolol SR 25 mg and sotalol 40 mg b.i.d. have been withdrawn.    Thus far no documented bradycardia or symptoms of bradycardia.  She has a 30 day event monitor on board currently.         Chronic diastolic heart failure     Condition unchanged.  She is seen in a wheelchair today.            Renal/    Stage 3a chronic kidney disease     Condition unchanged.            Endocrine    Obesity (BMI 30.0-34.9)     Weight loss encouraged.                 Thus far stable but she just got out of the hospital.  Primary sick sinus syndrome is of concern but obviously both sotalol and metoprolol have been removed and she is doing well at this time.    I advised a four-month follow-up.           Tutu Carlos MD  04/24/2024   3:00 PM

## 2024-04-25 ENCOUNTER — PATIENT OUTREACH (OUTPATIENT)
Dept: ADMINISTRATIVE | Facility: CLINIC | Age: 89
End: 2024-04-25
Payer: MEDICARE

## 2024-04-25 NOTE — PROGRESS NOTES
C3 nurse spoke with Tonya Bucio for a [facility]CC post hospital discharge follow up call. Nurse offered to scheduled [facility] hospital follow-up appointment. The patient declined assistance scheduling this appointment at this time, stating, she had her cardiology follow up and will contact Tasha Atkins MD.

## 2024-04-25 NOTE — PHYSICIAN QUERY
integumentary diagnosis related to the documentation of (buttocks):    Moisture associated dermatitis due to Friction or Contact with body fluids, unspecified     Juan Govea

## 2024-04-26 LAB
BACTERIA BLD CULT: NORMAL
BACTERIA BLD CULT: NORMAL

## 2024-05-01 ENCOUNTER — TELEPHONE (OUTPATIENT)
Dept: ELECTROPHYSIOLOGY | Facility: CLINIC | Age: 89
End: 2024-05-01
Payer: MEDICARE

## 2024-05-01 DIAGNOSIS — R00.1 BRADYCARDIA: Primary | ICD-10-CM

## 2024-05-01 NOTE — TELEPHONE ENCOUNTER
"[employer] cardiac monitoring event alert for 3.28 sec pause; escalated to clinic today at 1:09pm EST  Event recorded 5/1/24 @ 10:03am    S/w pt's daughter.  She is not with the patient at the moment but will assess for symptoms when she returns.  Daughter stated patient has had long pauses "7 seconds in the past".    She will contact the clinic for any symptoms related to the event and was asked to call in the future for any symptoms.          "

## 2024-05-05 ENCOUNTER — HOSPITAL ENCOUNTER (EMERGENCY)
Facility: HOSPITAL | Age: 89
Discharge: HOME | End: 2024-05-05
Attending: EMERGENCY MEDICINE
Payer: MEDICARE

## 2024-05-05 VITALS
OXYGEN SATURATION: 95 % | HEIGHT: 68 IN | TEMPERATURE: 99 F | RESPIRATION RATE: 22 BRPM | SYSTOLIC BLOOD PRESSURE: 136 MMHG | DIASTOLIC BLOOD PRESSURE: 65 MMHG | BODY MASS INDEX: 38.65 KG/M2 | WEIGHT: 255 LBS | HEART RATE: 78 BPM

## 2024-05-05 DIAGNOSIS — E86.0 DEHYDRATION: ICD-10-CM

## 2024-05-05 DIAGNOSIS — Z87.440 HISTORY OF UTI: Primary | ICD-10-CM

## 2024-05-05 LAB
ALBUMIN SERPL BCP-MCNC: 2.8 G/DL (ref 3.5–5.2)
ALP SERPL-CCNC: 83 U/L (ref 55–135)
ALT SERPL W/O P-5'-P-CCNC: 22 U/L (ref 10–44)
ANION GAP SERPL CALC-SCNC: 13 MMOL/L (ref 8–16)
AST SERPL-CCNC: 20 U/L (ref 10–40)
BACTERIA #/AREA URNS HPF: ABNORMAL /HPF
BASOPHILS # BLD AUTO: 0.05 K/UL (ref 0–0.2)
BASOPHILS NFR BLD: 0.4 % (ref 0–1.9)
BILIRUB SERPL-MCNC: 0.6 MG/DL (ref 0.1–1)
BILIRUB UR QL STRIP: ABNORMAL
BUN SERPL-MCNC: 30 MG/DL (ref 8–23)
CALCIUM SERPL-MCNC: 9.1 MG/DL (ref 8.7–10.5)
CHLORIDE SERPL-SCNC: 103 MMOL/L (ref 95–110)
CLARITY UR: CLEAR
CO2 SERPL-SCNC: 22 MMOL/L (ref 23–29)
COLOR UR: YELLOW
CREAT SERPL-MCNC: 1.6 MG/DL (ref 0.5–1.4)
DIFFERENTIAL METHOD BLD: ABNORMAL
EOSINOPHIL # BLD AUTO: 0 K/UL (ref 0–0.5)
EOSINOPHIL NFR BLD: 0 % (ref 0–8)
ERYTHROCYTE [DISTWIDTH] IN BLOOD BY AUTOMATED COUNT: 14.9 % (ref 11.5–14.5)
EST. GFR  (NO RACE VARIABLE): 31 ML/MIN/1.73 M^2
GLUCOSE SERPL-MCNC: 145 MG/DL (ref 70–110)
GLUCOSE UR QL STRIP: NEGATIVE
HCT VFR BLD AUTO: 33.2 % (ref 37–48.5)
HGB BLD-MCNC: 10.4 G/DL (ref 12–16)
HGB UR QL STRIP: ABNORMAL
HYALINE CASTS #/AREA URNS LPF: 2 /LPF
IMM GRANULOCYTES # BLD AUTO: 0.11 K/UL (ref 0–0.04)
IMM GRANULOCYTES NFR BLD AUTO: 0.8 % (ref 0–0.5)
INFLUENZA A, MOLECULAR: NEGATIVE
INFLUENZA B, MOLECULAR: NEGATIVE
KETONES UR QL STRIP: NEGATIVE
LACTATE SERPL-SCNC: 2.6 MMOL/L (ref 0.5–2.2)
LEUKOCYTE ESTERASE UR QL STRIP: NEGATIVE
LYMPHOCYTES # BLD AUTO: 0.4 K/UL (ref 1–4.8)
LYMPHOCYTES NFR BLD: 3.3 % (ref 18–48)
MCH RBC QN AUTO: 29 PG (ref 27–31)
MCHC RBC AUTO-ENTMCNC: 31.3 G/DL (ref 32–36)
MCV RBC AUTO: 93 FL (ref 82–98)
MICROSCOPIC COMMENT: ABNORMAL
MONOCYTES # BLD AUTO: 0.5 K/UL (ref 0.3–1)
MONOCYTES NFR BLD: 4.1 % (ref 4–15)
NEUTROPHILS # BLD AUTO: 11.9 K/UL (ref 1.8–7.7)
NEUTROPHILS NFR BLD: 91.4 % (ref 38–73)
NITRITE UR QL STRIP: NEGATIVE
NRBC BLD-RTO: 0 /100 WBC
PH UR STRIP: 6 [PH] (ref 5–8)
PLATELET # BLD AUTO: 305 K/UL (ref 150–450)
PMV BLD AUTO: 10.2 FL (ref 9.2–12.9)
POTASSIUM SERPL-SCNC: 4.6 MMOL/L (ref 3.5–5.1)
PROT SERPL-MCNC: 7 G/DL (ref 6–8.4)
PROT UR QL STRIP: ABNORMAL
RBC # BLD AUTO: 3.59 M/UL (ref 4–5.4)
RBC #/AREA URNS HPF: 6 /HPF (ref 0–4)
SARS-COV-2 RDRP RESP QL NAA+PROBE: NEGATIVE
SODIUM SERPL-SCNC: 138 MMOL/L (ref 136–145)
SP GR UR STRIP: 1.02 (ref 1–1.03)
SPECIMEN SOURCE: NORMAL
SQUAMOUS #/AREA URNS HPF: 7 /HPF
TROPONIN I SERPL DL<=0.01 NG/ML-MCNC: 0.08 NG/ML (ref 0–0.03)
URN SPEC COLLECT METH UR: ABNORMAL
UROBILINOGEN UR STRIP-ACNC: NEGATIVE EU/DL
WBC # BLD AUTO: 12.99 K/UL (ref 3.9–12.7)
WBC #/AREA URNS HPF: 13 /HPF (ref 0–5)

## 2024-05-05 PROCEDURE — 87040 BLOOD CULTURE FOR BACTERIA: CPT | Mod: 59,HCNC | Performed by: SURGERY

## 2024-05-05 PROCEDURE — 87502 INFLUENZA DNA AMP PROBE: CPT | Mod: HCNC | Performed by: EMERGENCY MEDICINE

## 2024-05-05 PROCEDURE — 99284 EMERGENCY DEPT VISIT MOD MDM: CPT | Mod: 25,HCNC

## 2024-05-05 PROCEDURE — 96361 HYDRATE IV INFUSION ADD-ON: CPT | Mod: HCNC

## 2024-05-05 PROCEDURE — 87086 URINE CULTURE/COLONY COUNT: CPT | Mod: HCNC | Performed by: EMERGENCY MEDICINE

## 2024-05-05 PROCEDURE — 96365 THER/PROPH/DIAG IV INF INIT: CPT | Mod: HCNC

## 2024-05-05 PROCEDURE — 85025 COMPLETE CBC W/AUTO DIFF WBC: CPT | Mod: HCNC | Performed by: EMERGENCY MEDICINE

## 2024-05-05 PROCEDURE — U0002 COVID-19 LAB TEST NON-CDC: HCPCS | Mod: HCNC | Performed by: EMERGENCY MEDICINE

## 2024-05-05 PROCEDURE — 80053 COMPREHEN METABOLIC PANEL: CPT | Mod: HCNC | Performed by: EMERGENCY MEDICINE

## 2024-05-05 PROCEDURE — 63600175 PHARM REV CODE 636 W HCPCS: Mod: HCNC | Performed by: SURGERY

## 2024-05-05 PROCEDURE — 83605 ASSAY OF LACTIC ACID: CPT | Mod: HCNC | Performed by: SURGERY

## 2024-05-05 PROCEDURE — 81000 URINALYSIS NONAUTO W/SCOPE: CPT | Mod: HCNC | Performed by: EMERGENCY MEDICINE

## 2024-05-05 PROCEDURE — 25000003 PHARM REV CODE 250: Mod: HCNC | Performed by: SURGERY

## 2024-05-05 PROCEDURE — 84484 ASSAY OF TROPONIN QUANT: CPT | Mod: HCNC | Performed by: SURGERY

## 2024-05-05 RX ORDER — CIPROFLOXACIN 500 MG/1
500 TABLET ORAL 2 TIMES DAILY
Qty: 14 TABLET | Refills: 0 | Status: SHIPPED | OUTPATIENT
Start: 2024-05-05 | End: 2024-05-12

## 2024-05-05 RX ORDER — IBUPROFEN 800 MG/1
800 TABLET ORAL
Status: DISCONTINUED | OUTPATIENT
Start: 2024-05-05 | End: 2024-05-06 | Stop reason: HOSPADM

## 2024-05-05 RX ORDER — TAMSULOSIN HYDROCHLORIDE 0.4 MG/1
0.4 CAPSULE ORAL DAILY
Qty: 30 CAPSULE | Refills: 0 | Status: SHIPPED | OUTPATIENT
Start: 2024-05-05 | End: 2024-06-04

## 2024-05-05 RX ORDER — ACETAMINOPHEN 500 MG
1000 TABLET ORAL
Status: COMPLETED | OUTPATIENT
Start: 2024-05-05 | End: 2024-05-05

## 2024-05-05 RX ADMIN — ACETAMINOPHEN 1000 MG: 500 TABLET ORAL at 09:05

## 2024-05-05 RX ADMIN — CEFTRIAXONE SODIUM 2 G: 2 INJECTION, POWDER, FOR SOLUTION INTRAMUSCULAR; INTRAVENOUS at 09:05

## 2024-05-05 RX ADMIN — SODIUM CHLORIDE 1000 ML: 9 INJECTION, SOLUTION INTRAVENOUS at 07:05

## 2024-05-05 NOTE — ED PROVIDER NOTES
NAME:  Tonya Bucio  CSN:     [id number]  MRN:    [id number]  ADMIT DATE: 5/5/2024        eMERGENCY dEPARTMENT eNCOUnter    CHIEF COMPLAINT    Chief Complaint   Patient presents with    Fatigue    Fever    Dysuria       HPI    Tonya Bucio is a 88 y.o. female who presents to the ED for evaluation of dysuria and fever.    Patient reports having  a fever of 102 at home today.  She reports associated with urinary symptoms.    She was admitted to the hospital 2 weeks ago for a cardiac evaluation for an arrhythmia and also had   a UTI that time.  She reports getting IV antibiotics while admitted but was not discharged home with   any antibiotics.  She denies any other symptoms    ALLERGIES    Review of patient's allergies indicates:  No Known Allergies    PAST MEDICAL HISTORY  Past Medical History:   Diagnosis Date    Acute bronchitis with asthma     Anemia due to stage 3 chronic kidney disease     Anticoagulant long-term use     Asthma     Back pain     Cancer     Chest pain, musculoskeletal 7/16/2013    Chronic bronchitis     Colon polyps     COPD exacerbation 3/13/2020    Gout, unspecified     History of cervical cancer     Hypothyroidism     Obesity     Osteoarthritis     Renal manifestation of secondary diabetes mellitus     Thyroid disease     Trouble in sleeping     Type 2 diabetes mellitus with ophthalmic manifestations     Type 2 diabetes with peripheral circulatory disorder, controlled     Urinary incontinence     Uterine cancer     Uterine cancer        SURGICAL HISTORY    Past Surgical History:   Procedure Laterality Date    ADENOIDECTOMY      EYE SURGERY Right     right eye cataract    HYSTERECTOMY  2008    LIZ-BSO    tonsilectomy      TONSILLECTOMY  1945    TOTAL ABDOMINAL HYSTERECTOMY  2008    TOTAL ABDOMINAL HYSTERECTOMY W/ BILATERAL SALPINGOOPHORECTOMY  2008       SOCIAL HISTORY    Social History     Socioeconomic History    Marital status:    Tobacco Use    Smoking status: Former      Current packs/day: 0.00     Average packs/day: 0.3 packs/day for 13.0 years (4.3 ttl pk-yrs)     Types: Cigarettes     Start date: 1951     Quit date: 1964     Years since quittin.7     Passive exposure: Past    Smokeless tobacco: Never   Substance and Sexual Activity    Alcohol use: No    Drug use: No    Sexual activity: Never     Birth control/protection: Surgical     Comment:      Social Determinants of Health     Financial Resource Strain: Low Risk  (2024)    Overall Financial Resource Strain (CARDIA)     Difficulty of Paying Living Expenses: Not hard at all   Food Insecurity: No Food Insecurity (2024)    Hunger Vital Sign     Worried About Running Out of Food in the Last Year: Never true     Ran Out of Food in the Last Year: Never true   Transportation Needs: No Transportation Needs (2024)    PRAPARE - Transportation     Lack of Transportation (Medical): No     Lack of Transportation (Non-Medical): No   Physical Activity: Inactive (2024)    Exercise Vital Sign     Days of Exercise per Week: 7 days     Minutes of Exercise per Session: 0 min   Stress: Patient Declined (2024)    [employer] - Occupational Stress Questionnaire     Feeling of Stress : Patient declined       FAMILY HISTORY    Family History   Problem Relation Name Age of Onset    Heart disease Mother Samara     Arthritis Father Andres     Breast cancer Sister Radha     Ovarian cancer Sister Radha     Cancer Brother Firmen         Throat cancer    Arthritis Daughter Samara         back    No Known Problems Son Roby     Heart disease Brother Andres Jr     Stroke Brother Andres Jr     Heart disease Brother Ean         stents heart and legs     Diabetes Brother Ean     Hyperlipidemia Brother Ean     Anemia Daughter Ramila     Arthritis Daughter Ramila         RA    Diabetes Daughter Mita     Arthritis Daughter Mita         RA    Glaucoma Daughter Mita     Diabetes  "Daughter Audra     Liver disease Daughter Audra     Arthritis Daughter Ibis         back     Stroke Son Conrad     No Known Problems Son Jarred        REVIEW OF SYSTEMS   ROS  All Systems otherwise negative except as noted in the History of Present Illness.        PHYSICAL EXAM    Reviewed Triage Note  VITAL SIGNS:   ED Triage Vitals   Enc Vitals Group      BP 05/05/24 1702 (!) 112/53      Pulse 05/05/24 1702 83      Resp 05/05/24 1702 18      Temp 05/05/24 1702 98.5 °F (36.9 °C)      Temp Source 05/05/24 1701 Oral      SpO2 05/05/24 1702 96 %      Weight 05/05/24 1701 255 lb      Height 05/05/24 1701 5' 8"      Head Circumference --       Peak Flow --       Pain Score --       Pain Loc --       Pain Education --       Exclude from Growth Chart --        Patient Vitals for the past 24 hrs:   BP Temp Temp src Pulse Resp SpO2 Height Weight   05/05/24 2107 -- (!) 100.4 °F (38 °C) Oral -- -- -- -- --   05/05/24 1903 139/82 -- -- 69 (!) 25 96 % -- --   05/05/24 1715 137/60 -- -- 78 (!) 25 95 % -- --   05/05/24 1702 (!) 112/53 98.5 °F (36.9 °C) Oral 83 18 96 % -- --   05/05/24 1701 -- -- Oral -- -- -- 5' 8" (1.727 m) 115.7 kg (255 lb)           Physical Exam  Constitutional:  Well-developed, well-nourished. No acute distress  HENT:  Normocephalic, atraumatic.  Eyes:  EOMI. Conjunctiva normal without discharge.   Neck: Normal range of motion.No stridor. No meningismus.   Respiratory:  Normal breath sounds bilaterally.  No respiratory distress, retractions, or conversational dyspnea. No wheezing. No rhonchi. No rales.   Cardiovascular:  Normal heart rate. Normal rhythm. No pitting lower extremity edema.   GI:  Abdomen soft, non-distended, non-tender. Normal bowel sounds. No guarding, rigidity or rebound.    : No CVA tenderness.   Musculoskeletal:  No gross deformity or limited range of motion of all major joints. No palpable bony deformity. No tenderness to palpation.  Integument:  Warm and dry. No rash.  Neurologic:  " Normal motor function. Normal sensory function. No focal deficits noted. Alert and Interactive.  Psychiatric:  Affect normal. Mood normal.         LABS  Pertinent labs reviewed. (See chart for details)   Labs Reviewed   CBC W/ AUTO DIFFERENTIAL - Abnormal; Notable for the following components:       Result Value    WBC 12.99 (*)     RBC 3.59 (*)     Hemoglobin 10.4 (*)     Hematocrit 33.2 (*)     MCHC 31.3 (*)     RDW 14.9 (*)     Immature Granulocytes 0.8 (*)     Gran # (ANC) 11.9 (*)     Immature Grans (Abs) 0.11 (*)     Lymph # 0.4 (*)     Gran % 91.4 (*)     Lymph % 3.3 (*)     All other components within normal limits   COMPREHENSIVE METABOLIC PANEL - Abnormal; Notable for the following components:    CO2 22 (*)     Glucose 145 (*)     BUN 30 (*)     Creatinine 1.6 (*)     Albumin 2.8 (*)     eGFR 31 (*)     All other components within normal limits   URINALYSIS, REFLEX TO URINE CULTURE - Abnormal; Notable for the following components:    Protein, UA 2+ (*)     Bilirubin (UA) 1+ (*)     Occult Blood UA 1+ (*)     All other components within normal limits    Narrative:     Specimen Source->Urine   URINALYSIS MICROSCOPIC - Abnormal; Notable for the following components:    RBC, UA 6 (*)     WBC, UA 13 (*)     Bacteria Few (*)     Hyaline Casts, UA 2 (*)     All other components within normal limits    Narrative:     Specimen Source->Urine   TROPONIN I - Abnormal; Notable for the following components:    Troponin I 0.077 (*)     All other components within normal limits   LACTIC ACID, PLASMA - Abnormal; Notable for the following components:    Lactate (Lactic Acid) 2.6 (*)     All other components within normal limits   INFLUENZA A & B BY MOLECULAR   CULTURE, URINE   CULTURE, BLOOD   CULTURE, BLOOD   SARS-COV-2 RNA AMPLIFICATION, QUAL         RADIOLOGY    Imaging Results              X-Ray Chest AP Portable (Final result)  Result time 05/05/24 18:05:47      Final result by Santos Cruz DO (05/05/24 18:05:47)                    Impression:      No acute abnormality.      Electronically signed by: Santos Cruz  Date:    05/05/2024  Time:    18:05               Narrative:    EXAMINATION:  XR CHEST AP PORTABLE    CLINICAL HISTORY:  fever;    TECHNIQUE:  Single frontal view of the chest was performed.    COMPARISON:  04/20/2024.    FINDINGS:  Monitoring device overlies the right lung.  The lungs are well expanded and clear. No focal opacities are seen. The pleural spaces are clear. The cardiac silhouette is enlarged.  There are calcifications of the aortic arch.  The visualized osseous structures are intact.                                      PROCEDURES    Procedures      EKG     No STEMI  Normal sinus rhythm  No ectopy  Normal conduction  Normal ST segments  Normal T-wave  Normal axis  Heart rate in the 60s  David Guzmán M.D. 9:25 PM 5/5/2024           ED COURSE & MEDICAL DECISION MAKING    Pertinent & Imaging studies reviewed. (See chart for details and specific orders.)        Medications   cefTRIAXone (ROCEPHIN) 2 g in dextrose 5 % in water (D5W) 100 mL IVPB (MB+) (2 g Intravenous New Bag 5/5/24 2105)   acetaminophen tablet 1,000 mg (has no administration in time range)   ibuprofen tablet 800 mg (has no administration in time range)   sodium chloride 0.9% bolus 1,000 mL 1,000 mL (0 mLs Intravenous Stopped 5/5/24 2056)       MDM  I took over this patient from the daytime physician Dr. Oliva at 6:00 p.m. tonight  This patient came in with weakness & fatigue, subjective fevers at home today  Patient was recently transferred to [facility] with an arrhythmia issue  Patient was seen & evaluated, medication changes & discharge per family  Patient had a UTI during hospitalization but did not go home on ABX profound    [place] [address], elevated lactic acid, low-grade temperature   IV fluids given, clear chest x-ray, otherwise stable lab work today in the ER  Tylenol Motrin given with complete fever dissipation,  good blood pressure  Urine sent for culture, patient's previous urine culture was reviewed tonight  IV Rocephin given in the emergency room with a prescription of Cipro on DC    I have instructed the patient to follow-up with Dr. Atkins in the next 48 hours  Blood cultures need to be reviewed, urine cultures need to be review then  Patient also has a good candidate for physical therapy post hospitalization  I will send Dr. Atkins & note in [facility] regarding this visit & the follow-up  Patient carefully counseled return with any concerns on discharge tonight  Patient lives with her daughter, daughter understands plan of care on discharge  They will return with any concerns, counseled on warning signs to return         DISCHARGE INSTRUCTIONS & MEDS       Medication List        START taking these medications      ciprofloxacin HCl 500 MG tablet  Commonly known as: CIPRO  Take 1 tablet (500 mg total) by mouth 2 (two) times daily. for 7 days     tamsulosin 0.4 mg Cap  Commonly known as: FLOMAX  Take 1 capsule (0.4 mg total) by mouth once daily.            ASK your doctor about these medications      acetaminophen 500 MG tablet  Commonly known as: TYLENOL     allopurinoL 300 MG tablet  Commonly known as: ZYLOPRIM  Take 1 tablet (300 mg total) by mouth once daily.     cinacalcet 60 MG Tab  Commonly known as: SENSIPAR     ferrous sulfate 325 (65 FE) MG EC tablet  Take 1 tablet (325 mg total) by mouth once daily.     HYDROcodone-acetaminophen 7.5-325 mg per tablet  Commonly known as: NORCO  Take 1 tablet by mouth every 24 hours as needed for Pain.     levothyroxine 75 MCG tablet  Commonly known as: SYNTHROID  Take 1 tablet by mouth once daily     losartan 25 MG tablet  Commonly known as: COZAAR  Take 1 tablet (25 mg total) by mouth once daily.     MAGNESIUM CITRATE ORAL     omega-3 acid ethyl esters 1 gram capsule  Commonly known as: LOVAZA  Take 1 capsule (1 g total) by mouth 2 (two) times daily.     ONE DAILY MULTIVITAMIN per  tablet  Generic drug: multivitamin     rosuvastatin 20 MG tablet  Commonly known as: CRESTOR  Take 1 tablet by mouth once daily     XARELTO 15 mg Tab  Generic drug: rivaroxaban  Take 1 tablet (15 mg total) by mouth every evening.               Where to Get Your Medications        These medications were sent to [facility] [address] [place], [place] [address], [place] [address] [address]      Phone: [phone]   ciprofloxacin HCl 500 MG tablet  tamsulosin 0.4 mg Cap           New Prescriptions    CIPROFLOXACIN HCL (CIPRO) 500 MG TABLET    Take 1 tablet (500 mg total) by mouth 2 (two) times daily. for 7 days    TAMSULOSIN (FLOMAX) 0.4 MG CAP    Take 1 capsule (0.4 mg total) by mouth once daily.           FINAL IMPRESSION    1. History of UTI    2. Dehydration      DISCLAIMER: This note was prepared with Dragon NaturallySpeaking voice recognition transcription software. Garbled syntax, mangled pronouns, and other bizarre constructions may be attributed to that software system.                David Guzmán MD  05/05/24 0565

## 2024-05-06 ENCOUNTER — PATIENT OUTREACH (OUTPATIENT)
Dept: EMERGENCY MEDICINE | Facility: HOSPITAL | Age: 89
End: 2024-05-06
Payer: MEDICARE

## 2024-05-06 ENCOUNTER — TELEPHONE (OUTPATIENT)
Dept: INTERNAL MEDICINE | Facility: CLINIC | Age: 89
End: 2024-05-06
Payer: MEDICARE

## 2024-05-06 ENCOUNTER — PATIENT OUTREACH (OUTPATIENT)
Dept: INTERNAL MEDICINE | Facility: CLINIC | Age: 89
End: 2024-05-06
Payer: MEDICARE

## 2024-05-06 NOTE — PROGRESS NOTES
ED Navigator reminded the patient of scheduled appointment with Dr Atkins on 5/8/24 at 4:15 pm. Patient agreed to appointment date and time. Patient declined additional services. Follow up encounter closed.

## 2024-05-06 NOTE — PROGRESS NOTES
Reached out to patient to inform her that Dr Atkins's staff have scheduled her post ED 7-day follow up for 5/8/24 at 4:15 pm. Patient declined needing further scheduling assistance.

## 2024-05-06 NOTE — TELEPHONE ENCOUNTER
----- Message from David Guzmán MD sent at 5/5/2024  9:32 PM CDT -----  Dr. Atkins    This patient came to the ER post hospitalization at [facility] for bradycardia  She had a low leukocytosis as well as a history of urinary tract infection at [facility]  Patient did not go home with antibiotics from [facility], Cipro prescribed on DC  The patient needs to follow-up with you by Wednesday to make sure she is doing okay    Thank you for your help, please review the ER note, blood & urine cultures are pending    David Guzmán M.D. 9:32 PM 5/5/2024

## 2024-05-06 NOTE — TELEPHONE ENCOUNTER
Spoke to ADEOLA. She will call back to schedule an appt as she needs to arrange for transportation for the patient.

## 2024-05-07 LAB — BACTERIA UR CULT: NO GROWTH

## 2024-05-08 ENCOUNTER — OFFICE VISIT (OUTPATIENT)
Dept: INTERNAL MEDICINE | Facility: CLINIC | Age: 89
End: 2024-05-08
Payer: MEDICARE

## 2024-05-08 VITALS
HEART RATE: 72 BPM | BODY MASS INDEX: 38.77 KG/M2 | DIASTOLIC BLOOD PRESSURE: 70 MMHG | HEIGHT: 68 IN | OXYGEN SATURATION: 95 % | RESPIRATION RATE: 19 BRPM | SYSTOLIC BLOOD PRESSURE: 110 MMHG

## 2024-05-08 DIAGNOSIS — Z09 HOSPITAL DISCHARGE FOLLOW-UP: Primary | ICD-10-CM

## 2024-05-08 DIAGNOSIS — B96.20 E. COLI UTI: ICD-10-CM

## 2024-05-08 DIAGNOSIS — E78.5 HYPERLIPIDEMIA ASSOCIATED WITH TYPE 2 DIABETES MELLITUS: ICD-10-CM

## 2024-05-08 DIAGNOSIS — M17.0 PRIMARY OSTEOARTHRITIS OF BOTH KNEES: ICD-10-CM

## 2024-05-08 DIAGNOSIS — I45.5 SINUS PAUSE: ICD-10-CM

## 2024-05-08 DIAGNOSIS — R53.81 DEBILITY: ICD-10-CM

## 2024-05-08 DIAGNOSIS — N39.0 E. COLI UTI: ICD-10-CM

## 2024-05-08 DIAGNOSIS — E11.69 HYPERLIPIDEMIA ASSOCIATED WITH TYPE 2 DIABETES MELLITUS: ICD-10-CM

## 2024-05-08 PROCEDURE — 1160F RVW MEDS BY RX/DR IN RCRD: CPT | Mod: HCNC,CPTII,S$GLB, | Performed by: INTERNAL MEDICINE

## 2024-05-08 PROCEDURE — 99214 OFFICE O/P EST MOD 30 MIN: CPT | Mod: HCNC,S$GLB,, | Performed by: INTERNAL MEDICINE

## 2024-05-08 PROCEDURE — 3288F FALL RISK ASSESSMENT DOCD: CPT | Mod: HCNC,CPTII,S$GLB, | Performed by: INTERNAL MEDICINE

## 2024-05-08 PROCEDURE — 1101F PT FALLS ASSESS-DOCD LE1/YR: CPT | Mod: HCNC,CPTII,S$GLB, | Performed by: INTERNAL MEDICINE

## 2024-05-08 PROCEDURE — 1159F MED LIST DOCD IN RCRD: CPT | Mod: HCNC,CPTII,S$GLB, | Performed by: INTERNAL MEDICINE

## 2024-05-08 PROCEDURE — 1126F AMNT PAIN NOTED NONE PRSNT: CPT | Mod: HCNC,CPTII,S$GLB, | Performed by: INTERNAL MEDICINE

## 2024-05-08 PROCEDURE — 1111F DSCHRG MED/CURRENT MED MERGE: CPT | Mod: HCNC,CPTII,S$GLB, | Performed by: INTERNAL MEDICINE

## 2024-05-08 PROCEDURE — 99999 PR PBB SHADOW E&M-EST. PATIENT-LVL III: CPT | Mod: PBBFAC,HCNC,, | Performed by: INTERNAL MEDICINE

## 2024-05-08 RX ORDER — OMEGA-3-ACID ETHYL ESTERS 1 G/1
1 CAPSULE, LIQUID FILLED ORAL 2 TIMES DAILY
Qty: 60 CAPSULE | Refills: 0 | Status: SHIPPED | OUTPATIENT
Start: 2024-05-08 | End: 2024-06-06 | Stop reason: SDUPTHER

## 2024-05-08 RX ORDER — HYDROCODONE BITARTRATE AND ACETAMINOPHEN 7.5; 325 MG/1; MG/1
1 TABLET ORAL
Qty: 30 TABLET | Refills: 0 | Status: SHIPPED | OUTPATIENT
Start: 2024-05-08 | End: 2024-06-06 | Stop reason: SDUPTHER

## 2024-05-08 NOTE — PROGRESS NOTES
HPI:  Tonya Bucio is a 88 y.o. female here for No chief complaint on file.  .  [facility] - [facility]iology Stepdown  Cardiology  Discharge Summary        Patient Name: Tonya Bucio  MRN: [id number]  Admission Date: 4/21/2024  Hospital Length of Stay: 2 days  Discharge Date and Time:  04/23/2024 3:08 PM  Attending Physician: Juan Govea MD    Discharging Provider: Britt Iverson MD  Primary Care Physician: Tasha Atkins MD     HPI:   87yo F patient with atrial flutter, HTN, HLD, T2DM (A1c 6.1 - diet controlleD), CKD IIIb, knee OA, hypothryoidism, tobacco (for 10 years 1/2ppd, quit 60 years ago), recurrent UTI, history of PE, who was admitted to [facility] on 04/20/24 with fever and diagnosed with UTI, and transferred to [facility] on 04/21/24 for new  onset complete heart block.      Patient was admitted to OSH for antibiotic hterpay after she was found to have fever and UTI. Patient has been feeling weak and near syncope episodes for the last couple of weeks.      On admission as per notes the patient presented multiple pauses on telemetry , longest approximately 7 seconds, with a HR measured at 31bpm, and feeling weak spells, flutters, and near syncope. Patient was transferred to ICU for low dose dopamine gtt and placed on pacer pads. As per notes patient has been taking both metoprolol and sotalol. Dopamine gtt was made titratable and started at 5mcg/kg due to continue pauses.       Labs remakrable for WBC 19.7, Hb 11.1, Plt 309, Trop 0.049 (Prior trop 11 months and 2 years ago ~0.4), Cr 1.4, BNP 97, Lactic acid 2, Procalcitonin 0.57,      As per outpatient cardiology notes, patient was followed for paroxysmal atrial flutter on sotalol and xarelto  (flutter in 2020 without recurrence, chemically converted to NSR and maintained on sotalol since then).      Due to pauses, patient was transferred to [facility].         * No surgery found *      Indwelling Lines/Drains at time of  discharge:  Lines/Drains/Airways         Drain  Duration                     Urethral Catheter 04/20/24 2106 16 Fr. 2 days                          Hospital Course:  Patient admitted to CCU for further management. Sotalol and metoprolol held on admission. Patient was noted to be bradycardic overnight of 4/22 but asymptomatic. Patient went into a 10 beat run of V tach noted on telemetry for which decision was made to restart patient's home toprol and patient was discharged with instructions to discontinue her sotalol, continue her home Toprol, and patient was mildly hypertensive here and was started on losartan. Patient will also be discharged with a holter monitor and instructions to follow up with cardiology.      Goals of Care Treatment Preferences:  Code Status: Full Code        Consults:      Significant Diagnostic Studies: Labs: All labs within the past 24 hours have been reviewed     Pending Diagnostic Studies:         None                      Final Active Diagnoses:     Diagnosis Date Noted POA    PRINCIPAL PROBLEM:  Bradycardia [R00.1] 04/21/2024 Yes    Chronic diastolic heart failure [I50.32] 04/23/2024 Yes    Stage 3a chronic kidney disease [N18.31] 04/21/2024 Yes    Primary hypertension [I10] 05/29/2023 Yes    UTI (urinary tract infection) [N39.0] 07/06/2022 Yes    Typical atrial flutter [I48.3] 03/14/2020 Yes    COPD (chronic obstructive pulmonary disease) [J44.9] 03/13/2020 Yes    Morbid obesity [E66.01]   Yes    Risk for falls [Z91.81] 02/14/2019 Not Applicable    Anemia due to stage 3 chronic kidney disease [N18.30, D63.1] 11/17/2016 Yes    Controlled type 2 diabetes mellitus without complication, without long-term current use of insulin [E11.9] 11/12/2015 Yes    History of pulmonary embolism [Z86.711] 07/07/2014 Yes    Hypothyroidism [E03.9] 07/16/2013 Yes       Problems Resolved During this Admission:      No new Assessment & Plan notes have been filed under this hospital service since the last  note was generated.  Service: Cardiology        Discharged Condition: stable     Disposition: Home or Self Care     Follow Up:     Patient Instructions:             Ambulatory referral/consult to Cardiology    Standing Status: Future   Referral Priority: Routine Referral Type: Consultation    Referral Reason: Specialty Services Required    Requested Specialty: Cardiology    Number of Visits Requested: 1             Cardiac Monitor - 3-15 Day Adult (Cupid Only)   Standing Status: Future Standing Exp. Date: 25      Order Specific Question Answer Comments   Duration: 14 days     Release to patient Immediate        Medications:  Reconciled Home Medications:       Medication List          START taking these medications       losartan 25 MG tablet  Commonly known as: COZAAR  Take 1 tablet (25 mg total) by mouth once daily.  Start taking on: 2024                CONTINUE taking these medications       acetaminophen 500 MG tablet  Commonly known as: TYLENOL  Take 500 mg by mouth every evening. And as needed      allopurinoL 300 MG tablet  Commonly known as: ZYLOPRIM  Take 1 tablet (300 mg total) by mouth once daily.      cinacalcet 60 MG Tab  Commonly known as: SENSIPAR  SMARTSI Tablet(s) By Mouth Every Evening      ferrous sulfate 325 (65 FE) MG EC tablet  Take 1 tablet (325 mg total) by mouth once daily.      HYDROcodone-acetaminophen 7.5-325 mg per tablet  Commonly known as: NORCO  Take 1 tablet by mouth every 24 hours as needed for Pain.      levothyroxine 75 MCG tablet  Commonly known as: SYNTHROID  Take 1 tablet by mouth once daily      MAGNESIUM CITRATE ORAL  Take by mouth.      metoprolol succinate 25 MG 24 hr tablet  Commonly known as: TOPROL-XL  Take 1 tablet (25 mg total) by mouth once daily.      omega-3 acid ethyl esters 1 gram capsule  Commonly known as: LOVAZA  Take 1 capsule (1 g total) by mouth 2 (two) times daily.      ONE DAILY MULTIVITAMIN per tablet  Generic drug: multivitamin  Take 1  tablet by mouth once daily.      rosuvastatin 20 MG tablet  Commonly known as: CRESTOR  Take 1 tablet by mouth once daily      XARELTO 15 mg Tab  Generic drug: rivaroxaban  Take 1 tablet (15 mg total) by mouth every evening.                STOP taking these medications       sotaloL 80 MG tablet  Commonly known as: BETAPACE                   Time spent on the discharge of patient: 35 minutes     Britt Iverson MD  Cardiology  [facility] Step[facility]     Staff Attestation:  I have seen the patient, reviewed the Resident's assessment and plan, personally interviewed and examined the patient at bedside and agree with the findings.Total critical care time: Approximately 45 minutes. Due to a high probability of clinically significant, life threatening deterioration, I personally spent this critical care time directly and personally managing the patient. This critical care time included obtaining a history; examining the patient; ordering and review of studies; arranging urgent treatment with development of a management plan; evaluation of patient's response to treatment; frequent reassessment; and, discussions with other providers.   This critical care time was performed to assess and manage the high probability of imminent, life-threatening deterioration that could result in multi-organ failure. It was medically reasonable and necessary. It was exclusive of separately billable procedures and treating other patients and teaching time.     Juan Govea MD  [facility] - Cardiology         Electronically signed by Britt Iverson MD at 4/23/2024  3:08 PM  Electronically signed by Juan Govea MD at 4/23/2024  3:38 PM         Admission (Discharged) on 4/21/2024            Revision & Routing History            Detailed Report          Note shared with patient  Additional Orders and Documentation      Results  Imaging         Meds           Orders  Procedures         Flowsheets      Encounter Info: History,      Allergies,     Education,     Care Plan,     Detailed Report     Linked Episodes    TCC From 4/23/2024 to 4/25/2024  Hospital Problem List      Hypothyroidism    History of pulmonary embolism    Controlled type 2 diabetes mellitus without complication, without long-term current use of insulin    Anemia due to stage 3 chronic kidney disease    Risk for falls    Morbid obesity    COPD (chronic obstructive pulmonary disease)    Typical atrial flutter    UTI (urinary tract infection)    Primary hypertension    Bradycardia    Stage 3a chronic kidney disease    Chronic diastolic heart failure  Care Timeline    04/21   Admitted to [facility] 1946 04/22   Transferred out of [facility] 2228 04/23   Discharged 1845 5/8/24  Meds reconciled  Problem list reviewed and updated  Discharge summary reviewed  Discharge labs and inpatient radiology reports reviewed     Also seen in ER on 5/ for UTI   Urine Culture, Routine  Abnormal   ESCHERICHIA COLI  >100,000 cfu/ml    Resulting Agency [facility]        Susceptibility     Escherichia coli     CULTURE, URINE     Amox/K Clav'ate <=8/4 mcg/mL Sensitive     Amp/Sulbactam <=8/4 mcg/mL Sensitive     Ampicillin <=8 mcg/mL Sensitive     Cefazolin <=2 mcg/mL Sensitive     Cefepime <=2 mcg/mL Sensitive     Ceftriaxone <=1 mcg/mL Sensitive     Ciprofloxacin <=1 mcg/mL Sensitive     Ertapenem <=0.5 mcg/mL Sensitive     Gentamicin <=4 mcg/mL Sensitive     Levofloxacin <=2 mcg/mL Sensitive     Meropenem <=1 mcg/mL Sensitive     Nitrofurantoin <=32 mcg/mL Sensitive     Piperacillin/Tazo <=16 mcg/mL Sensitive     Tobramycin <=4 mcg/mL Sensitive     Trimeth/Sulfa <=2/38 mcg/mL Sensitive                       Review of Systems   Constitutional:  Negative for chills and fever.   HENT:  Negative for congestion, hearing loss, sinus pressure and sore throat.    Eyes:  Negative for photophobia.   Respiratory:  Negative for cough, choking, chest  tightness and wheezing.    Cardiovascular:  Negative for chest pain and palpitations.   Gastrointestinal:  Negative for blood in stool, nausea and vomiting.   Genitourinary:  Negative for dysuria and hematuria.   Musculoskeletal:  Negative for arthralgias and myalgias.   Skin:  Negative for pallor.   Neurological:  Negative for dizziness and numbness.   Hematological:  Does not bruise/bleed easily.   Psychiatric/Behavioral:  Negative for confusion and suicidal ideas. The patient is not nervous/anxious.       A review of systems was performed and is negative except as noted above.    Objective:  There were no vitals filed for this visit.   Physical Exam  Vitals and nursing note reviewed.   Constitutional:       Appearance: She is well-developed. She is obese.      Comments: Sitting in wheel chair ;brought by family    HENT:      Head: Normocephalic and atraumatic.      Right Ear: External ear normal.      Left Ear: External ear normal.   Eyes:      Conjunctiva/sclera: Conjunctivae normal.      Pupils: Pupils are equal, round, and reactive to light.   Neck:      Thyroid: No thyromegaly.      Vascular: No JVD.      Trachea: No tracheal deviation.   Cardiovascular:      Rate and Rhythm: Normal rate and regular rhythm.      Heart sounds: Normal heart sounds.   Pulmonary:      Effort: Pulmonary effort is normal. No respiratory distress.      Breath sounds: Normal breath sounds. No wheezing or rales.   Chest:      Chest wall: No tenderness.   Abdominal:      General: Bowel sounds are normal. There is no distension.      Palpations: Abdomen is soft. There is no mass.      Tenderness: There is no abdominal tenderness. There is no guarding or rebound.   Musculoskeletal:         General: Normal range of motion.      Cervical back: Normal range of motion and neck supple.   Lymphadenopathy:      Cervical: No cervical adenopathy.   Skin:     General: Skin is warm and dry.             Comments: decubiti   Neurological:      Mental  Status: She is alert and oriented to person, place, and time.      Cranial Nerves: No cranial nerve deficit.      Motor: No abnormal muscle tone.      Coordination: Coordination normal.      Deep Tendon Reflexes: Reflexes are normal and symmetric. Reflexes normal.      Comments: CN: Optic discs are flat with normal vasculature, PERRL, Extraoccular movements and visual fields are full. Normal facial sensation and strength, Hearing symmetric, Tongue and Palate are midline and strong. Shoulder Shrug symmetric and strong.        Assessment/Plan:      1. Hospital discharge follow-up  Meds reconciled  Problem list reviewed and updated  Discharge summary reviewed  Discharge labs and inpatient radiology reports reviewed   2. Hyperlipidemia associated with type 2 diabetes mellitus  -     omega-3 acid ethyl esters (LOVAZA) 1 gram capsule; Take 1 capsule (1 g total) by mouth 2 (two) times daily.  Dispense: 60 capsule; Refill: 0    3. Primary osteoarthritis of both knees  -     HYDROcodone-acetaminophen (NORCO) 7.5-325 mg per tablet; Take 1 tablet by mouth every 24 hours as needed for Pain.  Dispense: 30 tablet; Refill: 0    4. Sinus pause  Stay off of sotalol     5. E. coli UTI  Continue with cipro      Debility  -     Ambulatory referral/consult to Home Health; Future

## 2024-05-11 LAB
BACTERIA BLD CULT: NORMAL
BACTERIA BLD CULT: NORMAL

## 2024-05-13 ENCOUNTER — TELEPHONE (OUTPATIENT)
Dept: CARDIOLOGY | Facility: HOSPITAL | Age: 89
End: 2024-05-13
Payer: MEDICARE

## 2024-05-13 PROCEDURE — G0180 MD CERTIFICATION HHA PATIENT: HCPCS | Mod: ,,, | Performed by: INTERNAL MEDICINE

## 2024-05-13 NOTE — TELEPHONE ENCOUNTER
Patient wearing 30 day event monitor for diagnosis bradycardia     Received auto-triggered alert notification for  3.1 second pause  on May 15, 2024 at 3: 23 PM        Called patient to assess for symptoms. Pt states she was awake but resting at the time. Pt denies any symptoms. Consult for EP previously ordered by Dr. Govea.     Strips placed under this encounter. Will continue to monitor until  5/23/24

## 2024-05-14 ENCOUNTER — TELEPHONE (OUTPATIENT)
Dept: INTERNAL MEDICINE | Facility: CLINIC | Age: 89
End: 2024-05-14
Payer: MEDICARE

## 2024-05-14 NOTE — TELEPHONE ENCOUNTER
----- Message from Roxanna Russ sent at 2024  9:23 AM CDT -----  Contact: [facility]  Tonya Bucio  MRN: [id number]  : 1935  PCP: Tasha Atkins  Home Phone      [phone]  Work Phone      Not on file.  Mobile          [phone]      MESSAGE:     Dhruv with [facility] states they need an order to add nursing to  due to a wound near her sacrum.         Please advise   [phone]

## 2024-05-16 ENCOUNTER — TELEPHONE (OUTPATIENT)
Dept: ELECTROPHYSIOLOGY | Facility: CLINIC | Age: 89
End: 2024-05-16
Payer: MEDICARE

## 2024-05-16 NOTE — TELEPHONE ENCOUNTER
Patient wearing 30 day event monitor for diagnosis bradycardia     Received auto-triggered alert notification for SR/SB with pauses, longest 3.2 seconds on 5/16/24 at 0755        Called patient to assess for symptoms. States she was still in bed sleeping.      Non actionable event. Will continue to monitor until 5/23/24

## 2024-05-29 ENCOUNTER — LAB VISIT (OUTPATIENT)
Dept: LAB | Facility: HOSPITAL | Age: 89
End: 2024-05-29
Attending: INTERNAL MEDICINE
Payer: MEDICARE

## 2024-05-29 DIAGNOSIS — I10 HTN (HYPERTENSION): Primary | ICD-10-CM

## 2024-05-29 DIAGNOSIS — N28.9 KIDNEY DISEASE: ICD-10-CM

## 2024-05-29 LAB
ALBUMIN SERPL BCP-MCNC: 3 G/DL (ref 3.5–5.2)
ALP SERPL-CCNC: 99 U/L (ref 55–135)
ALT SERPL W/O P-5'-P-CCNC: 22 U/L (ref 10–44)
ANION GAP SERPL CALC-SCNC: 10 MMOL/L (ref 8–16)
AST SERPL-CCNC: 18 U/L (ref 10–40)
BASOPHILS # BLD AUTO: 0.04 K/UL (ref 0–0.2)
BASOPHILS NFR BLD: 0.5 % (ref 0–1.9)
BILIRUB SERPL-MCNC: 0.5 MG/DL (ref 0.1–1)
BUN SERPL-MCNC: 21 MG/DL (ref 8–23)
CALCIUM SERPL-MCNC: 9.5 MG/DL (ref 8.7–10.5)
CHLORIDE SERPL-SCNC: 104 MMOL/L (ref 95–110)
CO2 SERPL-SCNC: 26 MMOL/L (ref 23–29)
CREAT SERPL-MCNC: 1.4 MG/DL (ref 0.5–1.4)
DIFFERENTIAL METHOD BLD: ABNORMAL
EOSINOPHIL # BLD AUTO: 0.3 K/UL (ref 0–0.5)
EOSINOPHIL NFR BLD: 3.2 % (ref 0–8)
ERYTHROCYTE [DISTWIDTH] IN BLOOD BY AUTOMATED COUNT: 15.9 % (ref 11.5–14.5)
EST. GFR  (NO RACE VARIABLE): 36 ML/MIN/1.73 M^2
GLUCOSE SERPL-MCNC: 140 MG/DL (ref 70–110)
HCT VFR BLD AUTO: 35.4 % (ref 37–48.5)
HGB BLD-MCNC: 10.9 G/DL (ref 12–16)
IMM GRANULOCYTES # BLD AUTO: 0.04 K/UL (ref 0–0.04)
IMM GRANULOCYTES NFR BLD AUTO: 0.5 % (ref 0–0.5)
LYMPHOCYTES # BLD AUTO: 2.6 K/UL (ref 1–4.8)
LYMPHOCYTES NFR BLD: 30.3 % (ref 18–48)
MCH RBC QN AUTO: 28.6 PG (ref 27–31)
MCHC RBC AUTO-ENTMCNC: 30.8 G/DL (ref 32–36)
MCV RBC AUTO: 93 FL (ref 82–98)
MONOCYTES # BLD AUTO: 0.4 K/UL (ref 0.3–1)
MONOCYTES NFR BLD: 4.9 % (ref 4–15)
NEUTROPHILS # BLD AUTO: 5.1 K/UL (ref 1.8–7.7)
NEUTROPHILS NFR BLD: 60.6 % (ref 38–73)
NRBC BLD-RTO: 0 /100 WBC
PHOSPHATE SERPL-MCNC: 2.9 MG/DL (ref 2.7–4.5)
PLATELET # BLD AUTO: 331 K/UL (ref 150–450)
PMV BLD AUTO: 11.2 FL (ref 9.2–12.9)
POTASSIUM SERPL-SCNC: 4.1 MMOL/L (ref 3.5–5.1)
PROT SERPL-MCNC: 7.4 G/DL (ref 6–8.4)
PTH-INTACT SERPL-MCNC: 264.4 PG/ML (ref 9–77)
RBC # BLD AUTO: 3.81 M/UL (ref 4–5.4)
SODIUM SERPL-SCNC: 140 MMOL/L (ref 136–145)
WBC # BLD AUTO: 8.45 K/UL (ref 3.9–12.7)

## 2024-05-29 PROCEDURE — 85025 COMPLETE CBC W/AUTO DIFF WBC: CPT | Mod: HCNC | Performed by: INTERNAL MEDICINE

## 2024-05-29 PROCEDURE — 36415 COLL VENOUS BLD VENIPUNCTURE: CPT | Mod: HCNC | Performed by: INTERNAL MEDICINE

## 2024-05-29 PROCEDURE — 83970 ASSAY OF PARATHORMONE: CPT | Mod: HCNC | Performed by: INTERNAL MEDICINE

## 2024-05-29 PROCEDURE — 80053 COMPREHEN METABOLIC PANEL: CPT | Mod: HCNC | Performed by: INTERNAL MEDICINE

## 2024-05-29 PROCEDURE — 84100 ASSAY OF PHOSPHORUS: CPT | Mod: HCNC | Performed by: INTERNAL MEDICINE

## 2024-06-04 ENCOUNTER — EXTERNAL HOME HEALTH (OUTPATIENT)
Dept: HOME HEALTH SERVICES | Facility: HOSPITAL | Age: 89
End: 2024-06-04
Payer: MEDICARE

## 2024-06-05 ENCOUNTER — OFFICE VISIT (OUTPATIENT)
Dept: CARDIOLOGY | Facility: CLINIC | Age: 89
End: 2024-06-05
Payer: MEDICARE

## 2024-06-05 DIAGNOSIS — N18.31 STAGE 3A CHRONIC KIDNEY DISEASE: ICD-10-CM

## 2024-06-05 DIAGNOSIS — E78.5 HYPERLIPIDEMIA ASSOCIATED WITH TYPE 2 DIABETES MELLITUS: ICD-10-CM

## 2024-06-05 DIAGNOSIS — I70.0 AORTIC ATHEROSCLEROSIS: ICD-10-CM

## 2024-06-05 DIAGNOSIS — I10 PRIMARY HYPERTENSION: ICD-10-CM

## 2024-06-05 DIAGNOSIS — E11.9 CONTROLLED TYPE 2 DIABETES MELLITUS WITHOUT COMPLICATION, WITHOUT LONG-TERM CURRENT USE OF INSULIN: ICD-10-CM

## 2024-06-05 DIAGNOSIS — I48.3 TYPICAL ATRIAL FLUTTER: ICD-10-CM

## 2024-06-05 DIAGNOSIS — R00.1 BRADYCARDIA: ICD-10-CM

## 2024-06-05 DIAGNOSIS — I48.0 PAROXYSMAL ATRIAL FIBRILLATION: Primary | ICD-10-CM

## 2024-06-05 DIAGNOSIS — E11.69 HYPERLIPIDEMIA ASSOCIATED WITH TYPE 2 DIABETES MELLITUS: ICD-10-CM

## 2024-06-05 PROCEDURE — 99999 PR PBB SHADOW E&M-EST. PATIENT-LVL III: CPT | Mod: PBBFAC,HCNC,, | Performed by: INTERNAL MEDICINE

## 2024-06-05 PROCEDURE — 93000 ELECTROCARDIOGRAM COMPLETE: CPT | Mod: HCNC,S$GLB,, | Performed by: INTERNAL MEDICINE

## 2024-06-05 PROCEDURE — 1126F AMNT PAIN NOTED NONE PRSNT: CPT | Mod: HCNC,CPTII,S$GLB, | Performed by: INTERNAL MEDICINE

## 2024-06-05 PROCEDURE — 1159F MED LIST DOCD IN RCRD: CPT | Mod: HCNC,CPTII,S$GLB, | Performed by: INTERNAL MEDICINE

## 2024-06-05 PROCEDURE — 99214 OFFICE O/P EST MOD 30 MIN: CPT | Mod: HCNC,S$GLB,, | Performed by: INTERNAL MEDICINE

## 2024-06-05 NOTE — PROGRESS NOTES
[facility]     Subjective:    Patient ID:  Tonya Bucio is a 88 y.o. female who presents for follow-up of No chief complaint on file.    Review of patient's allergies indicates:  No Known Allergies   I have seen her several times in the past, she has history of atrial flutter treated with sotalol in the past.  Low-dose Toprol was added by myself.  There was no recurrence.  She had a recent hospitalization for bradycardia heart rate 30s on presentation.  Sotalol and Toprol were withdrawn and she has done well.  She was discharged with a 30 day event monitor.    The monitor came back showing pauses, asymptomatic.  Multiple periods of paroxysmal AFib noted.  She is on Xarelto.  She was asymptomatic.  She is in a wheelchair.  She does not ambulate very much but is with normal cognitive since and home life.  She is with her daughter today.  She does notice that her heart rates are elevated.  She has had worsening shortness of breath with AFib episodes.     Today's Electrocardiogram reviewed and independently interpreted by myself confirms sinus tachycardia heart rate 103 beats per minute.  Nonspecific ST changes noted.        Review of Systems   Constitutional: Negative for chills, decreased appetite, diaphoresis, fever, malaise/fatigue, night sweats, weight gain and weight loss.   HENT:  Negative for congestion, ear discharge, ear pain, hearing loss, hoarse voice, nosebleeds, odynophagia, sore throat, stridor and tinnitus.    Eyes:  Negative for blurred vision, discharge, double vision, pain, photophobia, redness, vision loss in left eye, vision loss in right eye, visual disturbance and visual halos.   Cardiovascular:  Negative for chest pain, claudication, cyanosis, dyspnea on exertion, irregular heartbeat, leg swelling, near-syncope, orthopnea, palpitations, paroxysmal nocturnal dyspnea and syncope.   Respiratory:  Negative for  "cough, hemoptysis, shortness of breath, sleep disturbances due to breathing, snoring, sputum production and wheezing.    Endocrine: Negative for cold intolerance, heat intolerance, polydipsia, polyphagia and polyuria.   Hematologic/Lymphatic: Negative for adenopathy and bleeding problem. Does not bruise/bleed easily.   Skin:  Negative for color change, dry skin, flushing, itching, nail changes, poor wound healing, rash, skin cancer, suspicious lesions and unusual hair distribution.   Musculoskeletal:  Negative for arthritis, back pain, falls, gout, joint pain, joint swelling, muscle cramps, muscle weakness, myalgias, neck pain and stiffness.   Gastrointestinal:  Negative for bloating, abdominal pain, anorexia, change in bowel habit, bowel incontinence, constipation, diarrhea, dysphagia, excessive appetite, flatus, heartburn, hematemesis, hematochezia, hemorrhoids, jaundice, melena, nausea and vomiting.   Genitourinary:  Negative for bladder incontinence, decreased libido, dysuria, flank pain, frequency, genital sores, hematuria, hesitancy, incomplete emptying, nocturia and urgency.   Neurological:  Negative for aphonia, brief paralysis, difficulty with concentration, disturbances in coordination, excessive daytime sleepiness, dizziness, focal weakness, headaches, light-headedness, loss of balance, numbness, paresthesias, seizures, sensory change, tremors, vertigo and weakness.   Psychiatric/Behavioral:  Negative for altered mental status, depression, hallucinations, memory loss, substance abuse, suicidal ideas and thoughts of violence. The patient does not have insomnia and is not nervous/anxious.    Allergic/Immunologic: Negative for hives and persistent infections.        Objective:       Vitals:    06/05/24 1016   BP: 120/82   Pulse: 103   Resp: 18   Height: 5' 8" (1.727 m)    Physical Exam  Constitutional:       General: She is not in acute distress.     Appearance: She is well-developed. She is not diaphoretic. "   HENT:      Head: Normocephalic and atraumatic.      Nose: Nose normal.   Eyes:      General: No scleral icterus.        Right eye: No discharge.      Conjunctiva/sclera: Conjunctivae normal.      Pupils: Pupils are equal, round, and reactive to light.   Neck:      Thyroid: No thyromegaly.      Vascular: No JVD.      Trachea: No tracheal deviation.   Cardiovascular:      Rate and Rhythm: Normal rate and regular rhythm.      Pulses:           Carotid pulses are 2+ on the right side and 2+ on the left side.       Radial pulses are 2+ on the right side and 2+ on the left side.        Dorsalis pedis pulses are 1+ on the right side and 1+ on the left side.        Posterior tibial pulses are 1+ on the right side and 1+ on the left side.      Heart sounds: Normal heart sounds. No murmur heard.     No friction rub. No gallop.   Pulmonary:      Effort: Pulmonary effort is normal. No respiratory distress.      Breath sounds: Normal breath sounds. No stridor. No wheezing or rales.   Chest:      Chest wall: No tenderness.   Abdominal:      General: Bowel sounds are normal. There is no distension.      Palpations: Abdomen is soft. There is no mass.      Tenderness: There is no abdominal tenderness. There is no guarding or rebound.   Musculoskeletal:         General: No tenderness. Normal range of motion.      Cervical back: Normal range of motion and neck supple.   Lymphadenopathy:      Cervical: No cervical adenopathy.   Skin:     General: Skin is warm and dry.      Coloration: Skin is not pale.      Findings: No erythema or rash.   Neurological:      Mental Status: She is alert and oriented to person, place, and time.      Cranial Nerves: No cranial nerve deficit.      Coordination: Coordination normal.   Psychiatric:         Behavior: Behavior normal.         Thought Content: Thought content normal.         Judgment: Judgment normal.           Assessment:       1. Paroxysmal atrial fibrillation    2. Aortic atherosclerosis     3. Bradycardia    4. Typical atrial flutter    5. Primary hypertension    6. Stage 3a chronic kidney disease    7. Controlled type 2 diabetes mellitus without complication, without long-term current use of insulin    8. Hyperlipidemia associated with type 2 diabetes mellitus      Results for orders placed or performed in visit on 24   EKG 12-lead   Result Value Ref Range    QRS Duration 82 ms    OHS QTC Calculation 437 ms         Current Outpatient Medications:     acetaminophen (TYLENOL) 500 MG tablet, Take 500 mg by mouth every evening. And as needed, Disp: , Rfl:     allopurinoL (ZYLOPRIM) 300 MG tablet, Take 1 tablet (300 mg total) by mouth once daily., Disp: 90 tablet, Rfl: 3    cinacalcet (SENSIPAR) 60 MG Tab, SMARTSI Tablet(s) By Mouth Every Evening, Disp: , Rfl:     ferrous sulfate 325 (65 FE) MG EC tablet, Take 1 tablet (325 mg total) by mouth once daily., Disp: 90 tablet, Rfl: 1    levothyroxine (SYNTHROID) 75 MCG tablet, Take 1 tablet by mouth once daily, Disp: 90 tablet, Rfl: 3    MAGNESIUM CITRATE ORAL, Take by mouth., Disp: , Rfl:     multivitamin (ONE DAILY MULTIVITAMIN) per tablet, Take 1 tablet by mouth once daily., Disp: , Rfl:     rosuvastatin (CRESTOR) 20 MG tablet, Take 1 tablet by mouth once daily, Disp: 90 tablet, Rfl: 3    XARELTO 15 mg Tab, Take 1 tablet (15 mg total) by mouth every evening., Disp: 90 tablet, Rfl: 3    HYDROcodone-acetaminophen (NORCO) 7.5-325 mg per tablet, Take 1 tablet by mouth every 24 hours as needed for Pain., Disp: 30 tablet, Rfl: 0    omega-3 acid ethyl esters (LOVAZA) 1 gram capsule, Take 1 capsule (1 g total) by mouth 2 (two) times daily., Disp: 90 capsule, Rfl: 1    tamsulosin (FLOMAX) 0.4 mg Cap, Take 1 capsule (0.4 mg total) by mouth once daily., Disp: 30 capsule, Rfl: 0     Lab Results   Component Value Date    WBC 8.45 2024    RBC 3.81 (L) 2024    HGB 10.9 (L) 2024    HCT 35.4 (L) 2024    MCV 93 2024    [facility] 28.6  05/29/2024    MCHC 30.8 (L) 05/29/2024    RDW 15.9 (H) 05/29/2024     05/29/2024    MPV 11.2 05/29/2024    GRAN 5.1 05/29/2024    GRAN 60.6 05/29/2024    LYMPH 2.6 05/29/2024    LYMPH 30.3 05/29/2024    MONO 0.4 05/29/2024    MONO 4.9 05/29/2024    EOS 0.3 05/29/2024    BASO 0.04 05/29/2024    EOSINOPHIL 3.2 05/29/2024    BASOPHIL 0.5 05/29/2024    MG 1.6 04/22/2024        CMP  Lab Results   Component Value Date     05/29/2024    K 4.1 05/29/2024     05/29/2024    CO2 26 05/29/2024     (H) 05/29/2024    BUN 21 05/29/2024    CREATININE 1.4 05/29/2024    CALCIUM 9.5 05/29/2024    PROT 7.4 05/29/2024    ALBUMIN 3.0 (L) 05/29/2024    BILITOT 0.5 05/29/2024    ALKPHOS 99 05/29/2024    AST 18 05/29/2024    ALT 22 05/29/2024    ANIONGAP 10 05/29/2024    ESTGFRAFRICA 43 (A) 06/23/2022    EGFRNONAA 37 (A) 06/23/2022        Lab Results   Component Value Date    LABBLOO No growth after 5 days. 05/05/2024    LABURIN No growth 05/05/2024    RESPIRATORYC Normal respiratory vu 08/12/2014    GSRESP <10 epithelial cells per low power field. 08/12/2014    GSRESP Rare WBC's 08/12/2014    GSRESP No organisms seen 08/12/2014            Results for orders placed or performed in visit on 06/05/24   EKG 12-lead    Collection Time: 06/05/24 10:39 AM   Result Value Ref Range    QRS Duration 82 ms    OHS QTC Calculation 437 ms    Narrative    Test Reason : I48.0,    Vent. Rate : 103 BPM     Atrial Rate : 000 BPM     P-R Int : 000 ms          QRS Dur : 082 ms      QT Int : 334 ms       P-R-T Axes : 000 -09 103 degrees     QTc Int : 437 ms    Sinus tachycardia  Nonspecific ST abnormality  Abnormal ECG  When compared with ECG of 21-APR-2024 22:43,  Previous ECG has undetermined rhythm, needs review  Confirmed by Terrance VILLEDA, Tutu CHAVIRA ([id number]) on 6/10/2024 5:49:12 PM    Referred By:             Confirmed By:Tutu Carlos MD                  Plan:       Problem List Items Addressed This Visit          Cardiac/Vascular     Hyperlipidemia associated with type 2 diabetes mellitus       Rosuvastatin 20 mg, Omega fish oil therapy to continue.  Current LDL 60 mg% by labs March 2024         Typical atrial flutter       Sotalol recently  discontinued in setting of marked bradycardia, symptomatic.  No recurrence of atrial flutter thus far.      Today's Electrocardiogram confirms sinus rhythm heart rate 103 beats per minute.      Recent Holter confirming atrial fibrillation as well as 3.2 sec.pauses without beta-blockers or antiarrhythmic therapy         Aortic atherosclerosis       Condition unchanged.         Primary hypertension      Condition stable.  She takes losartan 25 mg daily.         Bradycardia       Significant on Holter with pauses.  Pacemaker evaluation needed.            Renal/    Stage 3a chronic kidney disease       Condition stable.            Endocrine    Controlled type 2 diabetes mellitus without complication, without long-term current use of insulin       Diet-controlled, most recent hemoglobin A1c 6.1.          Other Visit Diagnoses       Paroxysmal atrial fibrillation    -  Primary    Relevant Orders    EKG 12-lead (Completed)               I am hesitant to initiate beta-blockers today.  I am thinking that she may need a pacemaker to control bradycardia.  She was doing well with sotalol prior to developing severe bradycardia without recurrence of atrial arrhythmias on low-dose sotalol.    Dr. Aldridge to see the patient.  I have reached out to him.  I made a four-month follow-up.  Her only alternative would be to have ablation surgery for atrial arrhythmias but the timing of all this prompts me to consider pacemaker as her best management strategy at this time.      Recent Holter findings consistent with chronic sick sinus syndrome with exacerbation of bradycardia possibly related to sotalol, recently withdrawn.             Tutu Carlos MD  06/11/2024   10:32 AM

## 2024-06-06 DIAGNOSIS — M17.0 PRIMARY OSTEOARTHRITIS OF BOTH KNEES: ICD-10-CM

## 2024-06-06 DIAGNOSIS — E11.69 HYPERLIPIDEMIA ASSOCIATED WITH TYPE 2 DIABETES MELLITUS: ICD-10-CM

## 2024-06-06 DIAGNOSIS — E78.5 HYPERLIPIDEMIA ASSOCIATED WITH TYPE 2 DIABETES MELLITUS: ICD-10-CM

## 2024-06-06 RX ORDER — OMEGA-3-ACID ETHYL ESTERS 1 G/1
1 CAPSULE, LIQUID FILLED ORAL 2 TIMES DAILY
Qty: 90 CAPSULE | Refills: 1 | Status: SHIPPED | OUTPATIENT
Start: 2024-06-06

## 2024-06-06 RX ORDER — HYDROCODONE BITARTRATE AND ACETAMINOPHEN 7.5; 325 MG/1; MG/1
1 TABLET ORAL
Qty: 30 TABLET | Refills: 0 | Status: SHIPPED | OUTPATIENT
Start: 2024-06-06

## 2024-06-06 NOTE — TELEPHONE ENCOUNTER
----- Message from Roxanna Russ sent at 2024  8:56 AM CDT -----  Contact: Pt  Tonya Bucio  MRN: [id number]  : 1935  PCP: Tasha Atkins  Home Phone      [phone]  Work Phone      Not on file.  [employer]          [phone]      MESSAGE:     Pt needs a refill of HYDROcodone-acetaminophen (NORCO) 7.5-325 mg per tablet and omega-3 acid ethyl esters (LOVAZA) 1 gram capsule sent to [facility] in [place].         Tonya  [phone]

## 2024-06-06 NOTE — TELEPHONE ENCOUNTER
Requested Prescriptions     Pending Prescriptions Disp Refills    HYDROcodone-acetaminophen (NORCO) 7.5-325 mg per tablet 30 tablet 0     Sig: Take 1 tablet by mouth every 24 hours as needed for Pain.    omega-3 acid ethyl esters (LOVAZA) 1 gram capsule 90 capsule 1     Sig: Take 1 capsule (1 g total) by mouth 2 (two) times daily.   LOV: 5/8/24. Last fill date for Norco: 5/8/24

## 2024-06-06 NOTE — TELEPHONE ENCOUNTER
Care Due:                  Date            Visit Type   Department     Provider  --------------------------------------------------------------------------------                                EP -                              PRIMARY      [facility] INTER[facility]  Last Visit: 05-      CARE ([facility])   MEDICINE II    Tasha Atkins                              EP -                              PRIMARY      [facility] INTERNAL  Next Visit: 07-      CARE ([facility])   MEDICINE II    Tasha Atkins                                                            Last  Test          Frequency    Reason                     Performed    Due Date  --------------------------------------------------------------------------------    Uric Acid...  12 months..  allopurinoL..............  06- 06-    Health [facility] Embedded Care Due Messages. Reference number: [id number].   6/06/2024 9:20:36 AM CDT

## 2024-06-10 LAB
OHS QRS DURATION: 82 MS
OHS QTC CALCULATION: 437 MS

## 2024-06-11 VITALS
HEIGHT: 68 IN | HEART RATE: 103 BPM | BODY MASS INDEX: 38.77 KG/M2 | DIASTOLIC BLOOD PRESSURE: 82 MMHG | SYSTOLIC BLOOD PRESSURE: 120 MMHG | RESPIRATION RATE: 18 BRPM

## 2024-06-11 NOTE — ASSESSMENT & PLAN NOTE
Sotalol recently  discontinued in setting of marked bradycardia, symptomatic.  No recurrence of atrial flutter thus far.      Today's Electrocardiogram confirms sinus rhythm heart rate 103 beats per minute.      Recent Holter confirming atrial fibrillation as well as 3.2 sec.pauses without beta-blockers or antiarrhythmic therapy

## 2024-07-03 ENCOUNTER — TELEPHONE (OUTPATIENT)
Dept: CARDIOLOGY | Facility: CLINIC | Age: 89
End: 2024-07-03
Payer: MEDICARE

## 2024-07-03 DIAGNOSIS — D64.9 ANEMIA, UNSPECIFIED TYPE: ICD-10-CM

## 2024-07-03 DIAGNOSIS — M17.0 PRIMARY OSTEOARTHRITIS OF BOTH KNEES: ICD-10-CM

## 2024-07-03 RX ORDER — HYDROCODONE BITARTRATE AND ACETAMINOPHEN 7.5; 325 MG/1; MG/1
1 TABLET ORAL
Qty: 30 TABLET | Refills: 0 | Status: SHIPPED | OUTPATIENT
Start: 2024-07-03

## 2024-07-03 RX ORDER — FERROUS SULFATE 325(65) MG
325 TABLET, DELAYED RELEASE (ENTERIC COATED) ORAL DAILY
Qty: 90 TABLET | Refills: 1 | Status: SHIPPED | OUTPATIENT
Start: 2024-07-03

## 2024-07-03 NOTE — TELEPHONE ENCOUNTER
No care due was identified.  [facility] Embedded Care Due Messages. Reference number: [id number].   7/03/2024 3:32:50 PM CDT

## 2024-07-03 NOTE — TELEPHONE ENCOUNTER
----- Message from Sharri Barahona NP sent at 7/3/2024  4:25 PM CDT -----  Contact: [facility]  Please have patient monitor her BP at home x 2 weeks twice and day and send readings to determine if she needs BP medication.     Thanks  ----- Message -----  From: Carol Salgado MA  Sent: 2024   3:19 PM CDT  To: Sharri Barahona NP    I explained to the patient that Dr. Carlos is out of the clinic until August I was not able to clarify based of notes on the reason she is off of medication.  She asked me to forward the message to you to see if you can help her , she is willing to come in for an appointment if you need her too.  ----- Message -----  From: Bren Carr  Sent: 2024   3:09 PM CDT  To: Terrance Cam Staff    Tonya Meierups Fortunato  MRN: [id number]  : 1935  PCP: Tasha Atkins  Home Phone      [phone]  Work Phone      Not on file.  [employer]          [phone]      MESSAGE: July states that the patient would like to know why Dr. Carlos stopped the Losartan.        Phone: [phone]

## 2024-07-03 NOTE — TELEPHONE ENCOUNTER
----- Message from Summer Schmitt sent at 7/3/2024  3:16 PM CDT -----  Contact: pt  Tonya Bucio  MRN: [id number]  : 1935  PCP: Tasha Atkins  Home Phone      [phone]  Work Phone      Not on file.  [employer]          [phone]      MESSAGE:     Pt need refill on ferrous sulfate 325 (65 FE) MG EC tablet , and  HYDROcodone-acetaminophen (NORCO) 7.5-325 mg per tablet            Preferred Pharmacy    [address] [address]  Phone: [phone] Fax: [phone]  Hours: Not open 24 hours          [phone]

## 2024-07-03 NOTE — TELEPHONE ENCOUNTER
Patient is aware of blood pressure monitoring , says she has a blood pressure machine at home and will be checking it twice a day for two weeks.

## 2024-07-15 ENCOUNTER — OFFICE VISIT (OUTPATIENT)
Dept: INTERNAL MEDICINE | Facility: CLINIC | Age: 89
End: 2024-07-15
Payer: MEDICARE

## 2024-07-15 VITALS
HEART RATE: 95 BPM | OXYGEN SATURATION: 95 % | BODY MASS INDEX: 36.07 KG/M2 | SYSTOLIC BLOOD PRESSURE: 118 MMHG | DIASTOLIC BLOOD PRESSURE: 70 MMHG | RESPIRATION RATE: 18 BRPM | WEIGHT: 238 LBS | HEIGHT: 68 IN

## 2024-07-15 DIAGNOSIS — M17.0 PRIMARY OSTEOARTHRITIS OF BOTH KNEES: ICD-10-CM

## 2024-07-15 DIAGNOSIS — E11.69 HYPERLIPIDEMIA ASSOCIATED WITH TYPE 2 DIABETES MELLITUS: ICD-10-CM

## 2024-07-15 DIAGNOSIS — I10 PRIMARY HYPERTENSION: Primary | ICD-10-CM

## 2024-07-15 DIAGNOSIS — E11.9 CONTROLLED TYPE 2 DIABETES MELLITUS WITHOUT COMPLICATION, WITHOUT LONG-TERM CURRENT USE OF INSULIN: ICD-10-CM

## 2024-07-15 DIAGNOSIS — E78.5 HYPERLIPIDEMIA ASSOCIATED WITH TYPE 2 DIABETES MELLITUS: ICD-10-CM

## 2024-07-15 PROCEDURE — 1160F RVW MEDS BY RX/DR IN RCRD: CPT | Mod: HCNC,CPTII,S$GLB, | Performed by: INTERNAL MEDICINE

## 2024-07-15 PROCEDURE — G2211 COMPLEX E/M VISIT ADD ON: HCPCS | Mod: HCNC,S$GLB,, | Performed by: INTERNAL MEDICINE

## 2024-07-15 PROCEDURE — 1126F AMNT PAIN NOTED NONE PRSNT: CPT | Mod: HCNC,CPTII,S$GLB, | Performed by: INTERNAL MEDICINE

## 2024-07-15 PROCEDURE — 3288F FALL RISK ASSESSMENT DOCD: CPT | Mod: HCNC,CPTII,S$GLB, | Performed by: INTERNAL MEDICINE

## 2024-07-15 PROCEDURE — 1101F PT FALLS ASSESS-DOCD LE1/YR: CPT | Mod: HCNC,CPTII,S$GLB, | Performed by: INTERNAL MEDICINE

## 2024-07-15 PROCEDURE — 1159F MED LIST DOCD IN RCRD: CPT | Mod: HCNC,CPTII,S$GLB, | Performed by: INTERNAL MEDICINE

## 2024-07-15 PROCEDURE — 99214 OFFICE O/P EST MOD 30 MIN: CPT | Mod: HCNC,S$GLB,, | Performed by: INTERNAL MEDICINE

## 2024-07-15 PROCEDURE — 99999 PR PBB SHADOW E&M-EST. PATIENT-LVL III: CPT | Mod: PBBFAC,HCNC,, | Performed by: INTERNAL MEDICINE

## 2024-07-15 NOTE — PROGRESS NOTES
"HPI:  Tonya Bucio is a 89 y.o. female here for Follow-up  She is on chronic narcotics  BP is doing well here ; home record is showing high numbers; bring BP machine here    Review of Systems   Constitutional:  Positive for fatigue. Negative for chills and fever.   HENT:  Negative for congestion, hearing loss, sinus pressure and sore throat.    Eyes:  Negative for photophobia.   Respiratory:  Negative for cough, choking, chest tightness, shortness of breath and wheezing.    Cardiovascular:  Negative for chest pain and palpitations.   Gastrointestinal:  Negative for blood in stool, nausea and vomiting.   Endocrine: Negative for polydipsia and polyphagia.   Genitourinary:  Negative for dysuria and hematuria.   Musculoskeletal:  Positive for back pain and gait problem. Negative for arthralgias, myalgias and neck pain.        Still needing narcotics ; no confusion or hang over .     Skin:  Negative for pallor.   Neurological:  Negative for dizziness, weakness and numbness.   Hematological:  Does not bruise/bleed easily.   Psychiatric/Behavioral:  Negative for confusion and suicidal ideas. The patient is not nervous/anxious.       A review of systems was performed and is negative except as noted above.    Objective:  Vitals:    07/15/24 1444   BP: 118/70   Pulse: 95   Resp: 18   SpO2: 95%   Weight: 108 kg (238 lb)   Height: 5' 8" (1.727 m)      Physical Exam  Vitals and nursing note reviewed.   Constitutional:       Appearance: She is well-developed. She is obese.      Comments: Sitting in wheel chair ;brought by family    HENT:      Head: Normocephalic and atraumatic.      Right Ear: External ear normal.      Left Ear: External ear normal.   Eyes:      Conjunctiva/sclera: Conjunctivae normal.      Pupils: Pupils are equal, round, and reactive to light.   Neck:      Thyroid: No thyromegaly.      Vascular: No JVD.      Trachea: No tracheal deviation.   Cardiovascular:      Rate and Rhythm: Normal rate and regular " rhythm.      Heart sounds: Normal heart sounds.   Pulmonary:      Effort: Pulmonary effort is normal. No respiratory distress.      Breath sounds: Normal breath sounds. No wheezing or rales.   Chest:      Chest wall: No tenderness.   Abdominal:      General: Bowel sounds are normal. There is no distension.      Palpations: Abdomen is soft. There is no mass.      Tenderness: There is no abdominal tenderness. There is no guarding or rebound.   Musculoskeletal:         General: Normal range of motion.      Cervical back: Normal range of motion and neck supple.   Lymphadenopathy:      Cervical: No cervical adenopathy.   Skin:     General: Skin is warm and dry.             Comments: decubiti   Neurological:      Mental Status: She is alert and oriented to person, place, and time.      Cranial Nerves: No cranial nerve deficit.      Motor: No abnormal muscle tone.      Coordination: Coordination normal.      Deep Tendon Reflexes: Reflexes are normal and symmetric. Reflexes normal.      Comments: CN: Optic discs are flat with normal vasculature, PERRL, Extraoccular movements and visual fields are full. Normal facial sensation and strength, Hearing symmetric, Tongue and Palate are midline and strong. Shoulder Shrug symmetric and strong.          Assessment/Plan:  1. Primary hypertension  -     CBC Auto Differential; Future; Expected date: 10/13/2024  -     Comprehensive Metabolic Panel; Future; Expected date: 10/13/2024    2. Primary osteoarthritis of both knees    3. Controlled type 2 diabetes mellitus without complication, without long-term current use of insulin  -     Hemoglobin A1C; Future; Expected date: 10/13/2024  -     Microalbumin/Creatinine Ratio, Urine; Future; Expected date: 10/13/2024    4. Hyperlipidemia associated with type 2 diabetes mellitus  -     Lipid Panel; Future; Expected date: 10/13/2024  -     TSH; Future; Expected date: 10/13/2024     we discussed narcotics for pain management :    Managing chronic  pain with opioids is complicated and challenging. I explained to patient that Doctors need to know if patients can follow the treatment plan, if they get desired responses from the meds, and if there are signs of developing addiction. Physicians use medication contracts to monitor patients adherence, or to help check that patients are compliant with the medications ordered. Such agreements are most commonly used when narcotic pain relievers are prescribed. Narcotics can sometimes become addictive if not taken as prescribed by a doctor.    The use of a pain management agreement allows for the documentation of understanding between a doctor and patient. Such documentation, when used as a means of facilitating care, can improve communication between doctors and patients.      Chronic, pain controlled on current regimen  Cont for now  No escalation of narcotics  Agrees to random UDS   reviewed today

## 2024-07-18 RX ORDER — RIVAROXABAN 15 MG/1
15 TABLET, FILM COATED ORAL NIGHTLY
Qty: 90 TABLET | Refills: 0 | Status: SHIPPED | OUTPATIENT
Start: 2024-07-18

## 2024-07-22 PROBLEM — N39.0 UTI (URINARY TRACT INFECTION): Status: RESOLVED | Noted: 2022-07-06 | Resolved: 2024-07-22

## 2024-07-22 PROBLEM — N17.9 AKI (ACUTE KIDNEY INJURY): Status: RESOLVED | Noted: 2024-04-21 | Resolved: 2024-07-22

## 2024-07-29 DIAGNOSIS — M1A.39X0 CHRONIC GOUT DUE TO RENAL IMPAIRMENT OF MULTIPLE SITES WITHOUT TOPHUS: ICD-10-CM

## 2024-07-29 RX ORDER — ALLOPURINOL 300 MG/1
TABLET ORAL
Qty: 90 TABLET | Refills: 3 | Status: SHIPPED | OUTPATIENT
Start: 2024-07-29

## 2024-07-29 NOTE — TELEPHONE ENCOUNTER
----- Message from Roxanna Russ sent at 2024 10:51 AM CDT -----  Contact: Pt  Tonya Tasneem Bucio  MRN: [id number]  : 1935  PCP: Tasha Atkins  Home Phone      [phone]  Work Phone      Not on file.  [employer]          [phone]      MESSAGE:     Pt needs a refill of allopurinoL (ZYLOPRIM) 300 MG tablet sent to [facility] in [place].         Tonya   [phone]

## 2024-07-29 NOTE — TELEPHONE ENCOUNTER
No care due was identified.  Health [facility] Embedded Care Due Messages. Reference number: [id number].   7/29/2024 11:00:21 AM CDT

## 2024-08-02 ENCOUNTER — HOSPITAL ENCOUNTER (EMERGENCY)
Facility: HOSPITAL | Age: 89
Discharge: HOME | End: 2024-08-02
Attending: STUDENT IN AN ORGANIZED HEALTH CARE EDUCATION/TRAINING PROGRAM
Payer: MEDICARE

## 2024-08-02 VITALS
OXYGEN SATURATION: 95 % | HEART RATE: 61 BPM | BODY MASS INDEX: 36.07 KG/M2 | WEIGHT: 238 LBS | DIASTOLIC BLOOD PRESSURE: 79 MMHG | SYSTOLIC BLOOD PRESSURE: 177 MMHG | TEMPERATURE: 98 F | HEIGHT: 68 IN | RESPIRATION RATE: 20 BRPM

## 2024-08-02 DIAGNOSIS — R07.9 CHEST PAIN: ICD-10-CM

## 2024-08-02 DIAGNOSIS — R53.1 GENERALIZED WEAKNESS: Primary | ICD-10-CM

## 2024-08-02 DIAGNOSIS — R53.83 FATIGUE: ICD-10-CM

## 2024-08-02 LAB
ALBUMIN SERPL BCP-MCNC: 3 G/DL (ref 3.5–5.2)
ALP SERPL-CCNC: 102 U/L (ref 55–135)
ALT SERPL W/O P-5'-P-CCNC: 22 U/L (ref 10–44)
ANION GAP SERPL CALC-SCNC: 10 MMOL/L (ref 8–16)
AST SERPL-CCNC: 27 U/L (ref 10–40)
BACTERIA #/AREA URNS HPF: NORMAL /HPF
BASOPHILS # BLD AUTO: 0.04 K/UL (ref 0–0.2)
BASOPHILS NFR BLD: 0.4 % (ref 0–1.9)
BILIRUB SERPL-MCNC: 0.5 MG/DL (ref 0.1–1)
BILIRUB UR QL STRIP: NEGATIVE
BNP SERPL-MCNC: 123 PG/ML (ref 0–99)
BUN SERPL-MCNC: 25 MG/DL (ref 8–23)
CALCIUM SERPL-MCNC: 9.6 MG/DL (ref 8.7–10.5)
CHLORIDE SERPL-SCNC: 103 MMOL/L (ref 95–110)
CLARITY UR: CLEAR
CO2 SERPL-SCNC: 24 MMOL/L (ref 23–29)
COLOR UR: YELLOW
CREAT SERPL-MCNC: 1.4 MG/DL (ref 0.5–1.4)
DIFFERENTIAL METHOD BLD: ABNORMAL
EOSINOPHIL # BLD AUTO: 0 K/UL (ref 0–0.5)
EOSINOPHIL NFR BLD: 0.1 % (ref 0–8)
ERYTHROCYTE [DISTWIDTH] IN BLOOD BY AUTOMATED COUNT: 15.2 % (ref 11.5–14.5)
EST. GFR  (NO RACE VARIABLE): 36 ML/MIN/1.73 M^2
GLUCOSE SERPL-MCNC: 144 MG/DL (ref 70–110)
GLUCOSE UR QL STRIP: NEGATIVE
HCT VFR BLD AUTO: 33.9 % (ref 37–48.5)
HGB BLD-MCNC: 10.7 G/DL (ref 12–16)
HGB UR QL STRIP: ABNORMAL
HYALINE CASTS #/AREA URNS LPF: 0 /LPF
IMM GRANULOCYTES # BLD AUTO: 0.08 K/UL (ref 0–0.04)
IMM GRANULOCYTES NFR BLD AUTO: 0.8 % (ref 0–0.5)
KETONES UR QL STRIP: NEGATIVE
LEUKOCYTE ESTERASE UR QL STRIP: NEGATIVE
LYMPHOCYTES # BLD AUTO: 1.3 K/UL (ref 1–4.8)
LYMPHOCYTES NFR BLD: 13.4 % (ref 18–48)
MCH RBC QN AUTO: 28.5 PG (ref 27–31)
MCHC RBC AUTO-ENTMCNC: 31.6 G/DL (ref 32–36)
MCV RBC AUTO: 90 FL (ref 82–98)
MICROSCOPIC COMMENT: NORMAL
MONOCYTES # BLD AUTO: 0.6 K/UL (ref 0.3–1)
MONOCYTES NFR BLD: 6.4 % (ref 4–15)
NEUTROPHILS # BLD AUTO: 7.6 K/UL (ref 1.8–7.7)
NEUTROPHILS NFR BLD: 78.9 % (ref 38–73)
NITRITE UR QL STRIP: NEGATIVE
NRBC BLD-RTO: 0 /100 WBC
PH UR STRIP: 7 [PH] (ref 5–8)
PLATELET # BLD AUTO: 286 K/UL (ref 150–450)
PMV BLD AUTO: 10.4 FL (ref 9.2–12.9)
POTASSIUM SERPL-SCNC: 4.4 MMOL/L (ref 3.5–5.1)
PROT SERPL-MCNC: 7.7 G/DL (ref 6–8.4)
PROT UR QL STRIP: ABNORMAL
RBC # BLD AUTO: 3.75 M/UL (ref 4–5.4)
RBC #/AREA URNS HPF: 1 /HPF (ref 0–4)
SODIUM SERPL-SCNC: 137 MMOL/L (ref 136–145)
SP GR UR STRIP: 1.02 (ref 1–1.03)
TROPONIN I SERPL DL<=0.01 NG/ML-MCNC: 0.07 NG/ML (ref 0–0.03)
TROPONIN I SERPL DL<=0.01 NG/ML-MCNC: 0.07 NG/ML (ref 0–0.03)
URN SPEC COLLECT METH UR: ABNORMAL
UROBILINOGEN UR STRIP-ACNC: NEGATIVE EU/DL
WBC # BLD AUTO: 9.68 K/UL (ref 3.9–12.7)
WBC #/AREA URNS HPF: 2 /HPF (ref 0–5)

## 2024-08-02 PROCEDURE — 85025 COMPLETE CBC W/AUTO DIFF WBC: CPT | Mod: HCNC | Performed by: STUDENT IN AN ORGANIZED HEALTH CARE EDUCATION/TRAINING PROGRAM

## 2024-08-02 PROCEDURE — 99900035 HC TECH TIME PER 15 MIN (STAT): Mod: HCNC

## 2024-08-02 PROCEDURE — 25000003 PHARM REV CODE 250: Mod: HCNC | Performed by: STUDENT IN AN ORGANIZED HEALTH CARE EDUCATION/TRAINING PROGRAM

## 2024-08-02 PROCEDURE — 80053 COMPREHEN METABOLIC PANEL: CPT | Mod: HCNC | Performed by: STUDENT IN AN ORGANIZED HEALTH CARE EDUCATION/TRAINING PROGRAM

## 2024-08-02 PROCEDURE — 93010 ELECTROCARDIOGRAM REPORT: CPT | Mod: HCNC,,, | Performed by: INTERNAL MEDICINE

## 2024-08-02 PROCEDURE — 84484 ASSAY OF TROPONIN QUANT: CPT | Mod: HCNC | Performed by: STUDENT IN AN ORGANIZED HEALTH CARE EDUCATION/TRAINING PROGRAM

## 2024-08-02 PROCEDURE — 83880 ASSAY OF NATRIURETIC PEPTIDE: CPT | Mod: HCNC | Performed by: STUDENT IN AN ORGANIZED HEALTH CARE EDUCATION/TRAINING PROGRAM

## 2024-08-02 PROCEDURE — 93005 ELECTROCARDIOGRAM TRACING: CPT | Mod: HCNC

## 2024-08-02 PROCEDURE — 81000 URINALYSIS NONAUTO W/SCOPE: CPT | Mod: HCNC | Performed by: STUDENT IN AN ORGANIZED HEALTH CARE EDUCATION/TRAINING PROGRAM

## 2024-08-02 PROCEDURE — 96360 HYDRATION IV INFUSION INIT: CPT | Mod: HCNC

## 2024-08-02 PROCEDURE — 99285 EMERGENCY DEPT VISIT HI MDM: CPT | Mod: 25,HCNC

## 2024-08-02 RX ADMIN — SODIUM CHLORIDE 500 ML: 9 INJECTION, SOLUTION INTRAVENOUS at 01:08

## 2024-08-05 ENCOUNTER — PATIENT OUTREACH (OUTPATIENT)
Dept: EMERGENCY MEDICINE | Facility: HOSPITAL | Age: 89
End: 2024-08-05
Payer: MEDICARE

## 2024-08-05 LAB
OHS QRS DURATION: 84 MS
OHS QRS DURATION: 86 MS
OHS QTC CALCULATION: 385 MS
OHS QTC CALCULATION: 413 MS

## 2024-08-07 DIAGNOSIS — M17.0 PRIMARY OSTEOARTHRITIS OF BOTH KNEES: ICD-10-CM

## 2024-08-07 RX ORDER — HYDROCODONE BITARTRATE AND ACETAMINOPHEN 7.5; 325 MG/1; MG/1
1 TABLET ORAL
Qty: 30 TABLET | Refills: 0 | Status: SHIPPED | OUTPATIENT
Start: 2024-08-07

## 2024-08-26 DIAGNOSIS — E78.5 HYPERLIPIDEMIA ASSOCIATED WITH TYPE 2 DIABETES MELLITUS: ICD-10-CM

## 2024-08-26 DIAGNOSIS — E11.69 HYPERLIPIDEMIA ASSOCIATED WITH TYPE 2 DIABETES MELLITUS: ICD-10-CM

## 2024-08-26 RX ORDER — ROSUVASTATIN CALCIUM 20 MG/1
20 TABLET, COATED ORAL NIGHTLY
Qty: 90 TABLET | Refills: 3 | Status: SHIPPED | OUTPATIENT
Start: 2024-08-26

## 2024-08-26 NOTE — TELEPHONE ENCOUNTER
No care due was identified.  Health [facility] Embedded Care Due Messages. Reference number: [id number].   8/26/2024 11:44:16 AM CDT

## 2024-08-26 NOTE — TELEPHONE ENCOUNTER
----- Message from Connie Pineda sent at 2024 11:34 AM CDT -----  Contact: pt  Tonya Bucio  MRN: [id number]  : 1935  PCP: Tasha Atkins  Home Phone      [phone]  Work Phone      Not on file.  [employer]          [phone]      MESSAGE: pt states she needs a refill on the following medication    rosuvastatin (CRESTOR) 20 MG tablet    [facility] TERRI KEITH [address] [address]  Phone: [phone] Fax: [phone]  Hours: Not open 24 hours    [phone]

## 2024-08-28 ENCOUNTER — TELEPHONE (OUTPATIENT)
Dept: INTERNAL MEDICINE | Facility: CLINIC | Age: 89
End: 2024-08-28
Payer: MEDICARE

## 2024-08-28 ENCOUNTER — LAB VISIT (OUTPATIENT)
Dept: LAB | Facility: HOSPITAL | Age: 89
End: 2024-08-28
Attending: INTERNAL MEDICINE
Payer: MEDICARE

## 2024-08-28 DIAGNOSIS — R30.0 DYSURIA: ICD-10-CM

## 2024-08-28 DIAGNOSIS — R30.0 DYSURIA: Primary | ICD-10-CM

## 2024-08-28 LAB
BACTERIA #/AREA URNS HPF: ABNORMAL /HPF
BILIRUB UR QL STRIP: NEGATIVE
CLARITY UR: ABNORMAL
COLOR UR: YELLOW
GLUCOSE UR QL STRIP: NEGATIVE
HGB UR QL STRIP: ABNORMAL
HYALINE CASTS #/AREA URNS LPF: 0 /LPF
KETONES UR QL STRIP: NEGATIVE
LEUKOCYTE ESTERASE UR QL STRIP: ABNORMAL
MICROSCOPIC COMMENT: ABNORMAL
NITRITE UR QL STRIP: NEGATIVE
PH UR STRIP: 7 [PH] (ref 5–8)
PROT UR QL STRIP: ABNORMAL
RBC #/AREA URNS HPF: 2 /HPF (ref 0–4)
SP GR UR STRIP: 1.01 (ref 1–1.03)
SQUAMOUS #/AREA URNS HPF: 1 /HPF
URN SPEC COLLECT METH UR: ABNORMAL
UROBILINOGEN UR STRIP-ACNC: NEGATIVE EU/DL
WBC #/AREA URNS HPF: >100 /HPF (ref 0–5)

## 2024-08-28 PROCEDURE — 87086 URINE CULTURE/COLONY COUNT: CPT | Mod: HCNC | Performed by: INTERNAL MEDICINE

## 2024-08-28 PROCEDURE — 81000 URINALYSIS NONAUTO W/SCOPE: CPT | Mod: HCNC | Performed by: INTERNAL MEDICINE

## 2024-08-28 RX ORDER — NITROFURANTOIN 25; 75 MG/1; MG/1
100 CAPSULE ORAL DAILY
Qty: 10 CAPSULE | Refills: 0 | Status: SHIPPED | OUTPATIENT
Start: 2024-08-28 | End: 2024-09-07

## 2024-08-28 NOTE — TELEPHONE ENCOUNTER
Patient states that she collected a urine sample in a sterile container. I instructed that she bring it to [facility]. U/A w/ reflex to culture ordered.

## 2024-08-28 NOTE — TELEPHONE ENCOUNTER
----- Message from Connie Pineda sent at 2024 10:08 AM CDT -----  Contact: pt  Tonya Bucio  MRN: [id number]  : 1935  PCP: Tasha Atkins  Home Phone      [phone]  Work Phone      Not on file.  [employer]          [phone]      MESSAGE: pt states she needs antibiotics for UTI. She said she collected a urine this morning and wants to know where to bring it. Please advise       [facility] [place] [address] [address]  Phone: [phone] Fax: [phone]  Hours: Not open 24 hours      [phone]

## 2024-08-29 LAB
BACTERIA UR CULT: NORMAL
BACTERIA UR CULT: NORMAL

## 2024-09-03 ENCOUNTER — PATIENT OUTREACH (OUTPATIENT)
Dept: EMERGENCY MEDICINE | Facility: HOSPITAL | Age: 89
End: 2024-09-03
Payer: MEDICARE

## 2024-09-03 NOTE — PROGRESS NOTES
Pt is doing well. Pt will be attending her cardiologist appointment tomorrow. Pt has no needs today.    ED navigator ensured there was nothing she could help patient with today. ED navigator reminded patient to reach out if there is ever anything she can assist with. ED navigator will follow-up with patient on/around 10/14/2024.    Mildred Beverly  ED Navigator  [phone]

## 2024-09-03 NOTE — PROGRESS NOTES
Pt was reminded about and will be attending her appointment with Tutu Carlos MD for Thursday @ 9:40 a.m.    Mildred Beverly  ED Navigator  [phone]

## 2024-09-05 ENCOUNTER — TELEPHONE (OUTPATIENT)
Dept: INTERNAL MEDICINE | Facility: CLINIC | Age: 89
End: 2024-09-05
Payer: MEDICARE

## 2024-09-05 ENCOUNTER — TELEPHONE (OUTPATIENT)
Dept: ELECTROPHYSIOLOGY | Facility: CLINIC | Age: 89
End: 2024-09-05
Payer: MEDICARE

## 2024-09-05 ENCOUNTER — OFFICE VISIT (OUTPATIENT)
Dept: CARDIOLOGY | Facility: CLINIC | Age: 89
End: 2024-09-05
Payer: MEDICARE

## 2024-09-05 VITALS
BODY MASS INDEX: 37.13 KG/M2 | HEIGHT: 68 IN | HEART RATE: 98 BPM | DIASTOLIC BLOOD PRESSURE: 78 MMHG | SYSTOLIC BLOOD PRESSURE: 177 MMHG | OXYGEN SATURATION: 95 % | WEIGHT: 245 LBS

## 2024-09-05 DIAGNOSIS — I48.3 TYPICAL ATRIAL FLUTTER: ICD-10-CM

## 2024-09-05 DIAGNOSIS — E11.9 CONTROLLED TYPE 2 DIABETES MELLITUS WITHOUT COMPLICATION, WITHOUT LONG-TERM CURRENT USE OF INSULIN: ICD-10-CM

## 2024-09-05 DIAGNOSIS — E66.9 OBESITY (BMI 30.0-34.9): ICD-10-CM

## 2024-09-05 DIAGNOSIS — E78.5 HYPERLIPIDEMIA ASSOCIATED WITH TYPE 2 DIABETES MELLITUS: ICD-10-CM

## 2024-09-05 DIAGNOSIS — E11.69 HYPERLIPIDEMIA ASSOCIATED WITH TYPE 2 DIABETES MELLITUS: ICD-10-CM

## 2024-09-05 DIAGNOSIS — I48.0 PAROXYSMAL ATRIAL FIBRILLATION: ICD-10-CM

## 2024-09-05 DIAGNOSIS — I50.32 CHRONIC DIASTOLIC HEART FAILURE: ICD-10-CM

## 2024-09-05 DIAGNOSIS — N18.31 STAGE 3A CHRONIC KIDNEY DISEASE: ICD-10-CM

## 2024-09-05 DIAGNOSIS — M17.0 PRIMARY OSTEOARTHRITIS OF BOTH KNEES: ICD-10-CM

## 2024-09-05 DIAGNOSIS — R00.1 BRADYCARDIA: ICD-10-CM

## 2024-09-05 DIAGNOSIS — I10 PRIMARY HYPERTENSION: Primary | ICD-10-CM

## 2024-09-05 DIAGNOSIS — I70.0 AORTIC ATHEROSCLEROSIS: ICD-10-CM

## 2024-09-05 PROCEDURE — 99999 PR PBB SHADOW E&M-EST. PATIENT-LVL III: CPT | Mod: PBBFAC,HCNC,, | Performed by: INTERNAL MEDICINE

## 2024-09-05 PROCEDURE — 1101F PT FALLS ASSESS-DOCD LE1/YR: CPT | Mod: HCNC,CPTII,S$GLB, | Performed by: INTERNAL MEDICINE

## 2024-09-05 PROCEDURE — 99214 OFFICE O/P EST MOD 30 MIN: CPT | Mod: HCNC,S$GLB,, | Performed by: INTERNAL MEDICINE

## 2024-09-05 PROCEDURE — 1159F MED LIST DOCD IN RCRD: CPT | Mod: HCNC,CPTII,S$GLB, | Performed by: INTERNAL MEDICINE

## 2024-09-05 PROCEDURE — 3288F FALL RISK ASSESSMENT DOCD: CPT | Mod: HCNC,CPTII,S$GLB, | Performed by: INTERNAL MEDICINE

## 2024-09-05 PROCEDURE — 1125F AMNT PAIN NOTED PAIN PRSNT: CPT | Mod: HCNC,CPTII,S$GLB, | Performed by: INTERNAL MEDICINE

## 2024-09-05 PROCEDURE — 1160F RVW MEDS BY RX/DR IN RCRD: CPT | Mod: HCNC,CPTII,S$GLB, | Performed by: INTERNAL MEDICINE

## 2024-09-05 RX ORDER — LOSARTAN POTASSIUM 25 MG/1
25 TABLET ORAL DAILY
Qty: 90 TABLET | Refills: 3 | Status: SHIPPED | OUTPATIENT
Start: 2024-09-05

## 2024-09-05 RX ORDER — HYDROCODONE BITARTRATE AND ACETAMINOPHEN 7.5; 325 MG/1; MG/1
1 TABLET ORAL
Qty: 30 TABLET | Refills: 0 | Status: SHIPPED | OUTPATIENT
Start: 2024-09-05

## 2024-09-05 RX ORDER — OMEGA-3-ACID ETHYL ESTERS 1 G/1
1 CAPSULE, LIQUID FILLED ORAL 2 TIMES DAILY
Qty: 90 CAPSULE | Refills: 1 | Status: SHIPPED | OUTPATIENT
Start: 2024-09-05

## 2024-09-05 NOTE — PROGRESS NOTES
[facility] Cardiology     Subjective:    Patient ID:  Tonya Bucio is a 89 y.o. female who presents for follow-up of SSS, Atrial Fibrillation, Hypertension, and Diabetes Mellitus    Review of patient's allergies indicates:  No Known Allergies  She was seen in June after hospital admission for bradycardia associated with sotalol/Toprol usage for AFib, atrial flutter.  She wore an event monitor in May 2024 showing 3.2 sec pause during daytime hours and multiple episodes of AFib.  She remains off sotalol and Toprol.  She is on Xarelto 15 mg daily.    She is mostly wheelchair-bound.  She does some ambulation with a walker at home.  She does not feel palpitations classically.  She is asymptomatic today.  I had tried to refer the patient to [facility] locally but it never got accomplished.  She does not report any bleeding problems.    She has not had any symptoms related to heart failure.  No periods of lightheadedness or dizziness reported.  Her blood pressures and heart rates are reviewed today from the month of August.  Most of her readings of blood pressure are in the 150-160 systolic range.  Some readings are normal, 10% or less.  Her heart rates vary between 60s and 100s.      She is not on hypertension therapy.  She used to take losartan 25 mg.  She is on Crestor.  She has history of COPD, previous smoker.  She does have renal insufficiency, CKD 3B.  She is a diabetic.  She is quite sedentary at home.  She has a lot of back problems.  She is on hydrocodone for her back.    Her last echo confirmed normal ejection fraction April 20, 2024.    Today's electrocardiogram reviewed and independently interpreted by myself confirms heart rate 88 sinus rhythm with first-degree AV block.  PACs identified.  No ischemic ST changes or infarct pattern noted.         Review of Systems   Constitutional: Negative for chills, decreased appetite,  diaphoresis, fever, malaise/fatigue, night sweats, weight gain and weight loss.   HENT:  Positive for hearing loss. Negative for congestion, ear discharge, ear pain, hoarse voice, nosebleeds, odynophagia, sore throat, stridor and tinnitus.    Eyes:  Negative for blurred vision, discharge, double vision, pain, photophobia, redness, vision loss in left eye, vision loss in right eye, visual disturbance and visual halos.   Cardiovascular:  Negative for chest pain, claudication, cyanosis, dyspnea on exertion, irregular heartbeat, leg swelling, near-syncope, orthopnea, palpitations, paroxysmal nocturnal dyspnea and syncope.   Respiratory:  Negative for cough, hemoptysis, shortness of breath, sleep disturbances due to breathing, snoring, sputum production and wheezing.    Endocrine: Negative for cold intolerance, heat intolerance, polydipsia, polyphagia and polyuria.   Hematologic/Lymphatic: Negative for adenopathy and bleeding problem. Does not bruise/bleed easily.   Skin:  Negative for color change, dry skin, flushing, itching, nail changes, poor wound healing, rash, skin cancer, suspicious lesions and unusual hair distribution.   Musculoskeletal:  Positive for back pain and joint pain. Negative for arthritis, falls, gout, joint swelling, muscle cramps, muscle weakness, myalgias, neck pain and stiffness.   Gastrointestinal:  Negative for bloating, abdominal pain, anorexia, change in bowel habit, bowel incontinence, constipation, diarrhea, dysphagia, excessive appetite, flatus, heartburn, hematemesis, hematochezia, hemorrhoids, jaundice, melena, nausea and vomiting.   Genitourinary:  Negative for bladder incontinence, decreased libido, dysuria, flank pain, frequency, genital sores, hematuria, hesitancy, incomplete emptying, nocturia and urgency.   Neurological:  Negative for aphonia, brief paralysis, difficulty with concentration, disturbances in coordination, excessive daytime sleepiness, dizziness, focal weakness,  "headaches, light-headedness, loss of balance, numbness, paresthesias, seizures, sensory change, tremors, vertigo and weakness.   Psychiatric/Behavioral:  Negative for altered mental status, depression, hallucinations, memory loss, substance abuse, suicidal ideas and thoughts of violence. The patient does not have insomnia and is not nervous/anxious.    Allergic/Immunologic: Negative for hives and persistent infections.        Objective:       Vitals:    09/05/24 0952   BP: (!) 177/78   Pulse: 98   SpO2: 95%   Weight: 111.1 kg (245 lb)   Height: 5' 8" (1.727 m)    Physical Exam  Constitutional:       General: She is not in acute distress.     Appearance: She is well-developed. She is not diaphoretic.   HENT:      Head: Normocephalic and atraumatic.      Nose: Nose normal.   Eyes:      General: No scleral icterus.        Right eye: No discharge.      Conjunctiva/sclera: Conjunctivae normal.      Pupils: Pupils are equal, round, and reactive to light.   Neck:      Thyroid: No thyromegaly.      Vascular: No JVD.      Trachea: No tracheal deviation.   Cardiovascular:      Rate and Rhythm: Normal rate and regular rhythm. Frequent Extrasystoles are present.     Pulses:           Carotid pulses are 2+ on the right side and 2+ on the left side.       Radial pulses are 2+ on the right side and 2+ on the left side.        Dorsalis pedis pulses are 1+ on the right side and 1+ on the left side.        Posterior tibial pulses are 1+ on the right side and 1+ on the left side.      Heart sounds: Normal heart sounds. No murmur heard.     No friction rub. No gallop.   Pulmonary:      Effort: Pulmonary effort is normal. No respiratory distress.      Breath sounds: Normal breath sounds. No stridor. No wheezing or rales.   Chest:      Chest wall: No tenderness.   Abdominal:      General: Bowel sounds are normal. There is no distension.      Palpations: Abdomen is soft. There is no mass.      Tenderness: There is no abdominal tenderness. " There is no guarding or rebound.   Musculoskeletal:         General: No tenderness. Normal range of motion.      Cervical back: Normal range of motion and neck supple.   Lymphadenopathy:      Cervical: No cervical adenopathy.   Skin:     General: Skin is warm and dry.      Coloration: Skin is not pale.      Findings: No erythema or rash.   Neurological:      Mental Status: She is alert and oriented to person, place, and time.      Cranial Nerves: No cranial nerve deficit.      Coordination: Coordination normal.   Psychiatric:         Behavior: Behavior normal.         Thought Content: Thought content normal.         Judgment: Judgment normal.           Assessment:       1. Primary hypertension    2. Paroxysmal atrial fibrillation    3. Aortic atherosclerosis    4. Bradycardia    5. Chronic diastolic heart failure    6. Hyperlipidemia associated with type 2 diabetes mellitus    7. Typical atrial flutter    8. Stage 3a chronic kidney disease    9. Controlled type 2 diabetes mellitus without complication, without long-term current use of insulin    10. Obesity (BMI 30.0-34.9)      Results for orders placed or performed in visit on 08/28/24   Urine culture    Specimen: Urine   Result Value Ref Range    Urine Culture, Routine       Multiple organisms isolated. None in predominance.  Repeat if    Urine Culture, Routine clinically necessary.    Urinalysis, Reflex to Urine Culture Urine, Clean Catch    Specimen: Urine   Result Value Ref Range    Specimen UA Urine, Clean Catch     Color, UA Yellow Yellow, Straw, Sharda    Appearance, UA Cloudy (A) Clear    pH, UA 7.0 5.0 - 8.0    Specific Gravity, UA 1.015 1.005 - 1.030    Protein, UA 2+ (A) Negative    Glucose, UA Negative Negative    Ketones, UA Negative Negative    Bilirubin (UA) Negative Negative    Occult Blood UA 2+ (A) Negative    Nitrite, UA Negative Negative    Urobilinogen, UA Negative <2.0 EU/dL    Leukocytes, UA 3+ (A) Negative   Urinalysis Microscopic   Result  Value Ref Range    RBC, UA 2 0 - 4 /hpf    WBC, UA >100 (H) 0 - 5 /hpf    Bacteria Occasional None-Occ /hpf    Squam Epithel, UA 1 /hpf    Hyaline Casts, UA 0 0-1/lpf /lpf    Microscopic Comment SEE COMMENT          Current Outpatient Medications:     acetaminophen (TYLENOL) 500 MG tablet, Take 500 mg by mouth every evening. And as needed, Disp: , Rfl:     allopurinoL (ZYLOPRIM) 300 MG tablet, Take 1 tablet (300 mg total) by mouth once daily., Disp: 90 tablet, Rfl: 3    cinacalcet (SENSIPAR) 60 MG Tab, SMARTSI Tablet(s) By Mouth Every Evening, Disp: , Rfl:     ferrous sulfate 325 (65 FE) MG EC tablet, Take 1 tablet (325 mg total) by mouth once daily., Disp: 90 tablet, Rfl: 1    HYDROcodone-acetaminophen (NORCO) 7.5-325 mg per tablet, Take 1 tablet by mouth every 24 hours as needed for Pain., Disp: 30 tablet, Rfl: 0    levothyroxine (SYNTHROID) 75 MCG tablet, Take 1 tablet by mouth once daily, Disp: 90 tablet, Rfl: 3    MAGNESIUM CITRATE ORAL, Take by mouth., Disp: , Rfl:     multivitamin (ONE DAILY MULTIVITAMIN) per tablet, Take 1 tablet by mouth once daily., Disp: , Rfl:     nitrofurantoin, macrocrystal-monohydrate, (MACROBID) 100 MG capsule, Take 1 capsule (100 mg total) by mouth Daily. for 10 days, Disp: 10 capsule, Rfl: 0    omega-3 acid ethyl esters (LOVAZA) 1 gram capsule, Take 1 capsule (1 g total) by mouth 2 (two) times daily., Disp: 90 capsule, Rfl: 1    rosuvastatin (CRESTOR) 20 MG tablet, Take 1 tablet (20 mg total) by mouth every evening., Disp: 90 tablet, Rfl: 3    XARELTO 15 mg Tab, Take 1 tablet by mouth in the evening, Disp: 90 tablet, Rfl: 0    losartan (COZAAR) 25 MG tablet, Take 1 tablet (25 mg total) by mouth once daily., Disp: 90 tablet, Rfl: 3     Lab Results   Component Value Date    WBC 9.68 2024    RBC 3.75 (L) 2024    HGB 10.7 (L) 2024    HCT 33.9 (L) 2024    MCV 90 2024    MCH 28.5 2024    MCHC 31.6 (L) 2024    RDW 15.2 (H) 2024      08/02/2024    MPV 10.4 08/02/2024    GRAN 7.6 08/02/2024    GRAN 78.9 (H) 08/02/2024    LYMPH 1.3 08/02/2024    LYMPH 13.4 (L) 08/02/2024    MONO 0.6 08/02/2024    MONO 6.4 08/02/2024    EOS 0.0 08/02/2024    BASO 0.04 08/02/2024    EOSINOPHIL 0.1 08/02/2024    BASOPHIL 0.4 08/02/2024    MG 1.6 04/22/2024        CMP  Lab Results   Component Value Date     08/02/2024    K 4.4 08/02/2024     08/02/2024    CO2 24 08/02/2024     (H) 08/02/2024    BUN 25 (H) 08/02/2024    CREATININE 1.4 08/02/2024    CALCIUM 9.6 08/02/2024    PROT 7.7 08/02/2024    ALBUMIN 3.0 (L) 08/02/2024    BILITOT 0.5 08/02/2024    ALKPHOS 102 08/02/2024    AST 27 08/02/2024    ALT 22 08/02/2024    ANIONGAP 10 08/02/2024    ESTGFRAFRICA 43 (A) 06/23/2022    EGFRNONAA 37 (A) 06/23/2022        Lab Results   Component Value Date    LABBLOO No growth after 5 days. 05/05/2024    LABURIN  08/28/2024     Multiple organisms isolated. None in predominance.  Repeat if    LABURIN clinically necessary. 08/28/2024    RESPIRATORYC Normal respiratory vu 08/12/2014    GSRESP <10 epithelial cells per low power field. 08/12/2014    GSRESP Rare WBC's 08/12/2014    GSRESP No organisms seen 08/12/2014            Results for orders placed or performed during the hospital encounter of 08/02/24   Repeat EKG 12-lead    Collection Time: 08/02/24  1:18 PM   Result Value Ref Range    QRS Duration 84 ms    [facility] QTC Calculation 413 ms    Narrative    Test Reason : R53.83    Vent. Rate : 082 BPM     Atrial Rate : 082 BPM     P-R Int : 320 ms          QRS Dur : 084 ms      QT Int : 354 ms       P-R-T Axes : 075 011 091 degrees     QTc Int : 413 ms    Sinus rhythm with 1st degree A-V block  Nonspecific T wave abnormality  Abnormal ECG  When compared with ECG of 02-AUG-2024 11:19,  Premature atrial complexes are no longer Present  Confirmed by Tutu Carlos MD ([id number]) on 8/5/2024 7:02:34 AM    Referred By: [email or web address]   SELF           Confirmed By:Tutu Carlos  MD                  Plan:       Problem List Items Addressed This Visit          Cardiac/Vascular    Hyperlipidemia associated with type 2 diabetes mellitus     Rosuvastatin will be continued.  Condition controlled.  Medication tolerated well.         Typical atrial flutter     No documented recurrence since 2023.  She has also had atrial fibrillation in the past.           Aortic atherosclerosis     Condition stable.         Primary hypertension - Primary     Blood pressure readings from August reviewed.  She monitors on a regular basis.  Losartan 25 mg resumed.  She has been on that dose before.  Today's blood pressure 177/88 mm Hg.         Relevant Medications    losartan (COZAAR) 25 MG tablet    Bradycardia     Symptomatic events related to sotalol and beta-blocker usage in combination.  Event monitor confirmed 2nd pause during daytime hours.  She has been asymptomatic.    She is advised to not resume beta-blockers or sotalol at this time.         Chronic diastolic heart failure     She is quite sedentary at home.  If she did more I think her shortness of breath complaints would be evident.  She is not on loop diuretic therapy.            Renal/    Stage 3a chronic kidney disease     Condition stable.  GFR 35 range.            Endocrine    Controlled type 2 diabetes mellitus without complication, without long-term current use of insulin     Diet-controlled.  Impaired fasting glucose fits her diagnosis best.         Obesity (BMI 30.0-34.9)     Weight loss encouraged and benefits of ideal weight discussed.          Other Visit Diagnoses       Paroxysmal atrial fibrillation        Relevant Orders    EKG 12-lead               Electrophysiology evaluation, she is agreeable to go to [place].  I explained the management difficulties management wise surrounding sick sinus syndrome.    Losartan 25 mg ordered for blood pressure control.    I made a four-month follow-up.             Tutu Carlos MD  09/05/2024    10:19 AM

## 2024-09-05 NOTE — TELEPHONE ENCOUNTER
Please see below-pt requesting reill of Lovaza 1 gram. Medication not pended because it wanted to choose alternatives.

## 2024-09-05 NOTE — TELEPHONE ENCOUNTER
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

## 2024-09-05 NOTE — TELEPHONE ENCOUNTER
----- Message from Roxanna Russ sent at 2024  1:22 PM CDT -----  Contact: Fazal Castaneda Tasneem Bucio  MRN: [id number]  : 1935  PCP: Tasha Atkins  Home Phone      [phone]  Work Phone      Not on file.  [employer]          [phone]      MESSAGE:     Pt needs a refill of omega-3 acid ethyl esters (LOVAZA) 1 gram capsule sent to [facility] in [place].      Tonya  [phone]

## 2024-09-05 NOTE — TELEPHONE ENCOUNTER
----- Message from Roxanna Russ sent at 2024  1:10 PM CDT -----  Contact: Pt  Tonya Tasneem Bucio  MRN: [id number]  : 1935  PCP: Tasha Atkins  Home Phone      [phone]  Work Phone      Not on file.  [employer]          [phone]      MESSAGE:     Pt needs a refill of HYDROcodone-acetaminophen (NORCO) 7.5-325 mg per tablet sent to [facility] in [place].         Tonya   [phone]

## 2024-09-05 NOTE — ASSESSMENT & PLAN NOTE
She is quite sedentary at home.  If she did more I think her shortness of breath complaints would be evident.  She is not on loop diuretic therapy.

## 2024-09-05 NOTE — ASSESSMENT & PLAN NOTE
Symptomatic events related to sotalol and beta-blocker usage in combination.  Event monitor confirmed 2nd pause during daytime hours.  She has been asymptomatic.    She is advised to not resume beta-blockers or sotalol at this time.

## 2024-09-05 NOTE — TELEPHONE ENCOUNTER
No care due was identified.  [facility] Embedded Care Due Messages. Reference number: [id number].   9/05/2024 1:21:14 PM CDT

## 2024-09-05 NOTE — TELEPHONE ENCOUNTER
LOV 7/15/2024  Scheduled visit 10/15/2024    Requested Prescriptions     Pending Prescriptions Disp Refills    HYDROcodone-acetaminophen (NORCO) 7.5-325 mg per tablet 30 tablet 0     Sig: Take 1 tablet by mouth every 24 hours as needed for Pain.

## 2024-09-05 NOTE — TELEPHONE ENCOUNTER
Called pt to address new patient consult questions. Patient confirmed:    Have you ever seen a doctor outside of the [facility] system pertaining to your heart? If yes, where? no  Do you have a device implanted in your chest such as a pacemaker, defibrillator or loop recorder? no  Have you had any recent testing done for your heart such as holter, stress test, echo, heart cath or EKG? yes  Have you ever had surgery for an arrhythmia before such as cardioversion, ablation, EP study or tilt table test? no

## 2024-09-05 NOTE — ASSESSMENT & PLAN NOTE
Blood pressure readings from August reviewed.  She monitors on a regular basis.  Losartan 25 mg resumed.  She has been on that dose before.  Today's blood pressure 177/88 mm Hg.

## 2024-09-09 DIAGNOSIS — I48.3 TYPICAL ATRIAL FLUTTER: Primary | ICD-10-CM

## 2024-09-20 ENCOUNTER — TELEPHONE (OUTPATIENT)
Dept: ELECTROPHYSIOLOGY | Facility: CLINIC | Age: 89
End: 2024-09-20
Payer: MEDICARE

## 2024-09-21 ENCOUNTER — HOSPITAL ENCOUNTER (EMERGENCY)
Facility: HOSPITAL | Age: 89
Discharge: ANOTHER HOSPITAL | End: 2024-09-21
Attending: EMERGENCY MEDICINE
Payer: MEDICARE

## 2024-09-21 VITALS
OXYGEN SATURATION: 95 % | DIASTOLIC BLOOD PRESSURE: 60 MMHG | SYSTOLIC BLOOD PRESSURE: 129 MMHG | RESPIRATION RATE: 18 BRPM | HEART RATE: 87 BPM | TEMPERATURE: 98 F | WEIGHT: 236 LBS | HEIGHT: 68 IN | BODY MASS INDEX: 35.77 KG/M2

## 2024-09-21 DIAGNOSIS — R06.02 SHORTNESS OF BREATH: ICD-10-CM

## 2024-09-21 DIAGNOSIS — I16.1 HYPERTENSIVE EMERGENCY: Primary | ICD-10-CM

## 2024-09-21 LAB
ALBUMIN SERPL BCP-MCNC: 3.2 G/DL (ref 3.5–5.2)
ALP SERPL-CCNC: 95 U/L (ref 55–135)
ALT SERPL W/O P-5'-P-CCNC: 15 U/L (ref 10–44)
ANION GAP SERPL CALC-SCNC: 15 MMOL/L (ref 8–16)
AST SERPL-CCNC: 13 U/L (ref 10–40)
BASOPHILS # BLD AUTO: 0.06 K/UL (ref 0–0.2)
BASOPHILS NFR BLD: 0.5 % (ref 0–1.9)
BILIRUB SERPL-MCNC: 0.5 MG/DL (ref 0.1–1)
BNP SERPL-MCNC: 85 PG/ML (ref 0–99)
BUN SERPL-MCNC: 34 MG/DL (ref 8–23)
CALCIUM SERPL-MCNC: 8.9 MG/DL (ref 8.7–10.5)
CHLORIDE SERPL-SCNC: 106 MMOL/L (ref 95–110)
CO2 SERPL-SCNC: 19 MMOL/L (ref 23–29)
CREAT SERPL-MCNC: 1.2 MG/DL (ref 0.5–1.4)
DIFFERENTIAL METHOD BLD: ABNORMAL
EOSINOPHIL # BLD AUTO: 0.2 K/UL (ref 0–0.5)
EOSINOPHIL NFR BLD: 1.3 % (ref 0–8)
ERYTHROCYTE [DISTWIDTH] IN BLOOD BY AUTOMATED COUNT: 15.2 % (ref 11.5–14.5)
EST. GFR  (NO RACE VARIABLE): 44 ML/MIN/1.73 M^2
GLUCOSE SERPL-MCNC: 89 MG/DL (ref 70–110)
HCT VFR BLD AUTO: 35.3 % (ref 37–48.5)
HGB BLD-MCNC: 11.4 G/DL (ref 12–16)
IMM GRANULOCYTES # BLD AUTO: 0.11 K/UL (ref 0–0.04)
IMM GRANULOCYTES NFR BLD AUTO: 0.9 % (ref 0–0.5)
LYMPHOCYTES # BLD AUTO: 4.3 K/UL (ref 1–4.8)
LYMPHOCYTES NFR BLD: 33.5 % (ref 18–48)
MCH RBC QN AUTO: 28.9 PG (ref 27–31)
MCHC RBC AUTO-ENTMCNC: 32.3 G/DL (ref 32–36)
MCV RBC AUTO: 89 FL (ref 82–98)
MONOCYTES # BLD AUTO: 0.8 K/UL (ref 0.3–1)
MONOCYTES NFR BLD: 6.5 % (ref 4–15)
NEUTROPHILS # BLD AUTO: 7.4 K/UL (ref 1.8–7.7)
NEUTROPHILS NFR BLD: 57.3 % (ref 38–73)
NRBC BLD-RTO: 0 /100 WBC
PLATELET # BLD AUTO: 391 K/UL (ref 150–450)
PMV BLD AUTO: 10 FL (ref 9.2–12.9)
POTASSIUM SERPL-SCNC: 4.1 MMOL/L (ref 3.5–5.1)
PROT SERPL-MCNC: 7.9 G/DL (ref 6–8.4)
RBC # BLD AUTO: 3.95 M/UL (ref 4–5.4)
SODIUM SERPL-SCNC: 140 MMOL/L (ref 136–145)
TROPONIN I SERPL DL<=0.01 NG/ML-MCNC: 0.06 NG/ML (ref 0–0.03)
TROPONIN I SERPL DL<=0.01 NG/ML-MCNC: 0.06 NG/ML (ref 0–0.03)
WBC # BLD AUTO: 12.82 K/UL (ref 3.9–12.7)

## 2024-09-21 PROCEDURE — 84484 ASSAY OF TROPONIN QUANT: CPT | Mod: 91,HCNC | Performed by: EMERGENCY MEDICINE

## 2024-09-21 PROCEDURE — 93010 ELECTROCARDIOGRAM REPORT: CPT | Mod: HCNC,,, | Performed by: INTERNAL MEDICINE

## 2024-09-21 PROCEDURE — 25500020 PHARM REV CODE 255: Mod: HCNC | Performed by: EMERGENCY MEDICINE

## 2024-09-21 PROCEDURE — 80053 COMPREHEN METABOLIC PANEL: CPT | Mod: HCNC | Performed by: EMERGENCY MEDICINE

## 2024-09-21 PROCEDURE — 96374 THER/PROPH/DIAG INJ IV PUSH: CPT | Mod: HCNC,59

## 2024-09-21 PROCEDURE — 25000003 PHARM REV CODE 250: Mod: HCNC | Performed by: EMERGENCY MEDICINE

## 2024-09-21 PROCEDURE — 96365 THER/PROPH/DIAG IV INF INIT: CPT | Mod: HCNC

## 2024-09-21 PROCEDURE — 94640 AIRWAY INHALATION TREATMENT: CPT | Mod: HCNC

## 2024-09-21 PROCEDURE — 85025 COMPLETE CBC W/AUTO DIFF WBC: CPT | Mod: HCNC | Performed by: EMERGENCY MEDICINE

## 2024-09-21 PROCEDURE — 25000242 PHARM REV CODE 250 ALT 637 W/ HCPCS: Mod: HCNC | Performed by: EMERGENCY MEDICINE

## 2024-09-21 PROCEDURE — 99291 CRITICAL CARE FIRST HOUR: CPT | Mod: HCNC

## 2024-09-21 PROCEDURE — 93005 ELECTROCARDIOGRAM TRACING: CPT | Mod: HCNC

## 2024-09-21 PROCEDURE — 83880 ASSAY OF NATRIURETIC PEPTIDE: CPT | Mod: HCNC | Performed by: EMERGENCY MEDICINE

## 2024-09-21 PROCEDURE — 63600175 PHARM REV CODE 636 W HCPCS: Mod: HCNC | Performed by: EMERGENCY MEDICINE

## 2024-09-21 PROCEDURE — 96366 THER/PROPH/DIAG IV INF ADDON: CPT | Mod: HCNC

## 2024-09-21 PROCEDURE — 96375 TX/PRO/DX INJ NEW DRUG ADDON: CPT | Mod: HCNC

## 2024-09-21 PROCEDURE — 94760 N-INVAS EAR/PLS OXIMETRY 1: CPT | Mod: HCNC

## 2024-09-21 RX ORDER — NICARDIPINE HYDROCHLORIDE 0.2 MG/ML
0-15 INJECTION INTRAVENOUS CONTINUOUS
Status: DISCONTINUED | OUTPATIENT
Start: 2024-09-21 | End: 2024-09-21 | Stop reason: HOSPADM

## 2024-09-21 RX ORDER — ASPIRIN 325 MG
325 TABLET ORAL
Status: COMPLETED | OUTPATIENT
Start: 2024-09-21 | End: 2024-09-21

## 2024-09-21 RX ORDER — HYDRALAZINE HYDROCHLORIDE 20 MG/ML
10 INJECTION INTRAMUSCULAR; INTRAVENOUS
Status: COMPLETED | OUTPATIENT
Start: 2024-09-21 | End: 2024-09-21

## 2024-09-21 RX ORDER — DEXAMETHASONE SODIUM PHOSPHATE 4 MG/ML
8 INJECTION, SOLUTION INTRA-ARTICULAR; INTRALESIONAL; INTRAMUSCULAR; INTRAVENOUS; SOFT TISSUE
Status: COMPLETED | OUTPATIENT
Start: 2024-09-21 | End: 2024-09-21

## 2024-09-21 RX ORDER — IPRATROPIUM BROMIDE AND ALBUTEROL SULFATE 2.5; .5 MG/3ML; MG/3ML
3 SOLUTION RESPIRATORY (INHALATION)
Status: COMPLETED | OUTPATIENT
Start: 2024-09-21 | End: 2024-09-21

## 2024-09-21 RX ADMIN — ASPIRIN 325 MG ORAL TABLET 325 MG: 325 PILL ORAL at 05:09

## 2024-09-21 RX ADMIN — DEXAMETHASONE SODIUM PHOSPHATE 8 MG: 4 INJECTION, SOLUTION INTRA-ARTICULAR; INTRALESIONAL; INTRAMUSCULAR; INTRAVENOUS; SOFT TISSUE at 06:09

## 2024-09-21 RX ADMIN — IPRATROPIUM BROMIDE AND ALBUTEROL SULFATE 3 ML: 2.5; .5 SOLUTION RESPIRATORY (INHALATION) at 06:09

## 2024-09-21 RX ADMIN — HYDRALAZINE HYDROCHLORIDE 10 MG: 20 INJECTION, SOLUTION INTRAMUSCULAR; INTRAVENOUS at 04:09

## 2024-09-21 RX ADMIN — NICARDIPINE HYDROCHLORIDE 5 MG/HR: 0.2 INJECTION, SOLUTION INTRAVENOUS at 05:09

## 2024-09-21 RX ADMIN — IOHEXOL 100 ML: 350 INJECTION, SOLUTION INTRAVENOUS at 06:09

## 2024-09-21 RX ADMIN — NICARDIPINE HYDROCHLORIDE 15 MG/HR: 0.2 INJECTION, SOLUTION INTRAVENOUS at 08:09

## 2024-09-21 NOTE — ED PROVIDER NOTES
Encounter Date: 9/21/2024       History     Chief Complaint   Patient presents with    Shortness of Breath     Reported SOB over past few days with worsening today. Denies CP. PMH A-fib.     HPI    Patient is a 89-year-old white female with past medical history of COPD, thyroid disease, diabetes on anti evidence presenting with was shortness of breath with exertion for the past 2 days.  Endorses worsening of her lower extremity edema.    She denies chest pain, palpitations or syncope.    Review of patient's allergies indicates:  No Known Allergies  Past Medical History:   Diagnosis Date    Acute bronchitis with asthma     Anemia due to stage 3 chronic kidney disease     Anticoagulant long-term use     Asthma     Back pain     Cancer     Chest pain, musculoskeletal 7/16/2013    Chronic bronchitis     Colon polyps     COPD exacerbation 3/13/2020    Gout, unspecified     History of cervical cancer     Hypothyroidism     Obesity     Osteoarthritis     Renal manifestation of secondary diabetes mellitus     Thyroid disease     Trouble in sleeping     Type 2 diabetes mellitus with ophthalmic manifestations     Type 2 diabetes with peripheral circulatory disorder, controlled     Urinary incontinence     Uterine cancer     Uterine cancer      Past Surgical History:   Procedure Laterality Date    ADENOIDECTOMY      EYE SURGERY Right     right eye cataract    HYSTERECTOMY  2008    LIZ-BSO    tonsilectomy      TONSILLECTOMY  1945    TOTAL ABDOMINAL HYSTERECTOMY  2008    TOTAL ABDOMINAL HYSTERECTOMY W/ BILATERAL SALPINGOOPHORECTOMY  2008     Family History   Problem Relation Name Age of Onset    Heart disease Mother Samara     Arthritis Father Andres     Breast cancer Sister Radha     Ovarian cancer Sister Radha     Cancer Brother Firmen         Throat cancer    Arthritis Daughter Samara         back    No Known Problems Son Roby     Heart disease Brother Andres Joe     Stroke Brother Andres oJe     Heart disease Brother Ean          stents heart and legs     Diabetes Brother Ean     Hyperlipidemia Brother Ean     Anemia Daughter Ramila     Arthritis Daughter Ramila         RA    Diabetes Daughter Mita     Arthritis Daughter Mita         RA    Glaucoma Daughter Mita     Diabetes Daughter Audra     Liver disease Daughter Audra     Arthritis Daughter Ibis         back     Stroke Son Conrad     No Known Problems Son Jarred      Social History     Tobacco Use    Smoking status: Former     Current packs/day: 0.00     Average packs/day: 0.3 packs/day for 13.0 years (4.3 ttl pk-yrs)     Types: Cigarettes     Start date: 1951     Quit date: 1964     Years since quittin.1     Passive exposure: Past    Smokeless tobacco: Never   Substance Use Topics    Alcohol use: No    Drug use: No     Review of Systems   Constitutional:  Negative for chills and fever.   Respiratory:  Positive for shortness of breath.    Cardiovascular:  Positive for leg swelling. Negative for chest pain.   Gastrointestinal:  Negative for abdominal pain.   Neurological:  Negative for dizziness and weakness.   All other systems reviewed and are negative.    Social Determinants of Health     Tobacco Use: Medium Risk (2024)    Patient History     Smoking Tobacco Use: Former     Smokeless Tobacco Use: Never     Passive Exposure: Past   Alcohol Use: Not At Risk (2024)    AUDIT-C     Frequency of Alcohol Consumption: Never     Average Number of Drinks: Patient does not drink     Frequency of Binge Drinking: Never   Financial Resource Strain: Low Risk  (2024)    Overall Financial Resource Strain (CARDIA)     Difficulty of Paying Living Expenses: Not hard at all   Food Insecurity: No Food Insecurity (2024)    Hunger Vital Sign     Worried About Running Out of Food in the Last Year: Never true     Ran Out of Food in the Last Year: Never true   Transportation Needs: No Transportation Needs (2024)    PRAPARE - Transportation     Lack of  Transportation (Medical): No     Lack of Transportation (Non-Medical): No   Physical Activity: Insufficiently Active (8/5/2024)    Exercise Vital Sign     Days of Exercise per Week: 7 days     Minutes of Exercise per Session: 20 min   Stress: No Stress Concern Present (8/5/2024)    [employer] - Occupational Stress Questionnaire     Feeling of Stress : Not at all   Housing Stability: Low Risk  (8/5/2024)    Housing Stability Vital Sign     Unable to Pay for Housing in the Last Year: No     Homeless in the Last Year: No   Depression: Low Risk  (7/15/2024)    Depression     Last PHQ-4: Flowsheet Data: 0   Utilities: Not At Risk (8/5/2024)    [facility] Utilities     Threatened with loss of utilities: No   Health Literacy: Inadequate Health Literacy (8/5/2024)     Health Literacy     Frequency of need for help with medical instructions: Sometimes   Social Isolation: Not on file       Physical Exam     Initial Vitals [09/21/24 1556]   BP Pulse Resp Temp SpO2   (!) 206/90 91 (!) 26 97.9 °F (36.6 °C) 99 %      MAP       --         Physical Exam    Nursing note and vitals reviewed.  Constitutional: She appears well-developed and well-nourished.   HENT:   Head: Normocephalic and atraumatic.   Mouth/Throat: Oropharynx is clear and moist.   Eyes: EOM are normal. Pupils are equal, round, and reactive to light.   Neck: No JVD present.   Normal range of motion.  Cardiovascular:  Normal rate and intact distal pulses.           Pulmonary/Chest: Breath sounds normal. No stridor.   Coarse breath sounds   Abdominal: Abdomen is soft. There is no abdominal tenderness. There is no rebound.   Musculoskeletal:         General: Tenderness and edema present.      Cervical back: Normal range of motion.     Neurological: She is alert and oriented to person, place, and time. GCS score is 15. GCS eye subscore is 4. GCS verbal subscore is 5. GCS motor subscore is 6.   Skin: Skin is warm. Capillary refill takes less  than 2 seconds.         ED Course   Critical Care    Date/Time: 9/21/2024 5:52 PM    Performed by: Corinne Colin MD  Authorized by: Corinne Colin MD  Direct patient critical care time: 55 minutes  Total critical care time (exclusive of procedural time) : 55 minutes  Critical care was time spent personally by me on the following activities: pulse oximetry, ordering and review of radiographic studies and ordering and review of laboratory studies.        Labs Reviewed   CBC W/ AUTO DIFFERENTIAL - Abnormal       Result Value    WBC 12.82 (*)     RBC 3.95 (*)     Hemoglobin 11.4 (*)     Hematocrit 35.3 (*)     MCV 89      MCH 28.9      MCHC 32.3      RDW 15.2 (*)     Platelets 391      MPV 10.0      Immature Granulocytes 0.9 (*)     Gran # (ANC) 7.4      Immature Grans (Abs) 0.11 (*)     Lymph # 4.3      Mono # 0.8      Eos # 0.2      Baso # 0.06      nRBC 0      Gran % 57.3      Lymph % 33.5      Mono % 6.5      Eosinophil % 1.3      Basophil % 0.5      Differential Method Automated     COMPREHENSIVE METABOLIC PANEL - Abnormal    Sodium 140      Potassium 4.1      Chloride 106      CO2 19 (*)     Glucose 89      BUN 34 (*)     Creatinine 1.2      Calcium 8.9      Total Protein 7.9      Albumin 3.2 (*)     Total Bilirubin 0.5      Alkaline Phosphatase 95      AST 13      ALT 15      eGFR 44 (*)     Anion Gap 15     TROPONIN I - Abnormal    Troponin I 0.060 (*)    B-TYPE NATRIURETIC PEPTIDE    BNP 85     TROPONIN I   URINALYSIS, REFLEX TO URINE CULTURE     EKG Readings: (Independently Interpreted)   Initial Reading: No STEMI. Previous EKG: Compared with most recent EKG       Imaging Results              CTA Chest Non-Coronary (PE Studies) (In process)                      X-Ray Chest AP Portable (Final result)  Result time 09/21/24 17:20:32      Final result by Santos Cruz DO (09/21/24 17:20:32)                   Impression:      No acute abnormality.      Electronically signed by: Santos  Nancy  Date:    09/21/2024  Time:    17:20               Narrative:    EXAMINATION:  XR CHEST AP PORTABLE    CLINICAL HISTORY:  Shortness of breath    TECHNIQUE:  Single frontal view of the chest was performed.    COMPARISON:  08/02/2024.    FINDINGS:  The lungs are well expanded and clear. No focal opacities are seen. The pleural spaces are clear. The cardiac silhouette is unremarkable. There are calcifications of the aortic arch.  The visualized osseous structures demonstrate degenerative changes.                                       Medications   niCARdipine 40 mg/200 mL (0.2 mg/mL) infusion (15 mg/hr Intravenous Rate/Dose Change 9/21/24 1746)   albuterol-ipratropium 2.5 mg-0.5 mg/3 mL nebulizer solution 3 mL (has no administration in time range)   dexAMETHasone injection 8 mg (has no administration in time range)   hydrALAZINE injection 10 mg (10 mg Intravenous Given 9/21/24 1614)   aspirin tablet 325 mg (325 mg Oral Given 9/21/24 1711)     Medical Decision Making  This is an emergent evaluation of a 89y WF hx COPD, thyroid disease, diabetes presenting with worsening shortness of breath upon exertion for the past 2-3 days.  Physical exam she is not toxic afebrile with coarse breath sounds and lower extremity edema.    Labs remarkable for leukocytosis of 12.8, BNP negative at 85, troponin elevated 0.60.  Chest x-ray without acute cardiopulmonary disease.  EKG without ischemia or STEMI.    Had minimal response to hydralazine.     Placed on Cardene drip for hypertensive emergency.     She has also been given a neb treatment along with Decadron.  CT PE study ordered and pending.  Dispo is pending acceptance to outside facility for Cardiology evaluation.  In the meanwhile we will trend patient's troponin.    Differential diagnosis includes but is not limited to ACS, heart failure, COPD exacerbation, PE, pneumonia    Amount and/or Complexity of Data Reviewed  Labs: ordered.    Risk  Prescription drug  management.                                      Clinical Impression:  Final diagnoses:  [R06.02] Shortness of breath  [I16.1] Hypertensive emergency (Primary)          ED Disposition Condition    Transfer to Another Facility Stable                Corinne Colin MD  09/21/24 2593

## 2024-09-21 NOTE — ED NOTES
Attempted to contact [facility] [place] for verbal report for transfer. Reported per Diana,  the shift had reviewed the chart for report. Verbal report given to [facility].

## 2024-09-23 LAB
OHS QRS DURATION: 88 MS
OHS QTC CALCULATION: 420 MS

## 2024-09-24 ENCOUNTER — OFFICE VISIT (OUTPATIENT)
Dept: CARDIOLOGY | Facility: CLINIC | Age: 89
End: 2024-09-24
Payer: MEDICARE

## 2024-09-24 VITALS
DIASTOLIC BLOOD PRESSURE: 87 MMHG | SYSTOLIC BLOOD PRESSURE: 136 MMHG | BODY MASS INDEX: 37.13 KG/M2 | OXYGEN SATURATION: 97 % | WEIGHT: 245 LBS | HEIGHT: 68 IN | HEART RATE: 89 BPM

## 2024-09-24 DIAGNOSIS — I10 PRIMARY HYPERTENSION: ICD-10-CM

## 2024-09-24 DIAGNOSIS — N18.31 STAGE 3A CHRONIC KIDNEY DISEASE: ICD-10-CM

## 2024-09-24 DIAGNOSIS — E11.9 CONTROLLED TYPE 2 DIABETES MELLITUS WITHOUT COMPLICATION, WITHOUT LONG-TERM CURRENT USE OF INSULIN: ICD-10-CM

## 2024-09-24 DIAGNOSIS — I70.0 AORTIC ATHEROSCLEROSIS: ICD-10-CM

## 2024-09-24 DIAGNOSIS — I50.32 CHRONIC DIASTOLIC HEART FAILURE: Primary | ICD-10-CM

## 2024-09-24 DIAGNOSIS — E11.69 HYPERLIPIDEMIA ASSOCIATED WITH TYPE 2 DIABETES MELLITUS: ICD-10-CM

## 2024-09-24 DIAGNOSIS — E78.5 HYPERLIPIDEMIA ASSOCIATED WITH TYPE 2 DIABETES MELLITUS: ICD-10-CM

## 2024-09-24 DIAGNOSIS — E66.01 MORBID OBESITY: ICD-10-CM

## 2024-09-24 DIAGNOSIS — I48.3 TYPICAL ATRIAL FLUTTER: ICD-10-CM

## 2024-09-24 DIAGNOSIS — R00.1 BRADYCARDIA: ICD-10-CM

## 2024-09-24 PROCEDURE — 99214 OFFICE O/P EST MOD 30 MIN: CPT | Mod: HCNC,S$GLB,, | Performed by: INTERNAL MEDICINE

## 2024-09-24 PROCEDURE — 1159F MED LIST DOCD IN RCRD: CPT | Mod: HCNC,CPTII,S$GLB, | Performed by: INTERNAL MEDICINE

## 2024-09-24 PROCEDURE — 1126F AMNT PAIN NOTED NONE PRSNT: CPT | Mod: HCNC,CPTII,S$GLB, | Performed by: INTERNAL MEDICINE

## 2024-09-24 PROCEDURE — 1160F RVW MEDS BY RX/DR IN RCRD: CPT | Mod: HCNC,CPTII,S$GLB, | Performed by: INTERNAL MEDICINE

## 2024-09-24 PROCEDURE — 99999 PR PBB SHADOW E&M-EST. PATIENT-LVL III: CPT | Mod: PBBFAC,HCNC,, | Performed by: INTERNAL MEDICINE

## 2024-09-24 RX ORDER — LOSARTAN POTASSIUM 50 MG/1
50 TABLET ORAL 2 TIMES DAILY
Qty: 180 TABLET | Refills: 3 | Status: SHIPPED | OUTPATIENT
Start: 2024-09-24

## 2024-09-24 NOTE — PROGRESS NOTES
Psychiatric Cardiology     Subjective:    Patient ID:  Tonya Bucio is a 89 y.o. female who presents for follow-up of Congestive Heart Failure, Hypertension, Atrial Fibrillation, and Hyperlipidemia    Review of patient's allergies indicates:  No Known Allergies  The patient was in the emergency room September 24 for elevated blood pressure and shortness of breath.  Her BNP was mildly elevated.  She has a history of diastolic heart failure as well as paroxysmal AFib.  She was in sinus rhythm.  Because of troponin elevation she transferred to another facility but ultimately was released without further workup because of false-positive troponin for ACS.    She states she had stopped her losartan 25 mg tablet for 2 days.  She started feeling poorly.  I am not sure why she held her losartan but thought it might help.  Her blood pressure was in the 200 systolic range.  At 1 point she was placed on Cardene.  In the end she was discharged.  No increase in losartan dosing was made.    She has blood pressure readings from September that I have reviewed.  At least a 3rd are quite high, 180, 170 range.  I do see some readings that are in the 120 systolic range.  She is on Xarelto.  She is scheduled to see Dr. Casillas.  She had to push back the appointment and will be seen in November.  She had a 30 day event monitor that showed a 3.2nd pause.  There were also periods of AFib with controlled heart rate.      She discontinued sotalol 40 mg BID and toprol 25 mg related to a previous hospital admission at [facility] in 4/2024.  It was associated with heart rates in the 30s.  She has remained off Toprol and sotalol since then.         Review of Systems   Constitutional: Negative for chills, decreased appetite, diaphoresis, fever, malaise/fatigue, night sweats, weight gain and weight loss.   HENT:  Positive for hearing loss. Negative for congestion, ear discharge,  ear pain, hoarse voice, nosebleeds, odynophagia, sore throat, stridor and tinnitus.    Eyes:  Negative for blurred vision, discharge, double vision, pain, photophobia, redness, vision loss in left eye, vision loss in right eye, visual disturbance and visual halos.   Cardiovascular:  Positive for dyspnea on exertion. Negative for chest pain, claudication, cyanosis, irregular heartbeat, leg swelling, near-syncope, orthopnea, palpitations, paroxysmal nocturnal dyspnea and syncope.   Respiratory:  Positive for shortness of breath. Negative for cough, hemoptysis, sleep disturbances due to breathing, snoring, sputum production and wheezing.    Endocrine: Negative for cold intolerance, heat intolerance, polydipsia, polyphagia and polyuria.   Hematologic/Lymphatic: Negative for adenopathy and bleeding problem. Does not bruise/bleed easily.   Skin:  Negative for color change, dry skin, flushing, itching, nail changes, poor wound healing, rash, skin cancer, suspicious lesions and unusual hair distribution.   Musculoskeletal:  Positive for joint pain. Negative for arthritis, back pain, falls, gout, joint swelling, muscle cramps, muscle weakness, myalgias, neck pain and stiffness.   Gastrointestinal:  Negative for bloating, abdominal pain, anorexia, change in bowel habit, bowel incontinence, constipation, diarrhea, dysphagia, excessive appetite, flatus, heartburn, hematemesis, hematochezia, hemorrhoids, jaundice, melena, nausea and vomiting.   Genitourinary:  Negative for bladder incontinence, decreased libido, dysuria, flank pain, frequency, genital sores, hematuria, hesitancy, incomplete emptying, nocturia and urgency.   Neurological:  Negative for aphonia, brief paralysis, difficulty with concentration, disturbances in coordination, excessive daytime sleepiness, dizziness, focal weakness, headaches, light-headedness, loss of balance, numbness, paresthesias, seizures, sensory change, tremors, vertigo and weakness.  "  Psychiatric/Behavioral:  Negative for altered mental status, depression, hallucinations, memory loss, substance abuse, suicidal ideas and thoughts of violence. The patient does not have insomnia and is not nervous/anxious.    Allergic/Immunologic: Negative for hives and persistent infections.        Objective:       Vitals:    09/24/24 1520   BP: 136/87   Pulse: 89   SpO2: 97%   Weight: 111.1 kg (245 lb)   Height: 5' 8" (1.727 m)    Physical Exam  Constitutional:       General: She is not in acute distress.     Appearance: She is well-developed. She is not diaphoretic.   HENT:      Head: Normocephalic and atraumatic.      Nose: Nose normal.   Eyes:      General: No scleral icterus.        Right eye: No discharge.      Conjunctiva/sclera: Conjunctivae normal.      Pupils: Pupils are equal, round, and reactive to light.   Neck:      Thyroid: No thyromegaly.      Vascular: No JVD.      Trachea: No tracheal deviation.   Cardiovascular:      Rate and Rhythm: Normal rate and regular rhythm. Occasional Extrasystoles are present.     Pulses:           Carotid pulses are 2+ on the right side and 2+ on the left side.       Radial pulses are 2+ on the right side and 2+ on the left side.        Dorsalis pedis pulses are 1+ on the right side and 1+ on the left side.        Posterior tibial pulses are 1+ on the right side and 1+ on the left side.      Heart sounds: Normal heart sounds. No murmur heard.     No friction rub. No gallop.   Pulmonary:      Effort: Pulmonary effort is normal. No respiratory distress.      Breath sounds: Normal breath sounds. No stridor. No wheezing or rales.   Chest:      Chest wall: No tenderness.   Abdominal:      General: Bowel sounds are normal. There is no distension.      Palpations: Abdomen is soft. There is no mass.      Tenderness: There is no abdominal tenderness. There is no guarding or rebound.   Musculoskeletal:         General: No tenderness. Normal range of motion.      Cervical back: " Normal range of motion and neck supple.   Lymphadenopathy:      Cervical: No cervical adenopathy.   Skin:     General: Skin is warm and dry.      Coloration: Skin is not pale.      Findings: No erythema or rash.   Neurological:      Mental Status: She is alert and oriented to person, place, and time.      Cranial Nerves: No cranial nerve deficit.      Coordination: Coordination normal.   Psychiatric:         Behavior: Behavior normal.         Thought Content: Thought content normal.         Judgment: Judgment normal.     Dictation #1  MRN:[id number]  CSN:[id number]       Assessment:       1. Chronic diastolic heart failure    2. Primary hypertension    3. Aortic atherosclerosis    4. Bradycardia    5. Hyperlipidemia associated with type 2 diabetes mellitus    6. Typical atrial flutter    7. Stage 3a chronic kidney disease    8. Controlled type 2 diabetes mellitus without complication, without long-term current use of insulin    9. Morbid obesity      Results for orders placed or performed during the hospital encounter of 24   Troponin I   Result Value Ref Range    Troponin I 0.059 0.012 - 0.034 ng/mL         Current Outpatient Medications:     acetaminophen (TYLENOL) 500 MG tablet, Take 500 mg by mouth every evening. And as needed, Disp: , Rfl:     allopurinoL (ZYLOPRIM) 300 MG tablet, Take 1 tablet (300 mg total) by mouth once daily., Disp: 90 tablet, Rfl: 3    cinacalcet (SENSIPAR) 60 MG Tab, SMARTSI Tablet(s) By Mouth Every Evening, Disp: , Rfl:     empagliflozin (JARDIANCE) 10 mg tablet, Take 1 tablet (10 mg total) by mouth once daily., Disp: 90 tablet, Rfl: 3    ferrous sulfate 325 (65 FE) MG EC tablet, Take 1 tablet (325 mg total) by mouth once daily., Disp: 90 tablet, Rfl: 1    HYDROcodone-acetaminophen (NORCO) 7.5-325 mg per tablet, Take 1 tablet by mouth every 24 hours as needed for Pain., Disp: 30 tablet, Rfl: 0    levothyroxine (SYNTHROID) 75 MCG tablet, Take 1 tablet by mouth once daily, Disp: 90  tablet, Rfl: 3    losartan (COZAAR) 50 MG tablet, Take 1 tablet (50 mg total) by mouth 2 (two) times a day., Disp: 180 tablet, Rfl: 3    MAGNESIUM CITRATE ORAL, Take by mouth., Disp: , Rfl:     multivitamin (ONE DAILY MULTIVITAMIN) per tablet, Take 1 tablet by mouth once daily., Disp: , Rfl:     omega-3 acid ethyl esters (LOVAZA) 1 gram capsule, Take 1 capsule (1 g total) by mouth 2 (two) times daily., Disp: 90 capsule, Rfl: 1    rosuvastatin (CRESTOR) 20 MG tablet, Take 1 tablet (20 mg total) by mouth every evening., Disp: 90 tablet, Rfl: 3    XARELTO 15 mg Tab, Take 1 tablet by mouth in the evening, Disp: 90 tablet, Rfl: 0     Lab Results   Component Value Date    WBC 12.82 (H) 09/21/2024    RBC 3.95 (L) 09/21/2024    HGB 11.4 (L) 09/21/2024    HCT 35.3 (L) 09/21/2024    MCV 89 09/21/2024    MCH 28.9 09/21/2024    MCHC 32.3 09/21/2024    RDW 15.2 (H) 09/21/2024     09/21/2024    MPV 10.0 09/21/2024    GRAN 7.4 09/21/2024    GRAN 57.3 09/21/2024    LYMPH 4.3 09/21/2024    LYMPH 33.5 09/21/2024    MONO 0.8 09/21/2024    MONO 6.5 09/21/2024    EOS 0.2 09/21/2024    BASO 0.06 09/21/2024    EOSINOPHIL 1.3 09/21/2024    BASOPHIL 0.5 09/21/2024    MG 1.6 04/22/2024        CMP  Lab Results   Component Value Date     09/21/2024    K 4.1 09/21/2024     09/21/2024    CO2 19 (L) 09/21/2024    GLU 89 09/21/2024    BUN 34 (H) 09/21/2024    CREATININE 1.2 09/21/2024    CALCIUM 8.9 09/21/2024    PROT 7.9 09/21/2024    ALBUMIN 3.2 (L) 09/21/2024    BILITOT 0.5 09/21/2024    ALKPHOS 95 09/21/2024    AST 13 09/21/2024    ALT 15 09/21/2024    ANIONGAP 15 09/21/2024    ESTGFRAFRICA 43 (A) 06/23/2022    EGFRNONAA 37 (A) 06/23/2022        Lab Results   Component Value Date    LABBLOO No growth after 5 days. 05/05/2024    LABURIN  08/28/2024     Multiple organisms isolated. None in predominance.  Repeat if    LABURIN clinically necessary. 08/28/2024    RESPIRATORYC Normal respiratory vu 08/12/2014    GSRESP <10  epithelial cells per low power field. 08/12/2014    GSRESP Rare WBC's 08/12/2014    GSRESP No organisms seen 08/12/2014            Results for orders placed or performed during the hospital encounter of 09/21/24   EKG 12-lead    Collection Time: 09/21/24  4:02 PM   Result Value Ref Range    QRS Duration 88 ms    [facility] QTC Calculation 420 ms    Narrative    Test Reason : R06.02    Vent. Rate : 076 BPM     Atrial Rate : 076 BPM     P-R Int : 310 ms          QRS Dur : 088 ms      QT Int : 374 ms       P-R-T Axes : 058 -05 099 degrees     QTc Int : 420 ms    Sinus rhythm with 1st degree A-V block  T wave abnormality, consider lateral ischemia  Abnormal ECG  When compared with ECG of 05-SEP-2024 10:37,  T wave inversion more evident in Lateral leads  Confirmed by Terrance VILLEDA, Tutu CHAVIRA ([id number]) on 9/23/2024 7:05:17 AM    Referred By: [email or web address]   SELF           Confirmed By:Tutu Carlos MD                  Plan:       Problem List Items Addressed This Visit          Cardiac/Vascular    Hyperlipidemia associated with type 2 diabetes mellitus     Rosuvastatin will be continued.  Most recent LDL 61 mg% by labs March 20, 2024.         Typical atrial flutter     Recent event monitor showing AFib.  She has had a flutter in 2023.  Today she has regular rhythm with occasional ectopic beat.  Sinus rhythm suspected.         Aortic atherosclerosis     Condition unchanged.         Primary hypertension     Losartan 50 mg b.i.d. ordered today.  Her daughter and I discussed management and she will continue to report readings.  I reviewed readings from September.  She was just in the ED for elevated blood pressure but she had been noncompliant with her low-dose losartan.    Condition worsening.         Relevant Medications    losartan (COZAAR) 50 MG tablet    Bradycardia     Asymptomatic status.  Upcoming electrophysiology evaluation for sick sinus syndrome.    I did not resume beta-blocker therapy today.         Chronic diastolic heart  failure - Primary     Jardiance ordered.  Symptoms are getting worse.  Education provided to the patient regarding diastolic heart failure.         Relevant Medications    empagliflozin (JARDIANCE) 10 mg tablet       Renal/    Stage 3a chronic kidney disease     Condition stable.  GFR 44 range.            Endocrine    Controlled type 2 diabetes mellitus without complication, without long-term current use of insulin     Diet-controlled, last A1c 6.1.  Jardiance ordered today.         Morbid obesity     Weight loss discussed and encouraged.               Losartan increased to 50 mg 2 times daily.  Jardiance 10 mg prescribed for heart failure.  I made a 2 month follow-up.    I will avoid beta-blockers given findings on the event monitor.             Tutu Carlos MD  09/25/2024   4:03 PM

## 2024-09-25 NOTE — ASSESSMENT & PLAN NOTE
Jardiance ordered.  Symptoms are getting worse.  Education provided to the patient regarding diastolic heart failure.

## 2024-09-25 NOTE — ASSESSMENT & PLAN NOTE
Asymptomatic status.  Upcoming electrophysiology evaluation for sick sinus syndrome.    I did not resume beta-blocker therapy today.

## 2024-09-25 NOTE — ASSESSMENT & PLAN NOTE
Losartan 50 mg b.i.d. ordered today.  Her daughter and I discussed management and she will continue to report readings.  I reviewed readings from September.  She was just in the ED for elevated blood pressure but she had been noncompliant with her low-dose losartan.    Condition worsening.

## 2024-10-03 DIAGNOSIS — M17.0 PRIMARY OSTEOARTHRITIS OF BOTH KNEES: ICD-10-CM

## 2024-10-03 NOTE — TELEPHONE ENCOUNTER
----- Message from Summer sent at 10/3/2024 10:43 AM CDT -----  Contact: pt  Tonya Bucio  MRN: [id number]  : 1935  PCP: Tasha Atkins  Home Phone      [phone]  Work Phone      Not on file.  Mobile          [phone]      MESSAGE:     Pt need refill on HYDROcodone-acetaminophen (NORCO) 7.5-325 mg per tablet              Preferred Pharmacy    [address] [address]  Phone: [phone] Fax: [phone]  Hours: Not open 24 hours          [phone]

## 2024-10-03 NOTE — TELEPHONE ENCOUNTER
No care due was identified.  Health [facility] Embedded Care Due Messages. Reference number: [id number].   10/03/2024 10:46:29 AM CDT

## 2024-10-04 ENCOUNTER — LAB VISIT (OUTPATIENT)
Dept: LAB | Facility: HOSPITAL | Age: 89
End: 2024-10-04
Attending: INTERNAL MEDICINE
Payer: MEDICARE

## 2024-10-04 ENCOUNTER — TELEPHONE (OUTPATIENT)
Dept: INTERNAL MEDICINE | Facility: CLINIC | Age: 89
End: 2024-10-04
Payer: MEDICARE

## 2024-10-04 DIAGNOSIS — D63.1 ANEMIA DUE TO STAGE 3A CHRONIC KIDNEY DISEASE: ICD-10-CM

## 2024-10-04 DIAGNOSIS — R30.0 DYSURIA: Primary | ICD-10-CM

## 2024-10-04 DIAGNOSIS — E66.01 CLASS 2 SEVERE OBESITY DUE TO EXCESS CALORIES WITH SERIOUS COMORBIDITY IN ADULT, UNSPECIFIED BMI: ICD-10-CM

## 2024-10-04 DIAGNOSIS — E11.69 HYPERLIPIDEMIA ASSOCIATED WITH TYPE 2 DIABETES MELLITUS: ICD-10-CM

## 2024-10-04 DIAGNOSIS — E78.5 HYPERLIPIDEMIA ASSOCIATED WITH TYPE 2 DIABETES MELLITUS: ICD-10-CM

## 2024-10-04 DIAGNOSIS — N18.31 ANEMIA DUE TO STAGE 3A CHRONIC KIDNEY DISEASE: ICD-10-CM

## 2024-10-04 DIAGNOSIS — E66.812 CLASS 2 SEVERE OBESITY DUE TO EXCESS CALORIES WITH SERIOUS COMORBIDITY IN ADULT, UNSPECIFIED BMI: ICD-10-CM

## 2024-10-04 LAB
ALBUMIN SERPL BCP-MCNC: 3.1 G/DL (ref 3.5–5.2)
ALP SERPL-CCNC: 97 U/L (ref 55–135)
ALT SERPL W/O P-5'-P-CCNC: 21 U/L (ref 10–44)
ANION GAP SERPL CALC-SCNC: 9 MMOL/L (ref 8–16)
AST SERPL-CCNC: 11 U/L (ref 10–40)
BASOPHILS # BLD AUTO: 0.04 K/UL (ref 0–0.2)
BASOPHILS NFR BLD: 0.4 % (ref 0–1.9)
BILIRUB SERPL-MCNC: 0.6 MG/DL (ref 0.1–1)
BUN SERPL-MCNC: 23 MG/DL (ref 8–23)
CALCIUM SERPL-MCNC: 9.5 MG/DL (ref 8.7–10.5)
CHLORIDE SERPL-SCNC: 107 MMOL/L (ref 95–110)
CHOLEST SERPL-MCNC: 153 MG/DL (ref 120–199)
CHOLEST/HDLC SERPL: 3.7 {RATIO} (ref 2–5)
CO2 SERPL-SCNC: 27 MMOL/L (ref 23–29)
CREAT SERPL-MCNC: 1.3 MG/DL (ref 0.5–1.4)
DIFFERENTIAL METHOD BLD: ABNORMAL
EOSINOPHIL # BLD AUTO: 0.2 K/UL (ref 0–0.5)
EOSINOPHIL NFR BLD: 1.8 % (ref 0–8)
ERYTHROCYTE [DISTWIDTH] IN BLOOD BY AUTOMATED COUNT: 15.8 % (ref 11.5–14.5)
EST. GFR  (NO RACE VARIABLE): 40 ML/MIN/1.73 M^2
ESTIMATED AVG GLUCOSE: 123 MG/DL (ref 68–131)
GLUCOSE SERPL-MCNC: 117 MG/DL (ref 70–110)
HBA1C MFR BLD: 5.9 % (ref 4–5.6)
HCT VFR BLD AUTO: 33.6 % (ref 37–48.5)
HDLC SERPL-MCNC: 41 MG/DL (ref 40–75)
HDLC SERPL: 26.8 % (ref 20–50)
HGB BLD-MCNC: 10.5 G/DL (ref 12–16)
IMM GRANULOCYTES # BLD AUTO: 0.05 K/UL (ref 0–0.04)
IMM GRANULOCYTES NFR BLD AUTO: 0.5 % (ref 0–0.5)
LDLC SERPL CALC-MCNC: 60.8 MG/DL (ref 63–159)
LYMPHOCYTES # BLD AUTO: 2 K/UL (ref 1–4.8)
LYMPHOCYTES NFR BLD: 19.8 % (ref 18–48)
MCH RBC QN AUTO: 28 PG (ref 27–31)
MCHC RBC AUTO-ENTMCNC: 31.3 G/DL (ref 32–36)
MCV RBC AUTO: 90 FL (ref 82–98)
MONOCYTES # BLD AUTO: 0.6 K/UL (ref 0.3–1)
MONOCYTES NFR BLD: 5.5 % (ref 4–15)
NEUTROPHILS # BLD AUTO: 7.3 K/UL (ref 1.8–7.7)
NEUTROPHILS NFR BLD: 72 % (ref 38–73)
NONHDLC SERPL-MCNC: 112 MG/DL
NRBC BLD-RTO: 0 /100 WBC
PLATELET # BLD AUTO: 302 K/UL (ref 150–450)
PMV BLD AUTO: 10.2 FL (ref 9.2–12.9)
POTASSIUM SERPL-SCNC: 4.6 MMOL/L (ref 3.5–5.1)
PROT SERPL-MCNC: 7.1 G/DL (ref 6–8.4)
RBC # BLD AUTO: 3.75 M/UL (ref 4–5.4)
SODIUM SERPL-SCNC: 143 MMOL/L (ref 136–145)
TRIGL SERPL-MCNC: 256 MG/DL (ref 30–150)
TSH SERPL DL<=0.005 MIU/L-ACNC: 2.62 UIU/ML (ref 0.4–4)
WBC # BLD AUTO: 10.16 K/UL (ref 3.9–12.7)

## 2024-10-04 PROCEDURE — 36415 COLL VENOUS BLD VENIPUNCTURE: CPT | Mod: HCNC | Performed by: INTERNAL MEDICINE

## 2024-10-04 PROCEDURE — 80061 LIPID PANEL: CPT | Mod: HCNC | Performed by: INTERNAL MEDICINE

## 2024-10-04 PROCEDURE — 83036 HEMOGLOBIN GLYCOSYLATED A1C: CPT | Mod: HCNC | Performed by: INTERNAL MEDICINE

## 2024-10-04 PROCEDURE — 80053 COMPREHEN METABOLIC PANEL: CPT | Mod: HCNC | Performed by: INTERNAL MEDICINE

## 2024-10-04 PROCEDURE — 85025 COMPLETE CBC W/AUTO DIFF WBC: CPT | Mod: HCNC | Performed by: INTERNAL MEDICINE

## 2024-10-04 PROCEDURE — 84443 ASSAY THYROID STIM HORMONE: CPT | Mod: HCNC | Performed by: INTERNAL MEDICINE

## 2024-10-04 RX ORDER — HYDROCODONE BITARTRATE AND ACETAMINOPHEN 7.5; 325 MG/1; MG/1
1 TABLET ORAL
Qty: 30 TABLET | Refills: 0 | Status: SHIPPED | OUTPATIENT
Start: 2024-10-04

## 2024-10-04 NOTE — TELEPHONE ENCOUNTER
----- Message from Summer sent at 10/4/2024 10:16 AM CDT -----  Contact: nora  Tonya Bucio  MRN: [id number]  : 1935  PCP: Tasha Atkins  Home Phone      [phone]  Work Phone      Not on file.  Mobile          [phone]      MESSAGE:     Lab states they had to cancel urine test states pt brought urine in in a pill bottle , need new orders  put back in for urine orders

## 2024-10-09 ENCOUNTER — TELEPHONE (OUTPATIENT)
Dept: INTERNAL MEDICINE | Facility: CLINIC | Age: 89
End: 2024-10-09
Payer: MEDICARE

## 2024-10-09 ENCOUNTER — LAB VISIT (OUTPATIENT)
Dept: LAB | Facility: HOSPITAL | Age: 89
End: 2024-10-09
Attending: INTERNAL MEDICINE
Payer: MEDICARE

## 2024-10-09 DIAGNOSIS — R30.0 DYSURIA: ICD-10-CM

## 2024-10-09 LAB
BACTERIA #/AREA URNS HPF: ABNORMAL /HPF
BILIRUB UR QL STRIP: NEGATIVE
CLARITY UR: CLEAR
COLOR UR: YELLOW
GLUCOSE UR QL STRIP: ABNORMAL
HGB UR QL STRIP: NEGATIVE
HYALINE CASTS #/AREA URNS LPF: 0 /LPF
KETONES UR QL STRIP: NEGATIVE
LEUKOCYTE ESTERASE UR QL STRIP: ABNORMAL
MICROSCOPIC COMMENT: ABNORMAL
NITRITE UR QL STRIP: NEGATIVE
PH UR STRIP: 7 [PH] (ref 5–8)
PROT UR QL STRIP: ABNORMAL
RBC #/AREA URNS HPF: 0 /HPF (ref 0–4)
SP GR UR STRIP: 1.02 (ref 1–1.03)
SQUAMOUS #/AREA URNS HPF: 5 /HPF
URN SPEC COLLECT METH UR: ABNORMAL
UROBILINOGEN UR STRIP-ACNC: NEGATIVE EU/DL
WBC #/AREA URNS HPF: 20 /HPF (ref 0–5)

## 2024-10-09 PROCEDURE — 87086 URINE CULTURE/COLONY COUNT: CPT | Mod: HCNC | Performed by: INTERNAL MEDICINE

## 2024-10-09 PROCEDURE — 81000 URINALYSIS NONAUTO W/SCOPE: CPT | Mod: HCNC | Performed by: INTERNAL MEDICINE

## 2024-10-09 RX ORDER — NITROFURANTOIN MACROCRYSTALS 50 MG/1
50 CAPSULE ORAL DAILY
Qty: 10 CAPSULE | Refills: 0 | Status: SHIPPED | OUTPATIENT
Start: 2024-10-09 | End: 2024-10-19

## 2024-10-10 LAB
BACTERIA UR CULT: NORMAL
BACTERIA UR CULT: NORMAL

## 2024-10-14 ENCOUNTER — PATIENT OUTREACH (OUTPATIENT)
Dept: EMERGENCY MEDICINE | Facility: HOSPITAL | Age: 89
End: 2024-10-14
Payer: MEDICARE

## 2024-10-14 RX ORDER — RIVAROXABAN 15 MG/1
15 TABLET, FILM COATED ORAL NIGHTLY
Qty: 90 TABLET | Refills: 3 | Status: SHIPPED | OUTPATIENT
Start: 2024-10-14

## 2024-10-14 NOTE — PROGRESS NOTES
Pt expressed she is doing well. Pt revealed she has everything she needs scheduled at this time. Pt has no needs.    ED navigator ensured there was nothing she could help patient with. ED navigator reminded patient to reach out if there is ever anything she can assist with. ED navigator will follow-up with patient on/around 11/25/2024.    Mildred Beverly  ED Navigator  [phone]

## 2024-10-30 DIAGNOSIS — E03.4 HYPOTHYROIDISM DUE TO ACQUIRED ATROPHY OF THYROID: ICD-10-CM

## 2024-10-30 RX ORDER — LEVOTHYROXINE SODIUM 75 UG/1
TABLET ORAL
Qty: 90 TABLET | Refills: 0 | Status: SHIPPED | OUTPATIENT
Start: 2024-10-30

## 2024-11-06 DIAGNOSIS — M17.0 PRIMARY OSTEOARTHRITIS OF BOTH KNEES: ICD-10-CM

## 2024-11-06 RX ORDER — HYDROCODONE BITARTRATE AND ACETAMINOPHEN 7.5; 325 MG/1; MG/1
1 TABLET ORAL
Qty: 30 TABLET | Refills: 0 | Status: SHIPPED | OUTPATIENT
Start: 2024-11-06

## 2024-11-06 NOTE — TELEPHONE ENCOUNTER
No care due was identified.  Health [facility] Embedded Care Due Messages. Reference number: [id number].   11/06/2024 10:14:43 AM CST

## 2024-11-06 NOTE — TELEPHONE ENCOUNTER
----- Message from Roxanna sent at 2024 10:11 AM CST -----  Contact: Tonya Castaneda Tasneem Bucio  MRN: [id number]  : 1935  PCP: Tasha Atkins  Home Phone      [phone]  Work Phone      Not on file.  Mobile          [phone]      MESSAGE:     Pt needs a refill of HYDROcodone-acetaminophen (NORCO) 7.5-325 mg per tablet sent to [facility] in [place].         Tonya   [phone]

## 2024-11-06 NOTE — TELEPHONE ENCOUNTER
LOV 7/15/2024  Scheduled visit 11/15/2024    Requested Prescriptions     Pending Prescriptions Disp Refills    HYDROcodone-acetaminophen (NORCO) 7.5-325 mg per tablet 30 tablet 0     Sig: Take 1 tablet by mouth every 24 hours as needed for Pain.

## 2024-11-15 ENCOUNTER — OFFICE VISIT (OUTPATIENT)
Dept: INTERNAL MEDICINE | Facility: CLINIC | Age: 89
End: 2024-11-15
Payer: MEDICARE

## 2024-11-15 VITALS
HEIGHT: 68 IN | RESPIRATION RATE: 18 BRPM | DIASTOLIC BLOOD PRESSURE: 60 MMHG | BODY MASS INDEX: 37.25 KG/M2 | OXYGEN SATURATION: 95 % | SYSTOLIC BLOOD PRESSURE: 106 MMHG | HEART RATE: 82 BPM

## 2024-11-15 DIAGNOSIS — N18.31 STAGE 3A CHRONIC KIDNEY DISEASE: ICD-10-CM

## 2024-11-15 DIAGNOSIS — E11.69 HYPERLIPIDEMIA ASSOCIATED WITH TYPE 2 DIABETES MELLITUS: ICD-10-CM

## 2024-11-15 DIAGNOSIS — E03.9 ACQUIRED HYPOTHYROIDISM: ICD-10-CM

## 2024-11-15 DIAGNOSIS — R42 VERTIGO: ICD-10-CM

## 2024-11-15 DIAGNOSIS — F11.90 CHRONIC NARCOTIC USE: ICD-10-CM

## 2024-11-15 DIAGNOSIS — E11.9 CONTROLLED TYPE 2 DIABETES MELLITUS WITHOUT COMPLICATION, WITHOUT LONG-TERM CURRENT USE OF INSULIN: ICD-10-CM

## 2024-11-15 DIAGNOSIS — I50.32 CHRONIC DIASTOLIC HEART FAILURE: ICD-10-CM

## 2024-11-15 DIAGNOSIS — E78.5 HYPERLIPIDEMIA ASSOCIATED WITH TYPE 2 DIABETES MELLITUS: ICD-10-CM

## 2024-11-15 DIAGNOSIS — I10 PRIMARY HYPERTENSION: Primary | ICD-10-CM

## 2024-11-15 DIAGNOSIS — M17.0 PRIMARY OSTEOARTHRITIS OF BOTH KNEES: ICD-10-CM

## 2024-11-15 PROCEDURE — 99999 PR PBB SHADOW E&M-EST. PATIENT-LVL IV: CPT | Mod: PBBFAC,HCNC,, | Performed by: INTERNAL MEDICINE

## 2024-11-15 RX ORDER — MECLIZINE HCL 12.5 MG 12.5 MG/1
12.5 TABLET ORAL 3 TIMES DAILY PRN
Qty: 30 TABLET | Refills: 0 | Status: SHIPPED | OUTPATIENT
Start: 2024-11-15
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
## 2024-11-18 ENCOUNTER — TELEPHONE (OUTPATIENT)
Dept: ELECTROPHYSIOLOGY | Facility: CLINIC | Age: 89
End: 2024-11-18
Payer: MEDICARE

## 2024-11-19 ENCOUNTER — OFFICE VISIT (OUTPATIENT)
Dept: ELECTROPHYSIOLOGY | Facility: CLINIC | Age: 89
End: 2024-11-19
Payer: MEDICARE

## 2024-11-19 ENCOUNTER — HOSPITAL ENCOUNTER (OUTPATIENT)
Dept: CARDIOLOGY | Facility: CLINIC | Age: 89
Discharge: HOME | End: 2024-11-19
Payer: MEDICARE

## 2024-11-19 ENCOUNTER — CLINICAL SUPPORT (OUTPATIENT)
Dept: CARDIOLOGY | Facility: HOSPITAL | Age: 89
End: 2024-11-19
Attending: INTERNAL MEDICINE
Payer: MEDICARE

## 2024-11-19 VITALS
DIASTOLIC BLOOD PRESSURE: 60 MMHG | SYSTOLIC BLOOD PRESSURE: 112 MMHG | HEART RATE: 109 BPM | WEIGHT: 245 LBS | BODY MASS INDEX: 37.13 KG/M2 | HEIGHT: 68 IN

## 2024-11-19 DIAGNOSIS — I48.0 PAROXYSMAL ATRIAL FIBRILLATION: Primary | ICD-10-CM

## 2024-11-19 DIAGNOSIS — Z79.02 LONG TERM CURRENT USE OF ANTITHROMBOTICS/ANTIPLATELETS: ICD-10-CM

## 2024-11-19 DIAGNOSIS — I48.3 TYPICAL ATRIAL FLUTTER: Primary | ICD-10-CM

## 2024-11-19 DIAGNOSIS — I70.0 AORTIC ATHEROSCLEROSIS: ICD-10-CM

## 2024-11-19 DIAGNOSIS — I10 PRIMARY HYPERTENSION: ICD-10-CM

## 2024-11-19 DIAGNOSIS — I48.3 TYPICAL ATRIAL FLUTTER: ICD-10-CM

## 2024-11-19 DIAGNOSIS — N18.31 STAGE 3A CHRONIC KIDNEY DISEASE: ICD-10-CM

## 2024-11-19 DIAGNOSIS — I48.0 PAROXYSMAL ATRIAL FIBRILLATION: ICD-10-CM

## 2024-11-19 LAB
OHS QRS DURATION: 82 MS
OHS QTC CALCULATION: 428 MS

## 2024-11-19 PROCEDURE — 1159F MED LIST DOCD IN RCRD: CPT | Mod: HCNC,CPTII,S$GLB, | Performed by: INTERNAL MEDICINE

## 2024-11-19 PROCEDURE — 99999 PR PBB SHADOW E&M-EST. PATIENT-LVL III: CPT | Mod: PBBFAC,HCNC,, | Performed by: INTERNAL MEDICINE

## 2024-11-19 PROCEDURE — 1160F RVW MEDS BY RX/DR IN RCRD: CPT | Mod: HCNC,CPTII,S$GLB, | Performed by: INTERNAL MEDICINE

## 2024-11-19 PROCEDURE — 1126F AMNT PAIN NOTED NONE PRSNT: CPT | Mod: HCNC,CPTII,S$GLB, | Performed by: INTERNAL MEDICINE

## 2024-11-19 PROCEDURE — 93005 ELECTROCARDIOGRAM TRACING: CPT | Mod: HCNC,S$GLB,, | Performed by: INTERNAL MEDICINE

## 2024-11-19 PROCEDURE — 1101F PT FALLS ASSESS-DOCD LE1/YR: CPT | Mod: HCNC,CPTII,S$GLB, | Performed by: INTERNAL MEDICINE

## 2024-11-19 PROCEDURE — 93010 ELECTROCARDIOGRAM REPORT: CPT | Mod: HCNC,S$GLB,, | Performed by: INTERNAL MEDICINE

## 2024-11-19 PROCEDURE — 3288F FALL RISK ASSESSMENT DOCD: CPT | Mod: HCNC,CPTII,S$GLB, | Performed by: INTERNAL MEDICINE

## 2024-11-19 PROCEDURE — 93246 EXT ECG>7D<15D RECORDING: CPT | Mod: HCNC

## 2024-11-19 PROCEDURE — 99204 OFFICE O/P NEW MOD 45 MIN: CPT | Mod: HCNC,S$GLB,, | Performed by: INTERNAL MEDICINE

## 2024-11-19 NOTE — PROGRESS NOTES
Subjective   Patient ID:  Tonya Bucio is a 89 y.o. female who presents for evaluation of Atrial Fibrillation    Referring Cardiologist: Tutu Carlos MD  Primary Care Physician: Tasha Atkins MD    HPI  I had the pleasure of seeing Mrs. Bucio today in our electrophysiology clinic in consultation for her atrial arrhythmia. As you are aware she is an 89 year-old woman with hypertension, aortic atherosclerosis, CKD stage 3a, prior VTE and paroxysmal AF/ typical atrial flutter with symptomatic sinus pauses when on higher dose beta-blocker therapy. Atrial flutter was diagnosed in March of 2020. She was hospitalized at [facility]. Sotalol was started. She was diagnosed with community acquired pneumonia. She was subsequently seen by Dr. Ibarra with [facility]. Per his note she was also in AF. No ECGs showing AF are in the chart. At some point sotalol was reduced to 40mg bid. She has also been on metoprolol. She was admitted to an outside hospital in April of 2024 with fever and UTI. Observed to have 7 second pause on telemetry (chart indicates sinus pause but then others complete AVB). She was transferred and started on dopamine gtt while metoprolol/sotalol were held. Metoprolol was resumed due to reported episode of nsVT. EF is normal. Telemetry strips I observed show sinus pauses. She wore a 30 day even monitor which noted pAF, generally rate controlled, sinus pauses up to 3.3 seconds observed. Also has had sacral wounds observed during hospitalizations.    I reviewed available ECGs in [facility] and my personal interpretation summary is either normal sinus rhythm or typical atrial flutter with variable AV conduction but rate controlled (3/2020).    My interpretation of today's in-clinic ECG is AF with an average ventricular rate of 109 bpm.    Review of Systems   Constitutional: Negative for fever and malaise/fatigue.   HENT:  Negative for congestion and sore throat.    Eyes:  Negative for blurred vision and visual  disturbance.   Cardiovascular:  Negative for chest pain, dyspnea on exertion, irregular heartbeat, near-syncope, palpitations and syncope.   Respiratory:  Negative for cough and shortness of breath.    Hematologic/Lymphatic: Negative for bleeding problem. Does not bruise/bleed easily.   Skin: Negative.    Musculoskeletal: Negative.    Gastrointestinal:  Negative for bloating, abdominal pain, hematochezia and melena.   Neurological:  Negative for focal weakness and weakness.   Psychiatric/Behavioral: Negative.            Objective     Physical Exam  Vitals reviewed.   Constitutional:       General: She is not in acute distress.     Appearance: She is well-developed. She is not diaphoretic.   HENT:      Head: Normocephalic and atraumatic.   Eyes:      General:         Right eye: No discharge.         Left eye: No discharge.      Conjunctiva/sclera: Conjunctivae normal.   Cardiovascular:      Rate and Rhythm: Normal rate. Rhythm irregularly irregular.      Heart sounds: No murmur heard.     No friction rub. No gallop.   Pulmonary:      Effort: Pulmonary effort is normal. No respiratory distress.      Breath sounds: Normal breath sounds. No wheezing or rales.   Abdominal:      General: Bowel sounds are normal. There is no distension.      Palpations: Abdomen is soft.      Tenderness: There is no abdominal tenderness.   Musculoskeletal:      Cervical back: Neck supple.   Skin:     General: Skin is warm and dry.   Neurological:      Mental Status: She is alert and oriented to person, place, and time.   Psychiatric:         Behavior: Behavior normal.         Thought Content: Thought content normal.         Judgment: Judgment normal.            Assessment and Plan     1. Typical atrial flutter    2. Primary hypertension    3. Aortic atherosclerosis    4. Stage 3a chronic kidney disease    5. Long term current use of antithrombotics/antiplatelets    6. Paroxysmal atrial fibrillation        Plan:  In summary, Mrs Bucio is  an 89 year-old woman with hypertension, aortic atherosclerosis, CKD stage 3a, prior VTE and paroxysmal AF/ typical atrial flutter with symptomatic sinus pauses when on higher dose beta-blocker therapy. I had a long discussion with the patient about the pathophysiology and risks of atrial fibrillation and its basic pathophysiology, including its health implications and treatment options. Specifically, I addressed the need for CVA (stroke) prophylaxis with aspirin versus oral anticoagulation (warfarin vs DOACs, discussed bleeding risks, and need to come to the ER for any head trauma for CT scanning even if asymptomatic). [id number] score is 5 and oral anticoagulation is indicated. She is on lower dosed xarelto. I also discussed the goal to reduce symptomatic arrhythmic episodes by pharmacologic and/or procedural methods and utilizing a rhythm versus a rate control strategy. She has no symptoms and has SSS when on higher dosed beta-blockers. Discussed potential need for PPM implant to tolerate medical therapy if HR in her arrhythmia is too high. So far all we have are snap shots from an event monitor. Will get an extended holter monitor. She desires to avoid PPM if possible.    14 day ZIO. Will call with results    Thank you for allowing me to participate in the care of this patient. Please do not hesitate to call me with any questions or concerns.    Kostas Casillas MD, PhD  Cardiac Electrophysiology

## 2024-11-25 ENCOUNTER — PATIENT OUTREACH (OUTPATIENT)
Dept: EMERGENCY MEDICINE | Facility: HOSPITAL | Age: 89
End: 2024-11-25
Payer: MEDICARE

## 2024-11-25 NOTE — PROGRESS NOTES
Pt was reminded about and will be attending her appointment on tomorrow @ 3:00 p.m. with Tutu Carlos MD.    Mildred FlorezConrad [facility] [place] Navigator  [phone]

## 2024-11-25 NOTE — PROGRESS NOTES
Pt is doing well. Pt has cardiologist appointment on tomorrow. Pt has all she needs at this time.    ED navigator ensured there was nothing she could help patient with. ED navigator reminded patient to reach out if there is ever anything she can assist with ED navigator will follow-up with patient on/around 4/4/2025.    Mildred Beverly  ED Navigator  [phone]    Faxed.

## 2024-11-26 ENCOUNTER — OFFICE VISIT (OUTPATIENT)
Dept: CARDIOLOGY | Facility: CLINIC | Age: 89
End: 2024-11-26
Payer: MEDICARE

## 2024-11-26 VITALS
BODY MASS INDEX: 37.13 KG/M2 | HEIGHT: 68 IN | SYSTOLIC BLOOD PRESSURE: 96 MMHG | HEART RATE: 96 BPM | WEIGHT: 245 LBS | DIASTOLIC BLOOD PRESSURE: 59 MMHG | OXYGEN SATURATION: 96 %

## 2024-11-26 DIAGNOSIS — R00.1 BRADYCARDIA: ICD-10-CM

## 2024-11-26 DIAGNOSIS — E11.9 CONTROLLED TYPE 2 DIABETES MELLITUS WITHOUT COMPLICATION, WITHOUT LONG-TERM CURRENT USE OF INSULIN: ICD-10-CM

## 2024-11-26 DIAGNOSIS — E66.811 OBESITY (BMI 30.0-34.9): ICD-10-CM

## 2024-11-26 DIAGNOSIS — E78.5 HYPERLIPIDEMIA ASSOCIATED WITH TYPE 2 DIABETES MELLITUS: ICD-10-CM

## 2024-11-26 DIAGNOSIS — I10 PRIMARY HYPERTENSION: ICD-10-CM

## 2024-11-26 DIAGNOSIS — E11.69 HYPERLIPIDEMIA ASSOCIATED WITH TYPE 2 DIABETES MELLITUS: ICD-10-CM

## 2024-11-26 DIAGNOSIS — I70.0 AORTIC ATHEROSCLEROSIS: Primary | ICD-10-CM

## 2024-11-26 DIAGNOSIS — I48.0 PAROXYSMAL ATRIAL FIBRILLATION: ICD-10-CM

## 2024-11-26 DIAGNOSIS — I50.32 CHRONIC DIASTOLIC HEART FAILURE: ICD-10-CM

## 2024-11-26 PROCEDURE — 1126F AMNT PAIN NOTED NONE PRSNT: CPT | Mod: HCNC,CPTII,S$GLB, | Performed by: INTERNAL MEDICINE

## 2024-11-26 PROCEDURE — 1159F MED LIST DOCD IN RCRD: CPT | Mod: HCNC,CPTII,S$GLB, | Performed by: INTERNAL MEDICINE

## 2024-11-26 PROCEDURE — 1160F RVW MEDS BY RX/DR IN RCRD: CPT | Mod: HCNC,CPTII,S$GLB, | Performed by: INTERNAL MEDICINE

## 2024-11-26 PROCEDURE — 1101F PT FALLS ASSESS-DOCD LE1/YR: CPT | Mod: HCNC,CPTII,S$GLB, | Performed by: INTERNAL MEDICINE

## 2024-11-26 PROCEDURE — 99999 PR PBB SHADOW E&M-EST. PATIENT-LVL IV: CPT | Mod: PBBFAC,HCNC,, | Performed by: INTERNAL MEDICINE

## 2024-11-26 PROCEDURE — 3288F FALL RISK ASSESSMENT DOCD: CPT | Mod: HCNC,CPTII,S$GLB, | Performed by: INTERNAL MEDICINE

## 2024-11-26 PROCEDURE — 99214 OFFICE O/P EST MOD 30 MIN: CPT | Mod: HCNC,S$GLB,, | Performed by: INTERNAL MEDICINE

## 2024-11-26 NOTE — PROGRESS NOTES
[facility] Cardiology     Subjective:    Patient ID:  Tonya Bucio is a 89 y.o. female who presents for follow-up of Atrial Fibrillation, Bradycardia, Hypertension, Hyperlipidemia, and Congestive Heart Failure    Review of patient's allergies indicates:  No Known Allergies  She remains in AFib on Xarelto with 15 day monitor on for reassessment of heart rate.  After withdrawing beta-blockers and sotalol she has not had any further bradycardia.  She was maintaining sinus rhythm until September.    She states she has been feeling fine.  She does not get out of the house much.  She does some leg exercises in the sitting position.  She stopped Jardiance, there were side effects, it made her feel bad.  She only took it a couple of days.    Multiple blood pressure readings are reviewed today.  Most are in good range.  Several are above 150 systolic and I see some 105 systolic readings.  Her heart rates vary between 60s and 90s.  She can not say she feels palpitations.  She has not had bleeding problems.    She has not been to the hospital for complaints cardiac-wise.         Review of Systems   Constitutional: Negative for chills, decreased appetite, diaphoresis, fever, malaise/fatigue, night sweats, weight gain and weight loss.   HENT:  Negative for congestion, ear discharge, ear pain, hearing loss, hoarse voice, nosebleeds, odynophagia, sore throat, stridor and tinnitus.    Eyes:  Negative for blurred vision, discharge, double vision, pain, photophobia, redness, vision loss in left eye, vision loss in right eye, visual disturbance and visual halos.   Cardiovascular:  Positive for dyspnea on exertion. Negative for chest pain, claudication, cyanosis, irregular heartbeat, leg swelling, near-syncope, orthopnea, palpitations, paroxysmal nocturnal dyspnea and syncope.   Respiratory:  Positive for shortness of breath. Negative for cough, hemoptysis,  "sleep disturbances due to breathing, snoring, sputum production and wheezing.    Endocrine: Negative for cold intolerance, heat intolerance, polydipsia, polyphagia and polyuria.   Hematologic/Lymphatic: Negative for adenopathy and bleeding problem. Does not bruise/bleed easily.   Skin:  Negative for color change, dry skin, flushing, itching, nail changes, poor wound healing, rash, skin cancer, suspicious lesions and unusual hair distribution.   Musculoskeletal:  Positive for back pain and joint pain. Negative for arthritis, falls, gout, joint swelling, muscle cramps, muscle weakness, myalgias, neck pain and stiffness.   Gastrointestinal:  Negative for bloating, abdominal pain, anorexia, change in bowel habit, bowel incontinence, constipation, diarrhea, dysphagia, excessive appetite, flatus, heartburn, hematemesis, hematochezia, hemorrhoids, jaundice, melena, nausea and vomiting.   Genitourinary:  Negative for bladder incontinence, decreased libido, dysuria, flank pain, frequency, genital sores, hematuria, hesitancy, incomplete emptying, nocturia and urgency.   Neurological:  Negative for aphonia, brief paralysis, difficulty with concentration, disturbances in coordination, excessive daytime sleepiness, dizziness, focal weakness, headaches, light-headedness, loss of balance, numbness, paresthesias, seizures, sensory change, tremors, vertigo and weakness.   Psychiatric/Behavioral:  Negative for altered mental status, depression, hallucinations, memory loss, substance abuse, suicidal ideas and thoughts of violence. The patient does not have insomnia and is not nervous/anxious.    Allergic/Immunologic: Negative for hives and persistent infections.        Objective:       Vitals:    11/26/24 1515   BP: (!) 96/59   Pulse: 96   SpO2: 96%   Weight: 111.1 kg (245 lb)   Height: 5' 8" (1.727 m)    Physical Exam  Constitutional:       General: She is not in acute distress.     Appearance: She is well-developed. She is not " diaphoretic.   HENT:      Head: Normocephalic and atraumatic.      Nose: Nose normal.   Eyes:      General: No scleral icterus.        Right eye: No discharge.      Conjunctiva/sclera: Conjunctivae normal.      Pupils: Pupils are equal, round, and reactive to light.   Neck:      Thyroid: No thyromegaly.      Vascular: No JVD.      Trachea: No tracheal deviation.   Cardiovascular:      Rate and Rhythm: Normal rate. Rhythm irregularly irregular. Extrasystoles are present.     Pulses:           Carotid pulses are 2+ on the right side and 2+ on the left side.       Radial pulses are 2+ on the right side and 2+ on the left side.        Dorsalis pedis pulses are 1+ on the right side and 1+ on the left side.        Posterior tibial pulses are 1+ on the right side and 1+ on the left side.      Heart sounds: Normal heart sounds. No murmur heard.     No friction rub. No gallop.   Pulmonary:      Effort: Pulmonary effort is normal. No respiratory distress.      Breath sounds: Normal breath sounds. No stridor. No wheezing or rales.   Chest:      Chest wall: No tenderness.   Abdominal:      General: Bowel sounds are normal. There is no distension.      Palpations: Abdomen is soft. There is no mass.      Tenderness: There is no abdominal tenderness. There is no guarding or rebound.   Musculoskeletal:         General: No tenderness. Normal range of motion.      Cervical back: Normal range of motion and neck supple.   Lymphadenopathy:      Cervical: No cervical adenopathy.   Skin:     General: Skin is warm and dry.      Coloration: Skin is not pale.      Findings: No erythema or rash.   Neurological:      Mental Status: She is alert and oriented to person, place, and time.      Cranial Nerves: No cranial nerve deficit.      Coordination: Coordination normal.   Psychiatric:         Behavior: Behavior normal.         Thought Content: Thought content normal.         Judgment: Judgment normal.           Assessment:       1. Aortic  atherosclerosis    2. Bradycardia    3. Chronic diastolic heart failure    4. Hyperlipidemia associated with type 2 diabetes mellitus    5. Paroxysmal atrial fibrillation    6. Primary hypertension    7. Controlled type 2 diabetes mellitus without complication, without long-term current use of insulin    8. Obesity (BMI 30.0-34.9)      Results for orders placed or performed during the hospital encounter of 24   EKG RHYTHM STRIP (to Muse)    Collection Time: 24  1:13 PM   Result Value Ref Range    QRS Duration 82 ms    OHS QTC Calculation 428 ms         Current Outpatient Medications:     acetaminophen (TYLENOL) 500 MG tablet, Take 500 mg by mouth every evening. And as needed, Disp: , Rfl:     allopurinoL (ZYLOPRIM) 300 MG tablet, Take 1 tablet (300 mg total) by mouth once daily., Disp: 90 tablet, Rfl: 3    cinacalcet (SENSIPAR) 60 MG Tab, SMARTSI Tablet(s) By Mouth Every Evening, Disp: , Rfl:     ferrous sulfate 325 (65 FE) MG EC tablet, Take 1 tablet (325 mg total) by mouth once daily., Disp: 90 tablet, Rfl: 1    HYDROcodone-acetaminophen (NORCO) 7.5-325 mg per tablet, Take 1 tablet by mouth every 24 hours as needed for Pain., Disp: 30 tablet, Rfl: 0    levothyroxine (SYNTHROID) 75 MCG tablet, Take 1 tablet by mouth once daily, Disp: 90 tablet, Rfl: 0    losartan (COZAAR) 50 MG tablet, Take 1 tablet (50 mg total) by mouth 2 (two) times a day., Disp: 180 tablet, Rfl: 3    MAGNESIUM CITRATE ORAL, Take by mouth., Disp: , Rfl:     meclizine (ANTIVERT) 12.5 mg tablet, Take 1 tablet (12.5 mg total) by mouth 3 (three) times daily as needed for Dizziness., Disp: 30 tablet, Rfl: 0    multivitamin (ONE DAILY MULTIVITAMIN) per tablet, Take 1 tablet by mouth once daily., Disp: , Rfl:     omega-3 acid ethyl esters (LOVAZA) 1 gram capsule, Take 1 capsule (1 g total) by mouth 2 (two) times daily., Disp: 90 capsule, Rfl: 1    rosuvastatin (CRESTOR) 20 MG tablet, Take 1 tablet (20 mg total) by mouth every evening.,  Disp: 90 tablet, Rfl: 3    XARELTO 15 mg Tab, Take 1 tablet by mouth in the evening, Disp: 90 tablet, Rfl: 3     Lab Results   Component Value Date    WBC 10.16 10/04/2024    RBC 3.75 (L) 10/04/2024    HGB 10.5 (L) 10/04/2024    HCT 33.6 (L) 10/04/2024    MCV 90 10/04/2024    MCH 28.0 10/04/2024    MCHC 31.3 (L) 10/04/2024    RDW 15.8 (H) 10/04/2024     10/04/2024    MPV 10.2 10/04/2024    GRAN 7.3 10/04/2024    GRAN 72.0 10/04/2024    LYMPH 2.0 10/04/2024    LYMPH 19.8 10/04/2024    MONO 0.6 10/04/2024    MONO 5.5 10/04/2024    EOS 0.2 10/04/2024    BASO 0.04 10/04/2024    EOSINOPHIL 1.8 10/04/2024    BASOPHIL 0.4 10/04/2024    MG 1.6 04/22/2024        CMP  Lab Results   Component Value Date     10/04/2024    K 4.6 10/04/2024     10/04/2024    CO2 27 10/04/2024     (H) 10/04/2024    BUN 23 10/04/2024    CREATININE 1.3 10/04/2024    CALCIUM 9.5 10/04/2024    PROT 7.1 10/04/2024    ALBUMIN 3.1 (L) 10/04/2024    BILITOT 0.6 10/04/2024    ALKPHOS 97 10/04/2024    AST 11 10/04/2024    ALT 21 10/04/2024    ANIONGAP 9 10/04/2024    ESTGFRAFRICA 43 (A) 06/23/2022    EGFRNONAA 37 (A) 06/23/2022        Lab Results   Component Value Date    LABBLOO No growth after 5 days. 05/05/2024    LABURIN  10/09/2024     Multiple organisms isolated. None in predominance.  Repeat if    LABURIN clinically necessary. 10/09/2024    RESPIRATORYC Normal respiratory vu 08/12/2014    GSRESP <10 epithelial cells per low power field. 08/12/2014    GSRESP Rare WBC's 08/12/2014    GSRESP No organisms seen 08/12/2014            Results for orders placed or performed during the hospital encounter of 09/21/24   EKG 12-lead    Collection Time: 09/21/24  4:02 PM   Result Value Ref Range    QRS Duration 88 ms    OHS QTC Calculation 420 ms    Narrative    Test Reason : R06.02    Vent. Rate : 076 BPM     Atrial Rate : 076 BPM     P-R Int : 310 ms          QRS Dur : 088 ms      QT Int : 374 ms       P-R-T Axes : 058 -05 099  degrees     QTc Int : 420 ms    Sinus rhythm with 1st degree A-V block  T wave abnormality, consider lateral ischemia  Abnormal ECG  When compared with ECG of 05-SEP-2024 10:37,  T wave inversion more evident in Lateral leads  Confirmed by Terrance VILLEDA, Tutu CHAVIRA ([id number]) on 9/23/2024 7:05:17 AM    Referred By: GAURANG   SELF           Confirmed By:Tutu Carlos MD                   Plan:       Problem List Items Addressed This Visit          Cardiac/Vascular    Hyperlipidemia associated with type 2 diabetes mellitus     Rosuvastatin to continue.  Condition controlled.  Current LDL 60.8 mg% by labs October 2024.         Aortic atherosclerosis - Primary     Condition unchanged.         Primary hypertension     I reviewed her blood pressures.  No changes made.  Overall they seem controlled most of the time.  She is on losartan 50 mg 2 times daily.         Bradycardia     She has a monitor on currently.  No bradycardia documented.  She checks her vitals on a regular basis.  She no longer takes beta-blockers or sotalol.    Heart rates at home range between 60s to 100s.  A single heart rate at 59 BPM documented by the patient.         Chronic diastolic heart failure     She was intolerant to Jardiance.  She is housebound.  Limited activity tolerated but not solely on the basis of heart failure.         Paroxysmal atrial fibrillation     Persistent AFib at this point.  Heart rates appear to be controlled currently.    There is history of atrial flutter typical as well.  She follows with Dr. Casillas.  Xarelto 15 mg to continue.            Endocrine    Controlled type 2 diabetes mellitus without complication, without long-term current use of insulin     Jardiance withdrawn.  Currently she is diet-controlled.  Most recent hemoglobin A1c 5.9.         Obesity (BMI 30.0-34.9)     Weight loss encouraged.                 I made a four-month follow-up.  She will be having assessments with Dr. Casillas after her 15 day monitor has  completed.    Overall her clinical status is stable.  No med changes made today.           Tutu Carlos MD  11/27/2024   3:43 PM

## 2024-11-27 NOTE — ASSESSMENT & PLAN NOTE
She was intolerant to Jardiance.  She is housebound.  Limited activity tolerated but not solely on the basis of heart failure.

## 2024-11-27 NOTE — ASSESSMENT & PLAN NOTE
She has a monitor on currently.  No bradycardia documented.  She checks her vitals on a regular basis.  She no longer takes beta-blockers or sotalol.    Heart rates at home range between 60s to 100s.  A single heart rate at 59 BPM documented by the patient.

## 2024-11-27 NOTE — ASSESSMENT & PLAN NOTE
Persistent AFib at this point.  Heart rates appear to be controlled currently.    There is history of atrial flutter typical as well.  She follows with Dr. Casillas.  Xarelto 15 mg to continue.

## 2024-11-27 NOTE — ASSESSMENT & PLAN NOTE
I reviewed her blood pressures.  No changes made.  Overall they seem controlled most of the time.  She is on losartan 50 mg 2 times daily.

## 2024-12-05 DIAGNOSIS — M17.0 PRIMARY OSTEOARTHRITIS OF BOTH KNEES: ICD-10-CM

## 2024-12-05 DIAGNOSIS — E11.69 HYPERLIPIDEMIA ASSOCIATED WITH TYPE 2 DIABETES MELLITUS: ICD-10-CM

## 2024-12-05 DIAGNOSIS — E78.5 HYPERLIPIDEMIA ASSOCIATED WITH TYPE 2 DIABETES MELLITUS: ICD-10-CM

## 2024-12-05 RX ORDER — HYDROCODONE BITARTRATE AND ACETAMINOPHEN 7.5; 325 MG/1; MG/1
1 TABLET ORAL
Qty: 30 TABLET | Refills: 0 | Status: SHIPPED | OUTPATIENT
Start: 2024-12-05

## 2024-12-05 RX ORDER — OMEGA-3-ACID ETHYL ESTERS 1 G/1
1 CAPSULE, LIQUID FILLED ORAL 2 TIMES DAILY
Qty: 90 CAPSULE | Refills: 1 | Status: SHIPPED | OUTPATIENT
Start: 2024-12-05

## 2024-12-05 NOTE — TELEPHONE ENCOUNTER
Please fill in Dr. Atkins's absence. We do not have a MD in clinic.     LOV 11/15/2024  Scheduled visit 2/18/2025    Requested Prescriptions     Pending Prescriptions Disp Refills    omega-3 acid ethyl esters (LOVAZA) 1 gram capsule 90 capsule 1     Sig: Take 1 capsule (1 g total) by mouth 2 (two) times daily.    HYDROcodone-acetaminophen (NORCO) 7.5-325 mg per tablet 30 tablet 0     Sig: Take 1 tablet by mouth every 24 hours as needed for Pain.

## 2024-12-05 NOTE — TELEPHONE ENCOUNTER
No care due was identified.  [facility] Embedded Care Due Messages. Reference number: [id number].   12/05/2024 12:54:07 PM CST

## 2024-12-05 NOTE — TELEPHONE ENCOUNTER
----- Message from Roxanna sent at 2024 12:08 PM CST -----  Contact: Pt  Tonya Bucio  MRN: [id number]  : 1935  PCP: Tasha Atkins  Home Phone      [phone]  Work Phone      Not on file.  [employer]!          [phone]      MESSAGE:     Pt needs a refill of omega-3 acid ethyl esters (LOVAZA) 1 gram capsule and HYDROcodone-acetaminophen (NORCO) 7.5-325 mg per tablet sent to [facility] in [place].       Tonya   [phone]

## 2024-12-18 ENCOUNTER — TELEPHONE (OUTPATIENT)
Dept: ELECTROPHYSIOLOGY | Facility: CLINIC | Age: 89
End: 2024-12-18
Payer: MEDICARE

## 2024-12-18 NOTE — TELEPHONE ENCOUNTER
----- Message from Kostas Casillas MD sent at 12/17/2024  5:01 PM CST -----  Please notify patient her heart rate is well controlled as assessed on her monitor.

## 2024-12-23 ENCOUNTER — TELEPHONE (OUTPATIENT)
Dept: INTERNAL MEDICINE | Facility: CLINIC | Age: 89
End: 2024-12-23
Payer: MEDICARE

## 2024-12-23 DIAGNOSIS — R35.0 URINARY FREQUENCY: Primary | ICD-10-CM

## 2024-12-23 NOTE — TELEPHONE ENCOUNTER
----- Message from Roxanna sent at 2024  8:46 AM CST -----  Contact: Pt  Tonya Bucio  MRN: [id number]  : 1935  PCP: Tasha Atkins  Home Phone      [phone]  Work Phone      Not on file.  Mobile          [phone]      MESSAGE:     Pt states she has frequent urination and lower back pain. Pt believes she has a UTI and would like to know if she can get some medication sent in to [facility] in [place].           Please advise   [phone]

## 2024-12-27 ENCOUNTER — LAB VISIT (OUTPATIENT)
Dept: LAB | Facility: HOSPITAL | Age: 89
End: 2024-12-27
Attending: INTERNAL MEDICINE
Payer: MEDICARE

## 2024-12-27 DIAGNOSIS — I12.9 HYPERTENSIVE KIDNEY DISEASE WITH CHRONIC KIDNEY DISEASE: ICD-10-CM

## 2024-12-27 DIAGNOSIS — I12.9 HYPERTENSIVE KIDNEY DISEASE WITH CHRONIC KIDNEY DISEASE: Primary | ICD-10-CM

## 2024-12-27 DIAGNOSIS — E11.9 CONTROLLED TYPE 2 DIABETES MELLITUS WITHOUT COMPLICATION, WITHOUT LONG-TERM CURRENT USE OF INSULIN: ICD-10-CM

## 2024-12-27 LAB
ALBUMIN SERPL BCP-MCNC: 3.2 G/DL (ref 3.5–5.2)
ALBUMIN/CREAT UR: 592.8 UG/MG (ref 0–30)
ALP SERPL-CCNC: 105 U/L (ref 40–150)
ALT SERPL W/O P-5'-P-CCNC: 19 U/L (ref 10–44)
ANION GAP SERPL CALC-SCNC: 10 MMOL/L (ref 8–16)
AST SERPL-CCNC: 16 U/L (ref 10–40)
BASOPHILS # BLD AUTO: 0.06 K/UL (ref 0–0.2)
BASOPHILS NFR BLD: 0.5 % (ref 0–1.9)
BILIRUB SERPL-MCNC: 0.5 MG/DL (ref 0.1–1)
BUN SERPL-MCNC: 32 MG/DL (ref 8–23)
CALCIUM SERPL-MCNC: 9.6 MG/DL (ref 8.7–10.5)
CHLORIDE SERPL-SCNC: 100 MMOL/L (ref 95–110)
CO2 SERPL-SCNC: 24 MMOL/L (ref 23–29)
CREAT SERPL-MCNC: 1.5 MG/DL (ref 0.5–1.4)
CREAT UR-MCNC: 66.3 MG/DL (ref 15–325)
DIFFERENTIAL METHOD BLD: ABNORMAL
EOSINOPHIL # BLD AUTO: 0.1 K/UL (ref 0–0.5)
EOSINOPHIL NFR BLD: 0.5 % (ref 0–8)
ERYTHROCYTE [DISTWIDTH] IN BLOOD BY AUTOMATED COUNT: 15.4 % (ref 11.5–14.5)
EST. GFR  (NO RACE VARIABLE): 33 ML/MIN/1.73 M^2
GLUCOSE SERPL-MCNC: 164 MG/DL (ref 70–110)
HCT VFR BLD AUTO: 36.5 % (ref 37–48.5)
HGB BLD-MCNC: 11.8 G/DL (ref 12–16)
IMM GRANULOCYTES # BLD AUTO: 0.08 K/UL (ref 0–0.04)
IMM GRANULOCYTES NFR BLD AUTO: 0.7 % (ref 0–0.5)
LYMPHOCYTES # BLD AUTO: 2 K/UL (ref 1–4.8)
LYMPHOCYTES NFR BLD: 18.2 % (ref 18–48)
MCH RBC QN AUTO: 29.3 PG (ref 27–31)
MCHC RBC AUTO-ENTMCNC: 32.3 G/DL (ref 32–36)
MCV RBC AUTO: 91 FL (ref 82–98)
MICROALBUMIN UR DL<=1MG/L-MCNC: 393 UG/ML
MONOCYTES # BLD AUTO: 0.8 K/UL (ref 0.3–1)
MONOCYTES NFR BLD: 7 % (ref 4–15)
NEUTROPHILS # BLD AUTO: 8 K/UL (ref 1.8–7.7)
NEUTROPHILS NFR BLD: 73.1 % (ref 38–73)
NRBC BLD-RTO: 0 /100 WBC
PHOSPHATE SERPL-MCNC: 2.7 MG/DL (ref 2.7–4.5)
PLATELET # BLD AUTO: 296 K/UL (ref 150–450)
PMV BLD AUTO: 10.4 FL (ref 9.2–12.9)
POTASSIUM SERPL-SCNC: 4.5 MMOL/L (ref 3.5–5.1)
PROT SERPL-MCNC: 7.2 G/DL (ref 6–8.4)
PTH-INTACT SERPL-MCNC: 328.5 PG/ML (ref 9–77)
RBC # BLD AUTO: 4.03 M/UL (ref 4–5.4)
SODIUM SERPL-SCNC: 134 MMOL/L (ref 136–145)
WBC # BLD AUTO: 11 K/UL (ref 3.9–12.7)

## 2024-12-27 PROCEDURE — 82043 UR ALBUMIN QUANTITATIVE: CPT | Mod: HCNC | Performed by: INTERNAL MEDICINE

## 2024-12-27 PROCEDURE — 84100 ASSAY OF PHOSPHORUS: CPT | Mod: HCNC | Performed by: INTERNAL MEDICINE

## 2024-12-27 PROCEDURE — 85025 COMPLETE CBC W/AUTO DIFF WBC: CPT | Mod: HCNC | Performed by: INTERNAL MEDICINE

## 2024-12-27 PROCEDURE — 83970 ASSAY OF PARATHORMONE: CPT | Mod: HCNC | Performed by: INTERNAL MEDICINE

## 2024-12-27 PROCEDURE — 36415 COLL VENOUS BLD VENIPUNCTURE: CPT | Mod: HCNC | Performed by: INTERNAL MEDICINE

## 2024-12-27 PROCEDURE — 80053 COMPREHEN METABOLIC PANEL: CPT | Mod: HCNC | Performed by: INTERNAL MEDICINE

## 2024-12-28 ENCOUNTER — HOSPITAL ENCOUNTER (EMERGENCY)
Facility: HOSPITAL | Age: 89
Discharge: HOME | End: 2024-12-28
Attending: SURGERY
Payer: MEDICARE

## 2024-12-28 VITALS
DIASTOLIC BLOOD PRESSURE: 96 MMHG | RESPIRATION RATE: 20 BRPM | SYSTOLIC BLOOD PRESSURE: 151 MMHG | HEART RATE: 92 BPM | OXYGEN SATURATION: 97 % | TEMPERATURE: 98 F | BODY MASS INDEX: 37.89 KG/M2 | HEIGHT: 68 IN | WEIGHT: 250 LBS

## 2024-12-28 DIAGNOSIS — B02.8 HERPES ZOSTER WITH COMPLICATION: ICD-10-CM

## 2024-12-28 DIAGNOSIS — N30.00 ACUTE CYSTITIS WITHOUT HEMATURIA: Primary | ICD-10-CM

## 2024-12-28 LAB
ALBUMIN SERPL BCP-MCNC: 3.2 G/DL (ref 3.5–5.2)
ALP SERPL-CCNC: 102 U/L (ref 40–150)
ALT SERPL W/O P-5'-P-CCNC: 23 U/L (ref 10–44)
ANION GAP SERPL CALC-SCNC: 11 MMOL/L (ref 8–16)
AST SERPL-CCNC: 17 U/L (ref 10–40)
BACTERIA #/AREA URNS HPF: ABNORMAL /HPF
BASOPHILS # BLD AUTO: 0.06 K/UL (ref 0–0.2)
BASOPHILS NFR BLD: 0.7 % (ref 0–1.9)
BILIRUB SERPL-MCNC: 0.4 MG/DL (ref 0.1–1)
BILIRUB UR QL STRIP: NEGATIVE
BUN SERPL-MCNC: 33 MG/DL (ref 8–23)
CALCIUM SERPL-MCNC: 9.8 MG/DL (ref 8.7–10.5)
CHLORIDE SERPL-SCNC: 103 MMOL/L (ref 95–110)
CLARITY UR: CLEAR
CO2 SERPL-SCNC: 23 MMOL/L (ref 23–29)
COLOR UR: YELLOW
CREAT SERPL-MCNC: 1.3 MG/DL (ref 0.5–1.4)
DIFFERENTIAL METHOD BLD: ABNORMAL
EOSINOPHIL # BLD AUTO: 0.1 K/UL (ref 0–0.5)
EOSINOPHIL NFR BLD: 1.1 % (ref 0–8)
ERYTHROCYTE [DISTWIDTH] IN BLOOD BY AUTOMATED COUNT: 15.2 % (ref 11.5–14.5)
EST. GFR  (NO RACE VARIABLE): 40 ML/MIN/1.73 M^2
GIANT PLATELETS BLD QL SMEAR: PRESENT
GLUCOSE SERPL-MCNC: 115 MG/DL (ref 70–110)
GLUCOSE UR QL STRIP: NEGATIVE
HCT VFR BLD AUTO: 37.8 % (ref 37–48.5)
HGB BLD-MCNC: 12.6 G/DL (ref 12–16)
HGB UR QL STRIP: NEGATIVE
HYALINE CASTS #/AREA URNS LPF: 2 /LPF
IMM GRANULOCYTES # BLD AUTO: 0.08 K/UL (ref 0–0.04)
IMM GRANULOCYTES NFR BLD AUTO: 1 % (ref 0–0.5)
KETONES UR QL STRIP: NEGATIVE
LEUKOCYTE ESTERASE UR QL STRIP: ABNORMAL
LYMPHOCYTES # BLD AUTO: 2 K/UL (ref 1–4.8)
LYMPHOCYTES NFR BLD: 24 % (ref 18–48)
MCH RBC QN AUTO: 29.1 PG (ref 27–31)
MCHC RBC AUTO-ENTMCNC: 33.3 G/DL (ref 32–36)
MCV RBC AUTO: 87 FL (ref 82–98)
MICROSCOPIC COMMENT: ABNORMAL
MONOCYTES # BLD AUTO: 0.7 K/UL (ref 0.3–1)
MONOCYTES NFR BLD: 8.6 % (ref 4–15)
NEUTROPHILS # BLD AUTO: 5.3 K/UL (ref 1.8–7.7)
NEUTROPHILS NFR BLD: 64.6 % (ref 38–73)
NITRITE UR QL STRIP: NEGATIVE
NRBC BLD-RTO: 0 /100 WBC
PH UR STRIP: 7 [PH] (ref 5–8)
PLATELET # BLD AUTO: 257 K/UL (ref 150–450)
PLATELET BLD QL SMEAR: ABNORMAL
PMV BLD AUTO: 10.7 FL (ref 9.2–12.9)
POTASSIUM SERPL-SCNC: 4.3 MMOL/L (ref 3.5–5.1)
PROT SERPL-MCNC: 7.3 G/DL (ref 6–8.4)
PROT UR QL STRIP: ABNORMAL
RBC # BLD AUTO: 4.33 M/UL (ref 4–5.4)
RBC #/AREA URNS HPF: 1 /HPF (ref 0–4)
SODIUM SERPL-SCNC: 137 MMOL/L (ref 136–145)
SP GR UR STRIP: 1.01 (ref 1–1.03)
SQUAMOUS #/AREA URNS HPF: 3 /HPF
URN SPEC COLLECT METH UR: ABNORMAL
UROBILINOGEN UR STRIP-ACNC: NEGATIVE EU/DL
WBC # BLD AUTO: 8.22 K/UL (ref 3.9–12.7)
WBC #/AREA URNS HPF: 70 /HPF (ref 0–5)
WBC CLUMPS URNS QL MICRO: ABNORMAL

## 2024-12-28 PROCEDURE — 81000 URINALYSIS NONAUTO W/SCOPE: CPT | Mod: HCNC | Performed by: SURGERY

## 2024-12-28 PROCEDURE — 63600175 PHARM REV CODE 636 W HCPCS: Mod: HCNC | Performed by: SURGERY

## 2024-12-28 PROCEDURE — 80053 COMPREHEN METABOLIC PANEL: CPT | Mod: HCNC | Performed by: SURGERY

## 2024-12-28 PROCEDURE — 85025 COMPLETE CBC W/AUTO DIFF WBC: CPT | Mod: HCNC | Performed by: SURGERY

## 2024-12-28 PROCEDURE — 96374 THER/PROPH/DIAG INJ IV PUSH: CPT | Mod: HCNC

## 2024-12-28 PROCEDURE — 87086 URINE CULTURE/COLONY COUNT: CPT | Mod: HCNC | Performed by: SURGERY

## 2024-12-28 PROCEDURE — 99284 EMERGENCY DEPT VISIT MOD MDM: CPT | Mod: 25,HCNC

## 2024-12-28 PROCEDURE — 87088 URINE BACTERIA CULTURE: CPT | Mod: HCNC | Performed by: SURGERY

## 2024-12-28 RX ORDER — CIPROFLOXACIN 500 MG/1
500 TABLET ORAL 2 TIMES DAILY
Qty: 14 TABLET | Refills: 0 | Status: SHIPPED | OUTPATIENT
Start: 2024-12-28 | End: 2025-01-04

## 2024-12-28 RX ORDER — CEFTRIAXONE 1 G/1
1 INJECTION, POWDER, FOR SOLUTION INTRAMUSCULAR; INTRAVENOUS
Status: DISCONTINUED | OUTPATIENT
Start: 2024-12-28 | End: 2024-12-28

## 2024-12-28 RX ORDER — VALACYCLOVIR HYDROCHLORIDE 1 G/1
1000 TABLET, FILM COATED ORAL 3 TIMES DAILY
Qty: 21 TABLET | Refills: 0 | Status: SHIPPED | OUTPATIENT
Start: 2024-12-28 | End: 2025-01-04

## 2024-12-28 RX ORDER — CEFTRIAXONE 1 G/1
1 INJECTION, POWDER, FOR SOLUTION INTRAMUSCULAR; INTRAVENOUS
Status: COMPLETED | OUTPATIENT
Start: 2024-12-28 | End: 2024-12-28

## 2024-12-28 RX ADMIN — CEFTRIAXONE SODIUM 1 G: 1 INJECTION, POWDER, FOR SOLUTION INTRAMUSCULAR; INTRAVENOUS at 09:12

## 2024-12-28 NOTE — ED TRIAGE NOTES
C/o lower back pain, pressure and burning with urination x 1 week. Rash noted to left lower abdomen to lower back are x a few days.

## 2024-12-28 NOTE — ED PROVIDER NOTES
Encounter Date: 12/28/2024       History     Chief Complaint   Patient presents with    Dysuria     History of Present Illness  Tonya Bucio is a 89 y.o. female that presents with dysuria tonight  Patient with longstanding chronic urinary tract infections on ER interview  Patient with no flank pain or fever, patient with dysuria without hematuria  Also on physical exam the patient appears to have a left chest wall rash now  Not painful with a rash appears to be consistent with shingles, no shingles HX    The history is provided by the patient.     Review of patient's allergies indicates:  No Known Allergies  Past Medical History:   Diagnosis Date    Acute bronchitis with asthma     Anemia due to stage 3 chronic kidney disease     Anticoagulant long-term use     Asthma     Back pain     Cancer     Chest pain, musculoskeletal 7/16/2013    Chronic bronchitis     Colon polyps     COPD exacerbation 3/13/2020    Gout, unspecified     History of cervical cancer     Hypothyroidism     Obesity     Osteoarthritis     Paroxysmal atrial fibrillation 11/19/2024    Renal manifestation of secondary diabetes mellitus     Thyroid disease     Trouble in sleeping     Type 2 diabetes mellitus with ophthalmic manifestations     Type 2 diabetes with peripheral circulatory disorder, controlled     Urinary incontinence     Uterine cancer     Uterine cancer      Past Surgical History:   Procedure Laterality Date    ADENOIDECTOMY      EYE SURGERY Right     right eye cataract    HYSTERECTOMY  2008    LIZ-BSO    tonsilectomy      TONSILLECTOMY  1945    TOTAL ABDOMINAL HYSTERECTOMY  2008    TOTAL ABDOMINAL HYSTERECTOMY W/ BILATERAL SALPINGOOPHORECTOMY  2008     Family History   Problem Relation Name Age of Onset    Heart disease Mother Samara     Arthritis Father Andres     Breast cancer Sister Radha     Ovarian cancer Sister Radha     Cancer Brother Firmen         Throat cancer    Arthritis Daughter Samara         back    No Known  Problems Son Roby     Heart disease Brother Andres Jr     Stroke Brother Andres Jr     Heart disease Brother Ean         stents heart and legs     Diabetes Brother Ean     Hyperlipidemia Brother Ean     Anemia Daughter Ramila     Arthritis Daughter Ramila         RA    Diabetes Daughter Mtia     Arthritis Daughter Mita         RA    Glaucoma Daughter Mita     Diabetes Daughter Audra     Liver disease Daughter Audra     Arthritis Daughter Ibis         back     Stroke Son Conrad     No Known Problems Son Jarred      Social History     Tobacco Use    Smoking status: Former     Current packs/day: 0.00     Average packs/day: 0.3 packs/day for 13.0 years (4.3 ttl pk-yrs)     Types: Cigarettes     Start date: 1951     Quit date: 1964     Years since quittin.4     Passive exposure: Past    Smokeless tobacco: Never   Substance Use Topics    Alcohol use: No    Drug use: No     Review of Systems   Constitutional: Negative.    HENT: Negative.     Eyes: Negative.    Respiratory: Negative.     Cardiovascular: Negative.    Gastrointestinal: Negative.    Genitourinary:  Positive for dysuria.   Musculoskeletal: Negative.    Skin:  Positive for rash.   Neurological: Negative.    Psychiatric/Behavioral: Negative.         Physical Exam     Initial Vitals [24 0740]   BP Pulse Resp Temp SpO2   (!) 142/84 97 20 97.9 °F (36.6 °C) 98 %      MAP       --         Physical Exam    Nursing note and vitals reviewed.  Constitutional: She appears well-developed.   HENT:   Head: Normocephalic and atraumatic.   Right Ear: External ear normal.   Left Ear: External ear normal.   Nose: Nose normal. Mouth/Throat: Oropharynx is clear and moist.   Eyes: Conjunctivae and EOM are normal. Pupils are equal, round, and reactive to light.   Neck: Neck supple. No JVD present.   Normal range of motion.  Cardiovascular:  Normal rate and regular rhythm.           Pulmonary/Chest: No respiratory distress. She has no wheezes. She has  no rhonchi. She has no rales. She exhibits no tenderness.   Abdominal: Abdomen is soft. Bowel sounds are normal. She exhibits no distension. There is no abdominal tenderness. There is no rebound.   Musculoskeletal:         General: Normal range of motion.      Cervical back: Normal range of motion and neck supple.     Neurological: She is alert and oriented to person, place, and time. She has normal strength and normal reflexes.   Skin:   (+) shingles rash on left chest wall         ED Course   Procedures  Labs Reviewed   COMPREHENSIVE METABOLIC PANEL - Abnormal       Result Value    Sodium 137      Potassium 4.3      Chloride 103      CO2 23      Glucose 115 (*)     BUN 33 (*)     Creatinine 1.3      Calcium 9.8      Total Protein 7.3      Albumin 3.2 (*)     Total Bilirubin 0.4      Alkaline Phosphatase 102      AST 17      ALT 23      eGFR 40 (*)     Anion Gap 11     CBC W/ AUTO DIFFERENTIAL - Abnormal    WBC 8.22      RBC 4.33      Hemoglobin 12.6      Hematocrit 37.8      MCV 87      MCH 29.1      MCHC 33.3      RDW 15.2 (*)     Platelets 257      MPV 10.7      Immature Granulocytes 1.0 (*)     Gran # (ANC) 5.3      Immature Grans (Abs) 0.08 (*)     Lymph # 2.0      Mono # 0.7      Eos # 0.1      Baso # 0.06      nRBC 0      Gran % 64.6      Lymph % 24.0      Mono % 8.6      Eosinophil % 1.1      Basophil % 0.7      Platelet Estimate Appears normal      Large/Giant Platelets Present      Differential Method Automated     URINALYSIS, REFLEX TO URINE CULTURE - Abnormal    Specimen UA Urine, Catheterized      Color, UA Yellow      Appearance, UA Clear      pH, UA 7.0      Specific Gravity, UA 1.015      Protein, UA 2+ (*)     Glucose, UA Negative      Ketones, UA Negative      Bilirubin (UA) Negative      Occult Blood UA Negative      Nitrite, UA Negative      Urobilinogen, UA Negative      Leukocytes, UA 1+ (*)     Narrative:     Specimen Source->Urine   URINALYSIS MICROSCOPIC - Abnormal    RBC, UA 1      WBC, UA  70 (*)     WBC Clumps, UA Few (*)     Bacteria Moderate (*)     Squam Epithel, UA 3      Hyaline Casts, UA 2 (*)     Microscopic Comment SEE COMMENT      Narrative:     Specimen Source->Urine   CULTURE, URINE          Imaging Results    None          Medications   cefTRIAXone injection 1 g (1 g Intravenous Given 12/28/24 0931)     Medical Decision Making  89-year-old female presents with dysuria with chronic UTI history noted  Differential includes urinary tract infection, cystitis, pyelonephritis, IC    Problems Addressed:  Acute cystitis without hematuria: complicated acute illness or injury  Herpes zoster with complication: complicated acute illness or injury    Amount and/or Complexity of Data Reviewed  Labs: ordered. Decision-making details documented in ED Course.    ED Management & Risks of Complication, Morbidity, & Mortality:  Lab work within normal limits, urinalysis indicates UTI  IM Rocephin given in the ER, urine sent for culture today  Prescription of Cipro based on previous urine culture HX  Patient also has a a physical exam consistent with shingles  Valtrex prescription, follow-up with PCP in the next 48 hours  Pt/Family counseled to return to the ER with any concerns on DC    Need for Hospitalization or Surgery with Social Determinants of Health:  This patient does not need to be hospitalized on ER evaluation today  The patient's diagnosis is not limited by social determinants of health  Does not require surgery or procedure (major or minor), no risk factors    Clinical Impression:  Final diagnoses:  [N30.00] Acute cystitis without hematuria (Primary)  [B02.8] Herpes zoster with complication          ED Disposition Condition    Discharge Stable          ED Prescriptions       Medication Sig Dispense Start Date End Date Auth. Provider    ciprofloxacin HCl (CIPRO) 500 MG tablet Take 1 tablet (500 mg total) by mouth 2 (two) times daily. for 7 days 14 tablet 12/28/2024 1/4/2025 David Guzmán MD     valACYclovir (VALTREX) 1000 MG tablet Take 1 tablet (1,000 mg total) by mouth 3 (three) times daily. for 7 days 21 tablet 12/28/2024 1/4/2025 David Guzmán MD          Follow-up Information       Follow up With Specialties Details Why Contact Info    Tasha Atkins MD Internal Medicine Schedule an appointment as soon as possible for a visit in 2 days  [address]  [facility] [address]  [phone]               David Guzmán MD  12/28/24 1057

## 2024-12-29 LAB — BACTERIA UR CULT: ABNORMAL

## 2024-12-30 ENCOUNTER — HOSPITAL ENCOUNTER (EMERGENCY)
Facility: HOSPITAL | Age: 89
Discharge: HOME | End: 2024-12-30
Payer: MEDICARE

## 2024-12-30 VITALS
DIASTOLIC BLOOD PRESSURE: 84 MMHG | BODY MASS INDEX: 37.89 KG/M2 | TEMPERATURE: 98 F | WEIGHT: 250 LBS | SYSTOLIC BLOOD PRESSURE: 146 MMHG | RESPIRATION RATE: 18 BRPM | HEIGHT: 68 IN | HEART RATE: 90 BPM | OXYGEN SATURATION: 98 %

## 2024-12-30 DIAGNOSIS — R53.1 GENERALIZED WEAKNESS: ICD-10-CM

## 2024-12-30 DIAGNOSIS — R05.9 COUGH, UNSPECIFIED TYPE: ICD-10-CM

## 2024-12-30 DIAGNOSIS — E86.0 DEHYDRATION: ICD-10-CM

## 2024-12-30 DIAGNOSIS — N39.0 URINARY TRACT INFECTION WITHOUT HEMATURIA, SITE UNSPECIFIED: Primary | ICD-10-CM

## 2024-12-30 LAB
ALBUMIN SERPL BCP-MCNC: 3.4 G/DL (ref 3.5–5.2)
ALP SERPL-CCNC: 110 U/L (ref 40–150)
ALT SERPL W/O P-5'-P-CCNC: 24 U/L (ref 10–44)
ANION GAP SERPL CALC-SCNC: 14 MMOL/L (ref 8–16)
AST SERPL-CCNC: 14 U/L (ref 10–40)
BACTERIA #/AREA URNS HPF: ABNORMAL /HPF
BASOPHILS # BLD AUTO: 0.04 K/UL (ref 0–0.2)
BASOPHILS NFR BLD: 0.4 % (ref 0–1.9)
BILIRUB SERPL-MCNC: 0.4 MG/DL (ref 0.1–1)
BILIRUB UR QL STRIP: NEGATIVE
BUN SERPL-MCNC: 35 MG/DL (ref 8–23)
CALCIUM SERPL-MCNC: 10.1 MG/DL (ref 8.7–10.5)
CHLORIDE SERPL-SCNC: 100 MMOL/L (ref 95–110)
CLARITY UR: CLEAR
CO2 SERPL-SCNC: 22 MMOL/L (ref 23–29)
COLOR UR: YELLOW
CREAT SERPL-MCNC: 1.5 MG/DL (ref 0.5–1.4)
DIFFERENTIAL METHOD BLD: ABNORMAL
EOSINOPHIL # BLD AUTO: 0.1 K/UL (ref 0–0.5)
EOSINOPHIL NFR BLD: 1.3 % (ref 0–8)
ERYTHROCYTE [DISTWIDTH] IN BLOOD BY AUTOMATED COUNT: 15.4 % (ref 11.5–14.5)
EST. GFR  (NO RACE VARIABLE): 33 ML/MIN/1.73 M^2
GLUCOSE SERPL-MCNC: 181 MG/DL (ref 70–110)
GLUCOSE UR QL STRIP: NEGATIVE
HCT VFR BLD AUTO: 39.8 % (ref 37–48.5)
HGB BLD-MCNC: 12.8 G/DL (ref 12–16)
HGB UR QL STRIP: NEGATIVE
HYALINE CASTS #/AREA URNS LPF: 0 /LPF
IMM GRANULOCYTES # BLD AUTO: 0.08 K/UL (ref 0–0.04)
IMM GRANULOCYTES NFR BLD AUTO: 0.9 % (ref 0–0.5)
INFLUENZA A, MOLECULAR: NEGATIVE
INFLUENZA B, MOLECULAR: NEGATIVE
KETONES UR QL STRIP: NEGATIVE
LACTATE SERPL-SCNC: 1.9 MMOL/L (ref 0.5–2.2)
LACTATE SERPL-SCNC: 5 MMOL/L (ref 0.5–2.2)
LEUKOCYTE ESTERASE UR QL STRIP: ABNORMAL
LYMPHOCYTES # BLD AUTO: 1.8 K/UL (ref 1–4.8)
LYMPHOCYTES NFR BLD: 19.3 % (ref 18–48)
MCH RBC QN AUTO: 29.1 PG (ref 27–31)
MCHC RBC AUTO-ENTMCNC: 32.2 G/DL (ref 32–36)
MCV RBC AUTO: 91 FL (ref 82–98)
MICROSCOPIC COMMENT: ABNORMAL
MONOCYTES # BLD AUTO: 0.6 K/UL (ref 0.3–1)
MONOCYTES NFR BLD: 6.7 % (ref 4–15)
NEUTROPHILS # BLD AUTO: 6.7 K/UL (ref 1.8–7.7)
NEUTROPHILS NFR BLD: 71.4 % (ref 38–73)
NITRITE UR QL STRIP: NEGATIVE
NRBC BLD-RTO: 0 /100 WBC
PH UR STRIP: 7 [PH] (ref 5–8)
PLATELET # BLD AUTO: 299 K/UL (ref 150–450)
PMV BLD AUTO: 10.6 FL (ref 9.2–12.9)
POTASSIUM SERPL-SCNC: 4.2 MMOL/L (ref 3.5–5.1)
PROT SERPL-MCNC: 8.2 G/DL (ref 6–8.4)
PROT UR QL STRIP: ABNORMAL
RBC # BLD AUTO: 4.4 M/UL (ref 4–5.4)
RBC #/AREA URNS HPF: 0 /HPF (ref 0–4)
RSV AG SPEC QL IA: NEGATIVE
SARS-COV-2 RDRP RESP QL NAA+PROBE: NEGATIVE
SODIUM SERPL-SCNC: 136 MMOL/L (ref 136–145)
SP GR UR STRIP: 1.01 (ref 1–1.03)
SPECIMEN SOURCE: NORMAL
SPECIMEN SOURCE: NORMAL
SQUAMOUS #/AREA URNS HPF: 4 /HPF
URN SPEC COLLECT METH UR: ABNORMAL
UROBILINOGEN UR STRIP-ACNC: NEGATIVE EU/DL
WBC # BLD AUTO: 9.41 K/UL (ref 3.9–12.7)
WBC #/AREA URNS HPF: 20 /HPF (ref 0–5)

## 2024-12-30 PROCEDURE — 99900035 HC TECH TIME PER 15 MIN (STAT): Mod: HCNC

## 2024-12-30 PROCEDURE — 25000003 PHARM REV CODE 250: Mod: HCNC

## 2024-12-30 PROCEDURE — 80053 COMPREHEN METABOLIC PANEL: CPT | Mod: HCNC

## 2024-12-30 PROCEDURE — 36415 COLL VENOUS BLD VENIPUNCTURE: CPT | Mod: HCNC

## 2024-12-30 PROCEDURE — 81000 URINALYSIS NONAUTO W/SCOPE: CPT | Mod: HCNC

## 2024-12-30 PROCEDURE — 96361 HYDRATE IV INFUSION ADD-ON: CPT | Mod: HCNC

## 2024-12-30 PROCEDURE — 87086 URINE CULTURE/COLONY COUNT: CPT | Mod: HCNC

## 2024-12-30 PROCEDURE — 83605 ASSAY OF LACTIC ACID: CPT | Mod: HCNC

## 2024-12-30 PROCEDURE — 85025 COMPLETE CBC W/AUTO DIFF WBC: CPT | Mod: HCNC

## 2024-12-30 PROCEDURE — 87040 BLOOD CULTURE FOR BACTERIA: CPT | Mod: HCNC

## 2024-12-30 PROCEDURE — 93005 ELECTROCARDIOGRAM TRACING: CPT | Mod: HCNC

## 2024-12-30 PROCEDURE — 87634 RSV DNA/RNA AMP PROBE: CPT | Mod: HCNC

## 2024-12-30 PROCEDURE — 99285 EMERGENCY DEPT VISIT HI MDM: CPT | Mod: 25,HCNC

## 2024-12-30 PROCEDURE — 87635 SARS-COV-2 COVID-19 AMP PRB: CPT | Mod: HCNC

## 2024-12-30 PROCEDURE — 63600175 PHARM REV CODE 636 W HCPCS: Mod: HCNC

## 2024-12-30 PROCEDURE — 87502 INFLUENZA DNA AMP PROBE: CPT | Mod: HCNC

## 2024-12-30 PROCEDURE — 96374 THER/PROPH/DIAG INJ IV PUSH: CPT | Mod: HCNC

## 2024-12-30 RX ORDER — CEFUROXIME AXETIL 250 MG/1
500 TABLET ORAL 2 TIMES DAILY
Qty: 40 TABLET | Refills: 0 | Status: SHIPPED | OUTPATIENT
Start: 2024-12-30 | End: 2025-01-09

## 2024-12-30 RX ORDER — BENZONATATE 100 MG/1
200 CAPSULE ORAL
Status: COMPLETED | OUTPATIENT
Start: 2024-12-30 | End: 2024-12-30

## 2024-12-30 RX ORDER — CEFTRIAXONE 1 G/1
1 INJECTION, POWDER, FOR SOLUTION INTRAMUSCULAR; INTRAVENOUS
Status: COMPLETED | OUTPATIENT
Start: 2024-12-30 | End: 2024-12-30

## 2024-12-30 RX ORDER — CEPHALEXIN 500 MG/1
500 CAPSULE ORAL
Status: COMPLETED | OUTPATIENT
Start: 2024-12-30 | End: 2024-12-30

## 2024-12-30 RX ORDER — ACETAMINOPHEN 500 MG
1000 TABLET ORAL
Status: DISCONTINUED | OUTPATIENT
Start: 2024-12-30 | End: 2024-12-30 | Stop reason: HOSPADM

## 2024-12-30 RX ADMIN — SODIUM CHLORIDE 500 ML: 9 INJECTION, SOLUTION INTRAVENOUS at 09:12

## 2024-12-30 RX ADMIN — BENZONATATE 200 MG: 100 CAPSULE ORAL at 08:12

## 2024-12-30 RX ADMIN — CEPHALEXIN 500 MG: 500 CAPSULE ORAL at 09:12

## 2024-12-30 RX ADMIN — SODIUM CHLORIDE 1000 ML: 0.9 SOLUTION INTRAVENOUS at 10:12

## 2024-12-30 RX ADMIN — CEFTRIAXONE SODIUM 1 G: 1 INJECTION, POWDER, FOR SOLUTION INTRAMUSCULAR; INTRAVENOUS at 12:12

## 2024-12-30 NOTE — ED TRIAGE NOTES
Patient to ED per [facility] with c/o weakness since last night. Patient seen in ED this week for a UTI and Shingles. Patient just started her medications yesterday. Patient with loose sounding cough.

## 2024-12-30 NOTE — ED PROVIDER NOTES
Encounter Date: 12/30/2024    Source of History:   Patient and medical record, without language barrier or      Chief complaint:  Weakness and Cough    HPI:  Tonya Bucio is a 89 y.o. female with COPD HTN, CKD presenting with chief complaint of weakness and cough.  Patient was seen in the emergency department 2 days ago and was diagnosed with a UTI and shingles.  She was discharged on a course of ciprofloxacin and acyclovir.  Today patient reports generalized weakness worsening her lower extremities and new cough.  No fevers identified.  Patient does arrive mildly tachycardic in AFib    This is the extent to the patients complaints today here in the emergency department.    ROS: A review of systems was conducted with pertinent positive and negative findings documented in HPI with all other systems reviewed and negative.  ROS    Review of patient's allergies indicates:  No Known Allergies    PMH:  As per HPI and below:  Past Medical History:   Diagnosis Date    Acute bronchitis with asthma     Anemia due to stage 3 chronic kidney disease     Anticoagulant long-term use     Asthma     Back pain     Cancer     Chest pain, musculoskeletal 7/16/2013    Chronic bronchitis     Colon polyps     COPD exacerbation 3/13/2020    Gout, unspecified     History of cervical cancer     Hypothyroidism     Obesity     Osteoarthritis     Paroxysmal atrial fibrillation 11/19/2024    Renal manifestation of secondary diabetes mellitus     Thyroid disease     Trouble in sleeping     Type 2 diabetes mellitus with ophthalmic manifestations     Type 2 diabetes with peripheral circulatory disorder, controlled     Urinary incontinence     Uterine cancer     Uterine cancer      Past Surgical History:   Procedure Laterality Date    ADENOIDECTOMY      EYE SURGERY Right     right eye cataract    HYSTERECTOMY  2008    LIZ-BSO    tonsilectomy      TONSILLECTOMY  1945    TOTAL ABDOMINAL HYSTERECTOMY  2008    TOTAL ABDOMINAL  "HYSTERECTOMY W/ BILATERAL SALPINGOOPHORECTOMY  2008     Social History     Socioeconomic History    Marital status:    Tobacco Use    Smoking status: Former     Current packs/day: 0.00     Average packs/day: 0.3 packs/day for 13.0 years (4.3 ttl pk-yrs)     Types: Cigarettes     Start date: 1951     Quit date: 1964     Years since quittin.4     Passive exposure: Past    Smokeless tobacco: Never   Substance and Sexual Activity    Alcohol use: No    Drug use: No    Sexual activity: Never     Birth control/protection: Surgical     Comment:      Social Drivers of Health     Financial Resource Strain: Low Risk  (2024)    Overall Financial Resource Strain (CARDIA)     Difficulty of Paying Living Expenses: Not hard at all   Food Insecurity: No Food Insecurity (2024)    Hunger Vital Sign     Worried About Running Out of Food in the Last Year: Never true     Ran Out of Food in the Last Year: Never true   Transportation Needs: No Transportation Needs (2024)    PRAPARE - Transportation     Lack of Transportation (Medical): No     Lack of Transportation (Non-Medical): No   Physical Activity: Insufficiently Active (2024)    Exercise Vital Sign     Days of Exercise per Week: 7 days     Minutes of Exercise per Session: 20 min   Stress: No Stress Concern Present (2024)    [employer] - Occupational Stress Questionnaire     Feeling of Stress : Not at all   Housing Stability: Low Risk  (2024)    Housing Stability Vital Sign     Unable to Pay for Housing in the Last Year: No     Homeless in the Last Year: No     Vitals:    BP (!) 146/84   Pulse 90   Temp 98.1 °F (36.7 °C) (Oral)   Resp 18   Ht 5' 8" (1.727 m)   Wt 113.4 kg (250 lb)   SpO2 98%   Breastfeeding No   BMI 38.01 kg/m²     Physical Exam  Vitals and nursing note reviewed.   Constitutional:       General: She is not in acute distress.     Appearance: She is not toxic-appearing.   HENT:    "   Head: Normocephalic and atraumatic.      Mouth/Throat:      Mouth: Mucous membranes are moist.   Eyes:      General: No scleral icterus.  Cardiovascular:      Rate and Rhythm: Tachycardia present. Rhythm irregular.      Pulses: Normal pulses.      Heart sounds: Normal heart sounds. No murmur heard.     No friction rub. No gallop.   Pulmonary:      Effort: Pulmonary effort is normal. No respiratory distress.      Breath sounds: No stridor. Rhonchi present. No wheezing or rales.   Abdominal:      General: Abdomen is flat. There is no distension.      Palpations: Abdomen is soft.      Tenderness: There is no abdominal tenderness. There is no guarding.   Musculoskeletal:         General: No swelling or deformity.      Cervical back: Normal range of motion and neck supple.   Skin:     General: Skin is warm and dry.      Capillary Refill: Capillary refill takes less than 2 seconds.      Coloration: Skin is not jaundiced.      Findings: No rash.   Neurological:      Mental Status: Mental status is at baseline.       Procedures    Laboratory Studies:  Labs that have been ordered have been independently reviewed and interpreted by myself.  Labs Reviewed   CBC W/ AUTO DIFFERENTIAL - Abnormal       Result Value    WBC 9.41      RBC 4.40      Hemoglobin 12.8      Hematocrit 39.8      MCV 91      MCH 29.1      MCHC 32.2      RDW 15.4 (*)     Platelets 299      MPV 10.6      Immature Granulocytes 0.9 (*)     Gran # (ANC) 6.7      Immature Grans (Abs) 0.08 (*)     Lymph # 1.8      Mono # 0.6      Eos # 0.1      Baso # 0.04      nRBC 0      Gran % 71.4      Lymph % 19.3      Mono % 6.7      Eosinophil % 1.3      Basophil % 0.4      Differential Method Automated     COMPREHENSIVE METABOLIC PANEL - Abnormal    Sodium 136      Potassium 4.2      Chloride 100      CO2 22 (*)     Glucose 181 (*)     BUN 35 (*)     Creatinine 1.5 (*)     Calcium 10.1      Total Protein 8.2      Albumin 3.4 (*)     Total Bilirubin 0.4      Alkaline  Phosphatase 110      AST 14      ALT 24      eGFR 33 (*)     Anion Gap 14     LACTIC ACID, PLASMA - Abnormal    Lactate (Lactic Acid) 5.0 (*)     Narrative:      Lactic Acid  critical result(s) called and verbal readback obtained   from Moy Armas RN by [facility] 12/30/2024 09:48   URINALYSIS, REFLEX TO URINE CULTURE - Abnormal    Specimen UA Urine, Catheterized      Color, UA Yellow      Appearance, UA Clear      pH, UA 7.0      Specific Gravity, UA 1.015      Protein, UA 2+ (*)     Glucose, UA Negative      Ketones, UA Negative      Bilirubin (UA) Negative      Occult Blood UA Negative      Nitrite, UA Negative      Urobilinogen, UA Negative      Leukocytes, UA 1+ (*)     Narrative:     Specimen Source->Urine   URINALYSIS MICROSCOPIC - Abnormal    RBC, UA 0      WBC, UA 20 (*)     Bacteria Rare      Squam Epithel, UA 4      Hyaline Casts, UA 0      Microscopic Comment SEE COMMENT      Narrative:     Specimen Source->Urine   INFLUENZA A & B BY MOLECULAR    Influenza A, Molecular Negative      Influenza B, Molecular Negative      Flu A & B Source Nasal swab     CULTURE, BLOOD   CULTURE, BLOOD   CULTURE, URINE   SARS-COV-2 RNA AMPLIFICATION, QUAL    SARS-CoV-2 RNA, Amplification, Qual Negative     RSV ANTIGEN DETECTION    RSV Source Nasopharyngeal Swab      RSV Ag by Molecular Method Negative     LACTIC ACID, PLASMA    Lactate (Lactic Acid) 1.9       Imaging Results              X-Ray Chest AP Portable (Final result)  Result time 12/30/24 09:31:11      Final result by Bert Aviles Jr., MD (12/30/24 09:31:11)                   Impression:      No acute cardiopulmonary disease      Electronically signed by: Bert Calderon Jr  Date:    12/30/2024  Time:    09:31               Narrative:    EXAMINATION:  XR CHEST AP PORTABLE    CLINICAL HISTORY:  Sepsis;    TECHNIQUE:  Single frontal view of the chest was performed.    COMPARISON:  09/21/2024    FINDINGS:  Cardiomediastinal silhouetteis borderline in size with  ectasia and atherosclerosis of the thoracic aorta.    Lungs grossly clear within limitations of portable technique    Pleura, diaphragm, and thoracic cage grossly unremarkable.                                      EKG (independently interpreted by me): Rhythm:  AFib, rate of  90 BPM, no ST elevations or depressions, QTc 430    Imaging (independently interpreted by me):  CXR: No cardiomegaly, pulmonary edema, or pneumothorax    I decided to obtain the patient's medical records.  Summary of Medical Records:    Medications   acetaminophen tablet 1,000 mg (1,000 mg Oral Not Given 12/30/24 0845)   benzonatate capsule 200 mg (200 mg Oral Given 12/30/24 0843)   sodium chloride 0.9% bolus 500 mL 500 mL (0 mLs Intravenous Stopped 12/30/24 1014)   cephALEXin capsule 500 mg (500 mg Oral Given 12/30/24 0904)   sodium chloride 0.9% bolus 1,000 mL 1,000 mL (0 mLs Intravenous Stopped 12/30/24 1104)   cefTRIAXone injection 1 g (1 g Intravenous Given 12/30/24 1245)     MDM:    89 y.o. female with cough and generalized weakness in the setting of known UTI    Differential Dx:  Differential includes but is not limited to UTI, pneumonia, sepsis    ED Management:  Infectious workup started for acute presentation of an emergent condition.  Fluids, Tylenol, Tessalon Perles for symptomatic treatment.  Antibiotics ordered for source control.  Patient's urine culture from 2 days ago growing strep viridans.  While this may be a contaminant it may also be causing her UTI.  Plan to switch the patient's antibiotic to a cephalosporin which will cover strep viridans more appropriately.  Initial lactic acid elevated, fluids administered and repeat lactic acid normalized to 1.9.  No pneumonia identified on chest x-ray.  Urinalysis with what appears to be partially treated UTI.  Mild elevation in creatinine on CMP otherwise benign.  Viral swabs negative.  Plan to switch patient's ciprofloxacin to 3rd generation cephalosporin.  Prescription for  cefuroxime sent to patient's pharmacy as her insurance appears to cover this agent.  Family is agreeable with discharge and return to the emergency department for any changes or worsening in condition.  Discussed results, diagnosis, and treatment plan with patient; advised close follow-up with PCP. Reviewed strict return precautions. Patient confirms understanding and ability to comply. Patient was given the opportunity to ask questions prior to discharge and all questions answered.         Medical Decision Making  Amount and/or Complexity of Data Reviewed  Labs: ordered. Decision-making details documented in ED Course.  Radiology: ordered and independent interpretation performed.  ECG/medicine tests: ordered and independent interpretation performed.    Risk  OTC drugs.  Prescription drug management.         ED Course as of 12/30/24 1354   Mon Dec 30, 2024   1023 Creatinine(!): 1.5  BAYRON [AW]      ED Course User Index  [AW] Ifeanyi Gonzalez MD     Diagnostic Impression:    Final diagnoses:  [N39.0] Urinary tract infection without hematuria, site unspecified (Primary)  [E86.0] Dehydration  [R53.1] Generalized weakness  [R05.9] Cough, unspecified type               Ifeanyi Gonzalez MD  12/30/24 [id number]

## 2024-12-30 NOTE — DISCHARGE INSTRUCTIONS
Thank you for coming to our Emergency Department today!     -stop taking ciprofloxacin and start taking cefuroxime  -Follow-up with primary care doctor within 3-7 days to discuss your recent ER visit and any additional concerns that you may have.    Return to the Emergency Department for symptoms including but not limited to: persistence or worsening of symptoms, shortness of breath or chest pain, inability to drink without vomiting, passing out/fainting/ loss of consciousness, or if you have other concerns.

## 2024-12-31 LAB
BACTERIA UR CULT: NO GROWTH
OHS QRS DURATION: 86 MS
OHS QTC CALCULATION: 430 MS

## 2025-01-03 DIAGNOSIS — D64.9 ANEMIA, UNSPECIFIED TYPE: ICD-10-CM

## 2025-01-04 LAB
BACTERIA BLD CULT: NORMAL
BACTERIA BLD CULT: NORMAL

## 2025-01-06 DIAGNOSIS — M17.0 PRIMARY OSTEOARTHRITIS OF BOTH KNEES: ICD-10-CM

## 2025-01-06 RX ORDER — FERROUS SULFATE 325(65) MG
TABLET, DELAYED RELEASE (ENTERIC COATED) ORAL
Qty: 90 TABLET | Refills: 0 | Status: SHIPPED | OUTPATIENT
Start: 2025-01-06

## 2025-01-06 RX ORDER — HYDROCODONE BITARTRATE AND ACETAMINOPHEN 7.5; 325 MG/1; MG/1
1 TABLET ORAL
Qty: 30 TABLET | Refills: 0 | Status: SHIPPED | OUTPATIENT
Start: 2025-01-06

## 2025-01-06 NOTE — TELEPHONE ENCOUNTER
Care Due:                  Date            Visit Type   Department     Provider  --------------------------------------------------------------------------------                                EP -                              PRIMARY      [facility]  Last Visit: 11-      CARE ([facility])   MEDICINE II    Tasha Atkins                              EP -                              PRIMARY      [facility] INTER[facility]  Next Visit: 02-      CARE ([facility])   MEDICINE II    Tasha Atkins                                                            Last  Test          Frequency    Reason                     Performed    Due Date  --------------------------------------------------------------------------------    Uric Acid...  12 months..  allopurinoL..............  06- 06-    [facility] Embedded Care Due Messages. Reference number: [id number].   1/06/2025 10:27:53 AM CST

## 2025-01-06 NOTE — TELEPHONE ENCOUNTER
----- Message from Roxanna sent at 2025  9:52 AM CST -----  Contact: pt  Tonya Bucio  MRN: [id number]  : 1935  PCP: Tasha Atkins  Home Phone      [phone]  Work Phone      Not on file.  [employer]          [phone]      MESSAGE:     Pt needs a refill of HYDROcodone-acetaminophen (NORCO) 7.5-325 mg per tablet sent to [facility] in [place].       Tonya   [phone]

## 2025-01-13 DIAGNOSIS — Z00.00 ENCOUNTER FOR MEDICARE ANNUAL WELLNESS EXAM: ICD-10-CM

## 2025-02-06 DIAGNOSIS — E03.4 HYPOTHYROIDISM DUE TO ACQUIRED ATROPHY OF THYROID: ICD-10-CM

## 2025-02-06 DIAGNOSIS — M17.0 PRIMARY OSTEOARTHRITIS OF BOTH KNEES: ICD-10-CM

## 2025-02-06 NOTE — TELEPHONE ENCOUNTER
----- Message from Roxanna sent at 2025  1:59 PM CST -----  Contact: Pt  Tonya Bucio  MRN: [id number]  : 1935  PCP: Tasha Atkins  Home Phone      [phone]  Work Phone      Not on file.  Mobile          [phone]      MESSAGE:     Pt needs a refill of HYDROcodone-acetaminophen (NORCO) 7.5-325 mg per tablet and levothyroxine (SYNTHROID) 75 MCG tablet sent to [facility] in [place].         Tonya   [phone]

## 2025-02-06 NOTE — TELEPHONE ENCOUNTER
No care due was identified.  [facility] Embedded Care Due Messages. Reference number: [id number].   2/06/2025 2:06:31 PM CST

## 2025-02-07 RX ORDER — HYDROCODONE BITARTRATE AND ACETAMINOPHEN 7.5; 325 MG/1; MG/1
1 TABLET ORAL
Qty: 30 TABLET | Refills: 0 | Status: ON HOLD | OUTPATIENT
Start: 2025-02-07 | End: 2025-03-03

## 2025-02-07 RX ORDER — LEVOTHYROXINE SODIUM 75 UG/1
75 TABLET ORAL DAILY
Qty: 90 TABLET | Refills: 0 | Status: SHIPPED | OUTPATIENT
Start: 2025-02-07

## 2025-02-18 ENCOUNTER — OFFICE VISIT (OUTPATIENT)
Dept: INTERNAL MEDICINE | Facility: CLINIC | Age: 89
End: 2025-02-18
Payer: MEDICARE

## 2025-02-18 VITALS
HEART RATE: 84 BPM | BODY MASS INDEX: 37.13 KG/M2 | HEIGHT: 68 IN | WEIGHT: 245 LBS | RESPIRATION RATE: 17 BRPM | DIASTOLIC BLOOD PRESSURE: 70 MMHG | OXYGEN SATURATION: 97 % | SYSTOLIC BLOOD PRESSURE: 134 MMHG

## 2025-02-18 DIAGNOSIS — B02.8 HERPES ZOSTER WITH COMPLICATION: ICD-10-CM

## 2025-02-18 DIAGNOSIS — M17.0 PRIMARY OSTEOARTHRITIS OF BOTH KNEES: Primary | ICD-10-CM

## 2025-02-18 NOTE — PROGRESS NOTES
Subjective:   History of Present Illness    CHIEF COMPLAINT:  Tonya presents today for shingles and medication complications since New Year    SHINGLES AND PAIN MANAGEMENT:  She has a shingles rash in the left lower lumbar region with 2-3 small spots remaining, treated with calamine lotion. She continues to experience intermittent pain. She takes Norco 7.5mg every 24 hours and supplements with Tylenol for pain management.    MEDICATION ADVERSE REACTION:  She experienced severe adverse reactions to Valtrex 1000mg, which was prescribed for shingles without consideration of her kidney disease. Symptoms included hallucinations, visual disturbances, loss of muscle control in arms and legs, and difficulty with speech manifesting as mumbled speech. She discontinued the medication after three days due to these side effects.       Review of Systems   Constitutional:  Positive for fatigue. Negative for chills and fever.   HENT:  Negative for congestion, hearing loss, sinus pressure and sore throat.    Eyes:  Negative for photophobia.   Respiratory:  Negative for cough, choking, chest tightness, shortness of breath and wheezing.    Cardiovascular:  Negative for chest pain and palpitations.   Gastrointestinal:  Negative for blood in stool, nausea and vomiting.   Endocrine: Negative for polydipsia and polyphagia.   Genitourinary:  Negative for dysuria and hematuria.   Musculoskeletal:  Positive for arthralgias, back pain and gait problem. Negative for myalgias and neck pain.        Still needing narcotics ; no confusion or hang over .     Skin:  Positive for rash. Negative for pallor.   Neurological:  Negative for dizziness, weakness and numbness.   Hematological:  Does not bruise/bleed easily.   Psychiatric/Behavioral:  Negative for confusion and suicidal ideas. The patient is not nervous/anxious.       Objective:   Physical Exam  Vitals and nursing note reviewed.   Constitutional:       Appearance: She is well-developed. She  is obese.   HENT:      Head: Normocephalic and atraumatic.      Right Ear: External ear normal.      Left Ear: External ear normal.   Eyes:      Conjunctiva/sclera: Conjunctivae normal.      Pupils: Pupils are equal, round, and reactive to light.   Neck:      Thyroid: No thyromegaly.      Vascular: No JVD.      Trachea: No tracheal deviation.   Cardiovascular:      Rate and Rhythm: Normal rate and regular rhythm.      Heart sounds: Normal heart sounds.   Pulmonary:      Effort: Pulmonary effort is normal. No respiratory distress.      Breath sounds: Normal breath sounds. No wheezing or rales.   Chest:      Chest wall: No tenderness.   Abdominal:      General: Bowel sounds are normal. There is no distension.      Palpations: Abdomen is soft. There is no mass.      Tenderness: There is no abdominal tenderness. There is no guarding or rebound.   Musculoskeletal:         General: Normal range of motion.      Cervical back: Normal range of motion and neck supple.      Comments: Comes in wheel chair   Lymphadenopathy:      Cervical: No cervical adenopathy.   Skin:     General: Skin is warm and dry.             Comments: Scabbed rash of shingles   Neurological:      Mental Status: She is alert and oriented to person, place, and time.      Cranial Nerves: No cranial nerve deficit.      Motor: No abnormal muscle tone.      Coordination: Coordination normal.      Deep Tendon Reflexes: Reflexes are normal and symmetric. Reflexes normal.      Comments: CN: Optic discs are flat with normal vasculature, PERRL, Extraoccular movements and visual fields are full. Normal facial sensation and strength, Hearing symmetric, Tongue and Palate are midline and strong. Shoulder Shrug symmetric and strong.        Assessment/Plan:     1. Primary osteoarthritis of both knees        2. Herpes zoster with complication           Assessment & Plan    IMPRESSION:  - Assessed patient's recent shingles episode and associated medication reaction  -  Evaluated current pain management regimen, ensuring appropriate dosing considering patient's kidney disease  - Examined healing shingles rash, noting it is in the resolution phase  - Determined that current pain management with Norco is appropriate and not contributing to adverse effects    STAGE 3B CHRONIC KIDNEY DISEASE:  - Ordered labs and scheduled follow-up visit in 3 months for results review and meal evaluation.  - Acknowledged the patient's kidney disease and its impact on medication tolerance.  - Speculated that previous medication doses were not adjusted for kidney function and advised caution with medication due to altered metabolism in kidney disease.    SHINGLES:  - Educated the patient about shingles, including its typical 3-4 week healing period and the possibility of post-herpetic neuralgia (persistent pain after rash healing).  - Observed a large healing rash in the left lower lumbar region.  - Explained that the current healing lesions are non-contagious but may leave scars.  - Instructed the patient to apply calamine lotion to remaining shingles spots as needed for symptom relief.  - Advised that the rash will heal on its own.    PAIN MANAGEMENT:  - Prescribed and refilled Norco 7.5 mg, 1 tablet every 24 hours for long-term pain management.  - Provided a 3-month supply.  - Advised the patient to take the prescribed pain medicine and avoid other medications.    ADVERSE MEDICATION REACTION:  - Noted the patient's report of an adverse reaction to the antiviral medication given for shingles.  - Confirmed that the patient stopped taking the antiviral medication due to these adverse effects.          This note was generated with the assistance of ambient listening technology. Verbal consent was obtained by the patient and accompanying visitor(s) for the recording of patient appointment to facilitate this note. I attest to having reviewed and edited the generated note for accuracy, though some syntax or  spelling errors may persist. Please contact the author of this note for any clarification.

## 2025-02-25 ENCOUNTER — HOSPITAL ENCOUNTER (INPATIENT)
Facility: HOSPITAL | Age: 89
LOS: 4 days | Discharge: NURSING, REHAB OR LONG-TERM CARE | DRG: 690 | End: 2025-03-03
Admitting: FAMILY MEDICINE
Payer: MEDICARE

## 2025-02-25 DIAGNOSIS — R53.1 WEAKNESS: ICD-10-CM

## 2025-02-25 DIAGNOSIS — M79.606 LEG PAIN: ICD-10-CM

## 2025-02-25 DIAGNOSIS — R78.81 BACTEREMIA: ICD-10-CM

## 2025-02-25 DIAGNOSIS — E03.9 ACQUIRED HYPOTHYROIDISM: ICD-10-CM

## 2025-02-25 DIAGNOSIS — M17.0 PRIMARY OSTEOARTHRITIS OF BOTH KNEES: ICD-10-CM

## 2025-02-25 DIAGNOSIS — E11.69 HYPERLIPIDEMIA ASSOCIATED WITH TYPE 2 DIABETES MELLITUS: ICD-10-CM

## 2025-02-25 DIAGNOSIS — E78.5 HYPERLIPIDEMIA ASSOCIATED WITH TYPE 2 DIABETES MELLITUS: ICD-10-CM

## 2025-02-25 DIAGNOSIS — I48.3 TYPICAL ATRIAL FLUTTER: ICD-10-CM

## 2025-02-25 DIAGNOSIS — I48.91 ATRIAL FIBRILLATION AND FLUTTER: ICD-10-CM

## 2025-02-25 DIAGNOSIS — N39.0 UTI (URINARY TRACT INFECTION): ICD-10-CM

## 2025-02-25 DIAGNOSIS — I50.32 CHRONIC DIASTOLIC HEART FAILURE: ICD-10-CM

## 2025-02-25 DIAGNOSIS — R79.89 ELEVATED TROPONIN: ICD-10-CM

## 2025-02-25 DIAGNOSIS — I48.92 ATRIAL FIBRILLATION AND FLUTTER: ICD-10-CM

## 2025-02-25 DIAGNOSIS — N39.0 URINARY TRACT INFECTION WITHOUT HEMATURIA, SITE UNSPECIFIED: Primary | ICD-10-CM

## 2025-02-25 LAB
ALBUMIN SERPL BCP-MCNC: 3.1 G/DL (ref 3.5–5.2)
ALP SERPL-CCNC: 92 U/L (ref 40–150)
ALT SERPL W/O P-5'-P-CCNC: 24 U/L (ref 10–44)
ANION GAP SERPL CALC-SCNC: 9 MMOL/L (ref 8–16)
AST SERPL-CCNC: 18 U/L (ref 10–40)
BACTERIA #/AREA URNS HPF: ABNORMAL /HPF
BASOPHILS # BLD AUTO: 0.04 K/UL (ref 0–0.2)
BASOPHILS NFR BLD: 0.3 % (ref 0–1.9)
BILIRUB SERPL-MCNC: 0.6 MG/DL (ref 0.1–1)
BILIRUB UR QL STRIP: NEGATIVE
BNP SERPL-MCNC: 192 PG/ML (ref 0–99)
BUN SERPL-MCNC: 25 MG/DL (ref 8–23)
CALCIUM SERPL-MCNC: 9.3 MG/DL (ref 8.7–10.5)
CHLORIDE SERPL-SCNC: 104 MMOL/L (ref 95–110)
CK SERPL-CCNC: 26 U/L (ref 20–180)
CLARITY UR: ABNORMAL
CO2 SERPL-SCNC: 24 MMOL/L (ref 23–29)
COLOR UR: YELLOW
CREAT SERPL-MCNC: 1.6 MG/DL (ref 0.5–1.4)
DIFFERENTIAL METHOD BLD: ABNORMAL
EOSINOPHIL # BLD AUTO: 0 K/UL (ref 0–0.5)
EOSINOPHIL NFR BLD: 0 % (ref 0–8)
ERYTHROCYTE [DISTWIDTH] IN BLOOD BY AUTOMATED COUNT: 15.3 % (ref 11.5–14.5)
EST. GFR  (NO RACE VARIABLE): 31 ML/MIN/1.73 M^2
GLUCOSE SERPL-MCNC: 107 MG/DL (ref 70–110)
GLUCOSE UR QL STRIP: NEGATIVE
HCT VFR BLD AUTO: 34.3 % (ref 37–48.5)
HGB BLD-MCNC: 11 G/DL (ref 12–16)
HGB UR QL STRIP: ABNORMAL
HYALINE CASTS #/AREA URNS LPF: 2 /LPF
IMM GRANULOCYTES # BLD AUTO: 0.11 K/UL (ref 0–0.04)
IMM GRANULOCYTES NFR BLD AUTO: 0.8 % (ref 0–0.5)
INFLUENZA A, MOLECULAR: NEGATIVE
INFLUENZA B, MOLECULAR: NEGATIVE
KETONES UR QL STRIP: NEGATIVE
LACTATE SERPL-SCNC: 1.1 MMOL/L (ref 0.5–2.2)
LEUKOCYTE ESTERASE UR QL STRIP: ABNORMAL
LYMPHOCYTES # BLD AUTO: 1 K/UL (ref 1–4.8)
LYMPHOCYTES NFR BLD: 7.7 % (ref 18–48)
MAGNESIUM SERPL-MCNC: 1.9 MG/DL (ref 1.6–2.6)
MCH RBC QN AUTO: 29.3 PG (ref 27–31)
MCHC RBC AUTO-ENTMCNC: 32.1 G/DL (ref 32–36)
MCV RBC AUTO: 91 FL (ref 82–98)
MICROSCOPIC COMMENT: ABNORMAL
MONOCYTES # BLD AUTO: 0.6 K/UL (ref 0.3–1)
MONOCYTES NFR BLD: 4.8 % (ref 4–15)
NEUTROPHILS # BLD AUTO: 11.6 K/UL (ref 1.8–7.7)
NEUTROPHILS NFR BLD: 86.4 % (ref 38–73)
NITRITE UR QL STRIP: NEGATIVE
NRBC BLD-RTO: 0 /100 WBC
PH UR STRIP: 7 [PH] (ref 5–8)
PLATELET # BLD AUTO: 249 K/UL (ref 150–450)
PMV BLD AUTO: 10.1 FL (ref 9.2–12.9)
POTASSIUM SERPL-SCNC: 4.5 MMOL/L (ref 3.5–5.1)
PROT SERPL-MCNC: 7.4 G/DL (ref 6–8.4)
PROT UR QL STRIP: ABNORMAL
RBC # BLD AUTO: 3.76 M/UL (ref 4–5.4)
RBC #/AREA URNS HPF: 5 /HPF (ref 0–4)
SODIUM SERPL-SCNC: 137 MMOL/L (ref 136–145)
SP GR UR STRIP: 1.01 (ref 1–1.03)
SPECIMEN SOURCE: NORMAL
SQUAMOUS #/AREA URNS HPF: 2 /HPF
TROPONIN I SERPL DL<=0.01 NG/ML-MCNC: 0.11 NG/ML (ref 0–0.03)
TROPONIN I SERPL DL<=0.01 NG/ML-MCNC: 0.12 NG/ML (ref 0–0.03)
TROPONIN I SERPL DL<=0.01 NG/ML-MCNC: 0.13 NG/ML (ref 0–0.03)
TSH SERPL DL<=0.005 MIU/L-ACNC: 1.08 UIU/ML (ref 0.4–4)
URN SPEC COLLECT METH UR: ABNORMAL
UROBILINOGEN UR STRIP-ACNC: NEGATIVE EU/DL
WBC # BLD AUTO: 13.46 K/UL (ref 3.9–12.7)
WBC #/AREA URNS HPF: >100 /HPF (ref 0–5)
WBC CLUMPS URNS QL MICRO: ABNORMAL

## 2025-02-25 PROCEDURE — 87088 URINE BACTERIA CULTURE: CPT | Mod: HCNC

## 2025-02-25 PROCEDURE — 87150 DNA/RNA AMPLIFIED PROBE: CPT | Mod: HCNC

## 2025-02-25 PROCEDURE — 87502 INFLUENZA DNA AMP PROBE: CPT | Mod: HCNC

## 2025-02-25 PROCEDURE — 83605 ASSAY OF LACTIC ACID: CPT | Mod: HCNC

## 2025-02-25 PROCEDURE — 99285 EMERGENCY DEPT VISIT HI MDM: CPT | Mod: 25,HCNC

## 2025-02-25 PROCEDURE — 63600175 PHARM REV CODE 636 W HCPCS: Mod: HCNC

## 2025-02-25 PROCEDURE — G0378 HOSPITAL OBSERVATION PER HR: HCPCS | Mod: HCNC

## 2025-02-25 PROCEDURE — 25000003 PHARM REV CODE 250: Mod: HCNC

## 2025-02-25 PROCEDURE — 84443 ASSAY THYROID STIM HORMONE: CPT | Mod: HCNC

## 2025-02-25 PROCEDURE — 36415 COLL VENOUS BLD VENIPUNCTURE: CPT | Mod: HCNC

## 2025-02-25 PROCEDURE — 85025 COMPLETE CBC W/AUTO DIFF WBC: CPT | Mod: HCNC

## 2025-02-25 PROCEDURE — 87086 URINE CULTURE/COLONY COUNT: CPT | Mod: HCNC

## 2025-02-25 PROCEDURE — 84484 ASSAY OF TROPONIN QUANT: CPT | Mod: 91,HCNC

## 2025-02-25 PROCEDURE — 87040 BLOOD CULTURE FOR BACTERIA: CPT | Mod: HCNC

## 2025-02-25 PROCEDURE — 93010 ELECTROCARDIOGRAM REPORT: CPT | Mod: HCNC,,, | Performed by: INTERNAL MEDICINE

## 2025-02-25 PROCEDURE — 82550 ASSAY OF CK (CPK): CPT | Mod: HCNC

## 2025-02-25 PROCEDURE — 81000 URINALYSIS NONAUTO W/SCOPE: CPT | Mod: HCNC

## 2025-02-25 PROCEDURE — 96374 THER/PROPH/DIAG INJ IV PUSH: CPT | Mod: HCNC

## 2025-02-25 PROCEDURE — 80053 COMPREHEN METABOLIC PANEL: CPT | Mod: HCNC

## 2025-02-25 PROCEDURE — 87186 SC STD MICRODIL/AGAR DIL: CPT | Mod: 59,HCNC

## 2025-02-25 PROCEDURE — 87077 CULTURE AEROBIC IDENTIFY: CPT | Mod: 59,HCNC

## 2025-02-25 PROCEDURE — 93005 ELECTROCARDIOGRAM TRACING: CPT | Mod: HCNC

## 2025-02-25 PROCEDURE — 83880 ASSAY OF NATRIURETIC PEPTIDE: CPT | Mod: HCNC

## 2025-02-25 PROCEDURE — 83735 ASSAY OF MAGNESIUM: CPT | Mod: HCNC

## 2025-02-25 RX ORDER — TALC
6 POWDER (GRAM) TOPICAL NIGHTLY PRN
Status: DISCONTINUED | OUTPATIENT
Start: 2025-02-25 | End: 2025-03-03 | Stop reason: HOSPADM

## 2025-02-25 RX ORDER — LOSARTAN POTASSIUM 50 MG/1
50 TABLET ORAL 2 TIMES DAILY
Status: DISCONTINUED | OUTPATIENT
Start: 2025-02-25 | End: 2025-03-03 | Stop reason: HOSPADM

## 2025-02-25 RX ORDER — MULTIVITAMIN
1 TABLET ORAL DAILY
Status: ON HOLD | COMMUNITY
End: 2025-02-25

## 2025-02-25 RX ORDER — ASPIRIN 325 MG
325 TABLET ORAL
Status: COMPLETED | OUTPATIENT
Start: 2025-02-25 | End: 2025-02-25

## 2025-02-25 RX ORDER — SODIUM CHLORIDE 0.9 % (FLUSH) 0.9 %
10 SYRINGE (ML) INJECTION
Status: DISCONTINUED | OUTPATIENT
Start: 2025-02-25 | End: 2025-03-03 | Stop reason: HOSPADM

## 2025-02-25 RX ORDER — ONDANSETRON HYDROCHLORIDE 2 MG/ML
4 INJECTION, SOLUTION INTRAVENOUS EVERY 8 HOURS PRN
Status: DISCONTINUED | OUTPATIENT
Start: 2025-02-25 | End: 2025-03-03 | Stop reason: HOSPADM

## 2025-02-25 RX ORDER — SODIUM CHLORIDE 9 MG/ML
INJECTION, SOLUTION INTRAVENOUS CONTINUOUS
Status: DISCONTINUED | OUTPATIENT
Start: 2025-02-25 | End: 2025-02-27

## 2025-02-25 RX ORDER — CEFTRIAXONE 1 G/1
1 INJECTION, POWDER, FOR SOLUTION INTRAMUSCULAR; INTRAVENOUS
Status: COMPLETED | OUTPATIENT
Start: 2025-02-25 | End: 2025-02-25

## 2025-02-25 RX ORDER — CEFTRIAXONE 1 G/1
1 INJECTION, POWDER, FOR SOLUTION INTRAMUSCULAR; INTRAVENOUS
Status: DISCONTINUED | OUTPATIENT
Start: 2025-02-26 | End: 2025-03-01

## 2025-02-25 RX ORDER — ACETAMINOPHEN 325 MG/1
650 TABLET ORAL EVERY 8 HOURS PRN
Status: DISCONTINUED | OUTPATIENT
Start: 2025-02-25 | End: 2025-03-03 | Stop reason: HOSPADM

## 2025-02-25 RX ORDER — EMPAGLIFLOZIN 10 MG/1
10 TABLET, FILM COATED ORAL DAILY
COMMUNITY
Start: 2024-12-26

## 2025-02-25 RX ORDER — LEVOTHYROXINE SODIUM 75 UG/1
75 TABLET ORAL
Status: DISCONTINUED | OUTPATIENT
Start: 2025-02-26 | End: 2025-03-03 | Stop reason: HOSPADM

## 2025-02-25 RX ORDER — CALCIUM CARBONATE 260MG(650)
100 TABLET,CHEWABLE ORAL DAILY
COMMUNITY

## 2025-02-25 RX ORDER — PRAVASTATIN SODIUM 40 MG/1
80 TABLET ORAL DAILY
Status: DISCONTINUED | OUTPATIENT
Start: 2025-02-26 | End: 2025-03-03 | Stop reason: HOSPADM

## 2025-02-25 RX ADMIN — RIVAROXABAN 15 MG: 15 TABLET, FILM COATED ORAL at 08:02

## 2025-02-25 RX ADMIN — ASPIRIN 325 MG ORAL TABLET 325 MG: 325 PILL ORAL at 06:02

## 2025-02-25 RX ADMIN — CEFTRIAXONE SODIUM 1 G: 1 INJECTION, POWDER, FOR SOLUTION INTRAMUSCULAR; INTRAVENOUS at 04:02

## 2025-02-25 RX ADMIN — SODIUM CHLORIDE: 9 INJECTION, SOLUTION INTRAVENOUS at 09:02

## 2025-02-25 RX ADMIN — LOSARTAN POTASSIUM 50 MG: 50 TABLET, FILM COATED ORAL at 08:02

## 2025-02-25 NOTE — ED PROVIDER NOTES
"Encounter Date: 2/25/2025    Source of History:   Patient and medical record, without language barrier or      Chief complaint:  Weakness (Patient to ER via [facility] with a complaint of bilateral leg weakness that started last night )    HPI:  Tonya Bucio is a 89 y.o. female with obesity, afib, stage 3a CKD presented to the ED today with complaints of weakness. Pt reports that since last night she has felt "shakey," weak and is unable to stand. She states that when she tried to stand, her legs felt weak and were shaking. She denies any falls, dizziness, changes in vision during this episode. Pt endorses chills, denies cough, chest pain, SOB, N/V.     This is the extent to the patients complaints today here in the emergency department.    ROS: A review of systems was conducted with pertinent positive and negative findings documented in HPI with all other systems reviewed and negative.  ROS    Review of patient's allergies indicates:  No Known Allergies    PMH:  As per HPI and below:  Past Medical History:   Diagnosis Date    Acute bronchitis with asthma     Anemia due to stage 3 chronic kidney disease     Anticoagulant long-term use     Asthma     Back pain     Cancer     Chest pain, musculoskeletal 7/16/2013    Chronic bronchitis     Colon polyps     COPD exacerbation 3/13/2020    Gout, unspecified     History of cervical cancer     Hypothyroidism     Obesity     Osteoarthritis     Paroxysmal atrial fibrillation 11/19/2024    Renal manifestation of secondary diabetes mellitus     Thyroid disease     Trouble in sleeping     Type 2 diabetes mellitus with ophthalmic manifestations     Type 2 diabetes with peripheral circulatory disorder, controlled     Urinary incontinence     Uterine cancer     Uterine cancer      Past Surgical History:   Procedure Laterality Date    ADENOIDECTOMY      EYE SURGERY Right     right eye cataract    HYSTERECTOMY  2008    LIZ-BSO    tonsilectomy      " TONSILLECTOMY      TOTAL ABDOMINAL HYSTERECTOMY      TOTAL ABDOMINAL HYSTERECTOMY W/ BILATERAL SALPINGOOPHORECTOMY       Social History     Socioeconomic History    Marital status:    Tobacco Use    Smoking status: Former     Current packs/day: 0.00     Average packs/day: 0.3 packs/day for 13.0 years (4.3 ttl pk-yrs)     Types: Cigarettes     Start date: 1951     Quit date: 1964     Years since quittin.5     Passive exposure: Past    Smokeless tobacco: Never   Substance and Sexual Activity    Alcohol use: No    Drug use: No    Sexual activity: Never     Birth control/protection: Surgical     Comment:      Social Drivers of Health     Financial Resource Strain: Low Risk  (2024)    Overall Financial Resource Strain (CARDIA)     Difficulty of Paying Living Expenses: Not hard at all   Food Insecurity: No Food Insecurity (2024)    Hunger Vital Sign     Worried About Running Out of Food in the Last Year: Never true     Ran Out of Food in the Last Year: Never true   Transportation Needs: No Transportation Needs (2024)    PRAPARE - Transportation     Lack of Transportation (Medical): No     Lack of Transportation (Non-Medical): No   Physical Activity: Insufficiently Active (2024)    Exercise Vital Sign     Days of Exercise per Week: 7 days     Minutes of Exercise per Session: 20 min   Stress: No Stress Concern Present (2024)    [employer] - Occupational Stress Questionnaire     Feeling of Stress : Not at all   Housing Stability: Unknown (2024)    Housing Stability Vital Sign     Unable to Pay for Housing in the Last Year: No     Homeless in the Last Year: No     Vitals:    BP (!) 142/75 (BP Location: Right arm, Patient Position: Lying)   Pulse 85   Temp 98.5 °F (36.9 °C) (Oral)   Resp 20   SpO2 97%     Physical Exam  Vitals and nursing note reviewed.   Constitutional:       General: She is not in acute distress.      Appearance: Normal appearance. She is not toxic-appearing or diaphoretic.   HENT:      Head: Normocephalic and atraumatic.      Right Ear: External ear normal.      Left Ear: External ear normal.   Eyes:      General: No scleral icterus.     Conjunctiva/sclera: Conjunctivae normal.   Cardiovascular:      Rate and Rhythm: Normal rate and regular rhythm.   Pulmonary:      Effort: Pulmonary effort is normal. No respiratory distress.      Breath sounds: No stridor.   Abdominal:      General: Abdomen is flat. There is no distension.      Tenderness: There is abdominal tenderness (mild suprapubic).   Musculoskeletal:         General: No swelling.      Cervical back: Normal range of motion and neck supple.   Skin:     General: Skin is dry.      Coloration: Skin is not jaundiced.   Neurological:      Mental Status: She is alert. Mental status is at baseline.   Psychiatric:         Mood and Affect: Mood normal.         Behavior: Behavior normal.       Procedures    Laboratory Studies:  Labs that have been ordered have been independently reviewed and interpreted by myself.  Labs Reviewed   CBC W/ AUTO DIFFERENTIAL - Abnormal       Result Value    WBC 13.46 (*)     RBC 3.76 (*)     Hemoglobin 11.0 (*)     Hematocrit 34.3 (*)     MCV 91      MCH 29.3      MCHC 32.1      RDW 15.3 (*)     Platelets 249      MPV 10.1      Immature Granulocytes 0.8 (*)     Gran # (ANC) 11.6 (*)     Immature Grans (Abs) 0.11 (*)     Lymph # 1.0      Mono # 0.6      Eos # 0.0      Baso # 0.04      nRBC 0      Gran % 86.4 (*)     Lymph % 7.7 (*)     Mono % 4.8      Eosinophil % 0.0      Basophil % 0.3      Differential Method Automated     COMPREHENSIVE METABOLIC PANEL - Abnormal    Sodium 137      Potassium 4.5      Chloride 104      CO2 24      Glucose 107      BUN 25 (*)     Creatinine 1.6 (*)     Calcium 9.3      Total Protein 7.4      Albumin 3.1 (*)     Total Bilirubin 0.6      Alkaline Phosphatase 92      AST 18      ALT 24      eGFR 31 (*)      Anion Gap 9     TROPONIN I - Abnormal    Troponin I 0.110 (*)    URINALYSIS, REFLEX TO URINE CULTURE - Abnormal    Specimen UA Urine, Clean Catch      Color, UA Yellow      Appearance, UA Hazy (*)     pH, UA 7.0      Specific Gravity, UA 1.015      Protein, UA 2+ (*)     Glucose, UA Negative      Ketones, UA Negative      Bilirubin (UA) Negative      Occult Blood UA 1+ (*)     Nitrite, UA Negative      Urobilinogen, UA Negative      Leukocytes, UA 2+ (*)     Narrative:     Specimen Source->Urine   B-TYPE NATRIURETIC PEPTIDE - Abnormal     (*)    URINALYSIS MICROSCOPIC - Abnormal    RBC, UA 5 (*)     WBC, UA >100 (*)     WBC Clumps, UA Occasional (*)     Bacteria Few (*)     Squam Epithel, UA 2      Hyaline Casts, UA 2 (*)     Microscopic Comment SEE COMMENT      Narrative:     Specimen Source->Urine   TROPONIN I - Abnormal    Troponin I 0.121 (*)    INFLUENZA A & B BY MOLECULAR    Influenza A, Molecular Negative      Influenza B, Molecular Negative      Flu A & B Source Nasal swab     CULTURE, URINE   CULTURE, BLOOD   CULTURE, BLOOD   TSH    TSH 1.076     MAGNESIUM    Magnesium 1.9     CK   CK    CPK 26     LACTIC ACID, PLASMA     Imaging Results              CT Abdomen Pelvis  Without Contrast (Final result)  Result time 02/25/25 16:51:49   Procedure changed from CT Abdomen Pelvis With IV Contrast NO Oral Contrast     Final result by Bert Lund Jr., MD (02/25/25 16:51:49)                   Impression:      Cholelithiasis.  Multiple bilateral renal cyst without stone or hydronephrosis noted.  Prior hysterectomy.      Electronically signed by: Bert Lund MD  Date:    02/25/2025  Time:    16:51               Narrative:    EXAMINATION:  CT ABDOMEN PELVIS WITHOUT CONTRAST    CLINICAL HISTORY:  LLQ abdominal pain;RLQ abdominal pain (Age >= 14y);    TECHNIQUE:  Low dose axial images, sagittal and coronal reformations were obtained from the lung bases to the pubic  symphysis.    COMPARISON:  None    FINDINGS:  The liver is of normal size contour and CT density for a noncontrast study.  The gallbladder is of normal size but contains multiple dense kick gallstones.  Nikkie cholecystic fluid or thickening of the gallbladder wall is not seen.  The pancreas is of normal contour and CT density without edema or mass.  The spleen is of normal size and CT density.    The adrenal glands are not enlarged.  The right kidney is tilted horizontal.  At the upper pole is a 2.4 cm water density cyst.  There are at least 4 other cyst of the renal periphery the largest measuring 1.8 cm at the lower pole.  Stone or hydronephrosis is not seen.  On the left there is a 7.2 cm cyst at the upper pole with 2 smaller cyst in the upper pole and 5 other cyst of the mid and lower pole of the left kidney.  Stone or hydronephrosis is not seen.  Abdominal aorta and inferior vena cava are of normal caliber.    The stomach is of normal configuration.  Small bowel dilatation or air-fluid levels are not seen.  The colon appears of normal configuration without distention or mass.  A normal appendix is seen.  Free fluid or free air is not seen.    The bladder is of normal contour without mass or asymmetry.  A uterus is not seen.  Free fluid in the cul de sac is not noted.                                       US Lower Extremity Veins Bilateral (Final result)  Result time 02/25/25 13:21:47      Final result by Hannah Méndez MD (02/25/25 13:21:47)                   Impression:      No evidence of deep venous thrombosis in either lower extremity.      Electronically signed by: Hannah Méndez  Date:    02/25/2025  Time:    13:21               Narrative:    EXAMINATION:  US LOWER EXTREMITY VEINS BILATERAL    CLINICAL HISTORY:  Pain in leg, unspecified    TECHNIQUE:  Duplex and color flow Doppler and dynamic compression was performed of the bilateral lower extremity veins was  performed.    COMPARISON:  10/13/2014    FINDINGS:  Right thigh veins: The common femoral, femoral, popliteal, upper greater saphenous, and deep femoral veins are patent and free of thrombus. The veins are normally compressible and have normal phasic flow and augmentation response.    Right calf veins: The visualized calf veins are patent.    Left thigh veins: The common femoral, femoral, popliteal, upper greater saphenous, and deep femoral veins are patent and free of thrombus. The veins are normally compressible and have normal phasic flow and augmentation response.    Left calf veins: The visualized calf veins are patent.    Miscellaneous: None                                       X-Ray Chest AP Portable (Final result)  Result time 02/25/25 12:48:17      Final result by Hannah Méndez MD (02/25/25 12:48:17)                   Impression:      Stable chest x-ray.      Electronically signed by: Hannah Méndez  Date:    02/25/2025  Time:    12:48               Narrative:    EXAMINATION:  XR CHEST AP PORTABLE    CLINICAL HISTORY:  Weakness    TECHNIQUE:  Single frontal view of the chest was performed.    COMPARISON:  12/30/2024    FINDINGS:  The heart is enlarged and unchanged.  The aorta is calcified and ectatic.  The lungs remain clear.  No pleural effusion.                                      EKG (independently interpreted by me): Rhythm:  NSR, rate of  64 BPM, no ST elevations or depressions, QTc 402    Imaging (independently interpreted by me):  CXR: No cardiomegaly, pulmonary edema, or pneumothorax    I decided to obtain the patient's medical records.  Summary of Medical Records:    Medications   aspirin tablet 325 mg (has no administration in time range)   cefTRIAXone injection 1 g (1 g Intravenous Given 2/25/25 1617)     MDM:    89 y.o. female with weakness    Differential Dx:  Differential includes but is not limited to electrolyte abnormality, UTI, pneumonia    ED Management:  Cardiac workup  started for acute presentation of an emergent condition.  Basic labs with leukocytosis.  CMP with a baseline renal dysfunction.  Initial troponin mildly elevated, we will trend.  Patient does not have any chest pain or shortness of breath at this time but aspirin given out of abundance of caution.  Patient found to have UTI, given Rocephin.  Due to the combination of weakness, elevated troponin, and UTI, we will admit patient to the hospital for further workup and treatment as indicated.  Patient admitted to Hospital Medicine with Cardiology your consult and on gentle IV fluids    Discussed with:  Hospital medicine regarding admission      Medical Decision Making  Amount and/or Complexity of Data Reviewed  Labs: ordered.  Radiology: ordered.    Risk  OTC drugs.  Prescription drug management.            Diagnostic Impression:    Final diagnoses:  [M79.606] Leg pain  [R53.1] Weakness  [N39.0] UTI (urinary tract infection)     ED Disposition Condition    Observation Stable                   Ifeanyi Gonzalez MD  02/25/25 8009

## 2025-02-26 PROBLEM — N18.32 CKD STAGE 3B, GFR 30-44 ML/MIN: Status: ACTIVE | Noted: 2025-02-26

## 2025-02-26 PROBLEM — R79.89 ELEVATED TROPONIN: Status: ACTIVE | Noted: 2025-02-26

## 2025-02-26 PROBLEM — R53.1 GENERALIZED WEAKNESS: Status: ACTIVE | Noted: 2025-02-26

## 2025-02-26 PROBLEM — I10 ESSENTIAL HYPERTENSION: Status: ACTIVE | Noted: 2025-02-26

## 2025-02-26 PROBLEM — N39.0 UTI (URINARY TRACT INFECTION): Status: ACTIVE | Noted: 2025-02-26

## 2025-02-26 LAB
ALBUMIN SERPL BCP-MCNC: 2.9 G/DL (ref 3.5–5.2)
ALP SERPL-CCNC: 83 U/L (ref 40–150)
ALT SERPL W/O P-5'-P-CCNC: 31 U/L (ref 10–44)
ANION GAP SERPL CALC-SCNC: 11 MMOL/L (ref 8–16)
AST SERPL-CCNC: 37 U/L (ref 10–40)
BASOPHILS # BLD AUTO: 0.04 K/UL (ref 0–0.2)
BASOPHILS NFR BLD: 0.4 % (ref 0–1.9)
BILIRUB SERPL-MCNC: 0.5 MG/DL (ref 0.1–1)
BUN SERPL-MCNC: 25 MG/DL (ref 8–23)
CALCIUM SERPL-MCNC: 9.1 MG/DL (ref 8.7–10.5)
CHLORIDE SERPL-SCNC: 105 MMOL/L (ref 95–110)
CO2 SERPL-SCNC: 23 MMOL/L (ref 23–29)
CREAT SERPL-MCNC: 1.4 MG/DL (ref 0.5–1.4)
DIFFERENTIAL METHOD BLD: ABNORMAL
EOSINOPHIL # BLD AUTO: 0 K/UL (ref 0–0.5)
EOSINOPHIL NFR BLD: 0.2 % (ref 0–8)
ERYTHROCYTE [DISTWIDTH] IN BLOOD BY AUTOMATED COUNT: 15.3 % (ref 11.5–14.5)
EST. GFR  (NO RACE VARIABLE): 36 ML/MIN/1.73 M^2
GLUCOSE SERPL-MCNC: 103 MG/DL (ref 70–110)
HCT VFR BLD AUTO: 32.9 % (ref 37–48.5)
HGB BLD-MCNC: 10.3 G/DL (ref 12–16)
IMM GRANULOCYTES # BLD AUTO: 0.07 K/UL (ref 0–0.04)
IMM GRANULOCYTES NFR BLD AUTO: 0.7 % (ref 0–0.5)
LYMPHOCYTES # BLD AUTO: 1 K/UL (ref 1–4.8)
LYMPHOCYTES NFR BLD: 10.5 % (ref 18–48)
MCH RBC QN AUTO: 28.8 PG (ref 27–31)
MCHC RBC AUTO-ENTMCNC: 31.3 G/DL (ref 32–36)
MCV RBC AUTO: 92 FL (ref 82–98)
MONOCYTES # BLD AUTO: 0.7 K/UL (ref 0.3–1)
MONOCYTES NFR BLD: 7.3 % (ref 4–15)
MRSA ID BY PCR: NEGATIVE
NEUTROPHILS # BLD AUTO: 7.7 K/UL (ref 1.8–7.7)
NEUTROPHILS NFR BLD: 80.9 % (ref 38–73)
NRBC BLD-RTO: 0 /100 WBC
OHS QRS DURATION: 84 MS
OHS QRS DURATION: 88 MS
OHS QTC CALCULATION: 400 MS
OHS QTC CALCULATION: 402 MS
PLATELET # BLD AUTO: 237 K/UL (ref 150–450)
PMV BLD AUTO: 10.3 FL (ref 9.2–12.9)
POTASSIUM SERPL-SCNC: 4.6 MMOL/L (ref 3.5–5.1)
PROT SERPL-MCNC: 7 G/DL (ref 6–8.4)
RBC # BLD AUTO: 3.58 M/UL (ref 4–5.4)
SODIUM SERPL-SCNC: 139 MMOL/L (ref 136–145)
STAPH AUREUS ID BY PCR: NEGATIVE
TROPONIN I SERPL DL<=0.01 NG/ML-MCNC: 0.1 NG/ML (ref 0–0.03)
TROPONIN I SERPL DL<=0.01 NG/ML-MCNC: 0.11 NG/ML (ref 0–0.03)
WBC # BLD AUTO: 9.58 K/UL (ref 3.9–12.7)

## 2025-02-26 PROCEDURE — 93005 ELECTROCARDIOGRAM TRACING: CPT | Mod: HCNC

## 2025-02-26 PROCEDURE — 97165 OT EVAL LOW COMPLEX 30 MIN: CPT | Mod: HCNC

## 2025-02-26 PROCEDURE — 97162 PT EVAL MOD COMPLEX 30 MIN: CPT | Mod: HCNC

## 2025-02-26 PROCEDURE — 25000003 PHARM REV CODE 250: Mod: HCNC | Performed by: INTERNAL MEDICINE

## 2025-02-26 PROCEDURE — 85025 COMPLETE CBC W/AUTO DIFF WBC: CPT | Mod: HCNC

## 2025-02-26 PROCEDURE — 25000003 PHARM REV CODE 250: Mod: HCNC

## 2025-02-26 PROCEDURE — 80053 COMPREHEN METABOLIC PANEL: CPT | Mod: HCNC

## 2025-02-26 PROCEDURE — 84484 ASSAY OF TROPONIN QUANT: CPT | Mod: HCNC

## 2025-02-26 PROCEDURE — 99222 1ST HOSP IP/OBS MODERATE 55: CPT | Mod: HCNC,,, | Performed by: PHYSICIAN ASSISTANT

## 2025-02-26 PROCEDURE — 36415 COLL VENOUS BLD VENIPUNCTURE: CPT | Mod: HCNC

## 2025-02-26 PROCEDURE — 97530 THERAPEUTIC ACTIVITIES: CPT | Mod: HCNC

## 2025-02-26 PROCEDURE — 93010 ELECTROCARDIOGRAM REPORT: CPT | Mod: HCNC,,, | Performed by: INTERNAL MEDICINE

## 2025-02-26 PROCEDURE — 99223 1ST HOSP IP/OBS HIGH 75: CPT | Mod: 25,HCNC,, | Performed by: NURSE PRACTITIONER

## 2025-02-26 PROCEDURE — 63600175 PHARM REV CODE 636 W HCPCS: Mod: HCNC

## 2025-02-26 PROCEDURE — G0378 HOSPITAL OBSERVATION PER HR: HCPCS | Mod: HCNC

## 2025-02-26 RX ORDER — ALUMINUM HYDROXIDE, MAGNESIUM HYDROXIDE, AND SIMETHICONE 1200; 120; 1200 MG/30ML; MG/30ML; MG/30ML
15 SUSPENSION ORAL EVERY 6 HOURS PRN
Status: DISCONTINUED | OUTPATIENT
Start: 2025-02-26 | End: 2025-03-03 | Stop reason: HOSPADM

## 2025-02-26 RX ADMIN — ALUMINUM HYDROXIDE, MAGNESIUM HYDROXIDE, AND DIMETHICONE 15 ML: 200; 20; 200 SUSPENSION ORAL at 01:02

## 2025-02-26 RX ADMIN — LOSARTAN POTASSIUM 50 MG: 50 TABLET, FILM COATED ORAL at 09:02

## 2025-02-26 RX ADMIN — LEVOTHYROXINE SODIUM 75 MCG: 75 TABLET ORAL at 06:02

## 2025-02-26 RX ADMIN — LOSARTAN POTASSIUM 50 MG: 50 TABLET, FILM COATED ORAL at 08:02

## 2025-02-26 RX ADMIN — PRAVASTATIN SODIUM 80 MG: 40 TABLET ORAL at 09:02

## 2025-02-26 RX ADMIN — RIVAROXABAN 15 MG: 15 TABLET, FILM COATED ORAL at 08:02

## 2025-02-26 RX ADMIN — CEFTRIAXONE SODIUM 1 G: 1 INJECTION, POWDER, FOR SOLUTION INTRAMUSCULAR; INTRAVENOUS at 04:02

## 2025-02-26 NOTE — PT/OT/SLP EVAL
"Occupational Therapy   Evaluation    Name: Tonya Bucio  MRN: [id number]  Admitting Diagnosis: Elevated troponin  Recent Surgery: * No surgery found *      Recommendations:     Discharge Recommendations: Moderate Intensity Therapy  Discharge Equipment Recommendations:  slide board  Barriers to discharge:       Assessment:     Tonya Bucio is a 89 y.o. female with a medical diagnosis of Elevated troponin.  She presents with Performance deficits affecting function: weakness, impaired endurance, impaired functional mobility, impaired self care skills, impaired balance, decreased ROM.      Rehab Prognosis: Fair; patient would benefit from acute skilled OT services to address these deficits and reach maximum level of function.       Plan:     Patient to be seen 5 x/week to address the above listed problems via self-care/home management, therapeutic activities, therapeutic exercises  Plan of Care Expires: 03/12/25  Plan of Care Reviewed with: patient, daughter    Subjective     Chief Complaint: "My legs just feel shaky and weak."  Patient/Family Comments/goals: None stated    Occupational Profile:  Living Environment: Pt lives with family in [facility] with ramp to enter.   Previous level of function: MOD A shower for LE and back, MOD I toileting BSC, MOD I hygiene at wheelchair level, MOD I dressing.  Equipment Used at Home: wheelchair, bedside commode, bath bench, grab bar, other (see comments), walker, rolling  Assistance upon Discharge: Family    Pain/Comfort:  Pain Rating 1: 0/10    Patients cultural, spiritual, [employer] conflicts given the current situation: no    Objective:     Communicated with: Nursing prior to session.  Patient found HOB elevated with telemetry, peripheral IV, PureWick, bed alarm upon OT entry to room.    General Precautions: Standard, fall  Orthopedic Precautions: N/A  Braces: N/A  Respiratory Status: Room air    Occupational Performance:    Bed Mobility:    Patient completed " Rolling/Turning to Left with  stand by assistance  Patient completed Scooting/Bridging with stand by assistance  Patient completed Supine to Sit with stand by assistance  Patient completed Sit to Supine with minimum assistance    Functional Mobility/Transfers:  Patient completed Sit <> Stand Transfer with total assistance  with  hand-held assist and rolling walker Pt unable to reach full stand due to leg weakness and shakes.  Toilet transfer attempted to [facility] per PLOF. Pt unable to perform transfer due to increased weakness and shakiness to legs x3 trials.   Functional Mobility: Unable    Activities of Daily Living:  Grooming: modified independence with set up at bed level to complete oral hygiene and face washing.   Upper Body Dressing: modified independence to don/doff gown seated EOB.  Lower Body Dressing: maximal assistance to don/doff socks seated EOB.     Cognitive/Visual Perceptual:  Cognitive/Psychosocial Skills:     -       Oriented to: Person, Place, Time, and Situation   -       Follows Commands/attention:Follows multistep  commands  -       Communication: clear/fluent  -       Memory: No Deficits noted    Physical Exam:  Postural examination/scapula alignment:    -       Rounded shoulders  -       Forward head  Upper Extremity Range of Motion:     -       Right Upper Extremity: WFL  -       Left Upper Extremity: WFL  Upper Extremity Strength:    -       Right Upper Extremity: WFL  -       Left Upper Extremity: WFL   Strength:    -       Right Upper Extremity: WFL  -       Left Upper Extremity: WFL  Fine Motor Coordination:    -       Intact    AMPAC 6 Click ADL:  AMPAC Total Score: 17    Treatment & Education:  OT evaluation completed with Pt and daughter present. Both educated on role of OT and POC.     Patient left HOB elevated with all lines intact, call button in reach, and daughter present    GOALS:   Multidisciplinary Problems       Occupational Therapy Goals          Problem: Occupational  Therapy    Goal Priority Disciplines Outcome Interventions   Occupational Therapy Goal     OT, PT/OT Progressing    Description: Pt to perform LB dressing with MOD I using sockaid for increased independence with ADL.    Pt to perform self toileting with Min A using BSC.  Pt to perform level functional transfers required for ADL's with CGA with or without sliding board to reach wheelchair and BSC.  Pt to demonstrate consistent adherence to breathing control and energy conservation techniques as educated by OT.                         DME Justifications:  Pt would benefit from sliding board for improved safety and independence in transfer to bedside commode and wheelchair.    History:     Past Medical History:   Diagnosis Date    Acute bronchitis with asthma     Anemia due to stage 3 chronic kidney disease     Anticoagulant long-term use     Asthma     Back pain     Cancer     Chest pain, musculoskeletal 7/16/2013    Chronic bronchitis     Colon polyps     COPD exacerbation 3/13/2020    Gout, unspecified     History of cervical cancer     Hypothyroidism     Obesity     Osteoarthritis     Paroxysmal atrial fibrillation 11/19/2024    Renal manifestation of secondary diabetes mellitus     Thyroid disease     Trouble in sleeping     Type 2 diabetes mellitus with ophthalmic manifestations     Type 2 diabetes with peripheral circulatory disorder, controlled     Urinary incontinence     Uterine cancer     Uterine cancer          Past Surgical History:   Procedure Laterality Date    ADENOIDECTOMY      EYE SURGERY Right     right eye cataract    HYSTERECTOMY  2008    LIZ-BSO    tonsilectomy      TONSILLECTOMY  1945    TOTAL ABDOMINAL HYSTERECTOMY  2008    TOTAL ABDOMINAL HYSTERECTOMY W/ BILATERAL SALPINGOOPHORECTOMY  2008       Time Tracking:     OT Date of Treatment:    OT Start Time: 1251  OT Stop Time: 1322  OT Total Time (min): 31 min    Billable Minutes:Evaluation 31 2/26/2025

## 2025-02-26 NOTE — HPI
Patient is an 89 year old female with medical history of HTN, hypothyroidism, COPD, CKD stage 3, atrial fibrillation and atrial flutter who presented to the ED with lower extremity weakness. Symptoms started 3 days ago.  She normally is in a wheelchair but is able to stand up to make her transition from the wheelchair to toilet.  However, her legs would not work.  She denies fever, chills, CP, SOB, nausea, vomiting, abdominal pain, urinary retention and dysuria.        Admitted for elevated troponin.

## 2025-02-26 NOTE — ASSESSMENT & PLAN NOTE
Reason for admission  Troponin stable at 0.112  EKG without ST elevation  Cardiology consulted  Continue home pravastatin and xarelto

## 2025-02-26 NOTE — NURSING
C diff rule out BPA fired. Ordered per policy. MARLA Small notified. Discontinue order, not necessary to order at this time per PA.

## 2025-02-26 NOTE — H&P
[facility] ([address] [place] [address] Medicine  History & Physical    Patient Name: Tonya Bucio  MRN: [id number]  Patient Class: OP- Observation  Admission Date: 2/25/2025  Attending Physician: Tasha Atkins MD   Primary Care Provider: Tasha Atkins MD         Patient information was obtained from patient and ER records.     Subjective:     Principal Problem:Elevated troponin    Chief Complaint:   Chief Complaint   Patient presents with    Weakness     Patient to ER via [facility] with a complaint of bilateral leg weakness that started last night         HPI: Patient is an 89 year old female with medical history of HTN, hypothyroidism, COPD, CKD stage 3, atrial fibrillation and atrial flutter who presented to the ED with lower extremity weakness. Symptoms started 3 days ago.  She normally is in a wheelchair but is able to stand up to make her transition from the wheelchair to toilet.  However, her legs would not work.  She denies fever, chills, CP, SOB, nausea, vomiting, abdominal pain, urinary retention and dysuria.        Admitted for elevated troponin.      Past Medical History:   Diagnosis Date    Acute bronchitis with asthma     Anemia due to stage 3 chronic kidney disease     Anticoagulant long-term use     Asthma     Back pain     Cancer     Chest pain, musculoskeletal 7/16/2013    Chronic bronchitis     Colon polyps     COPD exacerbation 3/13/2020    Gout, unspecified     History of cervical cancer     Hypothyroidism     Obesity     Osteoarthritis     Paroxysmal atrial fibrillation 11/19/2024    Renal manifestation of secondary diabetes mellitus     Thyroid disease     Trouble in sleeping     Type 2 diabetes mellitus with ophthalmic manifestations     Type 2 diabetes with peripheral circulatory disorder, controlled     Urinary incontinence     Uterine cancer     Uterine cancer        Past Surgical History:   Procedure Laterality Date    ADENOIDECTOMY      EYE SURGERY Right      right eye cataract    HYSTERECTOMY  2008    [facility]    tonsilectomy      TONSILLECTOMY  1945    TOTAL ABDOMINAL HYSTERECTOMY  2008    TOTAL ABDOMINAL HYSTERECTOMY W/ BILATERAL SALPINGOOPHORECTOMY  2008       Review of patient's allergies indicates:  No Known Allergies    No current facility-administered medications on file prior to encounter.     Current Outpatient Medications on File Prior to Encounter   Medication Sig    allopurinoL (ZYLOPRIM) 300 MG tablet Take 1 tablet (300 mg total) by mouth once daily.    cinacalcet (SENSIPAR) 60 MG Tab Take 120 mg by mouth every evening.    ferrous sulfate 325 (65 FE) MG EC tablet Take 1 tablet by mouth once daily (Patient taking differently: Take 325 mg by mouth once daily.)    HYDROcodone-acetaminophen (NORCO) 7.5-325 mg per tablet Take 1 tablet by mouth every 24 hours as needed for Pain.    JARDIANCE 10 mg tablet Take 10 mg by mouth once daily.    levothyroxine (SYNTHROID) 75 MCG tablet Take 1 tablet (75 mcg total) by mouth once daily.    losartan (COZAAR) 50 MG tablet Take 1 tablet (50 mg total) by mouth 2 (two) times a day.    omega-3 acid ethyl esters (LOVAZA) 1 gram capsule Take 1 capsule (1 g total) by mouth 2 (two) times daily.    rosuvastatin (CRESTOR) 20 MG tablet Take 1 tablet (20 mg total) by mouth every evening.    XARELTO 15 mg Tab Take 1 tablet by mouth in the evening    acetaminophen (TYLENOL) 500 MG tablet Take 500 mg by mouth every evening. And as needed    magnesium citrate 100 mg Tab Take 100 mg by mouth once daily.    meclizine (ANTIVERT) 12.5 mg tablet Take 1 tablet (12.5 mg total) by mouth 3 (three) times daily as needed for Dizziness.    multivitamin (ONE DAILY MULTIVITAMIN) per tablet Take 1 tablet by mouth once daily.    valACYclovir (VALTREX) 1000 MG tablet Take 1 tablet (1,000 mg total) by mouth 3 (three) times daily. for 7 days (Patient not taking: Reported on 2/25/2025)     Family History       Problem Relation (Age of Onset)    Anemia Daughter     Arthritis Father, Daughter, Daughter, Daughter, Daughter    Breast cancer Sister    Cancer Brother    Diabetes Brother, Daughter, Daughter    Glaucoma Daughter    Heart disease Mother, Brother, Brother    Hyperlipidemia Brother    Liver disease Daughter    No Known Problems Son, Son    Ovarian cancer Sister    Stroke Brother, Son          Tobacco Use    Smoking status: Former     Current packs/day: 0.00     Average packs/day: 0.3 packs/day for 13.0 years (4.3 ttl pk-yrs)     Types: Cigarettes     Start date: 1951     Quit date: 1964     Years since quittin.5     Passive exposure: Past    Smokeless tobacco: Never   Substance and Sexual Activity    Alcohol use: No    Drug use: No    Sexual activity: Never     Birth control/protection: Surgical     Comment:      Review of Systems   Constitutional:  Negative for chills and fever.   HENT:  Negative for ear discharge and ear pain.    Eyes:  Negative for pain and discharge.   Respiratory:  Negative for cough and shortness of breath.    Cardiovascular:  Negative for chest pain and leg swelling.   Gastrointestinal:  Negative for abdominal distention, abdominal pain, nausea and vomiting.   Endocrine: Negative for cold intolerance and heat intolerance.   Genitourinary:  Negative for difficulty urinating, dysuria and urgency.   Musculoskeletal:  Negative for joint swelling and myalgias.   Skin:  Negative for rash and wound.   Neurological:  Positive for weakness. Negative for dizziness and headaches.   Psychiatric/Behavioral:  Negative for agitation and confusion.      Objective:     Vital Signs (Most Recent):  Temp: 98.7 °F (37.1 °C) (25 0856)  Pulse: 100 (25 0856)  Resp: 20 (25 0856)  BP: 118/60 (25 0856)  SpO2: (!) 93 % (25 0856) Vital Signs (24h Range):  Temp:  [97.2 °F (36.2 °C)-99.4 °F (37.4 °C)] 98.7 °F (37.1 °C)  Pulse:  [] 100  Resp:  [18-29] 20  SpO2:  [51 %-98 %] 93 %  BP: (118-186)/() 118/60  "    Weight: 106.3 kg (234 lb 5.6 oz)  Body mass index is 35.63 kg/m².     Physical Exam  Constitutional:       General: She is not in acute distress.  HENT:      Head: Normocephalic and atraumatic.   Eyes:      General:         Right eye: No discharge.         Left eye: No discharge.   Cardiovascular:      Rate and Rhythm: Normal rate and regular rhythm.   Pulmonary:      Effort: Pulmonary effort is normal.      Breath sounds: Normal breath sounds.   Abdominal:      General: There is no distension.      Tenderness: There is no abdominal tenderness.   Musculoskeletal:         General: No swelling or tenderness.      Cervical back: Neck supple. No tenderness.   Skin:     General: Skin is warm and dry.   Neurological:      General: No focal deficit present.      Mental Status: She is alert and oriented to person, place, and time.      Comments: No focal weakness  Able to maintain extremities against gravity  No facial asymmetry      Psychiatric:         Mood and Affect: Mood normal.         Behavior: Behavior normal.                Significant Labs: A1C:   Recent Labs   Lab 10/04/24  1010   HGBA1C 5.9*     ABGs: No results for input(s): "PH", "PCO2", "HCO3", "POCSATURATED", "BE", "TOTALHB", "COHB", "METHB", "O2HB", "POCFIO2", "PO2" in the last 48 hours.  Bilirubin:   Recent Labs   Lab 02/25/25  1238 02/26/25  0425   BILITOT 0.6 0.5     Blood Culture:   Recent Labs   Lab 02/25/25  1803 02/25/25  1804   LABBLOO No Growth to date No Growth to date     BMP:   Recent Labs   Lab 02/25/25  1238 02/26/25  0425    103    139   K 4.5 4.6    105   CO2 24 23   BUN 25* 25*   CREATININE 1.6* 1.4   CALCIUM 9.3 9.1   MG 1.9  --      CBC:   Recent Labs   Lab 02/25/25  1238 02/26/25  0425   WBC 13.46* 9.58   HGB 11.0* 10.3*   HCT 34.3* 32.9*    237     CMP:   Recent Labs   Lab 02/25/25  1238 02/26/25  0425    139   K 4.5 4.6    105   CO2 24 23    103   BUN 25* 25*   CREATININE 1.6* 1.4 " "  CALCIUM 9.3 9.1   PROT 7.4 7.0   ALBUMIN 3.1* 2.9*   BILITOT 0.6 0.5   ALKPHOS 92 83   AST 18 37   ALT 24 31   ANIONGAP 9 11     Cardiac Markers:   Recent Labs   Lab 02/25/25  1238   *     Coagulation: No results for input(s): "PT", "INR", "APTT" in the last 48 hours.  Lactic Acid:   Recent Labs   Lab 02/25/25  1804   LACTATE 1.1     Lipase: No results for input(s): "LIPASE" in the last 48 hours.  Lipid Panel: No results for input(s): "CHOL", "HDL", "LDLCALC", "TRIG", "CHOLHDL" in the last 48 hours.  Magnesium:   Recent Labs   Lab 02/25/25  1238   MG 1.9     POCT Glucose: No results for input(s): "POCTGLUCOSE" in the last 48 hours.  Prealbumin: No results for input(s): "PREALBUMIN" in the last 48 hours.  Respiratory Culture: No results for input(s): "GSRESP", "RESPIRATORYC" in the last 48 hours.  Troponin:   Recent Labs   Lab 02/25/25 2011 02/26/25  0200 02/26/25  0754   TROPONINI 0.133* 0.105* 0.112*     TSH:   Recent Labs   Lab 02/25/25  1238   TSH 1.076     Urine Culture: No results for input(s): "LABURIN" in the last 48 hours.  Urine Studies:   Recent Labs   Lab 02/25/25  1325   COLORU Yellow   APPEARANCEUA Hazy*   PHUR 7.0   SPECGRAV 1.015   PROTEINUA 2+*   GLUCUA Negative   KETONESU Negative   BILIRUBINUA Negative   OCCULTUA 1+*   NITRITE Negative   UROBILINOGEN Negative   LEUKOCYTESUR 2+*   RBCUA 5*   WBCUA >100*   BACTERIA Few*   SQUAMEPITHEL 2   HYALINECASTS 2*       Significant Imaging: I have reviewed all pertinent imaging results/findings within the past 24 hours.  Assessment/Plan:     * Elevated troponin  Reason for admission  Troponin stable at 0.112  EKG without ST elevation  Cardiology consulted  Continue home pravastatin and xarelto       Essential hypertension  Patient's blood pressure range in the last 24 hours was: BP  Min: 118/60  Max: 186/79.The patient's inpatient anti-hypertensive regimen is listed below:  Current Antihypertensives  losartan tablet 50 mg, 2 times daily, " Oral    Plan  Continue home antihypertensives     Generalized weakness  Due to age and acute UTI.    PT/OT pending       CKD stage 3b, GFR 30-44 ml/min  Creatine stable for now. BMP reviewed- noted Estimated Creatinine Clearance: 35.5 mL/min (based on SCr of 1.4 mg/dL). according to latest data. Based on current GFR, CKD stage is stage 3 - GFR 30-59.  Monitor UOP and serial BMP and adjust therapy as needed. Renally dose meds. Avoid nephrotoxic medications and procedures.    UTI (urinary tract infection)  U/A with 2+ leukocytes and >100 WBC on microscopic  Urine culture pending  Continue IV rocephin         Typical atrial flutter  Seen on EKG  Looks like patient goes in and out of atrial flutter  Unable to tolerate BB in the past       COPD (chronic obstructive pulmonary disease)  Not in acute exacerbation     Hypothyroidism  Continue home levothyroxine         VTE Risk Mitigation (From admission, onward)           Ordered     rivaroxaban tablet 15 mg  Nightly         02/25/25 1841     IP VTE HIGH RISK PATIENT  Once         02/25/25 1841     Place sequential compression device  Until discontinued         02/25/25 1841                         On 02/26/2025, patient should be placed in hospital observation services under my care in collaboration with Dr. Negrete.           Staci Knox PA-C  Department of Hospital Medicine  [place] [facility] (3rd Fl)

## 2025-02-26 NOTE — ASSESSMENT & PLAN NOTE
U/A with 2+ leukocytes and >100 WBC on microscopic  Urine culture pending  Continue IV rocephin

## 2025-02-26 NOTE — ASSESSMENT & PLAN NOTE
Patient's blood pressure range in the last 24 hours was: BP  Min: 118/60  Max: 186/79.The patient's inpatient anti-hypertensive regimen is listed below:  Current Antihypertensives  losartan tablet 50 mg, 2 times daily, Oral    Plan  Continue home antihypertensives

## 2025-02-26 NOTE — PLAN OF CARE
02/26/25 1357   Post-Acute Status   Post-Acute Authorization Placement   Post-Acute Placement Status Referrals Sent   Discharge Delays (!) Post-Acute Set-up   Discharge Plan   Discharge Plan A Skilled Nursing Facility   Discharge Plan B Skilled Nursing Facility     Patient interested in SNF services. PT/OT eval completed and qualifies patient. Patient would like to go to [facility] for SNF. Referrals sent to [facility]. LOCET completed.     Waiting on response from [facility] and [facility].

## 2025-02-26 NOTE — ASSESSMENT & PLAN NOTE
No angina  Peak troponin 0.133, now 0.112; chronic elevation  Likely non thrombotic troponin elevation due to CKD, diastolic heart failure, CAD    EF 60-65%, grade 2 diastolic dysfunction, left atrial moderately dilated, mild AVF, TR    Of note, false-positive troponin elevation September 2024

## 2025-02-26 NOTE — SUBJECTIVE & OBJECTIVE
Past Medical History:   Diagnosis Date    Acute bronchitis with asthma     Anemia due to stage 3 chronic kidney disease     Anticoagulant long-term use     Asthma     Back pain     Cancer     Chest pain, musculoskeletal 7/16/2013    Chronic bronchitis     Colon polyps     COPD exacerbation 3/13/2020    Gout, unspecified     History of cervical cancer     Hypothyroidism     Obesity     Osteoarthritis     Paroxysmal atrial fibrillation 11/19/2024    Renal manifestation of secondary diabetes mellitus     Thyroid disease     Trouble in sleeping     Type 2 diabetes mellitus with ophthalmic manifestations     Type 2 diabetes with peripheral circulatory disorder, controlled     Urinary incontinence     Uterine cancer     Uterine cancer        Past Surgical History:   Procedure Laterality Date    ADENOIDECTOMY      EYE SURGERY Right     right eye cataract    HYSTERECTOMY  2008    [facility]    tonsilectomy      TONSILLECTOMY  1945    TOTAL ABDOMINAL HYSTERECTOMY  2008    TOTAL ABDOMINAL HYSTERECTOMY W/ BILATERAL SALPINGOOPHORECTOMY  2008       Review of patient's allergies indicates:  No Known Allergies    No current facility-administered medications on file prior to encounter.     Current Outpatient Medications on File Prior to Encounter   Medication Sig    allopurinoL (ZYLOPRIM) 300 MG tablet Take 1 tablet (300 mg total) by mouth once daily.    cinacalcet (SENSIPAR) 60 MG Tab Take 120 mg by mouth every evening.    ferrous sulfate 325 (65 FE) MG EC tablet Take 1 tablet by mouth once daily (Patient taking differently: Take 325 mg by mouth once daily.)    HYDROcodone-acetaminophen (NORCO) 7.5-325 mg per tablet Take 1 tablet by mouth every 24 hours as needed for Pain.    JARDIANCE 10 mg tablet Take 10 mg by mouth once daily.    levothyroxine (SYNTHROID) 75 MCG tablet Take 1 tablet (75 mcg total) by mouth once daily.    losartan (COZAAR) 50 MG tablet Take 1 tablet (50 mg total) by mouth 2 (two) times a day.    omega-3 acid ethyl  esters (LOVAZA) 1 gram capsule Take 1 capsule (1 g total) by mouth 2 (two) times daily.    rosuvastatin (CRESTOR) 20 MG tablet Take 1 tablet (20 mg total) by mouth every evening.    XARELTO 15 mg Tab Take 1 tablet by mouth in the evening    acetaminophen (TYLENOL) 500 MG tablet Take 500 mg by mouth every evening. And as needed    magnesium citrate 100 mg Tab Take 100 mg by mouth once daily.    meclizine (ANTIVERT) 12.5 mg tablet Take 1 tablet (12.5 mg total) by mouth 3 (three) times daily as needed for Dizziness.    multivitamin (ONE DAILY MULTIVITAMIN) per tablet Take 1 tablet by mouth once daily.    valACYclovir (VALTREX) 1000 MG tablet Take 1 tablet (1,000 mg total) by mouth 3 (three) times daily. for 7 days (Patient not taking: Reported on 2025)     Family History       Problem Relation (Age of Onset)    Anemia Daughter    Arthritis Father, Daughter, Daughter, Daughter, Daughter    Breast cancer Sister    Cancer Brother    Diabetes Brother, Daughter, Daughter    Glaucoma Daughter    Heart disease Mother, Brother, Brother    Hyperlipidemia Brother    Liver disease Daughter    No Known Problems Son, Son    Ovarian cancer Sister    Stroke Brother, Son          Tobacco Use    Smoking status: Former     Current packs/day: 0.00     Average packs/day: 0.3 packs/day for 13.0 years (4.3 ttl pk-yrs)     Types: Cigarettes     Start date: 1951     Quit date: 1964     Years since quittin.5     Passive exposure: Past    Smokeless tobacco: Never   Substance and Sexual Activity    Alcohol use: No    Drug use: No    Sexual activity: Never     Birth control/protection: Surgical     Comment:      Review of Systems   Constitutional:  Negative for chills and fever.   HENT:  Negative for ear discharge and ear pain.    Eyes:  Negative for pain and discharge.   Respiratory:  Negative for cough and shortness of breath.    Cardiovascular:  Negative for chest pain and leg swelling.   Gastrointestinal:  Negative  for abdominal distention, abdominal pain, nausea and vomiting.   Endocrine: Negative for cold intolerance and heat intolerance.   Genitourinary:  Negative for difficulty urinating, dysuria and urgency.   Musculoskeletal:  Negative for joint swelling and myalgias.   Skin:  Negative for rash and wound.   Neurological:  Positive for weakness. Negative for dizziness and headaches.   Psychiatric/Behavioral:  Negative for agitation and confusion.      Objective:     Vital Signs (Most Recent):  Temp: 98.7 °F (37.1 °C) (02/26/25 0856)  Pulse: 100 (02/26/25 0856)  Resp: 20 (02/26/25 0856)  BP: 118/60 (02/26/25 0856)  SpO2: (!) 93 % (02/26/25 0856) Vital Signs (24h Range):  Temp:  [97.2 °F (36.2 °C)-99.4 °F (37.4 °C)] 98.7 °F (37.1 °C)  Pulse:  [] 100  Resp:  [18-29] 20  SpO2:  [51 %-98 %] 93 %  BP: (118-186)/() 118/60     Weight: 106.3 kg (234 lb 5.6 oz)  Body mass index is 35.63 kg/m².     Physical Exam  Constitutional:       General: She is not in acute distress.  HENT:      Head: Normocephalic and atraumatic.   Eyes:      General:         Right eye: No discharge.         Left eye: No discharge.   Cardiovascular:      Rate and Rhythm: Normal rate and regular rhythm.   Pulmonary:      Effort: Pulmonary effort is normal.      Breath sounds: Normal breath sounds.   Abdominal:      General: There is no distension.      Tenderness: There is no abdominal tenderness.   Musculoskeletal:         General: No swelling or tenderness.      Cervical back: Neck supple. No tenderness.   Skin:     General: Skin is warm and dry.   Neurological:      General: No focal deficit present.      Mental Status: She is alert and oriented to person, place, and time.      Comments: No focal weakness  Able to maintain extremities against gravity  No facial asymmetry      Psychiatric:         Mood and Affect: Mood normal.         Behavior: Behavior normal.                Significant Labs: A1C:   Recent Labs   Lab 10/04/24  1010   HGBA1C 5.9*  "    ABGs: No results for input(s): "PH", "PCO2", "HCO3", "POCSATURATED", "BE", "TOTALHB", "COHB", "METHB", "O2HB", "POCFIO2", "PO2" in the last 48 hours.  Bilirubin:   Recent Labs   Lab 02/25/25  1238 02/26/25  0425   BILITOT 0.6 0.5     Blood Culture:   Recent Labs   Lab 02/25/25  1803 02/25/25  1804   LABBLOO No Growth to date No Growth to date     BMP:   Recent Labs   Lab 02/25/25  1238 02/26/25  0425    103    139   K 4.5 4.6    105   CO2 24 23   BUN 25* 25*   CREATININE 1.6* 1.4   CALCIUM 9.3 9.1   MG 1.9  --      CBC:   Recent Labs   Lab 02/25/25  1238 02/26/25  0425   WBC 13.46* 9.58   HGB 11.0* 10.3*   HCT 34.3* 32.9*    237     CMP:   Recent Labs   Lab 02/25/25  1238 02/26/25  0425    139   K 4.5 4.6    105   CO2 24 23    103   BUN 25* 25*   CREATININE 1.6* 1.4   CALCIUM 9.3 9.1   PROT 7.4 7.0   ALBUMIN 3.1* 2.9*   BILITOT 0.6 0.5   ALKPHOS 92 83   AST 18 37   ALT 24 31   ANIONGAP 9 11     Cardiac Markers:   Recent Labs   Lab 02/25/25  1238   *     Coagulation: No results for input(s): "PT", "INR", "APTT" in the last 48 hours.  Lactic Acid:   Recent Labs   Lab 02/25/25  1804   LACTATE 1.1     Lipase: No results for input(s): "LIPASE" in the last 48 hours.  Lipid Panel: No results for input(s): "CHOL", "HDL", "LDLCALC", "TRIG", "CHOLHDL" in the last 48 hours.  Magnesium:   Recent Labs   Lab 02/25/25  1238   MG 1.9     POCT Glucose: No results for input(s): "POCTGLUCOSE" in the last 48 hours.  Prealbumin: No results for input(s): "PREALBUMIN" in the last 48 hours.  Respiratory Culture: No results for input(s): "GSRESP", "RESPIRATORYC" in the last 48 hours.  Troponin:   Recent Labs   Lab 02/25/25 2011 02/26/25  0200 02/26/25  0754   TROPONINI 0.133* 0.105* 0.112*     TSH:   Recent Labs   Lab 02/25/25  1238   TSH 1.076     Urine Culture: No results for input(s): "LABURIN" in the last 48 hours.  Urine Studies:   Recent Labs   Lab 02/25/25  1325   COLORU Yellow "   APPEARANCEUA Hazy*   PHUR 7.0   SPECGRAV 1.015   PROTEINUA 2+*   GLUCUA Negative   KETONESU Negative   BILIRUBINUA Negative   OCCULTUA 1+*   NITRITE Negative   UROBILINOGEN Negative   LEUKOCYTESUR 2+*   RBCUA 5*   WBCUA >100*   BACTERIA Few*   SQUAMEPITHEL 2   HYALINECASTS 2*       Significant Imaging: I have reviewed all pertinent imaging results/findings within the past 24 hours.

## 2025-02-26 NOTE — SUBJECTIVE & OBJECTIVE
Past Medical History:   Diagnosis Date    Acute bronchitis with asthma     Anemia due to stage 3 chronic kidney disease     Anticoagulant long-term use     Asthma     Back pain     Cancer     Chest pain, musculoskeletal 7/16/2013    Chronic bronchitis     Colon polyps     COPD exacerbation 3/13/2020    Gout, unspecified     History of cervical cancer     Hypothyroidism     Obesity     Osteoarthritis     Paroxysmal atrial fibrillation 11/19/2024    Renal manifestation of secondary diabetes mellitus     Thyroid disease     Trouble in sleeping     Type 2 diabetes mellitus with ophthalmic manifestations     Type 2 diabetes with peripheral circulatory disorder, controlled     Urinary incontinence     Uterine cancer     Uterine cancer        Past Surgical History:   Procedure Laterality Date    ADENOIDECTOMY      EYE SURGERY Right     right eye cataract    HYSTERECTOMY  2008    LIZ-BSO    tonsilectomy      TONSILLECTOMY  1945    TOTAL ABDOMINAL HYSTERECTOMY  2008    TOTAL ABDOMINAL HYSTERECTOMY W/ BILATERAL SALPINGOOPHORECTOMY  2008       Review of patient's allergies indicates:  No Known Allergies    No current facility-administered medications on file prior to encounter.     Current Outpatient Medications on File Prior to Encounter   Medication Sig    allopurinoL (ZYLOPRIM) 300 MG tablet Take 1 tablet (300 mg total) by mouth once daily.    cinacalcet (SENSIPAR) 60 MG Tab Take 120 mg by mouth every evening.    ferrous sulfate 325 (65 FE) MG EC tablet Take 1 tablet by mouth once daily (Patient taking differently: Take 325 mg by mouth once daily.)    HYDROcodone-acetaminophen (NORCO) 7.5-325 mg per tablet Take 1 tablet by mouth every 24 hours as needed for Pain.    JARDIANCE 10 mg tablet Take 10 mg by mouth once daily.    levothyroxine (SYNTHROID) 75 MCG tablet Take 1 tablet (75 mcg total) by mouth once daily.    losartan (COZAAR) 50 MG tablet Take 1 tablet (50 mg total) by mouth 2 (two) times a day.    omega-3 acid ethyl  esters (LOVAZA) 1 gram capsule Take 1 capsule (1 g total) by mouth 2 (two) times daily.    rosuvastatin (CRESTOR) 20 MG tablet Take 1 tablet (20 mg total) by mouth every evening.    XARELTO 15 mg Tab Take 1 tablet by mouth in the evening    acetaminophen (TYLENOL) 500 MG tablet Take 500 mg by mouth every evening. And as needed    magnesium citrate 100 mg Tab Take 100 mg by mouth once daily.    meclizine (ANTIVERT) 12.5 mg tablet Take 1 tablet (12.5 mg total) by mouth 3 (three) times daily as needed for Dizziness.    multivitamin (ONE DAILY MULTIVITAMIN) per tablet Take 1 tablet by mouth once daily.    valACYclovir (VALTREX) 1000 MG tablet Take 1 tablet (1,000 mg total) by mouth 3 (three) times daily. for 7 days (Patient not taking: Reported on 2025)     Family History       Problem Relation (Age of Onset)    Anemia Daughter    Arthritis Father, Daughter, Daughter, Daughter, Daughter    Breast cancer Sister    Cancer Brother    Diabetes Brother, Daughter, Daughter    Glaucoma Daughter    Heart disease Mother, Brother, Brother    Hyperlipidemia Brother    Liver disease Daughter    No Known Problems Son, Son    Ovarian cancer Sister    Stroke Brother, Son          Tobacco Use    Smoking status: Former     Current packs/day: 0.00     Average packs/day: 0.3 packs/day for 13.0 years (4.3 ttl pk-yrs)     Types: Cigarettes     Start date: 1951     Quit date: 1964     Years since quittin.5     Passive exposure: Past    Smokeless tobacco: Never   Substance and Sexual Activity    Alcohol use: No    Drug use: No    Sexual activity: Never     Birth control/protection: Surgical     Comment:      Review of Systems   Constitutional: Negative.   Cardiovascular: Negative.    Respiratory: Negative.     Musculoskeletal:  Positive for muscle weakness.   Neurological:  Positive for weakness.     Objective:     Vital Signs (Most Recent):  Temp: 98.6 °F (37 °C) (25)  Pulse: 83 (25)  Resp: 20  "(02/26/25 1516)  BP: (!) 147/67 (02/26/25 1516)  SpO2: 97 % (02/26/25 1516) Vital Signs (24h Range):  Temp:  [97.2 °F (36.2 °C)-99.4 °F (37.4 °C)] 98.6 °F (37 °C)  Pulse:  [] 83  Resp:  [18-20] 20  SpO2:  [93 %-98 %] 97 %  BP: (118-186)/(60-79) 147/67     Weight: 106.3 kg (234 lb 5.6 oz)  Body mass index is 35.63 kg/m².    SpO2: 97 %         Intake/Output Summary (Last 24 hours) at 2/26/2025 1539  Last data filed at 2/26/2025 1320  Gross per 24 hour   Intake 1180.18 ml   Output 800 ml   Net 380.18 ml       Lines/Drains/Airways       Drain  Duration             Female External Urinary Catheter w/ Suction 02/26/25 0856 <1 day              Peripheral Intravenous Line  Duration                  Peripheral IV - Single Lumen 02/25/25 1617 20 G Posterior;Right Hand <1 day                     Physical Exam  Constitutional:       Appearance: She is obese.   Cardiovascular:      Rate and Rhythm: Normal rate and regular rhythm.      Pulses: Normal pulses.      Heart sounds: Normal heart sounds.   Pulmonary:      Effort: Pulmonary effort is normal.      Breath sounds: Normal breath sounds.   Musculoskeletal:         General: Normal range of motion.   Skin:     General: Skin is warm and dry.   Neurological:      Mental Status: She is alert and oriented to person, place, and time.   Psychiatric:         Mood and Affect: Mood normal.          Significant Labs: CMP   Recent Labs   Lab 02/25/25  1238 02/26/25  0425    139   K 4.5 4.6    105   CO2 24 23    103   BUN 25* 25*   CREATININE 1.6* 1.4   CALCIUM 9.3 9.1   PROT 7.4 7.0   ALBUMIN 3.1* 2.9*   BILITOT 0.6 0.5   ALKPHOS 92 83   AST 18 37   ALT 24 31   ANIONGAP 9 11   , CBC   Recent Labs   Lab 02/25/25  1238 02/26/25  0425   WBC 13.46* 9.58   HGB 11.0* 10.3*   HCT 34.3* 32.9*    237   , and INR No results for input(s): "INR", "PROTIME" in the last 48 hours.    Significant Imaging: Echocardiogram: Transthoracic echo (TTE) complete (Cupid Only): "   Results for orders placed or performed during the hospital encounter of 04/21/24   Echo   Result Value Ref Range    RA Width 4.42 cm    LA Vol (MOD) 105.28 cm3    Left Atrium Major Axis 6.26 cm    Left Atrium Minor Axis 6.37 cm    RA Major Axis 5.34 cm    LV Diastolic Volume 119.50 mL    LV Systolic Volume 53.90 mL    MV Peak A Niranjan 0.80 m/s    MV stenosis pressure 1/2 time 61.40 ms    TR Max Niranjan 2.53 m/s    MV Peak E Niranjan 0.61 m/s    Ao VTI 27.79 cm    Ao peak niranjan 1.59 m/s    LVOT peak VTI 23.09 cm    LVOT peak niranjan 1.41 m/s    LVOT diameter 2.19 cm    E wave deceleration time 211.71 msec    AV mean gradient 5 mmHg    TAPSE 2.10 cm    RVDD 3.45 cm    LA size 3.96 cm    Ascending aorta 3.51 cm    STJ 2.77 cm    Sinus 3.08 cm    LVIDs 3.59 2.1 - 4.0 cm    PW 1.13 (A) 0.6 - 1.1 cm    IVS 1.35 (A) 0.6 - 1.1 cm    LVIDd 5.02 3.5 - 6.0 cm    TDI LATERAL 0.04 m/s    LA WIDTH 5.14 cm    TDI SEPTAL 0.04 m/s    LV LATERAL E/E' RATIO 15.25 m/s    LV SEPTAL E/E' RATIO 15.25 m/s    RV/LV Ratio 0.69 cm    FS 28 28 - 44 %    LA Vol 109.25 cm3    LV mass 246.35 g    Left Ventricle Relative Wall Thickness 0.45 cm    AV valve area 3.13 cm²    AV Velocity Ratio 0.89     AV index (prosthetic) 0.83     MV valve area p 1/2 method 3.58 cm2    E/A ratio 0.76     Mean e' 0.04 m/s    LVOT area 3.8 cm2    LVOT stroke volume 86.93 cm3    AV peak gradient 10 mmHg    E/E' ratio 15.25 m/s    Triscuspid Valve Regurgitation Peak Gradient 26 mmHg    RAYMOND by Velocity Ratio 3.34 cm²    BSA 2.36 m2    LV Systolic Volume Index 23.7 mL/m2    LV Diastolic Volume Index 52.64 mL/m2    LV Mass Index 109 g/m2    JACLYN 48.1 mL/m2    JACLYN (MOD) 46.4 mL/m2    ZLVIDS -2.78     ZLVIDD -5.19     TV resting pulmonary artery pressure 29 mmHg    RV TB RVSP 6 mmHg    Est. RA pres 3 mmHg    Narrative      Left Ventricle: The left ventricle is normal in size. Mildly increased   ventricular mass. Normal wall thickness. There is concentric hypertrophy.   There is normal  systolic function with a visually estimated ejection   fraction of 60 - 65%. Grade II diastolic dysfunction.    Right Ventricle: Normal right ventricular cavity size. Systolic   function is normal.    Left Atrium: Left atrium is moderately dilated.    Aortic Valve: There is mild aortic valve sclerosis.    Tricuspid Valve: There is mild regurgitation.    Pulmonary Artery: The estimated pulmonary artery systolic pressure is   29 mmHg.    IVC/SVC: Normal venous pressure at 3 mmHg.    Right ventricle not well seen.

## 2025-02-26 NOTE — PHARMACY MED REC
"  [facility]           Pharmacy  Admission Medication History     The home medication history was taken by Ananya Bueno.      Medication history obtained from Medications listed below were obtained from: Mobile [employer] software- [employer]    Based on information gathered for medication list, you may go to "Admission" then "Reconcile Home Medications" tabs to review and/or act upon those items.     The home medication list has been updated by the Pharmacy department.   Please read ALL comments highlighted in yellow.   Please address this information as you see fit.    Feel free to contact us if you have any questions or require assistance.        No current facility-administered medications on file prior to encounter.     Current Outpatient Medications on File Prior to Encounter   Medication Sig Dispense Refill    allopurinoL (ZYLOPRIM) 300 MG tablet Take 1 tablet (300 mg total) by mouth once daily. 90 tablet 3    cinacalcet (SENSIPAR) 60 MG Tab Take 120 mg by mouth every evening.      ferrous sulfate 325 (65 FE) MG EC tablet Take 1 tablet by mouth once daily (Patient taking differently: Take 325 mg by mouth once daily.) 90 tablet 0    HYDROcodone-acetaminophen (NORCO) 7.5-325 mg per tablet Take 1 tablet by mouth every 24 hours as needed for Pain. 30 tablet 0    JARDIANCE 10 mg tablet Take 10 mg by mouth once daily.      levothyroxine (SYNTHROID) 75 MCG tablet Take 1 tablet (75 mcg total) by mouth once daily. 90 tablet 0    losartan (COZAAR) 50 MG tablet Take 1 tablet (50 mg total) by mouth 2 (two) times a day. 180 tablet 3    omega-3 acid ethyl esters (LOVAZA) 1 gram capsule Take 1 capsule (1 g total) by mouth 2 (two) times daily. 90 capsule 1    rosuvastatin (CRESTOR) 20 MG tablet Take 1 tablet (20 mg total) by mouth every evening. 90 tablet 3    XARELTO 15 mg Tab Take 1 tablet by mouth in the evening 90 tablet 3    acetaminophen (TYLENOL) 500 MG tablet Take 500 mg by mouth every evening. And as " needed      magnesium citrate 100 mg Tab Take 100 mg by mouth once daily.      meclizine (ANTIVERT) 12.5 mg tablet Take 1 tablet (12.5 mg total) by mouth 3 (three) times daily as needed for Dizziness. 30 tablet 0    multivitamin (ONE DAILY MULTIVITAMIN) per tablet Take 1 tablet by mouth once daily.         Please address this information as you see fit.  Feel free to contact us if you have any questions or require assistance.    Ananya Bueno  [phone]                .

## 2025-02-26 NOTE — PT/OT/SLP EVAL
"Physical Therapy Evaluation    Patient Name:  Tonya Bucio   MRN:  [id number]    Recommendations:     Discharge Recommendations: Moderate Intensity Therapy   Discharge Equipment Recommendations: slide board   Barriers to discharge: Decreased caregiver support    Assessment:     Tonya Bucio is a 89 y.o. female admitted with a medical diagnosis of <principal problem not specified>.  She presents with the following impairments/functional limitations: weakness, impaired endurance, impaired self care skills, impaired balance, impaired functional mobility, decreased lower extremity function, decreased safety awareness, decreased upper extremity function. Patient tolerated sitting up at edge of the bed. Noted legs shakes and having difficulty to perform sit to stand with requires max assistance using RW to support. Legs gives out easily.    Rehab Prognosis: Fair; patient would benefit from acute skilled PT services to address these deficits and reach maximum level of function.    Recent Surgery: * No surgery found *      Plan:     During this hospitalization, patient to be seen 5 x/week to address the identified rehab impairments via therapeutic activities, therapeutic exercises, neuromuscular re-education and progress toward the following goals:    Plan of Care Expires:  03/05/25    Subjective     Chief Complaint: legs weakness and shakes - per pt  Patient/Family Comments/goals: "Able to transfer in my wheelchair" - per pt  Pain/Comfort:  Pain Rating 1: 0/10    Patients cultural, spiritual, [employer] conflicts given the current situation: no    Living Environment:  Patient lives with family( daughter, son and grandaughter) [facility] with ramp access  Prior to admission, patients level of function was Wheelchair bound Modified Ind with wheelchair at home environment.  Equipment used at home: wheelchair, bath bench, other (see comments), grab bar (transfer tub bench; RW not use).  DME owned (not currently " used): none.  Upon discharge, patient will have assistance from family.    Objective:     Communicated with patient prior to session.  Patient found HOB elevated with telemetry, peripheral IV, PureWick, bed alarm  upon PT entry to room.    General Precautions: Standard, fall  Orthopedic Precautions:N/A   Braces: N/A  Respiratory Status: Room air    Exams:  Cognitive Exam:  Patient is oriented to Person, Place, Time, and Situation  Fine Motor Coordination:    -       Intact  Gross Motor Coordination:  WFL  Postural Exam:  Patient presented with the following abnormalities:    -       Rounded shoulders  -       Forward head  Sensation:    -       Intact  Skin Integrity/Edema:      -       Skin integrity: Visible skin intact  RLE ROM: WFL  RLE Strength: 2+/5  LLE ROM: WFL  LLE Strength: 2+/5    Functional Mobility:  Bed Mobility:     Rolling Left:  stand by assistance  Rolling Right: stand by assistance  Scooting: contact guard assistance  Supine to Sit: contact guard assistance  Sit to Supine: contact guard assistance  Transfers:     Sit to Stand:  maximal assistance with rolling walker  Gait: unable; wheelchair bound  Balance: sitting: standby assistance; Standing static with grab bar or RW : Max assistance      AM-PAC 6 CLICK MOBILITY  Total Score:11       Treatment & Education:  Educated patient the role of PT, POC and safety measures in bed mobility.  Initiated sit to stand transfer trng and static stand tolerance using RW or bed rails    Patient left HOB elevated with all lines intact, call button in reach, bed alarm on, and nursing notified.    GOALS:   Multidisciplinary Problems       Physical Therapy Goals          Problem: Physical Therapy    Goal Priority Disciplines Outcome Interventions   Physical Therapy Goal     PT, PT/OT     Description: Patient will increase functional independence with mobility by performin. Supine to sit with Standby Assistance  2. Sit <> Stand with Standby Assistance with  grab bar or RW  3. Increase standing tolerance x ~2 minutes using grab bar or RW for support  4. Bed to chair transfer with Contact Guard Assistancewith or without sliding board using slide/scoot TECHNIQUE  5. Lower extremity exercise program x10 reps per handout, with assistance as needed                          DME Justifications:  Sliding board to promote modified independence during wheelchair transfers and self care activities.    History:     Past Medical History:   Diagnosis Date    Acute bronchitis with asthma     Anemia due to stage 3 chronic kidney disease     Anticoagulant long-term use     Asthma     Back pain     Cancer     Chest pain, musculoskeletal 7/16/2013    Chronic bronchitis     Colon polyps     COPD exacerbation 3/13/2020    Gout, unspecified     History of cervical cancer     Hypothyroidism     Obesity     Osteoarthritis     Paroxysmal atrial fibrillation 11/19/2024    Renal manifestation of secondary diabetes mellitus     Thyroid disease     Trouble in sleeping     Type 2 diabetes mellitus with ophthalmic manifestations     Type 2 diabetes with peripheral circulatory disorder, controlled     Urinary incontinence     Uterine cancer     Uterine cancer        Past Surgical History:   Procedure Laterality Date    ADENOIDECTOMY      EYE SURGERY Right     right eye cataract    HYSTERECTOMY  2008    LIZ-BSO    tonsilectomy      TONSILLECTOMY  1945    TOTAL ABDOMINAL HYSTERECTOMY  2008    TOTAL ABDOMINAL HYSTERECTOMY W/ BILATERAL SALPINGOOPHORECTOMY  2008       Time Tracking:     PT Received On: 02/26/25  PT Start Time: 1000     PT Stop Time: 1030  PT Total Time (min): 30 min     Billable Minutes: Evaluation 15 and Therapeutic Activity 15      02/26/2025

## 2025-02-26 NOTE — ASSESSMENT & PLAN NOTE
Condition stable  Recently started on Jardiance   BNP mildly elevated, however no heart failure concerns this hospitalization  Continue current regimen

## 2025-02-26 NOTE — HPI
89-year-old with past medical history PAF on anticoagulation, COPD, obesity, diabetes mellitus type 2, uterine cancer, history of cervical cancer, anemia, diastolic heart failure, a flutter, coronary artery disease, stage 3 chronic kidney disease, hyperlipidemia presents to the ED visit for complaints of weakness.  Troponin elevated on admission, peaked at 0.133, now 0.112.  No complaints of chest pain, shortness of breath, bilateral lower extremity swelling.  Patient noted to have UTI and is receiving treatment.  Cardiology ex to evaluate elevated troponin.  Of note, September 2024 patient with false-positive troponin

## 2025-02-26 NOTE — ASSESSMENT & PLAN NOTE
Creatine stable for now. BMP reviewed- noted Estimated Creatinine Clearance: 35.5 mL/min (based on SCr of 1.4 mg/dL). according to latest data. Based on current GFR, CKD stage is stage 3 - GFR 30-59.  Monitor UOP and serial BMP and adjust therapy as needed. Renally dose meds. Avoid nephrotoxic medications and procedures.

## 2025-02-26 NOTE — CONSULTS
[place] - Med Surg ([facility])  Cardiology  Consult Note    Patient Name: Tonya Bucio  MRN: [id number]  Admission Date: 2/25/2025  Hospital Length of Stay: 0 days  Code Status: Full Code   Attending Provider: Tasha Atkins MD   Consulting Provider: Sharri Barahona NP  Primary Care Physician: Tasha Atkins MD  Principal Problem:Elevated troponin    Patient information was obtained from patient and ER records.     [facility]  Consult performed by: Sharri Barahona NP  Consult ordered by: Ifeanyi Gonzalez MD        Subjective:     Chief Complaint:  weakness      HPI:   89-year-old with past medical history PAF on anticoagulation, COPD, obesity, diabetes mellitus type 2, uterine cancer, history of cervical cancer, anemia, diastolic heart failure, a flutter, coronary artery disease, stage 3 chronic kidney disease, hyperlipidemia presents to the ED visit for complaints of weakness.  Troponin elevated on admission, peaked at 0.133, now 0.112.  No complaints of chest pain, shortness of breath, bilateral lower extremity swelling.  Patient noted to have UTI and is receiving treatment.  Cardiology ex to evaluate elevated troponin.  Of note, September 2024 patient with false-positive troponin    Past Medical History:   Diagnosis Date    Acute bronchitis with asthma     Anemia due to stage 3 chronic kidney disease     Anticoagulant long-term use     Asthma     Back pain     Cancer     Chest pain, musculoskeletal 7/16/2013    Chronic bronchitis     Colon polyps     COPD exacerbation 3/13/2020    Gout, unspecified     History of cervical cancer     Hypothyroidism     Obesity     Osteoarthritis     Paroxysmal atrial fibrillation 11/19/2024    Renal manifestation of secondary diabetes mellitus     Thyroid disease     Trouble in sleeping     Type 2 diabetes mellitus with ophthalmic manifestations     Type 2 diabetes with peripheral circulatory disorder, controlled     Urinary  incontinence     Uterine cancer     Uterine cancer        Past Surgical History:   Procedure Laterality Date    ADENOIDECTOMY      EYE SURGERY Right     right eye cataract    HYSTERECTOMY  2008    [facility]-BSO    tonsilectomy      TONSILLECTOMY  1945    TOTAL ABDOMINAL HYSTERECTOMY  2008    TOTAL ABDOMINAL HYSTERECTOMY W/ BILATERAL SALPINGOOPHORECTOMY  2008       Review of patient's allergies indicates:  No Known Allergies    No current facility-administered medications on file prior to encounter.     Current Outpatient Medications on File Prior to Encounter   Medication Sig    allopurinoL (ZYLOPRIM) 300 MG tablet Take 1 tablet (300 mg total) by mouth once daily.    cinacalcet (SENSIPAR) 60 MG Tab Take 120 mg by mouth every evening.    ferrous sulfate 325 (65 FE) MG EC tablet Take 1 tablet by mouth once daily (Patient taking differently: Take 325 mg by mouth once daily.)    HYDROcodone-acetaminophen (NORCO) 7.5-325 mg per tablet Take 1 tablet by mouth every 24 hours as needed for Pain.    JARDIANCE 10 mg tablet Take 10 mg by mouth once daily.    levothyroxine (SYNTHROID) 75 MCG tablet Take 1 tablet (75 mcg total) by mouth once daily.    losartan (COZAAR) 50 MG tablet Take 1 tablet (50 mg total) by mouth 2 (two) times a day.    omega-3 acid ethyl esters (LOVAZA) 1 gram capsule Take 1 capsule (1 g total) by mouth 2 (two) times daily.    rosuvastatin (CRESTOR) 20 MG tablet Take 1 tablet (20 mg total) by mouth every evening.    XARELTO 15 mg Tab Take 1 tablet by mouth in the evening    acetaminophen (TYLENOL) 500 MG tablet Take 500 mg by mouth every evening. And as needed    magnesium citrate 100 mg Tab Take 100 mg by mouth once daily.    meclizine (ANTIVERT) 12.5 mg tablet Take 1 tablet (12.5 mg total) by mouth 3 (three) times daily as needed for Dizziness.    multivitamin (ONE DAILY MULTIVITAMIN) per tablet Take 1 tablet by mouth once daily.    valACYclovir (VALTREX) 1000 MG tablet Take 1 tablet (1,000 mg total) by mouth 3  (three) times daily. for 7 days (Patient not taking: Reported on 2025)     Family History       Problem Relation (Age of Onset)    Anemia Daughter    Arthritis Father, Daughter, Daughter, Daughter, Daughter    Breast cancer Sister    Cancer Brother    Diabetes Brother, Daughter, Daughter    Glaucoma Daughter    Heart disease Mother, Brother, Brother    Hyperlipidemia Brother    Liver disease Daughter    No Known Problems Son, Son    Ovarian cancer Sister    Stroke Brother, Son          Tobacco Use    Smoking status: Former     Current packs/day: 0.00     Average packs/day: 0.3 packs/day for 13.0 years (4.3 ttl pk-yrs)     Types: Cigarettes     Start date: 1951     Quit date: 1964     Years since quittin.5     Passive exposure: Past    Smokeless tobacco: Never   Substance and Sexual Activity    Alcohol use: No    Drug use: No    Sexual activity: Never     Birth control/protection: Surgical     Comment:      Review of Systems   Constitutional: Negative.   Cardiovascular: Negative.    Respiratory: Negative.     Musculoskeletal:  Positive for muscle weakness.   Neurological:  Positive for weakness.     Objective:     Vital Signs (Most Recent):  Temp: 98.6 °F (37 °C) (25)  Pulse: 83 (25)  Resp: 20 (25)  BP: (!) 147/67 (25)  SpO2: 97 % (25) Vital Signs (24h Range):  Temp:  [97.2 °F (36.2 °C)-99.4 °F (37.4 °C)] 98.6 °F (37 °C)  Pulse:  [] 83  Resp:  [18-20] 20  SpO2:  [93 %-98 %] 97 %  BP: (118-186)/(60-79) 147/67     Weight: 106.3 kg (234 lb 5.6 oz)  Body mass index is 35.63 kg/m².    SpO2: 97 %         Intake/Output Summary (Last 24 hours) at 2025 1539  Last data filed at 2025 1320  Gross per 24 hour   Intake 1180.18 ml   Output 800 ml   Net 380.18 ml       Lines/Drains/Airways       Drain  Duration             Female External Urinary Catheter w/ Suction 25 0856 <1 day              Peripheral Intravenous Line  Duration  "                 Peripheral IV - Single Lumen 02/25/25 1617 20 G Posterior;Right Hand <1 day                     Physical Exam  Constitutional:       Appearance: She is obese.   Cardiovascular:      Rate and Rhythm: Normal rate and regular rhythm.      Pulses: Normal pulses.      Heart sounds: Normal heart sounds.   Pulmonary:      Effort: Pulmonary effort is normal.      Breath sounds: Normal breath sounds.   Musculoskeletal:         General: Normal range of motion.   Skin:     General: Skin is warm and dry.   Neurological:      Mental Status: She is alert and oriented to person, place, and time.   Psychiatric:         Mood and Affect: Mood normal.          Significant Labs: CMP   Recent Labs   Lab 02/25/25  1238 02/26/25  0425    139   K 4.5 4.6    105   CO2 24 23    103   BUN 25* 25*   CREATININE 1.6* 1.4   CALCIUM 9.3 9.1   PROT 7.4 7.0   ALBUMIN 3.1* 2.9*   BILITOT 0.6 0.5   ALKPHOS 92 83   AST 18 37   ALT 24 31   ANIONGAP 9 11   , CBC   Recent Labs   Lab 02/25/25  1238 02/26/25  0425   WBC 13.46* 9.58   HGB 11.0* 10.3*   HCT 34.3* 32.9*    237   , and INR No results for input(s): "INR", "PROTIME" in the last 48 hours.    Significant Imaging: Echocardiogram: Transthoracic echo (TTE) complete (Cupid Only):   Results for orders placed or performed during the hospital encounter of 04/21/24   Echo   Result Value Ref Range    RA Width 4.42 cm    LA Vol (MOD) 105.28 cm3    Left Atrium Major Axis 6.26 cm    Left Atrium Minor Axis 6.37 cm    RA Major Axis 5.34 cm    LV Diastolic Volume 119.50 mL    LV Systolic Volume 53.90 mL    MV Peak A Niranjan 0.80 m/s    MV stenosis pressure 1/2 time 61.40 ms    TR Max Niranjan 2.53 m/s    MV Peak E Niranjan 0.61 m/s    Ao VTI 27.79 cm    Ao peak niranjan 1.59 m/s    LVOT peak VTI 23.09 cm    LVOT peak niranjan 1.41 m/s    LVOT diameter 2.19 cm    E wave deceleration time 211.71 msec    AV mean gradient 5 mmHg    TAPSE 2.10 cm    RVDD 3.45 cm    LA size 3.96 cm    Ascending aorta " 3.51 cm    STJ 2.77 cm    Sinus 3.08 cm    LVIDs 3.59 2.1 - 4.0 cm    PW 1.13 (A) 0.6 - 1.1 cm    IVS 1.35 (A) 0.6 - 1.1 cm    LVIDd 5.02 3.5 - 6.0 cm    TDI LATERAL 0.04 m/s    LA WIDTH 5.14 cm    TDI SEPTAL 0.04 m/s    LV LATERAL E/E' RATIO 15.25 m/s    LV SEPTAL E/E' RATIO 15.25 m/s    RV/LV Ratio 0.69 cm    FS 28 28 - 44 %    LA Vol 109.25 cm3    LV mass 246.35 g    Left Ventricle Relative Wall Thickness 0.45 cm    AV valve area 3.13 cm²    AV Velocity Ratio 0.89     AV index (prosthetic) 0.83     MV valve area p 1/2 method 3.58 cm2    E/A ratio 0.76     Mean e' 0.04 m/s    LVOT area 3.8 cm2    LVOT stroke volume 86.93 cm3    AV peak gradient 10 mmHg    E/E' ratio 15.25 m/s    Triscuspid Valve Regurgitation Peak Gradient 26 mmHg    RAYMOND by Velocity Ratio 3.34 cm²    BSA 2.36 m2    LV Systolic Volume Index 23.7 mL/m2    LV Diastolic Volume Index 52.64 mL/m2    LV Mass Index 109 g/m2    JACLYN 48.1 mL/m2    JACLYN (MOD) 46.4 mL/m2    ZLVIDS -2.78     ZLVIDD -5.19     TV resting pulmonary artery pressure 29 mmHg    RV TB RVSP 6 mmHg    Est. RA pres 3 mmHg    Narrative      Left Ventricle: The left ventricle is normal in size. Mildly increased   ventricular mass. Normal wall thickness. There is concentric hypertrophy.   There is normal systolic function with a visually estimated ejection   fraction of 60 - 65%. Grade II diastolic dysfunction.    Right Ventricle: Normal right ventricular cavity size. Systolic   function is normal.    Left Atrium: Left atrium is moderately dilated.    Aortic Valve: There is mild aortic valve sclerosis.    Tricuspid Valve: There is mild regurgitation.    Pulmonary Artery: The estimated pulmonary artery systolic pressure is   29 mmHg.    IVC/SVC: Normal venous pressure at 3 mmHg.    Right ventricle not well seen.       Assessment and Plan:     * Elevated troponin  No angina  Peak troponin 0.133, now 0.112; chronic elevation  Likely non thrombotic troponin elevation due to CKD, diastolic  heart failure, CAD    EF 60-65%, grade 2 diastolic dysfunction, left atrial moderately dilated, mild AVF, TR    Of note, false-positive troponin elevation September 2024      Essential hypertension  Condition stable   Continue losartan    UTI (urinary tract infection)  Treatment per primary team    Chronic diastolic heart failure  Condition stable  Recently started on Jardiance   BNP mildly elevated, however no heart failure concerns this hospitalization  Continue current regimen    Typical atrial flutter  Chronic; rate controlled  Anticoagulated with Xarelto  Continue current management    COPD (chronic obstructive pulmonary disease)  Condition stable    Hyperlipidemia associated with type 2 diabetes mellitus  Continue pravastatin          VTE Risk Mitigation (From admission, onward)           Ordered     rivaroxaban tablet 15 mg  Nightly         02/25/25 1841     IP VTE HIGH RISK PATIENT  Once         02/25/25 1841     Place sequential compression device  Until discontinued         02/25/25 1841                    Thank you for your consult. I will follow-up with patient. Please contact us if you have any additional questions.    Sharri Barahona NP  Cardiology   [place] - Med Surg (3rd Fl)

## 2025-02-26 NOTE — PLAN OF CARE
02/26/25 0901   Rounds   Attendance Provider;Nurse ;   Discharge Plan A Home with family   Why the patient remains in the hospital Requires continued medical care   Transition of Care Barriers Mobility     Care team at bedside, discussed plan of care with patient. Discharge planning initiated. Patient provided with Case Management contact information and encouraged to call with concerns or questions. Discharge Planning Begins on Admission Pamphlet provided.  Will continue to follow for duration of stay.

## 2025-02-26 NOTE — ASSESSMENT & PLAN NOTE
Seen on EKG  Looks like patient goes in and out of atrial flutter  Unable to tolerate BB in the past

## 2025-02-27 PROBLEM — R78.81 BACTEREMIA: Status: ACTIVE | Noted: 2025-02-27

## 2025-02-27 PROCEDURE — 87040 BLOOD CULTURE FOR BACTERIA: CPT | Mod: 59,HCNC | Performed by: FAMILY MEDICINE

## 2025-02-27 PROCEDURE — 36415 COLL VENOUS BLD VENIPUNCTURE: CPT | Mod: HCNC | Performed by: FAMILY MEDICINE

## 2025-02-27 PROCEDURE — 63600175 PHARM REV CODE 636 W HCPCS: Mod: HCNC

## 2025-02-27 PROCEDURE — 86580 TB INTRADERMAL TEST: CPT | Mod: HCNC | Performed by: PHYSICIAN ASSISTANT

## 2025-02-27 PROCEDURE — 11000001 HC ACUTE MED/SURG PRIVATE ROOM: Mod: HCNC

## 2025-02-27 PROCEDURE — 97530 THERAPEUTIC ACTIVITIES: CPT | Mod: HCNC

## 2025-02-27 PROCEDURE — 99232 SBSQ HOSP IP/OBS MODERATE 35: CPT | Mod: HCNC,95,, | Performed by: PHYSICIAN ASSISTANT

## 2025-02-27 PROCEDURE — 21400001 HC TELEMETRY ROOM: Mod: HCNC

## 2025-02-27 PROCEDURE — 30200315 PPD INTRADERMAL TEST REV CODE 302: Mod: HCNC | Performed by: PHYSICIAN ASSISTANT

## 2025-02-27 PROCEDURE — 25000003 PHARM REV CODE 250: Mod: HCNC

## 2025-02-27 RX ADMIN — LOSARTAN POTASSIUM 50 MG: 50 TABLET, FILM COATED ORAL at 08:02

## 2025-02-27 RX ADMIN — LEVOTHYROXINE SODIUM 75 MCG: 75 TABLET ORAL at 06:02

## 2025-02-27 RX ADMIN — CEFTRIAXONE SODIUM 1 G: 1 INJECTION, POWDER, FOR SOLUTION INTRAMUSCULAR; INTRAVENOUS at 04:02

## 2025-02-27 RX ADMIN — RIVAROXABAN 15 MG: 15 TABLET, FILM COATED ORAL at 08:02

## 2025-02-27 RX ADMIN — LOSARTAN POTASSIUM 50 MG: 50 TABLET, FILM COATED ORAL at 09:02

## 2025-02-27 RX ADMIN — PRAVASTATIN SODIUM 80 MG: 40 TABLET ORAL at 09:02

## 2025-02-27 RX ADMIN — TUBERCULIN PURIFIED PROTEIN DERIVATIVE 5 UNITS: 5 INJECTION, SOLUTION INTRADERMAL at 09:02

## 2025-02-27 NOTE — PT/OT/SLP PROGRESS
Occupational Therapy      Patient Name:  Tonya Bucio   MRN:  [id number]    Patient not seen today secondary to Patient unwilling to participate. Upon entry therapist explaining purpose of session to improve ability to transfer using slide board. Pt responded that she is feeling too weak today to get back up. Therapist encouraging Pt stating transfer would not require her to complete full stand with Pt continuing to decline. Pt was offered EOB activity today but continued declining. Will follow-up Tomorrow..    2/27/2025

## 2025-02-27 NOTE — PROGRESS NOTES
[facility] Surg ([facility])  [facility] Medicine  Progress Note    Patient Name: Tonya Bucio  MRN: [id number]  Patient Class: IP- Inpatient   Admission Date: 2/25/2025  Length of Stay: 0 days  Attending Physician: Anjali Porter MD  Primary Care Provider: Tasha Atkins MD        Subjective     Principal Problem:Elevated troponin        HPI:  Patient is an 89 year old female with medical history of HTN, hypothyroidism, COPD, CKD stage 3, atrial fibrillation and atrial flutter who presented to the ED with lower extremity weakness. Symptoms started 3 days ago.  She normally is in a wheelchair but is able to stand up to make her transition from the wheelchair to toilet.  However, her legs would not work.  She denies fever, chills, CP, SOB, nausea, vomiting, abdominal pain, urinary retention and dysuria.        Admitted for elevated troponin.      Overview/Hospital Course:  PT HD stable on room air.  Enterococcus on urine culture.  Staph on 2 out of 2 blood cultures.  Waiting for repeat.  Currently on IV rocephin.  Not MRSA on PCR.  Nursing home placement pending.      Past Medical History:   Diagnosis Date    Acute bronchitis with asthma     Anemia due to stage 3 chronic kidney disease     Anticoagulant long-term use     Asthma     Back pain     Cancer     Chest pain, musculoskeletal 7/16/2013    Chronic bronchitis     Colon polyps     COPD exacerbation 3/13/2020    Gout, unspecified     History of cervical cancer     Hypothyroidism     Obesity     Osteoarthritis     Paroxysmal atrial fibrillation 11/19/2024    Renal manifestation of secondary diabetes mellitus     Thyroid disease     Trouble in sleeping     Type 2 diabetes mellitus with ophthalmic manifestations     Type 2 diabetes with peripheral circulatory disorder, controlled     Urinary incontinence     Uterine cancer     Uterine cancer        Past Surgical History:   Procedure Laterality Date    ADENOIDECTOMY      EYE SURGERY Right     right eye  cataract    HYSTERECTOMY  2008    [facility]    tonsilectomy      TONSILLECTOMY  1945    TOTAL ABDOMINAL HYSTERECTOMY  2008    TOTAL ABDOMINAL HYSTERECTOMY W/ BILATERAL SALPINGOOPHORECTOMY  2008       Review of patient's allergies indicates:  No Known Allergies    No current facility-administered medications on file prior to encounter.     Current Outpatient Medications on File Prior to Encounter   Medication Sig    allopurinoL (ZYLOPRIM) 300 MG tablet Take 1 tablet (300 mg total) by mouth once daily.    cinacalcet (SENSIPAR) 60 MG Tab Take 120 mg by mouth every evening.    ferrous sulfate 325 (65 FE) MG EC tablet Take 1 tablet by mouth once daily (Patient taking differently: Take 325 mg by mouth once daily.)    HYDROcodone-acetaminophen (NORCO) 7.5-325 mg per tablet Take 1 tablet by mouth every 24 hours as needed for Pain.    JARDIANCE 10 mg tablet Take 10 mg by mouth once daily.    levothyroxine (SYNTHROID) 75 MCG tablet Take 1 tablet (75 mcg total) by mouth once daily.    losartan (COZAAR) 50 MG tablet Take 1 tablet (50 mg total) by mouth 2 (two) times a day.    omega-3 acid ethyl esters (LOVAZA) 1 gram capsule Take 1 capsule (1 g total) by mouth 2 (two) times daily.    rosuvastatin (CRESTOR) 20 MG tablet Take 1 tablet (20 mg total) by mouth every evening.    XARELTO 15 mg Tab Take 1 tablet by mouth in the evening    acetaminophen (TYLENOL) 500 MG tablet Take 500 mg by mouth every evening. And as needed    magnesium citrate 100 mg Tab Take 100 mg by mouth once daily.    meclizine (ANTIVERT) 12.5 mg tablet Take 1 tablet (12.5 mg total) by mouth 3 (three) times daily as needed for Dizziness.    multivitamin (ONE DAILY MULTIVITAMIN) per tablet Take 1 tablet by mouth once daily.    valACYclovir (VALTREX) 1000 MG tablet Take 1 tablet (1,000 mg total) by mouth 3 (three) times daily. for 7 days (Patient not taking: Reported on 2/25/2025)     Family History       Problem Relation (Age of Onset)    Anemia Daughter     Arthritis Father, Daughter, Daughter, Daughter, Daughter    Breast cancer Sister    Cancer Brother    Diabetes Brother, Daughter, Daughter    Glaucoma Daughter    Heart disease Mother, Brother, Brother    Hyperlipidemia Brother    Liver disease Daughter    No Known Problems Son, Son    Ovarian cancer Sister    Stroke Brother, Son          Tobacco Use    Smoking status: Former     Current packs/day: 0.00     Average packs/day: 0.3 packs/day for 13.0 years (4.3 ttl pk-yrs)     Types: Cigarettes     Start date: 1951     Quit date: 1964     Years since quittin.5     Passive exposure: Past    Smokeless tobacco: Never   Substance and Sexual Activity    Alcohol use: No    Drug use: No    Sexual activity: Never     Birth control/protection: Surgical     Comment:      Review of Systems   Constitutional:  Negative for chills and fever.   HENT:  Negative for ear discharge and ear pain.    Eyes:  Negative for pain and discharge.   Respiratory:  Negative for cough and shortness of breath.    Cardiovascular:  Negative for chest pain and leg swelling.   Gastrointestinal:  Negative for abdominal distention, abdominal pain, nausea and vomiting.   Endocrine: Negative for cold intolerance and heat intolerance.   Genitourinary:  Negative for difficulty urinating, dysuria and urgency.   Musculoskeletal:  Negative for joint swelling and myalgias.   Skin:  Negative for rash and wound.   Neurological:  Positive for weakness. Negative for dizziness and headaches.   Psychiatric/Behavioral:  Negative for agitation and confusion.      Objective:     Vital Signs (Most Recent):  Temp: 98.1 °F (36.7 °C) (25 1115)  Pulse: 84 (25 1203)  Resp: (!) 24 (25 1115)  BP: 130/64 (25 1115)  SpO2: 95 % (25 1115) Vital Signs (24h Range):  Temp:  [98.1 °F (36.7 °C)-99.5 °F (37.5 °C)] 98.1 °F (36.7 °C)  Pulse:  [76-93] 84  Resp:  [20-24] 24  SpO2:  [94 %-97 %] 95 %  BP: (130-147)/(63-69) 130/64     Weight:  "106.3 kg (234 lb 5.6 oz)  Body mass index is 35.63 kg/m².     Physical Exam  Constitutional:       General: She is not in acute distress.  HENT:      Head: Normocephalic and atraumatic.   Eyes:      General:         Right eye: No discharge.         Left eye: No discharge.   Cardiovascular:      Rate and Rhythm: Normal rate and regular rhythm.   Pulmonary:      Effort: Pulmonary effort is normal.      Breath sounds: Normal breath sounds.   Abdominal:      General: There is no distension.      Tenderness: There is no abdominal tenderness.   Musculoskeletal:         General: No swelling or tenderness.      Cervical back: Neck supple. No tenderness.   Skin:     General: Skin is warm and dry.   Neurological:      General: No focal deficit present.      Mental Status: She is alert and oriented to person, place, and time.      Comments: No focal weakness  Able to maintain extremities against gravity  No facial asymmetry      Psychiatric:         Mood and Affect: Mood normal.         Behavior: Behavior normal.                Significant Labs: A1C:   Recent Labs   Lab 10/04/24  1010   HGBA1C 5.9*     ABGs: No results for input(s): "PH", "PCO2", "HCO3", "POCSATURATED", "BE", "TOTALHB", "COHB", "METHB", "O2HB", "POCFIO2", "PO2" in the last 48 hours.  Bilirubin:   Recent Labs   Lab 02/25/25  1238 02/26/25  0425   BILITOT 0.6 0.5     Blood Culture:   Recent Labs   Lab 02/25/25  1803 02/25/25  1804   LABBLOO Gram stain aer bottle: Gram positive cocci in clusters resembling Staph  Results called to and read back by: Rina Maher RN  02/26/2025  17:02  STAPHYLOCOCCUS SPECIES  Identification and susceptibility pending  * Gram stain aer bottle: Gram positive cocci in clusters resembling Staph  Positive results previously called 02/26/2025  STAPHYLOCOCCUS SPECIES  Identification and susceptibility pending  *     BMP:   Recent Labs   Lab 02/26/25  0425         K 4.6      CO2 23   BUN 25*   CREATININE 1.4 " "  CALCIUM 9.1     CBC:   Recent Labs   Lab 02/26/25  0425   WBC 9.58   HGB 10.3*   HCT 32.9*        CMP:   Recent Labs   Lab 02/26/25  0425      K 4.6      CO2 23      BUN 25*   CREATININE 1.4   CALCIUM 9.1   PROT 7.0   ALBUMIN 2.9*   BILITOT 0.5   ALKPHOS 83   AST 37   ALT 31   ANIONGAP 11     Cardiac Markers:   No results for input(s): "CKMB", "MYOGLOBIN", "BNP", "TROPISTAT" in the last 48 hours.    Coagulation: No results for input(s): "PT", "INR", "APTT" in the last 48 hours.  Lactic Acid:   Recent Labs   Lab 02/25/25  1804   LACTATE 1.1     Lipase: No results for input(s): "LIPASE" in the last 48 hours.  Lipid Panel: No results for input(s): "CHOL", "HDL", "LDLCALC", "TRIG", "CHOLHDL" in the last 48 hours.  Magnesium:   No results for input(s): "MG" in the last 48 hours.    POCT Glucose: No results for input(s): "POCTGLUCOSE" in the last 48 hours.  Prealbumin: No results for input(s): "PREALBUMIN" in the last 48 hours.  Respiratory Culture: No results for input(s): "GSRESP", "RESPIRATORYC" in the last 48 hours.  Troponin:   Recent Labs   Lab 02/25/25 2011 02/26/25  0200 02/26/25  0754   TROPONINI 0.133* 0.105* 0.112*     TSH:   Recent Labs   Lab 02/25/25  1238   TSH 1.076     Urine Culture: No results for input(s): "LABURIN" in the last 48 hours.  Urine Studies:   No results for input(s): "COLORU", "APPEARANCEUA", "PHUR", "SPECGRAV", "PROTEINUA", "GLUCUA", "KETONESU", "BILIRUBINUA", "OCCULTUA", "NITRITE", "UROBILINOGEN", "LEUKOCYTESUR", "RBCUA", "WBCUA", "BACTERIA", "SQUAMEPITHEL", "HYALINECASTS" in the last 48 hours.    Invalid input(s): "BENJISUR"      Significant Imaging: I have reviewed all pertinent imaging results/findings within the past 24 hours.    Assessment and Plan     * Elevated troponin  Reason for admission  Troponin stable at 0.112  EKG without ST elevation  Cardiology consulted  Continue home pravastatin and xarelto       Bacteremia  Staph on 2 out 2 blood " cultures  IV rocephin since not MRSA  Waiting for final culture       Essential hypertension  Patient's blood pressure range in the last 24 hours was: BP  Min: 130/64  Max: 147/67.The patient's inpatient anti-hypertensive regimen is listed below:  Current Antihypertensives  losartan tablet 50 mg, 2 times daily, Oral    Plan  Continue home antihypertensives     Generalized weakness  Due to age and acute UTI.    PT/OT recommend SNF       CKD stage 3b, GFR 30-44 ml/min  Creatine stable for now. BMP reviewed- noted Estimated Creatinine Clearance: 35.5 mL/min (based on SCr of 1.4 mg/dL). according to latest data. Based on current GFR, CKD stage is stage 3 - GFR 30-59.  Monitor UOP and serial BMP and adjust therapy as needed. Renally dose meds. Avoid nephrotoxic medications and procedures.    UTI (urinary tract infection)  U/A with 2+ leukocytes and >100 WBC on microscopic  Urine culture pending  Continue IV rocephin         Typical atrial flutter  Seen on EKG  Looks like patient goes in and out of atrial flutter  Unable to tolerate BB in the past       COPD (chronic obstructive pulmonary disease)  Not in acute exacerbation     Hypothyroidism  Continue home levothyroxine         VTE Risk Mitigation (From admission, onward)           Ordered     rivaroxaban tablet 15 mg  Nightly         02/25/25 1841     IP VTE HIGH RISK PATIENT  Once         02/25/25 1841     Place sequential compression device  Until discontinued         02/25/25 1841                    Discharge Planning   JACINDA: 2/27/2025     Code Status: Full Code   Medical Readiness for Discharge Date:   Discharge Plan A: New Nursing Home placement - [facility] care facility   Discharge Delays: (!) Post-Acute Set-up            Please place Justification for DME        Staci Knox PA-C  Department of Hospital Medicine   [place] - Med Surg (3rd Fl)

## 2025-02-27 NOTE — HOSPITAL COURSE
PT HD stable on room air.  Enterococcus on urine culture.  Staph on 2 out of 2 blood cultures.  Waiting for repeat.  Currently on IV rocephin.  Not MRSA on PCR.  Nursing home placement pending.    Waiting on sensitivities to guide therapy. IF we are unable to discharge by 3p today she will have to stay the weekend as NH pharmacy is closed after 3       3/2/35  No events o/n   AFVSS   1 of 2 blood Cx sens back---staph sens to clinda. Other sens is pending     3/3/26  No events o/n   Her blood cx do not agree (diff species of staph) and both are staph skin vu. So likely contaminant.  She is on amp day 2 for enterococcus in urine. It is sens to macrobid .  She is on Macrobid 100 mg twice a day.  I will discharge her to nursing home for her debility.  We will continue with Macrobid 100 mg twice a day for 5 more days.  Her staph cultures are very likely contaminant.  We will not treat.

## 2025-02-27 NOTE — ASSESSMENT & PLAN NOTE
Patient's blood pressure range in the last 24 hours was: BP  Min: 130/64  Max: 147/67.The patient's inpatient anti-hypertensive regimen is listed below:  Current Antihypertensives  losartan tablet 50 mg, 2 times daily, Oral    Plan  Continue home antihypertensives

## 2025-02-27 NOTE — PLAN OF CARE
[facility] - Med Surg (3rd Fl)  Initial Discharge Assessment       Primary Care Provider: Tasha Atkins MD    Admission Diagnosis: Leg pain [M79.606]  UTI (urinary tract infection) [N39.0]  Weakness [R53.1]    Admission Date: 2/25/2025  Expected Discharge Date: 2/27/2025    Transition of Care Barriers: (P) None    Payor: HUMANA MANAGED MEDICARE / Plan: HU[employer]NA SNP HMO PPO SPECIAL NEEDS / Product Type: Medicare Advantage /     Extended Emergency Contact Information  Primary Emergency Contact: Mita Bucio   [place]  Mobile Phone: [phone]  Relation: Daughter  Secondary Emergency Contact: ratur reynoso  Mobile Phone: [phone]  Relation: Daughter  Preferred language: English   needed? No    Discharge Plan A: (P) Skilled Nursing Facility  Discharge Plan B: (P) Skilled Nursing Facility      [facility] [address] [id number] [address] [address]  Phone: [phone] Fax: [phone]    [facility] - [facility] [address]  [address] [facility] [address]  Phone: [phone] Fax: [phone]    [facility] - [facility] [address] [address]  [address] [facility] [address]  Phone: [phone] Fax: [phone]      Initial Assessment (most recent)       Adult Discharge Assessment - 02/27/25 1419          Discharge Assessment    Assessment Type Discharge Planning Assessment (P)      Confirmed/corrected address, phone number and insurance Yes (P)      Confirmed Demographics Correct on Facesheet (P)      Source of Information patient (P)      Communicated JACINDA with patient/caregiver Yes (P)      Reason For Admission Elevated troponin (P)      People in Home child(yesenia), adult (P)      Facility Arrived From: Home (P)      Do you expect to return to your current living situation? Yes (P)      Do you have help at home or someone to help you manage your care at home? Yes (P)      Who are your  caregiver(s) and their phone number(s)? Ibis daughter (P)      Prior to hospitilization cognitive status: Alert/Oriented (P)      Current cognitive status: Alert/Oriented (P)      Walking or Climbing Stairs Difficulty yes (P)      Walking or Climbing Stairs transferring difficulty, requires equipment;ambulation difficulty, requires equipment (P)      Mobility Management Wheelchair (P)      Dressing/Bathing Difficulty yes (P)      Dressing/Bathing bathing difficulty, requires equipment;bathing difficulty, assistance 1 person (P)      Dressing/Bathing Management Shower chair and family assist (P)      Home Accessibility wheelchair accessible (P)      Home Layout Able to live on 1st floor (P)      Equipment Currently Used at Home wheelchair;bedside commode;grab bar;walker, rolling (P)      Readmission within 30 days? No (P)      Patient currently being followed by outpatient case management? No (P)      Do you currently have service(s) that help you manage your care at home? No (P)      Do you take prescription medications? Yes (P)      Do you have prescription coverage? Yes (P)      Coverage [employer] (P)      Do you have any problems affording any of your prescribed medications? No (P)      Is the patient taking medications as prescribed? yes (P)      Who is going to help you get home at discharge? Family (P)      How do you get to doctors appointments? family or friend will provide (P)      Are you on dialysis? No (P)      Do you take coumadin? No (P)      Discharge Plan A Skilled Nursing Facility (P)      Discharge Plan B Skilled Nursing Facility (P)      DME Needed Upon Discharge  none (P)      Discharge Plan discussed with: Patient (P)      Transition of Care Barriers None (P)                           Discharge assessment completed at bedside with patient and patient's daughter ibis. Patient lives at home with daughter. Patient uses a wheelchair to get around, but is able to stand and transfer in  and out wheelchair independently prior to admission. Patient will need skilled PT and OT at discharge due to weakness patient has been experiencing. CM to remain available to assist with discharge.

## 2025-02-27 NOTE — PLAN OF CARE
Problem: Adult Inpatient Plan of Care  Goal: Plan of Care Review  Outcome: Progressing     Problem: Diabetes Comorbidity  Goal: Blood Glucose Level Within Targeted Range  Outcome: Progressing     Problem: Wound  Goal: Skin Health and Integrity  Outcome: Progressing     Problem: Skin Injury Risk Increased  Goal: Skin Health and Integrity  Outcome: Progressing     Problem: Fall Injury Risk  Goal: Absence of Fall and Fall-Related Injury  Outcome: Progressing     Problem: Infection  Goal: Absence of Infection Signs and Symptoms  Outcome: Progressing

## 2025-02-27 NOTE — PLAN OF CARE
02/27/25 0851   Rounds   Attendance Provider;Nurse ;   Discharge Plan A New Nursing Home placement - shelter care facility   Why the patient remains in the hospital Requires continued medical care   Transition of Care Barriers None     Care team reviewed plan of care and discharge plan with patient.  CM will continue to follow till discharge.

## 2025-02-27 NOTE — SUBJECTIVE & OBJECTIVE
Past Medical History:   Diagnosis Date    Acute bronchitis with asthma     Anemia due to stage 3 chronic kidney disease     Anticoagulant long-term use     Asthma     Back pain     Cancer     Chest pain, musculoskeletal 7/16/2013    Chronic bronchitis     Colon polyps     COPD exacerbation 3/13/2020    Gout, unspecified     History of cervical cancer     Hypothyroidism     Obesity     Osteoarthritis     Paroxysmal atrial fibrillation 11/19/2024    Renal manifestation of secondary diabetes mellitus     Thyroid disease     Trouble in sleeping     Type 2 diabetes mellitus with ophthalmic manifestations     Type 2 diabetes with peripheral circulatory disorder, controlled     Urinary incontinence     Uterine cancer     Uterine cancer        Past Surgical History:   Procedure Laterality Date    ADENOIDECTOMY      EYE SURGERY Right     right eye cataract    HYSTERECTOMY  2008    LIZ-BSO    tonsilectomy      TONSILLECTOMY  1945    TOTAL ABDOMINAL HYSTERECTOMY  2008    TOTAL ABDOMINAL HYSTERECTOMY W/ BILATERAL SALPINGOOPHORECTOMY  2008       Review of patient's allergies indicates:  No Known Allergies    No current facility-administered medications on file prior to encounter.     Current Outpatient Medications on File Prior to Encounter   Medication Sig    allopurinoL (ZYLOPRIM) 300 MG tablet Take 1 tablet (300 mg total) by mouth once daily.    cinacalcet (SENSIPAR) 60 MG Tab Take 120 mg by mouth every evening.    ferrous sulfate 325 (65 FE) MG EC tablet Take 1 tablet by mouth once daily (Patient taking differently: Take 325 mg by mouth once daily.)    HYDROcodone-acetaminophen (NORCO) 7.5-325 mg per tablet Take 1 tablet by mouth every 24 hours as needed for Pain.    JARDIANCE 10 mg tablet Take 10 mg by mouth once daily.    levothyroxine (SYNTHROID) 75 MCG tablet Take 1 tablet (75 mcg total) by mouth once daily.    losartan (COZAAR) 50 MG tablet Take 1 tablet (50 mg total) by mouth 2 (two) times a day.    omega-3 acid ethyl  esters (LOVAZA) 1 gram capsule Take 1 capsule (1 g total) by mouth 2 (two) times daily.    rosuvastatin (CRESTOR) 20 MG tablet Take 1 tablet (20 mg total) by mouth every evening.    XARELTO 15 mg Tab Take 1 tablet by mouth in the evening    acetaminophen (TYLENOL) 500 MG tablet Take 500 mg by mouth every evening. And as needed    magnesium citrate 100 mg Tab Take 100 mg by mouth once daily.    meclizine (ANTIVERT) 12.5 mg tablet Take 1 tablet (12.5 mg total) by mouth 3 (three) times daily as needed for Dizziness.    multivitamin (ONE DAILY MULTIVITAMIN) per tablet Take 1 tablet by mouth once daily.    valACYclovir (VALTREX) 1000 MG tablet Take 1 tablet (1,000 mg total) by mouth 3 (three) times daily. for 7 days (Patient not taking: Reported on 2025)     Family History       Problem Relation (Age of Onset)    Anemia Daughter    Arthritis Father, Daughter, Daughter, Daughter, Daughter    Breast cancer Sister    Cancer Brother    Diabetes Brother, Daughter, Daughter    Glaucoma Daughter    Heart disease Mother, Brother, Brother    Hyperlipidemia Brother    Liver disease Daughter    No Known Problems Son, Son    Ovarian cancer Sister    Stroke Brother, Son          Tobacco Use    Smoking status: Former     Current packs/day: 0.00     Average packs/day: 0.3 packs/day for 13.0 years (4.3 ttl pk-yrs)     Types: Cigarettes     Start date: 1951     Quit date: 1964     Years since quittin.5     Passive exposure: Past    Smokeless tobacco: Never   Substance and Sexual Activity    Alcohol use: No    Drug use: No    Sexual activity: Never     Birth control/protection: Surgical     Comment:      Review of Systems   Constitutional:  Negative for chills and fever.   HENT:  Negative for ear discharge and ear pain.    Eyes:  Negative for pain and discharge.   Respiratory:  Negative for cough and shortness of breath.    Cardiovascular:  Negative for chest pain and leg swelling.   Gastrointestinal:  Negative  for abdominal distention, abdominal pain, nausea and vomiting.   Endocrine: Negative for cold intolerance and heat intolerance.   Genitourinary:  Negative for difficulty urinating, dysuria and urgency.   Musculoskeletal:  Negative for joint swelling and myalgias.   Skin:  Negative for rash and wound.   Neurological:  Positive for weakness. Negative for dizziness and headaches.   Psychiatric/Behavioral:  Negative for agitation and confusion.      Objective:     Vital Signs (Most Recent):  Temp: 98.1 °F (36.7 °C) (02/27/25 1115)  Pulse: 84 (02/27/25 1203)  Resp: (!) 24 (02/27/25 1115)  BP: 130/64 (02/27/25 1115)  SpO2: 95 % (02/27/25 1115) Vital Signs (24h Range):  Temp:  [98.1 °F (36.7 °C)-99.5 °F (37.5 °C)] 98.1 °F (36.7 °C)  Pulse:  [76-93] 84  Resp:  [20-24] 24  SpO2:  [94 %-97 %] 95 %  BP: (130-147)/(63-69) 130/64     Weight: 106.3 kg (234 lb 5.6 oz)  Body mass index is 35.63 kg/m².     Physical Exam  Constitutional:       General: She is not in acute distress.  HENT:      Head: Normocephalic and atraumatic.   Eyes:      General:         Right eye: No discharge.         Left eye: No discharge.   Cardiovascular:      Rate and Rhythm: Normal rate and regular rhythm.   Pulmonary:      Effort: Pulmonary effort is normal.      Breath sounds: Normal breath sounds.   Abdominal:      General: There is no distension.      Tenderness: There is no abdominal tenderness.   Musculoskeletal:         General: No swelling or tenderness.      Cervical back: Neck supple. No tenderness.   Skin:     General: Skin is warm and dry.   Neurological:      General: No focal deficit present.      Mental Status: She is alert and oriented to person, place, and time.      Comments: No focal weakness  Able to maintain extremities against gravity  No facial asymmetry      Psychiatric:         Mood and Affect: Mood normal.         Behavior: Behavior normal.                Significant Labs: A1C:   Recent Labs   Lab 10/04/24  1010   HGBA1C 5.9*  "    ABGs: No results for input(s): "PH", "PCO2", "HCO3", "POCSATURATED", "BE", "TOTALHB", "COHB", "METHB", "O2HB", "POCFIO2", "PO2" in the last 48 hours.  Bilirubin:   Recent Labs   Lab 02/25/25  1238 02/26/25  0425   BILITOT 0.6 0.5     Blood Culture:   Recent Labs   Lab 02/25/25  1803 02/25/25  1804   LABBLOO Gram stain aer bottle: Gram positive cocci in clusters resembling Staph  Results called to and read back by: Rina Maher RN  02/26/2025  17:02  STAPHYLOCOCCUS SPECIES  Identification and susceptibility pending  * Gram stain aer bottle: Gram positive cocci in clusters resembling Staph  Positive results previously called 02/26/2025  STAPHYLOCOCCUS SPECIES  Identification and susceptibility pending  *     BMP:   Recent Labs   Lab 02/26/25  0425         K 4.6      CO2 23   BUN 25*   CREATININE 1.4   CALCIUM 9.1     CBC:   Recent Labs   Lab 02/26/25  0425   WBC 9.58   HGB 10.3*   HCT 32.9*        CMP:   Recent Labs   Lab 02/26/25  0425      K 4.6      CO2 23      BUN 25*   CREATININE 1.4   CALCIUM 9.1   PROT 7.0   ALBUMIN 2.9*   BILITOT 0.5   ALKPHOS 83   AST 37   ALT 31   ANIONGAP 11     Cardiac Markers:   No results for input(s): "CKMB", "MYOGLOBIN", "BNP", "TROPISTAT" in the last 48 hours.    Coagulation: No results for input(s): "PT", "INR", "APTT" in the last 48 hours.  Lactic Acid:   Recent Labs   Lab 02/25/25  1804   LACTATE 1.1     Lipase: No results for input(s): "LIPASE" in the last 48 hours.  Lipid Panel: No results for input(s): "CHOL", "HDL", "LDLCALC", "TRIG", "CHOLHDL" in the last 48 hours.  Magnesium:   No results for input(s): "MG" in the last 48 hours.    POCT Glucose: No results for input(s): "POCTGLUCOSE" in the last 48 hours.  Prealbumin: No results for input(s): "PREALBUMIN" in the last 48 hours.  Respiratory Culture: No results for input(s): "GSRESP", "RESPIRATORYC" in the last 48 hours.  Troponin:   Recent Labs   Lab 02/25/25 2011 " "02/26/25  0200 02/26/25  0754   TROPONINI 0.133* 0.105* 0.112*     TSH:   Recent Labs   Lab 02/25/25  1238   TSH 1.076     Urine Culture: No results for input(s): "LABURIN" in the last 48 hours.  Urine Studies:   No results for input(s): "COLORU", "APPEARANCEUA", "PHUR", "SPECGRAV", "PROTEINUA", "GLUCUA", "KETONESU", "BILIRUBINUA", "OCCULTUA", "NITRITE", "UROBILINOGEN", "LEUKOCYTESUR", "RBCUA", "WBCUA", "BACTERIA", "SQUAMEPITHEL", "HYALINECASTS" in the last 48 hours.    Invalid input(s): "WRIGHTSUR"      Significant Imaging: I have reviewed all pertinent imaging results/findings within the past 24 hours.  "

## 2025-02-27 NOTE — PT/OT/SLP PROGRESS
Physical Therapy Treatment    Patient Name:  Tonya Bucio   MRN:  [id number]    Recommendations:     Discharge Recommendations: Moderate Intensity Therapy  Discharge Equipment Recommendations: slide board  Barriers to discharge: Decreased caregiver support    Assessment:     Tonya Bucio is a 89 y.o. female admitted with a medical diagnosis of Elevated troponin.  She presents with the following impairments/functional limitations: weakness, impaired endurance, impaired functional mobility, impaired self care skills, impaired balance, decreased ROM, decreased upper extremity function, decreased lower extremity function, impaired cardiopulmonary response to activity. Patient tolerated LE strengthening ex and bed mobility ex for preparation for transfer activity. Patient gets fatigue easily during static standing tolerance with legs giving out easily.     Rehab Prognosis: Fair; patient would benefit from acute skilled PT services to address these deficits and reach maximum level of function.    Recent Surgery: * No surgery found *      Plan:     During this hospitalization, patient to be seen 5 x/week to address the identified rehab impairments via therapeutic activities, therapeutic exercises, neuromuscular re-education and progress toward the following goals:    Plan of Care Expires:  03/05/25    Subjective     Chief Complaint: gets tired easily  Patient/Family Comments/goals: none stated  Pain/Comfort:  Pain Rating 1: 0/10      Objective:     Communicated with patient prior to session.  Patient found HOB elevated with PureWick, peripheral IV, telemetry upon PT entry to room.     General Precautions: Standard, fall  Orthopedic Precautions: N/A  Braces: N/A  Respiratory Status: Room air     Functional Mobility:  Bed Mobility:     Rolling Left:  supervision  Rolling Right: supervision  Scooting: contact guard assistance  Supine to Sit: contact guard assistance  Sit to Supine: minimum assistance  Transfers:      Sit to Stand:  moderate assistance with grab bars  Balance: Sitting: Standby Assistance; Standing at bedside with bed rails: Moderate Assistance x ~15 seconds x 4 attempts.      AM-PAC 6 CLICK MOBILITY  Turning over in bed (including adjusting bedclothes, sheets and blankets)?: 3  Sitting down on and standing up from a chair with arms (e.g., wheelchair, bedside commode, etc.): 2  Moving from lying on back to sitting on the side of the bed?: 3  Moving to and from a bed to a chair (including a wheelchair)?: 1  Need to walk in hospital room?: 1  Climbing 3-5 steps with a railing?: 1  Basic Mobility Total Score: 11       Treatment & Education:  AROM ex on B LE x 10 reps involving ankle pumps, heel slides, abd/add, rolling to sides, LAQ, sit to stand with grab bar, static standing toelrance exl scooting ex at edge of the bed.    Patient left HOB elevated with all lines intact, call button in reach, bed alarm on, and nursing notified..    GOALS:   Multidisciplinary Problems       Physical Therapy Goals          Problem: Physical Therapy    Goal Priority Disciplines Outcome Interventions   Physical Therapy Goal     PT, PT/OT Progressing    Description: Patient will increase functional independence with mobility by performin. Supine to sit with Standby Assistance  2. Sit <> Stand with Standby Assistance with grab bar or RW  3. Increase standing tolerance x ~2 minutes using grab bar or RW for support  4. Bed to chair transfer with Contact Guard Assistancewith or without sliding board using slide/scoot TECHNIQUE  5. Lower extremity exercise program x10 reps per handout, with assistance as needed                          DME Justifications:  Sliding board for inc safety and performance with WC transfers.    Time Tracking:     PT Received On: 25  PT Start Time: 1023     PT Stop Time: 1046  PT Total Time (min): 23 min     Billable Minutes: Therapeutic Activity 23    Treatment Type: Treatment  PT/PTA: PT            02/27/2025

## 2025-02-27 NOTE — PLAN OF CARE
Problem: Adult Inpatient Plan of Care  Goal: Plan of Care Review  Outcome: Progressing     Problem: Wound  Goal: Optimal Wound Healing  Outcome: Progressing     Problem: Skin Injury Risk Increased  Goal: Skin Health and Integrity  Outcome: Progressing     Problem: Fall Injury Risk  Goal: Absence of Fall and Fall-Related Injury  Outcome: Progressing     Problem: Infection  Goal: Absence of Infection Signs and Symptoms  Outcome: Progressing

## 2025-02-27 NOTE — PLAN OF CARE
Problem: Physical Therapy  Goal: Physical Therapy Goal  Description: Patient will increase functional independence with mobility by performin. Supine to sit with Standby Assistance  2. Sit <> Stand with Standby Assistance with grab bar or RW  3. Increase standing tolerance x ~2 minutes using grab bar or RW for support  4. Bed to chair transfer with Contact Guard Assistancewith or without sliding board using slide/scoot TECHNIQUE  5. Lower extremity exercise program x10 reps per handout, with assistance as needed     Outcome: Progressing

## 2025-02-28 LAB
AV INDEX (PROSTH): 0.86
AV MEAN GRADIENT: 3 MMHG
AV PEAK GRADIENT: 6 MMHG
AV VALVE AREA BY VELOCITY RATIO: 2.4 CM²
AV VALVE AREA: 2.7 CM²
AV VELOCITY RATIO: 0.75
BACTERIA BLD CULT: ABNORMAL
BACTERIA UR CULT: ABNORMAL
BSA FOR ECHO PROCEDURE: 2.26 M2
CV ECHO LV RWT: 0.65 CM
DOP CALC AO PEAK VEL: 1.2 M/S
DOP CALC AO VTI: 24.1 CM
DOP CALC LVOT AREA: 3.1 CM2
DOP CALC LVOT DIAMETER: 2 CM
DOP CALC LVOT PEAK VEL: 0.9 M/S
DOP CALC LVOT STROKE VOLUME: 65 CM3
DOP CALCLVOT PEAK VEL VTI: 20.7 CM
E WAVE DECELERATION TIME: 119 MSEC
E/A RATIO: 0.91
E/E' RATIO: 14 M/S
ECHO LV POSTERIOR WALL: 1.5 CM (ref 0.6–1.1)
FRACTIONAL SHORTENING: 41.3 % (ref 28–44)
INTERVENTRICULAR SEPTUM: 1.6 CM (ref 0.6–1.1)
IVC DIAMETER: 1.8 CM
IVRT: 107 MSEC
LA MAJOR: 5.6 CM
LEFT ATRIUM AREA SYSTOLIC (APICAL 2 CHAMBER): 19.64 CM2
LEFT ATRIUM AREA SYSTOLIC (APICAL 4 CHAMBER): 18.52 CM2
LEFT ATRIUM SIZE: 3.2 CM
LEFT ATRIUM VOLUME INDEX MOD: 22 ML/M2
LEFT ATRIUM VOLUME MOD: 49 ML
LEFT INTERNAL DIMENSION IN SYSTOLE: 2.7 CM (ref 2.1–4)
LEFT VENTRICLE DIASTOLIC VOLUME INDEX: 43.38 ML/M2
LEFT VENTRICLE DIASTOLIC VOLUME: 95 ML
LEFT VENTRICLE END SYSTOLIC VOLUME APICAL 2 CHAMBER: 48.61 ML
LEFT VENTRICLE END SYSTOLIC VOLUME APICAL 4 CHAMBER: 45.38 ML
LEFT VENTRICLE MASS INDEX: 136.7 G/M2
LEFT VENTRICLE SYSTOLIC VOLUME INDEX: 12.3 ML/M2
LEFT VENTRICLE SYSTOLIC VOLUME: 27 ML
LEFT VENTRICULAR INTERNAL DIMENSION IN DIASTOLE: 4.6 CM (ref 3.5–6)
LEFT VENTRICULAR MASS: 299.5 G
LV LATERAL E/E' RATIO: 15.8 M/S
LV SEPTAL E/E' RATIO: 13.2 M/S
LVED V (TEICH): 94.91 ML
LVES V (TEICH): 26.91 ML
LVOT MG: 2.16 MMHG
LVOT MV: 0.71 CM/S
MV PEAK A VEL: 0.87 M/S
MV PEAK E VEL: 0.79 M/S
MV STENOSIS PRESSURE HALF TIME: 34.59 MS
MV VALVE AREA P 1/2 METHOD: 6.36 CM2
OHS CV RV/LV RATIO: 0.57 CM
PISA TR MAX VEL: 2.7 M/S
PULM VEIN S/D RATIO: 1.57
PV MV: 0.59 M/S
PV PEAK D VEL: 0.23 M/S
PV PEAK GRADIENT: 4 MMHG
PV PEAK S VEL: 0.36 M/S
PV PEAK VELOCITY: 1.03 M/S
RA MAJOR: 6.13 CM
RA PRESSURE ESTIMATED: 3 MMHG
RIGHT VENTRICLE DIASTOLIC BASEL DIMENSION: 2.6 CM
RIGHT VENTRICULAR END-DIASTOLIC DIMENSION: 2.57 CM
RV TB RVSP: 6 MMHG
RV TISSUE DOPPLER FREE WALL SYSTOLIC VELOCITY 1 (APICAL 4 CHAMBER VIEW): 11.02 CM/S
TDI LATERAL: 0.05 M/S
TDI SEPTAL: 0.06 M/S
TDI: 0.06 M/S
TRICUSPID ANNULAR PLANE SYSTOLIC EXCURSION: 2.02 CM
TV REST PULMONARY ARTERY PRESSURE: 32 MMHG
Z-SCORE OF LEFT VENTRICULAR DIMENSION IN END DIASTOLE: -4.75
Z-SCORE OF LEFT VENTRICULAR DIMENSION IN END SYSTOLE: -4.03

## 2025-02-28 PROCEDURE — 21400001 HC TELEMETRY ROOM: Mod: HCNC

## 2025-02-28 PROCEDURE — 11000001 HC ACUTE MED/SURG PRIVATE ROOM: Mod: HCNC

## 2025-02-28 PROCEDURE — 97530 THERAPEUTIC ACTIVITIES: CPT | Mod: HCNC

## 2025-02-28 PROCEDURE — 63600175 PHARM REV CODE 636 W HCPCS: Mod: HCNC

## 2025-02-28 PROCEDURE — 99232 SBSQ HOSP IP/OBS MODERATE 35: CPT | Mod: HCNC,,, | Performed by: FAMILY MEDICINE

## 2025-02-28 PROCEDURE — 97110 THERAPEUTIC EXERCISES: CPT | Mod: HCNC

## 2025-02-28 PROCEDURE — 25000003 PHARM REV CODE 250: Mod: HCNC

## 2025-02-28 RX ADMIN — CEFTRIAXONE SODIUM 1 G: 1 INJECTION, POWDER, FOR SOLUTION INTRAMUSCULAR; INTRAVENOUS at 03:02

## 2025-02-28 RX ADMIN — LOSARTAN POTASSIUM 50 MG: 50 TABLET, FILM COATED ORAL at 08:02

## 2025-02-28 RX ADMIN — Medication 6 MG: at 08:02

## 2025-02-28 RX ADMIN — LEVOTHYROXINE SODIUM 75 MCG: 75 TABLET ORAL at 05:02

## 2025-02-28 RX ADMIN — PRAVASTATIN SODIUM 80 MG: 40 TABLET ORAL at 08:02

## 2025-02-28 RX ADMIN — RIVAROXABAN 15 MG: 15 TABLET, FILM COATED ORAL at 08:02

## 2025-02-28 NOTE — ASSESSMENT & PLAN NOTE
U/A with 2+ leukocytes and >100 WBC on microscopic  Urine culture with enterococcus. Blood Cx with Staph. ID and sens is pending    Continue IV rocephin

## 2025-02-28 NOTE — PT/OT/SLP PROGRESS
"Occupational Therapy   Treatment    Name: Tonya Bucio  MRN: [id number]  Admitting Diagnosis:  Elevated troponin       Recommendations:     Discharge Recommendations: Moderate Intensity Therapy  Discharge Equipment Recommendations:  slide board  Barriers to discharge:  Decreased caregiver support    Assessment:     Tonya Bucio is a 89 y.o. female with a medical diagnosis of Elevated troponin.  She presents with performance deficits affecting function are weakness, impaired endurance, impaired self care skills, impaired balance, gait instability, impaired functional mobility, decreased lower extremity function, decreased safety awareness.     Rehab Prognosis:  Fair; patient would benefit from acute skilled OT services to address these deficits and reach maximum level of function.       Plan:     Patient to be seen 5 x/week to address the above listed problems via self-care/home management, therapeutic activities, therapeutic exercises  Plan of Care Expires: 03/12/25  Plan of Care Reviewed with: patient    Subjective     Chief Complaint: "I feel tired but I will work with you today."  Patient/Family Comments/goals: None stated  Pain/Comfort:  Pain Rating 1: 0/10  Pain Rating Post-Intervention 1: 0/10    Objective:     Communicated with: Nursing prior to session.  Patient found HOB elevated with PureWick, peripheral IV, telemetry upon OT entry to room.    General Precautions: Standard, fall    Orthopedic Precautions:N/A  Braces: N/A  Respiratory Status: Room air     Occupational Performance:     Bed Mobility:    Patient completed Rolling/Turning to Left with  supervision  Patient completed Scooting/Bridging with supervision  Patient completed Supine to Sit with contact guard assistance  Patient completed Sit to Supine with contact guard assistance     Functional Mobility/Transfers:  Patient completed Bed <> bedside commode transfer using slide board with CGA to reach bedside commode and squat pivot " transfer to return to bed with MOD A.  Pt requiring extended rest break prior to transfer to BSC and prior to return to bed.    [facility] 6 Click ADL: 17    Treatment & Education:  Pt with improved tolerance to session today demonstrating BSC transfer to and from bed using slide board and squat pivot transfer. Pt requiring extended rest breaks once seated EOB prior to transfer and once on BSC. When Pt returned to bed level she participated in bilateral upper extremity strengthening exercises with red bands for 20 reps each to improve endurance and activity tolerance including:   Shoulder flexion   Shoulder external rotation    Shoulder internal rotation    Row   Shrug   Biceps curls   Triceps extension    Patient left HOB elevated with all lines intact, call button in reach, and bed alarm on    GOALS:   Multidisciplinary Problems       Occupational Therapy Goals          Problem: Occupational Therapy    Goal Priority Disciplines Outcome Interventions   Occupational Therapy Goal     OT, PT/OT Progressing    Description: Pt to perform LB dressing with MOD I using sockaid for increased independence with ADL.    Pt to perform self toileting with Min A using BSC.  Pt to perform level functional transfers required for ADL's with CGA with or without sliding board to reach wheelchair and BSC.  Pt to demonstrate consistent adherence to breathing control and energy conservation techniques as educated by OT.                         DME Justifications:  Pt would benefit from sliding board for improved safety and independence in transfer to bedside commode and wheelchair.     Time Tracking:     OT Date of Treatment: 02/28/25  OT Start Time: 1109  OT Stop Time: 1127  OT Total Time (min): 18 min    Billable Minutes:Therapeutic Activity 10  Therapeutic Exercise 8    OT/NOEMI: OT     Number of NOEMI visits since last OT visit: 0    2/28/2025

## 2025-02-28 NOTE — PROGRESS NOTES
[place] [facility] ([facility])  [facility] Medicine  Progress Note    Patient Name: Tonya Bucio  MRN: [id number]  Patient Class: IP- Inpatient   Admission Date: 2/25/2025  Length of Stay: 1 days  Attending Physician: Anjali Porter MD  Primary Care Provider: Tasha Atkins MD        Subjective     Principal Problem:Elevated troponin        HPI:  Patient is an 89 year old female with medical history of HTN, hypothyroidism, COPD, CKD stage 3, atrial fibrillation and atrial flutter who presented to the ED with lower extremity weakness. Symptoms started 3 days ago.  She normally is in a wheelchair but is able to stand up to make her transition from the wheelchair to toilet.  However, her legs would not work.  She denies fever, chills, CP, SOB, nausea, vomiting, abdominal pain, urinary retention and dysuria.        Admitted for elevated troponin.      Overview/Hospital Course:  PT HD stable on room air.  Enterococcus on urine culture.  Staph on 2 out of 2 blood cultures.  Waiting for repeat.  Currently on IV rocephin.  Not MRSA on PCR.  Nursing home placement pending.    Waiting on sensitivities to guide therapy. IF we are unable to discharge by 3p today she will have to stay the weekend as [facility] pharmacy is closed after 3       Past Medical History:   Diagnosis Date    Acute bronchitis with asthma     Anemia due to stage 3 chronic kidney disease     Anticoagulant long-term use     Asthma     Back pain     Cancer     Chest pain, musculoskeletal 7/16/2013    Chronic bronchitis     Colon polyps     COPD exacerbation 3/13/2020    Gout, unspecified     History of cervical cancer     Hypothyroidism     Obesity     Osteoarthritis     Paroxysmal atrial fibrillation 11/19/2024    Renal manifestation of secondary diabetes mellitus     Thyroid disease     Trouble in sleeping     Type 2 diabetes mellitus with ophthalmic manifestations     Type 2 diabetes with peripheral circulatory disorder, controlled     Urinary  incontinence     Uterine cancer     Uterine cancer        Past Surgical History:   Procedure Laterality Date    ADENOIDECTOMY      EYE SURGERY Right     right eye cataract    HYSTERECTOMY  2008    LIZ-BSO    tonsilectomy      TONSILLECTOMY  1945    TOTAL ABDOMINAL HYSTERECTOMY  2008    TOTAL ABDOMINAL HYSTERECTOMY W/ BILATERAL SALPINGOOPHORECTOMY  2008       Review of patient's allergies indicates:  No Known Allergies    No current facility-administered medications on file prior to encounter.     Current Outpatient Medications on File Prior to Encounter   Medication Sig    allopurinoL (ZYLOPRIM) 300 MG tablet Take 1 tablet (300 mg total) by mouth once daily.    cinacalcet (SENSIPAR) 60 MG Tab Take 120 mg by mouth every evening.    ferrous sulfate 325 (65 FE) MG EC tablet Take 1 tablet by mouth once daily (Patient taking differently: Take 325 mg by mouth once daily.)    HYDROcodone-acetaminophen (NORCO) 7.5-325 mg per tablet Take 1 tablet by mouth every 24 hours as needed for Pain.    JARDIANCE 10 mg tablet Take 10 mg by mouth once daily.    levothyroxine (SYNTHROID) 75 MCG tablet Take 1 tablet (75 mcg total) by mouth once daily.    losartan (COZAAR) 50 MG tablet Take 1 tablet (50 mg total) by mouth 2 (two) times a day.    omega-3 acid ethyl esters (LOVAZA) 1 gram capsule Take 1 capsule (1 g total) by mouth 2 (two) times daily.    rosuvastatin (CRESTOR) 20 MG tablet Take 1 tablet (20 mg total) by mouth every evening.    XARELTO 15 mg Tab Take 1 tablet by mouth in the evening    acetaminophen (TYLENOL) 500 MG tablet Take 500 mg by mouth every evening. And as needed    magnesium citrate 100 mg Tab Take 100 mg by mouth once daily.    meclizine (ANTIVERT) 12.5 mg tablet Take 1 tablet (12.5 mg total) by mouth 3 (three) times daily as needed for Dizziness.    multivitamin (ONE DAILY MULTIVITAMIN) per tablet Take 1 tablet by mouth once daily.    valACYclovir (VALTREX) 1000 MG tablet Take 1 tablet (1,000 mg total) by mouth 3  (three) times daily. for 7 days (Patient not taking: Reported on 2025)     Family History       Problem Relation (Age of Onset)    Anemia Daughter    Arthritis Father, Daughter, Daughter, Daughter, Daughter    Breast cancer Sister    Cancer Brother    Diabetes Brother, Daughter, Daughter    Glaucoma Daughter    Heart disease Mother, Brother, Brother    Hyperlipidemia Brother    Liver disease Daughter    No Known Problems Son, Son    Ovarian cancer Sister    Stroke Brother, Son          Tobacco Use    Smoking status: Former     Current packs/day: 0.00     Average packs/day: 0.3 packs/day for 13.0 years (4.3 ttl pk-yrs)     Types: Cigarettes     Start date: 1951     Quit date: 1964     Years since quittin.5     Passive exposure: Past    Smokeless tobacco: Never   Substance and Sexual Activity    Alcohol use: No    Drug use: No    Sexual activity: Never     Birth control/protection: Surgical     Comment:      Review of Systems   Constitutional:  Negative for chills and fever.   HENT:  Negative for ear discharge and ear pain.    Eyes:  Negative for pain and discharge.   Respiratory:  Negative for cough and shortness of breath.    Cardiovascular:  Negative for chest pain and leg swelling.   Gastrointestinal:  Negative for abdominal distention, abdominal pain, nausea and vomiting.   Endocrine: Negative for cold intolerance and heat intolerance.   Genitourinary:  Negative for difficulty urinating, dysuria and urgency.   Musculoskeletal:  Negative for joint swelling and myalgias.   Skin:  Negative for rash and wound.   Neurological:  Positive for weakness. Negative for dizziness and headaches.   Psychiatric/Behavioral:  Negative for agitation and confusion.      Objective:     Vital Signs (Most Recent):  Temp: 98.6 °F (37 °C) (25)  Pulse: 88 (25 0800)  Resp: 20 (25)  BP: (!) 144/68 (25)  SpO2: 95 % (25) Vital Signs (24h Range):  Temp:  [97.9 °F  "(36.6 °C)-99.4 °F (37.4 °C)] 98.6 °F (37 °C)  Pulse:  [72-88] 88  Resp:  [16-24] 20  SpO2:  [95 %-96 %] 95 %  BP: (130-162)/(64-72) 144/68     Weight: 106.3 kg (234 lb 5.6 oz)  Body mass index is 35.63 kg/m².     Physical Exam  Constitutional:       General: She is not in acute distress.  HENT:      Head: Normocephalic and atraumatic.   Eyes:      General:         Right eye: No discharge.         Left eye: No discharge.   Cardiovascular:      Rate and Rhythm: Normal rate and regular rhythm.   Pulmonary:      Effort: Pulmonary effort is normal.      Breath sounds: Normal breath sounds.   Abdominal:      General: There is no distension.      Tenderness: There is no abdominal tenderness.   Musculoskeletal:         General: No swelling or tenderness.      Cervical back: Neck supple. No tenderness.   Skin:     General: Skin is warm and dry.   Neurological:      General: No focal deficit present.      Mental Status: She is alert and oriented to person, place, and time.      Comments: No focal weakness  Able to maintain extremities against gravity  No facial asymmetry      Psychiatric:         Mood and Affect: Mood normal.         Behavior: Behavior normal.                Significant Labs: A1C:   Recent Labs   Lab 10/04/24  1010   HGBA1C 5.9*     ABGs: No results for input(s): "PH", "PCO2", "HCO3", "POCSATURATED", "BE", "TOTALHB", "COHB", "METHB", "O2HB", "POCFIO2", "PO2" in the last 48 hours.  Bilirubin:   Recent Labs   Lab 02/25/25  1238 02/26/25  0425   BILITOT 0.6 0.5     Blood Culture:   Recent Labs   Lab 02/27/25  1621 02/27/25  1626   LABBLOO No Growth to date No Growth to date     BMP:   No results for input(s): "GLU", "NA", "K", "CL", "CO2", "BUN", "CREATININE", "CALCIUM", "MG" in the last 48 hours.    CBC:   No results for input(s): "WBC", "HGB", "HCT", "PLT" in the last 48 hours.    CMP:   No results for input(s): "NA", "K", "CL", "CO2", "GLU", "BUN", "CREATININE", "CALCIUM", "PROT", "ALBUMIN", "BILITOT", " ""ALKPHOS", "AST", "ALT", "ANIONGAP", "EGFRNONAA" in the last 48 hours.    Invalid input(s): "ESTGFAFRICA"    Cardiac Markers:   No results for input(s): "CKMB", "MYOGLOBIN", "BNP", "TROPISTAT" in the last 48 hours.    Coagulation: No results for input(s): "PT", "INR", "APTT" in the last 48 hours.  Lactic Acid:   No results for input(s): "LACTATE" in the last 48 hours.    Lipase: No results for input(s): "LIPASE" in the last 48 hours.  Lipid Panel: No results for input(s): "CHOL", "HDL", "LDLCALC", "TRIG", "CHOLHDL" in the last 48 hours.  Magnesium:   No results for input(s): "MG" in the last 48 hours.    POCT Glucose: No results for input(s): "POCTGLUCOSE" in the last 48 hours.  Prealbumin: No results for input(s): "PREALBUMIN" in the last 48 hours.  Respiratory Culture: No results for input(s): "GSRESP", "RESPIRATORYC" in the last 48 hours.  Troponin:   No results for input(s): "TROPONINI", "TROPONINIHS" in the last 48 hours.    TSH:   Recent Labs   Lab 02/25/25  1238   TSH 1.076     Urine Culture: No results for input(s): "LABURIN" in the last 48 hours.  Urine Studies:   No results for input(s): "COLORU", "APPEARANCEUA", "PHUR", "SPECGRAV", "PROTEINUA", "GLUCUA", "KETONESU", "BILIRUBINUA", "OCCULTUA", "NITRITE", "UROBILINOGEN", "LEUKOCYTESUR", "RBCUA", "WBCUA", "BACTERIA", "SQUAMEPITHEL", "HYALINECASTS" in the last 48 hours.    Invalid input(s): "WRIGHTSUR"      Significant Imaging: I have reviewed all pertinent imaging results/findings within the past 24 hours.    Assessment and Plan     * Elevated troponin  Reason for admission  Troponin stable at 0.112  EKG without ST elevation  Cardiology consulted  Continue home pravastatin and xarelto       Bacteremia  Staph on 2 out 2 blood cultures  IV rocephin since not MRSA  Waiting for final culture       Essential hypertension  Patient's blood pressure range in the last 24 hours was: BP  Min: 130/64  Max: 147/67.The patient's inpatient anti-hypertensive regimen is " listed below:  Current Antihypertensives  losartan tablet 50 mg, 2 times daily, Oral    Plan  Continue home antihypertensives     Generalized weakness  Due to age and acute UTI.    PT/OT recommend SNF       CKD stage 3b, GFR 30-44 ml/min  Creatine stable for now. BMP reviewed- noted Estimated Creatinine Clearance: 35.5 mL/min (based on SCr of 1.4 mg/dL). according to latest data. Based on current GFR, CKD stage is stage 3 - GFR 30-59.  Monitor UOP and serial BMP and adjust therapy as needed. Renally dose meds. Avoid nephrotoxic medications and procedures.    UTI (urinary tract infection)  U/A with 2+ leukocytes and >100 WBC on microscopic  Urine culture with enterococcus. Blood Cx with Staph. ID and sens is pending    Continue IV rocephin         Chronic diastolic heart failure        Typical atrial flutter  Seen on EKG  Looks like patient goes in and out of atrial flutter  Unable to tolerate BB in the past       COPD (chronic obstructive pulmonary disease)  Not in acute exacerbation     Hyperlipidemia associated with type 2 diabetes mellitus        Hypothyroidism  Continue home levothyroxine   Lab Results   Component Value Date    TSH 1.076 02/25/2025             VTE Risk Mitigation (From admission, onward)           Ordered     rivaroxaban tablet 15 mg  Nightly         02/25/25 1841     IP VTE HIGH RISK PATIENT  Once         02/25/25 1841     Place sequential compression device  Until discontinued         02/25/25 1841                    Discharge Planning   JACINDA: 2/27/2025     Code Status: Full Code   Medical Readiness for Discharge Date:   Discharge Plan A: Skilled Nursing Facility   Discharge Delays: (!) Post-Acute Set-up            Please place Justification for DME        Kevin Chatterjee MD  Department of Hospital Medicine   [place] [facility] ([facility] [place])

## 2025-02-28 NOTE — PT/OT/SLP PROGRESS
Physical Therapy Treatment    Patient Name:  Tonya Bucio   MRN:  [id number]    Recommendations:     Discharge Recommendations: Moderate Intensity Therapy  Discharge Equipment Recommendations: slide board  Barriers to discharge: Decreased caregiver support    Assessment:     Tonya Bucio is a 89 y.o. female admitted with a medical diagnosis of Elevated troponin.  She presents with the following impairments/functional limitations: weakness, impaired endurance, impaired self care skills, impaired functional mobility, impaired balance, impaired cardiopulmonary response to activity, decreased safety awareness, decreased lower extremity function, decreased upper extremity function. Patient performed and tolerated slide board transfers bed to wheelchair and stand pivot transfers wheelchair to bed with minimal assistance and cues  for safety. Patient exhibits modified independent with wheelchair management/mobility at the hallway.     Rehab Prognosis: Fair; patient would benefit from acute skilled PT services to address these deficits and reach maximum level of function.    Recent Surgery: * No surgery found *      Plan:     During this hospitalization, patient to be seen 5 x/week to address the identified rehab impairments via therapeutic activities, therapeutic exercises, neuromuscular re-education, wheelchair management/training and progress toward the following goals:    Plan of Care Expires:  03/05/25    Subjective     Chief Complaint: no new complain  Patient/Family Comments/goals: none stated  Pain/Comfort:  Pain Rating 1: 0/10      Objective:     Communicated with patient prior to session.  Patient found HOB elevated with PureWick, peripheral IV, telemetry upon PT entry to room.     General Precautions: Standard, fall  Orthopedic Precautions: N/A  Braces: N/A  Respiratory Status: Room air     Functional Mobility:  Bed Mobility:     Rolling Left:  supervision  Rolling Right: supervision  Scooting:  supervision  Supine to Sit: contact guard assistance  Sit to Supine: contact guard assistance  Transfers:     Sit to Stand:  minimum assistance with rolling walker  Bed to Chair: minimum assistance with  slide board  using  slide/scoot tech  Balance: Sitting: standby assistance; Standing with RW: Minimal Assistance  Wheelchair Propulsion:  Pt propelled Standard wheelchair x ~200 feet on Level tile with  Bilateral upper extremity and Bilateral lower extremity with Modified Independent.       AM-PAC 6 CLICK MOBILITY  Turning over in bed (including adjusting bedclothes, sheets and blankets)?: 3  Sitting down on and standing up from a chair with arms (e.g., wheelchair, bedside commode, etc.): 2  Moving from lying on back to sitting on the side of the bed?: 3  Moving to and from a bed to a chair (including a wheelchair)?: 2  Need to walk in hospital room?: 1  Climbing 3-5 steps with a railing?: 1  Basic Mobility Total Score: 12       Treatment & Education:  AROM ex on B LE x 10 reps involving ankle pumps/rotations, heel sldes, abd.add, rolling to sides, LAQ, sit to stand x 3 with RW, transfer trng using slide board and stand pivot tech and wheelchair management.    Patient left HOB elevated with all lines intact, call button in reach, bed alarm on, and nursing notified..    GOALS:   Multidisciplinary Problems       Physical Therapy Goals          Problem: Physical Therapy    Goal Priority Disciplines Outcome Interventions   Physical Therapy Goal     PT, PT/OT Progressing    Description: Patient will increase functional independence with mobility by performin. Supine to sit with Standby Assistance  2. Sit <> Stand with Standby Assistance with grab bar or RW  3. Increase standing tolerance x ~2 minutes using grab bar or RW for support  4. Bed to chair transfer with Contact Guard Assistancewith or without sliding board using slide/scoot TECHNIQUE  5. Lower extremity exercise program x10 reps per handout, with  assistance as needed                          DME Justifications:  Sliding board for inc safety with transfers.    Time Tracking:     PT Received On: 02/28/25  PT Start Time: 0945     PT Stop Time: 1015  PT Total Time (min): 30 min     Billable Minutes: Therapeutic Activity 30    Treatment Type: Treatment  PT/PTA: PT           02/28/2025

## 2025-02-28 NOTE — SUBJECTIVE & OBJECTIVE
Past Medical History:   Diagnosis Date    Acute bronchitis with asthma     Anemia due to stage 3 chronic kidney disease     Anticoagulant long-term use     Asthma     Back pain     Cancer     Chest pain, musculoskeletal 7/16/2013    Chronic bronchitis     Colon polyps     COPD exacerbation 3/13/2020    Gout, unspecified     History of cervical cancer     Hypothyroidism     Obesity     Osteoarthritis     Paroxysmal atrial fibrillation 11/19/2024    Renal manifestation of secondary diabetes mellitus     Thyroid disease     Trouble in sleeping     Type 2 diabetes mellitus with ophthalmic manifestations     Type 2 diabetes with peripheral circulatory disorder, controlled     Urinary incontinence     Uterine cancer     Uterine cancer        Past Surgical History:   Procedure Laterality Date    ADENOIDECTOMY      EYE SURGERY Right     right eye cataract    HYSTERECTOMY  2008    LIZ-BSO    tonsilectomy      TONSILLECTOMY  1945    TOTAL ABDOMINAL HYSTERECTOMY  2008    TOTAL ABDOMINAL HYSTERECTOMY W/ BILATERAL SALPINGOOPHORECTOMY  2008       Review of patient's allergies indicates:  No Known Allergies    No current facility-administered medications on file prior to encounter.     Current Outpatient Medications on File Prior to Encounter   Medication Sig    allopurinoL (ZYLOPRIM) 300 MG tablet Take 1 tablet (300 mg total) by mouth once daily.    cinacalcet (SENSIPAR) 60 MG Tab Take 120 mg by mouth every evening.    ferrous sulfate 325 (65 FE) MG EC tablet Take 1 tablet by mouth once daily (Patient taking differently: Take 325 mg by mouth once daily.)    HYDROcodone-acetaminophen (NORCO) 7.5-325 mg per tablet Take 1 tablet by mouth every 24 hours as needed for Pain.    JARDIANCE 10 mg tablet Take 10 mg by mouth once daily.    levothyroxine (SYNTHROID) 75 MCG tablet Take 1 tablet (75 mcg total) by mouth once daily.    losartan (COZAAR) 50 MG tablet Take 1 tablet (50 mg total) by mouth 2 (two) times a day.    omega-3 acid ethyl  esters (LOVAZA) 1 gram capsule Take 1 capsule (1 g total) by mouth 2 (two) times daily.    rosuvastatin (CRESTOR) 20 MG tablet Take 1 tablet (20 mg total) by mouth every evening.    XARELTO 15 mg Tab Take 1 tablet by mouth in the evening    acetaminophen (TYLENOL) 500 MG tablet Take 500 mg by mouth every evening. And as needed    magnesium citrate 100 mg Tab Take 100 mg by mouth once daily.    meclizine (ANTIVERT) 12.5 mg tablet Take 1 tablet (12.5 mg total) by mouth 3 (three) times daily as needed for Dizziness.    multivitamin (ONE DAILY MULTIVITAMIN) per tablet Take 1 tablet by mouth once daily.    valACYclovir (VALTREX) 1000 MG tablet Take 1 tablet (1,000 mg total) by mouth 3 (three) times daily. for 7 days (Patient not taking: Reported on 2025)     Family History       Problem Relation (Age of Onset)    Anemia Daughter    Arthritis Father, Daughter, Daughter, Daughter, Daughter    Breast cancer Sister    Cancer Brother    Diabetes Brother, Daughter, Daughter    Glaucoma Daughter    Heart disease Mother, Brother, Brother    Hyperlipidemia Brother    Liver disease Daughter    No Known Problems Son, Son    Ovarian cancer Sister    Stroke Brother, Son          Tobacco Use    Smoking status: Former     Current packs/day: 0.00     Average packs/day: 0.3 packs/day for 13.0 years (4.3 ttl pk-yrs)     Types: Cigarettes     Start date: 1951     Quit date: 1964     Years since quittin.5     Passive exposure: Past    Smokeless tobacco: Never   Substance and Sexual Activity    Alcohol use: No    Drug use: No    Sexual activity: Never     Birth control/protection: Surgical     Comment:      Review of Systems   Constitutional:  Negative for chills and fever.   HENT:  Negative for ear discharge and ear pain.    Eyes:  Negative for pain and discharge.   Respiratory:  Negative for cough and shortness of breath.    Cardiovascular:  Negative for chest pain and leg swelling.   Gastrointestinal:  Negative  for abdominal distention, abdominal pain, nausea and vomiting.   Endocrine: Negative for cold intolerance and heat intolerance.   Genitourinary:  Negative for difficulty urinating, dysuria and urgency.   Musculoskeletal:  Negative for joint swelling and myalgias.   Skin:  Negative for rash and wound.   Neurological:  Positive for weakness. Negative for dizziness and headaches.   Psychiatric/Behavioral:  Negative for agitation and confusion.      Objective:     Vital Signs (Most Recent):  Temp: 98.6 °F (37 °C) (02/28/25 0737)  Pulse: 88 (02/28/25 0800)  Resp: 20 (02/28/25 0737)  BP: (!) 144/68 (02/28/25 0737)  SpO2: 95 % (02/28/25 0737) Vital Signs (24h Range):  Temp:  [97.9 °F (36.6 °C)-99.4 °F (37.4 °C)] 98.6 °F (37 °C)  Pulse:  [72-88] 88  Resp:  [16-24] 20  SpO2:  [95 %-96 %] 95 %  BP: (130-162)/(64-72) 144/68     Weight: 106.3 kg (234 lb 5.6 oz)  Body mass index is 35.63 kg/m².     Physical Exam  Constitutional:       General: She is not in acute distress.  HENT:      Head: Normocephalic and atraumatic.   Eyes:      General:         Right eye: No discharge.         Left eye: No discharge.   Cardiovascular:      Rate and Rhythm: Normal rate and regular rhythm.   Pulmonary:      Effort: Pulmonary effort is normal.      Breath sounds: Normal breath sounds.   Abdominal:      General: There is no distension.      Tenderness: There is no abdominal tenderness.   Musculoskeletal:         General: No swelling or tenderness.      Cervical back: Neck supple. No tenderness.   Skin:     General: Skin is warm and dry.   Neurological:      General: No focal deficit present.      Mental Status: She is alert and oriented to person, place, and time.      Comments: No focal weakness  Able to maintain extremities against gravity  No facial asymmetry      Psychiatric:         Mood and Affect: Mood normal.         Behavior: Behavior normal.                Significant Labs: A1C:   Recent Labs   Lab 10/04/24  1010   HGBA1C 5.9*  "    ABGs: No results for input(s): "PH", "PCO2", "HCO3", "POCSATURATED", "BE", "TOTALHB", "COHB", "METHB", "O2HB", "POCFIO2", "PO2" in the last 48 hours.  Bilirubin:   Recent Labs   Lab 02/25/25  1238 02/26/25  0425   BILITOT 0.6 0.5     Blood Culture:   Recent Labs   Lab 02/27/25  1621 02/27/25  1626   LABBLOO No Growth to date No Growth to date     BMP:   No results for input(s): "GLU", "NA", "K", "CL", "CO2", "BUN", "CREATININE", "CALCIUM", "MG" in the last 48 hours.    CBC:   No results for input(s): "WBC", "HGB", "HCT", "PLT" in the last 48 hours.    CMP:   No results for input(s): "NA", "K", "CL", "CO2", "GLU", "BUN", "CREATININE", "CALCIUM", "PROT", "ALBUMIN", "BILITOT", "ALKPHOS", "AST", "ALT", "ANIONGAP", "EGFRNONAA" in the last 48 hours.    Invalid input(s): "ESTGFAFRICA"    Cardiac Markers:   No results for input(s): "CKMB", "MYOGLOBIN", "BNP", "TROPISTAT" in the last 48 hours.    Coagulation: No results for input(s): "PT", "INR", "APTT" in the last 48 hours.  Lactic Acid:   No results for input(s): "LACTATE" in the last 48 hours.    Lipase: No results for input(s): "LIPASE" in the last 48 hours.  Lipid Panel: No results for input(s): "CHOL", "HDL", "LDLCALC", "TRIG", "CHOLHDL" in the last 48 hours.  Magnesium:   No results for input(s): "MG" in the last 48 hours.    POCT Glucose: No results for input(s): "POCTGLUCOSE" in the last 48 hours.  Prealbumin: No results for input(s): "PREALBUMIN" in the last 48 hours.  Respiratory Culture: No results for input(s): "GSRESP", "RESPIRATORYC" in the last 48 hours.  Troponin:   No results for input(s): "TROPONINI", "TROPONINIHS" in the last 48 hours.    TSH:   Recent Labs   Lab 02/25/25  1238   TSH 1.076     Urine Culture: No results for input(s): "LABURIN" in the last 48 hours.  Urine Studies:   No results for input(s): "COLORU", "APPEARANCEUA", "PHUR", "SPECGRAV", "PROTEINUA", "GLUCUA", "KETONESU", "BILIRUBINUA", "OCCULTUA", "NITRITE", "UROBILINOGEN", " ""LEUKOCYTESUR", "RBCUA", "WBCUA", "BACTERIA", "SQUAMEPITHEL", "HYALINECASTS" in the last 48 hours.    Invalid input(s): "WRIGHTSUR"      Significant Imaging: I have reviewed all pertinent imaging results/findings within the past 24 hours.  "

## 2025-03-01 LAB
BASOPHILS # BLD AUTO: 0.03 K/UL (ref 0–0.2)
BASOPHILS NFR BLD: 0.5 % (ref 0–1.9)
DIFFERENTIAL METHOD BLD: ABNORMAL
EOSINOPHIL # BLD AUTO: 0.4 K/UL (ref 0–0.5)
EOSINOPHIL NFR BLD: 6.3 % (ref 0–8)
ERYTHROCYTE [DISTWIDTH] IN BLOOD BY AUTOMATED COUNT: 15.4 % (ref 11.5–14.5)
HCT VFR BLD AUTO: 32.3 % (ref 37–48.5)
HGB BLD-MCNC: 10.1 G/DL (ref 12–16)
IMM GRANULOCYTES # BLD AUTO: 0.04 K/UL (ref 0–0.04)
IMM GRANULOCYTES NFR BLD AUTO: 0.6 % (ref 0–0.5)
LYMPHOCYTES # BLD AUTO: 1.7 K/UL (ref 1–4.8)
LYMPHOCYTES NFR BLD: 25.7 % (ref 18–48)
MCH RBC QN AUTO: 28.6 PG (ref 27–31)
MCHC RBC AUTO-ENTMCNC: 31.3 G/DL (ref 32–36)
MCV RBC AUTO: 92 FL (ref 82–98)
MONOCYTES # BLD AUTO: 0.8 K/UL (ref 0.3–1)
MONOCYTES NFR BLD: 12.1 % (ref 4–15)
NEUTROPHILS # BLD AUTO: 3.6 K/UL (ref 1.8–7.7)
NEUTROPHILS NFR BLD: 54.8 % (ref 38–73)
NRBC BLD-RTO: 0 /100 WBC
PLATELET # BLD AUTO: 242 K/UL (ref 150–450)
PMV BLD AUTO: 9.9 FL (ref 9.2–12.9)
RBC # BLD AUTO: 3.53 M/UL (ref 4–5.4)
TB INDURATION 48 - 72 HR READ: NORMAL
TB SKIN TEST 48 - 72 HR READ: NORMAL
WBC # BLD AUTO: 6.62 K/UL (ref 3.9–12.7)

## 2025-03-01 PROCEDURE — 63600175 PHARM REV CODE 636 W HCPCS: Mod: HCNC | Performed by: FAMILY MEDICINE

## 2025-03-01 PROCEDURE — 25000003 PHARM REV CODE 250: Mod: HCNC

## 2025-03-01 PROCEDURE — 21400001 HC TELEMETRY ROOM: Mod: HCNC

## 2025-03-01 PROCEDURE — 36415 COLL VENOUS BLD VENIPUNCTURE: CPT | Mod: HCNC | Performed by: FAMILY MEDICINE

## 2025-03-01 PROCEDURE — 85025 COMPLETE CBC W/AUTO DIFF WBC: CPT | Mod: HCNC | Performed by: FAMILY MEDICINE

## 2025-03-01 PROCEDURE — 25000003 PHARM REV CODE 250: Mod: HCNC | Performed by: FAMILY MEDICINE

## 2025-03-01 PROCEDURE — 99233 SBSQ HOSP IP/OBS HIGH 50: CPT | Mod: HCNC,,, | Performed by: FAMILY MEDICINE

## 2025-03-01 RX ORDER — CLINDAMYCIN PHOSPHATE 600 MG/50ML
600 INJECTION, SOLUTION INTRAVENOUS
Status: DISCONTINUED | OUTPATIENT
Start: 2025-03-01 | End: 2025-03-01

## 2025-03-01 RX ADMIN — AMPICILLIN SODIUM 2 G: 2 INJECTION, POWDER, FOR SOLUTION INTRAMUSCULAR; INTRAVENOUS at 12:03

## 2025-03-01 RX ADMIN — PRAVASTATIN SODIUM 80 MG: 40 TABLET ORAL at 09:03

## 2025-03-01 RX ADMIN — RIVAROXABAN 15 MG: 15 TABLET, FILM COATED ORAL at 08:03

## 2025-03-01 RX ADMIN — Medication 6 MG: at 08:03

## 2025-03-01 RX ADMIN — LEVOTHYROXINE SODIUM 75 MCG: 75 TABLET ORAL at 06:03

## 2025-03-01 RX ADMIN — AMPICILLIN SODIUM 2 G: 2 INJECTION, POWDER, FOR SOLUTION INTRAMUSCULAR; INTRAVENOUS at 06:03

## 2025-03-01 RX ADMIN — LOSARTAN POTASSIUM 50 MG: 50 TABLET, FILM COATED ORAL at 09:03

## 2025-03-01 RX ADMIN — LOSARTAN POTASSIUM 50 MG: 50 TABLET, FILM COATED ORAL at 08:03

## 2025-03-01 NOTE — ASSESSMENT & PLAN NOTE
U/A with 2+ leukocytes and >100 WBC on microscopic  Urine culture with enterococcus. Blood Cx with Staph. ID and sens is pending    Change Rocephin to Ampicillin based on Cx and sens of urine

## 2025-03-01 NOTE — PLAN OF CARE
Problem: Adult Inpatient Plan of Care  Goal: Plan of Care Review  Outcome: Progressing    Problem: Skin Injury Risk Increased  Goal: Skin Health and Integrity  Outcome: Progressing     Problem: Fall Injury Risk  Goal: Absence of Fall and Fall-Related Injury  Outcome: Progressing     Problem: Infection  Goal: Absence of Infection Signs and Symptoms  Outcome: Progressing    Pt progessing, stable, and free of falls. Pt AAO x 4 with no distress noted on day of care. All needs met. Bed locked and in the lowest position with call bell within reach. POC going.

## 2025-03-01 NOTE — PROGRESS NOTES
[place] [facility] ([address] [place] [address] Medicine  Progress Note    Patient Name: Tonya Bucio  MRN: [id number]  Patient Class: IP- Inpatient   Admission Date: 2/25/2025  Length of Stay: 2 days  Attending Physician: Anjali Porter MD  Primary Care Provider: Tasha Atkins MD        Subjective     Principal Problem:Elevated troponin        HPI:  Patient is an 89 year old female with medical history of HTN, hypothyroidism, COPD, CKD stage 3, atrial fibrillation and atrial flutter who presented to the ED with lower extremity weakness. Symptoms started 3 days ago.  She normally is in a wheelchair but is able to stand up to make her transition from the wheelchair to toilet.  However, her legs would not work.  She denies fever, chills, CP, SOB, nausea, vomiting, abdominal pain, urinary retention and dysuria.        Admitted for elevated troponin.      Overview/Hospital Course:  PT HD stable on room air.  Enterococcus on urine culture.  Staph on 2 out of 2 blood cultures.  Waiting for repeat.  Currently on IV rocephin.  Not MRSA on PCR.  Nursing home placement pending.    Waiting on sensitivities to guide therapy. IF we are unable to discharge by 3p today she will have to stay the weekend as [facility] pharmacy is closed after 3       3/2/35  No events o/n   AFVSS   1 of 2 blood Cx sens back---staph sens to clinda. Other sens is pending       Past Medical History:   Diagnosis Date    Acute bronchitis with asthma     Anemia due to stage 3 chronic kidney disease     Anticoagulant long-term use     Asthma     Back pain     Cancer     Chest pain, musculoskeletal 7/16/2013    Chronic bronchitis     Colon polyps     COPD exacerbation 3/13/2020    Gout, unspecified     History of cervical cancer     Hypothyroidism     Obesity     Osteoarthritis     Paroxysmal atrial fibrillation 11/19/2024    Renal manifestation of secondary diabetes mellitus     Thyroid disease     Trouble in sleeping     Type 2 diabetes mellitus with  ophthalmic manifestations     Type 2 diabetes with peripheral circulatory disorder, controlled     Urinary incontinence     Uterine cancer     Uterine cancer        Past Surgical History:   Procedure Laterality Date    ADENOIDECTOMY      EYE SURGERY Right     right eye cataract    HYSTERECTOMY  2008    LIZ-BSO    tonsilectomy      TONSILLECTOMY  1945    TOTAL ABDOMINAL HYSTERECTOMY  2008    TOTAL ABDOMINAL HYSTERECTOMY W/ BILATERAL SALPINGOOPHORECTOMY  2008       Review of patient's allergies indicates:  No Known Allergies    No current facility-administered medications on file prior to encounter.     Current Outpatient Medications on File Prior to Encounter   Medication Sig    allopurinoL (ZYLOPRIM) 300 MG tablet Take 1 tablet (300 mg total) by mouth once daily.    cinacalcet (SENSIPAR) 60 MG Tab Take 120 mg by mouth every evening.    ferrous sulfate 325 (65 FE) MG EC tablet Take 1 tablet by mouth once daily (Patient taking differently: Take 325 mg by mouth once daily.)    HYDROcodone-acetaminophen (NORCO) 7.5-325 mg per tablet Take 1 tablet by mouth every 24 hours as needed for Pain.    JARDIANCE 10 mg tablet Take 10 mg by mouth once daily.    levothyroxine (SYNTHROID) 75 MCG tablet Take 1 tablet (75 mcg total) by mouth once daily.    losartan (COZAAR) 50 MG tablet Take 1 tablet (50 mg total) by mouth 2 (two) times a day.    omega-3 acid ethyl esters (LOVAZA) 1 gram capsule Take 1 capsule (1 g total) by mouth 2 (two) times daily.    rosuvastatin (CRESTOR) 20 MG tablet Take 1 tablet (20 mg total) by mouth every evening.    XARELTO 15 mg Tab Take 1 tablet by mouth in the evening    acetaminophen (TYLENOL) 500 MG tablet Take 500 mg by mouth every evening. And as needed    magnesium citrate 100 mg Tab Take 100 mg by mouth once daily.    meclizine (ANTIVERT) 12.5 mg tablet Take 1 tablet (12.5 mg total) by mouth 3 (three) times daily as needed for Dizziness.    multivitamin (ONE DAILY MULTIVITAMIN) per tablet Take 1  tablet by mouth once daily.    valACYclovir (VALTREX) 1000 MG tablet Take 1 tablet (1,000 mg total) by mouth 3 (three) times daily. for 7 days (Patient not taking: Reported on 2025)     Family History       Problem Relation (Age of Onset)    Anemia Daughter    Arthritis Father, Daughter, Daughter, Daughter, Daughter    Breast cancer Sister    Cancer Brother    Diabetes Brother, Daughter, Daughter    Glaucoma Daughter    Heart disease Mother, Brother, Brother    Hyperlipidemia Brother    Liver disease Daughter    No Known Problems Son, Son    Ovarian cancer Sister    Stroke Brother, Son          Tobacco Use    Smoking status: Former     Current packs/day: 0.00     Average packs/day: 0.3 packs/day for 13.0 years (4.3 ttl pk-yrs)     Types: Cigarettes     Start date: 1951     Quit date: 1964     Years since quittin.5     Passive exposure: Past    Smokeless tobacco: Never   Substance and Sexual Activity    Alcohol use: No    Drug use: No    Sexual activity: Never     Birth control/protection: Surgical     Comment:      Review of Systems   Constitutional:  Negative for chills and fever.   HENT:  Negative for ear discharge and ear pain.    Eyes:  Negative for pain and discharge.   Respiratory:  Negative for cough and shortness of breath.    Cardiovascular:  Negative for chest pain and leg swelling.   Gastrointestinal:  Negative for abdominal distention, abdominal pain, nausea and vomiting.   Endocrine: Negative for cold intolerance and heat intolerance.   Genitourinary:  Negative for difficulty urinating, dysuria and urgency.   Musculoskeletal:  Negative for joint swelling and myalgias.   Skin:  Negative for rash and wound.   Neurological:  Positive for weakness. Negative for dizziness and headaches.   Psychiatric/Behavioral:  Negative for agitation and confusion.      Objective:     Vital Signs (Most Recent):  Temp: 97.9 °F (36.6 °C) (25 0740)  Pulse: 78 (25 1000)  Resp: 20  "(03/01/25 0740)  BP: (!) 140/67 (03/01/25 0740)  SpO2: 95 % (03/01/25 0740) Vital Signs (24h Range):  Temp:  [97.9 °F (36.6 °C)-98.7 °F (37.1 °C)] 97.9 °F (36.6 °C)  Pulse:  [70-83] 78  Resp:  [17-20] 20  SpO2:  [95 %-98 %] 95 %  BP: (132-153)/(64-70) 140/67     Weight: 106.3 kg (234 lb 5.6 oz)  Body mass index is 35.63 kg/m².     Physical Exam  Constitutional:       General: She is not in acute distress.  HENT:      Head: Normocephalic and atraumatic.   Eyes:      General:         Right eye: No discharge.         Left eye: No discharge.   Cardiovascular:      Rate and Rhythm: Normal rate and regular rhythm.   Pulmonary:      Effort: Pulmonary effort is normal.      Breath sounds: Normal breath sounds.   Abdominal:      General: There is no distension.      Tenderness: There is no abdominal tenderness.   Musculoskeletal:         General: No swelling or tenderness.      Cervical back: Neck supple. No tenderness.   Skin:     General: Skin is warm and dry.   Neurological:      General: No focal deficit present.      Mental Status: She is alert and oriented to person, place, and time.      Comments: No focal weakness  Able to maintain extremities against gravity  No facial asymmetry      Psychiatric:         Mood and Affect: Mood normal.         Behavior: Behavior normal.                Significant Labs: A1C:   Recent Labs   Lab 10/04/24  1010   HGBA1C 5.9*     ABGs: No results for input(s): "PH", "PCO2", "HCO3", "POCSATURATED", "BE", "TOTALHB", "COHB", "METHB", "O2HB", "POCFIO2", "PO2" in the last 48 hours.  Bilirubin:   Recent Labs   Lab 02/25/25  1238 02/26/25  0425   BILITOT 0.6 0.5     Blood Culture:   Recent Labs   Lab 02/27/25  1621 02/27/25  1626   LABBLOO No Growth to date  No Growth to date No Growth to date  No Growth to date     BMP:   No results for input(s): "GLU", "NA", "K", "CL", "CO2", "BUN", "CREATININE", "CALCIUM", "MG" in the last 48 hours.    CBC:   Recent Labs   Lab 03/01/25  0703   WBC 6.62 " "  HGB 10.1*   HCT 32.3*          CMP:   No results for input(s): "NA", "K", "CL", "CO2", "GLU", "BUN", "CREATININE", "CALCIUM", "PROT", "ALBUMIN", "BILITOT", "ALKPHOS", "AST", "ALT", "ANIONGAP", "EGFRNONAA" in the last 48 hours.    Invalid input(s): "ESTGFAFRICA"    Cardiac Markers:   No results for input(s): "CKMB", "MYOGLOBIN", "BNP", "TROPISTAT" in the last 48 hours.    Coagulation: No results for input(s): "PT", "INR", "APTT" in the last 48 hours.  Lactic Acid:   No results for input(s): "LACTATE" in the last 48 hours.    Lipase: No results for input(s): "LIPASE" in the last 48 hours.  Lipid Panel: No results for input(s): "CHOL", "HDL", "LDLCALC", "TRIG", "CHOLHDL" in the last 48 hours.  Magnesium:   No results for input(s): "MG" in the last 48 hours.    POCT Glucose: No results for input(s): "POCTGLUCOSE" in the last 48 hours.  Prealbumin: No results for input(s): "PREALBUMIN" in the last 48 hours.  Respiratory Culture: No results for input(s): "GSRESP", "RESPIRATORYC" in the last 48 hours.  Troponin:   No results for input(s): "TROPONINI", "TROPONINIHS" in the last 48 hours.    TSH:   Recent Labs   Lab 02/25/25  1238   TSH 1.076     Urine Culture: No results for input(s): "LABURIN" in the last 48 hours.  Urine Studies:   No results for input(s): "COLORU", "APPEARANCEUA", "PHUR", "SPECGRAV", "PROTEINUA", "GLUCUA", "KETONESU", "BILIRUBINUA", "OCCULTUA", "NITRITE", "UROBILINOGEN", "LEUKOCYTESUR", "RBCUA", "WBCUA", "BACTERIA", "SQUAMEPITHEL", "HYALINECASTS" in the last 48 hours.    Invalid input(s): "WRIGHTSUR"      Significant Imaging: I have reviewed all pertinent imaging results/findings within the past 24 hours.    Assessment and Plan     * Elevated troponin  Reason for admission  Troponin stable at 0.112  EKG without ST elevation  Cardiology consulted  Continue home pravastatin and xarelto       Bacteremia  Staph on 2 out 2 blood cultures  IV rocephin since not MRSA---changed to ampicillin to cover " for enterococcus in the urine. Day #1 3/1/25  Waiting for final culture       Essential hypertension  Patient's blood pressure range in the last 24 hours was: BP  Min: 130/64  Max: 147/67.The patient's inpatient anti-hypertensive regimen is listed below:  Current Antihypertensives  losartan tablet 50 mg, 2 times daily, Oral    Plan  Continue home antihypertensives     Generalized weakness  Due to age and acute UTI.    PT/OT recommend SNF       CKD stage 3b, GFR 30-44 ml/min  Creatine stable for now. BMP reviewed- noted Estimated Creatinine Clearance: 35.5 mL/min (based on SCr of 1.4 mg/dL). according to latest data. Based on current GFR, CKD stage is stage 3 - GFR 30-59.  Monitor UOP and serial BMP and adjust therapy as needed. Renally dose meds. Avoid nephrotoxic medications and procedures.    UTI (urinary tract infection)  U/A with 2+ leukocytes and >100 WBC on microscopic  Urine culture with enterococcus. Blood Cx with Staph. ID and sens is pending    Change Rocephin to Ampicillin based on Cx and sens of urine         Chronic diastolic heart failure        Typical atrial flutter  Seen on EKG  Looks like patient goes in and out of atrial flutter  Unable to tolerate BB in the past       COPD (chronic obstructive pulmonary disease)  Not in acute exacerbation     Hyperlipidemia associated with type 2 diabetes mellitus        Hypothyroidism  Continue home levothyroxine   Lab Results   Component Value Date    TSH 1.076 02/25/2025             VTE Risk Mitigation (From admission, onward)           Ordered     rivaroxaban tablet 15 mg  Nightly         02/25/25 1841     IP VTE HIGH RISK PATIENT  Once         02/25/25 1841     Place sequential compression device  Until discontinued         02/25/25 1841                    Discharge Planning   JACINDA: 2/27/2025     Code Status: Full Code   Medical Readiness for Discharge Date:   Discharge Plan A: Skilled Nursing Facility   Discharge Delays: (!) Post-Acute  Set-up            Please place Justification for DME        Kevin Chatterjee MD  Department of Hospital Medicine   [place] [facility] (3rd Fl)

## 2025-03-01 NOTE — PROGRESS NOTES
Pharmacist Renal Dose Adjustment Note    Tonya Bucio is a 89 y.o. female being treated with the medication Ampicillin    Patient Data:    Vital Signs (Most Recent):  Temp: 97.8 °F (36.6 °C) (03/01/25 1216)  Pulse: 92 (03/01/25 1216)  Resp: 20 (03/01/25 1216)  BP: (!) 143/82 (03/01/25 1216)  SpO2: 97 % (03/01/25 1216) Vital Signs (72h Range):  Temp:  [97.7 °F (36.5 °C)-99.5 °F (37.5 °C)]   Pulse:  []   Resp:  [16-24]   BP: (130-162)/(63-82)   SpO2:  [94 %-98 %]      Recent Labs   Lab 02/25/25  1238 02/26/25  0425   CREATININE 1.6* 1.4     Serum creatinine: 1.4 mg/dL 02/26/25 0425  Estimated creatinine clearance: 35.5 mL/min    Medication:Ampicillin dose: 2g frequency: Q4 will be changed to   Medication:Ampicillin dose: 2g frequency: Q6    Pharmacist's Name: Fina Howard  Pharmacist's Extension: [phone]     Strong peripheral pulses

## 2025-03-01 NOTE — PLAN OF CARE
Problem: Adult Inpatient Plan of Care  Goal: Plan of Care Review  Outcome: Progressing  Goal: Patient-Specific Goal (Individualized)  Outcome: Progressing  Goal: Absence of Hospital-Acquired Illness or Injury  Outcome: Progressing  Goal: Optimal Comfort and Wellbeing  Outcome: Progressing  Plan of care reviewed and followed. Patient progressing well. Tolerated medications well. Absence of falls or injury noted throughout shift. Instructed to call for mobility assistance. Call bell in reach, side rails up x2, bed in lowest position. VSS and NADN. Compliant with both scheduled and prn medications. POC ongoing.

## 2025-03-01 NOTE — SUBJECTIVE & OBJECTIVE
Past Medical History:   Diagnosis Date    Acute bronchitis with asthma     Anemia due to stage 3 chronic kidney disease     Anticoagulant long-term use     Asthma     Back pain     Cancer     Chest pain, musculoskeletal 7/16/2013    Chronic bronchitis     Colon polyps     COPD exacerbation 3/13/2020    Gout, unspecified     History of cervical cancer     Hypothyroidism     Obesity     Osteoarthritis     Paroxysmal atrial fibrillation 11/19/2024    Renal manifestation of secondary diabetes mellitus     Thyroid disease     Trouble in sleeping     Type 2 diabetes mellitus with ophthalmic manifestations     Type 2 diabetes with peripheral circulatory disorder, controlled     Urinary incontinence     Uterine cancer     Uterine cancer        Past Surgical History:   Procedure Laterality Date    ADENOIDECTOMY      EYE SURGERY Right     right eye cataract    HYSTERECTOMY  2008    LIZ-BSO    tonsilectomy      TONSILLECTOMY  1945    TOTAL ABDOMINAL HYSTERECTOMY  2008    TOTAL ABDOMINAL HYSTERECTOMY W/ BILATERAL SALPINGOOPHORECTOMY  2008       Review of patient's allergies indicates:  No Known Allergies    No current facility-administered medications on file prior to encounter.     Current Outpatient Medications on File Prior to Encounter   Medication Sig    allopurinoL (ZYLOPRIM) 300 MG tablet Take 1 tablet (300 mg total) by mouth once daily.    cinacalcet (SENSIPAR) 60 MG Tab Take 120 mg by mouth every evening.    ferrous sulfate 325 (65 FE) MG EC tablet Take 1 tablet by mouth once daily (Patient taking differently: Take 325 mg by mouth once daily.)    HYDROcodone-acetaminophen (NORCO) 7.5-325 mg per tablet Take 1 tablet by mouth every 24 hours as needed for Pain.    JARDIANCE 10 mg tablet Take 10 mg by mouth once daily.    levothyroxine (SYNTHROID) 75 MCG tablet Take 1 tablet (75 mcg total) by mouth once daily.    losartan (COZAAR) 50 MG tablet Take 1 tablet (50 mg total) by mouth 2 (two) times a day.    omega-3 acid ethyl  esters (LOVAZA) 1 gram capsule Take 1 capsule (1 g total) by mouth 2 (two) times daily.    rosuvastatin (CRESTOR) 20 MG tablet Take 1 tablet (20 mg total) by mouth every evening.    XARELTO 15 mg Tab Take 1 tablet by mouth in the evening    acetaminophen (TYLENOL) 500 MG tablet Take 500 mg by mouth every evening. And as needed    magnesium citrate 100 mg Tab Take 100 mg by mouth once daily.    meclizine (ANTIVERT) 12.5 mg tablet Take 1 tablet (12.5 mg total) by mouth 3 (three) times daily as needed for Dizziness.    multivitamin (ONE DAILY MULTIVITAMIN) per tablet Take 1 tablet by mouth once daily.    valACYclovir (VALTREX) 1000 MG tablet Take 1 tablet (1,000 mg total) by mouth 3 (three) times daily. for 7 days (Patient not taking: Reported on 2025)     Family History       Problem Relation (Age of Onset)    Anemia Daughter    Arthritis Father, Daughter, Daughter, Daughter, Daughter    Breast cancer Sister    Cancer Brother    Diabetes Brother, Daughter, Daughter    Glaucoma Daughter    Heart disease Mother, Brother, Brother    Hyperlipidemia Brother    Liver disease Daughter    No Known Problems Son, Son    Ovarian cancer Sister    Stroke Brother, Son          Tobacco Use    Smoking status: Former     Current packs/day: 0.00     Average packs/day: 0.3 packs/day for 13.0 years (4.3 ttl pk-yrs)     Types: Cigarettes     Start date: 1951     Quit date: 1964     Years since quittin.5     Passive exposure: Past    Smokeless tobacco: Never   Substance and Sexual Activity    Alcohol use: No    Drug use: No    Sexual activity: Never     Birth control/protection: Surgical     Comment:      Review of Systems   Constitutional:  Negative for chills and fever.   HENT:  Negative for ear discharge and ear pain.    Eyes:  Negative for pain and discharge.   Respiratory:  Negative for cough and shortness of breath.    Cardiovascular:  Negative for chest pain and leg swelling.   Gastrointestinal:  Negative  for abdominal distention, abdominal pain, nausea and vomiting.   Endocrine: Negative for cold intolerance and heat intolerance.   Genitourinary:  Negative for difficulty urinating, dysuria and urgency.   Musculoskeletal:  Negative for joint swelling and myalgias.   Skin:  Negative for rash and wound.   Neurological:  Positive for weakness. Negative for dizziness and headaches.   Psychiatric/Behavioral:  Negative for agitation and confusion.      Objective:     Vital Signs (Most Recent):  Temp: 97.9 °F (36.6 °C) (03/01/25 0740)  Pulse: 78 (03/01/25 1000)  Resp: 20 (03/01/25 0740)  BP: (!) 140/67 (03/01/25 0740)  SpO2: 95 % (03/01/25 0740) Vital Signs (24h Range):  Temp:  [97.9 °F (36.6 °C)-98.7 °F (37.1 °C)] 97.9 °F (36.6 °C)  Pulse:  [70-83] 78  Resp:  [17-20] 20  SpO2:  [95 %-98 %] 95 %  BP: (132-153)/(64-70) 140/67     Weight: 106.3 kg (234 lb 5.6 oz)  Body mass index is 35.63 kg/m².     Physical Exam  Constitutional:       General: She is not in acute distress.  HENT:      Head: Normocephalic and atraumatic.   Eyes:      General:         Right eye: No discharge.         Left eye: No discharge.   Cardiovascular:      Rate and Rhythm: Normal rate and regular rhythm.   Pulmonary:      Effort: Pulmonary effort is normal.      Breath sounds: Normal breath sounds.   Abdominal:      General: There is no distension.      Tenderness: There is no abdominal tenderness.   Musculoskeletal:         General: No swelling or tenderness.      Cervical back: Neck supple. No tenderness.   Skin:     General: Skin is warm and dry.   Neurological:      General: No focal deficit present.      Mental Status: She is alert and oriented to person, place, and time.      Comments: No focal weakness  Able to maintain extremities against gravity  No facial asymmetry      Psychiatric:         Mood and Affect: Mood normal.         Behavior: Behavior normal.                Significant Labs: A1C:   Recent Labs   Lab 10/04/24  1010   HGBA1C 5.9*  "    ABGs: No results for input(s): "PH", "PCO2", "HCO3", "POCSATURATED", "BE", "TOTALHB", "COHB", "METHB", "O2HB", "POCFIO2", "PO2" in the last 48 hours.  Bilirubin:   Recent Labs   Lab 02/25/25  1238 02/26/25  0425   BILITOT 0.6 0.5     Blood Culture:   Recent Labs   Lab 02/27/25  1621 02/27/25  1626   LABBLOO No Growth to date  No Growth to date No Growth to date  No Growth to date     BMP:   No results for input(s): "GLU", "NA", "K", "CL", "CO2", "BUN", "CREATININE", "CALCIUM", "MG" in the last 48 hours.    CBC:   Recent Labs   Lab 03/01/25  0703   WBC 6.62   HGB 10.1*   HCT 32.3*          CMP:   No results for input(s): "NA", "K", "CL", "CO2", "GLU", "BUN", "CREATININE", "CALCIUM", "PROT", "ALBUMIN", "BILITOT", "ALKPHOS", "AST", "ALT", "ANIONGAP", "EGFRNONAA" in the last 48 hours.    Invalid input(s): "ESTGFAFRICA"    Cardiac Markers:   No results for input(s): "CKMB", "MYOGLOBIN", "BNP", "TROPISTAT" in the last 48 hours.    Coagulation: No results for input(s): "PT", "INR", "APTT" in the last 48 hours.  Lactic Acid:   No results for input(s): "LACTATE" in the last 48 hours.    Lipase: No results for input(s): "LIPASE" in the last 48 hours.  Lipid Panel: No results for input(s): "CHOL", "HDL", "LDLCALC", "TRIG", "CHOLHDL" in the last 48 hours.  Magnesium:   No results for input(s): "MG" in the last 48 hours.    POCT Glucose: No results for input(s): "POCTGLUCOSE" in the last 48 hours.  Prealbumin: No results for input(s): "PREALBUMIN" in the last 48 hours.  Respiratory Culture: No results for input(s): "GSRESP", "RESPIRATORYC" in the last 48 hours.  Troponin:   No results for input(s): "TROPONINI", "TROPONINIHS" in the last 48 hours.    TSH:   Recent Labs   Lab 02/25/25  1238   TSH 1.076     Urine Culture: No results for input(s): "LABURIN" in the last 48 hours.  Urine Studies:   No results for input(s): "COLORU", "APPEARANCEUA", "PHUR", "SPECGRAV", "PROTEINUA", "GLUCUA", "KETONESU", "BILIRUBINUA", " ""OCCULTUA", "NITRITE", "UROBILINOGEN", "LEUKOCYTESUR", "RBCUA", "WBCUA", "BACTERIA", "SQUAMEPITHEL", "HYALINECASTS" in the last 48 hours.    Invalid input(s): "WRIGHTSUR"      Significant Imaging: I have reviewed all pertinent imaging results/findings within the past 24 hours.  "

## 2025-03-01 NOTE — PLAN OF CARE
Problem: Adult Inpatient Plan of Care  Goal: Plan of Care Review  Outcome: Progressing     Problem: Diabetes Comorbidity  Goal: Blood Glucose Level Within Targeted Range  Outcome: Progressing     Problem: Wound  Goal: Absence of Infection Signs and Symptoms  Outcome: Progressing     Problem: Skin Injury Risk Increased  Goal: Skin Health and Integrity  Outcome: Progressing     Problem: Fall Injury Risk  Goal: Absence of Fall and Fall-Related Injury  Outcome: Progressing     Problem: Infection  Goal: Absence of Infection Signs and Symptoms  Outcome: Progressing     Pt progessing, stable, and free of falls. Pt AAO x 4 with no distress noted on day of care. All needs met. Bed locked and in the lowest position with call bell within reach.  POC going.

## 2025-03-01 NOTE — ASSESSMENT & PLAN NOTE
Staph on 2 out 2 blood cultures  IV rocephin since not MRSA---changed to ampicillin to cover for enterococcus in the urine. Day #1 3/1/25  Waiting for final culture

## 2025-03-02 LAB
BACTERIA BLD CULT: ABNORMAL

## 2025-03-02 PROCEDURE — 21400001 HC TELEMETRY ROOM: Mod: HCNC

## 2025-03-02 PROCEDURE — 25000003 PHARM REV CODE 250: Mod: HCNC | Performed by: FAMILY MEDICINE

## 2025-03-02 PROCEDURE — 99232 SBSQ HOSP IP/OBS MODERATE 35: CPT | Mod: HCNC,,, | Performed by: FAMILY MEDICINE

## 2025-03-02 PROCEDURE — 63600175 PHARM REV CODE 636 W HCPCS: Mod: HCNC | Performed by: FAMILY MEDICINE

## 2025-03-02 PROCEDURE — 25000003 PHARM REV CODE 250: Mod: HCNC

## 2025-03-02 RX ORDER — NITROFURANTOIN 25; 75 MG/1; MG/1
100 CAPSULE ORAL EVERY 12 HOURS
Status: DISCONTINUED | OUTPATIENT
Start: 2025-03-02 | End: 2025-03-03 | Stop reason: HOSPADM

## 2025-03-02 RX ADMIN — LOSARTAN POTASSIUM 50 MG: 50 TABLET, FILM COATED ORAL at 08:03

## 2025-03-02 RX ADMIN — PRAVASTATIN SODIUM 80 MG: 40 TABLET ORAL at 08:03

## 2025-03-02 RX ADMIN — NITROFURANTOIN (MONOHYDRATE/MACROCRYSTALS) 100 MG: 75; 25 CAPSULE ORAL at 08:03

## 2025-03-02 RX ADMIN — AMPICILLIN SODIUM 2 G: 2 INJECTION, POWDER, FOR SOLUTION INTRAMUSCULAR; INTRAVENOUS at 07:03

## 2025-03-02 RX ADMIN — RIVAROXABAN 15 MG: 15 TABLET, FILM COATED ORAL at 08:03

## 2025-03-02 RX ADMIN — LEVOTHYROXINE SODIUM 75 MCG: 75 TABLET ORAL at 06:03

## 2025-03-02 RX ADMIN — NITROFURANTOIN (MONOHYDRATE/MACROCRYSTALS) 100 MG: 75; 25 CAPSULE ORAL at 11:03

## 2025-03-02 RX ADMIN — AMPICILLIN SODIUM 2 G: 2 INJECTION, POWDER, FOR SOLUTION INTRAMUSCULAR; INTRAVENOUS at 01:03

## 2025-03-02 NOTE — ASSESSMENT & PLAN NOTE
Staph on 2 out 2 blood cultures  IV rocephin since not MRSA---changed to ampicillin to cover for enterococcus in the urine. Day #1 3/1/25  Waiting for final culture     3/2/25  Contaminant---- Her blood cx do not agree (diff species of staph) and both are staph skin vu. So likely contaminant.  She is on amp day 2 for enterococcus in urine. It is sens to macrobid. Will switch to oral abx.  She can go back to nursing home Monday

## 2025-03-02 NOTE — PROGRESS NOTES
[place] - [facility] Surg ([facility])  [facility] Medicine  Progress Note    Patient Name: Tonya Bucio  MRN: [id number]  Patient Class: IP- Inpatient   Admission Date: 2/25/2025  Length of Stay: 3 days  Attending Physician: Anjali Porter MD  Primary Care Provider: Tasha Atkins MD        Subjective     Principal Problem:Elevated troponin        HPI:  Patient is an 89 year old female with medical history of HTN, hypothyroidism, COPD, CKD stage 3, atrial fibrillation and atrial flutter who presented to the ED with lower extremity weakness. Symptoms started 3 days ago.  She normally is in a wheelchair but is able to stand up to make her transition from the wheelchair to toilet.  However, her legs would not work.  She denies fever, chills, CP, SOB, nausea, vomiting, abdominal pain, urinary retention and dysuria.        Admitted for elevated troponin.      Overview/Hospital Course:  PT HD stable on room air.  Enterococcus on urine culture.  Staph on 2 out of 2 blood cultures.  Waiting for repeat.  Currently on IV rocephin.  Not MRSA on PCR.  Nursing home placement pending.    Waiting on sensitivities to guide therapy. IF we are unable to discharge by 3p today she will have to stay the weekend as NH pharmacy is closed after 3       3/2/35  No events o/n   AFVSS   1 of 2 blood Cx sens back---staph sens to clinda. Other sens is pending     3/3  No events o/n   Her blood cx do not agree (diff species of staph) and both are staph skin vu. So likely contaminant.  She is on amp day 2 for enterococcus in urine. It is sens to macrobid         Past Medical History:   Diagnosis Date    Acute bronchitis with asthma     Anemia due to stage 3 chronic kidney disease     Anticoagulant long-term use     Asthma     Back pain     Cancer     Chest pain, musculoskeletal 7/16/2013    Chronic bronchitis     Colon polyps     COPD exacerbation 3/13/2020    Gout, unspecified     History of cervical cancer     Hypothyroidism     Obesity      Osteoarthritis     Paroxysmal atrial fibrillation 11/19/2024    Renal manifestation of secondary diabetes mellitus     Thyroid disease     Trouble in sleeping     Type 2 diabetes mellitus with ophthalmic manifestations     Type 2 diabetes with peripheral circulatory disorder, controlled     Urinary incontinence     Uterine cancer     Uterine cancer        Past Surgical History:   Procedure Laterality Date    ADENOIDECTOMY      EYE SURGERY Right     right eye cataract    HYSTERECTOMY  2008    LIZ-BSO    tonsilectomy      TONSILLECTOMY  1945    TOTAL ABDOMINAL HYSTERECTOMY  2008    TOTAL ABDOMINAL HYSTERECTOMY W/ BILATERAL SALPINGOOPHORECTOMY  2008       Review of patient's allergies indicates:  No Known Allergies    No current facility-administered medications on file prior to encounter.     Current Outpatient Medications on File Prior to Encounter   Medication Sig    allopurinoL (ZYLOPRIM) 300 MG tablet Take 1 tablet (300 mg total) by mouth once daily.    cinacalcet (SENSIPAR) 60 MG Tab Take 120 mg by mouth every evening.    ferrous sulfate 325 (65 FE) MG EC tablet Take 1 tablet by mouth once daily (Patient taking differently: Take 325 mg by mouth once daily.)    HYDROcodone-acetaminophen (NORCO) 7.5-325 mg per tablet Take 1 tablet by mouth every 24 hours as needed for Pain.    JARDIANCE 10 mg tablet Take 10 mg by mouth once daily.    levothyroxine (SYNTHROID) 75 MCG tablet Take 1 tablet (75 mcg total) by mouth once daily.    losartan (COZAAR) 50 MG tablet Take 1 tablet (50 mg total) by mouth 2 (two) times a day.    omega-3 acid ethyl esters (LOVAZA) 1 gram capsule Take 1 capsule (1 g total) by mouth 2 (two) times daily.    rosuvastatin (CRESTOR) 20 MG tablet Take 1 tablet (20 mg total) by mouth every evening.    XARELTO 15 mg Tab Take 1 tablet by mouth in the evening    acetaminophen (TYLENOL) 500 MG tablet Take 500 mg by mouth every evening. And as needed    magnesium citrate 100 mg Tab Take 100 mg by mouth  once daily.    meclizine (ANTIVERT) 12.5 mg tablet Take 1 tablet (12.5 mg total) by mouth 3 (three) times daily as needed for Dizziness.    multivitamin (ONE DAILY MULTIVITAMIN) per tablet Take 1 tablet by mouth once daily.    valACYclovir (VALTREX) 1000 MG tablet Take 1 tablet (1,000 mg total) by mouth 3 (three) times daily. for 7 days (Patient not taking: Reported on 2025)     Family History       Problem Relation (Age of Onset)    Anemia Daughter    Arthritis Father, Daughter, Daughter, Daughter, Daughter    Breast cancer Sister    Cancer Brother    Diabetes Brother, Daughter, Daughter    Glaucoma Daughter    Heart disease Mother, Brother, Brother    Hyperlipidemia Brother    Liver disease Daughter    No Known Problems Son, Son    Ovarian cancer Sister    Stroke Brother, Son          Tobacco Use    Smoking status: Former     Current packs/day: 0.00     Average packs/day: 0.3 packs/day for 13.0 years (4.3 ttl pk-yrs)     Types: Cigarettes     Start date: 1951     Quit date: 1964     Years since quittin.5     Passive exposure: Past    Smokeless tobacco: Never   Substance and Sexual Activity    Alcohol use: No    Drug use: No    Sexual activity: Never     Birth control/protection: Surgical     Comment:      Review of Systems   Constitutional:  Negative for chills and fever.   HENT:  Negative for ear discharge and ear pain.    Eyes:  Negative for pain and discharge.   Respiratory:  Negative for cough and shortness of breath.    Cardiovascular:  Negative for chest pain and leg swelling.   Gastrointestinal:  Negative for abdominal distention, abdominal pain, nausea and vomiting.   Endocrine: Negative for cold intolerance and heat intolerance.   Genitourinary:  Negative for difficulty urinating, dysuria and urgency.   Musculoskeletal:  Negative for joint swelling and myalgias.   Skin:  Negative for rash and wound.   Neurological:  Positive for weakness. Negative for dizziness and headaches.  "  Psychiatric/Behavioral:  Negative for agitation and confusion.      Objective:     Vital Signs (Most Recent):  Temp: 97.6 °F (36.4 °C) (03/02/25 0744)  Pulse: 75 (03/02/25 0744)  Resp: 18 (03/02/25 0744)  BP: (!) 144/67 (03/02/25 0852)  SpO2: 98 % (03/02/25 0744) Vital Signs (24h Range):  Temp:  [97.6 °F (36.4 °C)-98.7 °F (37.1 °C)] 97.6 °F (36.4 °C)  Pulse:  [59-92] 75  Resp:  [17-20] 18  SpO2:  [96 %-98 %] 98 %  BP: (138-152)/(62-82) 144/67     Weight: 106.3 kg (234 lb 5.6 oz)  Body mass index is 35.63 kg/m².     Physical Exam  Constitutional:       General: She is not in acute distress.  HENT:      Head: Normocephalic and atraumatic.   Eyes:      General:         Right eye: No discharge.         Left eye: No discharge.   Cardiovascular:      Rate and Rhythm: Normal rate and regular rhythm.   Pulmonary:      Effort: Pulmonary effort is normal.      Breath sounds: Normal breath sounds.   Abdominal:      General: There is no distension.      Tenderness: There is no abdominal tenderness.   Musculoskeletal:         General: No swelling or tenderness.      Cervical back: Neck supple. No tenderness.   Skin:     General: Skin is warm and dry.   Neurological:      General: No focal deficit present.      Mental Status: She is alert and oriented to person, place, and time.      Comments: No focal weakness  Able to maintain extremities against gravity  No facial asymmetry      Psychiatric:         Mood and Affect: Mood normal.         Behavior: Behavior normal.                Significant Labs: A1C:   Recent Labs   Lab 10/04/24  1010   HGBA1C 5.9*     ABGs: No results for input(s): "PH", "PCO2", "HCO3", "POCSATURATED", "BE", "TOTALHB", "COHB", "METHB", "O2HB", "POCFIO2", "PO2" in the last 48 hours.  Bilirubin:   Recent Labs   Lab 02/25/25  1238 02/26/25  0425   BILITOT 0.6 0.5     Blood Culture:   No results for input(s): "LABBLOO" in the last 48 hours.    BMP:   No results for input(s): "GLU", "NA", "K", "CL", "CO2", " ""BUN", "CREATININE", "CALCIUM", "MG" in the last 48 hours.    CBC:   Recent Labs   Lab 03/01/25  0703   WBC 6.62   HGB 10.1*   HCT 32.3*          CMP:   No results for input(s): "NA", "K", "CL", "CO2", "GLU", "BUN", "CREATININE", "CALCIUM", "PROT", "ALBUMIN", "BILITOT", "ALKPHOS", "AST", "ALT", "ANIONGAP", "EGFRNONAA" in the last 48 hours.    Invalid input(s): "ESTGFAFRICA"    Cardiac Markers:   No results for input(s): "CKMB", "MYOGLOBIN", "BNP", "TROPISTAT" in the last 48 hours.    Coagulation: No results for input(s): "PT", "INR", "APTT" in the last 48 hours.  Lactic Acid:   No results for input(s): "LACTATE" in the last 48 hours.    Lipase: No results for input(s): "LIPASE" in the last 48 hours.  Lipid Panel: No results for input(s): "CHOL", "HDL", "LDLCALC", "TRIG", "CHOLHDL" in the last 48 hours.  Magnesium:   No results for input(s): "MG" in the last 48 hours.    POCT Glucose: No results for input(s): "POCTGLUCOSE" in the last 48 hours.  Prealbumin: No results for input(s): "PREALBUMIN" in the last 48 hours.  Respiratory Culture: No results for input(s): "GSRESP", "RESPIRATORYC" in the last 48 hours.  Troponin:   No results for input(s): "TROPONINI", "TROPONINIHS" in the last 48 hours.    TSH:   Recent Labs   Lab 02/25/25  1238   TSH 1.076     Urine Culture: No results for input(s): "LABURIN" in the last 48 hours.  Urine Studies:   No results for input(s): "COLORU", "APPEARANCEUA", "PHUR", "SPECGRAV", "PROTEINUA", "GLUCUA", "KETONESU", "BILIRUBINUA", "OCCULTUA", "NITRITE", "UROBILINOGEN", "LEUKOCYTESUR", "RBCUA", "WBCUA", "BACTERIA", "SQUAMEPITHEL", "HYALINECASTS" in the last 48 hours.    Invalid input(s): "ENRIQUER"      Significant Imaging: I have reviewed all pertinent imaging results/findings within the past 24 hours.    Assessment and Plan     * Elevated troponin  Reason for admission  Troponin stable at 0.112  EKG without ST elevation  Cardiology consulted  Continue home pravastatin and xarelto "       Bacteremia  Staph on 2 out 2 blood cultures  IV rocephin since not MRSA---changed to ampicillin to cover for enterococcus in the urine. Day #1 3/1/25  Waiting for final culture     3/2/25  Contaminant---- Her blood cx do not agree (diff species of staph) and both are staph skin vu. So likely contaminant.  She is on amp day 2 for enterococcus in urine. It is sens to macrobid. Will switch to oral abx.  She can go back to nursing home Monday         Essential hypertension  Patient's blood pressure range in the last 24 hours was: BP  Min: 130/64  Max: 147/67.The patient's inpatient anti-hypertensive regimen is listed below:  Current Antihypertensives  losartan tablet 50 mg, 2 times daily, Oral    Plan  Continue home antihypertensives     Generalized weakness  Due to age and acute UTI.    PT/OT recommend SNF       CKD stage 3b, GFR 30-44 ml/min  Creatine stable for now. BMP reviewed- noted Estimated Creatinine Clearance: 35.5 mL/min (based on SCr of 1.4 mg/dL). according to latest data. Based on current GFR, CKD stage is stage 3 - GFR 30-59.  Monitor UOP and serial BMP and adjust therapy as needed. Renally dose meds. Avoid nephrotoxic medications and procedures.    UTI (urinary tract infection)  U/A with 2+ leukocytes and >100 WBC on microscopic  Urine culture with enterococcus. Blood Cx with Staph. ID and sens is pending    Change Rocephin to Ampicillin based on Cx and sens of urine     3/2--will transition to po macrobid since blood Cx are contaminant. She can go back to NH Monday         Chronic diastolic heart failure        Typical atrial flutter  Seen on EKG  Looks like patient goes in and out of atrial flutter  Unable to tolerate BB in the past       COPD (chronic obstructive pulmonary disease)  Not in acute exacerbation     Hyperlipidemia associated with type 2 diabetes mellitus        Hypothyroidism  Continue home levothyroxine   Lab Results   Component Value Date    TSH 1.076 02/25/2025             VTE  Risk Mitigation (From admission, onward)           Ordered     rivaroxaban tablet 15 mg  Nightly         02/25/25 1841     IP VTE HIGH RISK PATIENT  Once         02/25/25 1841     Place sequential compression device  Until discontinued         02/25/25 1841                    Discharge Planning   JACINDA: 2/27/2025     Code Status: Full Code   Medical Readiness for Discharge Date:   Discharge Plan A: Skilled Nursing Facility   Discharge Delays: (!) Post-Acute Set-up            Please place Justification for DME        Kevin Chatterjee MD  Department of Hospital Medicine   [place] - [facility] Surg (3rd Fl)

## 2025-03-02 NOTE — PLAN OF CARE
Problem: Adult Inpatient Plan of Care  Goal: Plan of Care Review  Outcome: Progressing     Problem: Wound  Goal: Absence of Infection Signs and Symptoms  Outcome: Progressing     Problem: Wound  Goal: Improved Oral Intake  Outcome: Progressing     Problem: Skin Injury Risk Increased  Goal: Skin Health and Integrity  Outcome: Progressing     Problem: Fall Injury Risk  Goal: Absence of Fall and Fall-Related Injury  Outcome: Progressing     Problem: Infection  Goal: Absence of Infection Signs and Symptoms  Outcome: Progressing     Pt progessing, stable, and free of falls. Pt AAO x 4 with no distress noted on day of care. All needs met. Bed locked and in the lowest position with call bell within reach.  POC going.

## 2025-03-02 NOTE — SUBJECTIVE & OBJECTIVE
"Interval History: ***    Review of Systems  Objective:     Vital Signs (Most Recent):  Temp: 97.6 °F (36.4 °C) (03/02/25 0744)  Pulse: 75 (03/02/25 0744)  Resp: 18 (03/02/25 0744)  BP: (!) 144/67 (03/02/25 0852)  SpO2: 98 % (03/02/25 0744) Vital Signs (24h Range):  Temp:  [97.6 °F (36.4 °C)-98.7 °F (37.1 °C)] 97.6 °F (36.4 °C)  Pulse:  [59-92] 75  Resp:  [17-20] 18  SpO2:  [96 %-98 %] 98 %  BP: (138-152)/(62-82) 144/67     Weight: 106.3 kg (234 lb 5.6 oz)  Body mass index is 35.63 kg/m².    Intake/Output Summary (Last 24 hours) at 3/2/2025 0943  Last data filed at 3/2/2025 0858  Gross per 24 hour   Intake 1040 ml   Output 2250 ml   Net -1210 ml         Physical Exam        Significant Labs: {Results:66872::"All pertinent labs within the past 24 hours have been reviewed."}    Significant Imaging: {Imaging Review:83013::"I have reviewed all pertinent imaging results/findings within the past 24 hours."}  "

## 2025-03-02 NOTE — ASSESSMENT & PLAN NOTE
U/A with 2+ leukocytes and >100 WBC on microscopic  Urine culture with enterococcus. Blood Cx with Staph. ID and sens is pending    Change Rocephin to Ampicillin based on Cx and sens of urine     3/2--will transition to po macrobid since blood Cx are contaminant. She can go back to NH Monday

## 2025-03-02 NOTE — SUBJECTIVE & OBJECTIVE
Past Medical History:   Diagnosis Date    Acute bronchitis with asthma     Anemia due to stage 3 chronic kidney disease     Anticoagulant long-term use     Asthma     Back pain     Cancer     Chest pain, musculoskeletal 7/16/2013    Chronic bronchitis     Colon polyps     COPD exacerbation 3/13/2020    Gout, unspecified     History of cervical cancer     Hypothyroidism     Obesity     Osteoarthritis     Paroxysmal atrial fibrillation 11/19/2024    Renal manifestation of secondary diabetes mellitus     Thyroid disease     Trouble in sleeping     Type 2 diabetes mellitus with ophthalmic manifestations     Type 2 diabetes with peripheral circulatory disorder, controlled     Urinary incontinence     Uterine cancer     Uterine cancer        Past Surgical History:   Procedure Laterality Date    ADENOIDECTOMY      EYE SURGERY Right     right eye cataract    HYSTERECTOMY  2008    LIZ-BSO    tonsilectomy      TONSILLECTOMY  1945    TOTAL ABDOMINAL HYSTERECTOMY  2008    TOTAL ABDOMINAL HYSTERECTOMY W/ BILATERAL SALPINGOOPHORECTOMY  2008       Review of patient's allergies indicates:  No Known Allergies    No current facility-administered medications on file prior to encounter.     Current Outpatient Medications on File Prior to Encounter   Medication Sig    allopurinoL (ZYLOPRIM) 300 MG tablet Take 1 tablet (300 mg total) by mouth once daily.    cinacalcet (SENSIPAR) 60 MG Tab Take 120 mg by mouth every evening.    ferrous sulfate 325 (65 FE) MG EC tablet Take 1 tablet by mouth once daily (Patient taking differently: Take 325 mg by mouth once daily.)    HYDROcodone-acetaminophen (NORCO) 7.5-325 mg per tablet Take 1 tablet by mouth every 24 hours as needed for Pain.    JARDIANCE 10 mg tablet Take 10 mg by mouth once daily.    levothyroxine (SYNTHROID) 75 MCG tablet Take 1 tablet (75 mcg total) by mouth once daily.    losartan (COZAAR) 50 MG tablet Take 1 tablet (50 mg total) by mouth 2 (two) times a day.    omega-3 acid ethyl  esters (LOVAZA) 1 gram capsule Take 1 capsule (1 g total) by mouth 2 (two) times daily.    rosuvastatin (CRESTOR) 20 MG tablet Take 1 tablet (20 mg total) by mouth every evening.    XARELTO 15 mg Tab Take 1 tablet by mouth in the evening    acetaminophen (TYLENOL) 500 MG tablet Take 500 mg by mouth every evening. And as needed    magnesium citrate 100 mg Tab Take 100 mg by mouth once daily.    meclizine (ANTIVERT) 12.5 mg tablet Take 1 tablet (12.5 mg total) by mouth 3 (three) times daily as needed for Dizziness.    multivitamin (ONE DAILY MULTIVITAMIN) per tablet Take 1 tablet by mouth once daily.    valACYclovir (VALTREX) 1000 MG tablet Take 1 tablet (1,000 mg total) by mouth 3 (three) times daily. for 7 days (Patient not taking: Reported on 2025)     Family History       Problem Relation (Age of Onset)    Anemia Daughter    Arthritis Father, Daughter, Daughter, Daughter, Daughter    Breast cancer Sister    Cancer Brother    Diabetes Brother, Daughter, Daughter    Glaucoma Daughter    Heart disease Mother, Brother, Brother    Hyperlipidemia Brother    Liver disease Daughter    No Known Problems Son, Son    Ovarian cancer Sister    Stroke Brother, Son          Tobacco Use    Smoking status: Former     Current packs/day: 0.00     Average packs/day: 0.3 packs/day for 13.0 years (4.3 ttl pk-yrs)     Types: Cigarettes     Start date: 1951     Quit date: 1964     Years since quittin.5     Passive exposure: Past    Smokeless tobacco: Never   Substance and Sexual Activity    Alcohol use: No    Drug use: No    Sexual activity: Never     Birth control/protection: Surgical     Comment:      Review of Systems   Constitutional:  Negative for chills and fever.   HENT:  Negative for ear discharge and ear pain.    Eyes:  Negative for pain and discharge.   Respiratory:  Negative for cough and shortness of breath.    Cardiovascular:  Negative for chest pain and leg swelling.   Gastrointestinal:  Negative  for abdominal distention, abdominal pain, nausea and vomiting.   Endocrine: Negative for cold intolerance and heat intolerance.   Genitourinary:  Negative for difficulty urinating, dysuria and urgency.   Musculoskeletal:  Negative for joint swelling and myalgias.   Skin:  Negative for rash and wound.   Neurological:  Positive for weakness. Negative for dizziness and headaches.   Psychiatric/Behavioral:  Negative for agitation and confusion.      Objective:     Vital Signs (Most Recent):  Temp: 97.6 °F (36.4 °C) (03/02/25 0744)  Pulse: 75 (03/02/25 0744)  Resp: 18 (03/02/25 0744)  BP: (!) 144/67 (03/02/25 0852)  SpO2: 98 % (03/02/25 0744) Vital Signs (24h Range):  Temp:  [97.6 °F (36.4 °C)-98.7 °F (37.1 °C)] 97.6 °F (36.4 °C)  Pulse:  [59-92] 75  Resp:  [17-20] 18  SpO2:  [96 %-98 %] 98 %  BP: (138-152)/(62-82) 144/67     Weight: 106.3 kg (234 lb 5.6 oz)  Body mass index is 35.63 kg/m².     Physical Exam  Constitutional:       General: She is not in acute distress.  HENT:      Head: Normocephalic and atraumatic.   Eyes:      General:         Right eye: No discharge.         Left eye: No discharge.   Cardiovascular:      Rate and Rhythm: Normal rate and regular rhythm.   Pulmonary:      Effort: Pulmonary effort is normal.      Breath sounds: Normal breath sounds.   Abdominal:      General: There is no distension.      Tenderness: There is no abdominal tenderness.   Musculoskeletal:         General: No swelling or tenderness.      Cervical back: Neck supple. No tenderness.   Skin:     General: Skin is warm and dry.   Neurological:      General: No focal deficit present.      Mental Status: She is alert and oriented to person, place, and time.      Comments: No focal weakness  Able to maintain extremities against gravity  No facial asymmetry      Psychiatric:         Mood and Affect: Mood normal.         Behavior: Behavior normal.                Significant Labs: A1C:   Recent Labs   Lab 10/04/24  1010   HGBA1C 5.9*  "    ABGs: No results for input(s): "PH", "PCO2", "HCO3", "POCSATURATED", "BE", "TOTALHB", "COHB", "METHB", "O2HB", "POCFIO2", "PO2" in the last 48 hours.  Bilirubin:   Recent Labs   Lab 02/25/25  1238 02/26/25  0425   BILITOT 0.6 0.5     Blood Culture:   No results for input(s): "LABBLOO" in the last 48 hours.    BMP:   No results for input(s): "GLU", "NA", "K", "CL", "CO2", "BUN", "CREATININE", "CALCIUM", "MG" in the last 48 hours.    CBC:   Recent Labs   Lab 03/01/25  0703   WBC 6.62   HGB 10.1*   HCT 32.3*          CMP:   No results for input(s): "NA", "K", "CL", "CO2", "GLU", "BUN", "CREATININE", "CALCIUM", "PROT", "ALBUMIN", "BILITOT", "ALKPHOS", "AST", "ALT", "ANIONGAP", "EGFRNONAA" in the last 48 hours.    Invalid input(s): "ESTGFAFRICA"    Cardiac Markers:   No results for input(s): "CKMB", "MYOGLOBIN", "BNP", "TROPISTAT" in the last 48 hours.    Coagulation: No results for input(s): "PT", "INR", "APTT" in the last 48 hours.  Lactic Acid:   No results for input(s): "LACTATE" in the last 48 hours.    Lipase: No results for input(s): "LIPASE" in the last 48 hours.  Lipid Panel: No results for input(s): "CHOL", "HDL", "LDLCALC", "TRIG", "CHOLHDL" in the last 48 hours.  Magnesium:   No results for input(s): "MG" in the last 48 hours.    POCT Glucose: No results for input(s): "POCTGLUCOSE" in the last 48 hours.  Prealbumin: No results for input(s): "PREALBUMIN" in the last 48 hours.  Respiratory Culture: No results for input(s): "GSRESP", "RESPIRATORYC" in the last 48 hours.  Troponin:   No results for input(s): "TROPONINI", "TROPONINIHS" in the last 48 hours.    TSH:   Recent Labs   Lab 02/25/25  1238   TSH 1.076     Urine Culture: No results for input(s): "LABURIN" in the last 48 hours.  Urine Studies:   No results for input(s): "COLORU", "APPEARANCEUA", "PHUR", "SPECGRAV", "PROTEINUA", "GLUCUA", "KETONESU", "BILIRUBINUA", "OCCULTUA", "NITRITE", "UROBILINOGEN", "LEUKOCYTESUR", "RBCUA", "WBCUA", " ""BACTERIA", "SQUAMEPITHEL", "HYALINECASTS" in the last 48 hours.    Invalid input(s): "WRIGHTSUR"      Significant Imaging: I have reviewed all pertinent imaging results/findings within the past 24 hours.  "

## 2025-03-03 VITALS
DIASTOLIC BLOOD PRESSURE: 58 MMHG | TEMPERATURE: 98 F | HEART RATE: 88 BPM | OXYGEN SATURATION: 96 % | SYSTOLIC BLOOD PRESSURE: 127 MMHG | WEIGHT: 234.38 LBS | RESPIRATION RATE: 22 BRPM | BODY MASS INDEX: 35.52 KG/M2 | HEIGHT: 68 IN

## 2025-03-03 LAB — SARS-COV-2 RDRP RESP QL NAA+PROBE: NEGATIVE

## 2025-03-03 PROCEDURE — 93010 ELECTROCARDIOGRAM REPORT: CPT | Mod: HCNC,,, | Performed by: INTERNAL MEDICINE

## 2025-03-03 PROCEDURE — 93005 ELECTROCARDIOGRAM TRACING: CPT | Mod: HCNC

## 2025-03-03 PROCEDURE — 87635 SARS-COV-2 COVID-19 AMP PRB: CPT | Mod: HCNC | Performed by: INTERNAL MEDICINE

## 2025-03-03 PROCEDURE — 25000003 PHARM REV CODE 250: Mod: HCNC

## 2025-03-03 PROCEDURE — 25000003 PHARM REV CODE 250: Mod: HCNC | Performed by: FAMILY MEDICINE

## 2025-03-03 PROCEDURE — 99900035 HC TECH TIME PER 15 MIN (STAT): Mod: HCNC

## 2025-03-03 PROCEDURE — 97530 THERAPEUTIC ACTIVITIES: CPT | Mod: HCNC

## 2025-03-03 PROCEDURE — 99239 HOSP IP/OBS DSCHRG MGMT >30: CPT | Mod: HCNC,,, | Performed by: INTERNAL MEDICINE

## 2025-03-03 RX ORDER — HYDROCODONE BITARTRATE AND ACETAMINOPHEN 7.5; 325 MG/1; MG/1
1 TABLET ORAL
Qty: 30 TABLET | Refills: 0 | Status: SHIPPED | OUTPATIENT
Start: 2025-03-03

## 2025-03-03 RX ORDER — NITROFURANTOIN 25; 75 MG/1; MG/1
100 CAPSULE ORAL DAILY
Qty: 5 CAPSULE | Refills: 0 | Status: SHIPPED | OUTPATIENT
Start: 2025-03-03 | End: 2025-03-03

## 2025-03-03 RX ORDER — NITROFURANTOIN 25; 75 MG/1; MG/1
100 CAPSULE ORAL DAILY
Qty: 5 CAPSULE | Refills: 0 | Status: SHIPPED | OUTPATIENT
Start: 2025-03-03 | End: 2025-03-13

## 2025-03-03 RX ADMIN — LEVOTHYROXINE SODIUM 75 MCG: 75 TABLET ORAL at 05:03

## 2025-03-03 RX ADMIN — NITROFURANTOIN (MONOHYDRATE/MACROCRYSTALS) 100 MG: 75; 25 CAPSULE ORAL at 08:03

## 2025-03-03 RX ADMIN — LOSARTAN POTASSIUM 50 MG: 50 TABLET, FILM COATED ORAL at 08:03

## 2025-03-03 RX ADMIN — PRAVASTATIN SODIUM 80 MG: 40 TABLET ORAL at 08:03

## 2025-03-03 RX ADMIN — ACETAMINOPHEN 650 MG: 325 TABLET ORAL at 12:03

## 2025-03-03 NOTE — ASSESSMENT & PLAN NOTE
Compensated congestive heart failure.  She did have a pause.  [facility] has been following the patient.  They were contacted and they will like to follow the patient as outpatient.  Please have her see Cardiology at [facility] for outpatient follow-up.

## 2025-03-03 NOTE — PROGRESS NOTES
[place] - [facility] Surg ([facility])  [facility] Medicine  Progress Note    Patient Name: Tonya Bucio  MRN: [id number]  Patient Class: IP- Inpatient   Admission Date: 2/25/2025  Length of Stay: 4 days  Attending Physician: Anjali Porter MD  Primary Care Provider: Tasha Atkins MD        Subjective     Principal Problem:Elevated troponin        HPI:  Patient is an 89 year old female with medical history of HTN, hypothyroidism, COPD, CKD stage 3, atrial fibrillation and atrial flutter who presented to the ED with lower extremity weakness. Symptoms started 3 days ago.  She normally is in a wheelchair but is able to stand up to make her transition from the wheelchair to toilet.  However, her legs would not work.  She denies fever, chills, CP, SOB, nausea, vomiting, abdominal pain, urinary retention and dysuria.        Admitted for elevated troponin.      Overview/Hospital Course:  PT HD stable on room air.  Enterococcus on urine culture.  Staph on 2 out of 2 blood cultures.  Waiting for repeat.  Currently on IV rocephin.  Not MRSA on PCR.  Nursing home placement pending.    Waiting on sensitivities to guide therapy. IF we are unable to discharge by 3p today she will have to stay the weekend as NH pharmacy is closed after 3       3/2/35  No events o/n   AFVSS   1 of 2 blood Cx sens back---staph sens to clinda. Other sens is pending     3/3/26  No events o/n   Her blood cx do not agree (diff species of staph) and both are staph skin vu. So likely contaminant.  She is on amp day 2 for enterococcus in urine. It is sens to macrobid .  She is on Macrobid 100 mg twice a day.  I will discharge her to nursing home for her debility.  We will continue with Macrobid 100 mg twice a day for 5 more days.  Her staph cultures are very likely contaminant.  We will not treat.                Review of Systems   Constitutional:  Negative for chills and fever.   HENT:  Negative for ear discharge and ear pain.    Eyes:  Negative for  pain and discharge.   Respiratory:  Negative for cough and shortness of breath.    Cardiovascular:  Negative for chest pain and leg swelling.   Gastrointestinal:  Negative for abdominal distention, abdominal pain, nausea and vomiting.   Endocrine: Negative for cold intolerance and heat intolerance.   Genitourinary:  Negative for difficulty urinating, dysuria and urgency.   Musculoskeletal:  Negative for joint swelling and myalgias.   Skin:  Negative for rash and wound.   Neurological:  Positive for weakness. Negative for dizziness and headaches.   Psychiatric/Behavioral:  Negative for agitation and confusion.      Objective:     Vital Signs (Most Recent):  Temp: 97.5 °F (36.4 °C) (03/03/25 0814)  Pulse: (!) 116 (03/03/25 0902)  Resp: 18 (03/03/25 0814)  BP: 136/65 (03/03/25 0814)  SpO2: 96 % (03/03/25 0814) Vital Signs (24h Range):  Temp:  [97.5 °F (36.4 °C)-98.5 °F (36.9 °C)] 97.5 °F (36.4 °C)  Pulse:  [] 116  Resp:  [18-22] 18  SpO2:  [96 %-98 %] 96 %  BP: (123-140)/(57-75) 136/65     Weight: 106.3 kg (234 lb 5.6 oz)  Body mass index is 35.63 kg/m².    Intake/Output Summary (Last 24 hours) at 3/3/2025 0930  Last data filed at 3/3/2025 0856  Gross per 24 hour   Intake 964.7 ml   Output 801 ml   Net 163.7 ml         Physical Exam  Vitals and nursing note reviewed.   Constitutional:       Appearance: She is well-developed.   HENT:      Head: Normocephalic and atraumatic.      Right Ear: External ear normal.      Left Ear: External ear normal.   Eyes:      Conjunctiva/sclera: Conjunctivae normal.      Pupils: Pupils are equal, round, and reactive to light.   Neck:      Thyroid: No thyromegaly.      Vascular: No JVD.      Trachea: No tracheal deviation.   Cardiovascular:      Rate and Rhythm: Normal rate and regular rhythm.      Heart sounds: Normal heart sounds.   Pulmonary:      Effort: Pulmonary effort is normal. No respiratory distress.      Breath sounds: Normal breath sounds. No wheezing or rales.   Chest:       Chest wall: No tenderness.   Abdominal:      General: Bowel sounds are normal. There is no distension.      Palpations: Abdomen is soft. There is no mass.      Tenderness: There is no abdominal tenderness. There is no guarding or rebound.   Musculoskeletal:         General: Normal range of motion.      Cervical back: Normal range of motion and neck supple.   Lymphadenopathy:      Cervical: No cervical adenopathy.   Skin:     General: Skin is warm and dry.   Neurological:      Mental Status: She is alert and oriented to person, place, and time.      Cranial Nerves: No cranial nerve deficit.      Motor: No abnormal muscle tone.      Coordination: Coordination normal.      Deep Tendon Reflexes: Reflexes are normal and symmetric. Reflexes normal.               Assessment and Plan     * Elevated troponin  Reason for admission  Troponin stable at 0.112  EKG without ST elevation  Cardiology consulted  Continue home pravastatin and xarelto .      Bacteremia  Staph on 2 out 2 blood cultures  IV rocephin since not MRSA---changed to ampicillin to cover for enterococcus in the urine. Day #1 3/1/25  Waiting for final culture     3/2/25  Contaminant---- Her blood cx do not agree (diff species of staph) and both are staph skin vu. So likely contaminant.  She is on amp day 2 for enterococcus in urine. It is sens to macrobid. Will switch to oral abx.  She can go back to nursing home Monday         Essential hypertension  Patient's blood pressure range in the last 24 hours was: BP  Min: 130/64  Max: 147/67.The patient's inpatient anti-hypertensive regimen is listed below:  Current Antihypertensives  losartan tablet 50 mg, 2 times daily, Oral    Plan  Continue home antihypertensives     Generalized weakness  Due to age and acute UTI.    PT/OT recommend SNF       CKD stage 3b, GFR 30-44 ml/min  Creatine stable for now. BMP reviewed- noted Estimated Creatinine Clearance: 35.5 mL/min (based on SCr of 1.4 mg/dL). according to  latest data. Based on current GFR, CKD stage is stage 3 - GFR 30-59.  Monitor UOP and serial BMP and adjust therapy as needed. Renally dose meds. Avoid nephrotoxic medications and procedures.    UTI (urinary tract infection)  U/A with 2+ leukocytes and >100 WBC on microscopic  Urine culture with enterococcus. Blood Cx with Staph. ID and sens is pending    Change Rocephin to Ampicillin based on Cx and sens of urine     3/2--will transition to po macrobid since blood Cx are contaminant. She can go back to NH Monday 03/03/2025.  Enterococcus UTI treated with IV ampicillin the hospital.  It is sensitive to Macrobid we will send to nursing home on Macrobid 100 mg  a day for 5 days .  As she has CKD.  We will change the dose to once a day.        Chronic diastolic heart failure    Compensated congestive heart failure.  She did have a pause.  [place] [facility]ology has been following the patient.  They were contacted and they will like to follow the patient as outpatient.  Please have her see Cardiology at [facility] for outpatient follow-up.    Typical atrial flutter  Seen on EKG  Looks like patient goes in and out of atrial flutter  Unable to tolerate BB in the past .      COPD (chronic obstructive pulmonary disease)  Not in acute exacerbation     Resolved    Hyperlipidemia associated with type 2 diabetes mellitus  Lab Results   Component Value Date    CHOL 153 10/04/2024    CHOL 157 03/29/2024    CHOL 161 09/29/2023     Lab Results   Component Value Date    HDL 41 10/04/2024    HDL 39 (L) 03/29/2024    HDL 36 (L) 09/29/2023     Lab Results   Component Value Date    LDLCALC 60.8 (L) 10/04/2024    LDLCALC 60.6 (L) 03/29/2024    LDLCALC 66.6 09/29/2023     Lab Results   Component Value Date    TRIG 256 (H) 10/04/2024    TRIG 287 (H) 03/29/2024    TRIG 292 (H) 09/29/2023       Lab Results   Component Value Date    CHOLHDL 26.8 10/04/2024    CHOLHDL 24.8 03/29/2024    CHOLHDL 22.4 09/29/2023           Hypothyroidism  Continue home levothyroxine   Lab Results   Component Value Date    TSH 1.076 02/25/2025     Continue same dose        VTE Risk Mitigation (From admission, onward)           Ordered     rivaroxaban tablet 15 mg  Nightly         02/25/25 1841     IP VTE HIGH RISK PATIENT  Once         02/25/25 1841     Place sequential compression device  Until discontinued         02/25/25 1841                    Discharge Planning   JACINDA: 3/3/2025     Code Status: Full Code   Medical Readiness for Discharge Date:   Discharge Plan A: Skilled Nursing Facility   Discharge Delays: (!) Post-Acute Set-up            Please place Justification for DME        Tasha Atkins MD  Department of Hospital Medicine   [place] - Med Surg (3rd Fl)

## 2025-03-03 NOTE — ASSESSMENT & PLAN NOTE
U/A with 2+ leukocytes and >100 WBC on microscopic  Urine culture with enterococcus. Blood Cx with Staph. ID and sens is pending    Change Rocephin to Ampicillin based on Cx and sens of urine     3/2--will transition to po macrobid since blood Cx are contaminant. She can go back to NH Monday 03/03/2025.  Enterococcus UTI treated with IV ampicillin the hospital.  It is sensitive to Macrobid we will send to nursing home on Macrobid 100 mg  a day for 5 days .  As she has CKD.  We will change the dose to once a day.

## 2025-03-03 NOTE — DISCHARGE SUMMARY
[place] - [facility] Surg ([facility])  [facility] Medicine  Discharge Summary      Patient Name: Tonya Bucio  MRN: [id number]  [facility]: [id number]  Patient Class: IP- Inpatient  Admission Date: 2/25/2025  Hospital Length of Stay: 4 days  Discharge Date and Time:  03/03/2025 9:40 AM  Attending Physician: Anjali Porter MD   Discharging Provider: Tasha Atkins MD  Primary Care Provider: Tasha Atkins MD    Primary Care Team: Networked reference to record PCT     HPI:   Patient is an 89 year old female with medical history of HTN, hypothyroidism, COPD, CKD stage 3, atrial fibrillation and atrial flutter who presented to the ED with lower extremity weakness. Symptoms started 3 days ago.  She normally is in a wheelchair but is able to stand up to make her transition from the wheelchair to toilet.  However, her legs would not work.  She denies fever, chills, CP, SOB, nausea, vomiting, abdominal pain, urinary retention and dysuria.        Admitted for elevated troponin.      * No surgery found *      Hospital Course:   PT HD stable on room air.  Enterococcus on urine culture.  Staph on 2 out of 2 blood cultures.  Waiting for repeat.  Currently on IV rocephin.  Not MRSA on PCR.  Nursing home placement pending.    Waiting on sensitivities to guide therapy. IF we are unable to discharge by 3p today she will have to stay the weekend as NH pharmacy is closed after 3       3/2/35  No events o/n   AFVSS   1 of 2 blood Cx sens back---staph sens to clinda. Other sens is pending     3/3/26  No events o/n   Her blood cx do not agree (diff species of staph) and both are staph skin vu. So likely contaminant.  She is on amp day 2 for enterococcus in urine. It is sens to macrobid .  She is on Macrobid 100 mg twice a day.  I will discharge her to nursing home for her debility.  We will continue with Macrobid 100 mg twice a day for 5 more days.  Her staph cultures are very likely contaminant.  We will not treat.             Goals  of Care Treatment Preferences:  Code Status: Full Code      SDOH Screening:  The patient was screened for utility difficulties, food insecurity, transport difficulties, housing insecurity, and interpersonal safety and there were no concerns identified this admission.     Consults:   Consults (From admission, onward)          Status Ordering Provider     Inpatient consult to Cardiology-[facility]  Once        Provider:  Tutu Carlos MD    Completed JO ANN URBINA            * Elevated troponin  Reason for admission  Troponin stable at 0.112  EKG without ST elevation  Cardiology consulted  Continue home pravastatin and xarelto .      Bacteremia  Staph on 2 out 2 blood cultures  IV rocephin since not MRSA---changed to ampicillin to cover for enterococcus in the urine. Day #1 3/1/25  Waiting for final culture     3/2/25  Contaminant---- Her blood cx do not agree (diff species of staph) and both are staph skin vu. So likely contaminant.  She is on amp day 2 for enterococcus in urine. It is sens to macrobid. Will switch to oral abx.  She can go back to nursing home Monday         Essential hypertension  Patient's blood pressure range in the last 24 hours was: BP  Min: 130/64  Max: 147/67.The patient's inpatient anti-hypertensive regimen is listed below:  Current Antihypertensives  losartan tablet 50 mg, 2 times daily, Oral    Plan  Continue home antihypertensives     Generalized weakness  Due to age and acute UTI.    PT/OT recommend SNF       CKD stage 3b, GFR 30-44 ml/min  Creatine stable for now. BMP reviewed- noted Estimated Creatinine Clearance: 35.5 mL/min (based on SCr of 1.4 mg/dL). according to latest data. Based on current GFR, CKD stage is stage 3 - GFR 30-59.  Monitor UOP and serial BMP and adjust therapy as needed. Renally dose meds. Avoid nephrotoxic medications and procedures.    UTI (urinary tract infection)  U/A with 2+ leukocytes and >100 WBC on microscopic  Urine culture with enterococcus.  Blood Cx with Staph. ID and sens is pending    Change Rocephin to Ampicillin based on Cx and sens of urine     3/2--will transition to po macrobid since blood Cx are contaminant. She can go back to NH Monday 03/03/2025.  Enterococcus UTI treated with IV ampicillin the hospital.  It is sensitive to Macrobid we will send to nursing home on Macrobid 100 mg  a day for 5 days .  As she has CKD.  We will change the dose to once a day.        Chronic diastolic heart failure    Compensated congestive heart failure.  She did have a pause.  [place] [facility]ology has been following the patient.  They were contacted and they will like to follow the patient as outpatient.  Please have her see Cardiology at [facility] for outpatient follow-up.    Typical atrial flutter  Seen on EKG  Looks like patient goes in and out of atrial flutter  Unable to tolerate BB in the past .      COPD (chronic obstructive pulmonary disease)  Not in acute exacerbation     Resolved    Hyperlipidemia associated with type 2 diabetes mellitus  Lab Results   Component Value Date    CHOL 153 10/04/2024    CHOL 157 03/29/2024    CHOL 161 09/29/2023     Lab Results   Component Value Date    HDL 41 10/04/2024    HDL 39 (L) 03/29/2024    HDL 36 (L) 09/29/2023     Lab Results   Component Value Date    LDLCALC 60.8 (L) 10/04/2024    LDLCALC 60.6 (L) 03/29/2024    LDLCALC 66.6 09/29/2023     Lab Results   Component Value Date    TRIG 256 (H) 10/04/2024    TRIG 287 (H) 03/29/2024    TRIG 292 (H) 09/29/2023       Lab Results   Component Value Date    CHOLHDL 26.8 10/04/2024    CHOLHDL 24.8 03/29/2024    CHOLHDL 22.4 09/29/2023          Hypothyroidism  Continue home levothyroxine   Lab Results   Component Value Date    TSH 1.076 02/25/2025     Continue same dose        Final Active Diagnoses:    Diagnosis Date Noted POA    PRINCIPAL PROBLEM:  Elevated troponin [R79.89] 02/26/2025 Yes    Bacteremia [R78.81] 02/27/2025 Yes    UTI (urinary tract  infection) [N39.0] 02/26/2025 Yes    CKD stage 3b, GFR 30-44 ml/min [N18.32] 02/26/2025 Yes    Generalized weakness [R53.1] 02/26/2025 Yes    Essential hypertension [I10] 02/26/2025 Yes    Chronic diastolic heart failure [I50.32] 04/23/2024 Yes    Typical atrial flutter [I48.3] 03/14/2020 Yes    COPD (chronic obstructive pulmonary disease) [J44.9] 03/13/2020 Yes    Hyperlipidemia associated with type 2 diabetes mellitus [E11.69, E78.5] 01/21/2014 Yes    Hypothyroidism [E03.9] 07/16/2013 Yes      Problems Resolved During this Admission:       Discharged Condition: fair    Disposition: nursing home     Follow Up:   Follow-up Information       Tutu Carlos MD Follow up.    Specialties: Cardiology, Interventional Cardiology  Contact information:  [address] [address]  [phone]               Tasha Atkins MD Follow up.    Specialty: Internal Medicine  Contact information:  [address]  [place] [address]  [phone]                           Patient Instructions:   No discharge procedures on file.    Significant Diagnostic Studies: Microbiology: Urine Culture    Lab Results   Component Value Date    LABURIN ENTEROCOCCUS FAECALIS  50,000 - 99,999 cfu/ml   (A) 02/25/2025       Pending Diagnostic Studies:       None           Medications:  Reconciled Home Medications:      Medication List        START taking these medications      nitrofurantoin (macrocrystal-monohydrate) 100 MG capsule  Commonly known as: MACROBID  Take 1 capsule (100 mg total) by mouth once daily. for 10 days            CHANGE how you take these medications      ferrous sulfate 325 (65 FE) MG EC tablet  Take 1 tablet by mouth once daily  What changed: when to take this            CONTINUE taking these medications      acetaminophen 500 MG tablet  Commonly known as: TYLENOL  Take 500 mg by mouth every evening. And as needed     allopurinoL 300 MG tablet  Commonly known as: ZYLOPRIM  Take 1 tablet (300 mg total) by mouth  once daily.     cinacalcet 60 MG Tab  Commonly known as: SENSIPAR  Take 120 mg by mouth every evening.     HYDROcodone-acetaminophen 7.5-325 mg per tablet  Commonly known as: NORCO  Take 1 tablet by mouth every 24 hours as needed for Pain.     JARDIANCE 10 mg tablet  Generic drug: empagliflozin  Take 10 mg by mouth once daily.     levothyroxine 75 MCG tablet  Commonly known as: SYNTHROID  Take 1 tablet (75 mcg total) by mouth once daily.     losartan 50 MG tablet  Commonly known as: COZAAR  Take 1 tablet (50 mg total) by mouth 2 (two) times a day.     magnesium citrate 100 mg Tab  Take 100 mg by mouth once daily.     meclizine 12.5 mg tablet  Commonly known as: ANTIVERT  Take 1 tablet (12.5 mg total) by mouth 3 (three) times daily as needed for Dizziness.     omega-3 acid ethyl esters 1 gram capsule  Commonly known as: LOVAZA  Take 1 capsule (1 g total) by mouth 2 (two) times daily.     ONE DAILY MULTIVITAMIN per tablet  Generic drug: multivitamin  Take 1 tablet by mouth once daily.     rosuvastatin 20 MG tablet  Commonly known as: CRESTOR  Take 1 tablet (20 mg total) by mouth every evening.     XARELTO 15 mg Tab  Generic drug: rivaroxaban  Take 1 tablet by mouth in the evening            STOP taking these medications      valACYclovir 1000 MG tablet  Commonly known as: VALTREX              Indwelling Lines/Drains at time of discharge:   Lines/Drains/Airways       Drain  Duration             Female External Urinary Catheter w/ Suction 02/27/25 0900 4 days                    Time spent on the discharge of patient: 35 minutes         Tasha Atkins MD  Department of Hospital Medicine  [place] - [facility] Surg (3rd Fl)

## 2025-03-03 NOTE — ASSESSMENT & PLAN NOTE
Lab Results   Component Value Date    CHOL 153 10/04/2024    CHOL 157 03/29/2024    CHOL 161 09/29/2023     Lab Results   Component Value Date    HDL 41 10/04/2024    HDL 39 (L) 03/29/2024    HDL 36 (L) 09/29/2023     Lab Results   Component Value Date    LDLCALC 60.8 (L) 10/04/2024    LDLCALC 60.6 (L) 03/29/2024    LDLCALC 66.6 09/29/2023     Lab Results   Component Value Date    TRIG 256 (H) 10/04/2024    TRIG 287 (H) 03/29/2024    TRIG 292 (H) 09/29/2023       Lab Results   Component Value Date    CHOLHDL 26.8 10/04/2024    CHOLHDL 24.8 03/29/2024    CHOLHDL 22.4 09/29/2023

## 2025-03-03 NOTE — ASSESSMENT & PLAN NOTE
Continue home levothyroxine   Lab Results   Component Value Date    TSH 1.076 02/25/2025     Continue same dose

## 2025-03-03 NOTE — PT/OT/SLP DISCHARGE
Physical Therapy Discharge Summary    Name: Tonya Bucio  MRN: [id number]   Principal Problem: Elevated troponin     Patient Discharged from acute Physical Therapy on 3/3/25.  Please refer to prior PT noted date on 3/3/25 for functional status.     Assessment:     Patient appropriate for care in another setting.    Objective:     GOALS:   Multidisciplinary Problems       Physical Therapy Goals          Problem: Physical Therapy    Goal Priority Disciplines Outcome Interventions   Physical Therapy Goal     PT, PT/OT Progressing    Description: Patient will increase functional independence with mobility by performin. Supine to sit with Standby Assistance  2. Sit <> Stand with Standby Assistance with grab bar or RW  3. Increase standing tolerance x ~2 minutes using grab bar or RW for support  4. Bed to chair transfer with Contact Guard Assistancewith or without sliding board using slide/scoot TECHNIQUE  5. Lower extremity exercise program x10 reps per handout, with assistance as needed                          Reasons for Discontinuation of Therapy Services  Transfer to alternate level of care.      Plan:     Patient Discharged to: Skilled Nursing Facility.      3/3/2025

## 2025-03-03 NOTE — PT/OT/SLP PROGRESS
"Physical Therapy Treatment    Patient Name:  Tonya Bucio   MRN:  [id number]    Recommendations:     Discharge Recommendations: Moderate Intensity Therapy  Discharge Equipment Recommendations: none  Barriers to discharge: Decreased caregiver support    Assessment:     Tonya Bucio is a 89 y.o. female admitted with a medical diagnosis of Elevated troponin.  She presents with the following impairments/functional limitations: weakness, impaired endurance, impaired self care skills, impaired functional mobility, impaired balance, decreased safety awareness. Patient tolerated sit to stand using RW or grab bar and stand pivot transfers Bed<>WC x 5 repetitions with standby assistance. No sign of fatigue.    Rehab Prognosis: Fair; patient would benefit from acute skilled PT services to address these deficits and reach maximum level of function.    Recent Surgery: * No surgery found *      Plan:     During this hospitalization, patient to be seen 5 x/week to address the identified rehab impairments via therapeutic activities, therapeutic exercises, neuromuscular re-education and progress toward the following goals:    Plan of Care Expires:  03/05/25    Subjective     Chief Complaint: none   Patient/Family Comments/goals: "to go to skilled nursing facility for Therapy"  Pain/Comfort:  Pain Rating 1: 0/10      Objective:     Communicated with patient prior to session.  Patient found HOB elevated with PureWick, peripheral IV, telemetry upon PT entry to room.     General Precautions: Standard, fall  Orthopedic Precautions: N/A  Braces: N/A  Respiratory Status: Room air     Functional Mobility:  Bed Mobility:     Rolling Left:  modified independence  Rolling Right: modified independence  Scooting: modified independence  Supine to Sit: supervision  Sit to Supine: contact guard assistance  Transfers:     Sit to Stand:  stand by assistance with rolling walker  Bed to Chair: stand by assistance with  no AD  using  Stand " Pivot  Balance: Sitting: Independnet; Standing with RW: standby assistance      AM-PAC 6 CLICK MOBILITY  Turning over in bed (including adjusting bedclothes, sheets and blankets)?: 3  Sitting down on and standing up from a chair with arms (e.g., wheelchair, bedside commode, etc.): 3  Moving from lying on back to sitting on the side of the bed?: 3  Moving to and from a bed to a chair (including a wheelchair)?: 3  Need to walk in hospital room?: 1  Climbing 3-5 steps with a railing?: 1  Basic Mobility Total Score: 14       Treatment & Education:  B LE strengthening ROM ex using 2 # ankle weights on each x 10 reps invoving LAQ, hip/knee flex/ext/abd/add, marching at seated, sit to stand x 3 with RW and stand poivot trasnfer trng bed<>wheelchair.    Patient left HOB elevated with all lines intact, call button in reach, and nursing notified..    GOALS:   Multidisciplinary Problems       Physical Therapy Goals          Problem: Physical Therapy    Goal Priority Disciplines Outcome Interventions   Physical Therapy Goal     PT, PT/OT Progressing    Description: Patient will increase functional independence with mobility by performin. Supine to sit with Standby Assistance  2. Sit <> Stand with Standby Assistance with grab bar or RW  3. Increase standing tolerance x ~2 minutes using grab bar or RW for support  4. Bed to chair transfer with Contact Guard Assistancewith or without sliding board using slide/scoot TECHNIQUE  5. Lower extremity exercise program x10 reps per handout, with assistance as needed                          DME Justifications:  No DME recommended requiring DME justifications    Time Tracking:     PT Received On: 25  PT Start Time: 0850     PT Stop Time: 0915  PT Total Time (min): 25 min     Billable Minutes: Therapeutic Activity 25    Treatment Type: Treatment  PT/PTA: PT           2025

## 2025-03-03 NOTE — NURSING
Dr. Atkins rounding - notified of pauses. Requested that cardiology see patient before considering discharge. Secure chat sent and RN notified.

## 2025-03-03 NOTE — PLAN OF CARE
03/03/25 0815   Rounds   Attendance Provider;Nurse    Discharge Plan A Skilled Nursing Facility   Why the patient remains in the hospital Other (see comment)  (patient ready for discharge)   Transition of Care Barriers None     Care team reviewed plan to discharge today with patient / family.  Patient / family VU of information discussed.

## 2025-03-03 NOTE — PLAN OF CARE
03/03/25 1152   Final Note   Assessment Type Final Discharge Note   Anticipated Discharge Disposition SNF  ([facility])   Post-Acute Status   Post-Acute Authorization Placement   Post-Acute Placement Status Set-up Complete/Auth obtained   Patient choice form signed by patient/caregiver   (PT requested [facility])   Discharge Delays None known at this time

## 2025-03-03 NOTE — ASSESSMENT & PLAN NOTE
Seen on EKG  Looks like patient goes in and out of atrial flutter  Unable to tolerate BB in the past .

## 2025-03-03 NOTE — PLAN OF CARE
Director faxed off patient's PASRR, 142 and Covid rapid results to the [facility].     11:00 AM - spoke back with eMr at the [facility] - report can be called to the [address] [place] [address]. Nursing staff notified.

## 2025-03-03 NOTE — SUBJECTIVE & OBJECTIVE
Review of Systems   Constitutional:  Negative for chills and fever.   HENT:  Negative for ear discharge and ear pain.    Eyes:  Negative for pain and discharge.   Respiratory:  Negative for cough and shortness of breath.    Cardiovascular:  Negative for chest pain and leg swelling.   Gastrointestinal:  Negative for abdominal distention, abdominal pain, nausea and vomiting.   Endocrine: Negative for cold intolerance and heat intolerance.   Genitourinary:  Negative for difficulty urinating, dysuria and urgency.   Musculoskeletal:  Negative for joint swelling and myalgias.   Skin:  Negative for rash and wound.   Neurological:  Positive for weakness. Negative for dizziness and headaches.   Psychiatric/Behavioral:  Negative for agitation and confusion.      Objective:     Vital Signs (Most Recent):  Temp: 97.5 °F (36.4 °C) (03/03/25 0814)  Pulse: (!) 116 (03/03/25 0902)  Resp: 18 (03/03/25 0814)  BP: 136/65 (03/03/25 0814)  SpO2: 96 % (03/03/25 0814) Vital Signs (24h Range):  Temp:  [97.5 °F (36.4 °C)-98.5 °F (36.9 °C)] 97.5 °F (36.4 °C)  Pulse:  [] 116  Resp:  [18-22] 18  SpO2:  [96 %-98 %] 96 %  BP: (123-140)/(57-75) 136/65     Weight: 106.3 kg (234 lb 5.6 oz)  Body mass index is 35.63 kg/m².    Intake/Output Summary (Last 24 hours) at 3/3/2025 0930  Last data filed at 3/3/2025 0856  Gross per 24 hour   Intake 964.7 ml   Output 801 ml   Net 163.7 ml         Physical Exam  Vitals and nursing note reviewed.   Constitutional:       Appearance: She is well-developed.   HENT:      Head: Normocephalic and atraumatic.      Right Ear: External ear normal.      Left Ear: External ear normal.   Eyes:      Conjunctiva/sclera: Conjunctivae normal.      Pupils: Pupils are equal, round, and reactive to light.   Neck:      Thyroid: No thyromegaly.      Vascular: No JVD.      Trachea: No tracheal deviation.   Cardiovascular:      Rate and Rhythm: Normal rate and regular rhythm.      Heart sounds: Normal heart sounds.    Pulmonary:      Effort: Pulmonary effort is normal. No respiratory distress.      Breath sounds: Normal breath sounds. No wheezing or rales.   Chest:      Chest wall: No tenderness.   Abdominal:      General: Bowel sounds are normal. There is no distension.      Palpations: Abdomen is soft. There is no mass.      Tenderness: There is no abdominal tenderness. There is no guarding or rebound.   Musculoskeletal:         General: Normal range of motion.      Cervical back: Normal range of motion and neck supple.   Lymphadenopathy:      Cervical: No cervical adenopathy.   Skin:     General: Skin is warm and dry.   Neurological:      Mental Status: She is alert and oriented to person, place, and time.      Cranial Nerves: No cranial nerve deficit.      Motor: No abnormal muscle tone.      Coordination: Coordination normal.      Deep Tendon Reflexes: Reflexes are normal and symmetric. Reflexes normal.

## 2025-03-03 NOTE — ASSESSMENT & PLAN NOTE
Reason for admission  Troponin stable at 0.112  EKG without ST elevation  Cardiology consulted  Continue home pravastatin and xarelto .

## 2025-03-03 NOTE — PT/OT/SLP DISCHARGE
Occupational Therapy Discharge Summary    Tonya Bucio  MRN: [id number]   Principal Problem: Elevated troponin      Patient Discharged from acute Occupational Therapy on 3/3/2025.  Please refer to prior OT note dated 3/3/2025 for functional status.    Assessment:      Patient appropriate for care in another setting.    Objective:     GOALS:   Multidisciplinary Problems       Occupational Therapy Goals          Problem: Occupational Therapy    Goal Priority Disciplines Outcome Interventions   Occupational Therapy Goal     OT, PT/OT Progressing    Description: Pt to perform LB dressing with MOD I using sockaid for increased independence with ADL.    Pt to perform self toileting with Min A using BSC.  Pt to perform level functional transfers required for ADL's with CGA with or without sliding board to reach wheelchair and BSC.  Pt to demonstrate consistent adherence to breathing control and energy conservation techniques as educated by OT.                         Reasons for Discontinuation of Therapy Services  Transfer to alternate level of care.      Plan:     Patient Discharged to: Skilled Nursing Facility    3/3/2025

## 2025-03-05 LAB
BACTERIA BLD CULT: NORMAL
BACTERIA BLD CULT: NORMAL
OHS QRS DURATION: 84 MS
OHS QTC CALCULATION: 375 MS

## 2025-03-13 ENCOUNTER — TELEPHONE (OUTPATIENT)
Dept: ELECTROPHYSIOLOGY | Facility: CLINIC | Age: 89
End: 2025-03-13
Payer: MEDICARE

## 2025-03-27 NOTE — TELEPHONE ENCOUNTER
Refill Routing Note   Medication(s) are not appropriate for processing by [facility] for the following reason(s):      Required labs abnormal    [facility] action(s):  Defer Care Due:  None identified          Pharmacist review requested: Yes     Appointments  past 12m or future 3m with PCP    Date Provider   Last Visit   7/3/2023 Tasha Atkins MD   Next Visit   10/3/2023 Tasha Atkins MD   ED visits in past 90 days: 1        Note composed:1:29 PM 08/01/2023                      Opt out

## 2025-03-31 ENCOUNTER — TELEPHONE (OUTPATIENT)
Dept: INTERNAL MEDICINE | Facility: CLINIC | Age: 89
End: 2025-03-31
Payer: MEDICARE

## 2025-03-31 NOTE — TELEPHONE ENCOUNTER
----- Message from Connie sent at 3/31/2025 10:03 AM CDT -----  Contact: Vanessa  [facility] [id number] 1935PCP: Tasha Atkins.Home Phone      [phone] Phone      Not on file.Mobile          [phone]: pt is tentatively discharging from the [facility] April 4th. She needs a follow up appt so HH can follow her. Please advise Vanessa [phone]

## 2025-04-07 ENCOUNTER — TELEPHONE (OUTPATIENT)
Dept: INTERNAL MEDICINE | Facility: CLINIC | Age: 89
End: 2025-04-07
Payer: MEDICARE

## 2025-04-07 NOTE — TELEPHONE ENCOUNTER
Spoke with Paige with [facility]. Pt has an open wound on her right heel and they would like to start wound care. Verbal order given. Pt will be in clinic on Thursday for appt.

## 2025-04-10 ENCOUNTER — OFFICE VISIT (OUTPATIENT)
Dept: INTERNAL MEDICINE | Facility: CLINIC | Age: 89
End: 2025-04-10
Payer: MEDICARE

## 2025-04-10 VITALS
HEART RATE: 88 BPM | RESPIRATION RATE: 18 BRPM | WEIGHT: 228 LBS | OXYGEN SATURATION: 97 % | DIASTOLIC BLOOD PRESSURE: 80 MMHG | BODY MASS INDEX: 34.56 KG/M2 | SYSTOLIC BLOOD PRESSURE: 110 MMHG | HEIGHT: 68 IN

## 2025-04-10 DIAGNOSIS — E66.01 MORBID OBESITY: ICD-10-CM

## 2025-04-10 DIAGNOSIS — I26.92 CHRONIC SADDLE PULMONARY EMBOLISM WITHOUT ACUTE COR PULMONALE: ICD-10-CM

## 2025-04-10 DIAGNOSIS — I27.82 CHRONIC SADDLE PULMONARY EMBOLISM WITHOUT ACUTE COR PULMONALE: ICD-10-CM

## 2025-04-10 DIAGNOSIS — C55 MALIGNANT NEOPLASM OF UTERUS, UNSPECIFIED SITE: ICD-10-CM

## 2025-04-10 DIAGNOSIS — R53.81 DEBILITY: Primary | ICD-10-CM

## 2025-04-10 PROCEDURE — 99999 PR PBB SHADOW E&M-EST. PATIENT-LVL IV: CPT | Mod: PBBFAC,HCNC,, | Performed by: INTERNAL MEDICINE

## 2025-04-10 PROCEDURE — 1159F MED LIST DOCD IN RCRD: CPT | Mod: HCNC,CPTII,S$GLB, | Performed by: INTERNAL MEDICINE

## 2025-04-10 PROCEDURE — 99214 OFFICE O/P EST MOD 30 MIN: CPT | Mod: HCNC,S$GLB,, | Performed by: INTERNAL MEDICINE

## 2025-04-10 PROCEDURE — 1101F PT FALLS ASSESS-DOCD LE1/YR: CPT | Mod: HCNC,CPTII,S$GLB, | Performed by: INTERNAL MEDICINE

## 2025-04-10 PROCEDURE — G2211 COMPLEX E/M VISIT ADD ON: HCPCS | Mod: HCNC,S$GLB,, | Performed by: INTERNAL MEDICINE

## 2025-04-10 PROCEDURE — 3288F FALL RISK ASSESSMENT DOCD: CPT | Mod: HCNC,CPTII,S$GLB, | Performed by: INTERNAL MEDICINE

## 2025-04-10 PROCEDURE — 1160F RVW MEDS BY RX/DR IN RCRD: CPT | Mod: HCNC,CPTII,S$GLB, | Performed by: INTERNAL MEDICINE

## 2025-04-10 PROCEDURE — 1126F AMNT PAIN NOTED NONE PRSNT: CPT | Mod: HCNC,CPTII,S$GLB, | Performed by: INTERNAL MEDICINE

## 2025-04-10 NOTE — PROGRESS NOTES
Subjective:   History of Present Illness    CHIEF COMPLAINT:  Tonya presents today for follow up after nursing home discharge.    RECENT CARE HISTORY:  She was hospitalized on February 25th for a urinary tract infection. Following discharge, she was admitted to a nursing home for one month to receive physical therapy and address debility. She is currently receiving home health services with both nursing and physical therapy visits, with the most recent physical therapy visit occurring yesterday.    FUNCTIONAL STATUS:  She currently uses a wheelchair for mobility.    CURRENT MEDICATIONS:  She continues blood pressure medication, thyroid medication, Xarelto, allopurinol, vitamins, and pain medication without any recent changes.       Review of Systems   Constitutional:  Negative for chills and fever.   HENT:  Negative for congestion, hearing loss, sinus pressure and sore throat.    Eyes:  Negative for photophobia.   Respiratory:  Negative for cough, choking, chest tightness and wheezing.    Cardiovascular:  Negative for chest pain and palpitations.   Gastrointestinal:  Negative for blood in stool, nausea and vomiting.   Genitourinary:  Negative for dysuria and hematuria.   Musculoskeletal:  Negative for arthralgias and myalgias.   Skin:  Negative for pallor.   Neurological:  Negative for dizziness and numbness.   Hematological:  Does not bruise/bleed easily.   Psychiatric/Behavioral:  Negative for confusion and suicidal ideas. The patient is not nervous/anxious.         Objective:   Physical Exam  Vitals and nursing note reviewed.   Constitutional:       Appearance: She is well-developed. She is obese.   HENT:      Head: Normocephalic and atraumatic.      Right Ear: External ear normal.      Left Ear: External ear normal.   Eyes:      Conjunctiva/sclera: Conjunctivae normal.      Pupils: Pupils are equal, round, and reactive to light.   Neck:      Thyroid: No thyromegaly.      Vascular: No JVD.      Trachea: No  tracheal deviation.   Cardiovascular:      Rate and Rhythm: Normal rate and regular rhythm.      Heart sounds: Normal heart sounds.   Pulmonary:      Effort: Pulmonary effort is normal. No respiratory distress.      Breath sounds: Normal breath sounds. No wheezing or rales.   Chest:      Chest wall: No tenderness.   Abdominal:      General: Bowel sounds are normal. There is no distension.      Palpations: Abdomen is soft. There is no mass.      Tenderness: There is no abdominal tenderness. There is no guarding or rebound.   Musculoskeletal:         General: Normal range of motion.      Cervical back: Normal range of motion and neck supple.      Comments: Comes in wheel chair   Lymphadenopathy:      Cervical: No cervical adenopathy.   Skin:     General: Skin is warm and dry.             Comments: Scabbed rash of shingles   Neurological:      Mental Status: She is alert and oriented to person, place, and time.      Cranial Nerves: No cranial nerve deficit.      Motor: No abnormal muscle tone.      Coordination: Coordination normal.      Deep Tendon Reflexes: Reflexes are normal and symmetric. Reflexes normal.      Comments: CN: Optic discs are flat with normal vasculature, PERRL, Extraoccular movements and visual fields are full. Normal facial sensation and strength, Hearing symmetric, Tongue and Palate are midline and strong. Shoulder Shrug symmetric and strong.          Assessment/Plan:     1. Debility  He was recently admitted to the hospital for UTI and debility and later on was discharged to nursing home for rehabbing.  She stayed there for 4 weeks and now she is back home with home health and home physical therapy.      2. Chronic saddle pulmonary embolism without acute cor pulmonale  Continue anticoagulants Xarelto 15 mg once a day      3. Morbid obesity  She has limited mobility unfortunately an is harder for her to lose weight      4. Malignant neoplasm of uterus, unspecified site  2008  Total abdominal  hysterectomy  2008  Total abdominal hysterectomy w/ bilateral salpingoophorectomy         Assessment & Plan    I26.92, I27.82 Chronic pulmonary embolism  I10 Essential (primary) hypertension  E03.9 Hypothyroidism, unspecified  M10.9 Gout, unspecified  R53.81 Other malaise  Z99.3 Dependence on wheelchair  Z79.01 Long term (current) use of anticoagulants  Z79.891 Long term (current) use of opiate analgesic  Z87.440 Personal history of urinary (tract) infections    IMPRESSION:  - Discharged from nursing home 6 days ago after month-long stay for physical therapy and rehabilitation following hospitalization for UTI in February.  - Assessed progress with mobility and functional status post-discharge.  - Monitoring for recurrence of UTI symptoms.    CHRONIC PULMONARY EMBOLISM:  - Continue Xarelto at current dose for treating and preventing blood clots, including pulmonary embolism.  - Instructed patient to maintain compliance with the prescribed regimen.    HYPERTENSION:  - Continue blood pressure medications at current doses.  - Advised patient to maintain adherence to the prescribed antihypertensive regimen.    HYPOTHYROIDISM:  - Continue thyroid medication at current dose.  - Instructed patient to maintain compliance with the prescribed thyroid hormone replacement therapy.    GOUT:  - Continue allopurinol at current dose for gout management.  - Advised patient to maintain adherence to the prescribed regimen.    DEBILITATION AND MOBILITY:  - Noted patient's history of debilitation and subsequent physical therapy in a nursing home.  - Observed improvement in condition, although still dependent on wheelchair for mobility.  - Assessed that patient has reached maximum improvement.  - Continue physical therapy and home health care to maintain functional gains.  - Advised patient to consistently engage in prescribed mobility exercises and therapies.    CHRONIC PAIN MANAGEMENT:  - Continue chronic pain medication (Narcos) at  current dose.  - Noted that patient reports good pain management with current medication.  - Advised patient to maintain adherence to the prescribed regimen.    URINARY TRACT INFECTION HISTORY:  - Noted patient's history of UTI in February requiring hospitalization.  - Inquired about current UTI symptoms.  - Educated patient about the importance of early detection of UTI symptoms and instructed to report any signs or symptoms promptly.    FOLLOW-UP AND ADDITIONAL INFORMATION:  - Continue vitamins at current doses.  - Ordered labs for May 8th.  - Follow up on May 13th as scheduled.          This note was generated with the assistance of ambient listening technology. Verbal consent was obtained by the patient and accompanying visitor(s) for the recording of patient appointment to facilitate this note. I attest to having reviewed and edited the generated note for accuracy, though some syntax or spelling errors may persist. Please contact the author of this note for any clarification.

## 2025-05-08 ENCOUNTER — LAB VISIT (OUTPATIENT)
Dept: LAB | Facility: HOSPITAL | Age: 89
End: 2025-05-08
Attending: INTERNAL MEDICINE
Payer: MEDICARE

## 2025-05-08 DIAGNOSIS — E11.69 HYPERLIPIDEMIA ASSOCIATED WITH TYPE 2 DIABETES MELLITUS: ICD-10-CM

## 2025-05-08 DIAGNOSIS — E11.9 CONTROLLED TYPE 2 DIABETES MELLITUS WITHOUT COMPLICATION, WITHOUT LONG-TERM CURRENT USE OF INSULIN: ICD-10-CM

## 2025-05-08 DIAGNOSIS — E78.5 HYPERLIPIDEMIA ASSOCIATED WITH TYPE 2 DIABETES MELLITUS: ICD-10-CM

## 2025-05-08 DIAGNOSIS — N18.31 STAGE 3A CHRONIC KIDNEY DISEASE: ICD-10-CM

## 2025-05-08 DIAGNOSIS — I10 PRIMARY HYPERTENSION: ICD-10-CM

## 2025-05-08 DIAGNOSIS — I50.32 CHRONIC DIASTOLIC HEART FAILURE: ICD-10-CM

## 2025-05-08 DIAGNOSIS — M17.0 PRIMARY OSTEOARTHRITIS OF BOTH KNEES: ICD-10-CM

## 2025-05-08 LAB
ABSOLUTE EOSINOPHIL (OHS): 0.27 K/UL
ABSOLUTE MONOCYTE (OHS): 0.55 K/UL (ref 0.3–1)
ABSOLUTE NEUTROPHIL COUNT (OHS): 6.62 K/UL (ref 1.8–7.7)
ALBUMIN SERPL BCP-MCNC: 3.2 G/DL (ref 3.5–5.2)
ALBUMIN/CREAT UR: 376.1 UG/MG
ALP SERPL-CCNC: 97 UNIT/L (ref 40–150)
ALT SERPL W/O P-5'-P-CCNC: 15 UNIT/L (ref 10–44)
ANION GAP (OHS): 10 MMOL/L (ref 8–16)
AST SERPL-CCNC: 11 UNIT/L (ref 11–45)
BASOPHILS # BLD AUTO: 0.04 K/UL
BASOPHILS NFR BLD AUTO: 0.4 %
BILIRUB SERPL-MCNC: 0.4 MG/DL (ref 0.1–1)
BNP SERPL-MCNC: 124 PG/ML (ref 0–99)
BUN SERPL-MCNC: 35 MG/DL (ref 8–23)
CALCIUM SERPL-MCNC: 8.8 MG/DL (ref 8.7–10.5)
CHLORIDE SERPL-SCNC: 108 MMOL/L (ref 95–110)
CHOLEST SERPL-MCNC: 150 MG/DL (ref 120–199)
CHOLEST/HDLC SERPL: 3.4 {RATIO} (ref 2–5)
CO2 SERPL-SCNC: 23 MMOL/L (ref 23–29)
CREAT SERPL-MCNC: 1.4 MG/DL (ref 0.5–1.4)
CREAT UR-MCNC: 22.6 MG/DL (ref 15–325)
EAG (OHS): 120 MG/DL (ref 68–131)
ERYTHROCYTE [DISTWIDTH] IN BLOOD BY AUTOMATED COUNT: 15.1 % (ref 11.5–14.5)
GFR SERPLBLD CREATININE-BSD FMLA CKD-EPI: 36 ML/MIN/1.73/M2
GLUCOSE SERPL-MCNC: 103 MG/DL (ref 70–110)
HBA1C MFR BLD: 5.8 % (ref 4–5.6)
HCT VFR BLD AUTO: 32.4 % (ref 37–48.5)
HDLC SERPL-MCNC: 44 MG/DL (ref 40–75)
HDLC SERPL: 29.3 % (ref 20–50)
HGB BLD-MCNC: 10 GM/DL (ref 12–16)
IMM GRANULOCYTES # BLD AUTO: 0.06 K/UL (ref 0–0.04)
IMM GRANULOCYTES NFR BLD AUTO: 0.6 % (ref 0–0.5)
LDLC SERPL CALC-MCNC: 70.8 MG/DL (ref 63–159)
LYMPHOCYTES # BLD AUTO: 2.2 K/UL (ref 1–4.8)
MCH RBC QN AUTO: 28.7 PG (ref 27–31)
MCHC RBC AUTO-ENTMCNC: 30.9 G/DL (ref 32–36)
MCV RBC AUTO: 93 FL (ref 82–98)
MICROALBUMIN UR-MCNC: 85 UG/ML (ref ?–5000)
NONHDLC SERPL-MCNC: 106 MG/DL
NUCLEATED RBC (/100WBC) (OHS): 0 /100 WBC
PLATELET # BLD AUTO: 312 K/UL (ref 150–450)
PMV BLD AUTO: 9.8 FL (ref 9.2–12.9)
POTASSIUM SERPL-SCNC: 4.6 MMOL/L (ref 3.5–5.1)
PROT SERPL-MCNC: 7.5 GM/DL (ref 6–8.4)
RBC # BLD AUTO: 3.48 M/UL (ref 4–5.4)
RELATIVE EOSINOPHIL (OHS): 2.8 %
RELATIVE LYMPHOCYTE (OHS): 22.6 % (ref 18–48)
RELATIVE MONOCYTE (OHS): 5.6 % (ref 4–15)
RELATIVE NEUTROPHIL (OHS): 68 % (ref 38–73)
SODIUM SERPL-SCNC: 141 MMOL/L (ref 136–145)
TRIGL SERPL-MCNC: 176 MG/DL (ref 30–150)
TSH SERPL-ACNC: 2.38 UIU/ML (ref 0.4–4)
WBC # BLD AUTO: 9.74 K/UL (ref 3.9–12.7)

## 2025-05-08 PROCEDURE — 36415 COLL VENOUS BLD VENIPUNCTURE: CPT | Mod: HCNC

## 2025-05-08 PROCEDURE — 85025 COMPLETE CBC W/AUTO DIFF WBC: CPT | Mod: HCNC

## 2025-05-08 PROCEDURE — 82040 ASSAY OF SERUM ALBUMIN: CPT | Mod: HCNC

## 2025-05-08 PROCEDURE — 83880 ASSAY OF NATRIURETIC PEPTIDE: CPT | Mod: HCNC

## 2025-05-08 PROCEDURE — 84443 ASSAY THYROID STIM HORMONE: CPT | Mod: HCNC

## 2025-05-08 PROCEDURE — 82570 ASSAY OF URINE CREATININE: CPT | Mod: HCNC

## 2025-05-08 PROCEDURE — 83036 HEMOGLOBIN GLYCOSYLATED A1C: CPT | Mod: HCNC

## 2025-05-08 PROCEDURE — 82465 ASSAY BLD/SERUM CHOLESTEROL: CPT | Mod: HCNC

## 2025-05-08 RX ORDER — HYDROCODONE BITARTRATE AND ACETAMINOPHEN 7.5; 325 MG/1; MG/1
1 TABLET ORAL
Qty: 30 TABLET | Refills: 0 | Status: SHIPPED | OUTPATIENT
Start: 2025-05-08

## 2025-05-08 RX ORDER — OMEGA-3-ACID ETHYL ESTERS 1 G/1
1 CAPSULE, LIQUID FILLED ORAL 2 TIMES DAILY
Qty: 90 CAPSULE | Refills: 1 | Status: SHIPPED | OUTPATIENT
Start: 2025-05-08

## 2025-05-08 NOTE — TELEPHONE ENCOUNTER
----- Message from Connie sent at 5/8/2025 11:27 AM CDT -----  Contact: pt  Tonya Meierjenna RushN: [id number]: 1935PCP: Tasha Atkins.Home Phone      [phone] Phone      Not on file.Mobile          [phone]: pt needs the following prescriptions refilled omega-3 acid ethyl esters (LOVAZA) 1 gram capsuleHYDROcodone-acetaminophen (NORCO) 7.5-325 mg [facility]Ell[facility] KEITH [address] [address]Yyehz: [phone] Fax: [phone]: Not open 24 [phone]

## 2025-05-08 NOTE — TELEPHONE ENCOUNTER
Care Due:                  Date            Visit Type   Department     Provider  --------------------------------------------------------------------------------                                [facility] INTERNAL  Last Visit: 04-      FOLLOW UP    MEDICINE CATALINO Atkins                               -                              PRIMARY      [facility] INTERNAL  Next Visit: 05-      CARE ([facility])   MEDICINE CATALINO Atkins                                                            Last  Test          Frequency    Reason                     Performed    Due Date  --------------------------------------------------------------------------------    Uric Acid...  12 months..  allopurinoL..............  06- 06-    Health [facility] Embedded Care Due Messages. Reference number: [id number].   5/08/2025 11:34:40 AM CDT

## 2025-05-13 ENCOUNTER — TELEPHONE (OUTPATIENT)
Dept: INTERNAL MEDICINE | Facility: CLINIC | Age: 89
End: 2025-05-13

## 2025-05-13 ENCOUNTER — OFFICE VISIT (OUTPATIENT)
Dept: INTERNAL MEDICINE | Facility: CLINIC | Age: 89
End: 2025-05-13
Payer: MEDICARE

## 2025-05-13 ENCOUNTER — RESULTS FOLLOW-UP (OUTPATIENT)
Dept: HEPATOLOGY | Facility: HOSPITAL | Age: 89
End: 2025-05-13

## 2025-05-13 VITALS
OXYGEN SATURATION: 97 % | HEART RATE: 67 BPM | SYSTOLIC BLOOD PRESSURE: 110 MMHG | RESPIRATION RATE: 16 BRPM | BODY MASS INDEX: 36.37 KG/M2 | HEIGHT: 68 IN | WEIGHT: 240 LBS | DIASTOLIC BLOOD PRESSURE: 70 MMHG

## 2025-05-13 DIAGNOSIS — M17.0 PRIMARY OSTEOARTHRITIS OF BOTH KNEES: ICD-10-CM

## 2025-05-13 DIAGNOSIS — I48.0 PAROXYSMAL ATRIAL FIBRILLATION: ICD-10-CM

## 2025-05-13 DIAGNOSIS — C53.9 MALIGNANT NEOPLASM OF CERVIX, UNSPECIFIED SITE: ICD-10-CM

## 2025-05-13 DIAGNOSIS — Z91.81 AT HIGH RISK FOR FALLS: ICD-10-CM

## 2025-05-13 DIAGNOSIS — E78.5 HYPERLIPIDEMIA ASSOCIATED WITH TYPE 2 DIABETES MELLITUS: ICD-10-CM

## 2025-05-13 DIAGNOSIS — E11.9 CONTROLLED TYPE 2 DIABETES MELLITUS WITHOUT COMPLICATION, WITHOUT LONG-TERM CURRENT USE OF INSULIN: Primary | ICD-10-CM

## 2025-05-13 DIAGNOSIS — E11.69 HYPERLIPIDEMIA ASSOCIATED WITH TYPE 2 DIABETES MELLITUS: ICD-10-CM

## 2025-05-13 PROCEDURE — 1101F PT FALLS ASSESS-DOCD LE1/YR: CPT | Mod: CPTII,HCNC,S$GLB, | Performed by: INTERNAL MEDICINE

## 2025-05-13 PROCEDURE — 99214 OFFICE O/P EST MOD 30 MIN: CPT | Mod: HCNC,S$GLB,, | Performed by: INTERNAL MEDICINE

## 2025-05-13 PROCEDURE — 99999 PR PBB SHADOW E&M-EST. PATIENT-LVL IV: CPT | Mod: PBBFAC,HCNC,, | Performed by: INTERNAL MEDICINE

## 2025-05-13 PROCEDURE — G2211 COMPLEX E/M VISIT ADD ON: HCPCS | Mod: HCNC,S$GLB,, | Performed by: INTERNAL MEDICINE

## 2025-05-13 PROCEDURE — 1125F AMNT PAIN NOTED PAIN PRSNT: CPT | Mod: CPTII,HCNC,S$GLB, | Performed by: INTERNAL MEDICINE

## 2025-05-13 PROCEDURE — 1159F MED LIST DOCD IN RCRD: CPT | Mod: CPTII,HCNC,S$GLB, | Performed by: INTERNAL MEDICINE

## 2025-05-13 PROCEDURE — 3288F FALL RISK ASSESSMENT DOCD: CPT | Mod: CPTII,HCNC,S$GLB, | Performed by: INTERNAL MEDICINE

## 2025-05-13 PROCEDURE — 1160F RVW MEDS BY RX/DR IN RCRD: CPT | Mod: CPTII,HCNC,S$GLB, | Performed by: INTERNAL MEDICINE

## 2025-05-13 RX ORDER — OMEGA-3-ACID ETHYL ESTERS 1 G/1
1 CAPSULE, LIQUID FILLED ORAL 2 TIMES DAILY
Qty: 90 CAPSULE | Refills: 1 | Status: SHIPPED | OUTPATIENT
Start: 2025-05-13

## 2025-05-13 RX ORDER — HYDROCODONE BITARTRATE AND ACETAMINOPHEN 7.5; 325 MG/1; MG/1
1 TABLET ORAL
Qty: 30 TABLET | Refills: 0 | Status: SHIPPED | OUTPATIENT
Start: 2025-05-13

## 2025-05-13 NOTE — PROGRESS NOTES
Subjective:   History of Present Illness    CHIEF COMPLAINT:  Tonya presents today for six month follow up    BLOOD PRESSURE:  She reports home blood pressure readings with systolic ranging from 118-165 mmHg and diastolic ranging from 49-62 mmHg. Heart rate ranges between 50-70 BPM. She continues Losartan 50mg for management.    LABS:  Hemoglobin is 10, indicating persistent anemia over past two months. She denies blood loss. eGFR is 36, consistent with stable chronic kidney disease. BNP has improved from 192 two months ago to 124. A1C has improved from 6.6 to 5.8.    MOBILITY:  She reports inability to manage mobility with a cane and is currently using a borrowed wheelchair for ambulation.    MEDICAL HISTORY:  She has history of cervical cancer treated with hysterectomy 16 years ago with complete resection.       Review of Systems   Constitutional:  Negative for chills and fever.   HENT:  Negative for congestion, hearing loss, sinus pressure and sore throat.    Eyes:  Negative for photophobia.   Respiratory:  Negative for cough, choking, chest tightness and wheezing.    Cardiovascular:  Negative for chest pain and palpitations.   Gastrointestinal:  Negative for blood in stool, nausea and vomiting.   Genitourinary:  Negative for dysuria and hematuria.   Musculoskeletal:  Positive for back pain, gait problem, joint swelling and myalgias. Negative for arthralgias.   Skin:  Negative for pallor.   Neurological:  Negative for dizziness and numbness.   Hematological:  Does not bruise/bleed easily.   Psychiatric/Behavioral:  Negative for confusion and suicidal ideas. The patient is not nervous/anxious.       Objective:   Physical Exam  Vitals and nursing note reviewed.   Constitutional:       Appearance: She is well-developed.   HENT:      Head: Normocephalic and atraumatic.      Right Ear: External ear normal.      Left Ear: External ear normal.   Eyes:      Conjunctiva/sclera: Conjunctivae normal.      Pupils: Pupils are  equal, round, and reactive to light.   Neck:      Thyroid: No thyromegaly.      Vascular: No JVD.      Trachea: No tracheal deviation.   Cardiovascular:      Rate and Rhythm: Normal rate and regular rhythm.      Heart sounds: Normal heart sounds.   Pulmonary:      Effort: Pulmonary effort is normal. No respiratory distress.      Breath sounds: Normal breath sounds. No wheezing or rales.   Chest:      Chest wall: No tenderness.   Abdominal:      General: Bowel sounds are normal. There is no distension.      Palpations: Abdomen is soft. There is no mass.      Tenderness: There is no abdominal tenderness. There is no guarding or rebound.   Musculoskeletal:         General: Normal range of motion.      Cervical back: Normal range of motion and neck supple.      Comments: Difficulty in walking.  Fall risk.  Comes in the clinic 1 wheelchair.  Needs her own wheelchair   Lymphadenopathy:      Cervical: No cervical adenopathy.   Skin:     General: Skin is warm and dry.   Neurological:      Mental Status: She is alert and oriented to person, place, and time.      Cranial Nerves: No cranial nerve deficit.      Motor: No abnormal muscle tone.      Coordination: Coordination normal.      Deep Tendon Reflexes: Reflexes are normal and symmetric. Reflexes normal.        Assessment/Plan:     1. Controlled type 2 diabetes mellitus without complication, without long-term current use of insulin  CBC Auto Differential    Comprehensive Metabolic Panel    Lipid Panel    TSH    Hemoglobin A1C    Microalbumin/Creatinine Ratio, Urine      2. Malignant neoplasm of cervix, unspecified site        3. Primary osteoarthritis of both knees  HYDROcodone-acetaminophen (NORCO) 7.5-325 mg per tablet    WHEELCHAIR FOR HOME USE      4. Hyperlipidemia associated with type 2 diabetes mellitus  omega-3 acid ethyl esters (LOVAZA) 1 gram capsule    Lipid Panel      5. Paroxysmal atrial fibrillation  TSH      6. 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    ESSENTIAL HYPERTENSION:  - Monitored the patient's home blood pressure readings, which include 146/53, 118/49, 133/60, and 165/62 with heart rate in the 50s to 70s.  - Overall blood pressure control is good despite a few high readings.  - Continued Losartan 50 mg as it is effectively managing the patient's hypertension.    HYPERTENSIVE HEART AND CHRONIC KIDNEY DISEASE WITH HEART FAILURE:  - Monitored eGFR at 36, stable between 33 to 40, indicating stable chronic kidney disease.  - BNP has decreased from 192 two months ago to 124 currently, suggesting improvement in heart failure status.  - Continued Losartan 50 mg as it is effectively managing both conditions.    TYPE 2 DIABETES MELLITUS WITH DIABETIC CHRONIC KIDNEY DISEASE:  - Monitored A1C decrease from 6.6 to 5.8, indicating well-controlled diabetes with improved glycemic management.  - Kidney function remains stable with eGFR between 33 to 40, indicating stable diabetic chronic kidney disease.    ANEMIA IN CHRONIC KIDNEY DISEASE:  - Monitored hemoglobin levels at 10, running between 10 and 11 for the last 2 months, indicating persistent but stable anemia related to chronic kidney disease.    CHRONIC PAIN  MANAGEMENT:  - Advised the patient to use half the pain pill (3.75 mg instead of 7.5 mg) to slowly wean off medication.    WHEELCHAIR DEPENDENCE:  - Evaluated the patient's need for wheelchair based on inability to walk independently and fall risk.  - Prescribed a manual wheelchair (no wider than 18 inches) for mobility outside the home.    OTHER MANAGEMENT:  - Started Omega-3 acid prescription, sent to [facility]        W/C for home use ordered.       Tonya Bucio has a mobility limitation that significantly impairs her ability to participate in one or more mobility related activities of daily living (MRADLs) such as toileting, feeding, dressing, grooming, and bathing in customary locations in the home. The mobility limitation cannot be sufficiently resolved by the use of a cane or walker. The use of a manual wheelchair will significantly improve the patients ability to participate in MRADLS and the patient will use it on regular basis in the home.  Tonya Bucio has expressed her willingness to use a manual wheelchair in the home. Patients upper body strength is sufficient for propulsion.  She also has a caregiver who is available, willing, and able to provide assistance with the wheelchair when needed.

## 2025-05-13 NOTE — TELEPHONE ENCOUNTER
Message received from home health today:  Anne Martines Mohammad Q., MD; [facility] Staff  PRACTITIONER NAME: DR LEIVA    PATIENT RECEIVING  HOME HEALTH SERVICES FOR: HYPERTENSIVE HEART DISEASE WITH HEART FAILURE    ASSESSMENT/CURRENT REPORTED PROBLEM/FINDINGS: PATIENT IS REQUESTING AN ORDER FOR MANUAL WHEELCHAIR FOR MOBILITY OUTSIDE OF HOME. CAN WE GET AN ORDER SENT TO DME OF YOUR CHOICE? THANK YOU.    W/C for home use ordered. Please add the following statement to note dated 4/10/25:  Tonya Bucio has a mobility limitation that significantly impairs her ability to participate in one or more mobility related activities of daily living (MRADLs) such as toileting, feeding, dressing, grooming, and bathing in customary locations in the home. The mobility limitation cannot be sufficiently resolved by the use of a cane or walker. The use of a manual wheelchair will significantly improve the patients ability to participate in MRADLS and the patient will use it on regular basis in the home.  Tonya Bucio has expressed her willingness to use a manual wheelchair in the home. Patients upper body strength is sufficient for propulsion.  She also has a caregiver who is available, willing, and able to provide assistance with the wheelchair when needed.

## 2025-05-14 ENCOUNTER — TELEPHONE (OUTPATIENT)
Dept: INTERNAL MEDICINE | Facility: CLINIC | Age: 89
End: 2025-05-14
Payer: MEDICARE

## 2025-05-14 NOTE — TELEPHONE ENCOUNTER
[facility] with progress note from yesterday, insurance and demographic information.

## 2025-05-16 DIAGNOSIS — D64.9 ANEMIA, UNSPECIFIED TYPE: ICD-10-CM

## 2025-05-19 RX ORDER — FERROUS SULFATE 325(65) MG
TABLET, DELAYED RELEASE (ENTERIC COATED) ORAL
Qty: 90 TABLET | Refills: 0 | Status: SHIPPED | OUTPATIENT
Start: 2025-05-19

## 2025-05-27 ENCOUNTER — TELEPHONE (OUTPATIENT)
Dept: INTERNAL MEDICINE | Facility: CLINIC | Age: 89
End: 2025-05-27
Payer: MEDICARE

## 2025-05-27 NOTE — TELEPHONE ENCOUNTER
----- Message from Kenzie sent at 5/27/2025  2:53 PM CDT -----  Contact: pt  Tonya Meierjenna RushN: [id number]: 1935PCP: Tasha Atkins.Home Phone      [phone] Phone      Not on file.Mobile          [phone]: Pt requests a refill of the following medicationHYDROcodone-acetaminophen (NORCO) 7.5-325 [facility] [address] [place] [address] [address] [address]Ksxez: [phone] Fax: [phone]: Not open 24 [phone]

## 2025-06-06 ENCOUNTER — DOCUMENT SCAN (OUTPATIENT)
Dept: HOME HEALTH SERVICES | Facility: HOSPITAL | Age: 89
End: 2025-06-06
Payer: MEDICARE

## 2025-06-09 ENCOUNTER — EXTERNAL HOME HEALTH (OUTPATIENT)
Dept: HOME HEALTH SERVICES | Facility: HOSPITAL | Age: 89
End: 2025-06-09
Payer: MEDICARE

## 2025-07-02 DIAGNOSIS — M17.0 PRIMARY OSTEOARTHRITIS OF BOTH KNEES: ICD-10-CM

## 2025-07-02 RX ORDER — HYDROCODONE BITARTRATE AND ACETAMINOPHEN 7.5; 325 MG/1; MG/1
1 TABLET ORAL
Qty: 30 TABLET | Refills: 0 | Status: SHIPPED | OUTPATIENT
Start: 2025-07-02

## 2025-07-02 NOTE — TELEPHONE ENCOUNTER
----- Message from Kenzie sent at 2025 10:37 AM CDT -----  Contact: pt  Tonya Bucio  MRN: [id number]  : 1935  PCP: Tasha Atkins  Home Phone      [phone]  Work Phone      Not on file.  Mobile          [phone]      MESSAGE:     Pt requests a refill on the following medication    HYDROcodone-acetaminophen (NORCO) 7.5-325 mg per tablet    [facility] [address] [place] [address] [address] [address]  Phone: [phone] Fax: [phone]  Hours: Not open 24 hours    [phone]

## 2025-07-02 NOTE — TELEPHONE ENCOUNTER
Please fill in Dr. Atkins's absence.    LOV 5/13/2025  Scheduled visit 8/14/2025    Requested Prescriptions     Pending Prescriptions Disp Refills    HYDROcodone-acetaminophen (NORCO) 7.5-325 mg per tablet 30 tablet 0     Sig: Take 1 tablet by mouth every 24 hours as needed for Pain.

## 2025-07-02 NOTE — TELEPHONE ENCOUNTER
Care Due:                  Date            Visit Type   Department     Provider  --------------------------------------------------------------------------------                                EP -                              PRIMARY      [facility] INTER[facility]  Last Visit: 05-      CARE ([facility])   MEDICINE II    Tasha Atkins                              EP -                              PRIMARY      [facility] INTERNAL  Next Visit: 08-      CARE ([facility])   MEDICINE II    Tasha Atkins                                                            Last  Test          Frequency    Reason                     Performed    Due Date  --------------------------------------------------------------------------------    Uric Acid...  12 months..  allopurinoL..............  Not Found    Overdue    [employer] Embedded Care Due Messages. Reference number: [id number].   7/02/2025 10:42:10 AM CDT

## 2025-07-10 DIAGNOSIS — E03.4 HYPOTHYROIDISM DUE TO ACQUIRED ATROPHY OF THYROID: ICD-10-CM

## 2025-07-10 NOTE — TELEPHONE ENCOUNTER
----- Message from Kenzie sent at 7/10/2025 11:30 AM CDT -----  Contact: pt  Tonya Bucio  MRN: [id number]  : 1935  PCP: Tasha Atkins  Home Phone      [phone]  Work Phone      Not on file.  Mobile          [phone]        MESSAGE:     Pt requests a refill on the following medication    levothyroxine (SYNTHROID) 75 MCG tablet    [facility] TERRI KEITH [address] [address]  Phone: [phone] Fax: [phone]  Hours: Not open 24 hours    [phone]

## 2025-07-10 NOTE — TELEPHONE ENCOUNTER
No care due was identified.  [facility] Embedded Care Due Messages. Reference number: [id number].   7/10/2025 11:46:19 AM CDT

## 2025-07-14 DIAGNOSIS — E03.4 HYPOTHYROIDISM DUE TO ACQUIRED ATROPHY OF THYROID: ICD-10-CM

## 2025-07-14 RX ORDER — LEVOTHYROXINE SODIUM 75 UG/1
75 TABLET ORAL DAILY
Qty: 90 TABLET | Refills: 0 | Status: SHIPPED | OUTPATIENT
Start: 2025-07-14

## 2025-07-14 NOTE — TELEPHONE ENCOUNTER
No care due was identified.  [facility] Embedded Care Due Messages. Reference number: [id number].   7/14/2025 10:33:07 AM CDT

## 2025-07-15 RX ORDER — LEVOTHYROXINE SODIUM 75 UG/1
75 TABLET ORAL
Qty: 90 TABLET | Refills: 0 | OUTPATIENT
Start: 2025-07-15

## 2025-07-15 NOTE — TELEPHONE ENCOUNTER
Refill Decision Note   Tonya Bucio  is requesting a refill authorization.  Brief Assessment and Rationale for Refill:  Quick Discontinue     Medication Therapy Plan:         Comments:     Note composed:2:34 PM 07/15/2025

## 2025-07-22 ENCOUNTER — DOCUMENT SCAN (OUTPATIENT)
Dept: HOME HEALTH SERVICES | Facility: HOSPITAL | Age: 89
End: 2025-07-22
Payer: MEDICARE

## 2025-07-28 ENCOUNTER — TELEPHONE (OUTPATIENT)
Dept: INTERNAL MEDICINE | Facility: CLINIC | Age: 89
End: 2025-07-28
Payer: MEDICARE

## 2025-07-28 NOTE — TELEPHONE ENCOUNTER
----- Message from Mary sent at 2025 11:25 AM CDT -----  Contact: pt  Tonya Bucio  MRN: [id number]  : 1935  PCP: Tasha Atkins  Home Phone      [phone]  Work Phone      Not on file.  Mobile          [phone]      MESSAGE: Pt called for refill on levothyroxine (SYNTHROID) 75 MCG tablet.       [phone]    [facility] [address] [address]  Phone: [phone] Fax: [phone]  Hours: Not open 24 hours

## 2025-07-28 NOTE — TELEPHONE ENCOUNTER
This was filled on 7/14/25; verified per [facility] pharmacy that it is ready for . Patient notified.

## 2025-08-07 DIAGNOSIS — M17.0 PRIMARY OSTEOARTHRITIS OF BOTH KNEES: ICD-10-CM

## 2025-08-07 RX ORDER — HYDROCODONE BITARTRATE AND ACETAMINOPHEN 7.5; 325 MG/1; MG/1
1 TABLET ORAL
Qty: 30 TABLET | Refills: 0 | Status: SHIPPED | OUTPATIENT
Start: 2025-08-07

## 2025-08-07 NOTE — TELEPHONE ENCOUNTER
----- Message from Kenzie sent at 2025  2:05 PM CDT -----  Contact: pt  Tonya Bucio  MRN: [id number]  : 1935  PCP: Tasha Atkins  Home Phone      [phone]  Work Phone      Not on file.  Mobile          [phone]      MESSAGE:     Pt requests a refill on the following medication    HYDROcodone-acetaminophen (NORCO) 7.5-325 mg per tablet    [facility] KE[address] [address]  Phone: [phone] Fax: [phone]  Hours: Not open 24 hours

## 2025-08-07 NOTE — TELEPHONE ENCOUNTER
No care due was identified.  [facility] Embedded Care Due Messages. Reference number: [id number].   8/07/2025 2:10:39 PM CDT

## 2025-08-14 ENCOUNTER — LAB VISIT (OUTPATIENT)
Dept: LAB | Facility: HOSPITAL | Age: 89
End: 2025-08-14
Attending: INTERNAL MEDICINE
Payer: MEDICARE

## 2025-08-14 ENCOUNTER — OFFICE VISIT (OUTPATIENT)
Dept: INTERNAL MEDICINE | Facility: CLINIC | Age: 89
End: 2025-08-14
Payer: MEDICARE

## 2025-08-14 VITALS
BODY MASS INDEX: 36.37 KG/M2 | DIASTOLIC BLOOD PRESSURE: 60 MMHG | HEIGHT: 68 IN | SYSTOLIC BLOOD PRESSURE: 100 MMHG | OXYGEN SATURATION: 96 % | WEIGHT: 240 LBS | HEART RATE: 62 BPM | RESPIRATION RATE: 16 BRPM

## 2025-08-14 DIAGNOSIS — M17.0 PRIMARY OSTEOARTHRITIS OF BOTH KNEES: ICD-10-CM

## 2025-08-14 DIAGNOSIS — I12.9 HYPERTENSIVE RENAL DISEASE: Primary | ICD-10-CM

## 2025-08-14 LAB
ABSOLUTE EOSINOPHIL (OHS): 0.37 K/UL
ABSOLUTE MONOCYTE (OHS): 0.71 K/UL (ref 0.3–1)
ABSOLUTE NEUTROPHIL COUNT (OHS): 6.18 K/UL (ref 1.8–7.7)
ALBUMIN SERPL BCP-MCNC: 3.3 G/DL (ref 3.5–5.2)
ANION GAP (OHS): 12 MMOL/L (ref 8–16)
BASOPHILS # BLD AUTO: 0.03 K/UL
BASOPHILS NFR BLD AUTO: 0.3 %
BUN SERPL-MCNC: 45 MG/DL (ref 8–23)
CALCIUM SERPL-MCNC: 8.9 MG/DL (ref 8.7–10.5)
CHLORIDE SERPL-SCNC: 106 MMOL/L (ref 95–110)
CO2 SERPL-SCNC: 24 MMOL/L (ref 23–29)
CREAT SERPL-MCNC: 1.4 MG/DL (ref 0.5–1.4)
ERYTHROCYTE [DISTWIDTH] IN BLOOD BY AUTOMATED COUNT: 15.7 % (ref 11.5–14.5)
GFR SERPLBLD CREATININE-BSD FMLA CKD-EPI: 36 ML/MIN/1.73/M2
GLUCOSE SERPL-MCNC: 115 MG/DL (ref 70–110)
HCT VFR BLD AUTO: 33.4 % (ref 37–48.5)
HGB BLD-MCNC: 10.5 GM/DL (ref 12–16)
IMM GRANULOCYTES # BLD AUTO: 0.1 K/UL (ref 0–0.04)
IMM GRANULOCYTES NFR BLD AUTO: 1.1 % (ref 0–0.5)
LYMPHOCYTES # BLD AUTO: 2 K/UL (ref 1–4.8)
MCH RBC QN AUTO: 28.8 PG (ref 27–31)
MCHC RBC AUTO-ENTMCNC: 31.4 G/DL (ref 32–36)
MCV RBC AUTO: 92 FL (ref 82–98)
NUCLEATED RBC (/100WBC) (OHS): 0 /100 WBC
PHOSPHATE SERPL-MCNC: 3.6 MG/DL (ref 2.7–4.5)
PLATELET # BLD AUTO: 286 K/UL (ref 150–450)
PMV BLD AUTO: 9.9 FL (ref 9.2–12.9)
POTASSIUM SERPL-SCNC: 4.5 MMOL/L (ref 3.5–5.1)
PTH-INTACT SERPL-MCNC: 333.1 PG/ML (ref 9–77)
RBC # BLD AUTO: 3.65 M/UL (ref 4–5.4)
RELATIVE EOSINOPHIL (OHS): 3.9 %
RELATIVE LYMPHOCYTE (OHS): 21.3 % (ref 18–48)
RELATIVE MONOCYTE (OHS): 7.6 % (ref 4–15)
RELATIVE NEUTROPHIL (OHS): 65.8 % (ref 38–73)
SODIUM SERPL-SCNC: 142 MMOL/L (ref 136–145)
WBC # BLD AUTO: 9.39 K/UL (ref 3.9–12.7)

## 2025-08-14 PROCEDURE — 99999 PR PBB SHADOW E&M-EST. PATIENT-LVL IV: CPT | Mod: PBBFAC,HCNC,, | Performed by: INTERNAL MEDICINE

## 2025-08-14 PROCEDURE — 1126F AMNT PAIN NOTED NONE PRSNT: CPT | Mod: CPTII,HCNC,S$GLB, | Performed by: INTERNAL MEDICINE

## 2025-08-14 PROCEDURE — 82310 ASSAY OF CALCIUM: CPT | Mod: HCNC

## 2025-08-14 PROCEDURE — 3288F FALL RISK ASSESSMENT DOCD: CPT | Mod: CPTII,HCNC,S$GLB, | Performed by: INTERNAL MEDICINE

## 2025-08-14 PROCEDURE — G2211 COMPLEX E/M VISIT ADD ON: HCPCS | Mod: HCNC,S$GLB,, | Performed by: INTERNAL MEDICINE

## 2025-08-14 PROCEDURE — 1160F RVW MEDS BY RX/DR IN RCRD: CPT | Mod: CPTII,HCNC,S$GLB, | Performed by: INTERNAL MEDICINE

## 2025-08-14 PROCEDURE — 85025 COMPLETE CBC W/AUTO DIFF WBC: CPT | Mod: HCNC

## 2025-08-14 PROCEDURE — 36415 COLL VENOUS BLD VENIPUNCTURE: CPT | Mod: HCNC

## 2025-08-14 PROCEDURE — 99213 OFFICE O/P EST LOW 20 MIN: CPT | Mod: HCNC,S$GLB,, | Performed by: INTERNAL MEDICINE

## 2025-08-14 PROCEDURE — 83970 ASSAY OF PARATHORMONE: CPT | Mod: HCNC

## 2025-08-14 PROCEDURE — 1159F MED LIST DOCD IN RCRD: CPT | Mod: CPTII,HCNC,S$GLB, | Performed by: INTERNAL MEDICINE

## 2025-08-14 PROCEDURE — 1101F PT FALLS ASSESS-DOCD LE1/YR: CPT | Mod: CPTII,HCNC,S$GLB, | Performed by: INTERNAL MEDICINE

## 2025-08-22 DIAGNOSIS — D64.9 ANEMIA, UNSPECIFIED TYPE: ICD-10-CM

## 2025-08-25 RX ORDER — FERROUS SULFATE 325(65) MG
TABLET, DELAYED RELEASE (ENTERIC COATED) ORAL
Qty: 90 TABLET | Refills: 0 | Status: SHIPPED | OUTPATIENT
Start: 2025-08-25